# Patient Record
Sex: FEMALE | Race: WHITE | NOT HISPANIC OR LATINO | Employment: OTHER | URBAN - METROPOLITAN AREA
[De-identification: names, ages, dates, MRNs, and addresses within clinical notes are randomized per-mention and may not be internally consistent; named-entity substitution may affect disease eponyms.]

---

## 2017-01-09 RX ORDER — TIOTROPIUM BROMIDE 18 UG/1
CAPSULE ORAL; RESPIRATORY (INHALATION)
Qty: 90 CAPSULE | Refills: 3 | Status: SHIPPED | OUTPATIENT
Start: 2017-01-09 | End: 2017-05-08 | Stop reason: SDUPTHER

## 2017-01-11 RX ORDER — PROMETHAZINE HYDROCHLORIDE AND CODEINE PHOSPHATE 6.25; 1 MG/5ML; MG/5ML
SOLUTION ORAL
Qty: 120 ML | Refills: 0 | OUTPATIENT
Start: 2017-01-11

## 2017-01-12 ENCOUNTER — LAB VISIT (OUTPATIENT)
Dept: LAB | Facility: HOSPITAL | Age: 77
End: 2017-01-12
Attending: INTERNAL MEDICINE
Payer: MEDICARE

## 2017-01-12 ENCOUNTER — OFFICE VISIT (OUTPATIENT)
Dept: PULMONOLOGY | Facility: CLINIC | Age: 77
End: 2017-01-12
Payer: MEDICARE

## 2017-01-12 VITALS
DIASTOLIC BLOOD PRESSURE: 76 MMHG | HEIGHT: 62 IN | WEIGHT: 130.31 LBS | HEART RATE: 99 BPM | SYSTOLIC BLOOD PRESSURE: 128 MMHG | BODY MASS INDEX: 23.98 KG/M2 | OXYGEN SATURATION: 96 %

## 2017-01-12 DIAGNOSIS — R09.89 CHRONIC SINUS COMPLAINTS: ICD-10-CM

## 2017-01-12 DIAGNOSIS — D84.9 IMMUNE DEFICIENCY DISORDER: ICD-10-CM

## 2017-01-12 DIAGNOSIS — K21.9 GASTROESOPHAGEAL REFLUX DISEASE WITHOUT ESOPHAGITIS: ICD-10-CM

## 2017-01-12 DIAGNOSIS — J41.8 MIXED SIMPLE AND MUCOPURULENT CHRONIC BRONCHITIS: ICD-10-CM

## 2017-01-12 DIAGNOSIS — R05.3 CHRONIC COUGH: ICD-10-CM

## 2017-01-12 DIAGNOSIS — J41.8 MIXED SIMPLE AND MUCOPURULENT CHRONIC BRONCHITIS: Primary | ICD-10-CM

## 2017-01-12 LAB
IGA SERPL-MCNC: 134 MG/DL
IGG SERPL-MCNC: 999 MG/DL
IGM SERPL-MCNC: 78 MG/DL

## 2017-01-12 PROCEDURE — 82784 ASSAY IGA/IGD/IGG/IGM EACH: CPT

## 2017-01-12 PROCEDURE — 82784 ASSAY IGA/IGD/IGG/IGM EACH: CPT | Mod: 59

## 2017-01-12 PROCEDURE — 99205 OFFICE O/P NEW HI 60 MIN: CPT | Mod: S$PBB,,, | Performed by: INTERNAL MEDICINE

## 2017-01-12 PROCEDURE — 36415 COLL VENOUS BLD VENIPUNCTURE: CPT

## 2017-01-12 PROCEDURE — 86648 DIPHTHERIA ANTIBODY: CPT

## 2017-01-12 PROCEDURE — 99213 OFFICE O/P EST LOW 20 MIN: CPT | Mod: PBBFAC,PO | Performed by: INTERNAL MEDICINE

## 2017-01-12 PROCEDURE — 99999 PR PBB SHADOW E&M-EST. PATIENT-LVL III: CPT | Mod: PBBFAC,,, | Performed by: INTERNAL MEDICINE

## 2017-01-12 RX ORDER — METOPROLOL SUCCINATE 25 MG/1
25 TABLET, EXTENDED RELEASE ORAL DAILY
COMMUNITY
Start: 2017-01-09 | End: 2021-05-18 | Stop reason: SDUPTHER

## 2017-01-12 RX ORDER — ALBUTEROL SULFATE 90 UG/1
AEROSOL, METERED RESPIRATORY (INHALATION)
Qty: 1 INHALER | Refills: 11 | Status: SHIPPED | OUTPATIENT
Start: 2017-01-12 | End: 2017-05-08 | Stop reason: SDUPTHER

## 2017-01-12 RX ORDER — FLUTICASONE FUROATE AND VILANTEROL 200; 25 UG/1; UG/1
1 POWDER RESPIRATORY (INHALATION) DAILY
Qty: 1 EACH | Refills: 11 | Status: SHIPPED | OUTPATIENT
Start: 2017-01-12 | End: 2017-05-08 | Stop reason: SDUPTHER

## 2017-01-12 RX ORDER — AZITHROMYCIN 500 MG/1
TABLET, FILM COATED ORAL
Qty: 3 TABLET | Refills: 3 | Status: SHIPPED | OUTPATIENT
Start: 2017-01-12 | End: 2017-02-09 | Stop reason: SDUPTHER

## 2017-01-12 RX ORDER — PREDNISONE 20 MG/1
TABLET ORAL
Qty: 12 TABLET | Refills: 0 | Status: SHIPPED | OUTPATIENT
Start: 2017-01-12 | End: 2017-03-02 | Stop reason: SDUPTHER

## 2017-01-12 RX ORDER — HYDROCODONE BITARTRATE AND ACETAMINOPHEN 5; 325 MG/1; MG/1
1 TABLET ORAL EVERY 6 HOURS PRN
Qty: 90 TABLET | Refills: 0 | Status: ON HOLD | OUTPATIENT
Start: 2017-01-12 | End: 2017-03-29

## 2017-01-12 NOTE — MR AVS SNAPSHOT
[place] [facility]  [address] [facility] [address]  [place] [address]  Phone: [phone]                  Darlin Tipton   2017 9:40 AM   Office Visit    Description:  Female : 1940   Provider:  Luis Uriostegui MD   Department:  [place] [facility]           Reason for Visit     Cough     Bronchitis     Sputum Production           Diagnoses this Visit        Comments    Mixed simple and mucopurulent chronic bronchitis    -  Primary     Chronic sinus complaints         Chronic cough         Gastroesophageal reflux disease without esophagitis                To Do List           Future Appointments        Provider Department Dept Phone    2017 11:00 AM MD [place] [facility] [phone]    3/2/2017 10:30 AM MD [facility] - [facility] [phone]      Goals (5 Years of Data)     None      Follow-Up and Disposition     Return in about 4 weeks (around 2017).    Follow-up and Disposition History       These Medications        Disp Refills Start End    predniSONE (DELTASONE) 20 MG tablet 12 tablet 0 2017     One daily for 3 days and repeat for flare of lung symptoms as intructed    Pharmacy: [facility] - [address] Ph #: [phone]       albuterol 90 mcg/actuation inhaler 1 Inhaler 11 2017     2 puffs every 4 hours as needed for cough, wheeze, or shortness of breath    Pharmacy: [facility] - [address] Ph #: [phone]       fluticasone-vilanterol (BREO ELLIPTA) 200-25 mcg/dose DsDv diskus inhaler 1 each 11 2017     Inhale 1 puff into the lungs once daily. - Inhalation    Pharmacy: [facility] - [address] Ph #: [phone]       azithromycin (ZITHROMAX) 500 MG tablet 3 tablet 3 2017     One daily for yellow mucous, repeat if needed    Pharmacy: [facility] -  [address] Ph #: [phone]       hydrocodone-acetaminophen 5-325mg (NORCO) 5-325 mg per tablet 90 tablet 0 2017     Take 1 tablet by mouth every 6 (six) hours as needed for Pain (cough). - Oral    Pharmacy: [facility] - [address] Ph #: [phone]         [facility] On [facility]     [facility] On Call Nurse Care Line -  Assistance  Registered nurses in the [facility] On Call Center provide clinical advisement, health education, appointment booking, and other advisory services.  Call for this free service at [phone].             Medications           Message regarding Medications     Verify the changes and/or additions to your medication regime listed below are the same as discussed with your clinician today.  If any of these changes or additions are incorrect, please notify your healthcare provider.        START taking these NEW medications        Refills    predniSONE (DELTASONE) 20 MG tablet 0    Sig: One daily for 3 days and repeat for flare of lung symptoms as intructed    Class: Normal    albuterol 90 mcg/actuation inhaler 11    Si puffs every 4 hours as needed for cough, wheeze, or shortness of breath    Class: Normal    fluticasone-vilanterol (BREO ELLIPTA) 200-25 mcg/dose DsDv diskus inhaler 11    Sig: Inhale 1 puff into the lungs once daily.    Class: Normal    Route: Inhalation    azithromycin (ZITHROMAX) 500 MG tablet 3    Sig: One daily for yellow mucous, repeat if needed    Class: Normal    hydrocodone-acetaminophen 5-325mg (NORCO) 5-325 mg per tablet 0    Sig: Take 1 tablet by mouth every 6 (six) hours as needed for Pain (cough).    Class: Print    Route: Oral      STOP taking these medications     clopidogrel (PLAVIX) 75 mg tablet Take 75 mg by mouth once daily.      metoprolol tartrate (LOPRESSOR) 25 MG tablet Take 1 tablet (25 mg total) by mouth 2 (two) times daily.           Verify that the below list of medications  is an accurate representation of the medications you are currently taking.  If none reported, the list may be blank. If incorrect, please contact your healthcare provider. Carry this list with you in case of emergency.           Current Medications     acetaminophen (TYLENOL ARTHRITIS) 650 MG tablet 2 Tablet(s) Oral PRN Every day.      amitriptyline (ELAVIL) 50 MG tablet Take 50 mg by mouth every evening. Every day PRN    biotin 5 mg Tab Take by mouth.    blood sugar diagnostic (FREESTYLE LITE STRIPS) Strp 1 strip by Misc.(Non-Drug; Combo Route) route 2 (two) times daily.    blood-glucose meter (FREESTYLE LITE METER) kit Use as instructed    carboxymethylcellulose (REFRESH) 1 % ophthalmic solution as directed    cholecalciferol, vitamin D3, (VITAMIN D3) 5,000 unit Tab Take 5,000 Units by mouth once daily.    coenzyme Q10 100 mg capsule 1 Capsule(s) Oral PRN Every day.      cranberry extract (THERACRAN) 650 mg Cap Take 1 tablet by mouth 2 (two) times daily.      diclofenac sodium (VOLTAREN) 1 % Gel APPLY 2 G TOPICALLY 3 (THREE) TIMES DAILY.    digoxin (LANOXIN) 0.25 MG tablet Take 250 mcg by mouth once daily. 1/2 daily    fenofibric acid (TRILIPIX) 135 mg capsule Every day    fluticasone (FLONASE) 50 mcg/actuation nasal spray USE ONE SPRAY IN EACH NOSTRIL DAILY    hydrOXYzine HCl (ATARAX) 25 MG tablet TAKE 1 TABLET BY MOUTH EVERY EVENING AS NEEDED PRURITUS. DO NOT DRIVE WHILE TAKING    isosorbide mononitrate (IMDUR) 60 MG 24 hr tablet Take 60 mg by mouth once daily.    lancets Misc 1 Units by Misc.(Non-Drug; Combo Route) route 2 (two) times daily.    levothyroxine (LEVOTHROID) 25 MCG tablet Take 25 mcg by mouth before breakfast. Every day    metaxalone (SKELAXIN) 800 MG tablet TAKE 1 TABLET TWICE A DAY    metformin (GLUCOPHAGE) 500 MG tablet TAKE 1 TABLET TWICE A DAY WITH MEALS    MYRBETRIQ 50 mg Tb24     PREMARIN vaginal cream INSERT 1 GRAM VAGINALLY ONCE DAILY    rabeprazole (ACIPHEX) 20 mg tablet Take 20 mg by  "mouth once daily.      simvastatin (ZOCOR) 20 MG tablet Take 20 mg by mouth every evening. Every day    SPIRIVA WITH HANDIHALER 18 mcg inhalation capsule INHALE THE CONTENTS OF 1 CAPSULE DAILY    TOPROL XL 25 mg 24 hr tablet     triazolam (HALCION) 0.125 MG tablet Take 1 tablet (0.125 mg total) by mouth nightly as needed.    albuterol 90 mcg/actuation inhaler 2 puffs every 4 hours as needed for cough, wheeze, or shortness of breath    aspirin 325 MG tablet Take 1 tablet (325 mg total) by mouth once daily.    azithromycin (ZITHROMAX) 500 MG tablet One daily for yellow mucous, repeat if needed    ciclopirox (LOPROX) 0.77 % Crea Apply topically 2 (two) times daily.    fluticasone-vilanterol (BREO ELLIPTA) 200-25 mcg/dose DsDv diskus inhaler Inhale 1 puff into the lungs once daily.    hydrocodone-acetaminophen 5-325mg (NORCO) 5-325 mg per tablet Take 1 tablet by mouth every 6 (six) hours as needed for Pain (cough).    magnesium oxide (MAG-OX) 400 mg tablet Take 1 tablet (400 mg total) by mouth 2 (two) times daily.    nitroGLYCERIN (NITROSTAT) 0.4 MG SL tablet 0.4mg Sublingual PRN .      predniSONE (DELTASONE) 20 MG tablet Take one tablet daily x 7 days    predniSONE (DELTASONE) 20 MG tablet One daily for 3 days and repeat for flare of lung symptoms as intructed    ranitidine (ZANTAC) 150 MG tablet            Clinical Reference Information           Vital Signs - Last Recorded  Most recent update: 1/12/2017  9:51 AM by Lala De La Paz MA    BP Pulse Ht Wt SpO2 BMI    128/76 (BP Location: Right arm, Patient Position: Sitting, BP Method: Automatic) 99 5' 2" (1.575 m) 59.1 kg (130 lb 4.7 oz) 96% 23.83 kg/m2      Blood Pressure          Most Recent Value    BP  128/76      Allergies as of 1/12/2017     Cefaclor    Disalcid  [Salsalate]    Fenofibrate Micronized    Nitrofurantoin Macrocrystalline    Ofloxacin    Penicillins    Phenylfenesin [place]  [Phenylpropanolamine-gg]    Sulfa (Sulfonamide Antibiotics)    "   Immunizations Administered on Date of Encounter - 1/12/2017     None      Orders Placed During Today's Visit     Future Labs/Procedures Expected by Expires    AFB Culture & Smear  1/12/2017 3/13/2018    Humoral Immune Eval (Pneumo 14) with H. flu  1/12/2017 3/13/2018    IgA  1/12/2017 3/13/2018    IgG  1/12/2017 3/13/2018    IgM  1/12/2017 3/13/2018    AFB Culture & Smear  As directed 5/12/2017    AFB Culture & Smear  As directed 1/12/2018    Culture, Respiratory with Gram Stain  As directed 4/12/2017      Instructions    Chronic sinus   Try afrin 10 minutes before flonase if any stuffiness.   May help for ent check - passages stuffy, balloon sinuplasty??   Take z ayala if yg or pain?     Chronic bronchitis    Cause not clear?  Asthma?   Check breathing test and immune system and screen for chronic lung infection   Consider ct chest but ct abd was good?  May have airway disease?      Trial of asthma rx    breo one a day to prevent    Albuterol as needed 1-2 puffs for cough, may worsen then help     Prednisone one a day for 3 days and repeat if needed and helps.      Suppress cough or pain - hydrocodone - use minimally.

## 2017-01-12 NOTE — LETTER
January 12, 2017      Albert Watt MD  [address]  [place] [address]           [facility] [facility]  [address]  [place] [address]  Phone: [phone]          Patient: Darlin Tipton   MR Number: [id number]   YOB: 1940   Date of Visit: 1/12/2017       Dear Dr. Albert Watt:    Thank you for referring Darlin Tipton to me for evaluation. Attached you will find relevant portions of my assessment and plan of care.    If you have questions, please do not hesitate to call me. I look forward to following Darlin Tipton along with you.    Sincerely,    Luis Uriostegui MD    Enclosure  CC:  No Recipients    If you would like to receive this communication electronically, please contact [email or web address] or [phone] to request more information on [facility] Link access.    For providers and/or their staff who would like to refer a patient to [facility], please contact us through our one-stop-shop provider referral line, [facility], at [phone].    If you feel you have received this communication in error or would no longer like to receive these types of communications, please e-mail [email or web address]

## 2017-01-12 NOTE — PATIENT INSTRUCTIONS
Chronic sinus   Try afrin 10 minutes before flonase if any stuffiness.   May help for ent check - passages stuffy, balloon sinuplasty??   Take z ayala if yg or pain?     Chronic bronchitis    Cause not clear?  Asthma?   Check breathing test and immune system and screen for chronic lung infection   Consider ct chest but ct abd was good?  May have airway disease?      Trial of asthma rx    breo one a day to prevent    Albuterol as needed 1-2 puffs for cough, may worsen then help     Prednisone one a day for 3 days and repeat if needed and helps.      Suppress cough or pain - hydrocodone - use minimally.

## 2017-01-12 NOTE — PROGRESS NOTES
"1/12/2017    [place]  New Patient Consult    Chief Complaint   Patient presents with    Cough    Bronchitis    Sputum Production     greenish yellow       HPI: never smoker, no h/o lung problems, moved to [facility] in [facility] with developed allergies-- had cough and wheezes, supine worse, seasonal worse, not nocturnal, only used spiriva- helps a little, no recall albuterol.  Steroids helps some.  flonase helps , may have used singulair in past with  No help.      Smell ok, stuffy +, sinus headaches over eyes and left maxilary.      Had cabg in sept .     Cough violent with no problems.  Uses cough supressant with good results.  Tessalon no help.        The chief compliant  problem is new to me",   PFSH:  Past Medical History   Diagnosis Date    Allergy      Dust mites, Grasses, Trees    Arthritis     Asthma     Blood transfusion     CAD (coronary artery disease)     Cataract     CHRONIC BRONCHITIS     Diabetes mellitus     Diabetes mellitus type II     GERD (gastroesophageal reflux disease)     Hyperlipidemia     Hypertension     Irregular heart beat     Spinal stenosis     Thyroid disease      Hypothyroidism         Past Surgical History   Procedure Laterality Date    Coronary artery bypass graft  4/26/2004     x5    Spine surgery  3/2000     Tumor    Appendectomy  1968    Esophageal dilation      Cardiac surgery       CABG    Bladder suspension  1989    Wrist surgery  1993    Cataract extraction  9/2007 (L) and 10/2207 (R)     Social History   Substance Use Topics    Smoking status: Never Smoker    Smokeless tobacco: Never Used    Alcohol use Yes      Comment: Rare     Family History   Problem Relation Age of Onset    Allergic rhinitis Neg Hx     Allergies Neg Hx     Angioedema Neg Hx     Asthma Neg Hx     Eczema Neg Hx     Immunodeficiency Neg Hx     Urticaria Neg Hx     Rhinitis Neg Hx     Atopy Neg Hx      Review of patient's allergies indicates:   Allergen Reactions    " "Cefaclor      Other reaction(s): rash    Disalcid  [salsalate]      Other reaction(s): rash    Fenofibrate micronized      Other reaction(s): rash    Nitrofurantoin macrocrystalline      Other reaction(s): rash    Ofloxacin      Other reaction(s): rash    Penicillins      Other reaction(s): rash    Phenylfenesin la  [phenylpropanolamine-gg]      Other reaction(s): rash    Sulfa (sulfonamide antibiotics)      Other reaction(s): rash       Performance Status:The patient's activity level is no limits with regular activity.      Review of Systems:  a review of eleven systems covering constitutional, Eye, HEENT, Psych, Respiratory, Cardiac, GI, , Musculoskeletal, Endocrine, Dermatologic was negative except for pertinent findings as listed ABOVE and below:  Aspirates rare and sticking mid chest and severe gerd in past- had endo with dr quiñonez with stricture dilated lasting about a yr.      Exam:Comprehensive exam done.   Visit Vitals    /76 (BP Location: Right arm, Patient Position: Sitting, BP Method: Automatic)    Pulse 99    Ht 5' 2" (1.575 m)    Wt 59.1 kg (130 lb 4.7 oz)    SpO2 96%    BMI 23.83 kg/m2     Exam included Vitals as listed, and patient's appearance and affect and alertness and mood, oral exam for yeast and hygiene and pharynx lesions and Mallapatti (M) score, neck with inspection for jvd and masses and thyroid abnormalities and lymph nodes (supraclavicular and infraclavicular nodes and axillary also examined and noted if abn), chest exam included symmetry and effort and fremitus and percussion and auscultation, cardiac exam included rhythm and gallops and murmur and rubs and jvd and edema, abdominal exam for mass and hepatosplenomegaly and tenderness and hernias and bowel sounds, Musculoskeletal exam with muscle tone and posture and mobility/gait and  strength, and skin for rashes and cyanosis and pallor and turgor, extremity for clubbing.  Findings were normal except for " pertinent findings listed below:   M2 and clear chest, barking cough, trace edema post farrukh harvest r> l    Radiographs (ct chest and cxr) reviewed: view by direct vision  Post op cabg and ? copd    Labs reviewed         PFT will be done and results to be reviewed     Plan:  Clinical impression is resonably certain and repeated evaluation prn +/- follow up will be needed as below.    [place] was seen today for cough, bronchitis and sputum production.    Diagnoses and all orders for this visit:    Mixed simple and mucopurulent chronic bronchitis  -     predniSONE (DELTASONE) 20 MG tablet; One daily for 3 days and repeat for flare of lung symptoms as intructed  -     albuterol 90 mcg/actuation inhaler; 2 puffs every 4 hours as needed for cough, wheeze, or shortness of breath  -     fluticasone-vilanterol (BREO ELLIPTA) 200-25 mcg/dose DsDv diskus inhaler; Inhale 1 puff into the lungs once daily.  -     azithromycin (ZITHROMAX) 500 MG tablet; One daily for yellow mucous, repeat if needed  -     Culture, Respiratory with Gram Stain; Future  -     AFB Culture & Smear; Future  -     AFB Culture & Smear; Future  -     IgG; Future  -     Humoral Immune Eval (Pneumo 14) with H. flu; Future  -     IgM; Future  -     IgA; Future  -     AFB Culture & Smear; Future    Chronic sinus complaints  -     IgG; Future  -     Humoral Immune Eval (Pneumo 14) with H. flu; Future  -     IgM; Future  -     IgA; Future    Chronic cough  -     hydrocodone-acetaminophen 5-325mg (NORCO) 5-325 mg per tablet; Take 1 tablet by mouth every 6 (six) hours as needed for Pain (cough).    Gastroesophageal reflux disease without esophagitis        Return in about 4 weeks (around 2/9/2017).    Discussed with patient above for education the following:    Chronic sinus   Try afrin 10 minutes before flonase if any stuffiness.   May help for ent check - passages stuffy, balloon sinuplasty??   Take z ayala if yg or pain?     Chronic bronchitis    Cause not clear?   Asthma?   Check breathing test and immune system and screen for chronic lung infection   Consider ct chest but ct abd was good?  May have airway disease?      Trial of asthma rx    breo one a day to prevent    Albuterol as needed 1-2 puffs for cough, may worsen then help     Prednisone one a day for 3 days and repeat if needed and helps.      Suppress cough or pain - hydrocodone - use minimally.

## 2017-01-18 LAB
C DIPHTHERIAE AB SER IA-ACNC: 0.59 IU/ML
C TETANI AB SER-ACNC: 0.15 IU/ML
DEPRECATED S PNEUM 1 IGG SER-MCNC: 16.7 MCG/ML
DEPRECATED S PNEUM12 IGG SER-MCNC: 0.4 MCG/ML
DEPRECATED S PNEUM14 IGG SER-MCNC: 0.7 MCG/ML
DEPRECATED S PNEUM19 IGG SER-MCNC: 1.4 MCG/ML
DEPRECATED S PNEUM23 IGG SER-MCNC: <0.3 MCG/ML
DEPRECATED S PNEUM3 IGG SER-MCNC: 1.8 MCG/ML
DEPRECATED S PNEUM4 IGG SER-MCNC: 0.5 MCG/ML
DEPRECATED S PNEUM5 IGG SER-MCNC: 2.9 MCG/ML
DEPRECATED S PNEUM8 IGG SER-MCNC: 1.3 MCG/ML
DEPRECATED S PNEUM9 IGG SER-MCNC: 0.8 MCG/ML
HAEM INFLU B IGG SER-MCNC: 0.73 MG/L
S PNEUM DA 18C IGG SER-MCNC: 0.7 MCG/ML
S PNEUM DA 6B IGG SER-MCNC: 2.6 MCG/ML
S PNEUM DA 7F IGG SER-MCNC: 5.4 MCG/ML
S PNEUM DA 9V IGG SER-MCNC: 1.2 MCG/ML

## 2017-01-20 ENCOUNTER — TELEPHONE (OUTPATIENT)
Dept: PULMONOLOGY | Facility: CLINIC | Age: 77
End: 2017-01-20

## 2017-01-20 NOTE — TELEPHONE ENCOUNTER
Spoke to patient gave her information from Luis Shen : Pneumonia vaccine needed, Prevnar 13 preferred- but pneumovax would be fine.  Ordered prevnar 13   scheduled 01/24/2017 at 1015 am at [facility]-  Ordered labs Humeral(pneum 14)on 03/10/17 to repeat lab in 6-8 wks.  Patient stated she understands and agrees.

## 2017-01-20 NOTE — TELEPHONE ENCOUNTER
----- Message from Luis Uriostegui MD sent at 1/18/2017 12:38 PM CST -----  Pneumonia vaccine needed, Prevnar 13 preferred- but pneumovax would be fine.

## 2017-01-24 ENCOUNTER — CLINICAL SUPPORT (OUTPATIENT)
Dept: INTERNAL MEDICINE | Facility: CLINIC | Age: 77
End: 2017-01-24
Payer: MEDICARE

## 2017-01-24 PROCEDURE — 90670 PCV13 VACCINE IM: CPT | Mod: PBBFAC,PO

## 2017-01-24 NOTE — PROGRESS NOTES
Two person identification name, d.o.b with verbal feedback.  Aseptic technique used.  Administration Prevnar 13 vaccine to the  R Deltoid IM given.  Tolerated well.  waited 15 min.  VIS 11/5/2015 given./mp

## 2017-02-08 ENCOUNTER — TELEPHONE (OUTPATIENT)
Dept: FAMILY MEDICINE | Facility: CLINIC | Age: 77
End: 2017-02-08

## 2017-02-08 DIAGNOSIS — E11.9 TYPE 2 DIABETES MELLITUS WITHOUT COMPLICATION: Chronic | ICD-10-CM

## 2017-02-08 NOTE — TELEPHONE ENCOUNTER
----- Message from Renuka Bear sent at 2/8/2017  1:56 PM CST -----  Contact: pt [phone]  Patient called for a refill on her Test strips to be called into [facility] by [facility] patient states she is out of the test strips.

## 2017-02-09 ENCOUNTER — OFFICE VISIT (OUTPATIENT)
Dept: PULMONOLOGY | Facility: CLINIC | Age: 77
End: 2017-02-09
Payer: MEDICARE

## 2017-02-09 VITALS
HEIGHT: 62 IN | DIASTOLIC BLOOD PRESSURE: 82 MMHG | HEART RATE: 96 BPM | OXYGEN SATURATION: 94 % | BODY MASS INDEX: 24.22 KG/M2 | SYSTOLIC BLOOD PRESSURE: 137 MMHG | WEIGHT: 131.63 LBS

## 2017-02-09 DIAGNOSIS — D84.9 IMMUNE DEFICIENCY DISORDER: ICD-10-CM

## 2017-02-09 DIAGNOSIS — R09.89 CHRONIC SINUS COMPLAINTS: Primary | ICD-10-CM

## 2017-02-09 DIAGNOSIS — J41.8 MIXED SIMPLE AND MUCOPURULENT CHRONIC BRONCHITIS: ICD-10-CM

## 2017-02-09 DIAGNOSIS — R05.3 CHRONIC COUGH: ICD-10-CM

## 2017-02-09 PROCEDURE — 99215 OFFICE O/P EST HI 40 MIN: CPT | Mod: PBBFAC,PO | Performed by: INTERNAL MEDICINE

## 2017-02-09 PROCEDURE — 99999 PR PBB SHADOW E&M-EST. PATIENT-LVL V: CPT | Mod: PBBFAC,,, | Performed by: INTERNAL MEDICINE

## 2017-02-09 PROCEDURE — 99214 OFFICE O/P EST MOD 30 MIN: CPT | Mod: S$PBB,,, | Performed by: INTERNAL MEDICINE

## 2017-02-09 RX ORDER — AZELASTINE 1 MG/ML
1 SPRAY, METERED NASAL 2 TIMES DAILY
Qty: 90 ML | Refills: 3 | Status: SHIPPED | OUTPATIENT
Start: 2017-02-09 | End: 2017-05-08 | Stop reason: SDUPTHER

## 2017-02-09 RX ORDER — PREDNISONE 20 MG/1
TABLET ORAL
Qty: 12 TABLET | Refills: 0 | Status: SHIPPED | OUTPATIENT
Start: 2017-02-09 | End: 2017-03-02 | Stop reason: SDUPTHER

## 2017-02-09 RX ORDER — AZITHROMYCIN 500 MG/1
TABLET, FILM COATED ORAL
Qty: 3 TABLET | Refills: 3 | Status: SHIPPED | OUTPATIENT
Start: 2017-02-09 | End: 2017-03-06

## 2017-02-09 RX ORDER — BLOOD-GLUCOSE METER
KIT MISCELLANEOUS
Qty: 200 STRIP | Refills: 3 | Status: SHIPPED | OUTPATIENT
Start: 2017-02-09 | End: 2019-03-28 | Stop reason: SDUPTHER

## 2017-02-09 NOTE — PATIENT INSTRUCTIONS
Sinus    Try astelin along with flonase, may use as needed or regular.   Take z ayala if pain or yellow mucous     If remains unstable needing therapy, would do ct sinus, could do prior to next visit if explicitly ask for ct- call if needed    Asthma    Continue breo and use prednisone daily for 3 days if needed.     Check lung capacity prior next visit    Immune weakness   Will re check if not stable.

## 2017-02-09 NOTE — PROGRESS NOTES
"2/9/2017    Darlin Tipton  New Patient Consult    Chief Complaint   Patient presents with    Follow-up     4 wk    Bronchitis    Cough     Feb 9, cleared for a few days and relapsed ppt another course prednisone last 3 days,  Uses breo and flonase dailly and albuterol helped.  Sinuses better but still with left sinus pain. z ayala used once.      8/14 pneumo titers are weak, had repeat vaccine last month.      Jan 12, 2017HPI: never smoker, no h/o lung problems, moved to [facility] in [facility] with developed allergies-- had cough and wheezes, supine worse, seasonal worse, not nocturnal, only used spiriva- helps a little, no recall albuterol.  Steroids helps some.  flonase helps , may have used singulair in past with  No help.      Smell ok, stuffy +, sinus headaches over eyes and left maxilary.      Had cabg in sept .     Cough violent with no problems.  Uses cough supressant with good results.  Tessalon no help.        The chief compliant  problem is new to me",   PFSH:  Past Medical History   Diagnosis Date    Allergy      Dust mites, Grasses, Trees    Arthritis     Asthma     Blood transfusion     CAD (coronary artery disease)     Cataract     CHRONIC BRONCHITIS     Diabetes mellitus     Diabetes mellitus type II     GERD (gastroesophageal reflux disease)     Hyperlipidemia     Hypertension     Irregular heart beat     Spinal stenosis     Thyroid disease      Hypothyroidism         Past Surgical History   Procedure Laterality Date    Coronary artery bypass graft  4/26/2004     x5    Spine surgery  3/2000     Tumor    Appendectomy  1968    Esophageal dilation      Cardiac surgery       CABG    Bladder suspension  1989    Wrist surgery  1993    Cataract extraction  9/2007 (L) and 10/2207 (R)     Social History   Substance Use Topics    Smoking status: Never Smoker    Smokeless tobacco: Never Used    Alcohol use Yes      Comment: Rare     Family History   Problem Relation Age of Onset    " "Allergic rhinitis Neg Hx     Allergies Neg Hx     Angioedema Neg Hx     Asthma Neg Hx     Eczema Neg Hx     Immunodeficiency Neg Hx     Urticaria Neg Hx     Rhinitis Neg Hx     Atopy Neg Hx      Review of patient's allergies indicates:   Allergen Reactions    Cefaclor      Other reaction(s): rash    Disalcid  [salsalate]      Other reaction(s): rash    Fenofibrate micronized      Other reaction(s): rash    Nitrofurantoin macrocrystalline      Other reaction(s): rash    Ofloxacin      Other reaction(s): rash    Penicillins      Other reaction(s): rash    Phenylfenesin la  [phenylpropanolamine-gg]      Other reaction(s): rash    Sulfa (sulfonamide antibiotics)      Other reaction(s): rash       Performance Status:The patient's activity level is no limits with regular activity.      Review of Systems:  a review of eleven systems covering constitutional, Eye, HEENT, Psych, Respiratory, Cardiac, GI, , Musculoskeletal, Endocrine, Dermatologic was negative except for pertinent findings as listed ABOVE and below:  Aspirates rare and sticking mid chest and severe gerd in past- had endo with dr quiñonez with stricture dilated lasting about a yr.      Exam:Comprehensive exam done.   Visit Vitals    /82 (BP Location: Right arm, Patient Position: Sitting, BP Method: Automatic)    Pulse 96    Ht 5' 2" (1.575 m)    Wt 59.7 kg (131 lb 9.8 oz)    SpO2 (!) 94%    BMI 24.07 kg/m2     Exam included Vitals as listed, and patient's appearance and affect and alertness and mood, oral exam for yeast and hygiene and pharynx lesions and Mallapatti (M) score, neck with inspection for jvd and masses and thyroid abnormalities and lymph nodes (supraclavicular and infraclavicular nodes and axillary also examined and noted if abn), chest exam included symmetry and effort and fremitus and percussion and auscultation, cardiac exam included rhythm and gallops and murmur and rubs and jvd and edema, abdominal exam for mass " and hepatosplenomegaly and tenderness and hernias and bowel sounds, Musculoskeletal exam with muscle tone and posture and mobility/gait and  strength, and skin for rashes and cyanosis and pallor and turgor, extremity for clubbing.  Findings were normal except for pertinent findings listed below:   M2 and clear chest, no cough, trace edema post farrukh harvest r> l    Radiographs (ct chest and cxr) reviewed: view by direct vision  Post op cabg and ? copd    Labs reviewed         PFT will be done and results to be reviewed     Plan:  Clinical impression is resonably certain and repeated evaluation prn +/- follow up will be needed as below.    Darlin was seen today for follow-up, bronchitis and cough.    Diagnoses and all orders for this visit:    Chronic sinus complaints  -     azelastine (ASTELIN) 137 mcg (0.1 %) nasal spray; 1 spray (137 mcg total) by Nasal route 2 (two) times daily.  -     predniSONE (DELTASONE) 20 MG tablet; One daily for 3 days and repeat for flare of lung symptoms as intructed    Mixed simple and mucopurulent chronic bronchitis  -     azithromycin (ZITHROMAX) 500 MG tablet; One daily for yellow mucous, repeat if needed    Chronic cough  -     azelastine (ASTELIN) 137 mcg (0.1 %) nasal spray; 1 spray (137 mcg total) by Nasal route 2 (two) times daily.  -     predniSONE (DELTASONE) 20 MG tablet; One daily for 3 days and repeat for flare of lung symptoms as intructed  -     Complete PFT with bronchodilator; Future    Asthma, persistent  -     predniSONE (DELTASONE) 20 MG tablet; One daily for 3 days and repeat for flare of lung symptoms as intructed  -     Complete PFT with bronchodilator; Future    Immune deficiency disorder      Return in about 3 months (around 5/9/2017).    Discussed with patient above for education the following:           Sinus    Try astelin along with flonase, may use as needed or regular.   Take z ayala if pain or yellow mucous     If remains unstable needing therapy, would  do ct sinus, could do prior to next visit if explicitly ask for ct- call if needed    Asthma    Continue breo and use prednisone daily for 3 days if needed.     Check lung capacity prior next visit    Immune weakness   Will re check if not stable.

## 2017-02-15 RX ORDER — CONJUGATED ESTROGENS 0.62 MG/G
CREAM VAGINAL
Qty: 30 G | Refills: 0 | Status: SHIPPED | OUTPATIENT
Start: 2017-02-15 | End: 2017-03-06 | Stop reason: SDUPTHER

## 2017-02-15 RX ORDER — TRIAZOLAM 0.12 MG/1
0.12 TABLET ORAL NIGHTLY PRN
Qty: 90 TABLET | Refills: 1 | Status: SHIPPED | OUTPATIENT
Start: 2017-02-15 | End: 2017-07-25 | Stop reason: SDUPTHER

## 2017-02-20 ENCOUNTER — DOCUMENTATION ONLY (OUTPATIENT)
Dept: FAMILY MEDICINE | Facility: CLINIC | Age: 77
End: 2017-02-20

## 2017-02-20 NOTE — PROGRESS NOTES
Pre-Visit Chart Review  For Appointment Scheduled on (date) 3/2/17    Health Maintenance Due   Topic Date Due    TETANUS VACCINE  08/02/1958    Zoster Vaccine  08/02/2000    Urine Microalbumin  02/18/2017

## 2017-03-02 ENCOUNTER — LAB VISIT (OUTPATIENT)
Dept: LAB | Facility: HOSPITAL | Age: 77
End: 2017-03-02
Attending: FAMILY MEDICINE
Payer: MEDICARE

## 2017-03-02 ENCOUNTER — OFFICE VISIT (OUTPATIENT)
Dept: FAMILY MEDICINE | Facility: CLINIC | Age: 77
End: 2017-03-02
Payer: MEDICARE

## 2017-03-02 VITALS
DIASTOLIC BLOOD PRESSURE: 72 MMHG | HEIGHT: 62 IN | BODY MASS INDEX: 24.22 KG/M2 | WEIGHT: 131.63 LBS | SYSTOLIC BLOOD PRESSURE: 132 MMHG | TEMPERATURE: 98 F | HEART RATE: 93 BPM | RESPIRATION RATE: 18 BRPM

## 2017-03-02 DIAGNOSIS — K21.9 GASTROESOPHAGEAL REFLUX DISEASE WITHOUT ESOPHAGITIS: ICD-10-CM

## 2017-03-02 DIAGNOSIS — E11.49 CONTROLLED TYPE 2 DIABETES MELLITUS WITH OTHER NEUROLOGIC COMPLICATION, UNSPECIFIED LONG TERM INSULIN USE STATUS: ICD-10-CM

## 2017-03-02 DIAGNOSIS — M54.16 LUMBAR RADICULOPATHY, ACUTE: ICD-10-CM

## 2017-03-02 DIAGNOSIS — J41.8 MIXED SIMPLE AND MUCOPURULENT CHRONIC BRONCHITIS: ICD-10-CM

## 2017-03-02 DIAGNOSIS — E11.49 CONTROLLED TYPE 2 DIABETES MELLITUS WITH OTHER NEUROLOGIC COMPLICATION, UNSPECIFIED LONG TERM INSULIN USE STATUS: Primary | ICD-10-CM

## 2017-03-02 LAB
CREAT UR-MCNC: 71 MG/DL
MICROALBUMIN UR DL<=1MG/L-MCNC: 29 UG/ML
MICROALBUMIN/CREATININE RATIO: 40.8 UG/MG

## 2017-03-02 PROCEDURE — 82570 ASSAY OF URINE CREATININE: CPT

## 2017-03-02 PROCEDURE — 99999 PR PBB SHADOW E&M-EST. PATIENT-LVL III: CPT | Mod: PBBFAC,,, | Performed by: FAMILY MEDICINE

## 2017-03-02 PROCEDURE — 99213 OFFICE O/P EST LOW 20 MIN: CPT | Mod: S$PBB,,, | Performed by: FAMILY MEDICINE

## 2017-03-02 RX ORDER — PREDNISONE 20 MG/1
TABLET ORAL
Qty: 15 TABLET | Refills: 0 | Status: ON HOLD | OUTPATIENT
Start: 2017-03-02 | End: 2017-03-29 | Stop reason: ALTCHOICE

## 2017-03-02 RX ORDER — CETIRIZINE HYDROCHLORIDE 10 MG/1
10 TABLET ORAL DAILY
COMMUNITY
End: 2023-10-18 | Stop reason: ALTCHOICE

## 2017-03-02 RX ORDER — ASPIRIN 81 MG/1
81 TABLET ORAL DAILY
COMMUNITY
End: 2022-04-20

## 2017-03-02 NOTE — MR AVS SNAPSHOT
[facility]  [address]  [place] [address]  Phone: [phone]  Fax: [phone]                  Darlin Tipton   3/2/2017 10:30 AM   Office Visit    Description:  Female : 1940   Provider:  Oumar Almonte Jr., MD   Department:  [facility]           Reason for Visit     Follow-up           Diagnoses this Visit        Comments    Lumbar radiculopathy, acute    -  Primary     Mixed simple and mucopurulent chronic bronchitis         Controlled type 2 diabetes mellitus with other neurologic complication, unspecified long term insulin use status                To Do List           Future Appointments        Provider Department Dept Phone    3/6/2017 10:15 AM [place] [facility] [place] [phone]    3/10/2017 10:45 AM [facility] [phone]    2017 10:00 AM [facility] PULMONARY [facility] [phone]    2017 10:20 AM [place] [phone]    10/5/2017 10:30 AM Oumar Almonte Jr., MD [facility] [phone]      Goals (5 Years of Data)     None      Follow-Up and Disposition     Return in about 6 months (around 2017).       These Medications        Disp Refills Start End    predniSONE (DELTASONE) 20 MG tablet 15 tablet 0 3/2/2017     Take 2 tablets daily x 5 days then 1 tablet daily x 5 days    Pharmacy: [facility]/pharmacy #[id number] - [place]  [address]. Ph #: [phone]         [facility]     [facility] -  Assistance  Registered nurses in the [facility] provide clinical advisement, health education, appointment booking, and other advisory services.  Call for this free service at [phone].             Medications           Message regarding Medications     Verify the changes and/or additions to your medication regime listed below are the same as discussed with your clinician today.  If any  of these changes or additions are incorrect, please notify your healthcare provider.        CHANGE how you are taking these medications     Start Taking Instead of    predniSONE (DELTASONE) 20 MG tablet predniSONE (DELTASONE) 20 MG tablet    Dosage:  Take 2 tablets daily x 5 days then 1 tablet daily x 5 days Dosage:  One daily for 3 days and repeat for flare of lung symptoms as intructed    Reason for Change:  Reorder       STOP taking these medications     ciclopirox (LOPROX) 0.77 % Crea Apply topically 2 (two) times daily.    hydrOXYzine HCl (ATARAX) 25 MG tablet TAKE 1 TABLET BY MOUTH EVERY EVENING AS NEEDED PRURITUS. DO NOT DRIVE WHILE TAKING    ranitidine (ZANTAC) 150 MG tablet            Verify that the below list of medications is an accurate representation of the medications you are currently taking.  If none reported, the list may be blank. If incorrect, please contact your healthcare provider. Carry this list with you in case of emergency.           Current Medications     acetaminophen (TYLENOL ARTHRITIS) 650 MG tablet 2 Tablet(s) Oral PRN Every day.      albuterol 90 mcg/actuation inhaler 2 puffs every 4 hours as needed for cough, wheeze, or shortness of breath    amitriptyline (ELAVIL) 50 MG tablet Take 50 mg by mouth every evening. Every day PRN    aspirin (ECOTRIN) 81 MG EC tablet Take 81 mg by mouth once daily.    azelastine (ASTELIN) 137 mcg (0.1 %) nasal spray 1 spray (137 mcg total) by Nasal route 2 (two) times daily.    B INFANTIS/B ANI/B RADHA/B BIFID (PROBIOTIC 4X ORAL) Take by mouth.    biotin 5 mg Tab Take by mouth.    carboxymethylcellulose (REFRESH) 1 % ophthalmic solution as directed    cetirizine (ZYRTEC) 10 MG tablet Take 10 mg by mouth once daily.    cholecalciferol, vitamin D3, (VITAMIN D3) 5,000 unit Tab Take 2,000 Units by mouth once daily.     coenzyme Q10 100 mg capsule 1 Capsule(s) Oral PRN Every day.      cranberry extract (THERACRAN) 650 mg Cap Take 1 tablet by mouth 2 (two) times  daily.      diclofenac sodium (VOLTAREN) 1 % Gel APPLY 2 G TOPICALLY 3 (THREE) TIMES DAILY.    digoxin (LANOXIN) 0.25 MG tablet Take 250 mcg by mouth once daily. 1/2 daily    fenofibric acid (TRILIPIX) 135 mg capsule Every day    fluticasone (FLONASE) 50 mcg/actuation nasal spray USE ONE SPRAY IN EACH NOSTRIL DAILY    fluticasone-vilanterol (BREO ELLIPTA) 200-25 mcg/dose DsDv diskus inhaler Inhale 1 puff into the lungs once daily.    FREESTYLE LITE STRIPS Strp USE TO TEST BLOOD SUGAR TWICE A DAY    GUAIFENESIN/DEXTROMETHORPHAN (DIABETIC TUSSIN DM ORAL) Take by mouth.    isosorbide mononitrate (IMDUR) 60 MG 24 hr tablet Take 60 mg by mouth once daily.    lancets Misc 1 Units by Misc.(Non-Drug; Combo Route) route 2 (two) times daily.    levothyroxine (LEVOTHROID) 25 MCG tablet Take 25 mcg by mouth before breakfast. Every day    metaxalone (SKELAXIN) 800 MG tablet TAKE 1 TABLET TWICE A DAY    metformin (GLUCOPHAGE) 500 MG tablet TAKE 1 TABLET TWICE A DAY WITH MEALS    MYRBETRIQ 50 mg Tb24     nitroGLYCERIN (NITROSTAT) 0.4 MG SL tablet 0.4mg Sublingual PRN .      PREMARIN vaginal cream INSERT 1 GRAM VAGINALLY ONCE DAILY    rabeprazole (ACIPHEX) 20 mg tablet Take 20 mg by mouth once daily.      simvastatin (ZOCOR) 20 MG tablet Take 20 mg by mouth every evening. Every day    SPIRIVA WITH HANDIHALER 18 mcg inhalation capsule INHALE THE CONTENTS OF 1 CAPSULE DAILY    TOPROL XL 25 mg 24 hr tablet     triazolam (HALCION) 0.125 MG tablet Take 1 tablet (0.125 mg total) by mouth nightly as needed.    vitamins  A,C,E-zinc-copper (PRESERVISION AREDS) 14,320-226-200 unit-mg-unit Cap Take by mouth.    azithromycin (ZITHROMAX) 500 MG tablet One daily for yellow mucous, repeat if needed    hydrocodone-acetaminophen 5-325mg (NORCO) 5-325 mg per tablet Take 1 tablet by mouth every 6 (six) hours as needed for Pain (cough).    magnesium oxide (MAG-OX) 400 mg tablet Take 1 tablet (400 mg total) by mouth 2 (two) times daily.    predniSONE  (DELTASONE) 20 MG tablet Take 2 tablets daily x 5 days then 1 tablet daily x 5 days           Clinical Reference Information           Your Vitals Were     BP                   132/72 (BP Location: Right arm, Patient Position: Sitting, BP Method: Automatic)           Blood Pressure          Most Recent Value    BP  132/72      Allergies as of 3/2/2017     Cefaclor    Disalcid  [Salsalate]    Fenofibrate Micronized    Nitrofurantoin Macrocrystalline    Ofloxacin    Penicillins    Phenylfenesin La  [Phenylpropanolamine-gg]    Sulfa (Sulfonamide Antibiotics)      Immunizations Administered on Date of Encounter - 3/2/2017     None      Orders Placed During Today's Visit     Future Labs/Procedures Expected by Expires    Microalbumin/creatinine urine ratio  3/2/2017 5/1/2018      Language Assistance Services     ATTENTION: Language assistance services are available, free of charge. Please call [phone].      ATENCIÓN: Si guerrero fonseca, tiene a ruth disposición servicios gratuitos de asistencia lingüística. Llame al [phone].     CHÚ Ý: N?u b?n nói Ti?ng Vi?t, có các d?ch v? h? tr? ngôn ng? mi?n phí dành cho b?n. G?i s? [phone].         [facility] complies with applicable Federal civil rights laws and does not discriminate on the basis of race, color, national origin, age, disability, or sex.

## 2017-03-06 ENCOUNTER — OFFICE VISIT (OUTPATIENT)
Dept: UROLOGY | Facility: CLINIC | Age: 77
End: 2017-03-06
Payer: MEDICARE

## 2017-03-06 ENCOUNTER — APPOINTMENT (OUTPATIENT)
Dept: LAB | Facility: HOSPITAL | Age: 77
End: 2017-03-06
Attending: UROLOGY
Payer: MEDICARE

## 2017-03-06 VITALS
HEART RATE: 86 BPM | DIASTOLIC BLOOD PRESSURE: 82 MMHG | WEIGHT: 132 LBS | SYSTOLIC BLOOD PRESSURE: 149 MMHG | HEIGHT: 62 IN | TEMPERATURE: 98 F | BODY MASS INDEX: 24.29 KG/M2

## 2017-03-06 DIAGNOSIS — R31.9 HEMATURIA: Primary | ICD-10-CM

## 2017-03-06 LAB
BILIRUB SERPL-MCNC: ABNORMAL MG/DL
BLOOD URINE, POC: ABNORMAL
COLOR, POC UA: ABNORMAL
GLUCOSE UR QL STRIP: ABNORMAL
KETONES UR QL STRIP: ABNORMAL
LEUKOCYTE ESTERASE URINE, POC: ABNORMAL
NITRITE, POC UA: ABNORMAL
PH, POC UA: 5
PROTEIN, POC: ABNORMAL
SPECIFIC GRAVITY, POC UA: 1.01
UROBILINOGEN, POC UA: ABNORMAL

## 2017-03-06 PROCEDURE — 99999 PR PBB SHADOW E&M-EST. PATIENT-LVL V: CPT | Mod: PBBFAC,,, | Performed by: UROLOGY

## 2017-03-06 PROCEDURE — 99213 OFFICE O/P EST LOW 20 MIN: CPT | Mod: S$PBB,,, | Performed by: UROLOGY

## 2017-03-06 PROCEDURE — 81002 URINALYSIS NONAUTO W/O SCOPE: CPT | Mod: PBBFAC,PO | Performed by: UROLOGY

## 2017-03-06 PROCEDURE — 88112 CYTOPATH CELL ENHANCE TECH: CPT | Performed by: PATHOLOGY

## 2017-03-06 PROCEDURE — 99215 OFFICE O/P EST HI 40 MIN: CPT | Mod: PBBFAC,PO | Performed by: UROLOGY

## 2017-03-06 PROCEDURE — 88112 CYTOPATH CELL ENHANCE TECH: CPT | Mod: 26,,, | Performed by: PATHOLOGY

## 2017-03-06 RX ORDER — ACETAMINOPHEN 500 MG
1 TABLET ORAL DAILY
COMMUNITY
End: 2023-10-18 | Stop reason: ALTCHOICE

## 2017-03-06 RX ORDER — ESTRADIOL 0.1 MG/G
3 CREAM VAGINAL
Qty: 3 TUBE | Refills: 3 | Status: ON HOLD | OUTPATIENT
Start: 2017-03-06 | End: 2017-03-29

## 2017-03-06 NOTE — PROGRESS NOTES
Subjective:       Patient ID: Darlin Tipton is a 76 y.o. female.    Chief Complaint: Diabetes; CAD F/U; and Hyperlipidemia    HPI Comments: Patient presents here for six-month follow-up of diabetes, hyperlipidemia, and CAD.  Her history is pertinent for the fact that she underwent a four-vessel repeat CABG on 9/13/16.  She has recovered well from the surgery and has no complaints and no pains at this time.  Her hyperlipidemia is stable with her present dose of fenofibrate acid as well as her low-sodium low-fat low-cholesterol diet.  She does complain of some back pain with radiculopathy down her right leg for the last 3 weeks.  She states it feels like a previous episode of sciatica.  She has pain both at rest and at exercise.    Diabetes   She presents for her follow-up diabetic visit. She has type 2 diabetes mellitus. Her disease course has been stable. There are no hypoglycemic associated symptoms. Pertinent negatives for hypoglycemia include no dizziness, headaches or nervousness/anxiousness. There are no diabetic associated symptoms. Pertinent negatives for diabetes include no chest pain and no fatigue. Symptoms are stable. Diabetic complications include heart disease. Pertinent negatives for diabetic complications include no CVA, nephropathy, peripheral neuropathy or retinopathy. Risk factors for coronary artery disease include diabetes mellitus, dyslipidemia and post-menopausal. Current diabetic treatment includes oral agent (monotherapy). She is compliant with treatment all of the time. Her weight is stable. She is following a diabetic, low fat/cholesterol and low salt diet. Meal planning includes ADA exchanges and avoidance of concentrated sweets. She has had a previous visit with a dietitian. She participates in exercise intermittently. There is no change in her home blood glucose trend. An ACE inhibitor/angiotensin II receptor blocker is not being taken. She sees a podiatrist.Eye exam is current.    Hyperlipidemia   This is a chronic problem. The problem is controlled. Recent lipid tests were reviewed and are normal. Pertinent negatives include no chest pain or shortness of breath. Current antihyperlipidemic treatment includes statins. The current treatment provides moderate improvement of lipids. There are no compliance problems.  Risk factors for coronary artery disease include diabetes mellitus, dyslipidemia and post-menopausal.     Review of Systems   Constitutional: Positive for activity change. Negative for chills, fatigue, fever and unexpected weight change.   HENT: Negative for congestion, ear pain, postnasal drip and sore throat.    Eyes: Negative for pain and visual disturbance.        Up-to-date with eye exams   Respiratory: Negative for cough, shortness of breath and wheezing.    Cardiovascular: Negative for chest pain, palpitations and leg swelling.   Gastrointestinal: Negative for abdominal pain, constipation, diarrhea, nausea and vomiting.   Genitourinary: Negative for difficulty urinating, dysuria and flank pain.   Musculoskeletal: Positive for back pain. Negative for arthralgias.   Neurological: Negative for dizziness, light-headedness and headaches.   Psychiatric/Behavioral: Negative for sleep disturbance. The patient is not nervous/anxious.        Objective:      Physical Exam   Constitutional: She is oriented to person, place, and time. She appears well-developed and well-nourished.   HENT:   Head: Normocephalic and atraumatic.   Right Ear: External ear normal.   Left Ear: External ear normal.   Nose: Nose normal.   Mouth/Throat: Oropharynx is clear and moist.   Neck: Neck supple. No thyromegaly present.   Cardiovascular: Normal rate, regular rhythm, normal heart sounds and intact distal pulses.    No murmur heard.  Pulmonary/Chest: Effort normal and breath sounds normal. She has no wheezes. She has no rales.   Musculoskeletal: Normal range of motion. She exhibits no edema or tenderness.    Positive straight leg raise on the right at 60°; left straight leg raise is normal   Lymphadenopathy:     She has no cervical adenopathy.   Neurological: She is alert and oriented to person, place, and time. She has normal reflexes. No cranial nerve deficit.   Vitals reviewed.      Assessment:       1. Controlled type 2 diabetes mellitus with other neurologic complication, unspecified long term insulin use status    2. Lumbar radiculopathy, acute    3. Mixed simple and mucopurulent chronic bronchitis    4. Gastroesophageal reflux disease without esophagitis        Plan:       1.  Continue present medication as her CAD and hyperlipidemia are controlled  2.  Continue low-sodium, low-fat low-cholesterol diet and exercise  3.  Prednisone 40 mg daily ×5 days then 20 mg daily ×5 days  4.  Follow-up with me in 6 months or when necessary

## 2017-03-06 NOTE — MR AVS SNAPSHOT
[facility] [place] [facility]  [address]  [place] [address]  Phone: [phone]                  Darlin Tipton   3/6/2017 10:15 AM   Office Visit    Description:  Female : 1940   Provider:  Justin Trevino MD   Department:  [facility]           Reason for Visit     Annual Exam     Hematuria           Diagnoses this Visit        Comments    Hematuria    -  Primary            To Do List           Future Appointments        Provider Department Dept Phone    3/10/2017 8:45 AM [facility] US2 [facility] [phone]    3/10/2017 10:45 AM LAB, [facility] [place] [facility] - Lab [phone]    2017 10:00 AM [facility] PULMONARY [facility] [phone]    2017 10:20 [facility] [phone]    10/5/2017 10:30 AM Oumar Almonte Jr., MD [place] - Family Medicine [phone]      Goals (5 Years of Data)     None       These Medications        Disp Refills Start End    estradiol (ESTRACE) 0.01 % (0.1 mg/gram) vaginal cream 3 Tube 3 3/6/2017 3/6/2018    Place 3 g vaginally twice a week. - Vaginal    Pharmacy: [facility] [address] #: [phone]         [facility] On Call     [facility] -  Assistance  Registered nurses in the [facility] provide clinical advisement, health education, appointment booking, and other advisory services.  Call for this free service at [phone].             Medications           Message regarding Medications     Verify the changes and/or additions to your medication regime listed below are the same as discussed with your clinician today.  If any of these changes or additions are incorrect, please notify your healthcare provider.        START taking these NEW medications        Refills    estradiol (ESTRACE) 0.01 % (0.1 mg/gram) vaginal cream 3    Sig: Place 3 g vaginally twice a week.    Class:  Normal    Route: Vaginal      STOP taking these medications     cholecalciferol, vitamin D3, (VITAMIN D3) 5,000 unit Tab Take 2,000 Units by mouth once daily.     azithromycin (ZITHROMAX) 500 MG tablet One daily for yellow mucous, repeat if needed    rabeprazole (ACIPHEX) 20 mg tablet Take 20 mg by mouth once daily.      PREMARIN vaginal cream INSERT 1 GRAM VAGINALLY ONCE DAILY           Verify that the below list of medications is an accurate representation of the medications you are currently taking.  If none reported, the list may be blank. If incorrect, please contact your healthcare provider. Carry this list with you in case of emergency.           Current Medications     acetaminophen (TYLENOL ARTHRITIS) 650 MG tablet 2 Tablet(s) Oral  Every day.    albuterol 90 mcg/actuation inhaler 2 puffs every 4 hours as needed for cough, wheeze, or shortness of breath    amitriptyline (ELAVIL) 50 MG tablet Take 50 mg by mouth every evening.     aspirin (ECOTRIN) 81 MG EC tablet Take 81 mg by mouth once daily.    azelastine (ASTELIN) 137 mcg (0.1 %) nasal spray 1 spray (137 mcg total) by Nasal route 2 (two) times daily.    B INFANTIS/B ANI/B RADHA/B BIFID (PROBIOTIC 4X ORAL) Take 1 capsule by mouth once daily.     biotin 5 mg Tab Take 1 tablet by mouth once daily.     carboxymethylcellulose (REFRESH) 1 % ophthalmic solution as directed    cetirizine (ZYRTEC) 10 MG tablet Take 10 mg by mouth once daily.    cholecalciferol, vitamin D3, (VITAMIN D3) 2,000 unit Cap Take 1 capsule by mouth once daily.    coenzyme Q10 100 mg capsule Take 100 mg by mouth once daily.     cranberry extract (THERACRAN) 650 mg Cap Take 1 tablet by mouth 2 (two) times daily.      diclofenac sodium (VOLTAREN) 1 % Gel APPLY 2 G TOPICALLY 3 (THREE) TIMES DAILY.    digoxin (LANOXIN) 0.25 MG tablet Take 250 mcg by mouth once daily. 1/2 daily    estradiol (ESTRACE) 0.01 % (0.1 mg/gram) vaginal cream Place 3 g vaginally twice a week.    fenofibric acid  (TRILIPIX) 135 mg capsule 1 capsule once daily. Every day    fluticasone (FLONASE) 50 mcg/actuation nasal spray USE ONE SPRAY IN EACH NOSTRIL DAILY    fluticasone-vilanterol (BREO ELLIPTA) 200-25 mcg/dose DsDv diskus inhaler Inhale 1 puff into the lungs once daily.    FREESTYLE LITE STRIPS Strp USE TO TEST BLOOD SUGAR TWICE A DAY    GUAIFENESIN/DEXTROMETHORPHAN (DIABETIC TUSSIN DM ORAL) Take by mouth as needed.     isosorbide mononitrate (IMDUR) 60 MG 24 hr tablet Take 60 mg by mouth once daily.    lancets Misc 1 Units by Misc.(Non-Drug; Combo Route) route 2 (two) times daily.    levothyroxine (LEVOTHROID) 25 MCG tablet Take 25 mcg by mouth before breakfast. Every day    metaxalone (SKELAXIN) 800 MG tablet TAKE 1 TABLET TWICE A DAY    metformin (GLUCOPHAGE) 500 MG tablet TAKE 1 TABLET TWICE A DAY WITH MEALS    MYRBETRIQ 50 mg Tb24 Take 1 tablet by mouth once daily.     nitroGLYCERIN (NITROSTAT) 0.4 MG SL tablet 0.4mg Sublingual PRN .      predniSONE (DELTASONE) 20 MG tablet Take 2 tablets daily x 5 days then 1 tablet daily x 5 days    simvastatin (ZOCOR) 20 MG tablet Take 20 mg by mouth every evening. Every day    SPIRIVA WITH HANDIHALER 18 mcg inhalation capsule INHALE THE CONTENTS OF 1 CAPSULE DAILY    TOPROL XL 25 mg 24 hr tablet Take 25 mg by mouth once daily.     triazolam (HALCION) 0.125 MG tablet Take 1 tablet (0.125 mg total) by mouth nightly as needed.    vitamins  A,C,E-zinc-copper (PRESERVISION AREDS) [phone] unit-mg-unit Cap Take 1 capsule by mouth 2 (two) times daily.     hydrocodone-acetaminophen 5-325mg (NORCO) 5-325 mg per tablet Take 1 tablet by mouth every 6 (six) hours as needed for Pain (cough).    magnesium oxide (MAG-OX) 400 mg tablet Take 1 tablet (400 mg total) by mouth 2 (two) times daily.           Clinical Reference Information           Your Vitals Were     BP Pulse Temp Height Weight BMI    149/82 (BP Location: Right arm, Patient Position: Sitting, BP Method: Automatic) 86 98 °F  "(36.7 °C) (Oral) 5' 2" (1.575 m) 59.9 kg (132 lb) 24.14 kg/m2      Blood Pressure          Most Recent Value    BP  (!)  149/82      Allergies as of 3/6/2017     Cefaclor    Disalcid  [Salsalate]    Fenofibrate Micronized    Nitrofurantoin Macrocrystalline    Ofloxacin    Penicillins    Phenylfenesin [place]  [Phenylpropanolamine-gg]    Sulfa (Sulfonamide Antibiotics)      Immunizations Administered on Date of Encounter - 3/6/2017     None      Orders Placed During Today's Visit      Normal Orders This Visit    Cytology, urine     POCT URINE DIPSTICK WITHOUT MICROSCOPE     Future Labs/Procedures Expected by Expires    Cytology, urine  3/6/2017 3/7/2018    US Retroperitoneal Limited  3/6/2017 3/6/2018      Language Assistance Services     ATTENTION: Language assistance services are available, free of charge. Please call [phone].      ATENCIÓN: Si habla amparoañol, tiene a ruth disposición servicios gratuitos de asistencia lingüística. Llame al [phone].     ROSANGELA Ý: N?u b?n nói Ti?ng Vi?t, có các d?ch v? h? tr? ngôn ng? mi?n phí dành cho b?n. G?i s? [phone].         [facility] complies with applicable Federal civil rights laws and does not discriminate on the basis of race, color, national origin, age, disability, or sex.        "

## 2017-03-06 NOTE — PROGRESS NOTES
Subjective:       Patient ID: Darlin Tipton is a 76 y.o. female.    Chief Complaint:   OFFICE NOTE    CHIEF COMPLAINT:  History of renal cyst and hematuria.    Ms. Tipton is a 76-year-old female who in the past was evaluated and treated for   a renal cyst and microhematuria.  The patient is here for her followup visit.    She refers that for the last six months, 90% of the time she can see blood in   the urine with no clots, a pinkish decoloration of her urination.  Denies   dysuria.  Denies hematuria.  Has nocturia x1.  When I said denies hematuria it   means that she do not have any clots in the urine.  She refers that she has an   occasional right flank pain, but it is not colicky type, it is just a pressure   pain on the right side.  She feels that she can empty the bladder   satisfactorily, but only putting pressure in the lower part of the abdomen.    The remaining of the medical and surgical history had changed.  In September 2016, she underwent a repeat aortocoronary bypass grafting.  Since then, the   patient is taking aspirin 81 mg and is not taking any other anticoagulant   therapy.  Since the surgery, she feels that she is feeling much better than   before.    The urinalysis today shows positive blood, negative leukocytes, negative   nitrites.      EOR/PN  dd: 03/06/2017 13:21:39 (CST)  td: 03/06/2017 20:32:15 (CST)  Doc ID   #[id number]  Job ID #[id number]    CC:       HPI  Review of Systems   Constitutional: Negative.  Negative for activity change.   HENT: Negative.  Negative for facial swelling.    Eyes: Negative for discharge.   Respiratory: Negative for cough and shortness of breath.    Cardiovascular: Negative for chest pain and palpitations.   Gastrointestinal: Negative for abdominal distention, blood in stool and constipation.   Genitourinary: Positive for hematuria. Negative for dysuria, flank pain, frequency, urgency and vaginal pain.   Musculoskeletal: Negative.  Negative for arthralgias.   Skin:  Negative.    Neurological: Negative.  Negative for dizziness.   Hematological: Negative for adenopathy.   Psychiatric/Behavioral: The patient is not nervous/anxious.        Objective:      Physical Exam   Constitutional: She appears well-developed.   HENT:   Head: Normocephalic.   Eyes: Pupils are equal, round, and reactive to light.   Neck: Normal range of motion.   Cardiovascular: Normal rate.    Pulmonary/Chest: Effort normal.   Abdominal: Soft. She exhibits no distension and no mass. There is no tenderness. Hernia confirmed negative in the right inguinal area and confirmed negative in the left inguinal area.   Genitourinary: Vagina normal. No erythema, tenderness or bleeding in the vagina.   Musculoskeletal: Normal range of motion.   Neurological: She is alert.   Skin: Skin is warm.     Psychiatric: She has a normal mood and affect.       Assessment:       1. Hematuria        Plan:       Hematuria  -     POCT URINE DIPSTICK WITHOUT MICROSCOPE  -     Cytology, urine; Future; Expected date: 3/6/17  -     US Retroperitoneal Limited; Future; Expected date: 3/6/17  -     Case Request Operating Room: CYSTOSCOPY  -     Place in Outpatient; Standing    Other orders  -     estradiol (ESTRACE) 0.01 % (0.1 mg/gram) vaginal cream; Place 3 g vaginally twice a week.  Dispense: 3 Tube; Refill: 3  -     Low Risk of VTE; Standing

## 2017-03-10 ENCOUNTER — HOSPITAL ENCOUNTER (OUTPATIENT)
Dept: RADIOLOGY | Facility: HOSPITAL | Age: 77
Discharge: HOME | End: 2017-03-10
Attending: UROLOGY
Payer: MEDICARE

## 2017-03-10 DIAGNOSIS — R31.9 HEMATURIA: ICD-10-CM

## 2017-03-10 PROCEDURE — 76770 US EXAM ABDO BACK WALL COMP: CPT | Mod: TC

## 2017-03-10 PROCEDURE — 76770 US EXAM ABDO BACK WALL COMP: CPT | Mod: 26,,, | Performed by: RADIOLOGY

## 2017-03-16 ENCOUNTER — LAB VISIT (OUTPATIENT)
Dept: LAB | Facility: HOSPITAL | Age: 77
End: 2017-03-16
Attending: INTERNAL MEDICINE
Payer: MEDICARE

## 2017-03-16 ENCOUNTER — TELEPHONE (OUTPATIENT)
Dept: FAMILY MEDICINE | Facility: CLINIC | Age: 77
End: 2017-03-16

## 2017-03-16 DIAGNOSIS — J41.8 MIXED SIMPLE AND MUCOPURULENT CHRONIC BRONCHITIS: ICD-10-CM

## 2017-03-16 PROCEDURE — 87015 SPECIMEN INFECT AGNT CONCNTJ: CPT

## 2017-03-16 PROCEDURE — 87205 SMEAR GRAM STAIN: CPT

## 2017-03-16 PROCEDURE — 87116 MYCOBACTERIA CULTURE: CPT

## 2017-03-16 PROCEDURE — 87070 CULTURE OTHR SPECIMN AEROBIC: CPT

## 2017-03-16 NOTE — TELEPHONE ENCOUNTER
----- Message from Shonamo Rojas sent at 3/16/2017 11:25 AM CDT -----  Pain in hip and leg and need to speak to you before scheduling.  Call [phone].

## 2017-03-20 LAB
BACTERIA SPEC AEROBE CULT: NORMAL
GRAM STN SPEC: NORMAL

## 2017-03-24 DIAGNOSIS — M25.559 ARTHRALGIA OF HIP, UNSPECIFIED LATERALITY: Primary | ICD-10-CM

## 2017-03-27 ENCOUNTER — HOSPITAL ENCOUNTER (OUTPATIENT)
Dept: RADIOLOGY | Facility: HOSPITAL | Age: 77
Discharge: HOME | End: 2017-03-27
Attending: ORTHOPAEDIC SURGERY
Payer: MEDICARE

## 2017-03-27 ENCOUNTER — OFFICE VISIT (OUTPATIENT)
Dept: ORTHOPEDICS | Facility: CLINIC | Age: 77
End: 2017-03-27
Payer: MEDICARE

## 2017-03-27 VITALS
WEIGHT: 132 LBS | HEIGHT: 62 IN | HEART RATE: 98 BPM | SYSTOLIC BLOOD PRESSURE: 126 MMHG | DIASTOLIC BLOOD PRESSURE: 63 MMHG | BODY MASS INDEX: 24.29 KG/M2

## 2017-03-27 DIAGNOSIS — M54.50 LUMBAR SPINE PAIN: Primary | ICD-10-CM

## 2017-03-27 DIAGNOSIS — M25.559 ARTHRALGIA OF HIP, UNSPECIFIED LATERALITY: ICD-10-CM

## 2017-03-27 DIAGNOSIS — M54.41 CHRONIC RIGHT-SIDED LOW BACK PAIN WITH RIGHT-SIDED SCIATICA: Primary | ICD-10-CM

## 2017-03-27 DIAGNOSIS — G89.29 CHRONIC RIGHT-SIDED LOW BACK PAIN WITH RIGHT-SIDED SCIATICA: Primary | ICD-10-CM

## 2017-03-27 PROCEDURE — 73502 X-RAY EXAM HIP UNI 2-3 VIEWS: CPT | Mod: 26,RT,, | Performed by: RADIOLOGY

## 2017-03-27 PROCEDURE — 99999 PR PBB SHADOW E&M-EST. PATIENT-LVL III: CPT | Mod: PBBFAC,,, | Performed by: ORTHOPAEDIC SURGERY

## 2017-03-27 PROCEDURE — 99213 OFFICE O/P EST LOW 20 MIN: CPT | Mod: S$PBB,,, | Performed by: ORTHOPAEDIC SURGERY

## 2017-03-27 PROCEDURE — 73502 X-RAY EXAM HIP UNI 2-3 VIEWS: CPT | Mod: TC,PN,RT

## 2017-03-27 NOTE — PROGRESS NOTES
Past Medical History:   Diagnosis Date    Allergy     Dust mites, Grasses, Trees    Arthritis     Asthma     Blood transfusion     CAD (coronary artery disease)     Cataract     CHRONIC BRONCHITIS     Diabetes mellitus     Diabetes mellitus type II     GERD (gastroesophageal reflux disease)     Hyperlipidemia     Hypertension     Irregular heart beat     Spinal stenosis     Thyroid disease     Hypothyroidism       Past Surgical History:   Procedure Laterality Date    APPENDECTOMY  1968    BLADDER SUSPENSION  1989    CARDIAC SURGERY      CABG    CATARACT EXTRACTION  9/2007 (L) and 10/2207 (R)    CORONARY ARTERY BYPASS GRAFT  4/26/2004    x5    ESOPHAGEAL DILATION      SPINE SURGERY  3/2000    Tumor    WRIST SURGERY  1993       Current Outpatient Prescriptions   Medication Sig    acetaminophen (TYLENOL ARTHRITIS) 650 MG tablet 2 Tablet(s) Oral  Every day.    albuterol 90 mcg/actuation inhaler 2 puffs every 4 hours as needed for cough, wheeze, or shortness of breath    amitriptyline (ELAVIL) 50 MG tablet Take 50 mg by mouth every evening.     aspirin (ECOTRIN) 81 MG EC tablet Take 81 mg by mouth once daily.    azelastine (ASTELIN) 137 mcg (0.1 %) nasal spray 1 spray (137 mcg total) by Nasal route 2 (two) times daily.    B INFANTIS/B ANI/B RADHA/B BIFID (PROBIOTIC 4X ORAL) Take 1 capsule by mouth once daily.     biotin 5 mg Tab Take 1 tablet by mouth once daily.     carboxymethylcellulose (REFRESH) 1 % ophthalmic solution as directed    cetirizine (ZYRTEC) 10 MG tablet Take 10 mg by mouth once daily.    cholecalciferol, vitamin D3, (VITAMIN D3) 2,000 unit Cap Take 1 capsule by mouth once daily.    coenzyme Q10 100 mg capsule Take 100 mg by mouth once daily.     cranberry extract (THERACRAN) 650 mg Cap Take 1 tablet by mouth 2 (two) times daily.      diclofenac sodium (VOLTAREN) 1 % Gel APPLY 2 G TOPICALLY 3 (THREE) TIMES DAILY. (Patient taking differently: 3 (three) times daily as  needed. APPLY 2 G TOPICALLY 3 (THREE) TIMES DAILY.)    digoxin (LANOXIN) 0.25 MG tablet Take 250 mcg by mouth once daily. 1/2 daily    estradiol (ESTRACE) 0.01 % (0.1 mg/gram) vaginal cream Place 3 g vaginally twice a week.    fenofibric acid (TRILIPIX) 135 mg capsule 1 capsule once daily. Every day    fluticasone (FLONASE) 50 mcg/actuation nasal spray USE ONE SPRAY IN EACH NOSTRIL DAILY    fluticasone-vilanterol (BREO ELLIPTA) 200-25 mcg/dose DsDv diskus inhaler Inhale 1 puff into the lungs once daily.    FREESTYLE LITE STRIPS Strp USE TO TEST BLOOD SUGAR TWICE A DAY    GUAIFENESIN/DEXTROMETHORPHAN (DIABETIC TUSSIN DM ORAL) Take by mouth as needed.     hydrocodone-acetaminophen 5-325mg (NORCO) 5-325 mg per tablet Take 1 tablet by mouth every 6 (six) hours as needed for Pain (cough).    isosorbide mononitrate (IMDUR) 60 MG 24 hr tablet Take 60 mg by mouth once daily.    lancets Misc 1 Units by Misc.(Non-Drug; Combo Route) route 2 (two) times daily.    levothyroxine (LEVOTHROID) 25 MCG tablet Take 25 mcg by mouth before breakfast. Every day    magnesium oxide (MAG-OX) 400 mg tablet Take 1 tablet (400 mg total) by mouth 2 (two) times daily.    metaxalone (SKELAXIN) 800 MG tablet TAKE 1 TABLET TWICE A DAY    metformin (GLUCOPHAGE) 500 MG tablet TAKE 1 TABLET TWICE A DAY WITH MEALS    MYRBETRIQ 50 mg Tb24 Take 1 tablet by mouth once daily.     nitroGLYCERIN (NITROSTAT) 0.4 MG SL tablet 0.4mg Sublingual PRN .      predniSONE (DELTASONE) 20 MG tablet Take 2 tablets daily x 5 days then 1 tablet daily x 5 days    simvastatin (ZOCOR) 20 MG tablet Take 20 mg by mouth every evening. Every day    SPIRIVA WITH HANDIHALER 18 mcg inhalation capsule INHALE THE CONTENTS OF 1 CAPSULE DAILY    TOPROL XL 25 mg 24 hr tablet Take 25 mg by mouth once daily.     triazolam (HALCION) 0.125 MG tablet Take 1 tablet (0.125 mg total) by mouth nightly as needed. (Patient taking differently: Take 0.125 mg by mouth every  evening. )    vitamins  A,C,E-zinc-copper (PRESERVISION AREDS) 14,320-998-200 unit-mg-unit Cap Take 1 capsule by mouth 2 (two) times daily.      No current facility-administered medications for this visit.        Review of patient's allergies indicates:   Allergen Reactions    Cefaclor      Other reaction(s): rash    Disalcid  [salsalate]      Other reaction(s): rash    Fenofibrate micronized      Other reaction(s): rash    Nitrofurantoin macrocrystalline      Other reaction(s): rash    Ofloxacin      Other reaction(s): rash    Penicillins      Other reaction(s): rash    Phenylfenesin la  [phenylpropanolamine-gg]      Other reaction(s): rash    Sulfa (sulfonamide antibiotics)      Other reaction(s): rash       Family History   Problem Relation Age of Onset    Allergic rhinitis Neg Hx     Allergies Neg Hx     Angioedema Neg Hx     Asthma Neg Hx     Eczema Neg Hx     Immunodeficiency Neg Hx     Urticaria Neg Hx     Rhinitis Neg Hx     Atopy Neg Hx        Social History     Social History    Marital status:      Spouse name: N/A    Number of children: N/A    Years of education: N/A     Occupational History    Not on file.     Social History Main Topics    Smoking status: Never Smoker    Smokeless tobacco: Never Used    Alcohol use Yes      Comment: Rare    Drug use: No    Sexual activity: Not Currently     Other Topics Concern    Not on file     Social History Narrative       Chief Complaint:   Chief Complaint   Patient presents with    Hip Pain       History of present illness: This is a 76-year-old female seen for right buttock and leg pain.  This been ongoing for a couple months now.  I saw her a year ago for a similar problem.  Patient denies an injury or trauma.  Patient does have occasional radicular type pain.  Pain when standing or sitting.  Pain located over the buttocks as well as the medial proximal calf.  Rates pain as a 5 out of 10.  She does recall having some veins  stripped out of that leg previously.  She does have type 2 diabetes without known neuropathy.  She denies bowel or bladder symptoms.  Denies weakness or falling..  No significant numbness and tingling.  No bowel or bladder symptoms.      Review of Systems:    Constitution: Negative for chills, fever, and sweats.  Negative for unexplained weight loss.    HENT:  Negative for headaches and blurry vision.    Cardiovascular:Negative for chest pain or irregular heart beat. Negative for hypertension.    Respiratory:  Negative for cough and shortness of breath.    Gastrointestinal: Negative for abdominal pain, heartburn, melena, nausea, and vomitting.    Genitourinary:  Negative bladder incontinence and dysuria.    Musculoskeletal:  See HPI    Neurological: Negative for numbness.    Psychiatric/Behavioral: Negative for depression.  The patient is not nervous/anxious.      Endocrine: Negative for polyuria    Hematologic/Lymphatic: Negative for bleeding problem.  Does not bruise/bleed easily.    Skin: Negative for poor would healing and rash      Physical Examination:    Vital Signs:    Vitals:    03/27/17 1130   BP: 126/63   Pulse: 98       Body mass index is 24.14 kg/(m^2).    This a well-developed, well nourished patient in no acute distress.  They are alert and oriented and cooperative to examination.  Pt. walks without an antalgic gait.      Examination of the patient's right hip shows full range of motion with flexion to 160°, extension to 0, external rotation to 50°, internal rotation of 15°, abduction of 50°, adduction of 15°. Skin has no rashes or bruising. Patient has negative Stinchfield exam. Patient has negative straight leg raise.Negative internal impingement test. Negative DASIA test. Negative Sukhdeep's test. Patient has no pain with hip range of motion. Nontender to palpation over the greater trochanteric bursa. Patient is 5 out of 5 motor strength, palpable distal pulses, and intact light touch sensation.   Patient has some tenderness in the medial calf.  No masses or abnormalities detected.    Examination of the patient's left hip shows full range of motion with flexion to 160°, extension to 0, external rotation to 50°, internal rotation of 15°, abduction of 50°, adduction of 15°. Skin has no rashes or bruising. Patient has negative Stinchfield exam. Patient has negative straight leg raise.Negative internal impingement test. Negative DASIA test. Negative Sukhdeep's test. Patient has no pain with hip range of motion. Nontender to palpation over the greater trochanteric bursa. Patient is 5 out of 5 motor strength, palpable distal pulses, and intact light touch sensation.       X-rays: 4 views of the right knee are  reviewed which show no sign of abnormal fractures or arthritis  X-rays of the pelvis and right hip are ordered and reviewed shows some lumbar spine arthritis with well-maintained hip joints     Assessment:: Right-sided sciatica    Plan:  I recommended an MRI of her lumbar spine.  Patient saw her primary care doctor previously gave her some prednisone which helped temporarily.  Symptoms seem to be worsening.  Talked about referral to Dr. Jones for possible epidural injections depending on what MRI shows.

## 2017-03-29 ENCOUNTER — HOSPITAL ENCOUNTER (OUTPATIENT)
Facility: AMBULARY SURGERY CENTER | Age: 77
Discharge: HOME | End: 2017-03-29
Attending: UROLOGY | Admitting: UROLOGY
Payer: MEDICARE

## 2017-03-29 ENCOUNTER — SURGERY (OUTPATIENT)
Age: 77
End: 2017-03-29

## 2017-03-29 DIAGNOSIS — R31.9 HEMATURIA: ICD-10-CM

## 2017-03-29 LAB
BILIRUB SERPL-MCNC: NEGATIVE MG/DL
BLOOD URINE, POC: NEGATIVE
COLOR, POC UA: YELLOW
GLUCOSE UR QL STRIP: 50
KETONES UR QL STRIP: NEGATIVE
LEUKOCYTE ESTERASE URINE, POC: NEGATIVE
NITRITE, POC UA: NEGATIVE
PH, POC UA: 5
PROTEIN, POC: NORMAL
SPECIFIC GRAVITY, POC UA: 1.01
UROBILINOGEN, POC UA: NORMAL

## 2017-03-29 PROCEDURE — 52000 CYSTOURETHROSCOPY: CPT | Performed by: UROLOGY

## 2017-03-29 PROCEDURE — 52000 CYSTOURETHROSCOPY: CPT | Mod: ,,, | Performed by: UROLOGY

## 2017-03-29 RX ORDER — LIDOCAINE HYDROCHLORIDE 20 MG/ML
JELLY TOPICAL
Status: DISCONTINUED | OUTPATIENT
Start: 2017-03-29 | End: 2017-03-29 | Stop reason: HOSPADM

## 2017-03-29 RX ORDER — LIDOCAINE HYDROCHLORIDE 20 MG/ML
JELLY TOPICAL
Status: DISCONTINUED
Start: 2017-03-29 | End: 2017-03-29 | Stop reason: HOSPADM

## 2017-03-29 RX ADMIN — LIDOCAINE HYDROCHLORIDE 400 ML: 20 JELLY TOPICAL at 02:03

## 2017-03-29 NOTE — BRIEF OP NOTE
Brief Operative Note     Surgery Date: 3/29/2017    Surgeon:   Justin Trevino MD     Pre-op Diagnosis:  Hematuria [R31.9]  Hematuria [R31.9]    Post-op Diagnosis:  Same    Procedure:  Procedure(s) (LRB):  CYSTOSCOPY (N/A)    Anesthesia: Local MAC    Findings/Key Components:  See Op Report    Estimated Blood Loss: Minimal                                                                                                                           Discharge Summary    Admit Date: 3/29/2017     Attending Physician: Justin Trevino MD     Discharge Physician: Justin Trevino MD      Discharge Date: 3/29/2017    Treatments/Procedures: Procedure(s) (LRB):  CYSTOSCOPY (N/A)  Final Diagnosis:Hematuria of unknown origin  Hospital Course: cystoscopy done as planned  F. USohan Trevino in 1 yr for yearly cytology of urine    Disposition: Home or Self Care     Patient Instructions:     Current Discharge Medication List:         Darlin Tipton   Home Medication Instructions ROMERO:[id number]    Printed on:03/29/17 3423   Medication Information                      acetaminophen (TYLENOL ARTHRITIS) 650 MG tablet  2 Tablet(s) Oral  Every day.             albuterol 90 mcg/actuation inhaler  2 puffs every 4 hours as needed for cough, wheeze, or shortness of breath             amitriptyline (ELAVIL) 50 MG tablet  Take 50 mg by mouth every evening.              aspirin (ECOTRIN) 81 MG EC tablet  Take 81 mg by mouth once daily.             azelastine (ASTELIN) 137 mcg (0.1 %) nasal spray  1 spray (137 mcg total) by Nasal route 2 (two) times daily.             B INFANTIS/B ANI/B RADHA/B BIFID (PROBIOTIC 4X ORAL)  Take 1 capsule by mouth once daily.              biotin 5 mg Tab  Take 1 tablet by mouth once daily.              carboxymethylcellulose (REFRESH) 1 % ophthalmic solution  as directed             cetirizine (ZYRTEC) 10 MG tablet  Take 10 mg by mouth once daily.             cholecalciferol, vitamin D3, (VITAMIN D3) 2,000  unit Cap  Take 1 capsule by mouth once daily.             coenzyme Q10 100 mg capsule  Take 100 mg by mouth once daily.              cranberry extract (THERACRAN) 650 mg Cap  Take 1 tablet by mouth 2 (two) times daily.               diclofenac sodium (VOLTAREN) 1 % Gel  APPLY 2 G TOPICALLY 3 (THREE) TIMES DAILY.             digoxin (LANOXIN) 0.25 MG tablet  Take 250 mcg by mouth once daily. 1/2 daily             ESTROGENS, CONJUGATED (PREMARIN ORAL)  Take 1 tablet by mouth 3 (three) times a week.             fenofibric acid (TRILIPIX) 135 mg capsule  1 capsule once daily. Every day             fluticasone (FLONASE) 50 mcg/actuation nasal spray  USE ONE SPRAY IN EACH NOSTRIL DAILY             fluticasone-vilanterol (BREO ELLIPTA) 200-25 mcg/dose DsDv diskus inhaler  Inhale 1 puff into the lungs once daily.             FREESTYLE LITE STRIPS Strp  USE TO TEST BLOOD SUGAR TWICE A DAY             GUAIFENESIN/DEXTROMETHORPHAN (DIABETIC TUSSIN DM ORAL)  Take by mouth as needed.              isosorbide mononitrate (IMDUR) 60 MG 24 hr tablet  Take 60 mg by mouth once daily.             lancets Misc  1 Units by Misc.(Non-Drug; Combo Route) route 2 (two) times daily.             levothyroxine (LEVOTHROID) 25 MCG tablet  Take 25 mcg by mouth before breakfast. Every day             metaxalone (SKELAXIN) 800 MG tablet  TAKE 1 TABLET TWICE A DAY             metformin (GLUCOPHAGE) 500 MG tablet  TAKE 1 TABLET TWICE A DAY WITH MEALS             MYRBETRIQ 50 mg Tb24  Take 1 tablet by mouth once daily.              nitroGLYCERIN (NITROSTAT) 0.4 MG SL tablet  0.4mg Sublingual PRN .               RABEPRAZOLE SODIUM (ACIPHEX ORAL)  Take 1 tablet by mouth before dinner.             simvastatin (ZOCOR) 20 MG tablet  Take 20 mg by mouth every evening. Every day             SPIRIVA WITH HANDIHALER 18 mcg inhalation capsule  INHALE THE CONTENTS OF 1 CAPSULE DAILY             TOPROL XL 25 mg 24 hr tablet  Take 25 mg by mouth once daily.               triazolam (HALCION) 0.125 MG tablet  Take 1 tablet (0.125 mg total) by mouth nightly as needed.             vitamins  A,C,E-zinc-copper (PRESERVISION AREDS) [phone] unit-mg-unit Cap  Take 1 capsule by mouth 2 (two) times daily.                      Current Discharge Medication List      CONTINUE these medications which have NOT CHANGED    Details   acetaminophen (TYLENOL ARTHRITIS) 650 MG tablet 2 Tablet(s) Oral  Every day.      aspirin (ECOTRIN) 81 MG EC tablet Take 81 mg by mouth once daily.      B INFANTIS/B ANI/B RADHA/B BIFID (PROBIOTIC 4X ORAL) Take 1 capsule by mouth once daily.       biotin 5 mg Tab Take 1 tablet by mouth once daily.       carboxymethylcellulose (REFRESH) 1 % ophthalmic solution as directed      cetirizine (ZYRTEC) 10 MG tablet Take 10 mg by mouth once daily.      cholecalciferol, vitamin D3, (VITAMIN D3) 2,000 unit Cap Take 1 capsule by mouth once daily.      coenzyme Q10 100 mg capsule Take 100 mg by mouth once daily.       cranberry extract (THERACRAN) 650 mg Cap Take 1 tablet by mouth 2 (two) times daily.        digoxin (LANOXIN) 0.25 MG tablet Take 250 mcg by mouth once daily. 1/2 daily      ESTROGENS, CONJUGATED (PREMARIN ORAL) Take 1 tablet by mouth 3 (three) times a week.      fluticasone-vilanterol (BREO ELLIPTA) 200-25 mcg/dose DsDv diskus inhaler Inhale 1 puff into the lungs once daily.  Qty: 1 each, Refills: 11    Associated Diagnoses: Mixed simple and mucopurulent chronic bronchitis      metaxalone (SKELAXIN) 800 MG tablet TAKE 1 TABLET TWICE A DAY  Qty: 180 tablet, Refills: 3      metformin (GLUCOPHAGE) 500 MG tablet TAKE 1 TABLET TWICE A DAY WITH MEALS  Qty: 180 tablet, Refills: 3      RABEPRAZOLE SODIUM (ACIPHEX ORAL) Take 1 tablet by mouth before dinner.      TOPROL XL 25 mg 24 hr tablet Take 25 mg by mouth once daily.       vitamins  A,C,E-zinc-copper (PRESERVISION AREDS) 14,320-226-200 unit-mg-unit Cap Take 1 capsule by mouth 2 (two) times daily.       albuterol  90 mcg/actuation inhaler 2 puffs every 4 hours as needed for cough, wheeze, or shortness of breath  Qty: 1 Inhaler, Refills: 11    Associated Diagnoses: Mixed simple and mucopurulent chronic bronchitis      amitriptyline (ELAVIL) 50 MG tablet Take 50 mg by mouth every evening.       azelastine (ASTELIN) 137 mcg (0.1 %) nasal spray 1 spray (137 mcg total) by Nasal route 2 (two) times daily.  Qty: 90 mL, Refills: 3    Associated Diagnoses: Chronic sinus complaints; Chronic cough      diclofenac sodium (VOLTAREN) 1 % Gel APPLY 2 G TOPICALLY 3 (THREE) TIMES DAILY.  Qty: 100 g, Refills: 5      fenofibric acid (TRILIPIX) 135 mg capsule 1 capsule once daily. Every day      fluticasone (FLONASE) 50 mcg/actuation nasal spray USE ONE SPRAY IN EACH NOSTRIL DAILY  Qty: 48 g, Refills: 3      FREESTYLE LITE STRIPS Strp USE TO TEST BLOOD SUGAR TWICE A DAY  Qty: 200 strip, Refills: 3    Comments: DX Code Needed  PT REQUESTED REFILLS.  Associated Diagnoses: Type 2 diabetes mellitus without complication      GUAIFENESIN/DEXTROMETHORPHAN (DIABETIC TUSSIN DM ORAL) Take by mouth as needed.       isosorbide mononitrate (IMDUR) 60 MG 24 hr tablet Take 60 mg by mouth once daily.      lancets Misc 1 Units by Misc.(Non-Drug; Combo Route) route 2 (two) times daily.  Qty: 200 each, Refills: 3    Associated Diagnoses: Type II or unspecified type diabetes mellitus without mention of complication, not stated as uncontrolled      levothyroxine (LEVOTHROID) 25 MCG tablet Take 25 mcg by mouth before breakfast. Every day      MYRBETRIQ 50 mg Tb24 Take 1 tablet by mouth once daily.       nitroGLYCERIN (NITROSTAT) 0.4 MG SL tablet 0.4mg Sublingual PRN .        simvastatin (ZOCOR) 20 MG tablet Take 20 mg by mouth every evening. Every day      SPIRIVA WITH HANDIHALER 18 mcg inhalation capsule INHALE THE CONTENTS OF 1 CAPSULE DAILY  Qty: 90 capsule, Refills: 3      triazolam (HALCION) 0.125 MG tablet Take 1 tablet (0.125 mg total) by mouth nightly as  needed.  Qty: 90 tablet, Refills: 1             Discharge Procedure Orders:      Discharge Procedure Orders  Diet general     Activity as tolerated

## 2017-03-29 NOTE — H&P
CHIEF COMPLAINT: History of renal cyst and hematuria.     Ms. Tipton is a 76-year-old female who in the past was evaluated and treated for   a renal cyst and microhematuria. The patient is here for her followup visit.   She refers that for the last six months, 90% of the time she can see blood in   the urine with no clots, a pinkish decoloration of her urination. Denies   dysuria. Denies hematuria. Has nocturia x1. When I said denies hematuria it   means that she do not have any clots in the urine. She refers that she has an   occasional right flank pain, but it is not colicky type, it is just a pressure   pain on the right side. She feels that she can empty the bladder   satisfactorily, but only putting pressure in the lower part of the abdomen.     The remaining of the medical and surgical history had changed. In September 2016, she underwent a repeat aortocoronary bypass grafting. Since then, the   patient is taking aspirin 81 mg and is not taking any other anticoagulant   therapy. Since the surgery, she feels that she is feeling much better than   before.     The urinalysis today shows positive blood, negative leukocytes, negative   nitrites.          Review of Systems   Constitutional: Negative. Negative for activity change.   HENT: Negative. Negative for facial swelling.   Eyes: Negative for discharge.   Respiratory: Negative for cough and shortness of breath.   Cardiovascular: Negative for chest pain and palpitations.   Gastrointestinal: Negative for abdominal distention, blood in stool and constipation.   Genitourinary: Positive for hematuria. Negative for dysuria, flank pain, frequency, urgency and vaginal pain.   Musculoskeletal: Negative. Negative for arthralgias.   Skin: Negative.   Neurological: Negative. Negative for dizziness.   Hematological: Negative for adenopathy.   Psychiatric/Behavioral: The patient is not nervous/anxious.       Objective:      Physical Exam   Constitutional: She appears  well-developed.   HENT:   Head: Normocephalic.   Eyes: Pupils are equal, round, and reactive to light.   Neck: Normal range of motion.   Cardiovascular: Normal rate.   Pulmonary/Chest: Effort normal.   Abdominal: Soft. She exhibits no distension and no mass. There is no tenderness. Hernia confirmed negative in the right inguinal area and confirmed negative in the left inguinal area.   Genitourinary: Vagina normal. No erythema, tenderness or bleeding in the vagina.   Musculoskeletal: Normal range of motion.   Neurological: She is alert.   Skin: Skin is warm.     Psychiatric: She has a normal mood and affect.       Assessment:       1. Hematuria        Plan:

## 2017-03-29 NOTE — OP NOTE
CYSTOSCOPY REPORT    Surgery Date:  3/29/2017  Surgeon(s) and Role:     * Justin Trevino MD - Primary    Darlin Tipton  : 1940  Pre Procedure Diagnosis:Hematuria    OPERATION: CYSTOSCOPY    ANESTHESIA: 10 cc 2% lidocaine jelly applied per urethra.  14 FR Flexible Olympus cystoscope used.    PROCEDURE:  The patient was taken to the cystoscopy suite and placed in supine position with legs in frog legged position.  The genitalia was prepped and draped  in the usual sterile fashion.  Two percent lidocaine jelly was inserted in the urethra.  After sufficent time had passed to allow good local anesthesia, the cystoscope was inserted in the urethra. The dome, anterior, posterior and lateral walls were examined systematically.  The ureteral orifices were in their usual position and configuration.  The cystoscope was turned upon itself 180 degrees to visualize the bladder neck.  The cystoscope was then brought to the level of the bladder neck, the water was turned on and the urethra was visualized.  The cystoscope was removed and the patient was instructed to urinate proir to leaving the office.  Specimen:none     FINDINGS:  URETHRA: open with no diverticulum  BLADDER: no lesions or stones seen  TRABEC:grade 1  URETERAL ORIFICES:nssp clear efflux  OTHER FINDINGS:none    Procedure medications: Lidocaine gel in the urethra  Post Procedure Diagnosis: essential hematuria    ASSESSMENT/PLAN:    Status Post flexible cystoscopy.  1. Push fluids for 24 hours.  2. May see blood in the urine, this should gradually improve over the next 2-3 days.  3. The patient was instructed to return to the office or go to the emergency should fever, chills, cloudy urine, or inability to urinate develop.  4.  PLAN:Annual cytologies.  5. Follow up in 1 yr

## 2017-03-29 NOTE — IP AVS SNAPSHOT
[facility] [place] Location  [address] [address]           Patient Discharge Instructions   Our goal is to set you up for success. This packet includes information on your condition, medications, and your home care.  It will help you care for yourself to prevent having to return to the hospital.     Please ask your nurse if you have any questions.      There are many details to remember when preparing to leave the hospital. Here is what you will need to do:    1. Take your medicine. If you are prescribed medications, review your Medication List on the following pages. You may have new medications to  at the pharmacy and others that you'll need to stop taking. Review the instructions for how and when to take your medications. Talk with your doctor or nurses if you are unsure of what to do.     2. Go to your follow-up appointments. Specific follow-up information is listed in the following pages. Your may be contacted by a nurse or clinical provider about future appointments. Be sure we have all of the phone numbers to reach you. Please contact your provider's office if you are unable to make an appointment.     3. Watch for warning signs. Your doctor or nurse will give you detailed warning signs to watch for and when to call for assistance. These instructions may also include educational information about your condition. If you experience any of warning signs to your health, call your doctor.               [facility]  Unless otherwise directed by your provider, please contact [facility], our nurse care line that is available for 24/7 assistance.     [phone] (toll-free)    Registered nurses in the [facility] provide clinical advisement, health education, appointment booking, and other advisory services.                    ** Verify the list of medication(s) below is accurate and up to date. Carry this with you in case of  emergency. If your medications have changed, please notify your healthcare provider.             Medication List      CHANGE how you take these medications        Additional Info                      diclofenac sodium 1 % Gel   Commonly known as:  VOLTAREN   Quantity:  100 g   Refills:  5   What changed:    - when to take this  - reasons to take this  - additional instructions    Instructions:  APPLY 2 G TOPICALLY 3 (THREE) TIMES DAILY.     Begin Date    AM    Noon    PM    Bedtime       triazolam 0.125 MG tablet   Commonly known as:  HALCION   Quantity:  90 tablet   Refills:  1   Dose:  0.125 mg   What changed:  when to take this    Instructions:  Take 1 tablet (0.125 mg total) by mouth nightly as needed.     Begin Date    AM    Noon    PM    Bedtime         CONTINUE taking these medications        Additional Info                      ACIPHEX ORAL   Refills:  0   Dose:  1 tablet    Instructions:  Take 1 tablet by mouth before dinner.     Begin Date    AM    Noon    PM    Bedtime       albuterol 90 mcg/actuation inhaler   Quantity:  1 Inhaler   Refills:  11    Instructions:  2 puffs every 4 hours as needed for cough, wheeze, or shortness of breath     Begin Date    AM    Noon    PM    Bedtime       amitriptyline 50 MG tablet   Commonly known as:  ELAVIL   Refills:  0   Dose:  50 mg    Instructions:  Take 50 mg by mouth every evening.     Begin Date    AM    Noon    PM    Bedtime       aspirin 81 MG EC tablet   Commonly known as:  ECOTRIN   Refills:  0   Dose:  81 mg    Instructions:  Take 81 mg by mouth once daily.     Begin Date    AM    Noon    PM    Bedtime       azelastine 137 mcg (0.1 %) nasal spray   Commonly known as:  ASTELIN   Quantity:  90 mL   Refills:  3   Dose:  1 spray    Instructions:  1 spray (137 mcg total) by Nasal route 2 (two) times daily.     Begin Date    AM    Noon    PM    Bedtime       biotin 5 mg Tab   Refills:  0   Dose:  1 tablet    Instructions:  Take 1 tablet by mouth once daily.      Begin Date    AM    Noon    PM    Bedtime       cetirizine 10 MG tablet   Commonly known as:  ZYRTEC   Refills:  0   Dose:  10 mg    Instructions:  Take 10 mg by mouth once daily.     Begin Date    AM    Noon    PM    Bedtime       coenzyme Q10 100 mg capsule   Refills:  0   Dose:  100 mg    Instructions:  Take 100 mg by mouth once daily.     Begin Date    AM    Noon    PM    Bedtime       DIABETIC TUSSIN DM ORAL   Refills:  0    Instructions:  Take by mouth as needed.     Begin Date    AM    Noon    PM    Bedtime       digoxin 250 mcg tablet   Commonly known as:  LANOXIN   Refills:  0   Dose:  250 mcg    Instructions:  Take 250 mcg by mouth once daily. 1/2 daily     Begin Date    AM    Noon    PM    Bedtime       fluticasone 50 mcg/actuation nasal spray   Commonly known as:  FLONASE   Quantity:  48 g   Refills:  3    Instructions:  USE ONE SPRAY IN EACH NOSTRIL DAILY     Begin Date    AM    Noon    PM    Bedtime       fluticasone-vilanterol 200-25 mcg/dose Dsdv diskus inhaler   Commonly known as:  BREO ELLIPTA   Quantity:  1 each   Refills:  11   Dose:  1 puff    Instructions:  Inhale 1 puff into the lungs once daily.     Begin Date    AM    Noon    PM    Bedtime       FREESTYLE LITE STRIPS Strp   Quantity:  200 strip   Refills:  3   Comments:  DX Code Needed  PT REQUESTED REFILLS.   Generic drug:  blood sugar diagnostic    Instructions:  USE TO TEST BLOOD SUGAR TWICE A DAY     Begin Date    AM    Noon    PM    Bedtime       isosorbide mononitrate 60 MG 24 hr tablet   Commonly known as:  IMDUR   Refills:  0   Dose:  60 mg    Instructions:  Take 60 mg by mouth once daily.     Begin Date    AM    Noon    PM    Bedtime       lancets Misc   Quantity:  200 each   Refills:  3   Dose:  1 Units    Instructions:  1 Units by Misc.(Non-Drug; Combo Route) route 2 (two) times daily.     Begin Date    AM    Noon    PM    Bedtime       LEVOTHROID 25 MCG tablet   Refills:  0   Dose:  25 mcg   Generic drug:  levothyroxine     Instructions:  Take 25 mcg by mouth before breakfast. Every day     Begin Date    AM    Noon    PM    Bedtime       metaxalone 800 MG tablet   Commonly known as:  SKELAXIN   Quantity:  180 tablet   Refills:  3    Instructions:  TAKE 1 TABLET TWICE A DAY     Begin Date    AM    Noon    PM    Bedtime       metformin 500 MG tablet   Commonly known as:  GLUCOPHAGE   Quantity:  180 tablet   Refills:  3    Instructions:  TAKE 1 TABLET TWICE A DAY WITH MEALS     Begin Date    AM    Noon    PM    Bedtime       MYRBETRIQ 50 mg Tb24   Refills:  0   Dose:  1 tablet   Generic drug:  mirabegron    Instructions:  Take 1 tablet by mouth once daily.     Begin Date    AM    Noon    PM    Bedtime       NITROSTAT 0.4 MG SL tablet   Refills:  0   Generic drug:  nitroGLYCERIN    Instructions:  0.4mg Sublingual PRN .     Begin Date    AM    Noon    PM    Bedtime       PREMARIN ORAL   Refills:  0   Dose:  1 tablet    Instructions:  Take 1 tablet by mouth 3 (three) times a week.     Begin Date    AM    Noon    PM    Bedtime       PRESERVISION AREDS 14,320-226-200 unit-mg-unit Cap   Refills:  0   Dose:  1 capsule   Generic drug:  vitamins  A,C,E-zinc-copper    Instructions:  Take 1 capsule by mouth 2 (two) times daily.     Begin Date    AM    Noon    PM    Bedtime       PROBIOTIC 4X ORAL   Refills:  0   Dose:  1 capsule    Instructions:  Take 1 capsule by mouth once daily.     Begin Date    AM    Noon    PM    Bedtime       REFRESH 1 % ophthalmic solution   Refills:  0   Generic drug:  carboxymethylcellulose    Instructions:  as directed     Begin Date    AM    Noon    PM    Bedtime       SPIRIVA WITH HANDIHALER 18 mcg inhalation capsule   Quantity:  90 capsule   Refills:  3   Generic drug:  tiotropium    Instructions:  INHALE THE CONTENTS OF 1 CAPSULE DAILY     Begin Date    AM    Noon    PM    Bedtime       THERACRAN 650 mg Cap   Refills:  0   Dose:  1 tablet   Generic drug:  cranberry extract    Instructions:  Take 1 tablet by mouth 2  (two) times daily.     Begin Date    AM    Noon    PM    Bedtime       TOPROL XL 25 MG 24 hr tablet   Refills:  0   Dose:  25 mg   Generic drug:  metoprolol succinate    Instructions:  Take 25 mg by mouth once daily.     Begin Date    AM    Noon    PM    Bedtime       TRILIPIX 135 mg Cpdr   Refills:  0   Dose:  1 capsule   Generic drug:  fenofibric acid    Instructions:  1 capsule once daily. Every day     Begin Date    AM    Noon    PM    Bedtime       TYLENOL ARTHRITIS 650 MG Tbsr   Refills:  0   Generic drug:  acetaminophen    Instructions:  2 Tablet(s) Oral  Every day.     Begin Date    AM    Noon    PM    Bedtime       VITAMIN D3 2,000 unit Cap   Refills:  0   Dose:  1 capsule   Generic drug:  cholecalciferol (vitamin D3)    Instructions:  Take 1 capsule by mouth once daily.     Begin Date    AM    Noon    PM    Bedtime       ZOCOR 20 MG tablet   Refills:  0   Dose:  20 mg   Generic drug:  simvastatin    Instructions:  Take 20 mg by mouth every evening. Every day     Begin Date    AM    Noon    PM    Bedtime                  Please bring to all follow up appointments:    1. A copy of your discharge instructions.  2. All medicines you are currently taking in their original bottles.  3. Identification and insurance card.    Please arrive 15 minutes ahead of scheduled appointment time.    Please call 24 hours in advance if you must reschedule your appointment and/or time.        Your Scheduled Appointments     Apr 01, 2017 10:00 AM CDT   Mri L Spine Non Cont with [facility] MRI1 300 LB LIMIT   [facility] ([facility]) [address]  [facility] [address]   [phone]            Apr 10, 2017 11:15 AM CDT   Non-Fasting Lab with LAB, [facility]   [facility]    [address] [facility] [address]   [phone]            May 02, 2017 10:00 AM CDT   Complete PFT with [facility] PULMONARY   [facility] ([facility]) [address]  [place] [address]   [phone]            May 08, 2017 10:20 AM CDT   Established Patient Visit with MD [facility] - Pul[facility] ([facility] [facility])    [address]  [place] [address]   [phone]            Oct 05, 2017 10:30 AM CDT   Established Patient Visit with MD Chase [facility]ard Jr[facility] - Family Medicine ([place])    [address]  [place] [address] [phone]              Follow-up Information     Follow up with Justni Trevino MD In 12 months.    Specialty:  Urology    Contact information:    [address]  [address]  [facility] [address]  [phone]          Discharge Instructions     Future Orders    Activity as tolerated     Diet general     Questions:    Total calories:      Fat restriction, if any:      Protein restriction, if any:      Na restriction, if any:      Fluid restriction:      Additional restrictions:          Discharge Instructions                After the procedure    · Drink plenty of fluids.  · You may have burning or light bleeding when you urinate--this is normal.  · Medications may be prescribed to ease any discomfort or prevent infection. Take these as directed.  · Call your doctor if you have heavy bleeding or blood clots, burning that lasts more than a day, a fever over 100°F  (38° C), or trouble urinating.              Primary Diagnosis     Your primary diagnosis was:  Blood In Urine      Admission Information     Date & Time Provider Department CSN    3/29/2017  1:42 PM Justin Trevino MD [facility] [id number]      Care Providers     Provider Role Specialty Primary office phone    Justin Trevino MD Attending Provider Urology [phone]    Justin Trevino MD Surgeon  Urology [phone]      Your Vitals Were     BP Pulse Temp Resp Height Weight    153/77 86 98 °F (36.7 °C) (Oral) 18 5' 2" (1.575 m) 59.9 kg (132 lb)    SpO2 BMI             98% 24.14 kg/m2         Recent Lab Values        " 7/3/2012 3/19/2013 4/16/2014 10/23/2014 1/21/2015 7/27/2015 2/18/2016 9/6/2016      1:57 PM  9:25 AM  9:10 AM  4:19 PM 12:35 PM 11:36 AM 10:52 AM 12:05 AM    A1C 6.6 (H) 6.7 (H) 6.8 (H) 6.9 (H) 6.6 (H) 6.9 (H) 6.3 (H) 6.5 (H)    Comment for A1C at 12:05 AM on 9/6/2016:  According to ADA guidelines, hemoglobin A1C <7.0% represents  optimal control in non-pregnant diabetic patients.  Different  metrics may apply to specific populations.   Standards of Medical Care in Diabetes - 2016.  For the purpose of screening for the presence of diabetes:  <5.7%     Consistent with the absence of diabetes  5.7-6.4%  Consistent with increasing risk for diabetes   (prediabetes)  >or=6.5%  Consistent with diabetes  Currently no consensus exists for use of hemoglobin A1C  for diagnosis of diabetes for children.        Allergies as of 3/29/2017        Reactions    Sulfa (Sulfonamide Antibiotics) Rash    Cefaclor Rash    Disalcid [Salsalate] Rash    Fenofibrate Micronized Rash    Nitrofurantoin Macrocrystalline Rash    Ofloxacin Rash    Penicillins Rash    Phenylfenesin La [Phenylpropanolamine-gg] Rash      Language Assistance Services     ATTENTION: Language assistance services are available, free of charge. Please call [phone].      ATENCIÓN: Si habla español, tiene a ruth disposición servicios gratuitos de asistencia lingüística. Llame al [phone].     [facility] Ý: N?u b?n nói Ti?ng Vi?t, có các d?ch v? h? tr? ngôn ng? mi?n phí dành cho b?n. G?i s? [phone].         [facility] complies with applicable Federal civil rights laws and does not discriminate on the basis of race, color, national origin, age, disability, or sex.

## 2017-03-30 VITALS
WEIGHT: 132 LBS | DIASTOLIC BLOOD PRESSURE: 80 MMHG | BODY MASS INDEX: 24.29 KG/M2 | HEIGHT: 62 IN | RESPIRATION RATE: 20 BRPM | HEART RATE: 80 BPM | OXYGEN SATURATION: 97 % | TEMPERATURE: 98 F | SYSTOLIC BLOOD PRESSURE: 150 MMHG

## 2017-04-01 ENCOUNTER — HOSPITAL ENCOUNTER (OUTPATIENT)
Dept: RADIOLOGY | Facility: HOSPITAL | Age: 77
Discharge: HOME | End: 2017-04-01
Attending: ORTHOPAEDIC SURGERY
Payer: MEDICARE

## 2017-04-01 DIAGNOSIS — M54.50 LUMBAR SPINE PAIN: ICD-10-CM

## 2017-04-01 PROCEDURE — 72148 MRI LUMBAR SPINE W/O DYE: CPT | Mod: 26,,, | Performed by: RADIOLOGY

## 2017-04-01 PROCEDURE — 72148 MRI LUMBAR SPINE W/O DYE: CPT | Mod: TC

## 2017-04-06 ENCOUNTER — OFFICE VISIT (OUTPATIENT)
Dept: ORTHOPEDIC SURGERY | Facility: CLINIC | Age: 77
End: 2017-04-06
Payer: MEDICARE

## 2017-04-06 VITALS
BODY MASS INDEX: 24.65 KG/M2 | SYSTOLIC BLOOD PRESSURE: 137 MMHG | DIASTOLIC BLOOD PRESSURE: 79 MMHG | HEIGHT: 62 IN | WEIGHT: 133.94 LBS | HEART RATE: 93 BPM

## 2017-04-06 DIAGNOSIS — M47.26 OSTEOARTHRITIS OF SPINE WITH RADICULOPATHY, LUMBAR REGION: ICD-10-CM

## 2017-04-06 DIAGNOSIS — M41.9 SCOLIOSIS OF LUMBAR SPINE, UNSPECIFIED SCOLIOSIS TYPE: Primary | ICD-10-CM

## 2017-04-06 PROCEDURE — 99999 PR PBB SHADOW E&M-EST. PATIENT-LVL V: CPT | Mod: PBBFAC,,, | Performed by: PHYSICIAN ASSISTANT

## 2017-04-06 PROCEDURE — 99215 OFFICE O/P EST HI 40 MIN: CPT | Mod: PBBFAC,PN | Performed by: PHYSICIAN ASSISTANT

## 2017-04-06 PROCEDURE — 99214 OFFICE O/P EST MOD 30 MIN: CPT | Mod: S$PBB,,, | Performed by: PHYSICIAN ASSISTANT

## 2017-04-06 NOTE — PROGRESS NOTES
Neurosurgery History & Physical    Patient ID: Darlin Tipton is a 76 y.o. female.    Chief Complaint   Patient presents with    Hip Pain     right    Leg Pain       Review of Systems   Constitutional: Negative for activity change, appetite change, chills, fever and unexpected weight change.   HENT: Negative for tinnitus, trouble swallowing and voice change.    Respiratory: Negative for apnea, cough, chest tightness and shortness of breath.    Cardiovascular: Negative for chest pain and palpitations.   Gastrointestinal: Negative for constipation, diarrhea, nausea and vomiting.   Genitourinary: Negative for difficulty urinating, dysuria, frequency and urgency.   Musculoskeletal: Positive for back pain and myalgias. Negative for gait problem, neck pain and neck stiffness.   Skin: Negative for wound.   Neurological: Positive for numbness. Negative for dizziness, tremors, seizures, facial asymmetry, speech difficulty, weakness, light-headedness and headaches.   Psychiatric/Behavioral: Negative for confusion and decreased concentration.       Past Medical History:   Diagnosis Date    Allergy     Dust mites, Grasses, Trees    Arthritis     Asthma     Blood transfusion     CAD (coronary artery disease)     Cataract     CHRONIC BRONCHITIS     Diabetes mellitus     Diabetes mellitus type II     GERD (gastroesophageal reflux disease)     Hyperlipidemia     Hypertension     Irregular heart beat     Spinal stenosis     Thyroid disease     Hypothyroidism     Social History     Social History    Marital status:      Spouse name: N/A    Number of children: N/A    Years of education: N/A     Occupational History    Not on file.     Social History Main Topics    Smoking status: Never Smoker    Smokeless tobacco: Never Used    Alcohol use Yes      Comment: Rare    Drug use: No    Sexual activity: Not Currently     Other Topics Concern    Not on file     Social History Narrative     Family History    Problem Relation Age of Onset    Allergic rhinitis Neg Hx     Allergies Neg Hx     Angioedema Neg Hx     Asthma Neg Hx     Eczema Neg Hx     Immunodeficiency Neg Hx     Urticaria Neg Hx     Rhinitis Neg Hx     Atopy Neg Hx      Review of patient's allergies indicates:   Allergen Reactions    Sulfa (sulfonamide antibiotics) Rash    Cefaclor Rash    Disalcid [salsalate] Rash    Fenofibrate micronized Rash    Nitrofurantoin macrocrystalline Rash    Ofloxacin Rash    Penicillins Rash    Phenylfenesin la [phenylpropanolamine-gg] Rash       Current Outpatient Prescriptions:     acetaminophen (TYLENOL ARTHRITIS) 650 MG tablet, 2 Tablet(s) Oral  Every day., Disp: , Rfl:     albuterol 90 mcg/actuation inhaler, 2 puffs every 4 hours as needed for cough, wheeze, or shortness of breath, Disp: 1 Inhaler, Rfl: 11    amitriptyline (ELAVIL) 50 MG tablet, Take 50 mg by mouth every evening. , Disp: , Rfl:     aspirin (ECOTRIN) 81 MG EC tablet, Take 81 mg by mouth once daily., Disp: , Rfl:     azelastine (ASTELIN) 137 mcg (0.1 %) nasal spray, 1 spray (137 mcg total) by Nasal route 2 (two) times daily., Disp: 90 mL, Rfl: 3    B INFANTIS/B ANI/B RADHA/B BIFID (PROBIOTIC 4X ORAL), Take 1 capsule by mouth once daily. , Disp: , Rfl:     biotin 5 mg Tab, Take 1 tablet by mouth once daily. , Disp: , Rfl:     carboxymethylcellulose (REFRESH) 1 % ophthalmic solution, as directed, Disp: , Rfl:     cetirizine (ZYRTEC) 10 MG tablet, Take 10 mg by mouth once daily., Disp: , Rfl:     cholecalciferol, vitamin D3, (VITAMIN D3) 2,000 unit Cap, Take 1 capsule by mouth once daily., Disp: , Rfl:     coenzyme Q10 100 mg capsule, Take 100 mg by mouth once daily. , Disp: , Rfl:     cranberry extract (THERACRAN) 650 mg Cap, Take 1 tablet by mouth 2 (two) times daily.  , Disp: , Rfl:     diclofenac sodium (VOLTAREN) 1 % Gel, APPLY 2 G TOPICALLY 3 (THREE) TIMES DAILY. (Patient taking differently: 3 (three) times daily as needed.  APPLY 2 G TOPICALLY 3 (THREE) TIMES DAILY.), Disp: 100 g, Rfl: 5    digoxin (LANOXIN) 0.25 MG tablet, Take 250 mcg by mouth once daily. 1/2 daily, Disp: , Rfl:     ESTROGENS, CONJUGATED (PREMARIN ORAL), Take 1 tablet by mouth 3 (three) times a week., Disp: , Rfl:     fenofibric acid (TRILIPIX) 135 mg capsule, 1 capsule once daily. Every day, Disp: , Rfl:     fluticasone (FLONASE) 50 mcg/actuation nasal spray, USE ONE SPRAY IN EACH NOSTRIL DAILY, Disp: 48 g, Rfl: 3    fluticasone-vilanterol (BREO ELLIPTA) 200-25 mcg/dose DsDv diskus inhaler, Inhale 1 puff into the lungs once daily., Disp: 1 each, Rfl: 11    FREESTYLE LITE STRIPS Strp, USE TO TEST BLOOD SUGAR TWICE A DAY, Disp: 200 strip, Rfl: 3    GUAIFENESIN/DEXTROMETHORPHAN (DIABETIC TUSSIN DM ORAL), Take by mouth as needed. , Disp: , Rfl:     isosorbide mononitrate (IMDUR) 60 MG 24 hr tablet, Take 60 mg by mouth once daily., Disp: , Rfl:     lancets Misc, 1 Units by Misc.(Non-Drug; Combo Route) route 2 (two) times daily., Disp: 200 each, Rfl: 3    levothyroxine (LEVOTHROID) 25 MCG tablet, Take 25 mcg by mouth before breakfast. Every day, Disp: , Rfl:     metaxalone (SKELAXIN) 800 MG tablet, TAKE 1 TABLET TWICE A DAY, Disp: 180 tablet, Rfl: 3    metformin (GLUCOPHAGE) 500 MG tablet, TAKE 1 TABLET TWICE A DAY WITH MEALS, Disp: 180 tablet, Rfl: 3    MYRBETRIQ 50 mg Tb24, Take 1 tablet by mouth once daily. , Disp: , Rfl:     nitroGLYCERIN (NITROSTAT) 0.4 MG SL tablet, 0.4mg Sublingual PRN .  , Disp: , Rfl:     RABEPRAZOLE SODIUM (ACIPHEX ORAL), Take 1 tablet by mouth before dinner., Disp: , Rfl:     simvastatin (ZOCOR) 20 MG tablet, Take 20 mg by mouth every evening. Every day, Disp: , Rfl:     SPIRIVA WITH HANDIHALER 18 mcg inhalation capsule, INHALE THE CONTENTS OF 1 CAPSULE DAILY, Disp: 90 capsule, Rfl: 3    TOPROL XL 25 mg 24 hr tablet, Take 25 mg by mouth once daily. , Disp: , Rfl:     triazolam (HALCION) 0.125 MG tablet, Take 1 tablet (0.125  "mg total) by mouth nightly as needed. (Patient taking differently: Take 0.125 mg by mouth every evening. ), Disp: 90 tablet, Rfl: 1    vitamins  A,C,E-zinc-copper (PRESERVISION AREDS) 14,320226-200 unit-mg-unit Cap, Take 1 capsule by mouth 2 (two) times daily. , Disp: , Rfl:     Vitals:    04/06/17 1227   BP: 137/79   BP Location: Left arm   Patient Position: Sitting   BP Method: Automatic   Pulse: 93   Weight: 60.7 kg (133 lb 14.9 oz)   Height: 5' 2" (1.575 m)       Physical Exam   Constitutional: She is oriented to person, place, and time. She appears well-developed and well-nourished.   HENT:   Head: Normocephalic and atraumatic.   Eyes: Pupils are equal, round, and reactive to light.   Neck: Normal range of motion. Neck supple.   Cardiovascular: Normal rate.    Pulmonary/Chest: Effort normal.   Musculoskeletal: Normal range of motion. She exhibits no edema.   Neurological: She is alert and oriented to person, place, and time. She has a normal Finger-Nose-Finger Test, a normal Heel to Shin Test, a normal Romberg Test and a normal Tandem Gait Test. Gait normal.   Reflex Scores:       Tricep reflexes are 2+ on the right side and 2+ on the left side.       Bicep reflexes are 2+ on the right side and 2+ on the left side.       Brachioradialis reflexes are 2+ on the right side and 2+ on the left side.       Patellar reflexes are 2+ on the right side and 2+ on the left side.       Achilles reflexes are 2+ on the right side and 2+ on the left side.  Skin: Skin is warm, dry and intact.   Psychiatric: She has a normal mood and affect. Her speech is normal and behavior is normal. Judgment and thought content normal.   Nursing note and vitals reviewed.      Neurologic Exam     Mental Status   Oriented to person, place, and time.   Oriented to person.   Oriented to place.   Oriented to time.   Follows 3 step commands.   Attention: normal. Concentration: normal.   Speech: speech is normal   Level of consciousness: " alert  Knowledge: consistent with education.   Able to name object. Able to read. Able to repeat. Able to write. Normal comprehension.     Cranial Nerves     CN II   Visual acuity: normal  Right visual field deficit: none  Left visual field deficit: none     CN III, IV, VI   Pupils are equal, round, and reactive to light.  Right pupil: Size: 3 mm. Shape: regular. Reactivity: brisk. Consensual response: intact.   Left pupil: Size: 3 mm. Shape: regular. Reactivity: brisk. Consensual response: intact.   CN III: no CN III palsy  CN VI: no CN VI palsy  Nystagmus: none   Diplopia: none  Ophthalmoparesis: none  Conjugate gaze: present    CN V   Right facial sensation deficit: none  Left facial sensation deficit: none    CN VII   Right facial weakness: none  Left facial weakness: none    CN VIII   Hearing: intact    CN IX, X   CN IX normal.   CN X normal.     CN XI   Right sternocleidomastoid strength: normal  Left sternocleidomastoid strength: normal  Right trapezius strength: normal  Left trapezius strength: normal    CN XII   Fasciculations: absent  Tongue deviation: none    Motor Exam   Muscle bulk: normal  Overall muscle tone: normal  Right arm pronator drift: absent  Left arm pronator drift: absent    Strength   Right neck flexion: 5/5  Left neck flexion: 5/5  Right neck extension: 5/5  Left neck extension: 5/5  Right deltoid: 5/5  Left deltoid: 5/5  Right biceps: 5/5  Left biceps: 5/5  Right triceps: 5/5  Left triceps: 5/5  Right wrist flexion: 5/5  Left wrist flexion: 5/5  Right wrist extension: 5/5  Left wrist extension: 5/5  Right interossei: 5/5  Left interossei: 5/5  Right abdominals: 5/5  Left abdominals: 5/5  Right iliopsoas: 5/5  Left iliopsoas: 5/5  Right quadriceps: 5/5  Left quadriceps: 5/5  Right hamstrin/5  Left hamstrin/5  Right glutei: 5/5  Left glutei: 5/5  Right anterior tibial: 5/5  Left anterior tibial: 5/5  Right posterior tibial: 5/5  Left posterior tibial: 5/5  Right peroneal: 5/5  Left  peroneal: 5/5  Right gastroc: 5/5  Left gastroc: 5/5    Sensory Exam   Right arm light touch: normal  Left arm light touch: normal  Right leg light touch: decresed over the medial right ankle; secondary to veissel harvesting during cardiac surgery.  Left leg light touch: normal  Right arm vibration: normal  Left arm vibration: normal  Right arm pinprick: normal  Left arm pinprick: normal    Gait, Coordination, and Reflexes     Gait  Gait: normal    Coordination   Romberg: negative  Finger to nose coordination: normal  Heel to shin coordination: normal  Tandem walking coordination: normal    Tremor   Resting tremor: absent  Intention tremor: absent  Action tremor: absent    Reflexes   Right brachioradialis: 2+  Left brachioradialis: 2+  Right biceps: 2+  Left biceps: 2+  Right triceps: 2+  Left triceps: 2+  Right patellar: 2+  Left patellar: 2+  Right achilles: 2+  Left achilles: 2+  Right Callahan: absent  Left Callahan: absent  Right ankle clonus: absent  Left ankle clonus: absent      Provider dictation:  76 year old female with history of cervcial spondylosis is referred by Dr. Ackerman for evaluation of back and right hip/ leg pain.  Pain started in September/ October 2016 after a cardiac surgery.  Pain is felt in the right hip radiating to the right posterior leg to the ankle.  She feels numbness in the right great toe only.  She is taking voltaren gel, skelaxin and tylenol for pain.  She has not had PT and recalls [facility] greater than 10 years ago.  She did see Dr. Javier in 2015 for cervical spondylosis.    Oswestry score: 24%.    PHQ:  13.    On exam, she has 5/5 stregnth in the upper/ lower extremiteis with 1+ muscle stretch reflexes throughout.  She has decresed sensation in the right medial ankle that has been present since vessel harvesting in the area during her cardiac surgery in 2016.    I have reveiwed an MRI of the lumbar spine from [facility] revealing:  Scoliosis and multi level degenerative changes in the  form of facet and ligamentous hypertropohy and disk/ ostephyte complexes.  There is L1/2 moderate left foraminal narrowing.  L2/3 left foraminal and lateral recess stenosis.  L3/4 right foraminal and lateral recess stenosis.  L4/5  Moderate-severe right foraminal narrowing and bilateral lateral resess stenosis.  L5/S1 left facet cyst and possible right pars defect.  There is left foraminal and right lateral recess stenosis at L5/s1.    Assessment:  76 year old female with lower back and right L5, S1 radicular type paiin,.  She has multi level spondylosis with significnat stenosis and foraminal narrowing at multiple levels.  Right leg pain most likely from right L4/5 foraminal narrowing.  Options to help with pain are regular exercise, PT, injections and lastly surgery.  She is not interested in surgery.  Referred to PT and pain management for injections.  Follow up for injections.    Visit Diagnosis:  Scoliosis of lumbar spine, unspecified scoliosis type  -     Ambulatory Referral to Physical/Occupational Therapy  -     Ambulatory referral to Pain Clinic    Osteoarthritis of spine with radiculopathy, lumbar region  -     Ambulatory Referral to Physical/Occupational Therapy  -     Ambulatory referral to Pain Clinic        Total time spent counseling greater than fifty percent of total visit time.  Counseling included discussion regarding imaging findings, diagnosis possibilities, treatment options, risks and benefits.   The patient had many questions regarding the options and long-term effects.

## 2017-04-06 NOTE — LETTER
April 6, 2017      Sung Moreno MD  [address] [place] [address] [address] [place] [id number]           [facility]  [address] [place] [address] [place] [address]  Phone: [phone]  Fax: [phone]          Patient: Darlin Tipton   MR Number: [id number]   YOB: 1940   Date of Visit: 4/6/2017       Dear Dr. Sung Moreno:    Thank you for referring Darlin Tipton to me for evaluation. Attached you will find relevant portions of my assessment and plan of care.    If you have questions, please do not hesitate to call me. I look forward to following Darlin Tipton along with you.    Sincerely,    Yahaira Louis PA-C    Enclosure  CC:  No Recipients    If you would like to receive this communication electronically, please contact [email or web address] or [phone] to request more information on [employer] Link access.    For providers and/or their staff who would like to refer a patient to [facility], please contact us through our one-stop-shop provider referral line, [facility], at [phone].    If you feel you have received this communication in error or would no longer like to receive these types of communications, please e-mail [email or web address]

## 2017-04-06 NOTE — MR AVS SNAPSHOT
[facility]  [address] [place] [address] [place] [address]  Phone: [phone]  Fax: [phone]                  Darlin Tipton   2017 12:30 PM   Office Visit    Description:  Female : 1940   Provider:  Yahaira Louis PA-C   Department:  [facility]           Reason for Visit     Hip Pain     Leg Pain           Diagnoses this Visit        Comments    Scoliosis of lumbar spine, unspecified scoliosis type    -  Primary     Osteoarthritis of spine with radiculopathy, lumbar region                To Do List           Future Appointments        Provider Department Dept Phone    4/10/2017 11:15 AM LAB, [facility] - Lab [phone]    2017 10:00 AM [facility] PULMONARY [facility] [phone]    5/3/2017 9:20 AM Bj Jones MD [facility] [phone]    2017 10:20 AM Luis Uriostegui MD [facility] - Pulmonary [phone]    10/5/2017 10:30 AM Oumar Almonte Jr., MD [facility] [phone]      Goals (5 Years of Data)     None      [facility]     [facility] Nurse Care Line -  Assistance  Unless otherwise directed by your provider, please contact [facility], our nurse care line that is available for  assistance.     Registered nurses in the [facility] Center provide: appointment scheduling, clinical advisement, health education, and other advisory services.  Call: [phone] (toll free)               Medications           Message regarding Medications     Verify the changes and/or additions to your medication regime listed below are the same as discussed with your clinician today.  If any of these changes or additions are incorrect, please notify your healthcare provider.             Verify that the below list of medications is an accurate representation of the medications you are currently taking.  If none reported, the list may be blank. If incorrect, please contact  your healthcare provider. Carry this list with you in case of emergency.           Current Medications     acetaminophen (TYLENOL ARTHRITIS) 650 MG tablet 2 Tablet(s) Oral  Every day.    albuterol 90 mcg/actuation inhaler 2 puffs every 4 hours as needed for cough, wheeze, or shortness of breath    amitriptyline (ELAVIL) 50 MG tablet Take 50 mg by mouth every evening.     aspirin (ECOTRIN) 81 MG EC tablet Take 81 mg by mouth once daily.    azelastine (ASTELIN) 137 mcg (0.1 %) nasal spray 1 spray (137 mcg total) by Nasal route 2 (two) times daily.    B INFANTIS/B ANI/B RADHA/B BIFID (PROBIOTIC 4X ORAL) Take 1 capsule by mouth once daily.     biotin 5 mg Tab Take 1 tablet by mouth once daily.     carboxymethylcellulose (REFRESH) 1 % ophthalmic solution as directed    cetirizine (ZYRTEC) 10 MG tablet Take 10 mg by mouth once daily.    cholecalciferol, vitamin D3, (VITAMIN D3) 2,000 unit Cap Take 1 capsule by mouth once daily.    coenzyme Q10 100 mg capsule Take 100 mg by mouth once daily.     cranberry extract (THERACRAN) 650 mg Cap Take 1 tablet by mouth 2 (two) times daily.      diclofenac sodium (VOLTAREN) 1 % Gel APPLY 2 G TOPICALLY 3 (THREE) TIMES DAILY.    digoxin (LANOXIN) 0.25 MG tablet Take 250 mcg by mouth once daily. 1/2 daily    ESTROGENS, CONJUGATED (PREMARIN ORAL) Take 1 tablet by mouth 3 (three) times a week.    fenofibric acid (TRILIPIX) 135 mg capsule 1 capsule once daily. Every day    fluticasone (FLONASE) 50 mcg/actuation nasal spray USE ONE SPRAY IN EACH NOSTRIL DAILY    fluticasone-vilanterol (BREO ELLIPTA) 200-25 mcg/dose DsDv diskus inhaler Inhale 1 puff into the lungs once daily.    FREESTYLE LITE STRIPS Strp USE TO TEST BLOOD SUGAR TWICE A DAY    GUAIFENESIN/DEXTROMETHORPHAN (DIABETIC TUSSIN DM ORAL) Take by mouth as needed.     isosorbide mononitrate (IMDUR) 60 MG 24 hr tablet Take 60 mg by mouth once daily.    lancets Misc 1 Units by Misc.(Non-Drug; Combo Route) route 2 (two) times daily.     "levothyroxine (LEVOTHROID) 25 MCG tablet Take 25 mcg by mouth before breakfast. Every day    metaxalone (SKELAXIN) 800 MG tablet TAKE 1 TABLET TWICE A DAY    metformin (GLUCOPHAGE) 500 MG tablet TAKE 1 TABLET TWICE A DAY WITH MEALS    MYRBETRIQ 50 mg Tb24 Take 1 tablet by mouth once daily.     nitroGLYCERIN (NITROSTAT) 0.4 MG SL tablet 0.4mg Sublingual PRN .      RABEPRAZOLE SODIUM (ACIPHEX ORAL) Take 1 tablet by mouth before dinner.    simvastatin (ZOCOR) 20 MG tablet Take 20 mg by mouth every evening. Every day    SPIRIVA WITH HANDIHALER 18 mcg inhalation capsule INHALE THE CONTENTS OF 1 CAPSULE DAILY    TOPROL XL 25 mg 24 hr tablet Take 25 mg by mouth once daily.     triazolam (HALCION) 0.125 MG tablet Take 1 tablet (0.125 mg total) by mouth nightly as needed.    vitamins  A,C,E-zinc-copper (PRESERVISION AREDS) 14,320-226-200 unit-mg-unit Cap Take 1 capsule by mouth 2 (two) times daily.            Clinical Reference Information           Your Vitals Were     BP Pulse Height Weight BMI    137/79 (BP Location: Left arm, Patient Position: Sitting, BP Method: Automatic) 93 5' 2" (1.575 m) 60.7 kg (133 lb 14.9 oz) 24.5 kg/m2      Blood Pressure          Most Recent Value    BP  137/79      Allergies as of 4/6/2017     Sulfa (Sulfonamide Antibiotics)    Cefaclor    Disalcid [Salsalate]    Fenofibrate Micronized    Nitrofurantoin Macrocrystalline    Ofloxacin    Penicillins    Phenylfenesin La [Phenylpropanolamine-gg]      Immunizations Administered on Date of Encounter - 4/6/2017     None      Orders Placed During Today's Visit      Normal Orders This Visit    Ambulatory referral to Pain Clinic     Ambulatory Referral to Physical/Occupational Therapy       Language Assistance Services     ATTENTION: Language assistance services are available, free of charge. Please call [phone].      ATENCIÓN: Si habla raymundo, tiene a ruth disposición servicios gratuitos de asistencia lingüística. Llame al [phone].     CHÚ " Ý: N?u b?n nói Ti?ng Vi?t, có các d?ch v? h? tr? ngôn ng? mi?n phí dành cho b?n. G?i s? [phone].         [facility] complies with applicable Federal civil rights laws and does not discriminate on the basis of race, color, national origin, age, disability, or sex.

## 2017-04-10 ENCOUNTER — LAB VISIT (OUTPATIENT)
Dept: LAB | Facility: HOSPITAL | Age: 77
End: 2017-04-10
Attending: INTERNAL MEDICINE
Payer: MEDICARE

## 2017-04-10 DIAGNOSIS — D84.9 IMMUNE DEFICIENCY DISORDER: ICD-10-CM

## 2017-04-10 PROCEDURE — 36415 COLL VENOUS BLD VENIPUNCTURE: CPT | Mod: PO

## 2017-04-10 PROCEDURE — 86684 HEMOPHILUS INFLUENZA ANTIBDY: CPT

## 2017-04-13 ENCOUNTER — CLINICAL SUPPORT (OUTPATIENT)
Dept: UROLOGY | Facility: CLINIC | Age: 77
End: 2017-04-13
Payer: MEDICARE

## 2017-04-13 ENCOUNTER — TELEPHONE (OUTPATIENT)
Dept: UROLOGY | Facility: CLINIC | Age: 77
End: 2017-04-13

## 2017-04-13 DIAGNOSIS — E11.9 TYPE 2 DIABETES MELLITUS WITHOUT COMPLICATION: ICD-10-CM

## 2017-04-13 DIAGNOSIS — R30.0 DYSURIA: Primary | ICD-10-CM

## 2017-04-13 DIAGNOSIS — N39.0 URINARY TRACT INFECTION WITH HEMATURIA, SITE UNSPECIFIED: ICD-10-CM

## 2017-04-13 DIAGNOSIS — R31.9 URINARY TRACT INFECTION WITH HEMATURIA, SITE UNSPECIFIED: ICD-10-CM

## 2017-04-13 LAB
BILIRUB SERPL-MCNC: NEGATIVE MG/DL
BLOOD URINE, POC: 250
COLOR, POC UA: ABNORMAL
GLUCOSE UR QL STRIP: NEGATIVE
KETONES UR QL STRIP: NEGATIVE
LEUKOCYTE ESTERASE URINE, POC: ABNORMAL
NITRITE, POC UA: POSITIVE
PH, POC UA: 6
PROTEIN, POC: NEGATIVE
SPECIFIC GRAVITY, POC UA: 1.01
UROBILINOGEN, POC UA: NEGATIVE

## 2017-04-13 PROCEDURE — 87186 SC STD MICRODIL/AGAR DIL: CPT

## 2017-04-13 PROCEDURE — 87077 CULTURE AEROBIC IDENTIFY: CPT

## 2017-04-13 PROCEDURE — 87088 URINE BACTERIA CULTURE: CPT

## 2017-04-13 PROCEDURE — 87086 URINE CULTURE/COLONY COUNT: CPT

## 2017-04-13 PROCEDURE — 81002 URINALYSIS NONAUTO W/O SCOPE: CPT | Mod: PBBFAC,PO

## 2017-04-13 RX ORDER — CIPROFLOXACIN 500 MG/1
500 TABLET ORAL 2 TIMES DAILY
Qty: 14 TABLET | Refills: 0 | Status: SHIPPED | OUTPATIENT
Start: 2017-04-13 | End: 2017-08-30

## 2017-04-13 NOTE — TELEPHONE ENCOUNTER
----- Message from Radha Cruz sent at 4/13/2017 10:18 AM CDT -----  Patient is requesting a call back she states she is having burning with urination, contact patient at [phone] to advise.    Thank you

## 2017-04-13 NOTE — TELEPHONE ENCOUNTER
Patient c/o dysuria and frequency since her cystoscopy 3/29.    Patient came in to provide urine specimen.     POCT:     2+ leukocytes  + nitrites  250 blood    Will send urine for culture, per Dr. Murphy's verbal order.    Will call to pharmacy, Cipro 500mg BID for 7 days, per Dr. Murphy's verbal order.      Patient advised.

## 2017-04-13 NOTE — TELEPHONE ENCOUNTER
Patient states she started with dysuria a few days after having a cystoscopy.    Patient will come in for POCT u/a.

## 2017-04-13 NOTE — TELEPHONE ENCOUNTER
I accidentally collected the order DR. Almonte put in for this patient for HGA1C.  Please have him put in another order if it was needed.  Looks like the nurse placed the order in 2/16.

## 2017-04-16 LAB — BACTERIA UR CULT: NORMAL

## 2017-04-16 RX ORDER — METAXALONE 800 MG/1
TABLET ORAL
Qty: 180 TABLET | Refills: 3 | Status: SHIPPED | OUTPATIENT
Start: 2017-04-16 | End: 2018-04-29 | Stop reason: SDUPTHER

## 2017-04-17 ENCOUNTER — TELEPHONE (OUTPATIENT)
Dept: UROLOGY | Facility: CLINIC | Age: 77
End: 2017-04-17

## 2017-04-17 NOTE — TELEPHONE ENCOUNTER
----- Message from Freeman Murphy MD sent at 4/17/2017  3:46 PM CDT -----  Please let her know that her urine culture is sensitive to the cipro I prescribed.  She should complete the course and fu with Dr Trevino as scheduled

## 2017-04-17 NOTE — TELEPHONE ENCOUNTER
Called and spoke with the patient's , informed him that her urine culture did come back showing an infection and the Cipro that she is taking will clear the infection. Also to give us a call after she completes the course to schedule for her to see the MD and retest her urine, he verbally understood.

## 2017-04-19 ENCOUNTER — CLINICAL SUPPORT (OUTPATIENT)
Dept: REHABILITATION | Facility: HOSPITAL | Age: 77
End: 2017-04-19
Attending: NEUROLOGICAL SURGERY
Payer: MEDICARE

## 2017-04-19 DIAGNOSIS — M41.9 SCOLIOSIS OF LUMBAR SPINE, UNSPECIFIED SCOLIOSIS TYPE: Primary | ICD-10-CM

## 2017-04-19 DIAGNOSIS — M47.26 OSTEOARTHRITIS OF SPINE WITH RADICULOPATHY, LUMBAR REGION: ICD-10-CM

## 2017-04-19 LAB
C DIPHTHERIAE AB SER IA-ACNC: >3 IU/ML
C TETANI AB SER-ACNC: 0.08 IU/ML
DEPRECATED S PNEUM 1 IGG SER-MCNC: 36.6 MCG/ML
DEPRECATED S PNEUM12 IGG SER-MCNC: 0.5 MCG/ML
DEPRECATED S PNEUM14 IGG SER-MCNC: 3.3 MCG/ML
DEPRECATED S PNEUM19 IGG SER-MCNC: 10 MCG/ML
DEPRECATED S PNEUM23 IGG SER-MCNC: 2.2 MCG/ML
DEPRECATED S PNEUM3 IGG SER-MCNC: 14.2 MCG/ML
DEPRECATED S PNEUM4 IGG SER-MCNC: 14.9 MCG/ML
DEPRECATED S PNEUM5 IGG SER-MCNC: 25.9 MCG/ML
DEPRECATED S PNEUM8 IGG SER-MCNC: 1.4 MCG/ML
DEPRECATED S PNEUM9 IGG SER-MCNC: 1.1 MCG/ML
HAEM INFLU B IGG SER-MCNC: 0.84 MG/L
S PNEUM DA 18C IGG SER-MCNC: 20.7 MCG/ML
S PNEUM DA 6B IGG SER-MCNC: 95.6 MCG/ML
S PNEUM DA 7F IGG SER-MCNC: 10 MCG/ML
S PNEUM DA 9V IGG SER-MCNC: >52 MCG/ML

## 2017-04-19 PROCEDURE — 97161 PT EVAL LOW COMPLEX 20 MIN: CPT | Mod: PN

## 2017-04-19 PROCEDURE — G8979 MOBILITY GOAL STATUS: HCPCS | Mod: CK,PN

## 2017-04-19 PROCEDURE — G8978 MOBILITY CURRENT STATUS: HCPCS | Mod: CL,PN

## 2017-04-19 NOTE — PLAN OF CARE
TIME RECORD    Date: 04/19/2017    Start Time:  1005  Stop Time:  1050    PROCEDURES:    TIMED  Procedure Time Min.    Start:  Stop:     Start:  Stop:     Start:  Stop:     Start:  Stop:          UNTIMED  Procedure Time Min.   EVAL Start:  Stop:     Start:  Stop:      Total Timed Minutes:    Total Timed Units:    Total Untimed Units:  1  Charges Billed/# of units:  1    OUTPATIENT PHYSICAL THERAPY   PATIENT EVALUATION  Onset Date: Insidious.  Primary Diagnosis:   1. Scoliosis of lumbar spine, unspecified scoliosis type     2. Osteoarthritis of spine with radiculopathy, lumbar region       Treatment Diagnosis: LBP.  Past Medical History:   Diagnosis Date    Allergy     Dust mites, Grasses, Trees    Arthritis     Asthma     Blood transfusion     CAD (coronary artery disease)     Cataract     CHRONIC BRONCHITIS     Diabetes mellitus     Diabetes mellitus type II     GERD (gastroesophageal reflux disease)     Hyperlipidemia     Hypertension     Irregular heart beat     Spinal stenosis     Thyroid disease     Hypothyroidism     Precautions: STD see chart,PMH.  Prior Therapy: 215/216 PT for neck pain.  Medications: Darlin Tipton has a current medication list which includes the following prescription(s): acetaminophen, albuterol, amitriptyline, aspirin, azelastine, b infantis/b ani/b christian/b bifid, biotin, carboxymethylcellulose, cetirizine, cholecalciferol (vitamin d3), ciprofloxacin hcl, coenzyme q10, cranberry extract, diclofenac sodium, digoxin, estrogens, conjugated, fenofibric acid, fluticasone, fluticasone-vilanterol, freestyle lite strips, guaifenesin/dextromethorphan, isosorbide mononitrate, lancets, levothyroxine, metaxalone, metformin, myrbetriq, nitroglycerin, rabeprazole sodium, simvastatin, spiriva with handihaler, toprol xl, triazolam, and vitamins  a,c,e-zinc-copper.  Nutrition:  Normal  History of Present Illness: LBP started ~ Feb 2017 without trauma.  Prior Level of Function:  Independent  Social History: Not working.  Place of Residence (Steps/Adaptations): With  [place] art house.  Functional Deficits Leading to Referral/Nature of Injury: Difficulties with ADL,functional activities,homemaking, self care.  Patient Therapy Goals: Decrease pain.    Subjective     Darlin Tipton states Right side hurts more..    Pain:  Location: back   Description: Aching, Throbbing, Tight, Sharp and Variable  Activities Which Increase Pain: Sitting, Standing, Walking, Lifting and Getting out of bed/chair  Activities Which Decrease Pain: relaxation, pain medication, lying down and rest  Pain Scale: 4/10 at best 6/10 now  10/10 at worst    Objective     Posture: Head forward, round shoulders,scoliosis.  Palpation: L3-S1 tender on right.  Sensation: Intact.  DTRs:  Range of Motion/Strength:   Cervical/Thoracic/Lumbar AROM: Pain/Dysfunction with Movement:   Flexion    40    Extension     10    Right side bending    20    Left side bending    25    Right rotation   10    Left rotation      10       Flexibility: Both  HS tight.  Gait: Without AD  Analysis: SBA - guarded, flexed hips.  Bed Mobility:Independent  Transfers: Independent  Special Tests: Slump test neg (-), SLR LT;90,  RT;90 neg (-) bilaterally.  Other: MOD OSWESTRY 38/50 60-80% IMPAIRED, GOAL 25/50 40-60% IMP  Treatment: EVAL.    Assessment       Initial Assessment (Pertinent finding, problem list and factors affecting outcome): Pt presents ROM and strength deficit of LS, poor posture, decreased functional level due to LBP and above listed deficits.Will benefit from skilled PT to decrease pain and improve function.  Rehab Potiential: good/fair.    Short Term Goals (2 Weeks): 1.Decrease pain x 1 grade. 2.Start HEP.  Long Term Goals (4 Weeks): 1.Pain 0-4/10.2.Independent HEP. 3.ROM WFL/WNL. 4.Strength >4/5. 5.Normalize posture. 6.MOD OSWESTRY 25/50 40-60% IMPAIRED. 7.Restore previous JANI.     Plan     Certification Period: 4/19/17 to  5/19/17  Recommended Treatment Plan: 2 times per week for 4 weeks: Electrical Stimulation PRN, Gait Training, Group Therapy, Manual Therapy, Moist Heat/ Ice, Patient Education, Therapeutic Activites and Therapeutic Exercise  Other Recommendations: Dry needling PRN.      Therapist: Wei Patel, PT    I CERTIFY THE NEED FOR THESE SERVICES FURNISHED UNDER THIS PLAN OF TREATMENT AND WHILE UNDER MY CARE    Physician's comments: ________________________________________________________________________________________________________________________________________________      Physician's Name: ___________________________________

## 2017-04-24 ENCOUNTER — TELEPHONE (OUTPATIENT)
Dept: UROLOGY | Facility: CLINIC | Age: 77
End: 2017-04-24

## 2017-04-24 ENCOUNTER — CLINICAL SUPPORT (OUTPATIENT)
Dept: REHABILITATION | Facility: HOSPITAL | Age: 77
End: 2017-04-24
Attending: NEUROLOGICAL SURGERY
Payer: MEDICARE

## 2017-04-24 DIAGNOSIS — M47.26 OSTEOARTHRITIS OF SPINE WITH RADICULOPATHY, LUMBAR REGION: Primary | ICD-10-CM

## 2017-04-24 PROCEDURE — 97110 THERAPEUTIC EXERCISES: CPT | Mod: PN

## 2017-04-24 PROCEDURE — 97140 MANUAL THERAPY 1/> REGIONS: CPT | Mod: PN

## 2017-04-24 NOTE — TELEPHONE ENCOUNTER
----- Message from Radha Gross sent at 4/24/2017  3:29 PM CDT -----  Patient calling back concerning referral to another doctor/stated number given is not a working number/please call back at [phone] or [phone] (cellphone)

## 2017-04-24 NOTE — PROGRESS NOTES
Name: Darlin RAYMUNDO Saeed  [facility] Number: [id number]  Date of Treatment: 04/24/2017   Diagnosis:   Encounter Diagnosis   Name Primary?    Osteoarthritis of spine with radiculopathy, lumbar region Yes       Time in: 1100  Time Out: 1145  Total Treatment Time: 45      Subjective:    Darlin reports pain remains.  Patient reports their pain to be 5/10 on a 0-10 scale with 0 being no pain and 10 being the worst pain imaginable.    Objective    Patient received individual therapy to increase strength, flexibility, posture and core stabilization with activities as follows:     Darlin received therapeutic exercises to develop strength, flexibility, posture and core stabilization for 35 minutes including:     Bike x 10 minutes  Standing gastroc stretch 3 x 30 sec B  Hamstring stretch 3 x 30 sec B  Piriformis stretch seated 3 x 30 sec L, R: supine 2 x 30 sec, seated x 30 sec, pt reported pain with seated stretch  Seated TrA x 30  LTR x 30 B  Bridges x 30  PPT x 30  Balls squeeze x 30  Clamshell RTB x 30    Darlin received the following manual therapy techniques: Myofacial release were applied to the: R ITB, hip, buttock regions for 10 minutes.     Written Home Exercises Provided: cont HEP  Pt demo good understanding of the education provided. Darlin demonstrated good return demonstration of activities.     Assessment:     Painful regions near iliac crest region, upper/mid ITB region.  Pain and discomfort decreased after MT, but did not resolve completely.  Pt will continue to benefit from skilled PT intervention. Medical Necessity is demonstrated by:  Pain limits function of effected part for some activities, Unable to participate fully in daily activities, Requires skilled supervision to complete and progress HEP and Weakness.    Patient is making good progress towards established goals.      Plan:  Continue with established Plan of Care towards PT goals.

## 2017-04-25 ENCOUNTER — TELEPHONE (OUTPATIENT)
Dept: UROLOGY | Facility: CLINIC | Age: 77
End: 2017-04-25

## 2017-04-25 NOTE — TELEPHONE ENCOUNTER
----- Message from Germain Mcfarland sent at 4/24/2017  4:11 PM CDT -----  Contact: Patient   Please call the office of Dr Chuyita Acevedo to provide medical information they have requested. The patient advised the nurses in [facility]'s office already have Dr Regalado contact info - Please call the patient at [phone] if you have any questions

## 2017-04-26 ENCOUNTER — TELEPHONE (OUTPATIENT)
Dept: UROLOGY | Facility: CLINIC | Age: 77
End: 2017-04-26

## 2017-04-26 ENCOUNTER — CLINICAL SUPPORT (OUTPATIENT)
Dept: REHABILITATION | Facility: HOSPITAL | Age: 77
End: 2017-04-26
Attending: NEUROLOGICAL SURGERY
Payer: MEDICARE

## 2017-04-26 DIAGNOSIS — M47.26 OSTEOARTHRITIS OF SPINE WITH RADICULOPATHY, LUMBAR REGION: ICD-10-CM

## 2017-04-26 PROCEDURE — 97110 THERAPEUTIC EXERCISES: CPT | Mod: PN

## 2017-04-26 PROCEDURE — 97140 MANUAL THERAPY 1/> REGIONS: CPT | Mod: PN

## 2017-04-26 NOTE — PROGRESS NOTES
"Name: Darlin RAYMUNDO [facility] Number: [id number]  Date of Treatment: 04/26/2017   Diagnosis:   Encounter Diagnosis   Name Primary?    Osteoarthritis of spine with radiculopathy, lumbar region        Time in: 1002  Time Out: 1100  Total Treatment Time: 58  Group Time: 0      Subjective:    Darlin reports R LE soreness from increased sitting yesterday.  Patient reports their pain to be 5/10 on a 0-10 scale with 0 being no pain and 10 being the worst pain imaginable.    Objective    Darlin received therapeutic exercises to develop strength, endurance, flexibility and core stabilization for 48 minutes including:     Bike Lv3 10'  Hamstring stretch 3x30" B in sit  Piriformis stretch 3x30" B in sit  IT band stretch 3x30" B supine with strap with assist  Ball squeeze x30  Clamshells x30 Green Tband  PPT x30  Bridges 3x10  LTR 3x10 with Tball  SLR B 3x10    Darlin received the following manual therapy techniques: Myofacial release with Thera roller were applied to the: R IT band while on stretch for 10 minutes.     Written Home Exercises Provided: yes    Pt demo good understanding of the education provided. Darlin demonstrated good return demonstration of activities with cues for proper form.     Assessment:     Pt will continue to benefit from skilled PT intervention. Medical Necessity is demonstrated by:  Pain limits function of effected part for some activities, Unable to participate fully in daily activities, Requires skilled supervision to complete and progress HEP and Weakness.    Plan:    Continue with established Plan of Care towards PT goals.   "

## 2017-05-01 ENCOUNTER — CLINICAL SUPPORT (OUTPATIENT)
Dept: REHABILITATION | Facility: HOSPITAL | Age: 77
End: 2017-05-01
Attending: NEUROLOGICAL SURGERY
Payer: MEDICARE

## 2017-05-01 DIAGNOSIS — M47.26 OSTEOARTHRITIS OF SPINE WITH RADICULOPATHY, LUMBAR REGION: ICD-10-CM

## 2017-05-01 PROCEDURE — 97110 THERAPEUTIC EXERCISES: CPT | Mod: PN

## 2017-05-01 NOTE — PROGRESS NOTES
"Name: Darlin RAYMUNDO [facility] Number: [id number]  Date of Treatment: 05/01/2017   Diagnosis:   Encounter Diagnosis   Name Primary?    Osteoarthritis of spine with radiculopathy, lumbar region        Time in: 1100  Time Out: 1150  Total Treatment Time: 50  Group Time: 0      Subjective:    Darlin reports worsening of symptoms. Patient reports continued R hip post/lat soreness, reports was unable to do HEP 2nd to increased soreness over weekend.  Patient reports their pain to be 6/10 on a 0-10 scale with 0 being no pain and 10 being the worst pain imaginable.    Objective    Darlin received therapeutic exercises to develop strength, endurance, flexibility and core stabilization for 50 minutes including:     Bike Lv3 10'  Hamstring stretch 3x30" B in sit  Piriformis stretch 3x30" B in sit  IT band stretch 3x30" B supine with strap with assist  Ball squeeze 3x10 5" hold  Clamshells 3x10 Green Tband  PPT 3x10  Bridges 2x10  LTR 3x10 with Tball, 1x10 without Tball  SLR B 2x10    Written Home Exercises Provided: yes    Pt demo fair understanding of the education provided. Darlin demonstrated fair return demonstration of activities with cues to break down activities into 3 sets with c/o pain.     Assessment:     Pt will continue to benefit from skilled PT intervention. Medical Necessity is demonstrated by:  Pain limits function of effected part for some activities, Unable to participate fully in daily activities, Requires skilled supervision to complete and progress HEP and Weakness.    Patient is making fair progress towards established goals.    Plan:    Continue with established Plan of Care towards PT goals.   "

## 2017-05-03 ENCOUNTER — CLINICAL SUPPORT (OUTPATIENT)
Dept: REHABILITATION | Facility: HOSPITAL | Age: 77
End: 2017-05-03
Attending: NEUROLOGICAL SURGERY
Payer: MEDICARE

## 2017-05-03 ENCOUNTER — OFFICE VISIT (OUTPATIENT)
Dept: PAIN MEDICINE | Facility: CLINIC | Age: 77
End: 2017-05-03
Payer: MEDICARE

## 2017-05-03 VITALS
WEIGHT: 133.81 LBS | DIASTOLIC BLOOD PRESSURE: 72 MMHG | BODY MASS INDEX: 24.63 KG/M2 | HEIGHT: 62 IN | HEART RATE: 88 BPM | SYSTOLIC BLOOD PRESSURE: 128 MMHG

## 2017-05-03 DIAGNOSIS — M41.9 SCOLIOSIS OF LUMBAR SPINE, UNSPECIFIED SCOLIOSIS TYPE: Primary | ICD-10-CM

## 2017-05-03 DIAGNOSIS — M50.30 DDD (DEGENERATIVE DISC DISEASE), CERVICAL: Primary | ICD-10-CM

## 2017-05-03 DIAGNOSIS — M47.819 FACET ARTHROPATHY: ICD-10-CM

## 2017-05-03 DIAGNOSIS — M54.16 LUMBAR RADICULITIS: ICD-10-CM

## 2017-05-03 PROCEDURE — 99213 OFFICE O/P EST LOW 20 MIN: CPT | Mod: PBBFAC,PO | Performed by: ANESTHESIOLOGY

## 2017-05-03 PROCEDURE — 99999 PR PBB SHADOW E&M-EST. PATIENT-LVL III: CPT | Mod: PBBFAC,,, | Performed by: ANESTHESIOLOGY

## 2017-05-03 PROCEDURE — 99204 OFFICE O/P NEW MOD 45 MIN: CPT | Mod: S$PBB,,, | Performed by: ANESTHESIOLOGY

## 2017-05-03 PROCEDURE — 97110 THERAPEUTIC EXERCISES: CPT | Mod: PN

## 2017-05-03 RX ORDER — AZITHROMYCIN 500 MG/1
TABLET, FILM COATED ORAL
Refills: 3 | COMMUNITY
Start: 2017-04-08 | End: 2017-05-08 | Stop reason: SDUPTHER

## 2017-05-03 RX ORDER — HYDROCODONE BITARTRATE AND ACETAMINOPHEN 5; 325 MG/1; MG/1
1 TABLET ORAL EVERY 12 HOURS PRN
Qty: 60 TABLET | Refills: 0 | Status: SHIPPED | OUTPATIENT
Start: 2017-05-03 | End: 2017-06-01 | Stop reason: SDUPTHER

## 2017-05-03 NOTE — PROGRESS NOTES
This note was completed with dictation software and grammatical errors may exist.    Referring Physician: Choco Hill MD    PCP: Oumar Almonte Jr, MD      CC: Right leg pain    HPI:   Darlin Tipton is a 76 y.o. female referred to us for right leg pain.  She has had lower back pain for over 20 years but states right leg pain has gradually worsened over past 2 months.  No traumatic incident.  She has intermittent burning, grabbing, deep, sharp, shooting pain traveling down her right leg into her right ankle.  Pain worsens with prolonged sitting, standing, walking or flexing.  Pain improves with heat.  MRI lumbar spine was recently ordered.  She denies any worsening weakness.  No bowel bladder changes.  She received start physical therapy with minimal benefit.  She takes Norco very sparingly with mild to moderate benefit.  She rates her pain 6/10 today, 8/10 at worse.    ROS:  CONSTITUTIONAL: No fevers, chills, night sweats, wt. loss, appetite changes  SKIN: no rashes or itching  ENT: No headaches, head trauma, vision changes, or eye pain  LYMPH NODES: None noticed   CV: No chest pain, palpitations.   RESP: No shortness of breath, dyspnea on exertion, cough, wheezing, or hemoptysis  GI: No nausea, emesis, diarrhea, constipation, melena, hematochezia, pain.    : No dysuria, hematuria, urgency, or frequency   HEME: No easy bruising, bleeding problems  PSYCHIATRIC: No depression, anxiety, psychosis, hallucinations.  NEURO: No seizures, memory loss, dizziness or difficulty sleeping  MSK: + History of present illness      Past Medical History:   Diagnosis Date    Allergy     Dust mites, Grasses, Trees    Arthritis     Asthma     Blood transfusion     CAD (coronary artery disease)     Cataract     CHRONIC BRONCHITIS     Diabetes mellitus     Diabetes mellitus type II     GERD (gastroesophageal reflux disease)     Hyperlipidemia     Hypertension     Irregular heart beat     Spinal stenosis      "Thyroid disease     Hypothyroidism     Past Surgical History:   Procedure Laterality Date    APPENDECTOMY  1968    BLADDER SUSPENSION  1989    CARDIAC SURGERY      CABG    CATARACT EXTRACTION  9/2007 (L) and 10/2207 (R)    CORONARY ARTERY BYPASS GRAFT  4/26/2004    x5    ESOPHAGEAL DILATION      SPINE SURGERY  3/2000    Tumor    WRIST SURGERY  1993     Family History   Problem Relation Age of Onset    Allergic rhinitis Neg Hx     Allergies Neg Hx     Angioedema Neg Hx     Asthma Neg Hx     Eczema Neg Hx     Immunodeficiency Neg Hx     Urticaria Neg Hx     Rhinitis Neg Hx     Atopy Neg Hx      Social History     Social History    Marital status:      Spouse name: N/A    Number of children: N/A    Years of education: N/A     Social History Main Topics    Smoking status: Never Smoker    Smokeless tobacco: Never Used    Alcohol use Yes      Comment: Rare    Drug use: No    Sexual activity: Not Currently     Other Topics Concern    None     Social History Narrative         Medications/Allergies: See med card    Vitals:    05/03/17 0923   BP: 128/72   Pulse: 88   Weight: 60.7 kg (133 lb 13.1 oz)   Height: 5' 2" (1.575 m)   PainSc:   6         Physical exam:    GENERAL: A and O x3, the patient appears well groomed and is in no acute distress.  Skin: No rashes or obvious lesions  HEENT: normocephalic, atraumatic  CARDIOVASCULAR:  Palpable peripheral pulses  LUNGS: easy work of breathing  ABDOMEN: soft, nontender   UPPER EXTREMITIES: Normal alignment, normal range of motion, no atrophy, no skin changes,  hair growth and nail growth normal and equal bilaterally. No swelling, no tenderness.    LOWER EXTREMITIES:  Normal alignment, normal range of motion, no atrophy, no skin changes,  hair growth and nail growth normal and equal bilaterally. No swelling, no tenderness.    LUMBAR SPINE  Lumbar spine: ROM is limited with flexion extension and oblique extension with mild increased pain.  "   Boyd's test causes no increased pain on either side.    Supine straight leg raise is positive on the right at 45°    Internal and external rotation of the hip causes no increased pain on either side.  Myofascial exam: No tenderness to palpation across lumbar paraspinous muscles.      MENTAL STATUS: normal orientation, speech, language, and fund of knowledge for social situation.  Emotional state appropriate.    CRANIAL NERVES:  II:  PERRL bilaterally,   III,IV,VI: EOMI.    V:  Facial sensation equal bilaterally  VII:  Facial motor function normal.  VIII:  Hearing equal to finger rub bilaterally  IX/X: Gag normal, palate symmetric  XI:  Shoulder shrug equal, head turn equal  XII:  Tongue midline without fasciculations      MOTOR: Tone and bulk: normal bilateral upper and lower Strength: normal   Delt Bi Tri WE WF     R 5 5 5 5 5 5   L 5 5 5 5 5 5     IP ADD ABD Quad TA Gas HAM  R 5 5 5 5 5 5 5  L 5 5 5 5 5 5 5    SENSATION: Light touch and pinprick intact bilaterally  REFLEXES: normal, symmetric, nonbrisk.  Toes down, no clonus. No hoffmans.  GAIT: normal rise, base, steps, and arm swing.        Imaging:  MRI L-spine 4/1/17  L1-L2: There is a broad disc bulge, asymmetric to the left, which extends into both neural foramina, left greater than right.  There is left ligamentum flavum thickening and left facet arthropathy.  There is left lateral recess stenosis.  No central canal stenosis.  There is moderate left neuroforaminal stenosis.  No right neuroforaminal stenosis.      L2-L3: There is a broad disc bulge, asymmetric to the left, which extends into the left neural foramen and left extraforaminal soft tissues.  There is prominent left ligamentum flavum thickening.  There is bilateral hypertrophic facet arthropathy.  There is a superimposed descending left paracentral disc extrusion which extends approximately 6 mm below the disc plane and which measures approximately 4 x 7 mm in size.  There is left lateral  recess stenosis.  Mass effect upon the descending left L3 nerve is possible.  Please correlate clinically for symptoms referable to the left L3 nerve.  There is mild-moderate central canal stenosis present.  There is moderate left neuroforaminal stenosis.  No right neuroforaminal stenosis.    L3-L4:  There is a broad disc bulge with a superimposed descending central disc extrusion which extends approximately 7 mm below the disc plane and measures approximately 13 x 4 mm.  There is right lateral recess stenosis.  There is ligamentum flavum thickening and facet arthropathy.  The patient's disc extrusion appears to encroach upon the descending right L4 nerve and the right lateral recess.  There is mild-moderate central canal stenosis.  There is, at most, mild left and mild-moderate right neuroforaminal stenosis.    L4-L5:  There is a bulging posterior disc osteophyte complex which extends into the neural foramina right greater than left.  There is bilateral lateral recess stenosis.  There is ligamentum flavum thickening and hypertrophic facet arthropathy, right greater than left.  There is abutment of the exited right L4 nerve in the right extraforaminal soft tissues.  Please correlate clinically for symptoms referable to the right L4 nerve.  There is also abutment of the exited left L4 nerve in the left extraforaminal soft tissues.There is moderate-severe right neuroforaminal stenosis.  No left neuroforaminal stenosis.  There is mild central canal stenosis.    L5-S1:  There is a grade I anterolisthesis of L5 on S1 with uncovering of the intervertebral disc.  There is a possible unilateral right-sided pars defect.  There is ligamentum flavum thickening and severe facet arthropathy.  There is a facet joint effusion present.  There is right lateral recess stenosis, and there is abutment of the descending right S1 nerve in the right lateral recess.There is mild overall central canal stenosis.  There is moderate-severe  left neuroforaminal stenosis.  There is an extraspinal synovial cyst measuring 10 mm which arises from the anterior margin of the left facet joint and exerts severe mass effect upon the exiting left L5 nerve (series 4 image 13).  Please correlate clinically for symptoms referable to the left L5 nerve.    Assessment:  Patient referred for right leg pain  1. DDD (degenerative disc disease), cervical    2. Lumbar radiculitis    3. Facet arthropathy          Plan:  - I have stressed the importance of physical activity and exercise to improve overall health  - I think that the patient's back pain and radicular leg symptoms are due to degenerative disc disease and have recommended a lumbar epidural steroid injection to the Right L4-5 and L5-S1 levels.  If the patient gets relief but not 100%, we can repeat this as necessary up to a total of 3 injections.  - She did request medication.  She can take Norco 5 mg every 12 hours as needed for pain.  - May consider adding gabapentin in the future if needed  - Follow-up after procedure        Thank you for referring this interesting patient, and I look forward to continuing to collaborate in her care.

## 2017-05-03 NOTE — PROGRESS NOTES
TIME RECORD    Date:  05/03/2017    Start Time:  1302  Stop Time:  1349    PROCEDURES:    TIMED  Procedure Time Min.   TE Start:1302  Stop:1349 47    Start:  Stop:     Start:  Stop:     Start:  Stop:          UNTIMED  Procedure Time Min.    Start:  Stop:     Start:  Stop:      Total Timed Minutes:  47  Total Timed Units:  3  Total Untimed Units:  0  Charges Billed/# of units:  3      Progress/Current Status    Subjective:     Patient ID: Darlin Tipton is a 76 y.o. female.  Diagnosis:   1. Scoliosis of lumbar spine, unspecified scoliosis type       Pain: 6 /10  Increased pain today.    Objective:     TE for ROM,strength,stabilization,posture.    Assessment:     Performed ex well.    Patient Education/Response:     Posture ed.    Plans and Goals:     Cont per POC.

## 2017-05-03 NOTE — PROGRESS NOTES
05/03/17 1300   Ankle Exercises   Standing Dorsiflexion Stretch(Gastroc) Reps/Sets/Hold Time 3X30   Hip Exercises   Clamshells Reps/Sets/Resistance 30 GTB, BALL SQUEEZE 30   Lumbar Exercises   Posterior Pelvic Tilts  Reps/Sets/Hold Time 30   Double Knee To Chest Stretch Reps/Sets/Hold Time 30 W/BALL   Hamsting Stretch Reps/Sets/Hold Time 3X30   Piriformis Stretch Reps/Sets/Hold Time 3X30   Bridging Reps/Sets/Hold Time 3X10   Crunches  Reps/Sets 3X10   Lower Trunk Rotation Stretch Reps/Sets/Hold Time 30/30   Additional Exercises   Additional Exercise BIKE 10'

## 2017-05-03 NOTE — MR AVS SNAPSHOT
[facility]  [address] [place] [address] Dr [address]  [facility] [place] [address]  Phone: [phone]                  Darlin Tipton   5/3/2017 9:30 AM   Office Visit    Description:  Female : 1940   Provider:  Bj Jones MD   Department:  [facility]           Reason for Visit     Hip Pain     Leg Pain           Diagnoses this Visit        Comments    DDD (degenerative disc disease), cervical    -  Primary     Lumbar radiculitis         Facet arthropathy                To Do List           Future Appointments        Provider Department Dept Phone    5/3/2017 1:00 PM Wei Patel, PT [facility] [phone]    2017 3:00 PM [facility]UL[facility] [phone]    2017 10:20 AM [facility] [phone]    2017 12:00 PM Aura Adkins, PTA [facility] [phone]    2017 10:00 AM Wei Patel, PT [facility] [phone]      Goals (5 Years of Data)     None       These Medications        Disp Refills Start End    hydrocodone-acetaminophen 5-325mg (NORCO) 5-325 mg per tablet 60 tablet 0 5/3/2017 2017    Take 1 tablet by mouth every 12 (twelve) hours as needed for Pain. - Oral    Pharmacy: [facility] - [address] Ph #: [phone]         [facility]     [facility] -  Assistance  Unless otherwise directed by your provider, please contact [facility], our nurse care line that is available for  assistance.     Registered nurses in the [facility] provide: appointment scheduling, clinical advisement, health education, and other advisory services.  Call: [phone] (toll free)               Medications           Message regarding Medications     Verify the changes and/or additions to your medication regime listed below are the same as discussed with  your clinician today.  If any of these changes or additions are incorrect, please notify your healthcare provider.        START taking these NEW medications        Refills    hydrocodone-acetaminophen 5-325mg (NORCO) 5-325 mg per tablet 0    Sig: Take 1 tablet by mouth every 12 (twelve) hours as needed for Pain.    Class: Print    Route: Oral           Verify that the below list of medications is an accurate representation of the medications you are currently taking.  If none reported, the list may be blank. If incorrect, please contact your healthcare provider. Carry this list with you in case of emergency.           Current Medications     acetaminophen (TYLENOL ARTHRITIS) 650 MG tablet 2 Tablet(s) Oral  Every day.    albuterol 90 mcg/actuation inhaler 2 puffs every 4 hours as needed for cough, wheeze, or shortness of breath    amitriptyline (ELAVIL) 50 MG tablet Take 50 mg by mouth every evening.     aspirin (ECOTRIN) 81 MG EC tablet Take 81 mg by mouth once daily.    azelastine (ASTELIN) 137 mcg (0.1 %) nasal spray 1 spray (137 mcg total) by Nasal route 2 (two) times daily.    azithromycin (ZITHROMAX) 500 MG tablet TAKE 1 TABLET BY MOUTH DAILY FOR YELLOW MUCUS. REPEAT IF NEEDED    B INFANTIS/B ANI/B RADHA/B BIFID (PROBIOTIC 4X ORAL) Take 1 capsule by mouth once daily.     biotin 5 mg Tab Take 1 tablet by mouth once daily.     cetirizine (ZYRTEC) 10 MG tablet Take 10 mg by mouth once daily.    cholecalciferol, vitamin D3, (VITAMIN D3) 2,000 unit Cap Take 1 capsule by mouth once daily.    coenzyme Q10 100 mg capsule Take 100 mg by mouth once daily.     cranberry extract (THERACRAN) 650 mg Cap Take 1 tablet by mouth 2 (two) times daily.      diclofenac sodium (VOLTAREN) 1 % Gel APPLY 2 G TOPICALLY 3 (THREE) TIMES DAILY.    digoxin (LANOXIN) 0.25 MG tablet Take 250 mcg by mouth once daily. 1/2 daily    ESTROGENS, CONJUGATED (PREMARIN ORAL) Take 1 tablet by mouth 3 (three) times a week.    fenofibric acid (TRILIPIX)  "135 mg capsule 1 capsule once daily. Every day    fluticasone (FLONASE) 50 mcg/actuation nasal spray USE ONE SPRAY IN EACH NOSTRIL DAILY    fluticasone-vilanterol (BREO ELLIPTA) 200-25 mcg/dose DsDv diskus inhaler Inhale 1 puff into the lungs once daily.    FREESTYLE LITE STRIPS Strp USE TO TEST BLOOD SUGAR TWICE A DAY    isosorbide mononitrate (IMDUR) 60 MG 24 hr tablet Take 60 mg by mouth once daily.    levothyroxine (LEVOTHROID) 25 MCG tablet Take 25 mcg by mouth before breakfast. Every day    metaxalone (SKELAXIN) 800 MG tablet TAKE 1 TABLET TWICE A DAY    MYRBETRIQ 50 mg Tb24 Take 1 tablet by mouth once daily.     nitroGLYCERIN (NITROSTAT) 0.4 MG SL tablet 0.4mg Sublingual PRN .      RABEPRAZOLE SODIUM (ACIPHEX ORAL) Take 1 tablet by mouth before dinner.    simvastatin (ZOCOR) 20 MG tablet Take 20 mg by mouth every evening. Every day    SPIRIVA WITH HANDIHALER 18 mcg inhalation capsule INHALE THE CONTENTS OF 1 CAPSULE DAILY    TOPROL XL 25 mg 24 hr tablet Take 25 mg by mouth once daily.     triazolam (HALCION) 0.125 MG tablet Take 1 tablet (0.125 mg total) by mouth nightly as needed.    vitamins  A,C,E-zinc-copper (PRESERVISION AREDS) 14,320-226-200 unit-mg-unit Cap Take 1 capsule by mouth 2 (two) times daily.     carboxymethylcellulose (REFRESH) 1 % ophthalmic solution as directed    ciprofloxacin HCl (CIPRO) 500 MG tablet Take 1 tablet (500 mg total) by mouth 2 (two) times daily.    GUAIFENESIN/DEXTROMETHORPHAN (DIABETIC TUSSIN DM ORAL) Take by mouth as needed.     hydrocodone-acetaminophen 5-325mg (NORCO) 5-325 mg per tablet Take 1 tablet by mouth every 12 (twelve) hours as needed for Pain.    lancets Misc 1 Units by Misc.(Non-Drug; Combo Route) route 2 (two) times daily.    metformin (GLUCOPHAGE) 500 MG tablet TAKE 1 TABLET TWICE A DAY WITH MEALS           Clinical Reference Information           Your Vitals Were     BP Pulse Height Weight BMI    128/72 88 5' 2" (1.575 m) 60.7 kg (133 lb 13.1 oz) 24.48 " kg/m2      Blood Pressure          Most Recent Value    BP  128/72      Allergies as of 5/3/2017     Sulfa (Sulfonamide Antibiotics)    Cefaclor    Disalcid [Salsalate]    Fenofibrate Micronized    Nitrofurantoin Macrocrystalline    Penicillins    Phenylfenesin La [Phenylpropanolamine-gg]      Immunizations Administered on Date of Encounter - 5/3/2017     None      Language Assistance Services     ATTENTION: Language assistance services are available, free of charge. Please call [phone].      ATENCIÓN: Si guerrero raymundo, tiene a ruth disposición servicios gratuitos de asistencia lingüística. Llame al [phone].     CHÚ Ý: N?u b?n nói Ti?ng Vi?t, có các d?ch v? h? tr? ngôn ng? mi?n phí dành cho b?n. G?i s? [phone].         [facility] complies with applicable Federal civil rights laws and does not discriminate on the basis of race, color, national origin, age, disability, or sex.

## 2017-05-04 ENCOUNTER — HOSPITAL ENCOUNTER (OUTPATIENT)
Dept: RESPIRATORY THERAPY | Facility: HOSPITAL | Age: 77
Discharge: HOME | End: 2017-05-04
Attending: INTERNAL MEDICINE
Payer: MEDICARE

## 2017-05-04 DIAGNOSIS — R05.3 CHRONIC COUGH: ICD-10-CM

## 2017-05-04 DIAGNOSIS — M54.16 LUMBAR RADICULOPATHY: Primary | ICD-10-CM

## 2017-05-04 PROCEDURE — 94060 EVALUATION OF WHEEZING: CPT | Mod: 26,,, | Performed by: INTERNAL MEDICINE

## 2017-05-04 PROCEDURE — 94727 GAS DIL/WSHOT DETER LNG VOL: CPT | Mod: 26,,, | Performed by: INTERNAL MEDICINE

## 2017-05-04 PROCEDURE — 94729 DIFFUSING CAPACITY: CPT | Mod: 26,,, | Performed by: INTERNAL MEDICINE

## 2017-05-04 PROCEDURE — 94060 EVALUATION OF WHEEZING: CPT

## 2017-05-04 PROCEDURE — 94729 DIFFUSING CAPACITY: CPT

## 2017-05-04 PROCEDURE — 94727 GAS DIL/WSHOT DETER LNG VOL: CPT

## 2017-05-08 ENCOUNTER — OFFICE VISIT (OUTPATIENT)
Dept: PULMONOLOGY | Facility: CLINIC | Age: 77
End: 2017-05-08
Payer: MEDICARE

## 2017-05-08 VITALS
HEIGHT: 62 IN | DIASTOLIC BLOOD PRESSURE: 75 MMHG | OXYGEN SATURATION: 94 % | BODY MASS INDEX: 24.78 KG/M2 | WEIGHT: 134.69 LBS | SYSTOLIC BLOOD PRESSURE: 142 MMHG | HEART RATE: 85 BPM

## 2017-05-08 DIAGNOSIS — R09.89 CHRONIC SINUS COMPLAINTS: Primary | ICD-10-CM

## 2017-05-08 DIAGNOSIS — R05.3 CHRONIC COUGH: ICD-10-CM

## 2017-05-08 DIAGNOSIS — J41.8 MIXED SIMPLE AND MUCOPURULENT CHRONIC BRONCHITIS: ICD-10-CM

## 2017-05-08 PROCEDURE — 99215 OFFICE O/P EST HI 40 MIN: CPT | Mod: PBBFAC,PO | Performed by: INTERNAL MEDICINE

## 2017-05-08 PROCEDURE — 99999 PR PBB SHADOW E&M-EST. PATIENT-LVL V: CPT | Mod: PBBFAC,,, | Performed by: INTERNAL MEDICINE

## 2017-05-08 PROCEDURE — 99213 OFFICE O/P EST LOW 20 MIN: CPT | Mod: S$PBB,,, | Performed by: INTERNAL MEDICINE

## 2017-05-08 RX ORDER — PREDNISONE 20 MG/1
TABLET ORAL
Qty: 21 TABLET | Refills: 0 | Status: SHIPPED | OUTPATIENT
Start: 2017-05-08 | End: 2017-12-23 | Stop reason: SDUPTHER

## 2017-05-08 RX ORDER — TIOTROPIUM BROMIDE 18 UG/1
CAPSULE ORAL; RESPIRATORY (INHALATION)
Qty: 90 CAPSULE | Refills: 3 | Status: SHIPPED | OUTPATIENT
Start: 2017-05-08 | End: 2018-01-30

## 2017-05-08 RX ORDER — ALBUTEROL SULFATE 90 UG/1
AEROSOL, METERED RESPIRATORY (INHALATION)
Qty: 3 INHALER | Refills: 3 | Status: SHIPPED | OUTPATIENT
Start: 2017-05-08 | End: 2018-01-30 | Stop reason: SDUPTHER

## 2017-05-08 RX ORDER — FLUTICASONE FUROATE AND VILANTEROL 200; 25 UG/1; UG/1
1 POWDER RESPIRATORY (INHALATION) DAILY
Qty: 3 EACH | Refills: 3 | Status: SHIPPED | OUTPATIENT
Start: 2017-05-08 | End: 2018-01-30

## 2017-05-08 RX ORDER — AZELASTINE 1 MG/ML
1 SPRAY, METERED NASAL 2 TIMES DAILY
Qty: 90 ML | Refills: 3 | Status: SHIPPED | OUTPATIENT
Start: 2017-05-08 | End: 2018-12-19 | Stop reason: SDUPTHER

## 2017-05-08 RX ORDER — AZITHROMYCIN 500 MG/1
TABLET, FILM COATED ORAL
Qty: 3 TABLET | Refills: 3 | Status: SHIPPED | OUTPATIENT
Start: 2017-05-08 | End: 2017-08-30

## 2017-05-08 NOTE — MR AVS SNAPSHOT
[facility]  [address] [facility] [address]  [facility] [address]  Phone: [phone]                  Darlin Tipton   2017 10:20 AM   Office Visit    Description:  Female : 1940   Provider:  Luis Uriostegui MD   Department:  [facility]           Reason for Visit     Follow-up     Asthma     Cough     Shortness of Breath           Diagnoses this Visit        Comments    Chronic sinus complaints    -  Primary     Chronic cough         Asthma, persistent         Mixed simple and mucopurulent chronic bronchitis                To Do List           Future Appointments        Provider Department Dept Phone    2017 12:00 PM Aura Adkins, PTA [facility] [phone]    2017 10:00 AM Wei Patel, PT [facility] [phone]    5/15/2017 11:00 AM Aura Adkins, PTA [facility] [phone]    2017 10:00 AM Wei Patel, PT [facility] [phone]    10/5/2017 10:30 AM Oumar Almonte Jr., MD [facility] [phone]      Your Future Surgeries/Procedures     May 09, 2017   Surgery with Bj Jones MD   [facility] ([facility])    [address]  [facility] [address] [phone]              Goals (5 Years of Data)     None      Follow-Up and Disposition     Return in about 1 year (around 2018).    Follow-up and Disposition History       These Medications        Disp Refills Start End    fluticasone-vilanterol (BREO ELLIPTA) 200-25 mcg/dose DsDv diskus inhaler 3 each 3 2017     Inhale 1 puff into the lungs once daily. - Inhalation    Pharmacy: [facility] - [facility], [place] [address] Ph #: [phone]       SPIRIVA WITH HANDIHALER 18 mcg inhalation capsule 90 capsule 3 2017     INHALE THE CONTENTS OF 1 CAPSULE DAILY    Pharmacy: [facility] -  [address] Ph #: [phone]       albuterol 90 mcg/actuation inhaler 3 Inhaler 3 5/8/2017     2 puffs every 4 hours as needed for cough, wheeze, or shortness of breath    Pharmacy: [facility] - [address] Ph #: [phone]       azelastine (ASTELIN) 137 mcg (0.1 %) nasal spray 90 mL 3 5/8/2017 5/8/2018    1 spray (137 mcg total) by Nasal route 2 (two) times daily. - Nasal    Pharmacy: [facility] - [address] Ph #: [phone]       azithromycin (ZITHROMAX) 500 MG tablet 3 tablet 3 5/8/2017     TAKE 1 TABLET BY MOUTH DAILY FOR YELLOW MUCUS. REPEAT IF NEEDED    Pharmacy: [facility] - [address] Ph #: [phone]       predniSONE (DELTASONE) 20 MG tablet 21 tablet 0 5/8/2017     One daily for 3 days and repeat for flare of lung symptoms as intructed    Pharmacy: [facility] - [address] Ph #: [phone]         [facility]     [facility] On Call Nurse Care Line - 24/7 Assistance  Unless otherwise directed by your provider, please contact [facility], our nurse care line that is available for 24/7 assistance.     Registered nurses in the [facility] On Call Center provide: appointment scheduling, clinical advisement, health education, and other advisory services.  Call: [phone] (toll free)               Medications           Message regarding Medications     Verify the changes and/or additions to your medication regime listed below are the same as discussed with your clinician today.  If any of these changes or additions are incorrect, please notify your healthcare provider.        START taking these NEW medications        Refills    predniSONE (DELTASONE) 20 MG tablet 0    Sig: One daily for 3 days and repeat for flare of lung symptoms as intructed    Class: Print           Verify that the below  list of medications is an accurate representation of the medications you are currently taking.  If none reported, the list may be blank. If incorrect, please contact your healthcare provider. Carry this list with you in case of emergency.           Current Medications     acetaminophen (TYLENOL ARTHRITIS) 650 MG tablet 2 Tablet(s) Oral  Every day.    albuterol 90 mcg/actuation inhaler 2 puffs every 4 hours as needed for cough, wheeze, or shortness of breath    amitriptyline (ELAVIL) 50 MG tablet Take 50 mg by mouth every evening.     azelastine (ASTELIN) 137 mcg (0.1 %) nasal spray 1 spray (137 mcg total) by Nasal route 2 (two) times daily.    B INFANTIS/B ANI/B RADHA/B BIFID (PROBIOTIC 4X ORAL) Take 1 capsule by mouth once daily.     biotin 5 mg Tab Take 1 tablet by mouth once daily.     carboxymethylcellulose (REFRESH) 1 % ophthalmic solution as directed    cetirizine (ZYRTEC) 10 MG tablet Take 10 mg by mouth once daily.    coenzyme Q10 100 mg capsule Take 100 mg by mouth once daily.     cranberry extract (THERACRAN) 650 mg Cap Take 1 tablet by mouth 2 (two) times daily.      diclofenac sodium (VOLTAREN) 1 % Gel APPLY 2 G TOPICALLY 3 (THREE) TIMES DAILY.    digoxin (LANOXIN) 0.25 MG tablet Take 250 mcg by mouth once daily. 1/2 daily    ESTROGENS, CONJUGATED (PREMARIN ORAL) Take 1 tablet by mouth 3 (three) times a week.    fenofibric acid (TRILIPIX) 135 mg capsule 1 capsule once daily. Every day    fluticasone (FLONASE) 50 mcg/actuation nasal spray USE ONE SPRAY IN EACH NOSTRIL DAILY    fluticasone-vilanterol (BREO ELLIPTA) 200-25 mcg/dose DsDv diskus inhaler Inhale 1 puff into the lungs once daily.    FREESTYLE LITE STRIPS Strp USE TO TEST BLOOD SUGAR TWICE A DAY    GUAIFENESIN/DEXTROMETHORPHAN (DIABETIC TUSSIN DM ORAL) Take by mouth as needed.     hydrocodone-acetaminophen 5-325mg (NORCO) 5-325 mg per tablet Take 1 tablet by mouth every 12 (twelve) hours as needed for Pain.    isosorbide mononitrate (IMDUR) 60 MG  "24 hr tablet Take 60 mg by mouth once daily.    lancets Misc 1 Units by Misc.(Non-Drug; Combo Route) route 2 (two) times daily.    levothyroxine (LEVOTHROID) 25 MCG tablet Take 25 mcg by mouth before breakfast. Every day    metaxalone (SKELAXIN) 800 MG tablet TAKE 1 TABLET TWICE A DAY    metformin (GLUCOPHAGE) 500 MG tablet TAKE 1 TABLET TWICE A DAY WITH MEALS    MYRBETRIQ 50 mg Tb24 Take 1 tablet by mouth once daily.     simvastatin (ZOCOR) 20 MG tablet Take 20 mg by mouth every evening. Every day    SPIRIVA WITH HANDIHALER 18 mcg inhalation capsule INHALE THE CONTENTS OF 1 CAPSULE DAILY    TOPROL XL 25 mg 24 hr tablet Take 25 mg by mouth once daily.     triazolam (HALCION) 0.125 MG tablet Take 1 tablet (0.125 mg total) by mouth nightly as needed.    vitamins  A,C,E-zinc-copper (PRESERVISION AREDS) 14,320-226-200 unit-mg-unit Cap Take 1 capsule by mouth 2 (two) times daily.     aspirin (ECOTRIN) 81 MG EC tablet Take 81 mg by mouth once daily.    azithromycin (ZITHROMAX) 500 MG tablet TAKE 1 TABLET BY MOUTH DAILY FOR YELLOW MUCUS. REPEAT IF NEEDED    cholecalciferol, vitamin D3, (VITAMIN D3) 2,000 unit Cap Take 1 capsule by mouth once daily.    ciprofloxacin HCl (CIPRO) 500 MG tablet Take 1 tablet (500 mg total) by mouth 2 (two) times daily.    nitroGLYCERIN (NITROSTAT) 0.4 MG SL tablet 0.4mg Sublingual PRN .      predniSONE (DELTASONE) 20 MG tablet One daily for 3 days and repeat for flare of lung symptoms as intructed    RABEPRAZOLE SODIUM (ACIPHEX ORAL) Take 1 tablet by mouth before dinner.           Clinical Reference Information           Your Vitals Were     BP Pulse Height Weight SpO2 BMI    142/75 (BP Location: Left arm, Patient Position: Sitting, BP Method: Automatic) 85 5' 2" (1.575 m) 61.1 kg (134 lb 11.2 oz) 94% 24.64 kg/m2      Blood Pressure          Most Recent Value    BP  (!)  142/75      Allergies as of 5/8/2017     Sulfa (Sulfonamide Antibiotics)    Cefaclor    Disalcid [Salsalate]    Fenofibrate " Micronized    Nitrofurantoin Macrocrystalline    Penicillins    Phenylfenesin La [Phenylpropanolamine-gg]      Immunizations Administered on Date of Encounter - 5/8/2017     None      Instructions    Tetanus immunity low, booster.  Pneumonia response was good, immune system looks ok.    Breathing test was  Good and normal.    Chronic cough from sinus and asthma.        Repeat prednisone as needed, avoid as much as able.     Try skipping spiriva if well.         Language Assistance Services     ATTENTION: Language assistance services are available, free of charge. Please call [phone].      ATENCIÓN: Si habla raymundo, tiene a ruth disposición servicios gratuitos de asistencia lingüística. Llame al [phone].     ROSANGELA Ý: N?u b?n nói Ti?ng Vi?t, có các d?ch v? h? tr? ngôn ng? mi?n phí dành cho b?n. G?i s? [phone].         [facility] complies with applicable Federal civil rights laws and does not discriminate on the basis of race, color, national origin, age, disability, or sex.

## 2017-05-08 NOTE — PATIENT INSTRUCTIONS
Tetanus immunity low, booster.  Pneumonia response was good, immune system looks ok.    Breathing test was  Good and normal.    Chronic cough from sinus and asthma.        Repeat prednisone as needed, avoid as much as able.     Try skipping spiriva if well.

## 2017-05-08 NOTE — PROGRESS NOTES
"5/8/2017    Darlin Tipton  New Patient Consult    Chief Complaint   Patient presents with    Follow-up     3 month    Asthma    Cough    Shortness of Breath     May 7, 2017- no prednisone since last visit, sinuses better, leg problems,  pft done and nl.      Feb 9, cleared for a few days and relapsed ppt another course prednisone last 3 days,  Uses breo and flonase dailly and albuterol helped.  Sinuses better but still with left sinus pain. z ayala used once.      8/14 pneumo titers are weak, had repeat vaccine last month.      Jan 12, 2017HPI: never smoker, no h/o lung problems, moved to [place] in 1965 with developed allergies-- had cough and wheezes, supine worse, seasonal worse, not nocturnal, only used spiriva- helps a little, no recall albuterol.  Steroids helps some.  flonase helps , may have used singulair in past with  No help.      Smell ok, stuffy +, sinus headaches over eyes and left maxilary.      Had cabg in sept .     Cough violent with no problems.  Uses cough supressant with good results.  Tessalon no help.        The chief compliant  problem is new to me",   PFSH:  Past Medical History:   Diagnosis Date    Allergy     Dust mites, Grasses, Trees    Arthritis     Asthma     Blood transfusion     CAD (coronary artery disease)     Cataract     CHRONIC BRONCHITIS     Diabetes mellitus     Diabetes mellitus type II     GERD (gastroesophageal reflux disease)     Hyperlipidemia     Hypertension     Irregular heart beat     Spinal stenosis     Thyroid disease     Hypothyroidism         Past Surgical History:   Procedure Laterality Date    APPENDECTOMY  1968    BLADDER SUSPENSION  1989    CARDIAC SURGERY      CABG    CATARACT EXTRACTION  9/2007 (L) and 10/2207 (R)    CORONARY ARTERY BYPASS GRAFT  4/26/2004    x5    ESOPHAGEAL DILATION      SPINE SURGERY  3/2000    Tumor    WRIST SURGERY  1993     Social History   Substance Use Topics    Smoking status: Never Smoker    Smokeless " "tobacco: Never Used    Alcohol use Yes      Comment: Rare     Family History   Problem Relation Age of Onset    Allergic rhinitis Neg Hx     Allergies Neg Hx     Angioedema Neg Hx     Asthma Neg Hx     Eczema Neg Hx     Immunodeficiency Neg Hx     Urticaria Neg Hx     Rhinitis Neg Hx     Atopy Neg Hx      Review of patient's allergies indicates:   Allergen Reactions    Cefaclor      Other reaction(s): rash    Disalcid  [salsalate]      Other reaction(s): rash    Fenofibrate micronized      Other reaction(s): rash    Nitrofurantoin macrocrystalline      Other reaction(s): rash    Ofloxacin      Other reaction(s): rash    Penicillins      Other reaction(s): rash    Phenylfenesin la  [phenylpropanolamine-gg]      Other reaction(s): rash    Sulfa (sulfonamide antibiotics)      Other reaction(s): rash       Performance Status:The patient's activity level is no limits with regular activity.      Review of Systems:  a review of eleven systems covering constitutional, Eye, HEENT, Psych, Respiratory, Cardiac, GI, , Musculoskeletal, Endocrine, Dermatologic was negative except for pertinent findings as listed ABOVE and below:  Aspirates rare and sticking mid chest and severe gerd in past- had endo with dr quiñonez with stricture dilated lasting about a yr.      Exam:Comprehensive exam done.   BP (!) 142/75 (BP Location: Left arm, Patient Position: Sitting, BP Method: Automatic)  Pulse 85  Ht 5' 2" (1.575 m)  Wt 61.1 kg (134 lb 11.2 oz)  SpO2 (!) 94%  BMI 24.64 kg/m2  Exam included Vitals as listed, and patient's appearance and affect and alertness and mood, oral exam for yeast and hygiene and pharynx lesions and Mallapatti (M) score, neck with inspection for jvd and masses and thyroid abnormalities and lymph nodes (supraclavicular and infraclavicular nodes and axillary also examined and noted if abn), chest exam included symmetry and effort and fremitus and percussion and auscultation, cardiac exam " included rhythm and gallops and murmur and rubs and jvd and edema, abdominal exam for mass and hepatosplenomegaly and tenderness and hernias and bowel sounds, Musculoskeletal exam with muscle tone and posture and mobility/gait and  strength, and skin for rashes and cyanosis and pallor and turgor, extremity for clubbing.  Findings were normal except for pertinent findings listed below:   M2 and clear chest, no cough, trace edema post farrukh harvest r> l    Radiographs (ct chest and cxr) reviewed: view by direct vision  Post op cabg and ? copd    Labs reviewed         PFT wnl 5/5/2017    Plan:  Clinical impression is resonably certain and repeated evaluation prn +/- follow up will be needed as below.    Darlin was seen today for follow-up, asthma, cough and shortness of breath.    Diagnoses and all orders for this visit:    Chronic sinus complaints  -     azelastine (ASTELIN) 137 mcg (0.1 %) nasal spray; 1 spray (137 mcg total) by Nasal route 2 (two) times daily.  -     azithromycin (ZITHROMAX) 500 MG tablet; TAKE 1 TABLET BY MOUTH DAILY FOR YELLOW MUCUS. REPEAT IF NEEDED    Chronic cough  -     azelastine (ASTELIN) 137 mcg (0.1 %) nasal spray; 1 spray (137 mcg total) by Nasal route 2 (two) times daily.  -     azithromycin (ZITHROMAX) 500 MG tablet; TAKE 1 TABLET BY MOUTH DAILY FOR YELLOW MUCUS. REPEAT IF NEEDED    Asthma, persistent  -     fluticasone-vilanterol (BREO ELLIPTA) 200-25 mcg/dose DsDv diskus inhaler; Inhale 1 puff into the lungs once daily.  -     albuterol 90 mcg/actuation inhaler; 2 puffs every 4 hours as needed for cough, wheeze, or shortness of breath  -     predniSONE (DELTASONE) 20 MG tablet; One daily for 3 days and repeat for flare of lung symptoms as intructed    Mixed simple and mucopurulent chronic bronchitis  -     fluticasone-vilanterol (BREO ELLIPTA) 200-25 mcg/dose DsDv diskus inhaler; Inhale 1 puff into the lungs once daily.  -     SPIRIVA WITH HANDIHALER 18 mcg inhalation capsule;  INHALE THE CONTENTS OF 1 CAPSULE DAILY  -     albuterol 90 mcg/actuation inhaler; 2 puffs every 4 hours as needed for cough, wheeze, or shortness of breath  -     predniSONE (DELTASONE) 20 MG tablet; One daily for 3 days and repeat for flare of lung symptoms as intructed      Return in about 1 year (around 5/8/2018).    Discussed with patient above for education the following:          Tetanus immunity low, booster.  Pneumonia response was good, immune system looks ok.    Breathing test was  Good and normal.    Chronic cough from sinus and asthma.        Repeat prednisone as needed, avoid as much as able.     Try skipping spiriva if well.

## 2017-05-09 ENCOUNTER — HOSPITAL ENCOUNTER (OUTPATIENT)
Facility: AMBULARY SURGERY CENTER | Age: 77
Discharge: HOME | End: 2017-05-09
Attending: ANESTHESIOLOGY | Admitting: ANESTHESIOLOGY
Payer: MEDICARE

## 2017-05-09 ENCOUNTER — SURGERY (OUTPATIENT)
Age: 77
End: 2017-05-09

## 2017-05-09 DIAGNOSIS — M51.36 DDD (DEGENERATIVE DISC DISEASE), LUMBAR: Primary | ICD-10-CM

## 2017-05-09 LAB — POCT GLUCOSE: 92 MG/DL (ref 70–110)

## 2017-05-09 PROCEDURE — 64483 NJX AA&/STRD TFRM EPI L/S 1: CPT | Mod: RT,,, | Performed by: ANESTHESIOLOGY

## 2017-05-09 PROCEDURE — 64484 NJX AA&/STRD TFRM EPI L/S EA: CPT | Mod: RT,,, | Performed by: ANESTHESIOLOGY

## 2017-05-09 PROCEDURE — G8907 PT DOC NO EVENTS ON DISCHARG: HCPCS | Performed by: ANESTHESIOLOGY

## 2017-05-09 PROCEDURE — 64483 NJX AA&/STRD TFRM EPI L/S 1: CPT | Performed by: ANESTHESIOLOGY

## 2017-05-09 PROCEDURE — G8918 PT W/O PREOP ORDER IV AB PRO: HCPCS | Performed by: ANESTHESIOLOGY

## 2017-05-09 PROCEDURE — 99152 MOD SED SAME PHYS/QHP 5/>YRS: CPT | Mod: ,,, | Performed by: ANESTHESIOLOGY

## 2017-05-09 PROCEDURE — 64484 NJX AA&/STRD TFRM EPI L/S EA: CPT | Performed by: ANESTHESIOLOGY

## 2017-05-09 RX ORDER — LIDOCAINE HYDROCHLORIDE 10 MG/ML
INJECTION, SOLUTION EPIDURAL; INFILTRATION; INTRACAUDAL; PERINEURAL
Status: DISCONTINUED | OUTPATIENT
Start: 2017-05-09 | End: 2017-05-09 | Stop reason: HOSPADM

## 2017-05-09 RX ORDER — LIDOCAINE HYDROCHLORIDE 10 MG/ML
0.2 INJECTION, SOLUTION EPIDURAL; INFILTRATION; INTRACAUDAL; PERINEURAL ONCE AS NEEDED
Status: COMPLETED | OUTPATIENT
Start: 2017-05-09 | End: 2017-05-09

## 2017-05-09 RX ORDER — DEXAMETHASONE SODIUM PHOSPHATE 10 MG/ML
INJECTION INTRAMUSCULAR; INTRAVENOUS
Status: DISCONTINUED
Start: 2017-05-09 | End: 2017-05-09 | Stop reason: HOSPADM

## 2017-05-09 RX ORDER — ALPRAZOLAM 1 MG/1
1 TABLET, ORALLY DISINTEGRATING ORAL
Status: DISCONTINUED | OUTPATIENT
Start: 2017-05-09 | End: 2017-05-09 | Stop reason: HOSPADM

## 2017-05-09 RX ORDER — FENTANYL CITRATE 50 UG/ML
INJECTION, SOLUTION INTRAMUSCULAR; INTRAVENOUS
Status: DISCONTINUED
Start: 2017-05-09 | End: 2017-05-09 | Stop reason: HOSPADM

## 2017-05-09 RX ORDER — BUPIVACAINE HYDROCHLORIDE 2.5 MG/ML
INJECTION, SOLUTION EPIDURAL; INFILTRATION; INTRACAUDAL
Status: DISCONTINUED
Start: 2017-05-09 | End: 2017-05-09 | Stop reason: HOSPADM

## 2017-05-09 RX ORDER — BUPIVACAINE HYDROCHLORIDE 2.5 MG/ML
INJECTION, SOLUTION EPIDURAL; INFILTRATION; INTRACAUDAL
Status: DISCONTINUED | OUTPATIENT
Start: 2017-05-09 | End: 2017-05-09 | Stop reason: HOSPADM

## 2017-05-09 RX ORDER — DEXAMETHASONE SODIUM PHOSPHATE 10 MG/ML
INJECTION INTRAMUSCULAR; INTRAVENOUS
Status: DISCONTINUED | OUTPATIENT
Start: 2017-05-09 | End: 2017-05-09 | Stop reason: HOSPADM

## 2017-05-09 RX ORDER — SODIUM CHLORIDE, SODIUM LACTATE, POTASSIUM CHLORIDE, CALCIUM CHLORIDE 600; 310; 30; 20 MG/100ML; MG/100ML; MG/100ML; MG/100ML
INJECTION, SOLUTION INTRAVENOUS ONCE AS NEEDED
Status: COMPLETED | OUTPATIENT
Start: 2017-05-09 | End: 2017-05-09

## 2017-05-09 RX ORDER — FENTANYL CITRATE 50 UG/ML
INJECTION, SOLUTION INTRAMUSCULAR; INTRAVENOUS
Status: DISCONTINUED | OUTPATIENT
Start: 2017-05-09 | End: 2017-05-09 | Stop reason: HOSPADM

## 2017-05-09 RX ORDER — LIDOCAINE HYDROCHLORIDE 10 MG/ML
INJECTION, SOLUTION EPIDURAL; INFILTRATION; INTRACAUDAL; PERINEURAL
Status: DISCONTINUED
Start: 2017-05-09 | End: 2017-05-09 | Stop reason: HOSPADM

## 2017-05-09 RX ORDER — MIDAZOLAM HYDROCHLORIDE 1 MG/ML
INJECTION INTRAMUSCULAR; INTRAVENOUS
Status: DISCONTINUED
Start: 2017-05-09 | End: 2017-05-09 | Stop reason: HOSPADM

## 2017-05-09 RX ORDER — MIDAZOLAM HYDROCHLORIDE 1 MG/ML
INJECTION INTRAMUSCULAR; INTRAVENOUS
Status: DISCONTINUED | OUTPATIENT
Start: 2017-05-09 | End: 2017-05-09 | Stop reason: HOSPADM

## 2017-05-09 RX ADMIN — FENTANYL CITRATE 25 MCG: 50 INJECTION, SOLUTION INTRAMUSCULAR; INTRAVENOUS at 12:05

## 2017-05-09 RX ADMIN — SODIUM CHLORIDE, SODIUM LACTATE, POTASSIUM CHLORIDE, CALCIUM CHLORIDE: 600; 310; 30; 20 INJECTION, SOLUTION INTRAVENOUS at 11:05

## 2017-05-09 RX ADMIN — MIDAZOLAM HYDROCHLORIDE 1 MG: 1 INJECTION INTRAMUSCULAR; INTRAVENOUS at 12:05

## 2017-05-09 RX ADMIN — LIDOCAINE HYDROCHLORIDE 10 ML: 10 INJECTION, SOLUTION EPIDURAL; INFILTRATION; INTRACAUDAL; PERINEURAL at 12:05

## 2017-05-09 RX ADMIN — DEXAMETHASONE SODIUM PHOSPHATE 10 MG: 10 INJECTION INTRAMUSCULAR; INTRAVENOUS at 12:05

## 2017-05-09 RX ADMIN — BUPIVACAINE HYDROCHLORIDE 3 ML: 2.5 INJECTION, SOLUTION EPIDURAL; INFILTRATION; INTRACAUDAL at 12:05

## 2017-05-09 RX ADMIN — LIDOCAINE HYDROCHLORIDE 0.2 MG: 10 INJECTION, SOLUTION EPIDURAL; INFILTRATION; INTRACAUDAL; PERINEURAL at 11:05

## 2017-05-09 NOTE — OP NOTE
PROCEDURE DATE: 5/9/2017    PROCEDURE: Right L4-5 and L5-S1 transforaminal epidural steroid injection under fluoroscopy    DIAGNOSIS: Lumbar disc displacement without myelopathy  Post op diagnosis: Same    PHYSICIAN: Bj Jones MD    MEDICATIONS INJECTED:  Dexamethasone 5mg (0.5ml) and 1.5ml 0.25% bupivicaine at each nerve root.     LOCAL ANESTHETIC INJECTED:  Lidocaine 1%. 2 ml per site.    SEDATION MEDICATIONS: RN IV sedation    ESTIMATED BLOOD LOSS:  None    COMPLICATIONS:  None    TECHNIQUE:   A time-out was taken to identify patient and procedure side prior to starting the procedure. The patient was placed in a prone position, prepped and draped in the usual sterile fashion using ChloraPrep and sterile towels.  The area to be injected was determined under fluoroscopic guidance in AP and oblique view.  Local anesthetic was given by raising a wheal and going down to the hub of a 25-gauge 1.5 inch needle.  In oblique view, a 3.5 inch 22-gauge bent-tip spinal needle was introduced towards 6 oclock position of the pedicle of each above named nerve root level.  The needle was walked medially then hinged into the neural foramen and position was confirmed in AP and lateral views.  1ml contrast dye was injected to confirm appropriate placement and that there was no vascular uptake.  After negative aspiration for blood or CSF, the medication was then injected. This was performed at the right L4-5 and L5-S1 level(s). The patient tolerated the procedure well.    The patient was monitored after the procedure.  Patient was given post procedure and discharge instructions to follow at home. The patient was discharged in a stable condition.

## 2017-05-09 NOTE — IP AVS SNAPSHOT
[facility] [place] Location  [address] [address]           Patient Discharge Instructions   Our goal is to set you up for success. This packet includes information on your condition, medications, and your home care.  It will help you care for yourself to prevent having to return to the hospital.     Please ask your nurse if you have any questions.      There are many details to remember when preparing to leave the hospital. Here is what you will need to do:    1. Take your medicine. If you are prescribed medications, review your Medication List on the following pages. You may have new medications to  at the pharmacy and others that you'll need to stop taking. Review the instructions for how and when to take your medications. Talk with your doctor or nurses if you are unsure of what to do.     2. Go to your follow-up appointments. Specific follow-up information is listed in the following pages. Your may be contacted by a nurse or clinical provider about future appointments. Be sure we have all of the phone numbers to reach you. Please contact your provider's office if you are unable to make an appointment.     3. Watch for warning signs. Your doctor or nurse will give you detailed warning signs to watch for and when to call for assistance. These instructions may also include educational information about your condition. If you experience any of warning signs to your health, call your doctor.           [facility]  Unless otherwise directed by your provider, please   contact [facility], our nurse care line   that is available for 24/7 assistance.     [phone] (toll-free)     Registered nurses in the [facility]   provide: appointment scheduling, clinical advisement, health education, and other advisory services.                  ** Verify the list of medication(s) below is accurate and up to date. Carry this with you in case of  emergency. If your medications have changed, please notify your healthcare provider.             Medication List      CHANGE how you take these medications        Additional Info                      diclofenac sodium 1 % Gel   Commonly known as:  VOLTAREN   Quantity:  100 g   Refills:  5   What changed:    - when to take this  - reasons to take this  - additional instructions    Instructions:  APPLY 2 G TOPICALLY 3 (THREE) TIMES DAILY.     Begin Date    AM    Noon    PM    Bedtime       triazolam 0.125 MG tablet   Commonly known as:  HALCION   Quantity:  90 tablet   Refills:  1   Dose:  0.125 mg   What changed:  when to take this    Instructions:  Take 1 tablet (0.125 mg total) by mouth nightly as needed.     Begin Date    AM    Noon    PM    Bedtime         CONTINUE taking these medications        Additional Info                      ACIPHEX ORAL   Refills:  0   Dose:  1 tablet    Instructions:  Take 1 tablet by mouth before dinner.     Begin Date    AM    Noon    PM    Bedtime       albuterol 90 mcg/actuation inhaler   Quantity:  3 Inhaler   Refills:  3    Instructions:  2 puffs every 4 hours as needed for cough, wheeze, or shortness of breath     Begin Date    AM    Noon    PM    Bedtime       amitriptyline 50 MG tablet   Commonly known as:  ELAVIL   Refills:  0   Dose:  50 mg    Instructions:  Take 50 mg by mouth every evening.     Begin Date    AM    Noon    PM    Bedtime       aspirin 81 MG EC tablet   Commonly known as:  ECOTRIN   Refills:  0   Dose:  81 mg    Instructions:  Take 81 mg by mouth once daily.     Begin Date    AM    Noon    PM    Bedtime       azelastine 137 mcg (0.1 %) nasal spray   Commonly known as:  ASTELIN   Quantity:  90 mL   Refills:  3   Dose:  1 spray    Instructions:  1 spray (137 mcg total) by Nasal route 2 (two) times daily.     Begin Date    AM    Noon    PM    Bedtime       azithromycin 500 MG tablet   Commonly known as:  ZITHROMAX   Quantity:  3 tablet   Refills:  3     Instructions:  TAKE 1 TABLET BY MOUTH DAILY FOR YELLOW MUCUS. REPEAT IF NEEDED     Begin Date    AM    Noon    PM    Bedtime       biotin 5 mg Tab   Refills:  0   Dose:  1 tablet    Instructions:  Take 1 tablet by mouth once daily.     Begin Date    AM    Noon    PM    Bedtime       cetirizine 10 MG tablet   Commonly known as:  ZYRTEC   Refills:  0   Dose:  10 mg    Instructions:  Take 10 mg by mouth once daily.     Begin Date    AM    Noon    PM    Bedtime       ciprofloxacin HCl 500 MG tablet   Commonly known as:  CIPRO   Quantity:  14 tablet   Refills:  0   Dose:  500 mg    Instructions:  Take 1 tablet (500 mg total) by mouth 2 (two) times daily.     Begin Date    AM    Noon    PM    Bedtime       coenzyme Q10 100 mg capsule   Refills:  0   Dose:  100 mg    Instructions:  Take 100 mg by mouth once daily.     Begin Date    AM    Noon    PM    Bedtime       DIABETIC TUSSIN DM ORAL   Refills:  0    Instructions:  Take by mouth as needed.     Begin Date    AM    Noon    PM    Bedtime       digoxin 250 mcg tablet   Commonly known as:  LANOXIN   Refills:  0   Dose:  250 mcg    Instructions:  Take 250 mcg by mouth once daily. 1/2 daily     Begin Date    AM    Noon    PM    Bedtime       fluticasone 50 mcg/actuation nasal spray   Commonly known as:  FLONASE   Quantity:  48 g   Refills:  3    Instructions:  USE ONE SPRAY IN EACH NOSTRIL DAILY     Begin Date    AM    Noon    PM    Bedtime       fluticasone-vilanterol 200-25 mcg/dose Dsdv diskus inhaler   Commonly known as:  BREO ELLIPTA   Quantity:  3 each   Refills:  3   Dose:  1 puff    Instructions:  Inhale 1 puff into the lungs once daily.     Begin Date    AM    Noon    PM    Bedtime       FREESTYLE LITE STRIPS Strp   Quantity:  200 strip   Refills:  3   Comments:  DX Code Needed  PT REQUESTED REFILLS.   Generic drug:  blood sugar diagnostic    Instructions:  USE TO TEST BLOOD SUGAR TWICE A DAY     Begin Date    AM    Noon    PM    Bedtime        hydrocodone-acetaminophen 5-325mg 5-325 mg per tablet   Commonly known as:  NORCO   Quantity:  60 tablet   Refills:  0   Dose:  1 tablet    Instructions:  Take 1 tablet by mouth every 12 (twelve) hours as needed for Pain.     Begin Date    AM    Noon    PM    Bedtime       isosorbide mononitrate 60 MG 24 hr tablet   Commonly known as:  IMDUR   Refills:  0   Dose:  60 mg    Instructions:  Take 60 mg by mouth once daily.     Begin Date    AM    Noon    PM    Bedtime       lancets Misc   Quantity:  200 each   Refills:  3   Dose:  1 Units    Instructions:  1 Units by Misc.(Non-Drug; Combo Route) route 2 (two) times daily.     Begin Date    AM    Noon    PM    Bedtime       LEVOTHROID 25 MCG tablet   Refills:  0   Dose:  25 mcg   Generic drug:  levothyroxine    Instructions:  Take 25 mcg by mouth before breakfast. Every day     Begin Date    AM    Noon    PM    Bedtime       metaxalone 800 MG tablet   Commonly known as:  SKELAXIN   Quantity:  180 tablet   Refills:  3    Instructions:  TAKE 1 TABLET TWICE A DAY     Begin Date    AM    Noon    PM    Bedtime       metformin 500 MG tablet   Commonly known as:  GLUCOPHAGE   Quantity:  180 tablet   Refills:  3    Instructions:  TAKE 1 TABLET TWICE A DAY WITH MEALS     Begin Date    AM    Noon    PM    Bedtime       MYRBETRIQ 50 mg Tb24   Refills:  0   Dose:  1 tablet   Generic drug:  mirabegron    Instructions:  Take 1 tablet by mouth once daily.     Begin Date    AM    Noon    PM    Bedtime       NITROSTAT 0.4 MG SL tablet   Refills:  0   Generic drug:  nitroGLYCERIN    Instructions:  0.4mg Sublingual PRN .     Begin Date    AM    Noon    PM    Bedtime       predniSONE 20 MG tablet   Commonly known as:  DELTASONE   Quantity:  21 tablet   Refills:  0    Instructions:  One daily for 3 days and repeat for flare of lung symptoms as intructed     Begin Date    AM    Noon    PM    Bedtime       PREMARIN ORAL   Refills:  0   Dose:  1 tablet    Instructions:  Take 1 tablet by mouth 3  (three) times a week.     Begin Date    AM    Noon    PM    Bedtime       PRESERVISION AREDS 14,320-226-200 unit-mg-unit Cap   Refills:  0   Dose:  1 capsule   Generic drug:  vitamins  A,C,E-zinc-copper    Instructions:  Take 1 capsule by mouth 2 (two) times daily.     Begin Date    AM    Noon    PM    Bedtime       PROBIOTIC 4X ORAL   Refills:  0   Dose:  1 capsule    Instructions:  Take 1 capsule by mouth once daily.     Begin Date    AM    Noon    PM    Bedtime       REFRESH 1 % ophthalmic solution   Refills:  0   Generic drug:  carboxymethylcellulose    Instructions:  as directed     Begin Date    AM    Noon    PM    Bedtime       SPIRIVA WITH HANDIHALER 18 mcg inhalation capsule   Quantity:  90 capsule   Refills:  3   Generic drug:  tiotropium    Instructions:  INHALE THE CONTENTS OF 1 CAPSULE DAILY     Begin Date    AM    Noon    PM    Bedtime       THERACRAN 650 mg Cap   Refills:  0   Dose:  1 tablet   Generic drug:  cranberry extract    Instructions:  Take 1 tablet by mouth 2 (two) times daily.     Begin Date    AM    Noon    PM    Bedtime       TOPROL XL 25 MG 24 hr tablet   Refills:  0   Dose:  25 mg   Generic drug:  metoprolol succinate    Instructions:  Take 25 mg by mouth once daily.     Begin Date    AM    Noon    PM    Bedtime       TRILIPIX 135 mg Cpdr   Refills:  0   Dose:  1 capsule   Generic drug:  fenofibric acid    Instructions:  1 capsule once daily. Every day     Begin Date    AM    Noon    PM    Bedtime       TYLENOL ARTHRITIS 650 MG Tbsr   Refills:  0   Generic drug:  acetaminophen    Instructions:  2 Tablet(s) Oral  Every day.     Begin Date    AM    Noon    PM    Bedtime       VITAMIN D3 2,000 unit Cap   Refills:  0   Dose:  1 capsule   Generic drug:  cholecalciferol (vitamin D3)    Instructions:  Take 1 capsule by mouth once daily.     Begin Date    AM    Noon    PM    Bedtime       ZOCOR 20 MG tablet   Refills:  0   Dose:  20 mg   Generic drug:  simvastatin    Instructions:  Take 20 mg  by mouth every evening. Every day     Begin Date    AM    Noon    PM    Bedtime                  Please bring to all follow up appointments:    1. A copy of your discharge instructions.  2. All medicines you are currently taking in their original bottles.  3. Identification and insurance card.    Please arrive 15 minutes ahead of scheduled appointment time.    Please call 24 hours in advance if you must reschedule your appointment and/or time.        Your Scheduled Appointments     May 15, 2017 11:00 AM CDT   Established Physical Therapy with Aura Adkins PTA   [facility] ([facility])    [address] [place] [id number]   [phone]            May 17, 2017 10:00 AM CDT   Established Physical Therapy with Wei Patel PT   [facility] ([facility])    [address] [place] [address]   [phone]            Jun 07, 2017  8:30 AM CDT   Established Patient Visit with INGE Smith - Pain Management ([facility])    [address] [place] [address] [address]  [facility] [address]   [phone]            Oct 05, 2017 10:30 AM CDT   Established Patient Visit with MD Dameon Heard Jr. - Family Medicine ([facility])    [address] [address] [facility] [address]   [phone]                Discharge Instructions     Future Orders    Activity as tolerated     Call MD for:  difficulty breathing or increased cough     Call MD for:  persistent nausea and vomiting or diarrhea     Call MD for:  redness, tenderness, or signs of infection (pain, swelling, redness, odor or green/yellow discharge around incision site)     Call MD for:  severe persistent headache     Call MD for:  severe uncontrolled pain     Call MD for:  temperature >100.4     Diet general     Questions:    Total calories:      Fat restriction, if any:      Protein restriction, if any:      Na  restriction, if any:      Fluid restriction:      Additional restrictions:          Discharge Instructions           Lumbar Epidural Injection: Recovery at Home    After a lumbar epidural injection, you dont need to stay in bed when you get home. In fact, its best to walk around if you feel up to it. Just be careful about being too active. Even if you feel better right away, avoid activities that may strain your back. And follow up on all treatment with your doctor.  What to know about pain relief  Keep in mind that some people feel more pain at first. It usually goes away within a few days. You may also have headaches or trouble sleeping, but if these symptoms are severe, you should call your doctor right away. These should also go away within a few days. In general:  · An injection to reduce inflammation takes a few days to work, sometimes even up to a week. There may even be more pain at first.  · An injection to help locate the source of pain may give only brief pain relief. Later, youll feel the same as you did before the injection.  Tips for recovery  Whether you were injected for pain relief or diagnosis, these tips will help you recover:  · Take walks when you feel up to it.  · Rest if needed, but get up and move around after sitting for half an hour.  · Dont exercise vigorously.  · Dont drive the day of the procedure or until your doctor says its OK.  · Return to work or other activities when your doctor says youre ready.  When to call your doctor  Call right away if you notice any of the following symptoms:  · Severe pain or headache  · Loss of bladder or bowel control  · Fever or chills  · Redness or swelling around the injection site       Date Last Reviewed: 11/1/2015 © 2000-2016 [employer]. [address] [place] [address], [place] [address]. All rights reserved. This information is not intended as a substitute for professional medical care. Always follow your healthcare professional's  instructions.        Procedural Sedation (Adult)  You have been given medicine by vein to make you sleep during your surgery. This may have included both a pain medicine and sleeping medicine. Most of the effects have worn off. But you may still have some drowsiness for the next 6 to 8 hours.  Home care  Follow these guidelines when you get home:  · For the next 8 hours, you should be watched by a responsible adult. This person should make sure your condition is not getting worse.  · Don't take any medicine by mouth for pain or for sleep during the next 4 hours. These might react with the medicines you were given in the hospital. This could cause a much stronger response than usual.  · Don't drink any alcohol for the next 24 hours.  · Don't drive, operate dangerous machinery, or make important business or personal decisions during the next 24 hours.  Follow-up care  Follow up with your healthcare provider if you are not alert and back to your usual level of activity within 12 hours.  Home care instructions  You may apply ice pack to the injection site for 20 minutes periods for the first 24hrs, NO HEAT for 2 days   You may shower today but dont soak in a tub above the injection site for 2 days  Resume Aspirin, Plavix, or Coumadin the day after the procedure unless otherwise instructed.  Gradually increase activity  Do not drive for 24 hr  If diabetic monitor your glucose carefully. Follow up with your primary MD if this is a problem    SEEK  IMMEDIATE MEDICAL HELP FOR:  Severe increase in your usual pain or appearance of new pain  Prolonged or increasing weakness or numbness in the legs or arms  Drainage, redness, active bleeding, or increased swelling at the injection site  Temperature over 100.0 degrees F.  Headache that increases when your head is upright and decreases when you lie flat  Shortness of breath, chest pain, or problems breathing                  Primary Diagnosis     Your primary diagnosis was:   "Degeneration Of Lumbar Or Lumbosacral Intervertebral Disc      Admission Information     Date & Time Provider Department CSN    5/9/2017 11:28 AM Bj Jones MD [facility] [id number]      Care Providers     Provider Role Specialty Primary office phone    Bj Jones MD Attending Provider Pain Medicine [phone]    Bj Jones MD Surgeon  Pain Medicine [phone]      Your Vitals Were     BP Pulse Temp Resp Height Weight    158/70 84 97.7 °F (36.5 °C) (Oral) 18 5' 2" (1.575 m) 60.3 kg (133 lb)    SpO2 BMI             96% 24.33 kg/m2         Recent Lab Values        7/3/2012 3/19/2013 4/16/2014 10/23/2014 1/21/2015 7/27/2015 2/18/2016 9/6/2016      1:57 PM  9:25 AM  9:10 AM  4:19 PM 12:35 PM 11:36 AM 10:52 AM 12:05 AM    A1C 6.6 (H) 6.7 (H) 6.8 (H) 6.9 (H) 6.6 (H) 6.9 (H) 6.3 (H) 6.5 (H)    Comment for A1C at 12:05 AM on 9/6/2016:  According to ADA guidelines, hemoglobin A1C <7.0% represents  optimal control in non-pregnant diabetic patients.  Different  metrics may apply to specific populations.   Standards of Medical Care in Diabetes - 2016.  For the purpose of screening for the presence of diabetes:  <5.7%     Consistent with the absence of diabetes  5.7-6.4%  Consistent with increasing risk for diabetes   (prediabetes)  >or=6.5%  Consistent with diabetes  Currently no consensus exists for use of hemoglobin A1C  for diagnosis of diabetes for children.        Allergies as of 5/9/2017        Reactions    Sulfa (Sulfonamide Antibiotics) Rash    Cefaclor Itching    Disalcid [Salsalate] Rash    Fenofibrate Micronized Rash    Nitrofurantoin Macrocrystalline Other (See Comments)    unknown    Penicillins Itching    itch    Phenylfenesin La [Phenylpropanolamine-gg] Rash      Language Assistance Services     ATTENTION: Language assistance services are available, free of charge. Please call [phone].      ATENCIÓN: Si guerrero fonseca, tiene a ruth disposición servicios gratuitos de asistencia " lingüística. Itzel al [phone].     ROSANGELA Ý: N?u b?n nói Ti?ng Vi?t, có các d?ch v? h? tr? ngôn ng? mi?n phí dành cho b?n. G?i s? [phone].         [facility] complies with applicable Federal civil rights laws and does not discriminate on the basis of race, color, national origin, age, disability, or sex.

## 2017-05-09 NOTE — DISCHARGE SUMMARY
[facility]  Discharge Note  Short Stay    Admit Date: 5/9/2017    Discharge Date and Time: 5/9/2017    Attending Physician: Bj Jones MD     Discharge Provider: Bj Jones    Diagnoses:  Active Hospital Problems    Diagnosis  POA    *DDD (degenerative disc disease), lumbar [M51.36]  Yes      Resolved Hospital Problems    Diagnosis Date Resolved POA   No resolved problems to display.       Hospital Course: Lumbar EDENILSON  Discharged Condition: Good    Final Diagnoses:   Active Hospital Problems    Diagnosis  POA    *DDD (degenerative disc disease), lumbar [M51.36]  Yes      Resolved Hospital Problems    Diagnosis Date Resolved POA   No resolved problems to display.       Disposition: Home or Self Care    Follow up/Patient Instructions:    Medications:  Reconciled Home Medications:   Current Discharge Medication List      CONTINUE these medications which have NOT CHANGED    Details   levothyroxine (LEVOTHROID) 25 MCG tablet Take 25 mcg by mouth before breakfast. Every day      TOPROL XL 25 mg 24 hr tablet Take 25 mg by mouth once daily.       acetaminophen (TYLENOL ARTHRITIS) 650 MG tablet 2 Tablet(s) Oral  Every day.      albuterol 90 mcg/actuation inhaler 2 puffs every 4 hours as needed for cough, wheeze, or shortness of breath  Qty: 3 Inhaler, Refills: 3    Associated Diagnoses: Mixed simple and mucopurulent chronic bronchitis; Asthma, persistent      amitriptyline (ELAVIL) 50 MG tablet Take 50 mg by mouth every evening.       aspirin (ECOTRIN) 81 MG EC tablet Take 81 mg by mouth once daily.      azelastine (ASTELIN) 137 mcg (0.1 %) nasal spray 1 spray (137 mcg total) by Nasal route 2 (two) times daily.  Qty: 90 mL, Refills: 3    Associated Diagnoses: Chronic sinus complaints; Chronic cough      azithromycin (ZITHROMAX) 500 MG tablet TAKE 1 TABLET BY MOUTH DAILY FOR YELLOW MUCUS. REPEAT IF NEEDED  Qty: 3 tablet, Refills: 3    Associated Diagnoses: Chronic sinus complaints; Chronic cough      B INFANTIS/B  ANI/B RADHA/B BIFID (PROBIOTIC 4X ORAL) Take 1 capsule by mouth once daily.       biotin 5 mg Tab Take 1 tablet by mouth once daily.       carboxymethylcellulose (REFRESH) 1 % ophthalmic solution as directed      cetirizine (ZYRTEC) 10 MG tablet Take 10 mg by mouth once daily.      cholecalciferol, vitamin D3, (VITAMIN D3) 2,000 unit Cap Take 1 capsule by mouth once daily.      ciprofloxacin HCl (CIPRO) 500 MG tablet Take 1 tablet (500 mg total) by mouth 2 (two) times daily.  Qty: 14 tablet, Refills: 0    Associated Diagnoses: Urinary tract infection with hematuria, site unspecified      coenzyme Q10 100 mg capsule Take 100 mg by mouth once daily.       cranberry extract (THERACRAN) 650 mg Cap Take 1 tablet by mouth 2 (two) times daily.        diclofenac sodium (VOLTAREN) 1 % Gel APPLY 2 G TOPICALLY 3 (THREE) TIMES DAILY.  Qty: 100 g, Refills: 5      digoxin (LANOXIN) 0.25 MG tablet Take 250 mcg by mouth once daily. 1/2 daily      ESTROGENS, CONJUGATED (PREMARIN ORAL) Take 1 tablet by mouth 3 (three) times a week.      fenofibric acid (TRILIPIX) 135 mg capsule 1 capsule once daily. Every day      fluticasone (FLONASE) 50 mcg/actuation nasal spray USE ONE SPRAY IN EACH NOSTRIL DAILY  Qty: 48 g, Refills: 3      fluticasone-vilanterol (BREO ELLIPTA) 200-25 mcg/dose DsDv diskus inhaler Inhale 1 puff into the lungs once daily.  Qty: 3 each, Refills: 3    Associated Diagnoses: Mixed simple and mucopurulent chronic bronchitis; Asthma, persistent      FREESTYLE LITE STRIPS Strp USE TO TEST BLOOD SUGAR TWICE A DAY  Qty: 200 strip, Refills: 3    Comments: DX Code Needed  PT REQUESTED REFILLS.  Associated Diagnoses: Type 2 diabetes mellitus without complication      GUAIFENESIN/DEXTROMETHORPHAN (DIABETIC TUSSIN DM ORAL) Take by mouth as needed.       hydrocodone-acetaminophen 5-325mg (NORCO) 5-325 mg per tablet Take 1 tablet by mouth every 12 (twelve) hours as needed for Pain.  Qty: 60 tablet, Refills: 0    Associated Diagnoses:  DDD (degenerative disc disease), cervical; Lumbar radiculitis; Facet arthropathy      isosorbide mononitrate (IMDUR) 60 MG 24 hr tablet Take 60 mg by mouth once daily.      lancets Misc 1 Units by Misc.(Non-Drug; Combo Route) route 2 (two) times daily.  Qty: 200 each, Refills: 3    Associated Diagnoses: Type II or unspecified type diabetes mellitus without mention of complication, not stated as uncontrolled      metaxalone (SKELAXIN) 800 MG tablet TAKE 1 TABLET TWICE A DAY  Qty: 180 tablet, Refills: 3      metformin (GLUCOPHAGE) 500 MG tablet TAKE 1 TABLET TWICE A DAY WITH MEALS  Qty: 180 tablet, Refills: 3      MYRBETRIQ 50 mg Tb24 Take 1 tablet by mouth once daily.       nitroGLYCERIN (NITROSTAT) 0.4 MG SL tablet 0.4mg Sublingual PRN .        predniSONE (DELTASONE) 20 MG tablet One daily for 3 days and repeat for flare of lung symptoms as intructed  Qty: 21 tablet, Refills: 0    Associated Diagnoses: Asthma, persistent; Mixed simple and mucopurulent chronic bronchitis      RABEPRAZOLE SODIUM (ACIPHEX ORAL) Take 1 tablet by mouth before dinner.      simvastatin (ZOCOR) 20 MG tablet Take 20 mg by mouth every evening. Every day      SPIRIVA WITH HANDIHALER 18 mcg inhalation capsule INHALE THE CONTENTS OF 1 CAPSULE DAILY  Qty: 90 capsule, Refills: 3    Associated Diagnoses: Mixed simple and mucopurulent chronic bronchitis      triazolam (HALCION) 0.125 MG tablet Take 1 tablet (0.125 mg total) by mouth nightly as needed.  Qty: 90 tablet, Refills: 1      vitamins  A,C,E-zinc-copper (PRESERVISION AREDS) [id number] unit-mg-unit Cap Take 1 capsule by mouth 2 (two) times daily.              Discharge Procedure Orders  Diet general     Activity as tolerated     Call MD for:  temperature >100.4     Call MD for:  persistent nausea and vomiting or diarrhea     Call MD for:  severe uncontrolled pain     Call MD for:  redness, tenderness, or signs of infection (pain, swelling, redness, odor or green/yellow discharge around  incision site)     Call MD for:  difficulty breathing or increased cough     Call MD for:  severe persistent headache          Follow up with MD in 2-3 weeks    Discharge Procedure Orders (must include Diet, Follow-up, Activity):    Discharge Procedure Orders (must include Diet, Follow-up, Activity)  Diet general     Activity as tolerated     Call MD for:  temperature >100.4     Call MD for:  persistent nausea and vomiting or diarrhea     Call MD for:  severe uncontrolled pain     Call MD for:  redness, tenderness, or signs of infection (pain, swelling, redness, odor or green/yellow discharge around incision site)     Call MD for:  difficulty breathing or increased cough     Call MD for:  severe persistent headache

## 2017-05-09 NOTE — DISCHARGE INSTRUCTIONS
Lumbar Epidural Injection: Recovery at Home    After a lumbar epidural injection, you dont need to stay in bed when you get home. In fact, its best to walk around if you feel up to it. Just be careful about being too active. Even if you feel better right away, avoid activities that may strain your back. And follow up on all treatment with your doctor.  What to know about pain relief  Keep in mind that some people feel more pain at first. It usually goes away within a few days. You may also have headaches or trouble sleeping, but if these symptoms are severe, you should call your doctor right away. These should also go away within a few days. In general:  · An injection to reduce inflammation takes a few days to work, sometimes even up to a week. There may even be more pain at first.  · An injection to help locate the source of pain may give only brief pain relief. Later, youll feel the same as you did before the injection.  Tips for recovery  Whether you were injected for pain relief or diagnosis, these tips will help you recover:  · Take walks when you feel up to it.  · Rest if needed, but get up and move around after sitting for half an hour.  · Dont exercise vigorously.  · Dont drive the day of the procedure or until your doctor says its OK.  · Return to work or other activities when your doctor says youre ready.  When to call your doctor  Call right away if you notice any of the following symptoms:  · Severe pain or headache  · Loss of bladder or bowel control  · Fever or chills  · Redness or swelling around the injection site       Date Last Reviewed: 11/1/2015 © 2000-2016 [employer]. [address] [place] [address], [place] [address]. All rights reserved. This information is not intended as a substitute for professional medical care. Always follow your healthcare professional's instructions.        Procedural Sedation (Adult)  You have been given medicine by vein to make you sleep during your  surgery. This may have included both a pain medicine and sleeping medicine. Most of the effects have worn off. But you may still have some drowsiness for the next 6 to 8 hours.  Home care  Follow these guidelines when you get home:  · For the next 8 hours, you should be watched by a responsible adult. This person should make sure your condition is not getting worse.  · Don't take any medicine by mouth for pain or for sleep during the next 4 hours. These might react with the medicines you were given in the hospital. This could cause a much stronger response than usual.  · Don't drink any alcohol for the next 24 hours.  · Don't drive, operate dangerous machinery, or make important business or personal decisions during the next 24 hours.  Follow-up care  Follow up with your healthcare provider if you are not alert and back to your usual level of activity within 12 hours.  Home care instructions  You may apply ice pack to the injection site for 20 minutes periods for the first 24hrs, NO HEAT for 2 days   You may shower today but dont soak in a tub above the injection site for 2 days  Resume Aspirin, Plavix, or Coumadin the day after the procedure unless otherwise instructed.  Gradually increase activity  Do not drive for 24 hr  If diabetic monitor your glucose carefully. Follow up with your primary MD if this is a problem    SEEK  IMMEDIATE MEDICAL HELP FOR:  Severe increase in your usual pain or appearance of new pain  Prolonged or increasing weakness or numbness in the legs or arms  Drainage, redness, active bleeding, or increased swelling at the injection site  Temperature over 100.0 degrees F.  Headache that increases when your head is upright and decreases when you lie flat  Shortness of breath, chest pain, or problems breathing

## 2017-05-09 NOTE — PLAN OF CARE
Discharged to car per wheelchair in stable condition accompanied by   Discussed walking with support of  or walker on arrival home to prevent fall

## 2017-05-09 NOTE — OR NURSING
Encouraged pt to sit in stepdown until numbness wears off in right leg but states she wants to go home. Walked from wheelchair to car with assistance.

## 2017-05-10 VITALS
DIASTOLIC BLOOD PRESSURE: 70 MMHG | BODY MASS INDEX: 24.48 KG/M2 | HEIGHT: 62 IN | TEMPERATURE: 98 F | SYSTOLIC BLOOD PRESSURE: 158 MMHG | OXYGEN SATURATION: 96 % | RESPIRATION RATE: 18 BRPM | HEART RATE: 84 BPM | WEIGHT: 133 LBS

## 2017-05-10 NOTE — PROCEDURES
Pulmonary Functions, including spirometry and bronchodilator response and lung volumes and diffusion, study was done May 4,2017.    Spirometry shows  Normal, FEV1 is 1.90 liters.  Lung volumes show  normal TLC.  Diffusion shows reduced but falls within normal range when corrected for lung volumes.    Pulmonary functions show normal. Clinical correlation recommended.     Luis Uriostegui M.D.

## 2017-05-11 ENCOUNTER — TELEPHONE (OUTPATIENT)
Dept: PULMONOLOGY | Facility: CLINIC | Age: 77
End: 2017-05-11

## 2017-05-11 NOTE — TELEPHONE ENCOUNTER
----- Message from Luis Uriostegui MD sent at 5/10/2017 10:28 AM CDT -----  Notify normal breathing test

## 2017-05-15 ENCOUNTER — TELEPHONE (OUTPATIENT)
Dept: PAIN MEDICINE | Facility: CLINIC | Age: 77
End: 2017-05-15

## 2017-05-15 NOTE — TELEPHONE ENCOUNTER
----- Message from Valentina Mcmahon sent at 5/15/2017 10:21 AM CDT -----  Contact: Patient  Patient states that she would like to know if she should continue rehab for physical therapy.  She has an appointment for today. States that she is still in a lot of pain.  Can you please call [phone].  Thank you

## 2017-05-19 LAB
ACID FAST MOD KINY STN SPEC: NORMAL
MYCOBACTERIUM SPEC QL CULT: NORMAL

## 2017-05-23 ENCOUNTER — TELEPHONE (OUTPATIENT)
Dept: PODIATRY | Facility: CLINIC | Age: 77
End: 2017-05-23

## 2017-05-23 NOTE — TELEPHONE ENCOUNTER
Faxed Custom insert prescription to [facility] via [facility] per patient's request.

## 2017-05-23 NOTE — TELEPHONE ENCOUNTER
----- Message from Jade Allan sent at 5/23/2017  2:11 PM CDT -----  Contact: self  Patient wants to speak with a nurse regarding getting another copy of orders for diabetic shoes. Please call back at [phone] (home)

## 2017-05-29 ENCOUNTER — TELEPHONE (OUTPATIENT)
Dept: PODIATRY | Facility: CLINIC | Age: 77
End: 2017-05-29

## 2017-05-29 NOTE — TELEPHONE ENCOUNTER
----- Message from Aura Garrido sent at 5/26/2017 11:38 AM CDT -----  Patient is calling in reference to orthotics for her shoes. Provider called patient and they haven't received the orders yet. Please call patient back at [phone].     Thank you

## 2017-05-29 NOTE — TELEPHONE ENCOUNTER
Notified Patient that Orthotic prescription and clinical note was faxed to [facility] via [facility].

## 2017-06-01 DIAGNOSIS — M54.16 LUMBAR RADICULITIS: ICD-10-CM

## 2017-06-01 DIAGNOSIS — M47.819 FACET ARTHROPATHY: ICD-10-CM

## 2017-06-01 DIAGNOSIS — M50.30 DDD (DEGENERATIVE DISC DISEASE), CERVICAL: ICD-10-CM

## 2017-06-01 RX ORDER — HYDROCODONE BITARTRATE AND ACETAMINOPHEN 5; 325 MG/1; MG/1
1 TABLET ORAL EVERY 12 HOURS PRN
Qty: 60 TABLET | Refills: 0 | Status: SHIPPED | OUTPATIENT
Start: 2017-06-07 | End: 2017-07-07

## 2017-06-05 NOTE — TELEPHONE ENCOUNTER
----- Message from Babs Rollins sent at 6/5/2017 10:10 AM CDT -----  Contact: self  Tried to refill a cream and states you had taken it off.  Needs to know what to do.  Please call back at [phone] (home)       [facility] - [address] [address]  Phone: [phone] Fax: [phone]

## 2017-06-06 ENCOUNTER — TELEPHONE (OUTPATIENT)
Dept: UROLOGY | Facility: CLINIC | Age: 77
End: 2017-06-06

## 2017-06-06 RX ORDER — ESTRADIOL 0.1 MG/G
3 CREAM VAGINAL
Qty: 3 TUBE | Refills: 3 | Status: SHIPPED | OUTPATIENT
Start: 2017-06-08 | End: 2017-09-07 | Stop reason: SDUPTHER

## 2017-06-06 RX ORDER — MIRABEGRON 50 MG/1
TABLET, FILM COATED, EXTENDED RELEASE ORAL
Qty: 90 TABLET | Refills: 2 | Status: SHIPPED | OUTPATIENT
Start: 2017-06-06 | End: 2018-03-14

## 2017-06-06 NOTE — TELEPHONE ENCOUNTER
----- Message from Negra Hatfield sent at 6/6/2017 10:42 AM CDT -----  Contact: Patient  Patient called regarding medication was stop per pharmacy. Please call back at [phone]. Thanks,

## 2017-06-07 ENCOUNTER — OFFICE VISIT (OUTPATIENT)
Dept: PAIN MEDICINE | Facility: CLINIC | Age: 77
End: 2017-06-07
Payer: MEDICARE

## 2017-06-07 VITALS
SYSTOLIC BLOOD PRESSURE: 117 MMHG | DIASTOLIC BLOOD PRESSURE: 61 MMHG | WEIGHT: 134 LBS | BODY MASS INDEX: 24.66 KG/M2 | HEART RATE: 92 BPM | HEIGHT: 62 IN

## 2017-06-07 DIAGNOSIS — M47.819 FACET ARTHROPATHY: ICD-10-CM

## 2017-06-07 DIAGNOSIS — M50.30 DDD (DEGENERATIVE DISC DISEASE), CERVICAL: ICD-10-CM

## 2017-06-07 DIAGNOSIS — M54.16 LUMBAR RADICULOPATHY: Primary | ICD-10-CM

## 2017-06-07 PROCEDURE — 1125F AMNT PAIN NOTED PAIN PRSNT: CPT | Mod: ,,, | Performed by: PHYSICIAN ASSISTANT

## 2017-06-07 PROCEDURE — 99999 PR PBB SHADOW E&M-EST. PATIENT-LVL V: CPT | Mod: PBBFAC,,, | Performed by: PHYSICIAN ASSISTANT

## 2017-06-07 PROCEDURE — 1159F MED LIST DOCD IN RCRD: CPT | Mod: ,,, | Performed by: PHYSICIAN ASSISTANT

## 2017-06-07 PROCEDURE — 99215 OFFICE O/P EST HI 40 MIN: CPT | Mod: PBBFAC,PO | Performed by: PHYSICIAN ASSISTANT

## 2017-06-07 PROCEDURE — 99214 OFFICE O/P EST MOD 30 MIN: CPT | Mod: S$PBB,,, | Performed by: PHYSICIAN ASSISTANT

## 2017-06-07 NOTE — PROGRESS NOTES
Referring Physician: No ref. provider found    PCP: Oumar Almonte Jr, MD      CC: Right leg pain  Interval History:  Mrs. Tipton is a 76 y.o. female with low back and right leg pain who presents today for f/u s/p right TFESI at L4-5 and L5-S1. Reports very good relief for 2 days and then pain gradually returned to 70% of baseline. Pain does not occur as often but she cannot sit, stand, or walk for prolonged periods. She is currently undergoing PT. She takes Norco very sparingly with mild to moderate benefits. Denies b/b changes or LE weakness. Pain today is rated 5/10.  Pt has been seen in the clinic before, however pt is new to me.     History below per Dr. Jones    HPI:   Darlin Tipton is a 76 y.o. female referred to us for right leg pain.  She has had lower back pain for over 20 years but states right leg pain has gradually worsened over past 2 months.  No traumatic incident.  She has intermittent burning, grabbing, deep, sharp, shooting pain traveling down her right leg into her right ankle.  Pain worsens with prolonged sitting, standing, walking or flexing.  Pain improves with heat.  MRI lumbar spine was recently ordered.  She denies any worsening weakness.  No bowel bladder changes.  She received start physical therapy with minimal benefit.  She takes Norco very sparingly with mild to moderate benefit.  She rates her pain 6/10 today, 8/10 at worse.    ROS:  CONSTITUTIONAL: No fevers, chills, night sweats, wt. loss, appetite changes  SKIN: no rashes or itching  ENT: No headaches, head trauma, vision changes, or eye pain  LYMPH NODES: None noticed   CV: No chest pain, palpitations.   RESP: No shortness of breath, dyspnea on exertion, cough, wheezing, or hemoptysis  GI: No nausea, emesis, diarrhea, constipation, melena, hematochezia, pain.    : No dysuria, hematuria, urgency, or frequency   HEME: No easy bruising, bleeding problems  PSYCHIATRIC: No depression, anxiety, psychosis, hallucinations.  NEURO: No  "seizures, memory loss, dizziness or difficulty sleeping  MSK: + History of present illness      Past Medical History:   Diagnosis Date    Allergy     Dust mites, Grasses, Trees    Arthritis     Asthma     Blood transfusion     CAD (coronary artery disease)     Cataract     CHRONIC BRONCHITIS     Diabetes mellitus     Diabetes mellitus type II     GERD (gastroesophageal reflux disease)     Hyperlipidemia     Hypertension     Irregular heart beat     Spinal stenosis     Thyroid disease     Hypothyroidism     Past Surgical History:   Procedure Laterality Date    APPENDECTOMY  1968    BLADDER SUSPENSION  1989    CARDIAC SURGERY      CABG    CATARACT EXTRACTION  9/2007 (L) and 10/2207 (R)    CORONARY ARTERY BYPASS GRAFT  4/26/2004    x5    ESOPHAGEAL DILATION      SPINE SURGERY  3/2000    Tumor    WRIST SURGERY  1993     Family History   Problem Relation Age of Onset    Allergic rhinitis Neg Hx     Allergies Neg Hx     Angioedema Neg Hx     Asthma Neg Hx     Eczema Neg Hx     Immunodeficiency Neg Hx     Urticaria Neg Hx     Rhinitis Neg Hx     Atopy Neg Hx      Social History     Social History    Marital status:      Spouse name: N/A    Number of children: N/A    Years of education: N/A     Social History Main Topics    Smoking status: Never Smoker    Smokeless tobacco: Never Used    Alcohol use Yes      Comment: Rare    Drug use: No    Sexual activity: Not Currently     Other Topics Concern    None     Social History Narrative    None         Medications/Allergies: See med card    Vitals:    06/07/17 0834   BP: 117/61   Pulse: 92   Weight: 60.8 kg (134 lb)   Height: 5' 2" (1.575 m)   PainSc:   5   PainLoc: Back         Physical exam:    GENERAL: A and O x3, the patient appears well groomed and is in no acute distress.  Skin: No rashes or obvious lesions  HEENT: normocephalic, atraumatic  CARDIOVASCULAR:  RRR  LUNGS: non labored breathing  ABDOMEN: soft, nontender   UPPER " EXTREMITIES: Normal alignment, normal range of motion, no atrophy, no skin changes,  hair growth and nail growth normal and equal bilaterally. No swelling, no tenderness.    LOWER EXTREMITIES:  Normal alignment, normal range of motion, no atrophy, no skin changes,  hair growth and nail growth normal and equal bilaterally. No swelling, no tenderness.    LUMBAR SPINE  Lumbar spine: ROM is limited with flexion extension and oblique extension with mild increased pain.    Boyd's test causes no increased pain on either side.    Supine straight leg raise is positive on the right at 45°    Internal and external rotation of the hip causes no increased pain on either side.  Myofascial exam: No tenderness to palpation across lumbar paraspinous muscles.      MENTAL STATUS: normal orientation, speech, language, and fund of knowledge for social situation.  Emotional state appropriate.    CRANIAL NERVES:  II:  PERRL bilaterally,   III,IV,VI: EOMI.    V:  Facial sensation equal bilaterally  VII:  Facial motor function normal.  VIII:  Hearing equal to finger rub bilaterally  IX/X: Gag normal, palate symmetric  XI:  Shoulder shrug equal, head turn equal  XII:  Tongue midline without fasciculations      MOTOR: Tone and bulk: normal bilateral upper and lower Strength: normal   Delt Bi Tri WE WF     R 5 5 5 5 5 5   L 5 5 5 5 5 5     IP ADD ABD Quad TA Gas HAM  R 5 5 5 5 5 5 5  L 5 5 5 5 5 5 5    SENSATION: Light touch and pinprick intact bilaterally  REFLEXES: normal, symmetric, nonbrisk.  Toes down, no clonus. No hoffmans.  GAIT: normal rise, base, steps, and arm swing.        Imaging:  MRI L-spine 4/1/17  L1-L2: There is a broad disc bulge, asymmetric to the left, which extends into both neural foramina, left greater than right.  There is left ligamentum flavum thickening and left facet arthropathy.  There is left lateral recess stenosis.  No central canal stenosis.  There is moderate left neuroforaminal stenosis.  No right  neuroforaminal stenosis.      L2-L3: There is a broad disc bulge, asymmetric to the left, which extends into the left neural foramen and left extraforaminal soft tissues.  There is prominent left ligamentum flavum thickening.  There is bilateral hypertrophic facet arthropathy.  There is a superimposed descending left paracentral disc extrusion which extends approximately 6 mm below the disc plane and which measures approximately 4 x 7 mm in size.  There is left lateral recess stenosis.  Mass effect upon the descending left L3 nerve is possible.  Please correlate clinically for symptoms referable to the left L3 nerve.  There is mild-moderate central canal stenosis present.  There is moderate left neuroforaminal stenosis.  No right neuroforaminal stenosis.    L3-L4:  There is a broad disc bulge with a superimposed descending central disc extrusion which extends approximately 7 mm below the disc plane and measures approximately 13 x 4 mm.  There is right lateral recess stenosis.  There is ligamentum flavum thickening and facet arthropathy.  The patient's disc extrusion appears to encroach upon the descending right L4 nerve and the right lateral recess.  There is mild-moderate central canal stenosis.  There is, at most, mild left and mild-moderate right neuroforaminal stenosis.    L4-L5:  There is a bulging posterior disc osteophyte complex which extends into the neural foramina right greater than left.  There is bilateral lateral recess stenosis.  There is ligamentum flavum thickening and hypertrophic facet arthropathy, right greater than left.  There is abutment of the exited right L4 nerve in the right extraforaminal soft tissues.  Please correlate clinically for symptoms referable to the right L4 nerve.  There is also abutment of the exited left L4 nerve in the left extraforaminal soft tissues.There is moderate-severe right neuroforaminal stenosis.  No left neuroforaminal stenosis.  There is mild central canal  stenosis.    L5-S1:  There is a grade I anterolisthesis of L5 on S1 with uncovering of the intervertebral disc.  There is a possible unilateral right-sided pars defect.  There is ligamentum flavum thickening and severe facet arthropathy.  There is a facet joint effusion present.  There is right lateral recess stenosis, and there is abutment of the descending right S1 nerve in the right lateral recess.There is mild overall central canal stenosis.  There is moderate-severe left neuroforaminal stenosis.  There is an extraspinal synovial cyst measuring 10 mm which arises from the anterior margin of the left facet joint and exerts severe mass effect upon the exiting left L5 nerve (series 4 image 13).  Please correlate clinically for symptoms referable to the left L5 nerve.    Assessment:  Mrs Tipton is a 76 y.o. female with right leg pain  1. Lumbar radiculopathy    2. DDD (degenerative disc disease), cervical    3. Facet arthropathy        Plan:  1. I have stressed the importance of physical activity and exercise to improve overall health  2. Schedule repeat Right L4-5 and L5-S1 levels.  I have explained the risks, benefits, and alternatives of the procedure in detail. The patient voices understanding and all questions have been answered. The patient agrees to proceed as planned. Written Consent obtained.   3. Continue PT  4. Prescription given for Norco 5 mg every 12 hours as needed for pain. She will fill this when needed.  5. May consider adding gabapentin in the future if needed  6. F/u 3 weeks s/p EDENILSON  All medication management was performed by Dr. Bj Jones

## 2017-06-15 ENCOUNTER — TELEPHONE (OUTPATIENT)
Dept: PODIATRY | Facility: CLINIC | Age: 77
End: 2017-06-15

## 2017-06-15 NOTE — TELEPHONE ENCOUNTER
----- Message from Ivy Alejandra sent at 6/15/2017 10:40 AM CDT -----  Contact: Son,Dwayne Rice wants to speak with a nurse regarding address to [employer] store please call back at [phone]

## 2017-06-19 DIAGNOSIS — M54.16 LUMBAR RADICULOPATHY: Primary | ICD-10-CM

## 2017-06-26 ENCOUNTER — SURGERY (OUTPATIENT)
Age: 77
End: 2017-06-26

## 2017-06-26 ENCOUNTER — HOSPITAL ENCOUNTER (OUTPATIENT)
Facility: AMBULARY SURGERY CENTER | Age: 77
Discharge: HOME | End: 2017-06-26
Attending: ANESTHESIOLOGY | Admitting: ANESTHESIOLOGY
Payer: MEDICARE

## 2017-06-26 DIAGNOSIS — M51.36 DDD (DEGENERATIVE DISC DISEASE), LUMBAR: Primary | ICD-10-CM

## 2017-06-26 LAB — POCT GLUCOSE: 91 MG/DL (ref 70–110)

## 2017-06-26 PROCEDURE — 64484 NJX AA&/STRD TFRM EPI L/S EA: CPT | Performed by: ANESTHESIOLOGY

## 2017-06-26 PROCEDURE — G8907 PT DOC NO EVENTS ON DISCHARG: HCPCS | Performed by: ANESTHESIOLOGY

## 2017-06-26 PROCEDURE — G8918 PT W/O PREOP ORDER IV AB PRO: HCPCS | Performed by: ANESTHESIOLOGY

## 2017-06-26 PROCEDURE — 64484 NJX AA&/STRD TFRM EPI L/S EA: CPT | Mod: RT,,, | Performed by: ANESTHESIOLOGY

## 2017-06-26 PROCEDURE — 64483 NJX AA&/STRD TFRM EPI L/S 1: CPT | Mod: RT,,, | Performed by: ANESTHESIOLOGY

## 2017-06-26 PROCEDURE — 64483 NJX AA&/STRD TFRM EPI L/S 1: CPT | Performed by: ANESTHESIOLOGY

## 2017-06-26 PROCEDURE — 99152 MOD SED SAME PHYS/QHP 5/>YRS: CPT | Mod: ,,, | Performed by: ANESTHESIOLOGY

## 2017-06-26 RX ORDER — MIDAZOLAM HYDROCHLORIDE 1 MG/ML
INJECTION INTRAMUSCULAR; INTRAVENOUS
Status: DISCONTINUED | OUTPATIENT
Start: 2017-06-26 | End: 2017-06-26 | Stop reason: HOSPADM

## 2017-06-26 RX ORDER — ALPRAZOLAM 1 MG/1
1 TABLET, ORALLY DISINTEGRATING ORAL
Status: DISCONTINUED | OUTPATIENT
Start: 2017-06-26 | End: 2017-06-26 | Stop reason: HOSPADM

## 2017-06-26 RX ORDER — LIDOCAINE HYDROCHLORIDE 10 MG/ML
INJECTION, SOLUTION EPIDURAL; INFILTRATION; INTRACAUDAL; PERINEURAL
Status: DISPENSED
Start: 2017-06-26 | End: 2017-06-27

## 2017-06-26 RX ORDER — SODIUM CHLORIDE, SODIUM LACTATE, POTASSIUM CHLORIDE, CALCIUM CHLORIDE 600; 310; 30; 20 MG/100ML; MG/100ML; MG/100ML; MG/100ML
INJECTION, SOLUTION INTRAVENOUS ONCE AS NEEDED
Status: COMPLETED | OUTPATIENT
Start: 2017-06-26 | End: 2017-06-26

## 2017-06-26 RX ORDER — BUPIVACAINE HYDROCHLORIDE 2.5 MG/ML
INJECTION, SOLUTION EPIDURAL; INFILTRATION; INTRACAUDAL
Status: DISCONTINUED | OUTPATIENT
Start: 2017-06-26 | End: 2017-06-26 | Stop reason: HOSPADM

## 2017-06-26 RX ORDER — FENTANYL CITRATE 50 UG/ML
INJECTION, SOLUTION INTRAMUSCULAR; INTRAVENOUS
Status: DISCONTINUED
Start: 2017-06-26 | End: 2017-06-26 | Stop reason: HOSPADM

## 2017-06-26 RX ORDER — BUPIVACAINE HYDROCHLORIDE 2.5 MG/ML
INJECTION, SOLUTION EPIDURAL; INFILTRATION; INTRACAUDAL
Status: DISPENSED
Start: 2017-06-26 | End: 2017-06-27

## 2017-06-26 RX ORDER — MIDAZOLAM HYDROCHLORIDE 1 MG/ML
INJECTION INTRAMUSCULAR; INTRAVENOUS
Status: DISCONTINUED
Start: 2017-06-26 | End: 2017-06-26 | Stop reason: HOSPADM

## 2017-06-26 RX ORDER — LIDOCAINE HYDROCHLORIDE 10 MG/ML
INJECTION, SOLUTION EPIDURAL; INFILTRATION; INTRACAUDAL; PERINEURAL
Status: DISCONTINUED | OUTPATIENT
Start: 2017-06-26 | End: 2017-06-26 | Stop reason: HOSPADM

## 2017-06-26 RX ORDER — FENTANYL CITRATE 50 UG/ML
INJECTION, SOLUTION INTRAMUSCULAR; INTRAVENOUS
Status: DISCONTINUED | OUTPATIENT
Start: 2017-06-26 | End: 2017-06-26 | Stop reason: HOSPADM

## 2017-06-26 RX ORDER — DEXAMETHASONE SODIUM PHOSPHATE 10 MG/ML
INJECTION INTRAMUSCULAR; INTRAVENOUS
Status: DISCONTINUED | OUTPATIENT
Start: 2017-06-26 | End: 2017-06-26 | Stop reason: HOSPADM

## 2017-06-26 RX ORDER — DEXAMETHASONE SODIUM PHOSPHATE 10 MG/ML
INJECTION INTRAMUSCULAR; INTRAVENOUS
Status: DISPENSED
Start: 2017-06-26 | End: 2017-06-27

## 2017-06-26 RX ADMIN — LIDOCAINE HYDROCHLORIDE 10 ML: 10 INJECTION, SOLUTION EPIDURAL; INFILTRATION; INTRACAUDAL; PERINEURAL at 12:06

## 2017-06-26 RX ADMIN — DEXAMETHASONE SODIUM PHOSPHATE 10 MG: 10 INJECTION INTRAMUSCULAR; INTRAVENOUS at 12:06

## 2017-06-26 RX ADMIN — MIDAZOLAM HYDROCHLORIDE 2 MG: 1 INJECTION INTRAMUSCULAR; INTRAVENOUS at 12:06

## 2017-06-26 RX ADMIN — BUPIVACAINE HYDROCHLORIDE 3 ML: 2.5 INJECTION, SOLUTION EPIDURAL; INFILTRATION; INTRACAUDAL at 12:06

## 2017-06-26 RX ADMIN — SODIUM CHLORIDE, SODIUM LACTATE, POTASSIUM CHLORIDE, CALCIUM CHLORIDE: 600; 310; 30; 20 INJECTION, SOLUTION INTRAVENOUS at 11:06

## 2017-06-26 RX ADMIN — FENTANYL CITRATE 50 MCG: 50 INJECTION, SOLUTION INTRAMUSCULAR; INTRAVENOUS at 12:06

## 2017-06-26 NOTE — OP NOTE
PROCEDURE DATE: 6/26/2017    PROCEDURE: Right L4-5 and L5-S1 transforaminal epidural steroid injection under fluoroscopy    DIAGNOSIS: Lumbar disc displacement without myelopathy  Post op diagnosis: Same    PHYSICIAN: Bj Jones MD    MEDICATIONS INJECTED:  Dexamethasone 5mg (0.5ml) and 1.5ml 0.25% bupivicaine at each nerve root.     LOCAL ANESTHETIC INJECTED:  Lidocaine 1%. 2 ml per site.    SEDATION MEDICATIONS: RN IV sedation    ESTIMATED BLOOD LOSS:  None    COMPLICATIONS:  None    TECHNIQUE:   A time-out was taken to identify patient and procedure side prior to starting the procedure. The patient was placed in a prone position, prepped and draped in the usual sterile fashion using ChloraPrep and sterile towels.  The area to be injected was determined under fluoroscopic guidance in AP and oblique view.  Local anesthetic was given by raising a wheal and going down to the hub of a 25-gauge 1.5 inch needle.  In oblique view, a 3.5 inch 22-gauge bent-tip spinal needle was introduced towards 6 oclock position of the pedicle of each above named nerve root level.  The needle was walked medially then hinged into the neural foramen and position was confirmed in AP and lateral views.  1ml contrast dye was injected to confirm appropriate placement and that there was no vascular uptake.  After negative aspiration for blood or CSF, the medication was then injected. This was performed at the right L4-5 and L5-S1 level(s). The patient tolerated the procedure well.    The patient was monitored after the procedure.  Patient was given post procedure and discharge instructions to follow at home. The patient was discharged in a stable condition.

## 2017-06-26 NOTE — DISCHARGE SUMMARY
[facility]  Discharge Note  Short Stay    Admit Date: 6/26/2017    Discharge Date and Time: 6/26/2017    Attending Physician: Bj Jones MD     Discharge Provider: Bj Jones    Diagnoses:  Active Hospital Problems    Diagnosis  POA    *DDD (degenerative disc disease), lumbar [M51.36]  Yes      Resolved Hospital Problems    Diagnosis Date Resolved POA   No resolved problems to display.       Hospital Course: Lumbar [place]  Discharged Condition: Good    Final Diagnoses:   Active Hospital Problems    Diagnosis  POA    *DDD (degenerative disc disease), lumbar [M51.36]  Yes      Resolved Hospital Problems    Diagnosis Date Resolved POA   No resolved problems to display.       Disposition: Home or Self Care    Follow up/Patient Instructions:    Medications:  Reconciled Home Medications:   Current Discharge Medication List      CONTINUE these medications which have NOT CHANGED    Details   fluticasone-vilanterol (BREO ELLIPTA) 200-25 mcg/dose DsDv diskus inhaler Inhale 1 puff into the lungs once daily.  Qty: 3 each, Refills: 3    Associated Diagnoses: Mixed simple and mucopurulent chronic bronchitis; Asthma, persistent      nitroGLYCERIN (NITROSTAT) 0.4 MG SL tablet 0.4mg Sublingual PRN .        TOPROL XL 25 mg 24 hr tablet Take 25 mg by mouth once daily.       acetaminophen (TYLENOL ARTHRITIS) 650 MG tablet 2 Tablet(s) Oral  Every day.      albuterol 90 mcg/actuation inhaler 2 puffs every 4 hours as needed for cough, wheeze, or shortness of breath  Qty: 3 Inhaler, Refills: 3    Associated Diagnoses: Mixed simple and mucopurulent chronic bronchitis; Asthma, persistent      amitriptyline (ELAVIL) 50 MG tablet Take 50 mg by mouth every evening.       aspirin (ECOTRIN) 81 MG EC tablet Take 81 mg by mouth once daily.      azelastine (ASTELIN) 137 mcg (0.1 %) nasal spray 1 spray (137 mcg total) by Nasal route 2 (two) times daily.  Qty: 90 mL, Refills: 3    Associated Diagnoses: Chronic sinus complaints; Chronic cough       azithromycin (ZITHROMAX) 500 MG tablet TAKE 1 TABLET BY MOUTH DAILY FOR YELLOW MUCUS. REPEAT IF NEEDED  Qty: 3 tablet, Refills: 3    Associated Diagnoses: Chronic sinus complaints; Chronic cough      B INFANTIS/B ANI/B RADHA/B BIFID (PROBIOTIC 4X ORAL) Take 1 capsule by mouth once daily.       biotin 5 mg Tab Take 1 tablet by mouth once daily.       carboxymethylcellulose (REFRESH) 1 % ophthalmic solution as directed      cetirizine (ZYRTEC) 10 MG tablet Take 10 mg by mouth once daily.      cholecalciferol, vitamin D3, (VITAMIN D3) 2,000 unit Cap Take 1 capsule by mouth once daily.      ciprofloxacin HCl (CIPRO) 500 MG tablet Take 1 tablet (500 mg total) by mouth 2 (two) times daily.  Qty: 14 tablet, Refills: 0    Associated Diagnoses: Urinary tract infection with hematuria, site unspecified      coenzyme Q10 100 mg capsule Take 100 mg by mouth once daily.       cranberry extract (THERACRAN) 650 mg Cap Take 1 tablet by mouth 2 (two) times daily.        diclofenac sodium (VOLTAREN) 1 % Gel APPLY 2 G TOPICALLY 3 (THREE) TIMES DAILY.  Qty: 100 g, Refills: 5      digoxin (LANOXIN) 0.25 MG tablet Take 250 mcg by mouth once daily. 1/2 daily      estradiol (ESTRACE) 0.01 % (0.1 mg/gram) vaginal cream Place 3 g vaginally twice a week.  Qty: 3 Tube, Refills: 3      ESTROGENS, CONJUGATED (PREMARIN ORAL) Take 1 tablet by mouth 3 (three) times a week.      fenofibric acid (TRILIPIX) 135 mg capsule 1 capsule once daily. Every day      fluticasone (FLONASE) 50 mcg/actuation nasal spray USE ONE SPRAY IN EACH NOSTRIL DAILY  Qty: 48 g, Refills: 3      FREESTYLE LITE STRIPS Strp USE TO TEST BLOOD SUGAR TWICE A DAY  Qty: 200 strip, Refills: 3    Comments: DX Code Needed  PT REQUESTED REFILLS.  Associated Diagnoses: Type 2 diabetes mellitus without complication      GUAIFENESIN/DEXTROMETHORPHAN (DIABETIC TUSSIN DM ORAL) Take by mouth as needed.       hydrocodone-acetaminophen 5-325mg (NORCO) 5-325 mg per tablet Take 1 tablet by  mouth every 12 (twelve) hours as needed for Pain.  Qty: 60 tablet, Refills: 0    Associated Diagnoses: DDD (degenerative disc disease), cervical; Lumbar radiculitis; Facet arthropathy      isosorbide mononitrate (IMDUR) 60 MG 24 hr tablet Take 60 mg by mouth once daily.      lancets Misc 1 Units by Misc.(Non-Drug; Combo Route) route 2 (two) times daily.  Qty: 200 each, Refills: 3    Associated Diagnoses: Type II or unspecified type diabetes mellitus without mention of complication, not stated as uncontrolled      levothyroxine (LEVOTHROID) 25 MCG tablet Take 25 mcg by mouth before breakfast. Every day      metaxalone (SKELAXIN) 800 MG tablet TAKE 1 TABLET TWICE A DAY  Qty: 180 tablet, Refills: 3      metformin (GLUCOPHAGE) 500 MG tablet TAKE 1 TABLET TWICE A DAY WITH MEALS  Qty: 180 tablet, Refills: 3      MYRBETRIQ 50 mg Tb24 TAKE 1 TABLET DAILY  Qty: 90 tablet, Refills: 2      predniSONE (DELTASONE) 20 MG tablet One daily for 3 days and repeat for flare of lung symptoms as intructed  Qty: 21 tablet, Refills: 0    Associated Diagnoses: Asthma, persistent; Mixed simple and mucopurulent chronic bronchitis      RABEPRAZOLE SODIUM (ACIPHEX ORAL) Take 1 tablet by mouth before dinner.      simvastatin (ZOCOR) 20 MG tablet Take 20 mg by mouth every evening. Every day      SPIRIVA WITH HANDIHALER 18 mcg inhalation capsule INHALE THE CONTENTS OF 1 CAPSULE DAILY  Qty: 90 capsule, Refills: 3    Associated Diagnoses: Mixed simple and mucopurulent chronic bronchitis      triazolam (HALCION) 0.125 MG tablet Take 1 tablet (0.125 mg total) by mouth nightly as needed.  Qty: 90 tablet, Refills: 1      vitamins  A,C,E-zinc-copper (PRESERVISION AREDS) 14,320-226-200 unit-mg-unit Cap Take 1 capsule by mouth 2 (two) times daily.              Discharge Procedure Orders  Diet general     Activity as tolerated     Call MD for:  temperature >100.4     Call MD for:  persistent nausea and vomiting or diarrhea     Call MD for:  severe  uncontrolled pain     Call MD for:  redness, tenderness, or signs of infection (pain, swelling, redness, odor or green/yellow discharge around incision site)     Call MD for:  difficulty breathing or increased cough     Call MD for:  severe persistent headache          Follow up with MD in 2-3 weeks    Discharge Procedure Orders (must include Diet, Follow-up, Activity):    Discharge Procedure Orders (must include Diet, Follow-up, Activity)  Diet general     Activity as tolerated     Call MD for:  temperature >100.4     Call MD for:  persistent nausea and vomiting or diarrhea     Call MD for:  severe uncontrolled pain     Call MD for:  redness, tenderness, or signs of infection (pain, swelling, redness, odor or green/yellow discharge around incision site)     Call MD for:  difficulty breathing or increased cough     Call MD for:  severe persistent headache

## 2017-06-26 NOTE — INTERVAL H&P NOTE
The patient has been examined and the H&P has been reviewed:    I concur with the findings and no changes have occurred since H&P was written.   ASA 3,  MP3  No history of anesthetic complications  Plan for RN IV sedation    This patient has been cleared for surgery in an ambulatory surgical facility      Anesthesia/Surgery risks, benefits and alternative options discussed and understood by patient/family.          Active Hospital Problems    Diagnosis  POA    DDD (degenerative disc disease), lumbar [M51.36]  Yes      Resolved Hospital Problems    Diagnosis Date Resolved POA   No resolved problems to display.

## 2017-06-26 NOTE — PLAN OF CARE
Stable, States ready to go home, kalyani po fluids, denies pain, to car per wc accompainied by RN and

## 2017-06-27 VITALS
SYSTOLIC BLOOD PRESSURE: 171 MMHG | BODY MASS INDEX: 24.66 KG/M2 | OXYGEN SATURATION: 93 % | DIASTOLIC BLOOD PRESSURE: 80 MMHG | TEMPERATURE: 98 F | HEART RATE: 84 BPM | RESPIRATION RATE: 20 BRPM | WEIGHT: 134 LBS | HEIGHT: 62 IN

## 2017-07-09 DIAGNOSIS — E11.49 CONTROLLED TYPE 2 DIABETES MELLITUS WITH OTHER NEUROLOGIC COMPLICATION, WITHOUT LONG-TERM CURRENT USE OF INSULIN: Primary | ICD-10-CM

## 2017-07-09 RX ORDER — METFORMIN HYDROCHLORIDE 500 MG/1
TABLET ORAL
Qty: 180 TABLET | Refills: 3 | Status: SHIPPED | OUTPATIENT
Start: 2017-07-09 | End: 2018-08-06 | Stop reason: SDUPTHER

## 2017-07-24 ENCOUNTER — OFFICE VISIT (OUTPATIENT)
Dept: PAIN MEDICINE | Facility: CLINIC | Age: 77
End: 2017-07-24
Payer: MEDICARE

## 2017-07-24 VITALS
DIASTOLIC BLOOD PRESSURE: 80 MMHG | SYSTOLIC BLOOD PRESSURE: 132 MMHG | HEIGHT: 62 IN | BODY MASS INDEX: 24.66 KG/M2 | HEART RATE: 97 BPM | WEIGHT: 134 LBS

## 2017-07-24 DIAGNOSIS — M50.30 DDD (DEGENERATIVE DISC DISEASE), CERVICAL: ICD-10-CM

## 2017-07-24 DIAGNOSIS — M47.819 FACET ARTHROPATHY: ICD-10-CM

## 2017-07-24 DIAGNOSIS — M54.16 LUMBAR RADICULOPATHY: Primary | ICD-10-CM

## 2017-07-24 PROCEDURE — 1159F MED LIST DOCD IN RCRD: CPT | Mod: ,,, | Performed by: PHYSICIAN ASSISTANT

## 2017-07-24 PROCEDURE — 1125F AMNT PAIN NOTED PAIN PRSNT: CPT | Mod: ,,, | Performed by: PHYSICIAN ASSISTANT

## 2017-07-24 PROCEDURE — 99214 OFFICE O/P EST MOD 30 MIN: CPT | Mod: PBBFAC,PO | Performed by: PHYSICIAN ASSISTANT

## 2017-07-24 PROCEDURE — 99999 PR PBB SHADOW E&M-EST. PATIENT-LVL IV: CPT | Mod: PBBFAC,,, | Performed by: PHYSICIAN ASSISTANT

## 2017-07-24 PROCEDURE — 99213 OFFICE O/P EST LOW 20 MIN: CPT | Mod: S$PBB,,, | Performed by: PHYSICIAN ASSISTANT

## 2017-07-24 RX ORDER — ONDANSETRON 8 MG/1
TABLET, ORALLY DISINTEGRATING ORAL
Status: ON HOLD | COMMUNITY
Start: 2017-07-19 | End: 2019-12-31

## 2017-07-24 NOTE — PROGRESS NOTES
Referring Physician: No ref. provider found    PCP: Oumar Almonte Jr, MD      CC: Right leg pain  Interval History:  Mrs. Tipton is a 76 y.o. female with low back and right leg pain who presents today for f/u s/p right TFESI at L4-5 and L5-S1. Reports very good relief for 2 days and then pain gradually returned to 70% of baseline. Pain does not occur as often but she cannot sit, stand, or walk for prolonged periods. She is currently undergoing PT. She takes Norco very sparingly with mild to moderate benefits. Denies b/b changes or LE weakness. Pain today is rated 5/10.  Pt has been seen in the clinic before, however pt is new to me.     History below per Dr. Jones    HPI:   Darlin Tipton is a 76 y.o. female referred to us for right leg pain.  She has had lower back pain for over 20 years but states right leg pain has gradually worsened over past 2 months.  No traumatic incident.  She has intermittent burning, grabbing, deep, sharp, shooting pain traveling down her right leg into her right ankle.  Pain worsens with prolonged sitting, standing, walking or flexing.  Pain improves with heat.  MRI lumbar spine was recently ordered.  She denies any worsening weakness.  No bowel bladder changes.  She received start physical therapy with minimal benefit.  She takes Norco very sparingly with mild to moderate benefit.  She rates her pain 6/10 today, 8/10 at worse.    ROS:  CONSTITUTIONAL: No fevers, chills, night sweats, wt. loss, appetite changes  SKIN: no rashes or itching  ENT: No headaches, head trauma, vision changes, or eye pain  LYMPH NODES: None noticed   CV: No chest pain, palpitations.   RESP: No shortness of breath, dyspnea on exertion, cough, wheezing, or hemoptysis  GI: No nausea, emesis, diarrhea, constipation, melena, hematochezia, pain.    : No dysuria, hematuria, urgency, or frequency   HEME: No easy bruising, bleeding problems  PSYCHIATRIC: No depression, anxiety, psychosis, hallucinations.  NEURO: No  "seizures, memory loss, dizziness or difficulty sleeping  MSK: + History of present illness      Past Medical History:   Diagnosis Date    Allergy     Dust mites, Grasses, Trees    Arthritis     Asthma     Blood transfusion     CAD (coronary artery disease)     Cataract     CHRONIC BRONCHITIS     Diabetes mellitus     Diabetes mellitus type II     GERD (gastroesophageal reflux disease)     Hyperlipidemia     Hypertension     Irregular heart beat     Spinal stenosis     Thyroid disease     Hypothyroidism     Past Surgical History:   Procedure Laterality Date    APPENDECTOMY  1968    BLADDER SUSPENSION  1989    CARDIAC SURGERY      CABG    CATARACT EXTRACTION  9/2007 (L) and 10/2207 (R)    CORONARY ARTERY BYPASS GRAFT  4/26/2004    x5    ESOPHAGEAL DILATION      SPINE SURGERY  3/2000    Tumor    WRIST SURGERY  1993     Family History   Problem Relation Age of Onset    Allergic rhinitis Neg Hx     Allergies Neg Hx     Angioedema Neg Hx     Asthma Neg Hx     Eczema Neg Hx     Immunodeficiency Neg Hx     Urticaria Neg Hx     Rhinitis Neg Hx     Atopy Neg Hx      Social History     Social History    Marital status:      Spouse name: N/A    Number of children: N/A    Years of education: N/A     Social History Main Topics    Smoking status: Never Smoker    Smokeless tobacco: Never Used    Alcohol use Yes      Comment: Rare    Drug use: No    Sexual activity: Not Currently     Other Topics Concern    None     Social History Narrative    None         Medications/Allergies: See med card    Vitals:    07/24/17 0826   BP: 132/80   Pulse: 97   Weight: 60.8 kg (134 lb)   Height: 5' 2" (1.575 m)   PainSc:   6   PainLoc: Back         Physical exam:    GENERAL: A and O x3, the patient appears well groomed and is in no acute distress.  Skin: No rashes or obvious lesions  HEENT: normocephalic, atraumatic  CARDIOVASCULAR:  RRR  LUNGS: non labored breathing  ABDOMEN: soft, nontender   UPPER " EXTREMITIES: Normal alignment, normal range of motion, no atrophy, no skin changes,  hair growth and nail growth normal and equal bilaterally. No swelling, no tenderness.    LOWER EXTREMITIES:  Normal alignment, normal range of motion, no atrophy, no skin changes,  hair growth and nail growth normal and equal bilaterally. No swelling, no tenderness.    LUMBAR SPINE  Lumbar spine: ROM is limited with flexion extension and oblique extension with mild increased pain.    Boyd's test causes no increased pain on either side.    Supine straight leg raise is positive on the right at 45°    Internal and external rotation of the hip causes no increased pain on either side.  Myofascial exam: No tenderness to palpation across lumbar paraspinous muscles.      MENTAL STATUS: normal orientation, speech, language, and fund of knowledge for social situation.  Emotional state appropriate.    CRANIAL NERVES:  II:  PERRL bilaterally,   III,IV,VI: EOMI.    V:  Facial sensation equal bilaterally  VII:  Facial motor function normal.  VIII:  Hearing equal to finger rub bilaterally  IX/X: Gag normal, palate symmetric  XI:  Shoulder shrug equal, head turn equal  XII:  Tongue midline without fasciculations      MOTOR: Tone and bulk: normal bilateral upper and lower Strength: normal   Delt Bi Tri WE WF     R 5 5 5 5 5 5   L 5 5 5 5 5 5     IP ADD ABD Quad TA Gas HAM  R 5 5 5 5 5 5 5  L 5 5 5 5 5 5 5    SENSATION: Light touch and pinprick intact bilaterally  REFLEXES: normal, symmetric, nonbrisk.  Toes down, no clonus. No hoffmans.  GAIT: normal rise, base, steps, and arm swing.        Imaging:  MRI L-spine 4/1/17  L1-L2: There is a broad disc bulge, asymmetric to the left, which extends into both neural foramina, left greater than right.  There is left ligamentum flavum thickening and left facet arthropathy.  There is left lateral recess stenosis.  No central canal stenosis.  There is moderate left neuroforaminal stenosis.  No right  neuroforaminal stenosis.      L2-L3: There is a broad disc bulge, asymmetric to the left, which extends into the left neural foramen and left extraforaminal soft tissues.  There is prominent left ligamentum flavum thickening.  There is bilateral hypertrophic facet arthropathy.  There is a superimposed descending left paracentral disc extrusion which extends approximately 6 mm below the disc plane and which measures approximately 4 x 7 mm in size.  There is left lateral recess stenosis.  Mass effect upon the descending left L3 nerve is possible.  Please correlate clinically for symptoms referable to the left L3 nerve.  There is mild-moderate central canal stenosis present.  There is moderate left neuroforaminal stenosis.  No right neuroforaminal stenosis.    L3-L4:  There is a broad disc bulge with a superimposed descending central disc extrusion which extends approximately 7 mm below the disc plane and measures approximately 13 x 4 mm.  There is right lateral recess stenosis.  There is ligamentum flavum thickening and facet arthropathy.  The patient's disc extrusion appears to encroach upon the descending right L4 nerve and the right lateral recess.  There is mild-moderate central canal stenosis.  There is, at most, mild left and mild-moderate right neuroforaminal stenosis.    L4-L5:  There is a bulging posterior disc osteophyte complex which extends into the neural foramina right greater than left.  There is bilateral lateral recess stenosis.  There is ligamentum flavum thickening and hypertrophic facet arthropathy, right greater than left.  There is abutment of the exited right L4 nerve in the right extraforaminal soft tissues.  Please correlate clinically for symptoms referable to the right L4 nerve.  There is also abutment of the exited left L4 nerve in the left extraforaminal soft tissues.There is moderate-severe right neuroforaminal stenosis.  No left neuroforaminal stenosis.  There is mild central canal  stenosis.    L5-S1:  There is a grade I anterolisthesis of L5 on S1 with uncovering of the intervertebral disc.  There is a possible unilateral right-sided pars defect.  There is ligamentum flavum thickening and severe facet arthropathy.  There is a facet joint effusion present.  There is right lateral recess stenosis, and there is abutment of the descending right S1 nerve in the right lateral recess.There is mild overall central canal stenosis.  There is moderate-severe left neuroforaminal stenosis.  There is an extraspinal synovial cyst measuring 10 mm which arises from the anterior margin of the left facet joint and exerts severe mass effect upon the exiting left L5 nerve (series 4 image 13).  Please correlate clinically for symptoms referable to the left L5 nerve.    Assessment:  Mrs Tipton is a 76 y.o. female with right leg pain  No diagnosis found.    Plan:  1. I have stressed the importance of physical activity and exercise to improve overall health  2. Schedule repeat Right L4-5 and L5-S1 levels.  I have explained the risks, benefits, and alternatives of the procedure in detail. The patient voices understanding and all questions have been answered. The patient agrees to proceed as planned. Written Consent obtained.   3. Continue PT  4. Prescription given for Norco 5 mg every 12 hours as needed for pain. She will fill this when needed.  5. May consider adding gabapentin in the future if needed  6. F/u 3 weeks s/p EDENILSON  All medication management was performed by Dr. Bj Jones

## 2017-07-24 NOTE — PROGRESS NOTES
Referring Physician: No ref. provider found    PCP: Oumar Almonte Jr, MD      CC: Right leg pain  Interval History:  Mrs. Tipton is a 76 y.o. female with low back and right leg pain who presents today for f/u s/p repeat right TFESI at L4-5 and L5-S1. Reports 60% improvment. She complains that she still cannot walk fast. She is currently undergoing PT. She takes Norco very sparingly with mild to moderate benefits. Causes constipation but she cannot take stool softeners often. Denies b/b changes or LE weakness. Pain today is rated 6/10.    HPI:   Darlin Tipton is a 76 y.o. female referred to us for right leg pain.  She has had lower back pain for over 20 years but states right leg pain has gradually worsened over past 2 months.  No traumatic incident.  She has intermittent burning, grabbing, deep, sharp, shooting pain traveling down her right leg into her right ankle.  Pain worsens with prolonged sitting, standing, walking or flexing.  Pain improves with heat.  MRI lumbar spine was recently ordered.  She denies any worsening weakness.  No bowel bladder changes.  She received start physical therapy with minimal benefit.  She takes Norco very sparingly with mild to moderate benefit.  She rates her pain 6/10 today, 8/10 at worse.    ROS:  CONSTITUTIONAL: No fevers, chills, night sweats, wt. loss, appetite changes  SKIN: no rashes or itching  ENT: No headaches, head trauma, vision changes, or eye pain  LYMPH NODES: None noticed   CV: No chest pain, palpitations.   RESP: No shortness of breath, dyspnea on exertion, cough, wheezing, or hemoptysis  GI: No nausea, emesis, diarrhea, constipation, melena, hematochezia, pain.    : No dysuria, hematuria, urgency, or frequency   HEME: No easy bruising, bleeding problems  PSYCHIATRIC: No depression, anxiety, psychosis, hallucinations.  NEURO: No seizures, memory loss, dizziness or difficulty sleeping  MSK: + History of present illness      Past Medical History:   Diagnosis Date  "   Allergy     Dust mites, Grasses, Trees    Arthritis     Asthma     Blood transfusion     CAD (coronary artery disease)     Cataract     CHRONIC BRONCHITIS     Diabetes mellitus     Diabetes mellitus type II     GERD (gastroesophageal reflux disease)     Hyperlipidemia     Hypertension     Irregular heart beat     Spinal stenosis     Thyroid disease     Hypothyroidism     Past Surgical History:   Procedure Laterality Date    APPENDECTOMY  1968    BLADDER SUSPENSION  1989    CARDIAC SURGERY      CABG    CATARACT EXTRACTION  9/2007 (L) and 10/2207 (R)    CORONARY ARTERY BYPASS GRAFT  4/26/2004    x5    ESOPHAGEAL DILATION      SPINE SURGERY  3/2000    Tumor    WRIST SURGERY  1993     Family History   Problem Relation Age of Onset    Allergic rhinitis Neg Hx     Allergies Neg Hx     Angioedema Neg Hx     Asthma Neg Hx     Eczema Neg Hx     Immunodeficiency Neg Hx     Urticaria Neg Hx     Rhinitis Neg Hx     Atopy Neg Hx      Social History     Social History    Marital status:      Spouse name: N/A    Number of children: N/A    Years of education: N/A     Social History Main Topics    Smoking status: Never Smoker    Smokeless tobacco: Never Used    Alcohol use Yes      Comment: Rare    Drug use: No    Sexual activity: Not Currently     Other Topics Concern    None     Social History Narrative    None         Medications/Allergies: See med card    Vitals:    07/24/17 0826   BP: 132/80   Pulse: 97   Weight: 60.8 kg (134 lb)   Height: 5' 2" (1.575 m)   PainSc:   6   PainLoc: Back         Physical exam:    GENERAL: A and O x3, the patient appears well groomed and is in no acute distress.  Skin: No rashes or obvious lesions  HEENT: normocephalic, atraumatic  CARDIOVASCULAR:  RRR  LUNGS: non labored breathing  ABDOMEN: soft, nontender   UPPER EXTREMITIES: Normal alignment, normal range of motion, no atrophy, no skin changes,  hair growth and nail growth normal and equal " bilaterally. No swelling, no tenderness.    LOWER EXTREMITIES:  Normal alignment, normal range of motion, no atrophy, no skin changes,  hair growth and nail growth normal and equal bilaterally. No swelling, no tenderness.    LUMBAR SPINE  Lumbar spine: ROM is limited with flexion extension and oblique extension with mild increased pain.    Boyd's test causes no increased pain on either side.    Supine straight leg raise is negative   Internal and external rotation of the hip causes no increased pain on either side.  Myofascial exam: No tenderness to palpation across lumbar paraspinous muscles.      MENTAL STATUS: normal orientation, speech, language, and fund of knowledge for social situation.  Emotional state appropriate.    CRANIAL NERVES:  II:  PERRL bilaterally,   III,IV,VI: EOMI.    V:  Facial sensation equal bilaterally  VII:  Facial motor function normal.  VIII:  Hearing equal to finger rub bilaterally  IX/X: Gag normal, palate symmetric  XI:  Shoulder shrug equal, head turn equal  XII:  Tongue midline without fasciculations      MOTOR: Tone and bulk: normal bilateral upper and lower Strength: normal   Delt Bi Tri WE WF     R 5 5 5 5 5 5   L 5 5 5 5 5 5     IP ADD ABD Quad TA Gas HAM  R 5 5 5 5 5 5 5  L 5 5 5 5 5 5 5    SENSATION: Light touch and pinprick intact bilaterally  REFLEXES: normal, symmetric, nonbrisk.  Toes down, no clonus. No hoffmans.  GAIT: normal rise, base, steps, and arm swing.        Imaging:  MRI L-spine 4/1/17  L1-L2: There is a broad disc bulge, asymmetric to the left, which extends into both neural foramina, left greater than right.  There is left ligamentum flavum thickening and left facet arthropathy.  There is left lateral recess stenosis.  No central canal stenosis.  There is moderate left neuroforaminal stenosis.  No right neuroforaminal stenosis.      L2-L3: There is a broad disc bulge, asymmetric to the left, which extends into the left neural foramen and left  extraforaminal soft tissues.  There is prominent left ligamentum flavum thickening.  There is bilateral hypertrophic facet arthropathy.  There is a superimposed descending left paracentral disc extrusion which extends approximately 6 mm below the disc plane and which measures approximately 4 x 7 mm in size.  There is left lateral recess stenosis.  Mass effect upon the descending left L3 nerve is possible.  Please correlate clinically for symptoms referable to the left L3 nerve.  There is mild-moderate central canal stenosis present.  There is moderate left neuroforaminal stenosis.  No right neuroforaminal stenosis.    L3-L4:  There is a broad disc bulge with a superimposed descending central disc extrusion which extends approximately 7 mm below the disc plane and measures approximately 13 x 4 mm.  There is right lateral recess stenosis.  There is ligamentum flavum thickening and facet arthropathy.  The patient's disc extrusion appears to encroach upon the descending right L4 nerve and the right lateral recess.  There is mild-moderate central canal stenosis.  There is, at most, mild left and mild-moderate right neuroforaminal stenosis.    L4-L5:  There is a bulging posterior disc osteophyte complex which extends into the neural foramina right greater than left.  There is bilateral lateral recess stenosis.  There is ligamentum flavum thickening and hypertrophic facet arthropathy, right greater than left.  There is abutment of the exited right L4 nerve in the right extraforaminal soft tissues.  Please correlate clinically for symptoms referable to the right L4 nerve.  There is also abutment of the exited left L4 nerve in the left extraforaminal soft tissues.There is moderate-severe right neuroforaminal stenosis.  No left neuroforaminal stenosis.  There is mild central canal stenosis.    L5-S1:  There is a grade I anterolisthesis of L5 on S1 with uncovering of the intervertebral disc.  There is a possible unilateral  right-sided pars defect.  There is ligamentum flavum thickening and severe facet arthropathy.  There is a facet joint effusion present.  There is right lateral recess stenosis, and there is abutment of the descending right S1 nerve in the right lateral recess.There is mild overall central canal stenosis.  There is moderate-severe left neuroforaminal stenosis.  There is an extraspinal synovial cyst measuring 10 mm which arises from the anterior margin of the left facet joint and exerts severe mass effect upon the exiting left L5 nerve (series 4 image 13).  Please correlate clinically for symptoms referable to the left L5 nerve.    Assessment:  Mrs Tipton is a 76 y.o. female with right leg pain  1. Lumbar radiculopathy    2. DDD (degenerative disc disease), cervical    3. Facet arthropathy        Plan:  1. I have stressed the importance of physical activity and exercise to improve overall health  2. Will monitor progress and consider repeat Right L4-5 and L5-S1 levels if indicated in the future.  3. Continue PT  4. Continue Norco 5 mg every 12 hours as needed for pain. She will call for refill if needed  5. May consider adding gabapentin in the future if needed  6. F/u prn   All medication management was performed by Dr. Bj Jones

## 2017-07-25 RX ORDER — TRIAZOLAM 0.12 MG/1
0.12 TABLET ORAL NIGHTLY PRN
Qty: 90 TABLET | Refills: 1 | Status: SHIPPED | OUTPATIENT
Start: 2017-07-25 | End: 2018-01-22 | Stop reason: SDUPTHER

## 2017-08-02 ENCOUNTER — OFFICE VISIT (OUTPATIENT)
Dept: FAMILY MEDICINE | Facility: CLINIC | Age: 77
End: 2017-08-02
Payer: MEDICARE

## 2017-08-02 VITALS
HEIGHT: 62 IN | WEIGHT: 139.56 LBS | DIASTOLIC BLOOD PRESSURE: 76 MMHG | SYSTOLIC BLOOD PRESSURE: 134 MMHG | BODY MASS INDEX: 25.68 KG/M2 | HEART RATE: 94 BPM | TEMPERATURE: 98 F

## 2017-08-02 DIAGNOSIS — H65.93 SEROMUCINOUS OTITIS MEDIA, BILATERAL: ICD-10-CM

## 2017-08-02 DIAGNOSIS — J32.8 OTHER CHRONIC SINUSITIS: Primary | ICD-10-CM

## 2017-08-02 DIAGNOSIS — J30.1 SEASONAL ALLERGIC RHINITIS DUE TO POLLEN, UNSPECIFIED CHRONICITY: ICD-10-CM

## 2017-08-02 PROCEDURE — 1125F AMNT PAIN NOTED PAIN PRSNT: CPT | Mod: ,,, | Performed by: FAMILY MEDICINE

## 2017-08-02 PROCEDURE — 99214 OFFICE O/P EST MOD 30 MIN: CPT | Mod: S$PBB,,, | Performed by: FAMILY MEDICINE

## 2017-08-02 PROCEDURE — 1159F MED LIST DOCD IN RCRD: CPT | Mod: ,,, | Performed by: FAMILY MEDICINE

## 2017-08-02 PROCEDURE — 99999 PR PBB SHADOW E&M-EST. PATIENT-LVL III: CPT | Mod: PBBFAC,,, | Performed by: FAMILY MEDICINE

## 2017-08-02 PROCEDURE — 99213 OFFICE O/P EST LOW 20 MIN: CPT | Mod: PBBFAC,PO | Performed by: FAMILY MEDICINE

## 2017-08-02 PROCEDURE — 96372 THER/PROPH/DIAG INJ SC/IM: CPT | Mod: PBBFAC,PO

## 2017-08-02 RX ORDER — BETAMETHASONE SODIUM PHOSPHATE AND BETAMETHASONE ACETATE 3; 3 MG/ML; MG/ML
9 INJECTION, SUSPENSION INTRA-ARTICULAR; INTRALESIONAL; INTRAMUSCULAR; SOFT TISSUE
Status: COMPLETED | OUTPATIENT
Start: 2017-08-02 | End: 2017-08-02

## 2017-08-02 RX ADMIN — BETAMETHASONE SODIUM PHOSPHATE AND BETAMETHASONE ACETATE 9 MG: 3; 3 INJECTION, SUSPENSION INTRA-ARTICULAR; INTRALESIONAL; INTRAMUSCULAR at 10:08

## 2017-08-02 NOTE — PROGRESS NOTES
Subjective:       Patient ID: Darlin Tipton is a 77 y.o. female.    Chief Complaint: Sore Throat    Sore Throat    This is a new problem. The current episode started in the past 7 days. The problem has been waxing and waning. The pain is worse on the left side. There has been no fever. The pain is mild. Associated symptoms include congestion, coughing, a plugged ear sensation and shortness of breath (mild, occasional, stable). Pertinent negatives include no abdominal pain. Associated symptoms comments: Flare of chronic allergy symptoms.. She has tried nothing for the symptoms.     Review of Systems   Constitutional: Negative for fever.   HENT: Positive for congestion and sore throat.    Respiratory: Positive for cough and shortness of breath (mild, occasional, stable).    Cardiovascular: Negative for chest pain.   Gastrointestinal: Negative for abdominal pain and nausea.   Skin: Negative for rash.   All other systems reviewed and are negative.      Objective:      Physical Exam   Constitutional: She appears well-developed. No distress.   HENT:   Right Ear: Tympanic membrane is not injected and not erythematous. A middle ear effusion is present.   Left Ear: Tympanic membrane is not injected and not erythematous. A middle ear effusion is present.   Nose: Mucosal edema present. Right sinus exhibits no maxillary sinus tenderness. Left sinus exhibits no maxillary sinus tenderness.   Mouth/Throat: Posterior oropharyngeal erythema present.   Neck: Neck supple.   Cardiovascular: Normal rate and regular rhythm.    No murmur heard.  Pulmonary/Chest: Effort normal and breath sounds normal.   Lymphadenopathy:     She has no cervical adenopathy.       Assessment:       1. Other chronic sinusitis    2. Seasonal allergic rhinitis due to pollen, unspecified chronicity    3. Seromucinous otitis media, bilateral        Plan:         Darlin was seen today for sore throat.    Diagnoses and all orders for this visit:    Other chronic  sinusitis  -     betamethasone acetate-betamethasone sodium phosphate injection 9 mg; Inject 1.5 mLs (9 mg total) into the muscle one time.    Seasonal allergic rhinitis due to pollen, unspecified chronicity  -     betamethasone acetate-betamethasone sodium phosphate injection 9 mg; Inject 1.5 mLs (9 mg total) into the muscle one time.    Seromucinous otitis media, bilateral  -     betamethasone acetate-betamethasone sodium phosphate injection 9 mg; Inject 1.5 mLs (9 mg total) into the muscle one time.

## 2017-08-30 ENCOUNTER — OFFICE VISIT (OUTPATIENT)
Dept: FAMILY MEDICINE | Facility: CLINIC | Age: 77
End: 2017-08-30
Payer: MEDICARE

## 2017-08-30 ENCOUNTER — DOCUMENTATION ONLY (OUTPATIENT)
Dept: FAMILY MEDICINE | Facility: CLINIC | Age: 77
End: 2017-08-30

## 2017-08-30 VITALS — TEMPERATURE: 98 F | HEIGHT: 62 IN | BODY MASS INDEX: 25.55 KG/M2 | WEIGHT: 138.88 LBS

## 2017-08-30 DIAGNOSIS — R49.0 CHRONIC HOARSENESS: Primary | ICD-10-CM

## 2017-08-30 DIAGNOSIS — J04.0 LARYNGITIS: ICD-10-CM

## 2017-08-30 PROCEDURE — 1159F MED LIST DOCD IN RCRD: CPT | Mod: ,,, | Performed by: PHYSICIAN ASSISTANT

## 2017-08-30 PROCEDURE — 99999 PR PBB SHADOW E&M-EST. PATIENT-LVL V: CPT | Mod: PBBFAC,,, | Performed by: PHYSICIAN ASSISTANT

## 2017-08-30 PROCEDURE — 99215 OFFICE O/P EST HI 40 MIN: CPT | Mod: PBBFAC,PO | Performed by: PHYSICIAN ASSISTANT

## 2017-08-30 PROCEDURE — 99213 OFFICE O/P EST LOW 20 MIN: CPT | Mod: S$PBB,,, | Performed by: PHYSICIAN ASSISTANT

## 2017-08-30 RX ORDER — PROMETHAZINE HYDROCHLORIDE AND CODEINE PHOSPHATE 6.25; 1 MG/5ML; MG/5ML
5 SOLUTION ORAL EVERY 6 HOURS PRN
Qty: 120 ML | Refills: 0 | Status: SHIPPED | OUTPATIENT
Start: 2017-08-30 | End: 2017-09-09

## 2017-08-30 NOTE — PROGRESS NOTES
Pre-Visit Chart Review  For Appointment Scheduled on 8-    Health Maintenance Due   Topic Date Due    TETANUS VACCINE  08/02/1958    Zoster Vaccine  08/02/2000    Hemoglobin A1c  03/06/2017    Influenza Vaccine  08/01/2017

## 2017-08-30 NOTE — PROGRESS NOTES
Subjective:       Patient ID: Darlin Tipton is a 77 y.o. female.    Chief Complaint: Cough    Patient with chronic cough, asthma, bronchitis, and frequent sinusitis presents for evaluation of hoarseness X 1 month.  She continues to have chronic non productive cough.  She denies weight loss, night sweats, hemoptysis.  She denies sneezing, itchy watery eyes.  She has dysphagia and sensation of something in throat when swallowing but she states that this is unrelated to her acute problems.  She has not had ENT evaluation and would like to pursue this.  Last week She took 3 days of prednisone and azithromycin as prescribed per pulmonary.  Her symptoms are unchanged.       Review of Systems   Constitutional: Positive for fatigue. Negative for activity change, appetite change, chills, diaphoresis, fever and unexpected weight change.   HENT: Positive for congestion, postnasal drip, rhinorrhea, trouble swallowing and voice change. Negative for drooling, ear pain, facial swelling, sinus pressure, sneezing and sore throat.    Respiratory: Positive for shortness of breath. Negative for apnea, cough, choking, chest tightness, wheezing and stridor.    Cardiovascular: Negative for chest pain, palpitations and leg swelling.   Gastrointestinal: Negative for abdominal pain.       Objective:      Physical Exam   Constitutional: She appears well-developed and well-nourished. She is cooperative. No distress.   HENT:   Head: Normocephalic and atraumatic.   Mouth/Throat: Posterior oropharyngeal erythema present.   Eyes: Conjunctivae and EOM are normal. Pupils are equal, round, and reactive to light. No scleral icterus.   Neck: Trachea normal. No tracheal tenderness present. No thyromegaly present.   Cardiovascular: Normal rate, regular rhythm and normal heart sounds.    Pulmonary/Chest: Effort normal and breath sounds normal. No stridor.   Abdominal: Soft. Bowel sounds are normal.   Musculoskeletal:        Right lower leg: She exhibits  no edema.        Left lower leg: She exhibits no edema.   Neurological: She is alert.   Skin: Skin is warm and dry.   Psychiatric: She has a normal mood and affect. Her speech is normal. Judgment normal. Cognition and memory are normal.   Vitals reviewed.      Assessment:       1. Chronic hoarseness    2. Laryngitis    3. Asthma, persistent        Plan:       Darlin was seen today for cough.    Diagnoses and all orders for this visit:    Chronic hoarseness  -     Ambulatory referral to ENT    Laryngitis  -     Ambulatory referral to ENT    Asthma, persistent  Continue breo, Spiriva , albuterol   Has prednisone to use per pulmonology   Other orders  -     promethazine-codeine 6.25-10 mg/5 ml (PHENERGAN WITH CODEINE) 6.25-10 mg/5 mL syrup; Take 5 mLs by mouth every 6 (six) hours as needed for Cough.

## 2017-09-07 NOTE — TELEPHONE ENCOUNTER
----- Message from Capri Qureshi sent at 9/7/2017  4:04 PM CDT -----  Contact: Patient  Darlin, patient [phone], calling about Rx estradiol (ESTRACE) 0.01 % (0.1 mg/gram) vaginal cream, Pharmacy has not filled Rx.  Please advise. Thanks.    [facility] - [address] [address]  Phone: [phone] Fax: [phone]

## 2017-09-08 RX ORDER — ESTRADIOL 0.1 MG/G
3 CREAM VAGINAL
Qty: 3 TUBE | Refills: 3 | Status: SHIPPED | OUTPATIENT
Start: 2017-09-11 | End: 2018-03-14 | Stop reason: SDUPTHER

## 2017-09-13 RX ORDER — FLUTICASONE PROPIONATE 50 MCG
SPRAY, SUSPENSION (ML) NASAL
Qty: 48 G | Refills: 3 | Status: SHIPPED | OUTPATIENT
Start: 2017-09-13 | End: 2018-10-05 | Stop reason: SDUPTHER

## 2017-09-26 ENCOUNTER — DOCUMENTATION ONLY (OUTPATIENT)
Dept: FAMILY MEDICINE | Facility: CLINIC | Age: 77
End: 2017-09-26

## 2017-09-26 NOTE — PROGRESS NOTES
Pre-Visit Chart Review  For Appointment Scheduled on (date) 10/5/17    Health Maintenance Due   Topic Date Due    TETANUS VACCINE  08/02/1958    Zoster Vaccine  08/02/2000    Hemoglobin A1c  03/06/2017    Influenza Vaccine  08/01/2017    Lipid Panel  09/06/2017    Foot Exam  11/21/2017

## 2017-09-27 ENCOUNTER — HOSPITAL ENCOUNTER (OUTPATIENT)
Facility: HOSPITAL | Age: 77
Discharge: HOME | End: 2017-09-27
Attending: INTERNAL MEDICINE | Admitting: INTERNAL MEDICINE
Payer: MEDICARE

## 2017-09-27 ENCOUNTER — ANESTHESIA (OUTPATIENT)
Dept: ENDOSCOPY | Facility: HOSPITAL | Age: 77
End: 2017-09-27
Payer: MEDICARE

## 2017-09-27 ENCOUNTER — ANESTHESIA EVENT (OUTPATIENT)
Dept: ENDOSCOPY | Facility: HOSPITAL | Age: 77
End: 2017-09-27
Payer: MEDICARE

## 2017-09-27 VITALS
SYSTOLIC BLOOD PRESSURE: 156 MMHG | DIASTOLIC BLOOD PRESSURE: 75 MMHG | RESPIRATION RATE: 20 BRPM | RESPIRATION RATE: 32 BRPM | HEART RATE: 86 BPM | OXYGEN SATURATION: 96 %

## 2017-09-27 DIAGNOSIS — R13.10 DIFFICULTY SWALLOWING: ICD-10-CM

## 2017-09-27 LAB — POCT GLUCOSE: 106 MG/DL (ref 70–110)

## 2017-09-27 PROCEDURE — 43450 DILATE ESOPHAGUS 1/MULT PASS: CPT | Performed by: INTERNAL MEDICINE

## 2017-09-27 PROCEDURE — 27201012 HC FORCEPS, HOT/COLD, DISP: Performed by: INTERNAL MEDICINE

## 2017-09-27 PROCEDURE — 25000003 PHARM REV CODE 250: Performed by: INTERNAL MEDICINE

## 2017-09-27 PROCEDURE — 88305 TISSUE EXAM BY PATHOLOGIST: CPT | Mod: 26,,,

## 2017-09-27 PROCEDURE — 43239 EGD BIOPSY SINGLE/MULTIPLE: CPT | Performed by: INTERNAL MEDICINE

## 2017-09-27 PROCEDURE — 27000221 HC OXYGEN, UP TO 24 HOURS

## 2017-09-27 PROCEDURE — 94640 AIRWAY INHALATION TREATMENT: CPT

## 2017-09-27 PROCEDURE — D9220A PRA ANESTHESIA: Mod: ANES,,, | Performed by: ANESTHESIOLOGY

## 2017-09-27 PROCEDURE — 87081 CULTURE SCREEN ONLY: CPT

## 2017-09-27 PROCEDURE — 37000009 HC ANESTHESIA EA ADD 15 MINS: Performed by: INTERNAL MEDICINE

## 2017-09-27 PROCEDURE — D9220A PRA ANESTHESIA: Mod: CRNA,,, | Performed by: NURSE ANESTHETIST, CERTIFIED REGISTERED

## 2017-09-27 PROCEDURE — 37000008 HC ANESTHESIA 1ST 15 MINUTES: Performed by: INTERNAL MEDICINE

## 2017-09-27 PROCEDURE — 25000242 PHARM REV CODE 250 ALT 637 W/ HCPCS: Performed by: INTERNAL MEDICINE

## 2017-09-27 PROCEDURE — 63600175 PHARM REV CODE 636 W HCPCS: Performed by: NURSE ANESTHETIST, CERTIFIED REGISTERED

## 2017-09-27 PROCEDURE — 88305 TISSUE EXAM BY PATHOLOGIST: CPT

## 2017-09-27 RX ORDER — LIDOCAINE HCL/PF 100 MG/5ML
SYRINGE (ML) INTRAVENOUS
Status: DISCONTINUED | OUTPATIENT
Start: 2017-09-27 | End: 2017-09-27

## 2017-09-27 RX ORDER — NITROGLYCERIN 0.4 MG/1
TABLET SUBLINGUAL
Status: DISCONTINUED
Start: 2017-09-27 | End: 2017-09-27 | Stop reason: WASHOUT

## 2017-09-27 RX ORDER — LEVALBUTEROL 1.25 MG/.5ML
1.25 SOLUTION, CONCENTRATE RESPIRATORY (INHALATION) ONCE
Status: COMPLETED | OUTPATIENT
Start: 2017-09-27 | End: 2017-09-27

## 2017-09-27 RX ORDER — SODIUM CHLORIDE 9 MG/ML
INJECTION, SOLUTION INTRAVENOUS CONTINUOUS
Status: DISCONTINUED | OUTPATIENT
Start: 2017-09-27 | End: 2017-09-27 | Stop reason: HOSPADM

## 2017-09-27 RX ORDER — PROPOFOL 10 MG/ML
VIAL (ML) INTRAVENOUS
Status: DISCONTINUED | OUTPATIENT
Start: 2017-09-27 | End: 2017-09-27

## 2017-09-27 RX ORDER — NITROGLYCERIN 0.4 MG/1
0.4 TABLET SUBLINGUAL ONCE
Status: COMPLETED | OUTPATIENT
Start: 2017-09-27 | End: 2017-09-27

## 2017-09-27 RX ADMIN — PROPOFOL 50 MG: 10 INJECTION, EMULSION INTRAVENOUS at 11:09

## 2017-09-27 RX ADMIN — SODIUM CHLORIDE: 0.9 INJECTION, SOLUTION INTRAVENOUS at 10:09

## 2017-09-27 RX ADMIN — NITROGLYCERIN 0.4 MG: 0.4 TABLET SUBLINGUAL at 12:09

## 2017-09-27 RX ADMIN — PROPOFOL 80 MG: 10 INJECTION, EMULSION INTRAVENOUS at 11:09

## 2017-09-27 RX ADMIN — LEVALBUTEROL 1.25 MG: 1.25 SOLUTION, CONCENTRATE RESPIRATORY (INHALATION) at 12:09

## 2017-09-27 RX ADMIN — LIDOCAINE HYDROCHLORIDE 100 MG: 20 INJECTION, SOLUTION INTRAVENOUS at 11:09

## 2017-09-27 NOTE — PLAN OF CARE
Vss, kalyani po fluids, denies pain, ambulates easily. IV removed, catheter intact. Discharge instructions provided and states understanding. States ready to go home.  Discharged from facility with family.

## 2017-09-27 NOTE — TRANSFER OF CARE
Anesthesia Transfer of Care Note    Patient: Darlin Tipton    Procedure(s) Performed: Procedure(s) (LRB):  ESOPHAGOGASTRODUODENOSCOPY (EGD) (N/A)    Patient location: GI    Anesthesia Type: general    Transport from OR: Transported from OR on 2-3 L/min O2 by NC with adequate spontaneous ventilation    Post pain: adequate analgesia    Post assessment: no apparent anesthetic complications    Post vital signs: stable    Level of consciousness: awake, alert and oriented    Nausea/Vomiting: no nausea/vomiting    Complications: none    Transfer of care protocol was followed      Last vitals:   Visit Vitals  BP (!) 146/69   Pulse 84   Resp 18   SpO2 95%   Breastfeeding? No

## 2017-09-27 NOTE — ANESTHESIA PREPROCEDURE EVALUATION
09/27/2017  Darlin Tipton is a 77 y.o., female.    Anesthesia Evaluation    I have reviewed the Patient Summary Reports.    I have reviewed the Nursing Notes.   I have reviewed the Medications.     Review of Systems  Anesthesia Hx:  Denies Family Hx of Anesthesia complications.   Denies Personal Hx of Anesthesia complications.   Cardiovascular:   Hypertension CAD  Dysrhythmias  Angina ECG has been reviewed.    Pulmonary:   Asthma    Hepatic/GI:   GERD    Musculoskeletal:   Arthritis     Neurological:   Denies Neuromuscular Disease.   Chronic Pain Syndrome   Endocrine:   Diabetes        Physical Exam  General:  Well nourished    Airway/Jaw/Neck:  Airway Findings: Mouth Opening: Normal Tongue: Normal  General Airway Assessment: Adult  Mallampati: III  Improves to II with phonation.  TM Distance: Normal, at least 6 cm  Jaw/Neck Findings:  Neck ROM: Normal ROM      Dental:  Dental Findings: In tact   Chest/Lungs:  Chest/Lungs Clear    Heart/Vascular:  Heart Findings: Normal            Anesthesia Plan  Type of Anesthesia, risks & benefits discussed:  Anesthesia Type:  general  Patient's Preference:   Intra-op Monitoring Plan: standard ASA monitors  Intra-op Monitoring Plan Comments:   Post Op Pain Control Plan:   Post Op Pain Control Plan Comments:   Induction:   IV  Beta Blocker:  Patient is not currently on a Beta-Blocker (No further documentation required).       Informed Consent: Patient understands risks and agrees with Anesthesia plan.  Questions answered. Anesthesia consent signed with patient.  ASA Score: 3     Day of Surgery Review of History & Physical:    H&P update referred to the provider.         Ready For Surgery From Anesthesia Perspective.

## 2017-09-27 NOTE — ANESTHESIA POSTPROCEDURE EVALUATION
Anesthesia Post Evaluation    Patient: Darlin Tipton    Procedure(s) Performed: Procedure(s) (LRB):  ESOPHAGOGASTRODUODENOSCOPY (EGD) (N/A)    Final Anesthesia Type: general  Patient location during evaluation: PACU  Patient participation: Yes- Able to Participate  Level of consciousness: awake and alert  Post-procedure vital signs: reviewed and stable  Pain management: adequate  Airway patency: patent  PONV status at discharge: No PONV  Anesthetic complications: no      Cardiovascular status: blood pressure returned to baseline  Respiratory status: unassisted  Hydration status: euvolemic  Follow-up not needed.        Visit Vitals  BP (!) 140/69   Pulse 73   Resp (!) 22   SpO2 98%   Breastfeeding? No       Pain/Reagan Score: Pain Assessment Performed: Yes (9/27/2017 12:10 PM)  Presence of Pain: denies (9/27/2017 12:15 PM)  Reagan Score: 10 (9/27/2017 12:15 PM)

## 2017-09-27 NOTE — DISCHARGE INSTRUCTIONS
"Discharge Instructions: After Your Surgery/Procedure  Youve just had surgery. During surgery you were given medicine called anesthesia to keep you relaxed and free of pain. After surgery you may have some pain or nausea. This is common. Here are some tips for feeling better and getting well after surgery.     Stay on schedule with your medication.   Going home  Your doctor or nurse will show you how to take care of yourself when you go home. He or she will also answer your questions. Have an adult family member or friend drive you home.      For your safety we recommend these precaution for the first 24 hours after your procedure:  · Do not drive or use heavy equipment.  · Do not make important decisions or sign legal papers.  · Do not drink alcohol.  · Have someone stay with you, if needed. He or she can watch for problems and help keep you safe.  · Your concentration, balance, coordination, and judgement may be impaired for many hours after anesthesia.  Use caution when ambulating or standing up.     · You may feel weak and "washed out" after anesthesia and surgery.      Subtle residual effects of general anesthesia or sedation with regional / local anesthesia can last more than 24 hours.  Rest for the remainder of the day or longer if your Doctor/Surgeon has advised you to do so.  Although you may feel normal within the first 24 hours, your reflexes and mental ability may be impaired without you realizing it.  You may feel dizzy, lightheaded or sleepy for 24 hours or longer.      Be sure to go to all follow-up visits with your doctor. And rest after your surgery for as long as your doctor tells you to.  Coping with pain  If you have pain after surgery, pain medicine will help you feel better. Take it as told, before pain becomes severe. Also, ask your doctor or pharmacist about other ways to control pain. This might be with heat, ice, or relaxation. And follow any other instructions your surgeon or nurse gives " you.  Tips for taking pain medicine  To get the best relief possible, remember these points:  · Pain medicines can upset your stomach. Taking them with a little food may help.  · Most pain relievers taken by mouth need at least 20 to 30 minutes to start to work.  · Taking medicine on a schedule can help you remember to take it. Try to time your medicine so that you can take it before starting an activity. This might be before you get dressed, go for a walk, or sit down for dinner.  · Constipation is a common side effect of pain medicines. Call your doctor before taking any medicines such as laxatives or stool softeners to help ease constipation. Also ask if you should skip any foods. Drinking lots of fluids and eating foods such as fruits and vegetables that are high in fiber can also help. Remember, do not take laxatives unless your surgeon has prescribed them.  · Drinking alcohol and taking pain medicine can cause dizziness and slow your breathing. It can even be deadly. Do not drink alcohol while taking pain medicine.  · Pain medicine can make you react more slowly to things. Do not drive or run machinery while taking pain medicine.  Your health care provider may tell you to take acetaminophen to help ease your pain. Ask him or her how much you are supposed to take each day. Acetaminophen or other pain relievers may interact with your prescription medicines or other over-the-counter (OTC) drugs. Some prescription medicines have acetaminophen and other ingredients. Using both prescription and OTC acetaminophen for pain can cause you to overdose. Read the labels on your OTC medicines with care. This will help you to clearly know the list of ingredients, how much to take, and any warnings. It may also help you not take too much acetaminophen. If you have questions or do not understand the information, ask your pharmacist or health care provider to explain it to you before you take the OTC medicine.  Managing  nausea  Some people have an upset stomach after surgery. This is often because of anesthesia, pain, or pain medicine, or the stress of surgery. These tips will help you handle nausea and eat healthy foods as you get better. If you were on a special food plan before surgery, ask your doctor if you should follow it while you get better. These tips may help:  · Do not push yourself to eat. Your body will tell you when to eat and how much.  · Start off with clear liquids and soup. They are easier to digest.  · Next try semi-solid foods, such as mashed potatoes, applesauce, and gelatin, as you feel ready.  · Slowly move to solid foods. Dont eat fatty, rich, or spicy foods at first.  · Do not force yourself to have 3 large meals a day. Instead eat smaller amounts more often.  · Take pain medicines with a small amount of solid food, such as crackers or toast, to avoid nausea.     Call your surgeon if  · You still have pain an hour after taking medicine. The medicine may not be strong enough.  · You feel too sleepy, dizzy, or groggy. The medicine may be too strong.  · You have side effects like nausea, vomiting, or skin changes, such as rash, itching, or hives.       If you have obstructive sleep apnea  You were given anesthesia medicine during surgery to keep you comfortable and free of pain. After surgery, you may have more apnea spells because of this medicine and other medicines you were given. The spells may last longer than usual.   At home:  · Keep using the continuous positive airway pressure (CPAP) device when you sleep. Unless your health care provider tells you not to, use it when you sleep, day or night. CPAP is a common device used to treat obstructive sleep apnea.  · Talk with your provider before taking any pain medicine, muscle relaxants, or sedatives. Your provider will tell you about the possible dangers of taking these medicines.  © 3057-7167 [employer]. [address] [place] [address]  [place] [address]. All rights reserved. This information is not intended as a substitute for professional medical care. Always follow your healthcare professional's instructions.      Gastritis (Adult)    Gastritis is inflammation and irritation of the stomach lining. It can be present for a short time (acute) or be long lasting (chronic). Gastritis is often caused by infection with bacteria called H pylori. More than a third of people in the [place] have this bacteria in their bodies. In many cases, H pylori causes no problems or symptoms. In some people, though, the infection irritates the stomach lining and causes gastritis. Other causes of stomach irritation include drinking alcohol or taking pain-relieving medicines called NSAIDs (such as aspirin or ibuprofen).   Symptoms of gastritis can include:  · Abdominal pain or bloating  · Loss of appetite  · Nausea or vomiting  · Vomiting blood or having black stools  · Feeling more tired than usual  An inflamed and irritated stomach lining is more likely to develop a sore called an ulcer. To help prevent this, gastritis should be treated.  Home care  If needed, medicines may be prescribed. If you have H pylori infection, treating it will likely relieve your symptoms. Other changes can help reduce stomach irritation and help it heal.  · If you have been prescribed medicines for H pylori infection, take them as directed. Take all of the medicine until it is finished or your healthcare provider tells you to stop, even if you feel better.  · Your healthcare provider may recommend avoiding NSAIDs. If you take daily aspirin for your heart or other medical reasons, do not stop without talking to your healthcare provider first.  · Avoid drinking alcohol.  · Stop smoking. Smoking can irritate the stomach and delay healing. As much as possible, stay away from second hand smoke.  Follow-up care  Follow up with your healthcare provider, or as advised by our staff. Testing may be needed to check  for inflammation or an ulcer.  When to seek medical advice  Call your healthcare provider for any of the following:  · Stomach pain that gets worse or moves to the lower right abdomen (appendix area)  · Chest pain that appears or gets worse, or spreads to the back, neck, shoulder, or arm  · Frequent vomiting (cant keep down liquids)  · Blood in the stool or vomit (red or black in color)  · Feeling weak or dizzy  · Fever of 100.4ºF (38ºC) or higher, or as directed by your healthcare provider  Date Last Reviewed: 6/22/2015  © 6280-3944 [employer]. [address] [place] [address], [place] [address]. All rights reserved. This information is not intended as a substitute for professional medical care. Always follow your healthcare professional's instructions.

## 2017-09-28 DIAGNOSIS — E11.8 TYPE 2 DIABETES MELLITUS WITH COMPLICATION, WITHOUT LONG-TERM CURRENT USE OF INSULIN: Primary | ICD-10-CM

## 2017-09-28 LAB — UREASE 1D P INC TISS QL: NEGATIVE

## 2017-10-05 ENCOUNTER — OFFICE VISIT (OUTPATIENT)
Dept: FAMILY MEDICINE | Facility: CLINIC | Age: 77
End: 2017-10-05
Payer: MEDICARE

## 2017-10-05 VITALS
DIASTOLIC BLOOD PRESSURE: 61 MMHG | BODY MASS INDEX: 25.76 KG/M2 | SYSTOLIC BLOOD PRESSURE: 124 MMHG | RESPIRATION RATE: 18 BRPM | WEIGHT: 140 LBS | HEIGHT: 62 IN | TEMPERATURE: 98 F | HEART RATE: 81 BPM

## 2017-10-05 DIAGNOSIS — I25.119 ATHEROSCLEROSIS OF NATIVE CORONARY ARTERY OF NATIVE HEART WITH ANGINA PECTORIS: ICD-10-CM

## 2017-10-05 DIAGNOSIS — E78.1 HYPERTRIGLYCERIDEMIA: ICD-10-CM

## 2017-10-05 DIAGNOSIS — J45.30 MILD PERSISTENT ASTHMA WITHOUT COMPLICATION: ICD-10-CM

## 2017-10-05 DIAGNOSIS — K21.00 GASTROESOPHAGEAL REFLUX DISEASE WITH ESOPHAGITIS: ICD-10-CM

## 2017-10-05 DIAGNOSIS — E11.49 CONTROLLED TYPE 2 DIABETES MELLITUS WITH OTHER NEUROLOGIC COMPLICATION, WITHOUT LONG-TERM CURRENT USE OF INSULIN: Primary | ICD-10-CM

## 2017-10-05 DIAGNOSIS — J41.8 MIXED SIMPLE AND MUCOPURULENT CHRONIC BRONCHITIS: ICD-10-CM

## 2017-10-05 PROCEDURE — G0008 ADMIN INFLUENZA VIRUS VAC: HCPCS | Mod: PBBFAC,PO

## 2017-10-05 PROCEDURE — 99213 OFFICE O/P EST LOW 20 MIN: CPT | Mod: PBBFAC,PO | Performed by: FAMILY MEDICINE

## 2017-10-05 PROCEDURE — 99214 OFFICE O/P EST MOD 30 MIN: CPT | Mod: S$PBB,,, | Performed by: FAMILY MEDICINE

## 2017-10-05 PROCEDURE — 99999 PR PBB SHADOW E&M-EST. PATIENT-LVL III: CPT | Mod: PBBFAC,,, | Performed by: FAMILY MEDICINE

## 2017-10-05 RX ORDER — SUCRALFATE 1 G/10ML
SUSPENSION ORAL
COMMUNITY
Start: 2017-09-27 | End: 2019-07-08

## 2017-10-05 NOTE — PROGRESS NOTES
2 patient identifiers used ( name &  ).  Administered Flu vaccine left deltoid IM.  Patient tolerated well, no bleeding at insertion site noted.  Pain scale 0/10.  Aseptic technique maintained.  Immunization information given to patient. Advised patient to remain in clinic for 15 minutes to monitor for reaction.  No AR noted.

## 2017-10-09 NOTE — PROGRESS NOTES
Subjective:       Patient ID: Darlin Tipton is a 77 y.o. female.    Chief Complaint: Diabetes; Hyperlipidemia; and Coronary Artery Disease    Patient presents here for six-month follow-up of diabetes, hyperlipidemia, CAD, asthma, and GERD.  Her weight has increased 8 pounds over the last 6 months.  She has been limited in her physical activity more so lately but she states she has been following her diet well.  Her diabetes has been well controlled at home and she is expressing no hypoglycemia.  She has no diabetic nephropathy or neuropathy but she does have mild nonproliferative retinopathy bilaterally.  She has had a hemoglobin A1c done by her cardiologist and I will attempt to get this result.  She states that it was good.  Her hyperlipidemia is also well controlled and this was also done by her cardiologist.  She states she is following her low fat low-cholesterol diet and is compliant with her present dose of simvastatin and fenofibric acid.  Her hypothyroidism is well controlled with her present dose of levothyroxine 25 mg daily.  She has not had any significant problems with her asthma over the last 6 months.  She would like to get her flu vaccine today.  She defers shingles vaccine at this time.      Diabetes   She presents for her follow-up diabetic visit. She has type 2 diabetes mellitus. Her disease course has been stable. There are no hypoglycemic associated symptoms. Pertinent negatives for hypoglycemia include no dizziness, headaches or nervousness/anxiousness. Pertinent negatives for diabetes include no chest pain, no fatigue, no foot paresthesias, no polydipsia, no polyuria and no visual change. There are no hypoglycemic complications. Symptoms are stable. Diabetic complications include heart disease and retinopathy. Pertinent negatives for diabetic complications include no CVA, nephropathy or peripheral neuropathy. Risk factors for coronary artery disease include diabetes mellitus, dyslipidemia and  post-menopausal. Current diabetic treatment includes oral agent (monotherapy). She is compliant with treatment all of the time. Her weight is stable. She is following a diabetic, low fat/cholesterol and low salt diet. Meal planning includes ADA exchanges and avoidance of concentrated sweets. She has had a previous visit with a dietitian. She participates in exercise intermittently. There is no change in her home blood glucose trend. An ACE inhibitor/angiotensin II receptor blocker is not being taken. She does not see a podiatrist.Eye exam is current.   Hyperlipidemia   This is a chronic problem. The problem is controlled. Recent lipid tests were reviewed and are normal. Pertinent negatives include no chest pain, myalgias or shortness of breath. Current antihyperlipidemic treatment includes statins and fibric acid derivatives. The current treatment provides moderate improvement of lipids. There are no compliance problems.  Risk factors for coronary artery disease include diabetes mellitus, dyslipidemia, hypertension and post-menopausal.   Coronary Artery Disease   Presents for follow-up visit. Pertinent negatives include no chest pain, chest tightness, dizziness, palpitations or shortness of breath. Risk factors include hyperlipidemia. The symptoms have been stable. Compliance with diet is good. Compliance with exercise is variable. Compliance with medications is good.     Review of Systems   Constitutional: Negative for chills, fatigue, fever and unexpected weight change.   HENT: Negative for congestion, ear pain, postnasal drip and sore throat.    Eyes: Negative for pain and visual disturbance.        Up to date with eye exams; positive for mild retinopathy   Respiratory: Negative for cough, chest tightness, shortness of breath and wheezing.    Cardiovascular: Negative for chest pain and palpitations.   Gastrointestinal: Negative for abdominal pain, diarrhea and nausea.   Endocrine: Negative for polydipsia and  polyuria.   Genitourinary: Negative for difficulty urinating, dysuria, flank pain and pelvic pain.   Musculoskeletal: Positive for arthralgias. Negative for back pain and myalgias.   Neurological: Negative for dizziness, light-headedness and headaches.   Psychiatric/Behavioral: Positive for sleep disturbance. The patient is not nervous/anxious.        Objective:      Physical Exam   Constitutional: She is oriented to person, place, and time. She appears well-developed and well-nourished.   HENT:   Head: Normocephalic and atraumatic.   Right Ear: External ear normal.   Left Ear: External ear normal.   Nose: Nose normal.   Mouth/Throat: Oropharynx is clear and moist.   Neck: Normal range of motion. Neck supple. No JVD present. No thyromegaly present.   Cardiovascular: Normal rate, regular rhythm, normal heart sounds and intact distal pulses.    No murmur heard.  Pulmonary/Chest: Effort normal and breath sounds normal. She has no wheezes. She has no rales.   Musculoskeletal: Normal range of motion. She exhibits no edema or tenderness.   Lymphadenopathy:     She has no cervical adenopathy.   Neurological: She is alert and oriented to person, place, and time. She has normal reflexes. No cranial nerve deficit.   Psychiatric: She has a normal mood and affect. Her behavior is normal.   Vitals reviewed.      Assessment:       1. Controlled type 2 diabetes mellitus with other neurologic complication, without long-term current use of insulin    2. Hypertriglyceridemia    3. Atherosclerosis of native coronary artery of native heart with angina pectoris    4. Gastroesophageal reflux disease with esophagitis    5. Mild persistent asthma without complication    6. Mixed simple and mucopurulent chronic bronchitis        Plan:       1.  High-dose flu vaccine today  2.  Continue present medication as all of her chronic medical problems noted above are stable and controlled  3.  Continue diabetic, low-sodium, low-fat low-cholesterol  diet and try to increase exercise for weight loss  4.  Continue follow-up with cardiology  5.  I will attempt to get her recent blood work done by cardiology which she states a lipid profile and hemoglobin A1c was done  6.  Follow-up with me in 6 months or when necessary        Patient readiness: acceptance and barriers:none    During the course of the visit the patient was educated and counseled about the following:     Diabetes:  Addressed ADA diet.  Suggested low cholesterol diet.  Encouraged aerobic exercise.  Discussed foot care.  Reminded to get yearly retinal exam.  Discussed ways to avoid symptomatic hypoglycemia.  Discussed sick day management.  Reminded to bring in blood sugar diary at next visit.  Follow up in 6 months or as needed.    Goals: Diabetes: Maintain Hemoglobin A1C below 7    Did patient meet goals/outcomes: Yes    The following self management tools provided: blood glucose log    Patient Instructions (the written plan) was given to the patient/family.     Time spent with patient: 30 minutes

## 2017-10-18 ENCOUNTER — ANESTHESIA EVENT (OUTPATIENT)
Dept: ENDOSCOPY | Facility: HOSPITAL | Age: 77
End: 2017-10-18
Payer: MEDICARE

## 2017-10-18 ENCOUNTER — HOSPITAL ENCOUNTER (OUTPATIENT)
Facility: HOSPITAL | Age: 77
Discharge: HOME | End: 2017-10-18
Attending: INTERNAL MEDICINE | Admitting: INTERNAL MEDICINE
Payer: MEDICARE

## 2017-10-18 ENCOUNTER — ANESTHESIA (OUTPATIENT)
Dept: ENDOSCOPY | Facility: HOSPITAL | Age: 77
End: 2017-10-18
Payer: MEDICARE

## 2017-10-18 VITALS — RESPIRATION RATE: 44 BRPM

## 2017-10-18 DIAGNOSIS — Z86.010 HX OF COLONIC POLYPS: ICD-10-CM

## 2017-10-18 LAB — POCT GLUCOSE: 101 MG/DL (ref 70–110)

## 2017-10-18 PROCEDURE — 25000242 PHARM REV CODE 250 ALT 637 W/ HCPCS: Performed by: INTERNAL MEDICINE

## 2017-10-18 PROCEDURE — 27201012 HC FORCEPS, HOT/COLD, DISP: Performed by: INTERNAL MEDICINE

## 2017-10-18 PROCEDURE — 25000003 PHARM REV CODE 250: Performed by: INTERNAL MEDICINE

## 2017-10-18 PROCEDURE — D9220A PRA ANESTHESIA: Mod: PT,ANES,, | Performed by: ANESTHESIOLOGY

## 2017-10-18 PROCEDURE — 88305 TISSUE EXAM BY PATHOLOGIST: CPT | Performed by: PATHOLOGY

## 2017-10-18 PROCEDURE — 45380 COLONOSCOPY AND BIOPSY: CPT | Performed by: INTERNAL MEDICINE

## 2017-10-18 PROCEDURE — D9220A PRA ANESTHESIA: Mod: PT,CRNA,, | Performed by: NURSE ANESTHETIST, CERTIFIED REGISTERED

## 2017-10-18 PROCEDURE — 37000009 HC ANESTHESIA EA ADD 15 MINS: Performed by: INTERNAL MEDICINE

## 2017-10-18 PROCEDURE — 94640 AIRWAY INHALATION TREATMENT: CPT

## 2017-10-18 PROCEDURE — 37000008 HC ANESTHESIA 1ST 15 MINUTES: Performed by: INTERNAL MEDICINE

## 2017-10-18 PROCEDURE — 63600175 PHARM REV CODE 636 W HCPCS: Performed by: NURSE ANESTHETIST, CERTIFIED REGISTERED

## 2017-10-18 PROCEDURE — 82962 GLUCOSE BLOOD TEST: CPT | Performed by: INTERNAL MEDICINE

## 2017-10-18 RX ORDER — LIDOCAINE HYDROCHLORIDE 20 MG/ML
INJECTION, SOLUTION EPIDURAL; INFILTRATION; INTRACAUDAL; PERINEURAL
Status: DISCONTINUED
Start: 2017-10-18 | End: 2017-10-18 | Stop reason: HOSPADM

## 2017-10-18 RX ORDER — PROPOFOL 10 MG/ML
INJECTION, EMULSION INTRAVENOUS
Status: DISCONTINUED
Start: 2017-10-18 | End: 2017-10-18 | Stop reason: HOSPADM

## 2017-10-18 RX ORDER — LIDOCAINE HCL/PF 100 MG/5ML
SYRINGE (ML) INTRAVENOUS
Status: DISCONTINUED | OUTPATIENT
Start: 2017-10-18 | End: 2017-10-18

## 2017-10-18 RX ORDER — SODIUM CHLORIDE 9 MG/ML
INJECTION, SOLUTION INTRAVENOUS CONTINUOUS
Status: DISCONTINUED | OUTPATIENT
Start: 2017-10-18 | End: 2017-10-18 | Stop reason: HOSPADM

## 2017-10-18 RX ORDER — LEVALBUTEROL 1.25 MG/.5ML
1.25 SOLUTION, CONCENTRATE RESPIRATORY (INHALATION) ONCE
Status: COMPLETED | OUTPATIENT
Start: 2017-10-18 | End: 2017-10-18

## 2017-10-18 RX ORDER — PROPOFOL 10 MG/ML
VIAL (ML) INTRAVENOUS
Status: DISCONTINUED | OUTPATIENT
Start: 2017-10-18 | End: 2017-10-18

## 2017-10-18 RX ADMIN — PROPOFOL 50 MG: 10 INJECTION, EMULSION INTRAVENOUS at 09:10

## 2017-10-18 RX ADMIN — PROPOFOL 50 MG: 10 INJECTION, EMULSION INTRAVENOUS at 10:10

## 2017-10-18 RX ADMIN — SODIUM CHLORIDE: 0.9 INJECTION, SOLUTION INTRAVENOUS at 08:10

## 2017-10-18 RX ADMIN — PROPOFOL 40 MG: 10 INJECTION, EMULSION INTRAVENOUS at 09:10

## 2017-10-18 RX ADMIN — PROPOFOL 60 MG: 10 INJECTION, EMULSION INTRAVENOUS at 09:10

## 2017-10-18 RX ADMIN — LIDOCAINE HYDROCHLORIDE 50 MG: 20 INJECTION, SOLUTION INTRAVENOUS at 09:10

## 2017-10-18 RX ADMIN — LEVALBUTEROL 1.25 MG: 1.25 SOLUTION, CONCENTRATE RESPIRATORY (INHALATION) at 11:10

## 2017-10-18 NOTE — TRANSFER OF CARE
"Anesthesia Transfer of Care Note    Patient: Darlin Tipton    Procedure(s) Performed: Procedure(s) (LRB):  COLONOSCOPY (N/A)    Patient location: PACU    Anesthesia Type: general    Transport from OR: Transported from OR on room air with adequate spontaneous ventilation    Post pain: adequate analgesia    Post assessment: no apparent anesthetic complications and tolerated procedure well    Post vital signs: stable    Level of consciousness: awake    Nausea/Vomiting: no nausea/vomiting    Complications: none    Transfer of care protocol was followed      Last vitals:   Visit Vitals  /72 (BP Location: Left arm)   Pulse 104   Temp 36.4 °C (97.5 °F) (Temporal)   Resp (!) 22   Ht 5' 2" (1.575 m)   Wt 61.2 kg (135 lb)   SpO2 (!) 94%   Breastfeeding? No   BMI 24.69 kg/m²     "

## 2017-10-18 NOTE — PLAN OF CARE
Discharge instructions given to pt and family who voice understanding.  Taking po fluids without difficulty.  Denies pain  Passing flatus without difficulty.

## 2017-10-18 NOTE — OR NURSING
Have you had a colonoscopy LESS THAN 3 years ago?   * If YES, answer these questions*: no, 12/29/2010    1. Did patient have a prior colonic polyp in a previous surveillance/diagnostic colonoscopy and is 18 years or older on date of encounter?  2. Documentation of < 3 year interval since the patients last colonoscopy due to medical reasons (eg., last colonoscopy incomplete, last colonoscopy had inadequate prep, piecemeal removal of adenomas, or last colonoscopy found > 10 adenomas) ?

## 2017-10-18 NOTE — ANESTHESIA PREPROCEDURE EVALUATION
10/18/2017  Darlin Tipton is a 77 y.o., female.    Anesthesia Evaluation    I have reviewed the Patient Summary Reports.    I have reviewed the Nursing Notes.   I have reviewed the Medications.     Review of Systems  Anesthesia Hx:  No problems with previous Anesthesia    Social:  Non-Smoker    Cardiovascular:   Hypertension CAD  Dysrhythmias  Angina    Pulmonary:   Asthma mild    Renal/:  Renal/ Normal     Hepatic/GI:   GERD, well controlled    Musculoskeletal:   Arthritis     Neurological:   Neuromuscular Disease,    Endocrine:   Diabetes, well controlled        Physical Exam  General:  Well nourished    Airway/Jaw/Neck:  Airway Findings: Mouth Opening: Normal Tongue: Normal  General Airway Assessment: Adult  Oropharynx Findings:  Mallampati: II  Jaw/Neck Findings:  Neck ROM: Normal ROM     Eyes/Ears/Nose:  Eyes/Ears/Nose Findings:    Dental:  Dental Findings:   Chest/Lungs:  Chest/Lungs Findings: Clear to auscultation, Normal Respiratory Rate     Heart/Vascular:  Heart Findings: Rate: Normal  Rhythm: Regular Rhythm        Mental Status:  Mental Status Findings:  Cooperative, Alert and Oriented         Anesthesia Plan  Type of Anesthesia, risks & benefits discussed:  Anesthesia Type:  general  Patient's Preference:   Intra-op Monitoring Plan:   Intra-op Monitoring Plan Comments:   Post Op Pain Control Plan: multimodal analgesia  Post Op Pain Control Plan Comments:   Induction:   IV  Beta Blocker:  Patient is on a Beta-Blocker and has received one dose within the past 24 hours (No further documentation required).       Informed Consent: Patient understands risks and agrees with Anesthesia plan.  Questions answered. Anesthesia consent signed with patient.  ASA Score: 3     Day of Surgery Review of History & Physical:  There are no significant changes.   H&P completed by Anesthesiologist.       Ready For  Surgery From Anesthesia Perspective.

## 2017-10-18 NOTE — DISCHARGE INSTRUCTIONS
"Discharge Instructions: After Your Surgery/Procedure  Youve just had surgery. During surgery you were given medicine called anesthesia to keep you relaxed and free of pain. After surgery you may have some pain or nausea. This is common. Here are some tips for feeling better and getting well after surgery.     Stay on schedule with your medication.   Going home  Your doctor or nurse will show you how to take care of yourself when you go home. He or she will also answer your questions. Have an adult family member or friend drive you home.      For your safety we recommend these precaution for the first 24 hours after your procedure:  · Do not drive or use heavy equipment.  · Do not make important decisions or sign legal papers.  · Do not drink alcohol.  · Have someone stay with you, if needed. He or she can watch for problems and help keep you safe.  · Your concentration, balance, coordination, and judgement may be impaired for many hours after anesthesia.  Use caution when ambulating or standing up.     · You may feel weak and "washed out" after anesthesia and surgery.      Subtle residual effects of general anesthesia or sedation with regional / local anesthesia can last more than 24 hours.  Rest for the remainder of the day or longer if your Doctor/Surgeon has advised you to do so.  Although you may feel normal within the first 24 hours, your reflexes and mental ability may be impaired without you realizing it.  You may feel dizzy, lightheaded or sleepy for 24 hours or longer.      Be sure to go to all follow-up visits with your doctor. And rest after your surgery for as long as your doctor tells you to.  Coping with pain  If you have pain after surgery, pain medicine will help you feel better. Take it as told, before pain becomes severe. Also, ask your doctor or pharmacist about other ways to control pain. This might be with heat, ice, or relaxation. And follow any other instructions your surgeon or nurse gives " you.  Tips for taking pain medicine  To get the best relief possible, remember these points:  · Pain medicines can upset your stomach. Taking them with a little food may help.  · Most pain relievers taken by mouth need at least 20 to 30 minutes to start to work.  · Taking medicine on a schedule can help you remember to take it. Try to time your medicine so that you can take it before starting an activity. This might be before you get dressed, go for a walk, or sit down for dinner.  · Constipation is a common side effect of pain medicines. Call your doctor before taking any medicines such as laxatives or stool softeners to help ease constipation. Also ask if you should skip any foods. Drinking lots of fluids and eating foods such as fruits and vegetables that are high in fiber can also help. Remember, do not take laxatives unless your surgeon has prescribed them.  · Drinking alcohol and taking pain medicine can cause dizziness and slow your breathing. It can even be deadly. Do not drink alcohol while taking pain medicine.  · Pain medicine can make you react more slowly to things. Do not drive or run machinery while taking pain medicine.  Your health care provider may tell you to take acetaminophen to help ease your pain. Ask him or her how much you are supposed to take each day. Acetaminophen or other pain relievers may interact with your prescription medicines or other over-the-counter (OTC) drugs. Some prescription medicines have acetaminophen and other ingredients. Using both prescription and OTC acetaminophen for pain can cause you to overdose. Read the labels on your OTC medicines with care. This will help you to clearly know the list of ingredients, how much to take, and any warnings. It may also help you not take too much acetaminophen. If you have questions or do not understand the information, ask your pharmacist or health care provider to explain it to you before you take the OTC medicine.  Managing  nausea  Some people have an upset stomach after surgery. This is often because of anesthesia, pain, or pain medicine, or the stress of surgery. These tips will help you handle nausea and eat healthy foods as you get better. If you were on a special food plan before surgery, ask your doctor if you should follow it while you get better. These tips may help:  · Do not push yourself to eat. Your body will tell you when to eat and how much.  · Start off with clear liquids and soup. They are easier to digest.  · Next try semi-solid foods, such as mashed potatoes, applesauce, and gelatin, as you feel ready.  · Slowly move to solid foods. Dont eat fatty, rich, or spicy foods at first.  · Do not force yourself to have 3 large meals a day. Instead eat smaller amounts more often.  · Take pain medicines with a small amount of solid food, such as crackers or toast, to avoid nausea.     Call your surgeon if  · You still have pain an hour after taking medicine. The medicine may not be strong enough.  · You feel too sleepy, dizzy, or groggy. The medicine may be too strong.  · You have side effects like nausea, vomiting, or skin changes, such as rash, itching, or hives.       If you have obstructive sleep apnea  You were given anesthesia medicine during surgery to keep you comfortable and free of pain. After surgery, you may have more apnea spells because of this medicine and other medicines you were given. The spells may last longer than usual.   At home:  · Keep using the continuous positive airway pressure (CPAP) device when you sleep. Unless your health care provider tells you not to, use it when you sleep, day or night. CPAP is a common device used to treat obstructive sleep apnea.  · Talk with your provider before taking any pain medicine, muscle relaxants, or sedatives. Your provider will tell you about the possible dangers of taking these medicines.  © 8364-0683 [employer]. [address] [place] [address]  [place] [address]. All rights reserved. This information is not intended as a substitute for professional medical care. Always follow your healthcare professional's instructions.          Diverticulosis    Diverticulosis means that small pouches have formed in the wall of your large intestine (colon). Most often, this problem causes no symptoms and is common as people age. But the pouches in the colon are at risk of becoming infected. When this happens, the condition is called diverticulitis. Although most people with diverticulosis never develop diverticulitis, it is still not uncommon. Rectal bleeding can also occur and in less common situations, a type of colon inflammation called colitis.  While most people do not have symptoms, some people with diverticulosis may have:  · Abdominal cramps and pain  · Bloating  · Constipation  · Change in bowel habits  Causes  The exact cause of diverticulosis (and diverticulitis) has not been proved, but a few things are associated with the condition:  · Low-fiber diet  · Constipation  · Lack of exercise  Your healthcare provider will talk with you about how to manage your condition. Diet changes may be all that are needed to help control diverticulosis and prevent progression to diverticulitis. If you develop diverticulitis, you will likely need other treatments.  Home care  You may be told to take fiber supplements daily. Fiber adds bulk to the stool so that it passes through the colon more easily. Stool softeners may be recommended. You may also be given medications for pain relief. Be sure to take all medications as directed.  In the past, people were told to avoid corn, nuts, and seeds. This is no longer necessary.  Follow these guidelines when caring for yourself at home:  · Eat unprocessed foods that are high in fiber. Whole grains, fruits, and vegetables are good choices.  · Drink 6 to 8 glasses of water every day unless your healthcare provider has you limit how much fluid you  should have.  · Watch for changes in your bowel movements. Tell your provider if you notice any changes.  · Begin an exercise program. Ask your provider how to get started. Generally, walking is the best.  · Get plenty of rest and sleep.  Follow-up care  Follow up with your healthcare provider, or as advised. Regular visits may be needed to check on your health. Sometimes special procedures such as colonoscopy, are needed after an episode of diverticulitis or blooding. Be sure to keep all your appointments.  If a stool sample was taken, or cultures were done, you should be told if they are positive, or if your treatment needs to be changed. You can call as directed for the results.  If X-rays were done, a radiologist will look at them. You will be told if there is a change in your treatment.  If antibiotics were prescribed, be sure to finish them all.  When to seek medical advice  Call your healthcare provider right away if any of these occur:  · Fever of 100.4°F (38°C) or higher, or as directed by your healthcare provider  · Severe cramps in the lower left side of the abdomen or pain that is getting worse  · Tenderness in the lower left side of the abdomen or worsening pain throughout the abdomen  · Diarrhea or constipation that doesn't get better within 24 hours  · Nausea and vomiting  · Bleeding from the rectum  Call 911  Call emergency services if any of the following occur:  · Trouble breathing  · Confusion  · Very drowsy or trouble awakening  · Fainting or loss of consciousness  · Rapid heart rate  · Chest pain  Date Last Reviewed: 12/30/2015  © 3676-7044 [employer]. [address] [place] [address], [place] [address]. All rights reserved. This information is not intended as a substitute for professional medical care. Always follow your healthcare professional's instructions.        Understanding Colon and Rectal Polyps    The colon (also called the large intestine) is a muscular tube that forms the last  part of the digestive tract. It absorbs water and stores food waste. The colon is about 4 to 6 feet long. The rectum is the last 6 inches of the colon. The colon and rectum have a smooth lining composed of millions of cells. Changes in these cells can lead to growths in the colon that can become cancerous and should be removed. Multiple tests are available to screen for colon cancer, but the colonoscopy is the most recommended test. During colonoscopy, these polyps can be removed. How often you need this test depends on many things including your condition, your family history, symptoms, and what the findings were at the previous colonoscopy.   When the colon lining changes  Changes that happen in the cells that line the colon or rectum can lead to growths called polyps. Over a period of years, polyps can turn cancerous. Removing polyps early may prevent cancer from ever forming.  Polyps  Polyps are fleshy clumps of tissue that form on the lining of the colon or rectum. Small polyps are usually benign (not cancerous). However, over time, cells in a polyp can change and become cancerous. Certain types of polyps known as adenomatous polyps are premalignant. The risk for invasive cancer increases with the size of the polyp and certain cell and gene features. This means that they can become cancerous if they're not removed. Hyperplastic polyps are benign. They can grow quite large and not turn cancerous.   Cancer  Almost all colorectal cancers start when polyp cells begin growing abnormally. As a cancerous tumor grows, it may involve more and more of the colon or rectum. In time, cancer can also grow beyond the colon or rectum and spread to nearby organs or to glands called lymph nodes. The cells can also travel to other parts of the body. This is known as metastasis. The earlier a cancerous tumor is removed, the better the chance of preventing its spread.    Date Last Reviewed: 8/1/2016  © 5768-3816 [employer]. [address] [place] [address], [place] [address]. All rights reserved. This information is not intended as a substitute for professional medical care. Always follow your healthcare professional's instructions.

## 2017-10-18 NOTE — ANESTHESIA POSTPROCEDURE EVALUATION
"Anesthesia Post Evaluation    Patient: Darlin Tipton    Procedure(s) Performed: Procedure(s) (LRB):  COLONOSCOPY (N/A)    Final Anesthesia Type: general  Patient location during evaluation: PACU  Patient participation: Yes- Able to Participate  Level of consciousness: awake and alert and oriented  Post-procedure vital signs: reviewed and stable  Pain management: adequate  Airway patency: patent  PONV status at discharge: No PONV  Anesthetic complications: no      Cardiovascular status: blood pressure returned to baseline and stable  Respiratory status: unassisted and spontaneous ventilation  Hydration status: euvolemic  Follow-up not needed.        Visit Vitals  /64   Pulse 81   Temp 36.4 °C (97.5 °F) (Temporal)   Resp 16   Ht 5' 2" (1.575 m)   Wt 61.2 kg (135 lb)   SpO2 96%   Breastfeeding? No   BMI 24.69 kg/m²       Pain/Reagan Score: Pain Assessment Performed: Yes (10/18/2017 11:26 AM)  Presence of Pain: denies (10/18/2017 11:26 AM)  Reagan Score: 10 (10/18/2017 11:26 AM)      "

## 2017-10-19 VITALS
BODY MASS INDEX: 24.84 KG/M2 | SYSTOLIC BLOOD PRESSURE: 138 MMHG | WEIGHT: 135 LBS | HEART RATE: 81 BPM | OXYGEN SATURATION: 96 % | TEMPERATURE: 98 F | RESPIRATION RATE: 16 BRPM | DIASTOLIC BLOOD PRESSURE: 64 MMHG | HEIGHT: 62 IN

## 2017-12-18 ENCOUNTER — TELEPHONE (OUTPATIENT)
Dept: FAMILY MEDICINE | Facility: CLINIC | Age: 77
End: 2017-12-18

## 2017-12-18 NOTE — TELEPHONE ENCOUNTER
----- Message from Julienne Grarido sent at 12/18/2017  1:09 PM CST -----  Contact: Darlin  Patient is asking to be seen today for productive cough and wheezing. Please call [phone]. Thanks!

## 2017-12-18 NOTE — TELEPHONE ENCOUNTER
C/o cough with clear/beige sputum, wheezing.  Appt made with patient to see Mariella Sy PA-C tomorrow, 12/19/17 at 10:40 am.

## 2017-12-19 ENCOUNTER — DOCUMENTATION ONLY (OUTPATIENT)
Dept: FAMILY MEDICINE | Facility: CLINIC | Age: 77
End: 2017-12-19

## 2017-12-19 ENCOUNTER — OFFICE VISIT (OUTPATIENT)
Dept: FAMILY MEDICINE | Facility: CLINIC | Age: 77
End: 2017-12-19
Payer: MEDICARE

## 2017-12-19 ENCOUNTER — TELEPHONE (OUTPATIENT)
Dept: INTERNAL MEDICINE | Facility: CLINIC | Age: 77
End: 2017-12-19

## 2017-12-19 VITALS
HEART RATE: 89 BPM | HEIGHT: 62 IN | SYSTOLIC BLOOD PRESSURE: 134 MMHG | BODY MASS INDEX: 24.91 KG/M2 | WEIGHT: 135.38 LBS | DIASTOLIC BLOOD PRESSURE: 76 MMHG | TEMPERATURE: 98 F

## 2017-12-19 DIAGNOSIS — R05.8 COUGH PRODUCTIVE OF PURULENT SPUTUM: ICD-10-CM

## 2017-12-19 DIAGNOSIS — J45.901 EXACERBATION OF ASTHMA, UNSPECIFIED ASTHMA SEVERITY, UNSPECIFIED WHETHER PERSISTENT: Primary | ICD-10-CM

## 2017-12-19 DIAGNOSIS — E11.49 CONTROLLED TYPE 2 DIABETES MELLITUS WITH OTHER NEUROLOGIC COMPLICATION, WITHOUT LONG-TERM CURRENT USE OF INSULIN: ICD-10-CM

## 2017-12-19 PROCEDURE — 94640 AIRWAY INHALATION TREATMENT: CPT | Mod: PBBFAC,PO

## 2017-12-19 PROCEDURE — 99213 OFFICE O/P EST LOW 20 MIN: CPT | Mod: S$PBB,,, | Performed by: PHYSICIAN ASSISTANT

## 2017-12-19 PROCEDURE — 99213 OFFICE O/P EST LOW 20 MIN: CPT | Mod: PBBFAC,PO | Performed by: PHYSICIAN ASSISTANT

## 2017-12-19 PROCEDURE — 99999 PR PBB SHADOW E&M-EST. PATIENT-LVL III: CPT | Mod: PBBFAC,,, | Performed by: PHYSICIAN ASSISTANT

## 2017-12-19 RX ORDER — MOXIFLOXACIN HYDROCHLORIDE 400 MG/1
400 TABLET ORAL DAILY
Qty: 7 TABLET | Refills: 0 | Status: SHIPPED | OUTPATIENT
Start: 2017-12-19 | End: 2018-05-03

## 2017-12-19 RX ORDER — ALBUTEROL SULFATE 1.25 MG/3ML
1.25 SOLUTION RESPIRATORY (INHALATION)
Status: COMPLETED | OUTPATIENT
Start: 2017-12-19 | End: 2017-12-19

## 2017-12-19 RX ORDER — ALBUTEROL SULFATE 1.25 MG/3ML
1.25 SOLUTION RESPIRATORY (INHALATION) EVERY 6 HOURS PRN
Qty: 180 ML | Refills: 0 | Status: SHIPPED | OUTPATIENT
Start: 2017-12-19 | End: 2018-01-30 | Stop reason: SDUPTHER

## 2017-12-19 RX ADMIN — ALBUTEROL SULFATE 1.25 MG: 1.5 SOLUTION RESPIRATORY (INHALATION) at 11:12

## 2017-12-19 NOTE — PROGRESS NOTES
Pre-Visit Chart Review  For Appointment Scheduled on 12/19/17    Health Maintenance Due   Topic Date Due    TETANUS VACCINE  08[birth year]02/1958    Zoster Vaccine  08/02/2000    Hemoglobin A1c  03/06/2017    Foot Exam  11/21/2017

## 2017-12-19 NOTE — PROGRESS NOTES
2 patient identifiers used ( name &  ).  Administered Albuterol Nebulizer .  Patient tolerated well.

## 2017-12-19 NOTE — TELEPHONE ENCOUNTER
----- Message from Radha Ruiz sent at 12/14/2017 12:36 PM CST -----  Contact: SELF  WOULD LIKE TEST RESULTS FROM LABS, EGD, AND COLONOSCOPY.

## 2017-12-19 NOTE — PROGRESS NOTES
Subjective:       Patient ID: Darlin Tipton is a 77 y.o. female.    Chief Complaint: Cough and Chest Congestion    Cough   This is a new problem. The current episode started 1 to 4 weeks ago. The problem has been unchanged. The cough is productive of purulent sputum. Associated symptoms include a sore throat (scratchy) and wheezing. Pertinent negatives include no ear congestion, ear pain, fever, heartburn, hemoptysis, myalgias, postnasal drip, rhinorrhea, shortness of breath, sweats or weight loss. Treatments tried: mucinex. The treatment provided mild relief. Her past medical history is significant for asthma.     Review of Systems   Constitutional: Negative for fever and weight loss.   HENT: Positive for sore throat (scratchy). Negative for ear pain, postnasal drip and rhinorrhea.    Respiratory: Positive for cough and wheezing. Negative for hemoptysis and shortness of breath.    Gastrointestinal: Negative for heartburn.   Musculoskeletal: Negative for myalgias.       Objective:      Physical Exam   Constitutional: Vital signs are normal. She appears well-developed and well-nourished. No distress.   Cardiovascular: Normal rate, regular rhythm, S1 normal and S2 normal.  Exam reveals no gallop.    No murmur heard.   No diastolic murmur is present   Pulses:       Radial pulses are 2+ on the right side, and 2+ on the left side.   Pulmonary/Chest: Effort normal. No respiratory distress. She has decreased breath sounds. She has no wheezes. She has no rhonchi.   Decreased inspiratory and expiratory lung capacity; mild wheezing upper airways   Skin: Skin is warm and dry. She is not diaphoretic.   Psychiatric: She has a normal mood and affect. Her speech is normal and behavior is normal. Judgment and thought content normal. Cognition and memory are normal.       Assessment:       1. Exacerbation of asthma, unspecified asthma severity, unspecified whether persistent    2. Cough productive of purulent sputum    3.  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    Diagnoses and all orders for this visit:    Exacerbation of asthma, unspecified asthma severity, unspecified whether persistent  Patient given albuterol treatment in clinic today with good improvement & good mucous production; we will try and get her set up with a nebulizer machine  -     albuterol nebulizer solution 1.25 mg; Take 3 mLs (1.25 mg total) by nebulization one time.  -     Cancel: NEBULIZER FOR HOME USE  -     albuterol (ACCUNEB) 1.25 mg/3 mL Nebu; Take 3 mLs (1.25 mg total) by nebulization every 6 (six) hours as needed. Rescue  -     moxifloxacin (AVELOX) 400 mg tablet; Take 1 tablet (400 mg total) by mouth once daily.  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    Goals: Diabetes: Maintain Hemoglobin A1C below 7    Did patient meet goals/outcomes: Yes    The following self management tools provided: declined    Patient Instructions (the written plan) was given to the patient/family.     Time spent with patient: 20 minutes    Barriers to medications present (no )    Adverse reactions to current medications (no)    Over the counter medications reviewed (Yes)

## 2017-12-22 ENCOUNTER — OFFICE VISIT (OUTPATIENT)
Dept: FAMILY MEDICINE | Facility: CLINIC | Age: 77
End: 2017-12-22
Payer: MEDICARE

## 2017-12-22 ENCOUNTER — DOCUMENTATION ONLY (OUTPATIENT)
Dept: FAMILY MEDICINE | Facility: CLINIC | Age: 77
End: 2017-12-22

## 2017-12-22 ENCOUNTER — HOSPITAL ENCOUNTER (OUTPATIENT)
Dept: RADIOLOGY | Facility: CLINIC | Age: 77
Discharge: HOME | End: 2017-12-22
Attending: PHYSICIAN ASSISTANT
Payer: MEDICARE

## 2017-12-22 ENCOUNTER — TELEPHONE (OUTPATIENT)
Dept: FAMILY MEDICINE | Facility: CLINIC | Age: 77
End: 2017-12-22

## 2017-12-22 VITALS
DIASTOLIC BLOOD PRESSURE: 74 MMHG | HEART RATE: 92 BPM | SYSTOLIC BLOOD PRESSURE: 132 MMHG | HEIGHT: 62 IN | BODY MASS INDEX: 25.23 KG/M2 | TEMPERATURE: 98 F | WEIGHT: 137.13 LBS | OXYGEN SATURATION: 95 %

## 2017-12-22 DIAGNOSIS — J45.909 ASTHMA, UNSPECIFIED ASTHMA SEVERITY, UNSPECIFIED WHETHER COMPLICATED, UNSPECIFIED WHETHER PERSISTENT: ICD-10-CM

## 2017-12-22 DIAGNOSIS — R06.02 SHORTNESS OF BREATH: Primary | ICD-10-CM

## 2017-12-22 DIAGNOSIS — J45.909 ASTHMA, UNSPECIFIED ASTHMA SEVERITY, UNSPECIFIED WHETHER COMPLICATED, UNSPECIFIED WHETHER PERSISTENT: Primary | ICD-10-CM

## 2017-12-22 DIAGNOSIS — E11.8 CONTROLLED TYPE 2 DIABETES MELLITUS WITH COMPLICATION, WITHOUT LONG-TERM CURRENT USE OF INSULIN: ICD-10-CM

## 2017-12-22 DIAGNOSIS — J41.8 MIXED SIMPLE AND MUCOPURULENT CHRONIC BRONCHITIS: ICD-10-CM

## 2017-12-22 DIAGNOSIS — J45.909 PERSISTENT ASTHMA WITHOUT COMPLICATION, UNSPECIFIED ASTHMA SEVERITY: ICD-10-CM

## 2017-12-22 PROCEDURE — 71020 XR CHEST PA AND LATERAL: CPT | Mod: TC,PO

## 2017-12-22 PROCEDURE — 71020 XR CHEST PA AND LATERAL: CPT | Mod: 26,,, | Performed by: RADIOLOGY

## 2017-12-22 PROCEDURE — 99999 PR PBB SHADOW E&M-EST. PATIENT-LVL V: CPT | Mod: PBBFAC,,, | Performed by: PHYSICIAN ASSISTANT

## 2017-12-22 PROCEDURE — 99214 OFFICE O/P EST MOD 30 MIN: CPT | Mod: S$PBB,,, | Performed by: PHYSICIAN ASSISTANT

## 2017-12-22 PROCEDURE — 99215 OFFICE O/P EST HI 40 MIN: CPT | Mod: PBBFAC,PO | Performed by: PHYSICIAN ASSISTANT

## 2017-12-22 NOTE — TELEPHONE ENCOUNTER
CXR showed increased pulmonary markings suggesting a congestive heart failure exacerbation. I do not have her labs back yet, but I will look at these tomorrow.     Who is her cardiologist? Does she take diuretics?    I would have her go ahead and start her prednisone and we will give her a call back tomorrow when I get the labs back. If worsening over night, needs to go to the ER.

## 2017-12-22 NOTE — TELEPHONE ENCOUNTER
Patient has been notified of results.  Patient's cardiologist is Dr Coleman and no she is not on any diuretics

## 2017-12-22 NOTE — PROGRESS NOTES
Subjective:       Patient ID: Darlin Tipton is a 77 y.o. female.    Chief Complaint: Follow-up    Asthma   She complains of cough, difficulty breathing, hoarse voice, shortness of breath, sputum production and wheezing. There is no chest tightness, frequent throat clearing or hemoptysis. This is a new problem. The current episode started 1 to 4 weeks ago. The problem has been gradually worsening. The cough is croupy, barking and productive of purulent sputum. Associated symptoms include dyspnea on exertion, headaches, postnasal drip and a sore throat. Pertinent negatives include no appetite change, chest pain, ear congestion, fever, heartburn, malaise/fatigue, myalgias, orthopnea, rhinorrhea, trouble swallowing or weight loss. Her symptoms are aggravated by lying down. Her symptoms are alleviated by nothing. She reports no improvement on treatment. Ineffective treatments: on avelox, albuterol neb tx. Her past medical history is significant for asthma.     Review of Systems   Constitutional: Negative for activity change, appetite change, chills, diaphoresis, fatigue, fever, malaise/fatigue and weight loss.   HENT: Positive for congestion, hoarse voice, postnasal drip, sinus pain, sinus pressure and sore throat. Negative for rhinorrhea and trouble swallowing.    Respiratory: Positive for cough, sputum production, shortness of breath and wheezing. Negative for hemoptysis and stridor.    Cardiovascular: Positive for dyspnea on exertion. Negative for chest pain, palpitations and leg swelling.   Gastrointestinal: Negative for diarrhea, heartburn, nausea and vomiting.   Musculoskeletal: Negative for myalgias.   Neurological: Positive for headaches.       Objective:      Physical Exam   Constitutional: Vital signs are normal. She appears well-developed and well-nourished. No distress.   Cardiovascular: Normal rate, regular rhythm, S1 normal, S2 normal and normal heart sounds.  Exam reveals no gallop.    No murmur  heard.  Pulses:       Radial pulses are 2+ on the right side, and 2+ on the left side.   Pulmonary/Chest: Effort normal and breath sounds normal. No respiratory distress. She has no wheezes. She has no rhonchi.   Skin: Skin is warm and dry. She is not diaphoretic.   Psychiatric: She has a normal mood and affect. Her speech is normal and behavior is normal. Judgment and thought content normal. Cognition and memory are normal.       Assessment:       1. Asthma, unspecified asthma severity, unspecified whether complicated, unspecified whether persistent    2. Controlled type 2 diabetes mellitus with complication, without long-term current use of insulin        Plan:   Darlin was seen today for follow-up.    Diagnoses and all orders for this visit:    Asthma, unspecified asthma severity, unspecified whether complicated, unspecified whether persistent  On avelox  Continue albuterol neb treatments every 4-6 hours prn  -     CBC auto differential; Future  -     Basic metabolic panel; Future  -     X-Ray Chest PA And Lateral; Future  Will f/u with xray and discuss further recommendations    Controlled type 2 diabetes mellitus with complication, without long-term current use of insulin  -     Hemoglobin A1c; Future      Patient readiness: acceptance and barriers:none    During the course of the visit the patient was educated and counseled about the following:     Diabetes:  Discussed general issues about diabetes pathophysiology and management.    Goals: Diabetes: Maintain Hemoglobin A1C below 7    Did patient meet goals/outcomes: No    The following self management tools provided: declined    Patient Instructions (the written plan) was given to the patient/family.     Time spent with patient: 15 minutes    Barriers to medications present (no )    Adverse reactions to current medications (no)    Over the counter medications reviewed (Yes)

## 2017-12-22 NOTE — PROGRESS NOTES
Pre-Visit Chart Review  For Appointment Scheduled on (date)    Health Maintenance Due   Topic Date Due    TETANUS VACCINE  08[birth year]02/1958    Zoster Vaccine  08/02/2000    Hemoglobin A1c  03/06/2017    Foot Exam  11/21/2017

## 2017-12-23 RX ORDER — FUROSEMIDE 20 MG/1
20 TABLET ORAL 2 TIMES DAILY
Qty: 30 TABLET | Refills: 0 | Status: SHIPPED | OUTPATIENT
Start: 2017-12-23 | End: 2018-01-30

## 2017-12-23 RX ORDER — PREDNISONE 20 MG/1
TABLET ORAL
Qty: 5 TABLET | Refills: 0 | Status: SHIPPED | OUTPATIENT
Start: 2017-12-23 | End: 2018-05-03 | Stop reason: SDUPTHER

## 2018-01-03 ENCOUNTER — TELEPHONE (OUTPATIENT)
Dept: FAMILY MEDICINE | Facility: CLINIC | Age: 78
End: 2018-01-03

## 2018-01-03 NOTE — TELEPHONE ENCOUNTER
Patient states when she started taking Furosamide she has develop a rash with bumps and itching.  started 12/23/17.  Please advise

## 2018-01-03 NOTE — TELEPHONE ENCOUNTER
When did this rash start? We do not have appointments today, but she needs to stop the medication. She can start OTC benadry. She needs to be scheduled soon, but if symptoms worsen or difficulty breathing she needs to go to the ER.    How is her breathing doing?

## 2018-01-03 NOTE — TELEPHONE ENCOUNTER
----- Message from Julienne Garrido sent at 1/3/2018  9:25 AM CST -----  Contact: Darlin  Patient is asking to be seen today for allergic reaction to Rx Furosemide (allergic to sulphur) ; itchy with small bumps. Please call [phone]. Thanks!

## 2018-01-03 NOTE — TELEPHONE ENCOUNTER
Patient was advised to stop the lasix and take Benadryl.  She states she went to urgent care.  appt scheduled for 1/8/18.  Verbalized understanding to go to the ED is symptoms worsens

## 2018-01-22 RX ORDER — TRIAZOLAM 0.12 MG/1
0.12 TABLET ORAL NIGHTLY PRN
Qty: 90 TABLET | Refills: 1 | Status: SHIPPED | OUTPATIENT
Start: 2018-01-22 | End: 2018-04-02 | Stop reason: SDUPTHER

## 2018-01-26 ENCOUNTER — TELEPHONE (OUTPATIENT)
Dept: PULMONOLOGY | Facility: CLINIC | Age: 78
End: 2018-01-26

## 2018-01-29 ENCOUNTER — TELEPHONE (OUTPATIENT)
Dept: PULMONOLOGY | Facility: CLINIC | Age: 78
End: 2018-01-29

## 2018-01-29 NOTE — TELEPHONE ENCOUNTER
Advised pt she needs to discuss her billing issues with the billing department. Gave her number to dept    ----- Message from Capri Qureshi sent at 1/29/2018 11:03 AM CST -----  Darlin, patient [phone], Calling about tomorrows appointment and her insurance, [employer]. Please advise. Thanks.

## 2018-01-30 ENCOUNTER — OFFICE VISIT (OUTPATIENT)
Dept: PULMONOLOGY | Facility: CLINIC | Age: 78
End: 2018-01-30
Payer: MEDICARE

## 2018-01-30 VITALS
HEIGHT: 62 IN | SYSTOLIC BLOOD PRESSURE: 137 MMHG | BODY MASS INDEX: 24.06 KG/M2 | OXYGEN SATURATION: 96 % | DIASTOLIC BLOOD PRESSURE: 75 MMHG | WEIGHT: 130.75 LBS | HEART RATE: 90 BPM

## 2018-01-30 DIAGNOSIS — J45.30 MILD PERSISTENT ASTHMA WITHOUT COMPLICATION: ICD-10-CM

## 2018-01-30 DIAGNOSIS — J41.8 MIXED SIMPLE AND MUCOPURULENT CHRONIC BRONCHITIS: ICD-10-CM

## 2018-01-30 DIAGNOSIS — R60.9 EDEMA, UNSPECIFIED TYPE: Primary | ICD-10-CM

## 2018-01-30 DIAGNOSIS — J45.901 EXACERBATION OF ASTHMA, UNSPECIFIED ASTHMA SEVERITY, UNSPECIFIED WHETHER PERSISTENT: ICD-10-CM

## 2018-01-30 PROCEDURE — 1126F AMNT PAIN NOTED NONE PRSNT: CPT | Mod: S$GLB,,, | Performed by: INTERNAL MEDICINE

## 2018-01-30 PROCEDURE — 99999 PR PBB SHADOW E&M-EST. PATIENT-LVL V: CPT | Mod: PBBFAC,,, | Performed by: INTERNAL MEDICINE

## 2018-01-30 PROCEDURE — 99214 OFFICE O/P EST MOD 30 MIN: CPT | Mod: S$GLB,,, | Performed by: INTERNAL MEDICINE

## 2018-01-30 PROCEDURE — 3008F BODY MASS INDEX DOCD: CPT | Mod: S$GLB,,, | Performed by: INTERNAL MEDICINE

## 2018-01-30 PROCEDURE — 1159F MED LIST DOCD IN RCRD: CPT | Mod: S$GLB,,, | Performed by: INTERNAL MEDICINE

## 2018-01-30 RX ORDER — TORSEMIDE 5 MG/1
10 TABLET ORAL DAILY
Qty: 30 TABLET | Refills: 1 | Status: SHIPPED | OUTPATIENT
Start: 2018-01-30 | End: 2018-05-03

## 2018-01-30 RX ORDER — SPIRONOLACTONE 25 MG/1
TABLET ORAL
Status: ON HOLD | COMMUNITY
Start: 2018-01-04 | End: 2020-01-27

## 2018-01-30 RX ORDER — PREDNISONE 20 MG/1
TABLET ORAL
Qty: 12 TABLET | Refills: 0 | Status: SHIPPED | OUTPATIENT
Start: 2018-01-30 | End: 2018-05-14 | Stop reason: SDUPTHER

## 2018-01-30 RX ORDER — ALBUTEROL SULFATE 90 UG/1
AEROSOL, METERED RESPIRATORY (INHALATION)
Qty: 3 INHALER | Refills: 3 | Status: SHIPPED | OUTPATIENT
Start: 2018-01-30 | End: 2018-07-26 | Stop reason: SDUPTHER

## 2018-01-30 RX ORDER — FUROSEMIDE 20 MG/1
20 TABLET ORAL DAILY PRN
Qty: 30 TABLET | Refills: 1 | Status: SHIPPED | OUTPATIENT
Start: 2018-01-30 | End: 2018-01-30

## 2018-01-30 RX ORDER — ALBUTEROL SULFATE 1.25 MG/3ML
1.25 SOLUTION RESPIRATORY (INHALATION) 4 TIMES DAILY PRN
Qty: 360 VIAL | Refills: 3 | Status: SHIPPED | OUTPATIENT
Start: 2018-01-30 | End: 2018-07-26 | Stop reason: SDUPTHER

## 2018-01-30 RX ORDER — OSELTAMIVIR PHOSPHATE 75 MG/1
75 CAPSULE ORAL 2 TIMES DAILY
Qty: 10 CAPSULE | Refills: 0 | Status: SHIPPED | OUTPATIENT
Start: 2018-01-30 | End: 2018-02-04

## 2018-01-30 RX ORDER — AMOXICILLIN 875 MG/1
875 TABLET, FILM COATED ORAL 2 TIMES DAILY
Qty: 20 TABLET | Refills: 3 | Status: SHIPPED | OUTPATIENT
Start: 2018-01-30 | End: 2018-02-21 | Stop reason: ALTCHOICE

## 2018-01-30 NOTE — PROGRESS NOTES
"1/30/2018    Darlin Tipton  New Patient Consult    Chief Complaint   Patient presents with    Cough    Shortness of Breath    Asthma     Jan30, 2018 - had increase cough with occ sob, better with nebulizer rx. Had cxr dec NSR with early chf changes.  Had another cxr 1/11/18 [facility] with lg cor, but no chf- films viewed.  Echo 1/17 tihe ef 30-35%, pt had lhc and vessel blocked but said to be small.. Sinuses ok,  Sl leg edema.   No prednisone lately.  Took prednisone/abx past - poor response and ppt insomnia..        May 7, 2017- no prednisone since last visit, sinuses better, leg problems,  pft done and nl.      Feb 9, cleared for a few days and relapsed ppt another course prednisone last 3 days,  Uses breo and flonase dailly and albuterol helped.  Sinuses better but still with left sinus pain. z ayala used once.      8/14 pneumo titers are weak, had repeat vaccine last month.      Jan 12, 2017HPI: never smoker, no h/o lung problems, moved to [facility] in [facility] with developed allergies-- had cough and wheezes, supine worse, seasonal worse, not nocturnal, only used spiriva- helps a little, no recall albuterol.  Steroids helps some.  flonase helps , may have used singulair in past with  No help.      Smell ok, stuffy +, sinus headaches over eyes and left maxilary.      Had cabg in sept .     Cough violent with no problems.  Uses cough supressant with good results.  Tessalon no help.        The chief compliant  problem is new to me",   PFSH:  Past Medical History:   Diagnosis Date    Allergy     Dust mites, Grasses, Trees    Arthritis     Asthma     Blood transfusion     CAD (coronary artery disease)     Cataract     CHRONIC BRONCHITIS     Diabetes mellitus     Diabetes mellitus type II     GERD (gastroesophageal reflux disease)     Hyperlipidemia     Hypertension     Irregular heart beat     Spinal stenosis     Thyroid disease     Hypothyroidism         Past Surgical History:   Procedure Laterality Date    " "APPENDECTOMY  1968    BLADDER SUSPENSION  1989    CARDIAC SURGERY      CABG    CATARACT EXTRACTION  9/2007 (L) and 10/2207 (R)    COLONOSCOPY N/A 10/18/2017    Procedure: COLONOSCOPY;  Surgeon: Esme Quiñonez MD;  Location: [facility] ENDO;  Service: Endoscopy;  Laterality: N/A;    CORONARY ARTERY BYPASS GRAFT  4/26/2004    x5    ESOPHAGEAL DILATION      SPINE SURGERY  3/2000    Tumor    WRIST SURGERY  1993     Social History   Substance Use Topics    Smoking status: Never Smoker    Smokeless tobacco: Never Used    Alcohol use Yes      Comment: Rare     Family History   Problem Relation Age of Onset    Allergic rhinitis Neg Hx     Allergies Neg Hx     Angioedema Neg Hx     Asthma Neg Hx     Eczema Neg Hx     Immunodeficiency Neg Hx     Urticaria Neg Hx     Rhinitis Neg Hx     Atopy Neg Hx      Review of patient's allergies indicates:   Allergen Reactions    Cefaclor      Other reaction(s): rash    Disalcid  [salsalate]      Other reaction(s): rash    Fenofibrate micronized      Other reaction(s): rash    Nitrofurantoin macrocrystalline      Other reaction(s): rash    Ofloxacin      Other reaction(s): rash    Penicillins      Other reaction(s): rash    Phenylfenesin la  [phenylpropanolamine-gg]      Other reaction(s): rash    Sulfa (sulfonamide antibiotics)      Other reaction(s): rash       Performance Status:The patient's activity level is no limits with regular activity.      Review of Systems:  a review of eleven systems covering constitutional, Eye, HEENT, Psych, Respiratory, Cardiac, GI, , Musculoskeletal, Endocrine, Dermatologic was negative except for pertinent findings as listed ABOVE and below:  Aspirates rare and sticking mid chest and severe gerd in past- had endo with dr quiñonez with stricture dilated lasting about a yr.      Exam:Comprehensive exam done.   /75 (BP Location: Right arm, Patient Position: Sitting)   Pulse 90   Ht 5' 2" (1.575 m)   Wt 59.3 kg (130 lb " 11.7 oz)   SpO2 96%   BMI 23.91 kg/m²   Exam included Vitals as listed, and patient's appearance and affect and alertness and mood, oral exam for yeast and hygiene and pharynx lesions and Mallapatti (M) score, neck with inspection for jvd and masses and thyroid abnormalities and lymph nodes (supraclavicular and infraclavicular nodes and axillary also examined and noted if abn), chest exam included symmetry and effort and fremitus and percussion and auscultation, cardiac exam included rhythm and gallops and murmur and rubs and jvd and edema, abdominal exam for mass and hepatosplenomegaly and tenderness and hernias and bowel sounds, Musculoskeletal exam with muscle tone and posture and mobility/gait and  strength, and skin for rashes and cyanosis and pallor and turgor, extremity for clubbing.  Findings were normal except for pertinent findings listed below:   M2 and clear chest, no cough, trace edema post farrukh harvest r> l    Radiographs (ct chest and cxr) reviewed: view by direct vision  Post op cabg and ? Copd,  Had cxr dec NSR with early chf changes.  Had another cxr 1/11/18 [facility] with lg cor, but no chf- films viewed.    Labs reviewed         PFT wnl 5/5/2017    Plan:  Clinical impression is resonably certain and repeated evaluation prn +/- follow up will be needed as below.    Darlin was seen today for cough, shortness of breath and asthma.    Diagnoses and all orders for this visit:    Edema, unspecified type  -     Discontinue: furosemide (LASIX) 20 MG tablet; Take 1 tablet (20 mg total) by mouth daily as needed. For edema,  -     torsemide (DEMADEX) 5 MG Tab; Take 2 tablets (10 mg total) by mouth once daily.    Exacerbation of asthma, unspecified asthma severity, unspecified whether persistent    Mixed simple and mucopurulent chronic bronchitis  -     albuterol (ACCUNEB) 1.25 mg/3 mL Nebu; Take 3 mLs (1.25 mg total) by nebulization 4 (four) times daily as needed. Rescue  -     albuterol 90 mcg/actuation  inhaler; 2 puffs every 4 hours as needed for cough, wheeze, or shortness of breath  -     fluticasone-umeclidin-vilanter (TRELEGY ELLIPTA) 100-62.5-25 mcg DsDv; Inhale 1 puff into the lungs once daily.  -     amoxicillin (AMOXIL) 875 MG tablet; Take 1 tablet (875 mg total) by mouth 2 (two) times daily.  -     predniSONE (DELTASONE) 20 MG tablet; One daily for 3 days and repeat for flare of lung symptoms as intructed    Mild persistent asthma without complication  -     oseltamivir (TAMIFLU) 75 MG capsule; Take 1 capsule (75 mg total) by mouth 2 (two) times daily.  -     albuterol (ACCUNEB) 1.25 mg/3 mL Nebu; Take 3 mLs (1.25 mg total) by nebulization 4 (four) times daily as needed. Rescue  -     albuterol 90 mcg/actuation inhaler; 2 puffs every 4 hours as needed for cough, wheeze, or shortness of breath  -     fluticasone-umeclidin-vilanter (TRELEGY ELLIPTA) 100-62.5-25 mcg DsDv; Inhale 1 puff into the lungs once daily.  -     amoxicillin (AMOXIL) 875 MG tablet; Take 1 tablet (875 mg total) by mouth 2 (two) times daily.  -     predniSONE (DELTASONE) 20 MG tablet; One daily for 3 days and repeat for flare of lung symptoms as intructed        Follow-up in about 6 months (around 7/30/2018), or if symptoms worsen or fail to improve.    Discussed with patient above for education the following:    Chest xray in December suggest role of heart failure with lung problems and heart test a bit weak.  Heart could be contributing to lung symptoms.      Use amoxil for cough/yellow mucous.    May use nebulizer as needed for cough or wheezes or short breath.    Use prednisone daily x 3 for bad cough/wheezes    Use trelegy in place of breo and spiriva    If having more fluid in legs- may take torsemide 5-10 mg a day - 5 mg pill - please no more than 10 mg every 3 days or 20mg weekly.  Try one, and go to 2 if not passing lots of urine.      Use tamiflu if flu - fever, headache,cough, muscle ache, sore throat- call

## 2018-01-30 NOTE — PATIENT INSTRUCTIONS
Chest xray in December suggest role of heart failure with lung problems and heart test a bit weak.  Heart could be contributing to lung symptoms.      Use amoxil for cough/yellow mucous.    May use nebulizer as needed for cough or wheezes or short breath.    Use prednisone daily x 3 for bad cough/wheezes    Use trelegy in place of breo and spiriva    If having more fluid in legs- may take torsemide 5-10 mg a day - 5 mg pill - please no more than 10 mg every 3 days or 20mg weekly.  Try one, and go to 2 if not passing lots of urine.      Use tamiflu if flu - fever, headache,cough, muscle ache, sore throat- call

## 2018-02-07 ENCOUNTER — TELEPHONE (OUTPATIENT)
Dept: PULMONOLOGY | Facility: CLINIC | Age: 78
End: 2018-02-07

## 2018-02-07 NOTE — TELEPHONE ENCOUNTER
Advised that the information that they wanted was faxed to them on 2/6/2018.    ----- Message from Gayle Davey sent at 2/7/2018 11:08 AM CST -----  Contact: Lu diez/ Shannon [place]   Lu diez/ Shannon [facility] in regards to a request being sent  for medical records. No response. Please advise. Call to pod. No answer.   Call back    Fax   Thanks!

## 2018-02-21 ENCOUNTER — OFFICE VISIT (OUTPATIENT)
Dept: PODIATRY | Facility: CLINIC | Age: 78
End: 2018-02-21
Payer: MEDICARE

## 2018-02-21 VITALS — BODY MASS INDEX: 23.91 KG/M2 | WEIGHT: 130.75 LBS

## 2018-02-21 DIAGNOSIS — M79.674 TOE PAIN, RIGHT: Primary | ICD-10-CM

## 2018-02-21 DIAGNOSIS — L60.0 INGROWN NAIL: ICD-10-CM

## 2018-02-21 DIAGNOSIS — L03.031 PARONYCHIA, TOE, RIGHT: ICD-10-CM

## 2018-02-21 PROCEDURE — 1126F AMNT PAIN NOTED NONE PRSNT: CPT | Mod: S$GLB,,, | Performed by: PODIATRIST

## 2018-02-21 PROCEDURE — 99214 OFFICE O/P EST MOD 30 MIN: CPT | Mod: S$GLB,,, | Performed by: PODIATRIST

## 2018-02-21 PROCEDURE — 1159F MED LIST DOCD IN RCRD: CPT | Mod: S$GLB,,, | Performed by: PODIATRIST

## 2018-02-21 PROCEDURE — 3008F BODY MASS INDEX DOCD: CPT | Mod: S$GLB,,, | Performed by: PODIATRIST

## 2018-02-21 PROCEDURE — 99999 PR PBB SHADOW E&M-EST. PATIENT-LVL III: CPT | Mod: PBBFAC,,, | Performed by: PODIATRIST

## 2018-02-21 RX ORDER — TOBRAMYCIN 3 MG/ML
SOLUTION/ DROPS OPHTHALMIC
Qty: 5 ML | Refills: 3 | Status: SHIPPED | OUTPATIENT
Start: 2018-02-21 | End: 2018-05-03

## 2018-02-21 NOTE — PROGRESS NOTES
Subjective:      Patient ID: Darlin Tipton is a 77 y.o. female.    Chief Complaint: Ingrown Toenail (left great toe )    Sharp deep pain from ingrown nail right big toe.  Gradual onset, worsening over past several weeks, aggravated by increased weight bearing, shoe gear, pressure.  No previous medical treatment.  OTC pain med not helping.   denies trauma, surgery right big toe.    Diabetes, increased risk amputation needing evaluation/management/optomization of foot care.     Review of Systems   Constitution: Negative for chills, diaphoresis, fever, malaise/fatigue and night sweats.   Cardiovascular: Negative for claudication, cyanosis, leg swelling and syncope.   Skin: Positive for nail changes. Negative for color change, dry skin, rash, suspicious lesions and unusual hair distribution.   Musculoskeletal: Negative for falls, joint pain, joint swelling, muscle cramps, muscle weakness and stiffness.   Gastrointestinal: Negative for constipation, diarrhea, nausea and vomiting.   Neurological: Positive for sensory change. Negative for brief paralysis, disturbances in coordination, focal weakness, numbness, paresthesias and tremors.           Objective:      Physical Exam   Constitutional: She is oriented to person, place, and time. She appears well-developed and well-nourished. She is cooperative.   Oriented to time, place, and person.   Cardiovascular:   Pulses:       Dorsalis pedis pulses are 1+ on the right side, and 1+ on the left side.        Posterior tibial pulses are 1+ on the right side, and 1+ on the left side.   Capillary fill time 3-5 seconds.  All toes warm to touch.      Negative lower extremity edema bilateral.    Negative elevational pallor and dependent rubor bilateral.     Musculoskeletal:   Normal angle, base, station of gait. Decreased stride length, early heel off, moderately propulsive toe off bilateral.    All ten toes without clubbing, cyanosis, or signs of ischemia.      No pain to palpation  bilateral lower extremities.      Range of motion, stability, muscle strength, and muscle tone are age and health appropriate normal bilateral feet and legs.       Lymphadenopathy:   Negative lymphadenopathy bilateral popliteal fossa and tarsal tunnel.  Negative lymphangitic streaking bilateral foot/ankle bilateral.     Neurological: She is alert and oriented to person, place, and time. She has normal strength. She is not disoriented. She displays no atrophy and no tremor. A sensory deficit is present. She exhibits normal muscle tone.   Reflex Scores:       Patellar reflexes are 2+ on the right side and 2+ on the left side.       Achilles reflexes are 2+ on the right side and 2+ on the left side.  Decreased/absent vibratory sensation bilateral feet to 128Hz tuning fork.     Skin: Skin is warm, dry and intact. No abrasion, no bruising, no burn, no ecchymosis, no laceration, no lesion, no petechiae and no rash noted. She is not diaphoretic. No cyanosis or erythema. No pallor. Nails show no clubbing.   Skin thin, atrophic, with decreased density and distribution of pedal hair bilateral, but without hyperpigmentation, nomi discoloration,  ulcers, masses, nodules or cords palpated bilateral feet and legs.      Visible and palpable ingrowth of toenail lateral border right hallux with pain to palpation, and focal localized erythema and edema,  without ulceration, drainage, pus, tracking, fluctuance, malodor, or cardinal signs infection.               Assessment:       Encounter Diagnoses   Name Primary?    Toe pain, right Yes    Ingrown nail     Paronychia, toe, right          Plan:       Darlin was seen today for ingrown toenail.    Diagnoses and all orders for this visit:    Toe pain, right    Ingrown nail    Paronychia, toe, right    Other orders  -     tobramycin sulfate 0.3% (TOBREX) 0.3 % ophthalmic solution; 1-2 drops topically twice daily to affected toe(s).      I counseled the patient on her conditions, their  implications and medical management.    The patient has received literature on basic diabetic foot care.  Patient will inspect feet daily, wear protective shoe gear when ambulatory, and apply moisturizer to skin as needed to maintain elasticity and help prevent ulceration.    Rx tobramycin drops bid.  Cover right hallux all times with band aid dressings changing daily.    Non covered foot care:  Utilizing sterile toenail clippers I aggressively debrided the offending nail border approximately 3 mm from its edge and carried the nail plate incision down at an angle in order to wedge out the offending cryptotic portion of the nail plate. The offending border was then removed in toto. The area was cleansed with alcohol. Patient tolerated the procedure well and related significant relief.    Discussed conservative treatment with shoes of adequate dimensions, material, and style to alleviate symptoms and delay or prevent surgical intervention.         Follow-up in about 1 week (around 2/28/2018).

## 2018-03-14 ENCOUNTER — OFFICE VISIT (OUTPATIENT)
Dept: UROLOGY | Facility: CLINIC | Age: 78
End: 2018-03-14
Payer: MEDICARE

## 2018-03-14 VITALS
BODY MASS INDEX: 24.06 KG/M2 | WEIGHT: 130.75 LBS | HEIGHT: 62 IN | RESPIRATION RATE: 18 BRPM | DIASTOLIC BLOOD PRESSURE: 58 MMHG | SYSTOLIC BLOOD PRESSURE: 110 MMHG | HEART RATE: 87 BPM

## 2018-03-14 DIAGNOSIS — N32.81 OAB (OVERACTIVE BLADDER): Primary | ICD-10-CM

## 2018-03-14 LAB
BILIRUB SERPL-MCNC: NORMAL MG/DL
BLOOD URINE, POC: NORMAL
COLOR, POC UA: CLEAR
GLUCOSE UR QL STRIP: NORMAL
KETONES UR QL STRIP: NORMAL
LEUKOCYTE ESTERASE URINE, POC: NORMAL
NITRITE, POC UA: NORMAL
PH, POC UA: 5
PROTEIN, POC: NORMAL
SPECIFIC GRAVITY, POC UA: 1.01
UROBILINOGEN, POC UA: NORMAL

## 2018-03-14 PROCEDURE — 99999 PR PBB SHADOW E&M-EST. PATIENT-LVL III: CPT | Mod: PBBFAC,,, | Performed by: UROLOGY

## 2018-03-14 PROCEDURE — 81002 URINALYSIS NONAUTO W/O SCOPE: CPT | Mod: S$GLB,,, | Performed by: UROLOGY

## 2018-03-14 PROCEDURE — 99213 OFFICE O/P EST LOW 20 MIN: CPT | Mod: 25,S$GLB,, | Performed by: UROLOGY

## 2018-03-14 RX ORDER — ESTRADIOL 0.1 MG/G
3 CREAM VAGINAL
Qty: 3 TUBE | Refills: 3 | Status: SHIPPED | OUTPATIENT
Start: 2018-03-15 | End: 2019-06-17 | Stop reason: SDUPTHER

## 2018-03-14 NOTE — PROGRESS NOTES
OFFICE NOTE    CHIEF COMPLAINT:  Overactive bladder.    HISTORY OF PRESENT ILLNESS:  Ms. Tipton has a long history of overactive bladder,   lately was well controlled using Myrbetriq 50 mg p.o. daily.  Recently, the   patient had discontinued the Myrbetriq because she underwent the placement of   heart pacemaker and the defibrillator.  At the same time, the patient is taking   Lasix, and she felt that taking Lasix was not good to combine it with Myrbetriq.    I explained to the patient that probably was okay, but she referred that   despite that she is not taking any medication for her overactive bladder, she is   urinating with a fairly good normal function, nocturia x1, no dysuria, no   hematuria, the flow is adequate and she feels that she empties the bladder   satisfactorily.  In view of normal urination with no medication for her   overactive bladder, I suggested to the patient that we should not continue with   that medication with any other medication for overactive bladder since she takes   no medicines and she can do very well without this type of medicine.  She   agreed for it    The past medical and the past surgical history are well documented in the   medical record, and all this was reviewed by me during this visit, in the review   also the current medications and allergies.  It is to be noted that the only   change is the placement of defibrillator pacemaker recently, and she feels   somewhat better from her heart condition.  The urinalysis today is completely   clear.  I felt no reason to do a physical exam on Ms. Tipton today, and she left   the office in satisfactory condition.  She is going to return to the clinic in   one year unless is a problem with her urination.      EOR/HN  dd: 03/14/2018 13:06:03 (CDT)  td: 03/15/2018 07:05:18 (CDT)  Doc ID   #[id number]  Job ID #[id number]    CC:

## 2018-04-02 RX ORDER — TRIAZOLAM 0.12 MG/1
0.12 TABLET ORAL NIGHTLY PRN
Qty: 90 TABLET | Refills: 1 | Status: SHIPPED | OUTPATIENT
Start: 2018-04-02 | End: 2018-09-26 | Stop reason: SDUPTHER

## 2018-04-23 ENCOUNTER — DOCUMENTATION ONLY (OUTPATIENT)
Dept: FAMILY MEDICINE | Facility: CLINIC | Age: 78
End: 2018-04-23

## 2018-04-23 NOTE — PROGRESS NOTES
Pre-Visit Chart Review  For Appointment Scheduled on (date) 5/3/18      Health Maintenance Due   Topic Date Due    TETANUS VACCINE  [birth year]02/1958    Zoster Vaccine  08/02/2000    Urine Microalbumin  04/27/2018

## 2018-04-30 DIAGNOSIS — E11.8 TYPE 2 DIABETES MELLITUS WITH COMPLICATION, WITHOUT LONG-TERM CURRENT USE OF INSULIN: Primary | ICD-10-CM

## 2018-04-30 RX ORDER — METAXALONE 800 MG/1
TABLET ORAL
Qty: 180 TABLET | Refills: 3 | Status: SHIPPED | OUTPATIENT
Start: 2018-04-30 | End: 2019-05-13 | Stop reason: SDUPTHER

## 2018-05-03 ENCOUNTER — OFFICE VISIT (OUTPATIENT)
Dept: FAMILY MEDICINE | Facility: CLINIC | Age: 78
End: 2018-05-03
Payer: MEDICARE

## 2018-05-03 VITALS
RESPIRATION RATE: 16 BRPM | SYSTOLIC BLOOD PRESSURE: 119 MMHG | WEIGHT: 133.38 LBS | HEART RATE: 84 BPM | HEIGHT: 62 IN | TEMPERATURE: 98 F | BODY MASS INDEX: 24.54 KG/M2 | DIASTOLIC BLOOD PRESSURE: 58 MMHG

## 2018-05-03 DIAGNOSIS — I25.119 ATHEROSCLEROSIS OF NATIVE CORONARY ARTERY OF NATIVE HEART WITH ANGINA PECTORIS: ICD-10-CM

## 2018-05-03 DIAGNOSIS — E11.49 CONTROLLED TYPE 2 DIABETES MELLITUS WITH OTHER NEUROLOGIC COMPLICATION, WITHOUT LONG-TERM CURRENT USE OF INSULIN: Primary | ICD-10-CM

## 2018-05-03 DIAGNOSIS — J45.30 MILD PERSISTENT ASTHMA WITHOUT COMPLICATION: ICD-10-CM

## 2018-05-03 DIAGNOSIS — K21.00 GASTROESOPHAGEAL REFLUX DISEASE WITH ESOPHAGITIS: ICD-10-CM

## 2018-05-03 DIAGNOSIS — E11.21 TYPE 2 DIABETES MELLITUS WITH DIABETIC NEPHROPATHY, WITHOUT LONG-TERM CURRENT USE OF INSULIN: ICD-10-CM

## 2018-05-03 DIAGNOSIS — E78.1 HYPERTRIGLYCERIDEMIA: ICD-10-CM

## 2018-05-03 DIAGNOSIS — I25.5 ISCHEMIC CARDIOMYOPATHY: ICD-10-CM

## 2018-05-03 DIAGNOSIS — D84.9 IMMUNE DEFICIENCY DISORDER: ICD-10-CM

## 2018-05-03 PROCEDURE — 99214 OFFICE O/P EST MOD 30 MIN: CPT | Mod: S$GLB,,, | Performed by: FAMILY MEDICINE

## 2018-05-03 PROCEDURE — 99999 PR PBB SHADOW E&M-EST. PATIENT-LVL III: CPT | Mod: PBBFAC,,, | Performed by: FAMILY MEDICINE

## 2018-05-05 PROBLEM — E11.21 TYPE 2 DIABETES MELLITUS WITH DIABETIC NEPHROPATHY, WITHOUT LONG-TERM CURRENT USE OF INSULIN: Status: ACTIVE | Noted: 2018-05-05

## 2018-05-05 PROBLEM — R13.10 DIFFICULTY SWALLOWING: Status: RESOLVED | Noted: 2017-09-27 | Resolved: 2018-05-05

## 2018-05-05 NOTE — PROGRESS NOTES
Subjective:       Patient ID: Darlin Tipton is a 77 y.o. female.    Chief Complaint: Diabetes; Coronary Artery Disease; Hyperlipidemia; Asthma; and Gastroesophageal Reflux    Patient presents here for six-month follow-up of diabetes, CAD, hypertriglyceridemia, asthma, and GERD.  Her weight has decreased 6 pounds in the last 6 months.  The most significant change in her medical history is the fact that she had a decrease in her ejection fraction to 35% when seeing her cardiologist and he placed her on a LifeVest for 3 months.  She is due to see him in 2 weeks.  She recently had another echocardiogram and this which shows an ejection fraction of 45-47%.  She has not had the LifeVest cough and no shocks.  According to the patient's history, the precipitating factor was when she was exercising in cardiac rehabilitation, she had a significant episode of what sounds like asystole but she was completely asymptomatic.  She was sent to the emergency room at that time and evaluated and admitted.  She did have a coronary angiogram at that time but I am not sure what the results showed.  Her chart was reviewed since my last visit and she has had multiple visits with other specialist including pulmonary and urology.  As far as her diabetes, this is well controlled on her present dose of metformin.  She denies any hypoglycemia although she does have rare glucose down into the 70s.  She is completely asymptomatic with this.  I have informed her that if she has any significant hypoglycemia she needs to let me know so that we can back off on her medications.  She has no diabetic neuropathy or retinopathy but she does have mild diabetic nephropathy.  She is up-to-date with her eye exams.  Her coronary artery disease is stable at this time except for the episodes mentioned above.  As far as her hypertriglyceridemia, her blood work has been done by her cardiologist and I do not have a copy of this at this time, but I will get one.   She is compliant with her medications and her diet.  As far as her asthma and chronic bronchitis, she had seen Dr. Uriostegui in pulmonology and this has stabilized.  Her GERD is stable at this point on her present medications.  As far as screening, she is up-to-date with all the recommended screening except she does need a tetanus vaccine and shingles vaccine.  She will get these done at her local pharmacy and let me know when this is done.  Most recent hemoglobin A1c in December 2017 was 5.6%.      Diabetes   She presents for her follow-up diabetic visit. She has type 2 diabetes mellitus. Her disease course has been stable. There are no hypoglycemic associated symptoms. Pertinent negatives for hypoglycemia include no confusion or nervousness/anxiousness. Associated symptoms include weakness. Pertinent negatives for diabetes include no chest pain, no polydipsia and no polyuria. There are no hypoglycemic complications. Symptoms are stable. Diabetic complications include heart disease, nephropathy and peripheral neuropathy. Pertinent negatives for diabetic complications include no CVA or retinopathy. Risk factors for coronary artery disease include diabetes mellitus, dyslipidemia and post-menopausal. Current diabetic treatment includes oral agent (monotherapy). She is compliant with treatment all of the time. Her weight is stable. She is following a diabetic, low salt and low fat/cholesterol diet. Meal planning includes ADA exchanges and avoidance of concentrated sweets. She has had a previous visit with a dietitian. She participates in exercise intermittently. There is no change in her home blood glucose trend. An ACE inhibitor/angiotensin II receptor blocker is not being taken. She does not see a podiatrist.Eye exam is current.   Coronary Artery Disease   Presents for follow-up visit. Pertinent negatives include no chest pain, chest tightness, palpitations or shortness of breath. Risk factors include hyperlipidemia. The  symptoms have been stable. Compliance with diet is good. Compliance with exercise is good. Compliance with medications is good.   Hyperlipidemia   This is a chronic problem. The problem is controlled. Pertinent negatives include no chest pain, myalgias or shortness of breath. Current antihyperlipidemic treatment includes statins and fibric acid derivatives. The current treatment provides moderate improvement of lipids. There are no compliance problems.  Risk factors for coronary artery disease include diabetes mellitus, dyslipidemia and post-menopausal.     Review of Systems   Constitutional: Negative for activity change and unexpected weight change.   HENT: Negative for hearing loss.    Eyes: Negative for discharge and visual disturbance.   Respiratory: Negative for chest tightness and shortness of breath.    Cardiovascular: Negative for chest pain and palpitations.   Gastrointestinal: Negative for blood in stool, constipation, diarrhea and vomiting.   Endocrine: Negative for polydipsia and polyuria.   Genitourinary: Negative for difficulty urinating, dysuria, hematuria and menstrual problem.   Musculoskeletal: Negative for arthralgias, joint swelling, myalgias and neck pain.   Neurological: Positive for weakness.   Psychiatric/Behavioral: Negative for confusion and dysphoric mood. The patient is not nervous/anxious.        Objective:      Physical Exam   Constitutional: She is oriented to person, place, and time. She appears well-developed and well-nourished.   HENT:   Head: Normocephalic and atraumatic.   Right Ear: External ear normal.   Left Ear: External ear normal.   Nose: Nose normal.   Mouth/Throat: Oropharynx is clear and moist.   Neck: Normal range of motion. Neck supple. No thyromegaly present.   Cardiovascular: Normal rate, regular rhythm, normal heart sounds and intact distal pulses.    No murmur heard.  Pulmonary/Chest: Effort normal and breath sounds normal. She has no wheezes. She has no rales.    Musculoskeletal: Normal range of motion. She exhibits no edema or tenderness.   Lymphadenopathy:     She has no cervical adenopathy.   Neurological: She is alert and oriented to person, place, and time. She has normal reflexes. No cranial nerve deficit.   Psychiatric: She has a normal mood and affect. Her behavior is normal.   Vitals reviewed.      Assessment:       1. Controlled type 2 diabetes mellitus with other neurologic complication, without long-term current use of insulin    2. Type 2 diabetes mellitus with diabetic nephropathy, without long-term current use of insulin    3. Ischemic cardiomyopathy    4. Atherosclerosis of native coronary artery of native heart with angina pectoris    5. Hypertriglyceridemia    6. Gastroesophageal reflux disease with esophagitis    7. Mild persistent asthma without complication    8. Immune deficiency disorder        Plan:       1.  Continue present medication as her diabetes, CAD, GERD, asthma, and hypertriglyceridemia are all stable  2.  Obtain results of blood work from her cardiologist  3.  Continue using LifeVest until seen by cardiology in 2 weeks  4.  Continue diabetic, low-sodium, low-fat low-cholesterol diet  5.  Patient instructed to get her tetanus vaccine and shingles vaccine done prior to her next visit  6.  Follow-up with me in 6 months or when necessary        Patient readiness: acceptance and barriers:none    During the course of the visit the patient was educated and counseled about the following:     Diabetes:  Addressed ADA diet.  Suggested low cholesterol diet.  Encouraged aerobic exercise.  Discussed foot care.  Reminded to get yearly retinal exam.  Discussed ways to avoid symptomatic hypoglycemia.  Discussed sick day management.  Reminded to bring in blood sugar diary at next visit.  Follow up in 6 months or as needed.    Goals: Diabetes: Maintain Hemoglobin A1C below 7    Did patient meet goals/outcomes: Yes    The following self management tools  provided: blood glucose log    Patient Instructions (the written plan) was given to the patient/family.     Time spent with patient: 45 minutes    Barriers to medications present (no )    Adverse reactions to current medications (no)    Over the counter medications reviewed (Yes)

## 2018-05-14 DIAGNOSIS — J41.8 MIXED SIMPLE AND MUCOPURULENT CHRONIC BRONCHITIS: ICD-10-CM

## 2018-05-14 DIAGNOSIS — J45.30 MILD PERSISTENT ASTHMA WITHOUT COMPLICATION: ICD-10-CM

## 2018-05-14 RX ORDER — PREDNISONE 20 MG/1
TABLET ORAL
Qty: 12 TABLET | Refills: 0 | Status: SHIPPED | OUTPATIENT
Start: 2018-05-14 | End: 2018-07-26 | Stop reason: SDUPTHER

## 2018-05-14 NOTE — TELEPHONE ENCOUNTER
----- Message from Thu Llanes sent at 5/14/2018  2:53 PM CDT -----  Type:  RX Refill Request    Who Called: Patient   Refill or New Rx:  refill  RX Name and Strength:  predniSONE (DELTASONE) 20 MG tablet   How is the patient currently taking it? (ex. 1XDay):  As needed  Is this a 30 day or 90 day RX:  12  Preferred Pharmacy with phone number:   [facility]/pharmacy #[id number] - [place] - [address]  [address] [address]  Phone: [phone] Fax: [phone]  Local or Mail Order:  Local  Ordering Provider:  Same  Best Call Back Number:  [phone]  Additional Information:  Please call when completed.

## 2018-07-26 ENCOUNTER — OFFICE VISIT (OUTPATIENT)
Dept: PULMONOLOGY | Facility: CLINIC | Age: 78
End: 2018-07-26
Payer: MEDICARE

## 2018-07-26 VITALS
WEIGHT: 137.13 LBS | OXYGEN SATURATION: 96 % | HEART RATE: 80 BPM | SYSTOLIC BLOOD PRESSURE: 131 MMHG | HEIGHT: 62 IN | BODY MASS INDEX: 25.23 KG/M2 | DIASTOLIC BLOOD PRESSURE: 59 MMHG

## 2018-07-26 DIAGNOSIS — D84.9 IMMUNE DEFICIENCY DISORDER: Primary | ICD-10-CM

## 2018-07-26 DIAGNOSIS — J41.8 MIXED SIMPLE AND MUCOPURULENT CHRONIC BRONCHITIS: ICD-10-CM

## 2018-07-26 DIAGNOSIS — J45.30 MILD PERSISTENT ASTHMA WITHOUT COMPLICATION: ICD-10-CM

## 2018-07-26 PROCEDURE — 99214 OFFICE O/P EST MOD 30 MIN: CPT | Mod: S$GLB,,, | Performed by: INTERNAL MEDICINE

## 2018-07-26 PROCEDURE — 99999 PR PBB SHADOW E&M-EST. PATIENT-LVL V: CPT | Mod: PBBFAC,,, | Performed by: INTERNAL MEDICINE

## 2018-07-26 RX ORDER — AMOXICILLIN 875 MG/1
875 TABLET, FILM COATED ORAL EVERY 12 HOURS
Qty: 20 TABLET | Refills: 3 | Status: SHIPPED | OUTPATIENT
Start: 2018-07-26 | End: 2019-01-29 | Stop reason: SDUPTHER

## 2018-07-26 RX ORDER — ALBUTEROL SULFATE 1.25 MG/3ML
1.25 SOLUTION RESPIRATORY (INHALATION) 4 TIMES DAILY PRN
Qty: 360 VIAL | Refills: 3 | Status: SHIPPED | OUTPATIENT
Start: 2018-07-26 | End: 2019-07-08 | Stop reason: SDUPTHER

## 2018-07-26 RX ORDER — ALBUTEROL SULFATE 90 UG/1
AEROSOL, METERED RESPIRATORY (INHALATION)
Qty: 3 INHALER | Refills: 3 | Status: SHIPPED | OUTPATIENT
Start: 2018-07-26 | End: 2020-01-24 | Stop reason: SDUPTHER

## 2018-07-26 RX ORDER — AMOXICILLIN 875 MG/1
TABLET, FILM COATED ORAL
COMMUNITY
Start: 2018-06-26 | End: 2018-07-26 | Stop reason: SDUPTHER

## 2018-07-26 RX ORDER — FLUTICASONE FUROATE AND VILANTEROL 200; 25 UG/1; UG/1
1 POWDER RESPIRATORY (INHALATION) DAILY
Qty: 3 EACH | Refills: 3 | Status: SHIPPED | OUTPATIENT
Start: 2018-07-26 | End: 2019-01-29

## 2018-07-26 RX ORDER — MONTELUKAST SODIUM 10 MG/1
10 TABLET ORAL NIGHTLY
Qty: 90 TABLET | Refills: 3 | Status: SHIPPED | OUTPATIENT
Start: 2018-07-26 | End: 2019-07-05 | Stop reason: SDUPTHER

## 2018-07-26 RX ORDER — PREDNISONE 20 MG/1
TABLET ORAL
Qty: 21 TABLET | Refills: 1 | Status: SHIPPED | OUTPATIENT
Start: 2018-07-26 | End: 2019-01-29 | Stop reason: SDUPTHER

## 2018-07-26 NOTE — PROGRESS NOTES
"7/26/2018    Darlin Tipton  New Patient Consult    Chief Complaint   Patient presents with    Follow-up    Sputum Production     clear   July 26, 2018- took prednisone and abx 2 x or so.  Uses trelegy.  Hear t improved by 4/2018 - 45%+.  Uses neb 3-day.  Having sinus- pnd , never tried singulair?        Jan30, 2018 - had increase cough with occ sob, better with nebulizer rx. Had cxr dec NSR with early chf changes.  Had another cxr 1/11/18 [facility] with lg cor, but no chf- films viewed.  Echo 1/17 tihe ef 30-35%, pt had lhc and vessel blocked but said to be small.. Sinuses ok,  Sl leg edema.   No prednisone lately.  Took prednisone/abx past - poor response and ppt insomnia..    May 7, 2017- no prednisone since last visit, sinuses better, leg problems,  pft done and nl.  Feb 9, cleared for a few days and relapsed ppt another course prednisone last 3 days,  Uses breo and flonase dailly and albuterol helped.  Sinuses better but still with left sinus pain. z ayala used once.  8/14 pneumo titers are weak, had repeat vaccine last month.  Jan 12, 2017HPI: never smoker, no h/o lung problems, moved to [facility] in [place] with developed allergies-- had cough and wheezes, supine worse, seasonal worse, not nocturnal, only used spiriva- helps a little, no recall albuterol.  Steroids helps some.  flonase helps , may have used singulair in past with  No help.    Smell ok, stuffy +, sinus headaches over eyes and left maxilary.    Had cabg in sept .   Cough violent with no problems.  Uses cough supressant with good results.  Tessalon no help.        The chief compliant  problem is new to me",   PFSH:  Past Medical History:   Diagnosis Date    Allergy     Dust mites, Grasses, Trees    Arthritis     Asthma     Blood transfusion     CAD (coronary artery disease)     Cataract     CHRONIC BRONCHITIS     Diabetes mellitus     Diabetes mellitus type II     GERD (gastroesophageal reflux disease)     Hyperlipidemia     Hypertension     " Irregular heart beat     Spinal stenosis     Thyroid disease     Hypothyroidism         Past Surgical History:   Procedure Laterality Date    APPENDECTOMY  1968    BLADDER SUSPENSION  1989    CARDIAC SURGERY      CABG    CATARACT EXTRACTION  9/2007 (L) and 10/2207 (R)    COLONOSCOPY N/A 10/18/2017    Procedure: COLONOSCOPY;  Surgeon: Esme Quiñonez MD;  Location: [facility];  Service: Endoscopy;  Laterality: N/A;    CORONARY ARTERY BYPASS GRAFT  4/26/2004    x5    ESOPHAGEAL DILATION      SPINE SURGERY  3/2000    Tumor    WRIST SURGERY  1993     Social History   Substance Use Topics    Smoking status: Never Smoker    Smokeless tobacco: Never Used    Alcohol use Yes      Comment: Rare     Family History   Problem Relation Age of Onset    Allergic rhinitis Neg Hx     Allergies Neg Hx     Angioedema Neg Hx     Asthma Neg Hx     Eczema Neg Hx     Immunodeficiency Neg Hx     Urticaria Neg Hx     Rhinitis Neg Hx     Atopy Neg Hx      Review of patient's allergies indicates:   Allergen Reactions    Cefaclor      Other reaction(s): rash    Disalcid  [salsalate]      Other reaction(s): rash    Fenofibrate micronized      Other reaction(s): rash    Nitrofurantoin macrocrystalline      Other reaction(s): rash    Ofloxacin      Other reaction(s): rash    Penicillins      Other reaction(s): rash    Phenylfenesin la  [phenylpropanolamine-gg]      Other reaction(s): rash    Sulfa (sulfonamide antibiotics)      Other reaction(s): rash       Performance Status:The patient's activity level is no limits with regular activity.      Review of Systems:  a review of eleven systems covering constitutional, Eye, HEENT, Psych, Respiratory, Cardiac, GI, , Musculoskeletal, Endocrine, Dermatologic was negative except for pertinent findings as listed ABOVE and below:  Aspirates rare and sticking mid chest and severe gerd in past- had endo with dr quiñonez with stricture dilated lasting about a yr.   "    Exam:Comprehensive exam done.   BP (!) 131/59 (BP Location: Left arm, Patient Position: Sitting)   Pulse 80   Ht 5' 2" (1.575 m)   Wt 62.2 kg (137 lb 2 oz)   SpO2 96%   BMI 25.08 kg/m²   Exam included Vitals as listed, and patient's appearance and affect and alertness and mood, oral exam for yeast and hygiene and pharynx lesions and Mallapatti (M) score, neck with inspection for jvd and masses and thyroid abnormalities and lymph nodes (supraclavicular and infraclavicular nodes and axillary also examined and noted if abn), chest exam included symmetry and effort and fremitus and percussion and auscultation, cardiac exam included rhythm and gallops and murmur and rubs and jvd and edema, abdominal exam for mass and hepatosplenomegaly and tenderness and hernias and bowel sounds, Musculoskeletal exam with muscle tone and posture and mobility/gait and  strength, and skin for rashes and cyanosis and pallor and turgor, extremity for clubbing.  Findings were normal except for pertinent findings listed below:   M2 and clear chest, no cough, trace edema post farrukh harvest r> l    Radiographs (ct chest and cxr) reviewed: view by direct vision  Post op cabg and ? Copd,  Had cxr dec NSR with early chf changes.  Had another cxr 1/11/18 [facility] with lg cor, but no chf- films viewed.    Labs reviewed         PFT wnl 5/5/2017    Plan:  Clinical impression is resonably certain and repeated evaluation prn +/- follow up will be needed as below.    Darlin was seen today for follow-up and sputum production.    Diagnoses and all orders for this visit:    Immune deficiency disorder  -     amoxicillin (AMOXIL) 875 MG tablet; Take 1 tablet (875 mg total) by mouth every 12 (twelve) hours.    Mixed simple and mucopurulent chronic bronchitis  -     predniSONE (DELTASONE) 20 MG tablet; One daily for 3 days and repeat for flare of lung symptoms as intructed  -     amoxicillin (AMOXIL) 875 MG tablet; Take 1 tablet (875 mg total) by mouth " every 12 (twelve) hours.  -     fluticasone-vilanterol (BREO ELLIPTA) 200-25 mcg/dose DsDv diskus inhaler; Inhale 1 puff into the lungs once daily.  -     montelukast (SINGULAIR) 10 mg tablet; Take 1 tablet (10 mg total) by mouth every evening.  -     albuterol (ACCUNEB) 1.25 mg/3 mL Nebu; Take 3 mLs (1.25 mg total) by nebulization 4 (four) times daily as needed. Rescue  -     tiotropium bromide (SPIRIVA RESPIMAT) 2.5 mcg/actuation Mist; Inhale 2 puffs into the lungs once daily. Controller  -     albuterol 90 mcg/actuation inhaler; 2 puffs every 4 hours as needed for cough, wheeze, or shortness of breath    Mild persistent asthma without complication  -     predniSONE (DELTASONE) 20 MG tablet; One daily for 3 days and repeat for flare of lung symptoms as intructed  -     amoxicillin (AMOXIL) 875 MG tablet; Take 1 tablet (875 mg total) by mouth every 12 (twelve) hours.  -     fluticasone-vilanterol (BREO ELLIPTA) 200-25 mcg/dose DsDv diskus inhaler; Inhale 1 puff into the lungs once daily.  -     montelukast (SINGULAIR) 10 mg tablet; Take 1 tablet (10 mg total) by mouth every evening.  -     albuterol (ACCUNEB) 1.25 mg/3 mL Nebu; Take 3 mLs (1.25 mg total) by nebulization 4 (four) times daily as needed. Rescue  -     tiotropium bromide (SPIRIVA RESPIMAT) 2.5 mcg/actuation Mist; Inhale 2 puffs into the lungs once daily. Controller  -     albuterol 90 mcg/actuation inhaler; 2 puffs every 4 hours as needed for cough, wheeze, or shortness of breath        Follow-up in about 6 months (around 1/26/2019), or if symptoms worsen or fail to improve.    Discussed with patient above for education the following:      breo 200 has more steroid than trelegy-(only 100)-- use up trelegy going to breo 200 later.    spriva may be resumed if needed- 1-2 daily ok    Try singulair daily - should help sinus and asthma    Prednisone if cough/wheeze/sob/respiratory treatments not working or lasting?    Antibiotic if yellow.

## 2018-07-26 NOTE — PATIENT INSTRUCTIONS
breo 200 has more steroid than trelegy-(only 100)-- use up trelegy going to breo 200 later.    spriva may be resumed if needed- 1-2 daily ok    Try singulair daily - should help sinus and asthma    Prednisone if cough/wheeze/sob/respiratory treatments not working or lasting?    Antibiotic if yellow.

## 2018-08-06 DIAGNOSIS — E11.49 CONTROLLED TYPE 2 DIABETES MELLITUS WITH OTHER NEUROLOGIC COMPLICATION, WITHOUT LONG-TERM CURRENT USE OF INSULIN: ICD-10-CM

## 2018-08-06 RX ORDER — METFORMIN HYDROCHLORIDE 500 MG/1
TABLET ORAL
Qty: 180 TABLET | Refills: 3 | Status: SHIPPED | OUTPATIENT
Start: 2018-08-06 | End: 2019-07-28 | Stop reason: SDUPTHER

## 2018-09-26 RX ORDER — TRIAZOLAM 0.12 MG/1
0.12 TABLET ORAL NIGHTLY PRN
Qty: 90 TABLET | Refills: 1 | Status: SHIPPED | OUTPATIENT
Start: 2018-09-26 | End: 2019-04-02 | Stop reason: SDUPTHER

## 2018-10-08 RX ORDER — FLUTICASONE PROPIONATE 50 MCG
SPRAY, SUSPENSION (ML) NASAL
Qty: 48 G | Refills: 3 | Status: ON HOLD | OUTPATIENT
Start: 2018-10-08 | End: 2020-02-07 | Stop reason: SDUPTHER

## 2018-11-05 ENCOUNTER — TELEPHONE (OUTPATIENT)
Dept: ADMINISTRATIVE | Facility: HOSPITAL | Age: 78
End: 2018-11-05

## 2018-11-05 ENCOUNTER — PATIENT OUTREACH (OUTPATIENT)
Dept: ADMINISTRATIVE | Facility: HOSPITAL | Age: 78
End: 2018-11-05

## 2018-11-05 DIAGNOSIS — E11.8 TYPE 2 DIABETES MELLITUS WITH COMPLICATION, WITHOUT LONG-TERM CURRENT USE OF INSULIN: Primary | ICD-10-CM

## 2018-11-05 DIAGNOSIS — Z78.0 ASYMPTOMATIC MENOPAUSAL STATE: ICD-10-CM

## 2018-11-09 ENCOUNTER — HOSPITAL ENCOUNTER (OUTPATIENT)
Dept: RADIOLOGY | Facility: CLINIC | Age: 78
Discharge: HOME | End: 2018-11-09
Attending: FAMILY MEDICINE
Payer: MEDICARE

## 2018-11-09 DIAGNOSIS — Z78.0 ASYMPTOMATIC MENOPAUSAL STATE: ICD-10-CM

## 2018-11-09 PROCEDURE — 77080 DXA BONE DENSITY AXIAL: CPT | Mod: 26,,, | Performed by: RADIOLOGY

## 2018-11-09 PROCEDURE — 77080 DXA BONE DENSITY AXIAL: CPT | Mod: TC,PO

## 2018-11-19 ENCOUNTER — OFFICE VISIT (OUTPATIENT)
Dept: FAMILY MEDICINE | Facility: CLINIC | Age: 78
End: 2018-11-19
Payer: MEDICARE

## 2018-11-19 VITALS
BODY MASS INDEX: 25.11 KG/M2 | HEART RATE: 87 BPM | HEIGHT: 62 IN | TEMPERATURE: 98 F | WEIGHT: 136.44 LBS | SYSTOLIC BLOOD PRESSURE: 118 MMHG | DIASTOLIC BLOOD PRESSURE: 66 MMHG

## 2018-11-19 DIAGNOSIS — I25.119 ATHEROSCLEROSIS OF NATIVE CORONARY ARTERY OF NATIVE HEART WITH ANGINA PECTORIS: ICD-10-CM

## 2018-11-19 DIAGNOSIS — I25.5 ISCHEMIC CARDIOMYOPATHY: ICD-10-CM

## 2018-11-19 DIAGNOSIS — M54.31 SCIATICA OF RIGHT SIDE: ICD-10-CM

## 2018-11-19 DIAGNOSIS — E11.49 CONTROLLED TYPE 2 DIABETES MELLITUS WITH OTHER NEUROLOGIC COMPLICATION, WITHOUT LONG-TERM CURRENT USE OF INSULIN: ICD-10-CM

## 2018-11-19 DIAGNOSIS — D84.9 IMMUNE DEFICIENCY DISORDER: ICD-10-CM

## 2018-11-19 DIAGNOSIS — J45.20 MILD INTERMITTENT ASTHMA WITHOUT COMPLICATION: ICD-10-CM

## 2018-11-19 DIAGNOSIS — K21.00 GASTROESOPHAGEAL REFLUX DISEASE WITH ESOPHAGITIS: ICD-10-CM

## 2018-11-19 DIAGNOSIS — E11.21 TYPE 2 DIABETES MELLITUS WITH DIABETIC NEPHROPATHY, WITHOUT LONG-TERM CURRENT USE OF INSULIN: Primary | ICD-10-CM

## 2018-11-19 PROCEDURE — G0008 ADMIN INFLUENZA VIRUS VAC: HCPCS | Mod: S$GLB,,, | Performed by: FAMILY MEDICINE

## 2018-11-19 PROCEDURE — 99213 OFFICE O/P EST LOW 20 MIN: CPT | Mod: 25,S$GLB,, | Performed by: FAMILY MEDICINE

## 2018-11-19 PROCEDURE — 90662 IIV NO PRSV INCREASED AG IM: CPT | Mod: S$GLB,,, | Performed by: FAMILY MEDICINE

## 2018-11-19 PROCEDURE — 99999 PR PBB SHADOW E&M-EST. PATIENT-LVL III: CPT | Mod: PBBFAC,,, | Performed by: FAMILY MEDICINE

## 2018-11-19 RX ORDER — PREDNISONE 20 MG/1
TABLET ORAL
Qty: 15 TABLET | Refills: 0 | Status: SHIPPED | OUTPATIENT
Start: 2018-11-19 | End: 2019-07-01 | Stop reason: SDUPTHER

## 2018-11-25 NOTE — PROGRESS NOTES
Subjective:       Patient ID: Darlin Tipton is a 78 y.o. female.    Chief Complaint: Diabetes; Coronary Artery Disease; Hyperlipidemia; Asthma; and Gastroesophageal Reflux    Patient presents here for follow-up of diabetes, hyperlipidemia, CAD, GERD, and asthma.  At her last visit, she was on a life vest due to a significant reduction in her ejection fraction to 31%.  With further follow-up from Cardiology, this has increased to 47% so she is no longer wearing the life vest.  She is felt to have an ischemic cardiomyopathy however.  She denies any chest pains or palpitations and is followed on a regular basis by Cardiology.  As far as her diabetes, this is well controlled on her present medications.  She has no hypoglycemia and she had her eye exam last week and results are pending.  She has diabetic neuropathy as well as diabetic nephropathy.  Her most recent hemoglobin A1c was 5.9%.  As far as her hyperlipidemia, this is well controlled on her present medication, except for an elevated triglycerides.  She is tolerating her medication well.  Her coronary artery disease is stable without chest pains or palpitations or shortness of breath.  Her GERD is well controlled on her present medications as well as her diet.  As far as her asthma, this is also stable and she recently had Singulair added to her regimen.  As far as health maintenance, she is up-to-date with all of her recommended screening exams except she does need her flu vaccine today as well as she needs it shingles vaccine and a tetanus vaccine in the future.  Patient is also having some sciatica type symptoms on the right side similar to which she has had in the past.  She did respond in the past to a short dose of steroids, so this will be tried again.      Review of Systems   Constitutional: Positive for fatigue. Negative for chills, fever and unexpected weight change.   HENT: Negative for congestion, ear pain, postnasal drip and sore throat.    Eyes:  Negative for pain and visual disturbance.        Up-to-date with eye exam   Respiratory: Negative for cough, shortness of breath and wheezing.    Cardiovascular: Negative for chest pain and palpitations.   Gastrointestinal: Negative for abdominal pain, diarrhea, nausea and vomiting.   Endocrine: Negative for polydipsia and polyuria.   Genitourinary: Negative for difficulty urinating, dysuria, flank pain and pelvic pain.   Musculoskeletal: Positive for arthralgias. Negative for back pain.        Sciatica symptoms on the right side   Neurological: Negative for dizziness, light-headedness and headaches.   Hematological: Negative for adenopathy. Bruises/bleeds easily.   Psychiatric/Behavioral: Positive for sleep disturbance. The patient is not nervous/anxious.        Objective:      Physical Exam   Constitutional: She is oriented to person, place, and time. She appears well-developed and well-nourished.   HENT:   Head: Normocephalic and atraumatic.   Right Ear: External ear normal.   Left Ear: External ear normal.   Nose: Nose normal.   Mouth/Throat: Oropharynx is clear and moist.   Neck: Normal range of motion. Neck supple. No thyromegaly present.   Cardiovascular: Normal rate, regular rhythm, normal heart sounds and intact distal pulses.   No murmur heard.  Pulmonary/Chest: Effort normal and breath sounds normal. She has no wheezes. She has no rales.   Musculoskeletal: Normal range of motion. She exhibits no edema or tenderness.   Lymphadenopathy:     She has no cervical adenopathy.   Neurological: She is alert and oriented to person, place, and time. She has normal reflexes. No cranial nerve deficit.   Psychiatric: She has a normal mood and affect. Her behavior is normal.   Vitals reviewed.      Assessment:       1. Type 2 diabetes mellitus with diabetic nephropathy, without long-term current use of insulin    2. Ischemic cardiomyopathy    3. Immune deficiency disorder    4. Mild intermittent asthma without  complication    5. Atherosclerosis of native coronary artery of native heart with angina pectoris    6. Controlled type 2 diabetes mellitus with other neurologic complication, without long-term current use of insulin    7. Gastroesophageal reflux disease with esophagitis    8. Sciatica of right side        Plan:       1.  High-dose flu vaccine today  2.  Continue present medication as all of her chronic medical problems noted above are stable and controlled  3.  Continue diabetic, low-sodium, low-fat low-cholesterol diet and exercise  4.  Continue follow-up with Cardiology and immunology  5.  Trial of prednisone 40 mg daily x5 days then 20 mg daily x5 days then discontinue as treatment for her sciatica on the right side  6.  Follow up with me in 6 months or p.r.n.        Patient readiness: acceptance and barriers:none    During the course of the visit the patient was educated and counseled about the following:     Diabetes:  Addressed ADA diet.  Suggested low cholesterol diet.  Encouraged aerobic exercise.  Discussed foot care.  Reminded to get yearly retinal exam.  Discussed ways to avoid symptomatic hypoglycemia.  Discussed sick day management.  Reminded to bring in blood sugar diary at next visit.  Follow up in 6 months or as needed.  Hypertension:   Dietary sodium restriction.  Regular aerobic exercise.  Follow up: 6 months and as needed.    Goals: Diabetes: Maintain Hemoglobin A1C below 7 and Hypertension: Reduce Blood Pressure    Did patient meet goals/outcomes: Yes    The following self management tools provided: blood glucose log    Patient Instructions (the written plan) was given to the patient/family.     Time spent with patient: 30 minutes    Barriers to medications present (no )    Adverse reactions to current medications (no)    Over the counter medications reviewed (Yes)

## 2018-11-26 ENCOUNTER — TELEPHONE (OUTPATIENT)
Dept: ADMINISTRATIVE | Facility: HOSPITAL | Age: 78
End: 2018-11-26

## 2018-12-03 ENCOUNTER — PATIENT MESSAGE (OUTPATIENT)
Dept: FAMILY MEDICINE | Facility: CLINIC | Age: 78
End: 2018-12-03

## 2018-12-04 ENCOUNTER — TELEPHONE (OUTPATIENT)
Dept: FAMILY MEDICINE | Facility: CLINIC | Age: 78
End: 2018-12-04

## 2018-12-04 NOTE — TELEPHONE ENCOUNTER
----- Message from Fe Arzate sent at 12/4/2018 10:09 AM CST -----  Contact: Isabel from [facility]  Isabel from [facility] need to speak to nurse regarding did fax come to office on 11/27 and 12/3(refaxed) for Free Style Liblie for patient       Please call to advice Isabel from [facility] [phone]

## 2018-12-04 NOTE — TELEPHONE ENCOUNTER
Tereso Hall at [facility], wasn't available. Spoke to Maddy instead. Advised her that we rcvd the fax however our provider is not in today to sign the form and will return next week. She verbalized understanding.

## 2018-12-06 NOTE — TELEPHONE ENCOUNTER
Advised pt that her freestyle strip request form has been faxed to [facility].

## 2018-12-10 ENCOUNTER — TELEPHONE (OUTPATIENT)
Dept: FAMILY MEDICINE | Facility: CLINIC | Age: 78
End: 2018-12-10

## 2018-12-10 NOTE — TELEPHONE ENCOUNTER
----- Message from Verena Arias MA sent at 12/10/2018  7:51 AM CST -----  Contact: Isabel with [facility]      ----- Message -----  From: Verena Arias MA  Sent: 12/7/2018   4:55 PM  To: Verena Arias MA        ----- Message -----  From: Shekhar Fermin  Sent: 12/7/2018   2:47 PM  To: Suzy CARLSON Jr. 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 Needs to know if papre work has been faxed back to [place].  Fax [phone]  Call [phone] to advise

## 2018-12-10 NOTE — TELEPHONE ENCOUNTER
"Tried the number left below, the line didn't go through. The phone says "the number is not supported to your area."  "

## 2018-12-17 DIAGNOSIS — K21.00 GASTROESOPHAGEAL REFLUX DISEASE WITH ESOPHAGITIS: ICD-10-CM

## 2018-12-19 ENCOUNTER — PATIENT MESSAGE (OUTPATIENT)
Dept: PULMONOLOGY | Facility: CLINIC | Age: 78
End: 2018-12-19

## 2018-12-19 DIAGNOSIS — R05.3 CHRONIC COUGH: ICD-10-CM

## 2018-12-19 DIAGNOSIS — R09.89 CHRONIC SINUS COMPLAINTS: ICD-10-CM

## 2018-12-19 RX ORDER — AZELASTINE 1 MG/ML
1 SPRAY, METERED NASAL 2 TIMES DAILY
Qty: 90 ML | Refills: 3 | Status: SHIPPED | OUTPATIENT
Start: 2018-12-19 | End: 2020-01-24 | Stop reason: SDUPTHER

## 2019-01-02 ENCOUNTER — PATIENT MESSAGE (OUTPATIENT)
Dept: FAMILY MEDICINE | Facility: CLINIC | Age: 79
End: 2019-01-02

## 2019-01-29 ENCOUNTER — OFFICE VISIT (OUTPATIENT)
Dept: PULMONOLOGY | Facility: CLINIC | Age: 79
End: 2019-01-29
Payer: MEDICARE

## 2019-01-29 VITALS
OXYGEN SATURATION: 93 % | HEIGHT: 62 IN | DIASTOLIC BLOOD PRESSURE: 62 MMHG | BODY MASS INDEX: 25.19 KG/M2 | HEART RATE: 89 BPM | WEIGHT: 136.88 LBS | SYSTOLIC BLOOD PRESSURE: 131 MMHG

## 2019-01-29 DIAGNOSIS — J45.20 MILD INTERMITTENT ASTHMA WITHOUT COMPLICATION: Primary | ICD-10-CM

## 2019-01-29 DIAGNOSIS — J45.30 MILD PERSISTENT ASTHMA WITHOUT COMPLICATION: ICD-10-CM

## 2019-01-29 DIAGNOSIS — E11.49 CONTROLLED TYPE 2 DIABETES MELLITUS WITH OTHER NEUROLOGIC COMPLICATION, WITHOUT LONG-TERM CURRENT USE OF INSULIN: ICD-10-CM

## 2019-01-29 DIAGNOSIS — D84.9 IMMUNE DEFICIENCY DISORDER: ICD-10-CM

## 2019-01-29 DIAGNOSIS — J41.8 MIXED SIMPLE AND MUCOPURULENT CHRONIC BRONCHITIS: ICD-10-CM

## 2019-01-29 PROCEDURE — 99999 PR PBB SHADOW E&M-EST. PATIENT-LVL V: CPT | Mod: PBBFAC,,, | Performed by: INTERNAL MEDICINE

## 2019-01-29 PROCEDURE — 99213 OFFICE O/P EST LOW 20 MIN: CPT | Mod: S$GLB,,, | Performed by: INTERNAL MEDICINE

## 2019-01-29 PROCEDURE — 99999 PR PBB SHADOW E&M-EST. PATIENT-LVL V: ICD-10-PCS | Mod: PBBFAC,,, | Performed by: INTERNAL MEDICINE

## 2019-01-29 PROCEDURE — 99213 PR OFFICE/OUTPT VISIT, EST, LEVL III, 20-29 MIN: ICD-10-PCS | Mod: S$GLB,,, | Performed by: INTERNAL MEDICINE

## 2019-01-29 RX ORDER — FLUTICASONE FUROATE AND VILANTEROL 200; 25 UG/1; UG/1
1 POWDER RESPIRATORY (INHALATION) DAILY
Qty: 3 EACH | Refills: 3 | Status: ON HOLD | OUTPATIENT
Start: 2019-01-29 | End: 2021-01-27

## 2019-01-29 RX ORDER — PREDNISONE 20 MG/1
TABLET ORAL
Qty: 21 TABLET | Refills: 1 | Status: SHIPPED | OUTPATIENT
Start: 2019-01-29 | End: 2019-07-01 | Stop reason: SDUPTHER

## 2019-01-29 RX ORDER — AMOXICILLIN 875 MG/1
875 TABLET, FILM COATED ORAL EVERY 12 HOURS
Qty: 20 TABLET | Refills: 3 | Status: SHIPPED | OUTPATIENT
Start: 2019-01-29 | End: 2019-07-08 | Stop reason: ALTCHOICE

## 2019-01-29 NOTE — PATIENT INSTRUCTIONS
breo and spiriva has worked well    Use amoxil if yellow  Or infected mucous    Use prednisone if asthma worsens.

## 2019-01-29 NOTE — PROGRESS NOTES
1/29/2019    Darlin Tipton  New Patient Consult    Chief Complaint   Patient presents with    Follow-up     6 month    Cough    Sputum Production     sometimes a little yellow   jan 29, 2019- needed to resume resp meds after skipping, uses regular spiriva and breo.  Had increase lately, no recent prednisone, vague on rescue/noct arousals.      July 26, 2018- took prednisone and abx 2 x or so.  Uses trelegy.  Hear t improved by 4/2018 - 45%+.  Uses neb 3-day.  Having sinus- pnd , never tried singulair?   Follow-up in about 6 months (around 1/26/2019), or if symptoms worsen or fail to improve.    Discussed with patient above for education the following:      breo 200 has more steroid than trelegy-(only 100)-- use up trelegy going to breo 200 later.    spriva may be resumed if needed- 1-2 daily ok    Try singulair daily - should help sinus and asthma    Prednisone if cough/wheeze/sob/respiratory treatments not working or lasting?    Antibiotic if yellow.      Jan30, 2018 - had increase cough with occ sob, better with nebulizer rx. Had cxr dec NSR with early chf changes.  Had another cxr 1/11/18 [facility] with lg cor, but no chf- films viewed.  Echo 1/17 tihe ef 30-35%, pt had lhc and vessel blocked but said to be small.. Sinuses ok,  Sl leg edema.   No prednisone lately.  Took prednisone/abx past - poor response and ppt insomnia..    May 7, 2017- no prednisone since last visit, sinuses better, leg problems,  pft done and nl.  Feb 9, cleared for a few days and relapsed ppt another course prednisone last 3 days,  Uses breo and flonase dailly and albuterol helped.  Sinuses better but still with left sinus pain. z ayala used once.  8/14 pneumo titers are weak, had repeat vaccine last month.  Jan 12, 2017HPI: never smoker, no h/o lung problems, moved to [facility] in [place] with developed allergies-- had cough and wheezes, supine worse, seasonal worse, not nocturnal, only used spiriva- helps a little, no recall albuterol.  Steroids  "helps some.  flonase helps , may have used singulair in past with  No help.    Smell ok, stuffy +, sinus headaches over eyes and left maxilary.    Had cabg in sept .   Cough violent with no problems.  Uses cough supressant with good results.  Tessalon no help.        The chief compliant  problem is new to me",   [facility]:  Past Medical History:   Diagnosis Date    Allergy     Dust mites, Grasses, Trees    Arthritis     Asthma     Blood transfusion     CAD (coronary artery disease)     Cataract     CHRONIC BRONCHITIS     Diabetes mellitus     Diabetes mellitus type II     GERD (gastroesophageal reflux disease)     Hyperlipidemia     Hypertension     Irregular heart beat     Spinal stenosis     Thyroid disease     Hypothyroidism         Past Surgical History:   Procedure Laterality Date    ANOSCOPY N/A 12/3/2012    [facility]formed [facility]    APPENDECTOMY  1968    BLADDER SUSPENSION  1989    CARDIAC SURGERY      CABG    CATARACT EXTRACTION  9/2007 (L) and 10/2207 (R)    COLONOSCOPY N/A 10/18/2017    Per[facility] at [facility]    CORONARY ARTERY BYPASS GRAFT  4/26/2004    x5    CYSTOSCOPY N/A 3/29/2017    [facility] at [facility]    EGD (ESOPHAGOGASTRODUODENOSCOPY) N/A 12/3/2012    Performed by [facility] MD at [facility] ENDO    ESOPHAGEAL DILATION      ESOPHAGOGASTRODUODENOSCOPY (EGD) N/A 9/27/2017    Performed by Es[facility] MD at [facility] ENDO    ESOPHAGOGASTRODUODENOSCOPY (EGD) with Dilatation N/A 4/1/2015    Performed by Es[facility] MD at [facility] ENDO    INJECTION-STEROID-EPIDURAL-TRANSFORAMINAL Right 6/26/2017    [facility] Bj [facility] at [facility] OR    INJECTION-STEROID-EPIDURAL-TRANSFORAMINAL Right 5/9/2017    [facility]formed by Bj [facility] at [facility] OR    SIGMOIDOSCOPY, FLEXIBLE N/A 12/3/2012    Performed by [facility] at [facility] END[facility]    SPINE SURGERY  3/2000    Tumor    WRIST SURGERY  1993 " "    Social History     Tobacco Use    Smoking status: Never Smoker    Smokeless tobacco: Never Used   Substance Use Topics    Alcohol use: Yes     Comment: Rare    Drug use: No     Family History   Problem Relation Age of Onset    Allergic rhinitis Neg Hx     Allergies Neg Hx     Angioedema Neg Hx     Asthma Neg Hx     Eczema Neg Hx     Immunodeficiency Neg Hx     Urticaria Neg Hx     Rhinitis Neg Hx     Atopy Neg Hx      Review of patient's allergies indicates:   Allergen Reactions    Cefaclor      Other reaction(s): rash    Disalcid  [salsalate]      Other reaction(s): rash    Fenofibrate micronized      Other reaction(s): rash    Nitrofurantoin macrocrystalline      Other reaction(s): rash    Ofloxacin      Other reaction(s): rash    Penicillins      Other reaction(s): rash    Phenylfenesin la  [phenylpropanolamine-gg]      Other reaction(s): rash    Sulfa (sulfonamide antibiotics)      Other reaction(s): rash       Performance Status:The patient's activity level is no limits with regular activity.      Review of Systems:  a review of eleven systems covering constitutional, Eye, HEENT, Psych, Respiratory, Cardiac, GI, , Musculoskeletal, Endocrine, Dermatologic was negative except for pertinent findings as listed ABOVE and below:  Aspirates rare and sticking mid chest and severe gerd in past- had endo with dr quiñonez with stricture dilated lasting about a yr.      Exam:Comprehensive exam done.   /62 (BP Location: Left arm, Patient Position: Sitting)   Pulse 89   Ht 5' 2" (1.575 m)   Wt 62.1 kg (136 lb 14.5 oz)   SpO2 (!) 93% Comment: on room air  BMI 25.04 kg/m²   Exam included Vitals as listed, and patient's appearance and affect and alertness and mood, oral exam for yeast and hygiene and pharynx lesions and Mallapatti (M) score, neck with inspection for jvd and masses and thyroid abnormalities and lymph nodes (supraclavicular and infraclavicular nodes and axillary also examined " and noted if abn), chest exam included symmetry and effort and fremitus and percussion and auscultation, cardiac exam included rhythm and gallops and murmur and rubs and jvd and edema, abdominal exam for mass and hepatosplenomegaly and tenderness and hernias and bowel sounds, Musculoskeletal exam with muscle tone and posture and mobility/gait and  strength, and skin for rashes and cyanosis and pallor and turgor, extremity for clubbing.  Findings were normal except for pertinent findings listed below:   M2 and clear chest, no cough, trace edema post farrukh harvest r> l    Radiographs (ct chest and cxr) reviewed: view by direct vision  Post op cabg and ? Copd,  Had cxr dec NSR with early chf changes.  Had another cxr 1/11/18 [facility] with lg cor, but no chf- films viewed.    Labs reviewed         PFT wn 5/5/2017    Plan:  Clinical impression is resonably certain and repeated evaluation prn +/- follow up will be needed as below.    Darlin was seen today for follow-up, cough and sputum production.    Diagnoses and all orders for this visit:    Mild intermittent asthma without complication  -     fluticasone-vilanterol (BREO ELLIPTA) 200-25 mcg/dose DsDv diskus inhaler; Inhale 1 puff into the lungs once daily. Controller    Immune deficiency disorder  -     amoxicillin (AMOXIL) 875 MG tablet; Take 1 tablet (875 mg total) by mouth every 12 (twelve) hours.    Mixed simple and mucopurulent chronic bronchitis  -     predniSONE (DELTASONE) 20 MG tablet; One daily for 3 days and repeat for flare of lung symptoms as intructed  -     amoxicillin (AMOXIL) 875 MG tablet; Take 1 tablet (875 mg total) by mouth every 12 (twelve) hours.    Mild persistent asthma without complication  -     predniSONE (DELTASONE) 20 MG tablet; One daily for 3 days and repeat for flare of lung symptoms as intructed  -     amoxicillin (AMOXIL) 875 MG tablet; Take 1 tablet (875 mg total) by mouth every 12 (twelve) hours.        No Follow-up on  file.    Discussed with patient above for education the following:       breo and spiriva has worked well    Use amoxil if yellow  Or infected mucous    Use prednisone if asthma worsens.

## 2019-03-14 ENCOUNTER — DOCUMENTATION ONLY (OUTPATIENT)
Dept: FAMILY MEDICINE | Facility: CLINIC | Age: 79
End: 2019-03-14

## 2019-03-14 NOTE — PROGRESS NOTES
Pre-Visit Chart Review  For Appointment Scheduled on 3/15/2019.      Health Maintenance Due   Topic Date Due    TETANUS VACCINE  08[birth year]02/1958    Zoster Vaccine  08/02/2000    Foot Exam  02/21/2019

## 2019-03-15 ENCOUNTER — TELEPHONE (OUTPATIENT)
Dept: FAMILY MEDICINE | Facility: CLINIC | Age: 79
End: 2019-03-15

## 2019-03-15 ENCOUNTER — OFFICE VISIT (OUTPATIENT)
Dept: FAMILY MEDICINE | Facility: CLINIC | Age: 79
End: 2019-03-15
Payer: MEDICARE

## 2019-03-15 VITALS
TEMPERATURE: 98 F | BODY MASS INDEX: 24.75 KG/M2 | WEIGHT: 134.5 LBS | HEART RATE: 93 BPM | HEIGHT: 62 IN | DIASTOLIC BLOOD PRESSURE: 55 MMHG | SYSTOLIC BLOOD PRESSURE: 102 MMHG

## 2019-03-15 DIAGNOSIS — J34.89 SINUS DRAINAGE: ICD-10-CM

## 2019-03-15 DIAGNOSIS — T24.219A: Primary | ICD-10-CM

## 2019-03-15 PROCEDURE — 99213 PR OFFICE/OUTPT VISIT, EST, LEVL III, 20-29 MIN: ICD-10-PCS | Mod: S$GLB,,, | Performed by: PHYSICIAN ASSISTANT

## 2019-03-15 PROCEDURE — 99213 OFFICE O/P EST LOW 20 MIN: CPT | Mod: S$GLB,,, | Performed by: PHYSICIAN ASSISTANT

## 2019-03-15 PROCEDURE — 99999 PR PBB SHADOW E&M-EST. PATIENT-LVL V: CPT | Mod: PBBFAC,,, | Performed by: PHYSICIAN ASSISTANT

## 2019-03-15 PROCEDURE — 99999 PR PBB SHADOW E&M-EST. PATIENT-LVL V: ICD-10-PCS | Mod: PBBFAC,,, | Performed by: PHYSICIAN ASSISTANT

## 2019-03-15 RX ORDER — MUPIROCIN 20 MG/G
1 OINTMENT TOPICAL
COMMUNITY
Start: 2019-03-08 | End: 2019-03-18
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
## 2019-03-15 NOTE — TELEPHONE ENCOUNTER
----- Message from Nik De La Torre sent at 3/15/2019  4:06 PM CDT -----  Contact: [facility]  [facility] calling to let clinic know patient is scheduled for 3/21/19 and to thank them for the referral. Please advise  [phone]

## 2019-03-28 DIAGNOSIS — E11.9 TYPE 2 DIABETES MELLITUS WITHOUT COMPLICATION: Chronic | ICD-10-CM

## 2019-04-02 DIAGNOSIS — F51.01 PRIMARY INSOMNIA: Primary | ICD-10-CM

## 2019-04-02 RX ORDER — TRIAZOLAM 0.12 MG/1
0.12 TABLET ORAL NIGHTLY PRN
Qty: 90 TABLET | Refills: 1 | Status: SHIPPED | OUTPATIENT
Start: 2019-04-02 | End: 2019-10-01 | Stop reason: SDUPTHER

## 2019-05-13 RX ORDER — METAXALONE 800 MG/1
TABLET ORAL
Qty: 180 TABLET | Refills: 3 | Status: SHIPPED | OUTPATIENT
Start: 2019-05-13 | End: 2020-09-08

## 2019-06-17 RX ORDER — ESTRADIOL 0.1 MG/G
CREAM VAGINAL
Qty: 127.5 G | Refills: 3 | Status: SHIPPED | OUTPATIENT
Start: 2019-06-17 | End: 2020-01-09 | Stop reason: SDUPTHER

## 2019-06-24 ENCOUNTER — PATIENT OUTREACH (OUTPATIENT)
Dept: ADMINISTRATIVE | Facility: HOSPITAL | Age: 79
End: 2019-06-24

## 2019-06-24 NOTE — LETTER
June 24, 2019    [place] [id number]             [facility]  [address]  [facility] [address]  Phone: [phone] [place] [id number]        Dear, Darlin Tipton      [facility] is committed to your overall health.  To help you get the most out of each of your visits, we will review your information to make sure you are up to date on all of your recommended tests and/or procedures.      Dr. Oumar Amlonte Jr, MD  has found that your chart shows you may be due for     DIABETIC FOOT EXAM   HEMOGLOBIN A1C    If you have had any of the above done at another facility, please bring the records or information with you so that your record at [facility] will be complete.  If you would like to schedule any of these, please contact the clinic at [phone].    If you are currently taking medication, please bring it with you to your appointment for review.    Also, if you have any type of Advanced Directives, please bring them with you to your office visit so we may scan them into your chart.    Thank You,    Your [facility] Team,  MD Divya Solares Jr,  Panel Care Coordinator  [facility] [address]  [place] [address]  Phone [phone]  Fax [phone]

## 2019-06-25 NOTE — TELEPHONE ENCOUNTER
----- Message from Thu Llanes sent at 4/26/2017  4:01 PM CDT -----  Dr. Fernández's office is on the phone asking for the facesheet on patient. Please fax to [phone].   Last Clinic Visit: 5/22/19  Primary Care Clinic    90 day to pharmacy.

## 2019-07-01 DIAGNOSIS — M54.31 SCIATICA OF RIGHT SIDE: ICD-10-CM

## 2019-07-01 DIAGNOSIS — J45.30 MILD PERSISTENT ASTHMA WITHOUT COMPLICATION: ICD-10-CM

## 2019-07-01 DIAGNOSIS — J41.8 MIXED SIMPLE AND MUCOPURULENT CHRONIC BRONCHITIS: ICD-10-CM

## 2019-07-01 RX ORDER — PREDNISONE 20 MG/1
TABLET ORAL
Qty: 30 TABLET | Refills: 1 | Status: SHIPPED | OUTPATIENT
Start: 2019-07-01 | End: 2019-11-13 | Stop reason: SDUPTHER

## 2019-07-01 RX ORDER — PREDNISONE 20 MG/1
TABLET ORAL
Qty: 21 TABLET | Refills: 1 | Status: SHIPPED | OUTPATIENT
Start: 2019-07-01 | End: 2020-09-08

## 2019-07-05 ENCOUNTER — DOCUMENTATION ONLY (OUTPATIENT)
Dept: FAMILY MEDICINE | Facility: CLINIC | Age: 79
End: 2019-07-05

## 2019-07-05 DIAGNOSIS — J41.8 MIXED SIMPLE AND MUCOPURULENT CHRONIC BRONCHITIS: ICD-10-CM

## 2019-07-05 DIAGNOSIS — J45.30 MILD PERSISTENT ASTHMA WITHOUT COMPLICATION: ICD-10-CM

## 2019-07-05 RX ORDER — MONTELUKAST SODIUM 10 MG/1
10 TABLET ORAL NIGHTLY
Qty: 90 TABLET | Refills: 3 | Status: ON HOLD | OUTPATIENT
Start: 2019-07-05 | End: 2019-11-21

## 2019-07-08 ENCOUNTER — OFFICE VISIT (OUTPATIENT)
Dept: FAMILY MEDICINE | Facility: CLINIC | Age: 79
End: 2019-07-08
Payer: MEDICARE

## 2019-07-08 VITALS
HEIGHT: 62 IN | HEART RATE: 87 BPM | DIASTOLIC BLOOD PRESSURE: 72 MMHG | SYSTOLIC BLOOD PRESSURE: 116 MMHG | TEMPERATURE: 98 F | WEIGHT: 140.44 LBS | OXYGEN SATURATION: 95 % | BODY MASS INDEX: 25.84 KG/M2

## 2019-07-08 DIAGNOSIS — E11.9 TYPE 2 DIABETES MELLITUS WITHOUT COMPLICATION: Chronic | ICD-10-CM

## 2019-07-08 DIAGNOSIS — K21.00 GASTROESOPHAGEAL REFLUX DISEASE WITH ESOPHAGITIS: ICD-10-CM

## 2019-07-08 DIAGNOSIS — I25.5 ISCHEMIC CARDIOMYOPATHY: ICD-10-CM

## 2019-07-08 DIAGNOSIS — I25.119 ATHEROSCLEROSIS OF NATIVE CORONARY ARTERY OF NATIVE HEART WITH ANGINA PECTORIS: ICD-10-CM

## 2019-07-08 DIAGNOSIS — J41.8 MIXED SIMPLE AND MUCOPURULENT CHRONIC BRONCHITIS: ICD-10-CM

## 2019-07-08 DIAGNOSIS — E11.21 TYPE 2 DIABETES MELLITUS WITH DIABETIC NEPHROPATHY, WITHOUT LONG-TERM CURRENT USE OF INSULIN: Primary | ICD-10-CM

## 2019-07-08 DIAGNOSIS — R10.9 FLANK PAIN: ICD-10-CM

## 2019-07-08 DIAGNOSIS — J45.30 MILD PERSISTENT ASTHMA WITHOUT COMPLICATION: ICD-10-CM

## 2019-07-08 PROCEDURE — 99214 OFFICE O/P EST MOD 30 MIN: CPT | Mod: S$GLB,,, | Performed by: FAMILY MEDICINE

## 2019-07-08 PROCEDURE — 99214 PR OFFICE/OUTPT VISIT, EST, LEVL IV, 30-39 MIN: ICD-10-PCS | Mod: S$GLB,,, | Performed by: FAMILY MEDICINE

## 2019-07-08 PROCEDURE — 99999 PR PBB SHADOW E&M-EST. PATIENT-LVL III: CPT | Mod: PBBFAC,,, | Performed by: FAMILY MEDICINE

## 2019-07-08 PROCEDURE — 99999 PR PBB SHADOW E&M-EST. PATIENT-LVL III: ICD-10-PCS | Mod: PBBFAC,,, | Performed by: FAMILY MEDICINE

## 2019-07-08 RX ORDER — ALBUTEROL SULFATE 1.25 MG/3ML
1.25 SOLUTION RESPIRATORY (INHALATION) 4 TIMES DAILY PRN
Qty: 360 VIAL | Refills: 3 | Status: SHIPPED | OUTPATIENT
Start: 2019-07-08 | End: 2019-07-16 | Stop reason: SDUPTHER

## 2019-07-09 ENCOUNTER — LAB VISIT (OUTPATIENT)
Dept: LAB | Facility: HOSPITAL | Age: 79
End: 2019-07-09
Attending: FAMILY MEDICINE
Payer: MEDICARE

## 2019-07-09 DIAGNOSIS — E11.21 TYPE 2 DIABETES MELLITUS WITH DIABETIC NEPHROPATHY, WITHOUT LONG-TERM CURRENT USE OF INSULIN: ICD-10-CM

## 2019-07-09 LAB
ALBUMIN SERPL BCP-MCNC: 3.9 G/DL (ref 3.5–5.2)
ALP SERPL-CCNC: 33 U/L (ref 55–135)
ALT SERPL W/O P-5'-P-CCNC: 24 U/L (ref 10–44)
ANION GAP SERPL CALC-SCNC: 10 MMOL/L (ref 8–16)
AST SERPL-CCNC: 20 U/L (ref 10–40)
BILIRUB SERPL-MCNC: 0.3 MG/DL (ref 0.1–1)
BUN SERPL-MCNC: 37 MG/DL (ref 8–23)
CALCIUM SERPL-MCNC: 9.9 MG/DL (ref 8.7–10.5)
CHLORIDE SERPL-SCNC: 102 MMOL/L (ref 95–110)
CHOLEST SERPL-MCNC: 193 MG/DL (ref 120–199)
CHOLEST/HDLC SERPL: 6.4 {RATIO} (ref 2–5)
CO2 SERPL-SCNC: 26 MMOL/L (ref 23–29)
CREAT SERPL-MCNC: 1.2 MG/DL (ref 0.5–1.4)
EST. GFR  (AFRICAN AMERICAN): 50 ML/MIN/1.73 M^2
EST. GFR  (NON AFRICAN AMERICAN): 43.4 ML/MIN/1.73 M^2
ESTIMATED AVG GLUCOSE: 137 MG/DL (ref 68–131)
GLUCOSE SERPL-MCNC: 133 MG/DL (ref 70–110)
HBA1C MFR BLD HPLC: 6.4 % (ref 4–5.6)
HDLC SERPL-MCNC: 30 MG/DL (ref 40–75)
HDLC SERPL: 15.5 % (ref 20–50)
LDLC SERPL CALC-MCNC: 94.2 MG/DL (ref 63–159)
NONHDLC SERPL-MCNC: 163 MG/DL
POTASSIUM SERPL-SCNC: 4.6 MMOL/L (ref 3.5–5.1)
PROT SERPL-MCNC: 7 G/DL (ref 6–8.4)
SODIUM SERPL-SCNC: 138 MMOL/L (ref 136–145)
TRIGL SERPL-MCNC: 344 MG/DL (ref 30–150)

## 2019-07-09 PROCEDURE — 83036 HEMOGLOBIN GLYCOSYLATED A1C: CPT

## 2019-07-09 PROCEDURE — 80061 LIPID PANEL: CPT

## 2019-07-09 PROCEDURE — 80053 COMPREHEN METABOLIC PANEL: CPT

## 2019-07-09 PROCEDURE — 36415 COLL VENOUS BLD VENIPUNCTURE: CPT | Mod: PO

## 2019-07-10 ENCOUNTER — TELEPHONE (OUTPATIENT)
Dept: PULMONOLOGY | Facility: CLINIC | Age: 79
End: 2019-07-10

## 2019-07-10 NOTE — TELEPHONE ENCOUNTER
Instructed to fill largest Rx for titration reasons.     ----- Message from Zia Moore sent at 7/10/2019  9:17 AM CDT -----  Contact: Divya - [facility]  Type:  Pharmacy Calling to Clarify an RX    Name of Caller:  Divya  Pharmacy Name:   [facility] - [address] [address]  Phone: [phone] Fax: [phone]  Prescription Name:  predniSONE (DELTASONE) 20 MG tablet  What do they need to clarify?:  Instructions, duplicate prescriptions  Best Call Back Number:  [phone]  Additional Information:  Reference Number: [id number]

## 2019-07-14 NOTE — PROGRESS NOTES
Subjective:       Patient ID: Darlin Tipton is a 78 y.o. female.    Chief Complaint: Diabetes; Hyperlipidemia; Coronary Artery Disease; Gastroesophageal Reflux; and Asthma    Patient presents here for 6 month follow-up of diabetes, hyperlipidemia, CAD, GERD, asthma, and chronic kidney disease stage 3.  Her diabetes is well controlled on her present dose of metformin as well as her diabetic diet.  She denies any hypoglycemia.  She does have diabetic nephropathy and diabetic neuropathy but no diabetic retinopathy.  Her hyperlipidemia is well controlled with her present dose of simvastatin and her low-fat low-cholesterol diet.  As far as her coronary artery disease, she denies any chest pains or palpitations.  She also does have a mild ischemic cardiomyopathy which is under control at this time also.  Her GERD is well controlled with her present medication and she watches her diet well.  She has not had any significant flare-ups of her asthma over the last 6 months and she is compliant with her medications for the asthma.  Her chronic kidney disease stage 3 is stable at present time.  Treatment is to control her diabetes and hyperlipidemia as well as possible.  As far as health maintenance, she is up-to-date with all of her routine screening and immunizations with the exception of a tetanus vaccine, shingles vaccine, and foot exam.  Ft exam will be done today.  She states that she did have a tetanus shot done within the last year so at [facility], and will try to get the results of this immunization.    Diabetes   She presents for her follow-up diabetic visit. She has type 2 diabetes mellitus. Her disease course has been stable. There are no hypoglycemic associated symptoms. Pertinent negatives for hypoglycemia include no dizziness, headaches or nervousness/anxiousness. There are no diabetic associated symptoms. Pertinent negatives for diabetes include no chest pain, no fatigue, no polydipsia and no polyuria.  There are no hypoglycemic complications. Symptoms are stable. Diabetic complications include heart disease, nephropathy and peripheral neuropathy. Pertinent negatives for diabetic complications include no CVA or PVD. Risk factors for coronary artery disease include diabetes mellitus, dyslipidemia and post-menopausal. Current diabetic treatment includes oral agent (monotherapy). She is compliant with treatment all of the time. Her weight is stable. She is following a diabetic, low fat/cholesterol and low salt diet. Meal planning includes avoidance of concentrated sweets and ADA exchanges. She has had a previous visit with a dietitian. She participates in exercise intermittently. There is no change in her home blood glucose trend. An ACE inhibitor/angiotensin II receptor blocker is being taken. She does not see a podiatrist.Eye exam is current.   Hyperlipidemia   This is a chronic problem. The problem is controlled. Recent lipid tests were reviewed and are normal. Pertinent negatives include no chest pain or shortness of breath. Current antihyperlipidemic treatment includes statins. The current treatment provides moderate improvement of lipids. Compliance problems include adherence to exercise.  Risk factors for coronary artery disease include diabetes mellitus, dyslipidemia and post-menopausal.     Review of Systems   Constitutional: Negative for chills, fatigue, fever and unexpected weight change.   HENT: Negative for congestion, ear pain, postnasal drip and sore throat.    Respiratory: Negative for cough, shortness of breath and wheezing.    Cardiovascular: Negative for chest pain and palpitations.   Gastrointestinal: Negative for abdominal pain, blood in stool, diarrhea, nausea and vomiting.   Endocrine: Negative for polydipsia and polyuria.   Genitourinary: Negative for difficulty urinating, dysuria and flank pain.   Musculoskeletal: Positive for back pain. Negative for arthralgias.   Neurological: Negative for  dizziness, light-headedness and headaches.   Hematological: Negative for adenopathy. Does not bruise/bleed easily.   Psychiatric/Behavioral: Negative for sleep disturbance. The patient is not nervous/anxious.        Objective:      Physical Exam   Constitutional: She is oriented to person, place, and time. She appears well-developed and well-nourished.   HENT:   Head: Normocephalic and atraumatic.   Right Ear: External ear normal.   Left Ear: External ear normal.   Nose: Nose normal.   Mouth/Throat: Oropharynx is clear and moist.   Neck: Normal range of motion. Neck supple. No thyromegaly present.   Cardiovascular: Normal rate, regular rhythm, normal heart sounds and intact distal pulses.   No murmur heard.  Pulses:       Dorsalis pedis pulses are 1+ on the right side, and 1+ on the left side.        Posterior tibial pulses are 1+ on the right side, and 1+ on the left side.   Pulmonary/Chest: Effort normal and breath sounds normal. She has no wheezes. She has no rales.   Musculoskeletal: Normal range of motion. She exhibits no edema or tenderness.        Right foot: There is normal range of motion and no deformity.        Left foot: There is normal range of motion and no deformity.   Feet:   Right Foot:   Protective Sensation: 5 sites tested. 5 sites sensed.   Skin Integrity: Negative for ulcer, skin breakdown or callus.   Left Foot:   Protective Sensation: 5 sites tested. 5 sites sensed.   Skin Integrity: Negative for ulcer, skin breakdown or callus.   Lymphadenopathy:     She has no cervical adenopathy.   Neurological: She is alert and oriented to person, place, and time. She has normal reflexes. No cranial nerve deficit.   Skin: No rash noted.   Psychiatric: She has a normal mood and affect. Her behavior is normal.   Vitals reviewed.      Assessment:       1. Type 2 diabetes mellitus with diabetic nephropathy, without long-term current use of insulin    2. Mild persistent asthma without complication    3.  Ischemic cardiomyopathy    4. Atherosclerosis of native coronary artery of native heart with angina pectoris    5. Mixed simple and mucopurulent chronic bronchitis    6. Gastroesophageal reflux disease with esophagitis    7. Flank pain    8. Type 2 diabetes mellitus without complication        Plan:       1.  Continue present medication as her diabetes, hyperlipidemia, CAD, GERD, asthma, and chronic kidney disease are all stable  2.  Continue low-sodium, low-fat low-cholesterol, diabetic diet and get what ever exercise she can do  3.  Continue follow-up with Cardiology  4.  Patient declines shingles vaccine at this time  5.  Follow up with me in 6 months or p.r.n.        Patient readiness: acceptance and barriers:none    During the course of the visit the patient was educated and counseled about the following:     Diabetes:  Addressed ADA diet.  Suggested low cholesterol diet.  Encouraged aerobic exercise.  Discussed foot care.  Reminded to get yearly retinal exam.  Discussed ways to avoid symptomatic hypoglycemia.  Discussed sick day management.  Reminded to bring in blood sugar diary at next visit.  Follow up in 6 months or as needed.    Goals: Diabetes: Maintain Hemoglobin A1C below 7    Did patient meet goals/outcomes: Yes    The following self management tools provided: blood glucose log    Patient Instructions (the written plan) was given to the patient/family.     Time spent with patient: 30 minutes    Barriers to medications present (no )    Adverse reactions to current medications (no)    Over the counter medications reviewed (Yes)

## 2019-07-16 ENCOUNTER — PATIENT MESSAGE (OUTPATIENT)
Dept: FAMILY MEDICINE | Facility: CLINIC | Age: 79
End: 2019-07-16

## 2019-07-16 DIAGNOSIS — J41.8 MIXED SIMPLE AND MUCOPURULENT CHRONIC BRONCHITIS: ICD-10-CM

## 2019-07-16 DIAGNOSIS — J45.30 MILD PERSISTENT ASTHMA WITHOUT COMPLICATION: ICD-10-CM

## 2019-07-16 RX ORDER — ALBUTEROL SULFATE 1.25 MG/3ML
1.25 SOLUTION RESPIRATORY (INHALATION) 4 TIMES DAILY PRN
Qty: 360 VIAL | Refills: 3 | Status: ON HOLD | OUTPATIENT
Start: 2019-07-16 | End: 2020-02-11

## 2019-07-28 DIAGNOSIS — E11.49 CONTROLLED TYPE 2 DIABETES MELLITUS WITH OTHER NEUROLOGIC COMPLICATION, WITHOUT LONG-TERM CURRENT USE OF INSULIN: ICD-10-CM

## 2019-07-28 RX ORDER — METFORMIN HYDROCHLORIDE 500 MG/1
TABLET ORAL
Qty: 180 TABLET | Refills: 3 | Status: ON HOLD | OUTPATIENT
Start: 2019-07-28 | End: 2020-02-10 | Stop reason: HOSPADM

## 2019-09-05 DIAGNOSIS — Z12.39 SCREENING BREAST EXAMINATION: Primary | ICD-10-CM

## 2019-09-12 ENCOUNTER — HOSPITAL ENCOUNTER (OUTPATIENT)
Dept: RADIOLOGY | Facility: HOSPITAL | Age: 79
Discharge: HOME | End: 2019-09-12
Attending: OBSTETRICS & GYNECOLOGY
Payer: MEDICARE

## 2019-09-12 VITALS — BODY MASS INDEX: 25.76 KG/M2 | HEIGHT: 62 IN | WEIGHT: 140 LBS

## 2019-09-12 DIAGNOSIS — Z12.39 SCREENING BREAST EXAMINATION: ICD-10-CM

## 2019-09-12 PROCEDURE — 77067 SCR MAMMO BI INCL CAD: CPT | Mod: TC,PO

## 2019-10-01 DIAGNOSIS — F51.01 PRIMARY INSOMNIA: ICD-10-CM

## 2019-10-01 RX ORDER — TRIAZOLAM 0.12 MG/1
0.12 TABLET ORAL NIGHTLY PRN
Qty: 90 TABLET | Refills: 1 | Status: SHIPPED | OUTPATIENT
Start: 2019-10-01 | End: 2020-04-14 | Stop reason: SDUPTHER

## 2019-11-13 ENCOUNTER — OFFICE VISIT (OUTPATIENT)
Dept: PAIN MEDICINE | Facility: CLINIC | Age: 79
End: 2019-11-13
Payer: MEDICARE

## 2019-11-13 VITALS
WEIGHT: 137 LBS | BODY MASS INDEX: 25.21 KG/M2 | SYSTOLIC BLOOD PRESSURE: 136 MMHG | DIASTOLIC BLOOD PRESSURE: 71 MMHG | HEART RATE: 92 BPM | HEIGHT: 62 IN

## 2019-11-13 DIAGNOSIS — M47.896 OTHER SPONDYLOSIS, LUMBAR REGION: ICD-10-CM

## 2019-11-13 DIAGNOSIS — M51.36 DDD (DEGENERATIVE DISC DISEASE), LUMBAR: Primary | ICD-10-CM

## 2019-11-13 DIAGNOSIS — M54.16 LUMBAR RADICULITIS: ICD-10-CM

## 2019-11-13 DIAGNOSIS — M54.16 RADICULOPATHY OF LUMBAR REGION: Primary | ICD-10-CM

## 2019-11-13 PROCEDURE — 99999 PR PBB SHADOW E&M-EST. PATIENT-LVL IV: CPT | Mod: PBBFAC,,, | Performed by: ANESTHESIOLOGY

## 2019-11-13 PROCEDURE — 99999 PR PBB SHADOW E&M-EST. PATIENT-LVL IV: ICD-10-PCS | Mod: PBBFAC,,, | Performed by: ANESTHESIOLOGY

## 2019-11-13 PROCEDURE — 99214 PR OFFICE/OUTPT VISIT, EST, LEVL IV, 30-39 MIN: ICD-10-PCS | Mod: S$GLB,,, | Performed by: ANESTHESIOLOGY

## 2019-11-13 PROCEDURE — 99214 OFFICE O/P EST MOD 30 MIN: CPT | Mod: S$GLB,,, | Performed by: ANESTHESIOLOGY

## 2019-11-13 NOTE — H&P (VIEW-ONLY)
Referring Physician: Marco, Kianrefandres    PCP: Oumar Almonte Jr, MD      CC: Right leg pain    Interval History:  Mrs. Tipton is a 79 y.o. female with low back and right leg pain who returns to our clinic.  She was last seen in 2018.  She has history of low back and radicular right leg pain.  She underwent lumbar EDENILSON eyes with us with moderate benefit.  Last procedure was over 2 years ago.  Pain has since worsened recently.  She desires repeat procedure with us.  She denies any weakness.  No bowel bladder changes.    Prior HPI:   Darlin Tipton is a 79 y.o. female referred to us for right leg pain.  She has had lower back pain for over 20 years but states right leg pain has gradually worsened over past 2 months.  No traumatic incident.  She has intermittent burning, grabbing, deep, sharp, shooting pain traveling down her right leg into her right ankle.  Pain worsens with prolonged sitting, standing, walking or flexing.  Pain improves with heat.  MRI lumbar spine was recently ordered.  She denies any worsening weakness.  No bowel bladder changes.  She received start physical therapy with minimal benefit.  She takes Norco very sparingly with mild to moderate benefit.  She rates her pain 6/10 today, 8/10 at worse.    ROS:  CONSTITUTIONAL: No fevers, chills, night sweats, wt. loss, appetite changes  SKIN: no rashes or itching  ENT: No headaches, head trauma, vision changes, or eye pain  LYMPH NODES: None noticed   CV: No chest pain, palpitations.   RESP: No shortness of breath, dyspnea on exertion, cough, wheezing, or hemoptysis  GI: No nausea, emesis, diarrhea, constipation, melena, hematochezia, pain.    : No dysuria, hematuria, urgency, or frequency   HEME: No easy bruising, bleeding problems  PSYCHIATRIC: No depression, anxiety, psychosis, hallucinations.  NEURO: No seizures, memory loss, dizziness or difficulty sleeping  MSK: + History of present illness      Past Medical History:   Diagnosis Date    Allergy      Dust mites, Grasses, Trees    Arthritis     Asthma     Blood transfusion     CAD (coronary artery disease)     Cataract     CHRONIC BRONCHITIS     Diabetes mellitus     Diabetes mellitus type II     GERD (gastroesophageal reflux disease)     Hyperlipidemia     Hypertension     Irregular heart beat     Spinal stenosis     Thyroid disease     Hypothyroidism     Past Surgical History:   Procedure Laterality Date    APPENDECTOMY  1968    BLADDER SUSPENSION  1989    CARDIAC SURGERY      CABG    CATARACT EXTRACTION  9/2007 (L) and 10/2207 (R)    COLONOSCOPY N/A 10/18/2017    Procedure: COLONOSCOPY;  Surgeon: Esme Acuna MD;  Location: [facility];  Service: Endoscopy;  Laterality: N/A;    CORONARY ARTERY BYPASS GRAFT  4/26/2004    x5    ESOPHAGEAL DILATION      SPINE SURGERY  3/2000    Tumor    WRIST SURGERY  1993     Family History   Problem Relation Age of Onset    Breast cancer Mother     Breast cancer Maternal Aunt     Allergic rhinitis Neg Hx     Allergies Neg Hx     Angioedema Neg Hx     Asthma Neg Hx     Eczema Neg Hx     Immunodeficiency Neg Hx     Urticaria Neg Hx     Rhinitis Neg Hx     Atopy Neg Hx      Social History     Socioeconomic History    Marital status:      Spouse name: Not on file    Number of children: Not on file    Years of education: Not on file    Highest education level: Not on file   Occupational History    Not on file   Social Needs    Financial resource strain: Not on file    Food insecurity:     Worry: Not on file     Inability: Not on file    Transportation needs:     Medical: Not on file     Non-medical: Not on file   Tobacco Use    Smoking status: Never Smoker    Smokeless tobacco: Never Used   Substance and Sexual Activity    Alcohol use: Yes     Comment: Rare    Drug use: No    Sexual activity: Not Currently   Lifestyle    Physical activity:     Days per week: Not on file     Minutes per session: Not on file    Stress:  "Not on file   Relationships    Social connections:     Talks on phone: Not on file     Gets together: Not on file     Attends Taoist service: Not on file     Active member of club or organization: Not on file     Attends meetings of clubs or organizations: Not on file     Relationship status: Not on file   Other Topics Concern    Not on file   Social History Narrative    Not on file         Medications/Allergies: See med card    Vitals:    11/13/19 1012   BP: 136/71   Pulse: 92   Weight: 62.1 kg (137 lb)   Height: 5' 2" (1.575 m)   PainSc:   6   PainLoc: Back         Physical exam:    GENERAL: A and O x3, the patient appears well groomed and is in no acute distress.  Skin: No rashes or obvious lesions  HEENT: normocephalic, atraumatic  CARDIOVASCULAR:  RRR  LUNGS: non labored breathing  ABDOMEN: soft, nontender   UPPER EXTREMITIES: Normal alignment, normal range of motion, no atrophy, no skin changes,  hair growth and nail growth normal and equal bilaterally. No swelling, no tenderness.    LOWER EXTREMITIES:  Normal alignment, normal range of motion, no atrophy, no skin changes,  hair growth and nail growth normal and equal bilaterally. No swelling, no tenderness.    LUMBAR SPINE  Lumbar spine: ROM is limited with flexion extension and oblique extension with mild increased pain.    Boyd's test causes no increased pain on either side.    Supine straight leg raise is negative   Internal and external rotation of the hip causes no increased pain on either side.  Myofascial exam: No tenderness to palpation across lumbar paraspinous muscles.      MENTAL STATUS: normal orientation, speech, language, and fund of knowledge for social situation.  Emotional state appropriate.    CRANIAL NERVES:  II:  PERRL bilaterally,   III,IV,VI: EOMI.    V:  Facial sensation equal bilaterally  VII:  Facial motor function normal.  VIII:  Hearing equal to finger rub bilaterally  IX/X: Gag normal, palate symmetric  XI:  Shoulder shrug " equal, head turn equal  XII:  Tongue midline without fasciculations      MOTOR: Tone and bulk: normal bilateral upper and lower Strength: normal   Delt Bi Tri WE WF     R 5 5 5 5 5 5   L 5 5 5 5 5 5     IP ADD ABD Quad TA Gas HAM  R 5 5 5 5 5 5 5  L 5 5 5 5 5 5 5    SENSATION: Light touch and pinprick intact bilaterally  REFLEXES: normal, symmetric, nonbrisk.  Toes down, no clonus. No hoffmans.  GAIT: normal rise, base, steps, and arm swing.        Imaging:  MRI L-spine 4/1/17  L1-L2: There is a broad disc bulge, asymmetric to the left, which extends into both neural foramina, left greater than right.  There is left ligamentum flavum thickening and left facet arthropathy.  There is left lateral recess stenosis.  No central canal stenosis.  There is moderate left neuroforaminal stenosis.  No right neuroforaminal stenosis.      L2-L3: There is a broad disc bulge, asymmetric to the left, which extends into the left neural foramen and left extraforaminal soft tissues.  There is prominent left ligamentum flavum thickening.  There is bilateral hypertrophic facet arthropathy.  There is a superimposed descending left paracentral disc extrusion which extends approximately 6 mm below the disc plane and which measures approximately 4 x 7 mm in size.  There is left lateral recess stenosis.  Mass effect upon the descending left L3 nerve is possible.  Please correlate clinically for symptoms referable to the left L3 nerve.  There is mild-moderate central canal stenosis present.  There is moderate left neuroforaminal stenosis.  No right neuroforaminal stenosis.    L3-L4:  There is a broad disc bulge with a superimposed descending central disc extrusion which extends approximately 7 mm below the disc plane and measures approximately 13 x 4 mm.  There is right lateral recess stenosis.  There is ligamentum flavum thickening and facet arthropathy.  The patient's disc extrusion appears to encroach upon the descending right L4 nerve  and the right lateral recess.  There is mild-moderate central canal stenosis.  There is, at most, mild left and mild-moderate right neuroforaminal stenosis.    L4-L5:  There is a bulging posterior disc osteophyte complex which extends into the neural foramina right greater than left.  There is bilateral lateral recess stenosis.  There is ligamentum flavum thickening and hypertrophic facet arthropathy, right greater than left.  There is abutment of the exited right L4 nerve in the right extraforaminal soft tissues.  Please correlate clinically for symptoms referable to the right L4 nerve.  There is also abutment of the exited left L4 nerve in the left extraforaminal soft tissues.There is moderate-severe right neuroforaminal stenosis.  No left neuroforaminal stenosis.  There is mild central canal stenosis.    L5-S1:  There is a grade I anterolisthesis of L5 on S1 with uncovering of the intervertebral disc.  There is a possible unilateral right-sided pars defect.  There is ligamentum flavum thickening and severe facet arthropathy.  There is a facet joint effusion present.  There is right lateral recess stenosis, and there is abutment of the descending right S1 nerve in the right lateral recess.There is mild overall central canal stenosis.  There is moderate-severe left neuroforaminal stenosis.  There is an extraspinal synovial cyst measuring 10 mm which arises from the anterior margin of the left facet joint and exerts severe mass effect upon the exiting left L5 nerve (series 4 image 13).  Please correlate clinically for symptoms referable to the left L5 nerve.    Assessment:  Mrs Tipton is a 79 y.o. female with right leg pain  1. DDD (degenerative disc disease), lumbar    2. Lumbar radiculitis    3. Other spondylosis, lumbar region        Plan:  1. I have stressed the importance of physical activity and exercise to improve overall health  2. Reviewed pertinent imaging and records with patient  3. I think that the  patient's back pain and radicular leg symptoms are due to degenerative disc disease and have recommended a lumbar epidural steroid injection to the Right L4-5 and L5-s1 level(s).  4. Follow up after procedure

## 2019-11-13 NOTE — PROGRESS NOTES
Referring Physician: Marco, Kianrefandres    PCP: Oumar Almonte Jr, MD      CC: Right leg pain    Interval History:  Mrs. Tipton is a 79 y.o. female with low back and right leg pain who returns to our clinic.  She was last seen in 2018.  She has history of low back and radicular right leg pain.  She underwent lumbar EDENILSON eyes with us with moderate benefit.  Last procedure was over 2 years ago.  Pain has since worsened recently.  She desires repeat procedure with us.  She denies any weakness.  No bowel bladder changes.    Prior HPI:   Darlin Tipton is a 79 y.o. female referred to us for right leg pain.  She has had lower back pain for over 20 years but states right leg pain has gradually worsened over past 2 months.  No traumatic incident.  She has intermittent burning, grabbing, deep, sharp, shooting pain traveling down her right leg into her right ankle.  Pain worsens with prolonged sitting, standing, walking or flexing.  Pain improves with heat.  MRI lumbar spine was recently ordered.  She denies any worsening weakness.  No bowel bladder changes.  She received start physical therapy with minimal benefit.  She takes Norco very sparingly with mild to moderate benefit.  She rates her pain 6/10 today, 8/10 at worse.    ROS:  CONSTITUTIONAL: No fevers, chills, night sweats, wt. loss, appetite changes  SKIN: no rashes or itching  ENT: No headaches, head trauma, vision changes, or eye pain  LYMPH NODES: None noticed   CV: No chest pain, palpitations.   RESP: No shortness of breath, dyspnea on exertion, cough, wheezing, or hemoptysis  GI: No nausea, emesis, diarrhea, constipation, melena, hematochezia, pain.    : No dysuria, hematuria, urgency, or frequency   HEME: No easy bruising, bleeding problems  PSYCHIATRIC: No depression, anxiety, psychosis, hallucinations.  NEURO: No seizures, memory loss, dizziness or difficulty sleeping  MSK: + History of present illness      Past Medical History:   Diagnosis Date    Allergy      Dust mites, Grasses, Trees    Arthritis     Asthma     Blood transfusion     CAD (coronary artery disease)     Cataract     CHRONIC BRONCHITIS     Diabetes mellitus     Diabetes mellitus type II     GERD (gastroesophageal reflux disease)     Hyperlipidemia     Hypertension     Irregular heart beat     Spinal stenosis     Thyroid disease     Hypothyroidism     Past Surgical History:   Procedure Laterality Date    APPENDECTOMY  1968    BLADDER SUSPENSION  1989    CARDIAC SURGERY      CABG    CATARACT EXTRACTION  9/2007 (L) and 10/2207 (R)    COLONOSCOPY N/A 10/18/2017    Procedure: COLONOSCOPY;  Surgeon: Esme Acuna MD;  Location: [facility];  Service: Endoscopy;  Laterality: N/A;    CORONARY ARTERY BYPASS GRAFT  4/26/2004    x5    ESOPHAGEAL DILATION      SPINE SURGERY  3/2000    Tumor    WRIST SURGERY  1993     Family History   Problem Relation Age of Onset    Breast cancer Mother     Breast cancer Maternal Aunt     Allergic rhinitis Neg Hx     Allergies Neg Hx     Angioedema Neg Hx     Asthma Neg Hx     Eczema Neg Hx     Immunodeficiency Neg Hx     Urticaria Neg Hx     Rhinitis Neg Hx     Atopy Neg Hx      Social History     Socioeconomic History    Marital status:      Spouse name: Not on file    Number of children: Not on file    Years of education: Not on file    Highest education level: Not on file   Occupational History    Not on file   Social Needs    Financial resource strain: Not on file    Food insecurity:     Worry: Not on file     Inability: Not on file    Transportation needs:     Medical: Not on file     Non-medical: Not on file   Tobacco Use    Smoking status: Never Smoker    Smokeless tobacco: Never Used   Substance and Sexual Activity    Alcohol use: Yes     Comment: Rare    Drug use: No    Sexual activity: Not Currently   Lifestyle    Physical activity:     Days per week: Not on file     Minutes per session: Not on file    Stress:  "Not on file   Relationships    Social connections:     Talks on phone: Not on file     Gets together: Not on file     Attends Faith service: Not on file     Active member of club or organization: Not on file     Attends meetings of clubs or organizations: Not on file     Relationship status: Not on file   Other Topics Concern    Not on file   Social History Narrative    Not on file         Medications/Allergies: See med card    Vitals:    11/13/19 1012   BP: 136/71   Pulse: 92   Weight: 62.1 kg (137 lb)   Height: 5' 2" (1.575 m)   PainSc:   6   PainLoc: Back         Physical exam:    GENERAL: A and O x3, the patient appears well groomed and is in no acute distress.  Skin: No rashes or obvious lesions  HEENT: normocephalic, atraumatic  CARDIOVASCULAR:  RRR  LUNGS: non labored breathing  ABDOMEN: soft, nontender   UPPER EXTREMITIES: Normal alignment, normal range of motion, no atrophy, no skin changes,  hair growth and nail growth normal and equal bilaterally. No swelling, no tenderness.    LOWER EXTREMITIES:  Normal alignment, normal range of motion, no atrophy, no skin changes,  hair growth and nail growth normal and equal bilaterally. No swelling, no tenderness.    LUMBAR SPINE  Lumbar spine: ROM is limited with flexion extension and oblique extension with mild increased pain.    Boyd's test causes no increased pain on either side.    Supine straight leg raise is negative   Internal and external rotation of the hip causes no increased pain on either side.  Myofascial exam: No tenderness to palpation across lumbar paraspinous muscles.      MENTAL STATUS: normal orientation, speech, language, and fund of knowledge for social situation.  Emotional state appropriate.    CRANIAL NERVES:  II:  PERRL bilaterally,   III,IV,VI: EOMI.    V:  Facial sensation equal bilaterally  VII:  Facial motor function normal.  VIII:  Hearing equal to finger rub bilaterally  IX/X: Gag normal, palate symmetric  XI:  Shoulder shrug " equal, head turn equal  XII:  Tongue midline without fasciculations      MOTOR: Tone and bulk: normal bilateral upper and lower Strength: normal   Delt Bi Tri WE WF     R 5 5 5 5 5 5   L 5 5 5 5 5 5     IP ADD ABD Quad TA Gas HAM  R 5 5 5 5 5 5 5  L 5 5 5 5 5 5 5    SENSATION: Light touch and pinprick intact bilaterally  REFLEXES: normal, symmetric, nonbrisk.  Toes down, no clonus. No hoffmans.  GAIT: normal rise, base, steps, and arm swing.        Imaging:  MRI L-spine 4/1/17  L1-L2: There is a broad disc bulge, asymmetric to the left, which extends into both neural foramina, left greater than right.  There is left ligamentum flavum thickening and left facet arthropathy.  There is left lateral recess stenosis.  No central canal stenosis.  There is moderate left neuroforaminal stenosis.  No right neuroforaminal stenosis.      L2-L3: There is a broad disc bulge, asymmetric to the left, which extends into the left neural foramen and left extraforaminal soft tissues.  There is prominent left ligamentum flavum thickening.  There is bilateral hypertrophic facet arthropathy.  There is a superimposed descending left paracentral disc extrusion which extends approximately 6 mm below the disc plane and which measures approximately 4 x 7 mm in size.  There is left lateral recess stenosis.  Mass effect upon the descending left L3 nerve is possible.  Please correlate clinically for symptoms referable to the left L3 nerve.  There is mild-moderate central canal stenosis present.  There is moderate left neuroforaminal stenosis.  No right neuroforaminal stenosis.    L3-L4:  There is a broad disc bulge with a superimposed descending central disc extrusion which extends approximately 7 mm below the disc plane and measures approximately 13 x 4 mm.  There is right lateral recess stenosis.  There is ligamentum flavum thickening and facet arthropathy.  The patient's disc extrusion appears to encroach upon the descending right L4 nerve  and the right lateral recess.  There is mild-moderate central canal stenosis.  There is, at most, mild left and mild-moderate right neuroforaminal stenosis.    L4-L5:  There is a bulging posterior disc osteophyte complex which extends into the neural foramina right greater than left.  There is bilateral lateral recess stenosis.  There is ligamentum flavum thickening and hypertrophic facet arthropathy, right greater than left.  There is abutment of the exited right L4 nerve in the right extraforaminal soft tissues.  Please correlate clinically for symptoms referable to the right L4 nerve.  There is also abutment of the exited left L4 nerve in the left extraforaminal soft tissues.There is moderate-severe right neuroforaminal stenosis.  No left neuroforaminal stenosis.  There is mild central canal stenosis.    L5-S1:  There is a grade I anterolisthesis of L5 on S1 with uncovering of the intervertebral disc.  There is a possible unilateral right-sided pars defect.  There is ligamentum flavum thickening and severe facet arthropathy.  There is a facet joint effusion present.  There is right lateral recess stenosis, and there is abutment of the descending right S1 nerve in the right lateral recess.There is mild overall central canal stenosis.  There is moderate-severe left neuroforaminal stenosis.  There is an extraspinal synovial cyst measuring 10 mm which arises from the anterior margin of the left facet joint and exerts severe mass effect upon the exiting left L5 nerve (series 4 image 13).  Please correlate clinically for symptoms referable to the left L5 nerve.    Assessment:  Mrs Tipton is a 79 y.o. female with right leg pain  1. DDD (degenerative disc disease), lumbar    2. Lumbar radiculitis    3. Other spondylosis, lumbar region        Plan:  1. I have stressed the importance of physical activity and exercise to improve overall health  2. Reviewed pertinent imaging and records with patient  3. I think that the  patient's back pain and radicular leg symptoms are due to degenerative disc disease and have recommended a lumbar epidural steroid injection to the Right L4-5 and L5-s1 level(s).  4. Follow up after procedure

## 2019-11-21 ENCOUNTER — HOSPITAL ENCOUNTER (OUTPATIENT)
Facility: AMBULARY SURGERY CENTER | Age: 79
Discharge: HOME | End: 2019-11-21
Attending: ANESTHESIOLOGY | Admitting: ANESTHESIOLOGY
Payer: MEDICARE

## 2019-11-21 DIAGNOSIS — M54.16 LUMBAR RADICULITIS: Primary | ICD-10-CM

## 2019-11-21 LAB — POCT GLUCOSE: 119 MG/DL (ref 70–110)

## 2019-11-21 PROCEDURE — 64484 NJX AA&/STRD TFRM EPI L/S EA: CPT | Performed by: ANESTHESIOLOGY

## 2019-11-21 PROCEDURE — 64484 NJX AA&/STRD TFRM EPI L/S EA: CPT | Mod: RT,,, | Performed by: ANESTHESIOLOGY

## 2019-11-21 PROCEDURE — 99152 MOD SED SAME PHYS/QHP 5/>YRS: CPT | Mod: ,,, | Performed by: ANESTHESIOLOGY

## 2019-11-21 PROCEDURE — 99152 PR MOD CONSCIOUS SEDATION, SAME PHYS, 5+ YRS, FIRST 15 MIN: ICD-10-PCS | Mod: ,,, | Performed by: ANESTHESIOLOGY

## 2019-11-21 PROCEDURE — 64484 PRA INJECT ANES/STEROID FORAMEN LUMBAR/SACRAL W IMG GUIDE ,EA ADD LEVEL: ICD-10-PCS | Mod: RT,,, | Performed by: ANESTHESIOLOGY

## 2019-11-21 PROCEDURE — 64483 PR EPIDURAL INJ, ANES/STEROID, TRANSFORAMINAL, LUMB/SACR, SNGL LEVL: ICD-10-PCS | Mod: RT,,, | Performed by: ANESTHESIOLOGY

## 2019-11-21 PROCEDURE — 64483 NJX AA&/STRD TFRM EPI L/S 1: CPT | Mod: RT,,, | Performed by: ANESTHESIOLOGY

## 2019-11-21 PROCEDURE — 64483 NJX AA&/STRD TFRM EPI L/S 1: CPT | Performed by: ANESTHESIOLOGY

## 2019-11-21 RX ORDER — MIDAZOLAM HYDROCHLORIDE 1 MG/ML
INJECTION INTRAMUSCULAR; INTRAVENOUS
Status: DISCONTINUED | OUTPATIENT
Start: 2019-11-21 | End: 2019-11-21 | Stop reason: HOSPADM

## 2019-11-21 RX ORDER — FENTANYL CITRATE 50 UG/ML
INJECTION, SOLUTION INTRAMUSCULAR; INTRAVENOUS
Status: DISCONTINUED | OUTPATIENT
Start: 2019-11-21 | End: 2019-11-21 | Stop reason: HOSPADM

## 2019-11-21 RX ORDER — SODIUM CHLORIDE, SODIUM LACTATE, POTASSIUM CHLORIDE, CALCIUM CHLORIDE 600; 310; 30; 20 MG/100ML; MG/100ML; MG/100ML; MG/100ML
INJECTION, SOLUTION INTRAVENOUS ONCE AS NEEDED
Status: COMPLETED | OUTPATIENT
Start: 2019-11-21 | End: 2019-11-21

## 2019-11-21 RX ORDER — LIDOCAINE HYDROCHLORIDE 10 MG/ML
INJECTION, SOLUTION EPIDURAL; INFILTRATION; INTRACAUDAL; PERINEURAL
Status: DISCONTINUED | OUTPATIENT
Start: 2019-11-21 | End: 2019-11-21 | Stop reason: HOSPADM

## 2019-11-21 RX ORDER — DEXAMETHASONE SODIUM PHOSPHATE 10 MG/ML
INJECTION INTRAMUSCULAR; INTRAVENOUS
Status: DISCONTINUED | OUTPATIENT
Start: 2019-11-21 | End: 2019-11-21 | Stop reason: HOSPADM

## 2019-11-21 RX ORDER — BUPIVACAINE HYDROCHLORIDE 2.5 MG/ML
INJECTION, SOLUTION EPIDURAL; INFILTRATION; INTRACAUDAL
Status: DISCONTINUED | OUTPATIENT
Start: 2019-11-21 | End: 2019-11-21 | Stop reason: HOSPADM

## 2019-11-21 RX ADMIN — SODIUM CHLORIDE, SODIUM LACTATE, POTASSIUM CHLORIDE, CALCIUM CHLORIDE: 600; 310; 30; 20 INJECTION, SOLUTION INTRAVENOUS at 11:11

## 2019-11-21 NOTE — PLAN OF CARE
Patient sitting in wheelchair and states she is ready to go home. She denies pain or nausea. She was able to transfer to wheelchair with minimal assistance and gait belt. She states her rt leg is far less weak then when she got OOB to chair;she can stand and ambulate with minimal assistance.She states she is ready to go home. Patient's vital signs and injection sites are stable. Her spouse is present and states he is ready to take patient home and that he is driving.  All belongings listed on pre op check list have been returned to patient.

## 2019-11-21 NOTE — OP NOTE
PROCEDURE DATE: 11/21/2019    PROCEDURE: Right L4-5 and L5-S1 transforaminal epidural steroid injection under fluoroscopy    DIAGNOSIS: Lumbar disc displacement without myelopathy  Post op diagnosis: Same    PHYSICIAN: Bj Jones MD    MEDICATIONS INJECTED:  Dexamethasone 5mg (0.5ml) and 1.5ml 0.25% bupivicaine at each nerve root.     LOCAL ANESTHETIC INJECTED:  Lidocaine 1%. 2 ml per site.    SEDATION MEDICATIONS: RN IV sedation    ESTIMATED BLOOD LOSS:  None    COMPLICATIONS:  NOne    TECHNIQUE:   A time-out was taken to identify patient and procedure side prior to starting the procedure. The patient was placed in a prone position, prepped and draped in the usual sterile fashion using ChloraPrep and sterile towels.  The area to be injected was determined under fluoroscopic guidance in AP and oblique view.  Local anesthetic was given by raising a wheal and going down to the hub of a 25-gauge 1.5 inch needle.  In oblique view, a 3.5 inch 22-gauge bent-tip spinal needle was introduced towards 6 oclock position of the pedicle of each above named nerve root level.  The needle was walked medially then hinged into the neural foramen and position was confirmed in AP and lateral views.  1ml contrast dye was injected to confirm appropriate placement and that there was no vascular uptake.  After negative aspiration for blood or CSF, the medication was then injected. This was performed at the right L4-5 and L5-S1 level(s). The patient tolerated the procedure well.    The patient was monitored after the procedure.  Patient was given post procedure and discharge instructions to follow at home. The patient was discharged in a stable condition.

## 2019-11-21 NOTE — DISCHARGE INSTRUCTIONS
Before leaving, please make sure you have all your personal belongings such as glasses, purses, wallets, keys, cell phones, jewelry, jackets etc     Pain injection instructions:     This procedure may take a couple weeks to relieve pain  You may get some pain relief from the local anesthetic initally.    No driving for 24 hrs.   Activity as tolerated- gradually increase activities.  Dont lift over 10 lbs for 24 hrs   No heat at injection sites x 2 days. No heating pads, hot tubs, saunas, or swimming in any body of water or pool for 2 days.  Use ice pack for mild swelling and for comfort , apply for 20 minutes, remove for 20 minute intervals. No direct contact of ice itself  to skin.  May shower today. Do not allow shower water to beat on injection sites for 2 days.No tub baths for two days.      Resume Aspirin, Plavix, or Coumadin the day after the procedure unless otherwise instructed.   If diabetic,monitor your glucose carefully as steroids can increase your glucose level    Seek immediate medical help for:   Severe increase in your usual pain or appearance of new pain.  Prolonged (more than 8 hours) or increasing weakness or numbness in the legs or arms. Numbing medicine was injected and can affect the messages to and from the brain and legs or arms.  .    Fever above 100.4degrees F ,Drainage,redness,active bleeding, or increased swelling at the injection site.  Headache, shortness of breath, chest pain, or breathing problems.    After Surgery:  Always be aware that any surgery can cause these symptoms:    Pain- Medication can be prescribed for pain to decrease your pain but may not completely take your pain away. Over the Counter pain medicine my be enough and you can always use Ice and rest to help ease pain.    Bleeding- a very  little bleeding after a surgery is usually within normal.  If there is a lot of blood you need to notify your MD.  Emergency treatments of bleeding are cold application, elevation of  the bleeding site and compression.    Infection- Infection after surgery is NOT a normal occurrence.  Signs of infection are fever, swelling, hot to touch the incision.  If this occurs notify your MD immediately.    Nausea- this can be common after a surgery especially if you have had anesthesia medicine or are taking pain medicine.  Steroids have a side effect of nausea sometimes. Staying on clear liquids, bland foods, gingerale, or over the counter anti nausea medicines can help.  If you vomit more than once, notify your MD.  Anti Nausea medicines can be prescribed.

## 2019-11-21 NOTE — DISCHARGE SUMMARY
[facility]  Discharge Note  Short Stay    Admit Date: 11/21/2019    Discharge Date and Time: 11/21/2019    Attending Physician: Bj Jones MD     Discharge Provider: Bj Jones    Diagnoses:  Active Hospital Problems    Diagnosis  POA    *Lumbar radiculitis [M54.16]  Yes      Resolved Hospital Problems   No resolved problems to display.       Hospital Course: Lumbar EDENILSON  Discharged Condition: Good    Final Diagnoses:   Active Hospital Problems    Diagnosis  POA    *Lumbar radiculitis [M54.16]  Yes      Resolved Hospital Problems   No resolved problems to display.       Disposition: Home or Self Care    Follow up/Patient Instructions:    Medications:  Reconciled Home Medications:      Medication List      CHANGE how you take these medications    diclofenac sodium 1 % Gel  Commonly known as:  Voltaren  APPLY 2 G TOPICALLY 3 (THREE) TIMES DAILY.  What changed:    · when to take this  · reasons to take this        CONTINUE taking these medications    * albuterol 90 mcg/actuation inhaler  Commonly known as:  PROVENTIL/VENTOLIN HFA  2 puffs every 4 hours as needed for cough, wheeze, or shortness of breath     * albuterol 1.25 mg/3 mL Nebu  Commonly known as:  ACCUNEB  Take 3 mLs (1.25 mg total) by nebulization 4 (four) times daily as needed. Rescue     amitriptyline 50 MG tablet  Commonly known as:  ELAVIL  Take 50 mg by mouth every evening.     aspirin 81 MG EC tablet  Commonly known as:  ECOTRIN  Take 81 mg by mouth once daily.     azelastine 137 mcg (0.1 %) nasal spray  Commonly known as:  ASTELIN  1 spray (137 mcg total) by Nasal route 2 (two) times daily.     blood sugar diagnostic Strp  Commonly known as:  FreeStyle Lite Strips  USE TO TEST BLOOD SUGAR TWICE A DAY     cetirizine 10 MG tablet  Commonly known as:  ZYRTEC  Take 10 mg by mouth once daily.     coenzyme Q10 100 mg capsule  Take 100 mg by mouth once daily.     DIABETIC TUSSIN DM ORAL  Take by mouth as needed.     digoxin 250 mcg  tablet  Commonly known as:  LANOXIN  Take 250 mcg by mouth once daily. 1/2 daily     estradiol 0.01 % (0.1 mg/gram) vaginal cream  Commonly known as:  ESTRACE  PLACE 3 GRAMS VAGINALLY TWICE A WEEK     fluticasone furoate-vilanterol 200-25 mcg/dose Dsdv diskus inhaler  Commonly known as:  Breo Ellipta  Inhale 1 puff into the lungs once daily. Controller     fluticasone propionate 50 mcg/actuation nasal spray  Commonly known as:  FLONASE  USE ONE SPRAY IN EACH NOSTRIL DAILY     isosorbide mononitrate 60 MG 24 hr tablet  Commonly known as:  IMDUR  Take 60 mg by mouth every evening.     lancets Misc  1 Units by Misc.(Non-Drug; Combo Route) route 2 (two) times daily.     Levothroid 25 MCG tablet  Generic drug:  levothyroxine  Take 25 mcg by mouth before breakfast. Every day     metaxalone 800 MG tablet  Commonly known as:  SKELAXIN  TAKE 1 TABLET TWICE A DAY     metFORMIN 500 MG tablet  Commonly known as:  GLUCOPHAGE  TAKE 1 TABLET TWICE A DAY WITH MEALS     Nitrostat 0.4 MG SL tablet  Generic drug:  nitroGLYCERIN  0.4mg Sublingual PRN .     ondansetron 8 MG Tbdl  Commonly known as:  ZOFRAN-ODT     POWDER BASE NO.196 (BULK) MISC  by Misc.(Non-Drug; Combo Route) route.     predniSONE 20 MG tablet  Commonly known as:  DELTASONE  One daily for 3 days and repeat for flare of lung symptoms as intructed     PreserVision AREDS [id number] unit-mg-unit Cap  Generic drug:  vitamins  A,C,E-zinc-copper  Take 1 capsule by mouth 2 (two) times daily.     PROMETHAZINE ORAL  Take by mouth.     Refresh 1 % ophthalmic solution  Generic drug:  carboxymethylcellulose  as directed     spironolactone 25 MG tablet  Commonly known as:  ALDACTONE     Theracran 650 mg Cap  Generic drug:  cranberry extract  Take 1 tablet by mouth 2 (two) times daily.     Toprol XL 25 MG 24 hr tablet  Generic drug:  metoprolol succinate  Take 25 mg by mouth once daily.     triazolam 0.125 MG tablet  Commonly known as:  HALCION  Take 1 tablet (0.125 mg total) by  mouth nightly as needed.     Trilipix 135 mg Cpdr  Generic drug:  fenofibric acid  1 capsule once daily. Every day     Tylenol Arthritis 650 MG Tbsr  Generic drug:  acetaminophen  2 Tablet(s) Oral  Every day.     Vitamin D3 50 mcg (2,000 unit) Cap  Generic drug:  cholecalciferol (vitamin D3)  Take 1 capsule by mouth once daily.     Zocor 20 MG tablet  Generic drug:  simvastatin  Take 20 mg by mouth every evening. Every day         * This list has 2 medication(s) that are the same as other medications prescribed for you. Read the directions carefully, and ask your doctor or other care provider to review them with you.              Discharge Procedure Orders   Call MD for:  temperature >100.4     Call MD for:  persistent nausea and vomiting or diarrhea     Call MD for:  severe uncontrolled pain     Call MD for:  redness, tenderness, or signs of infection (pain, swelling, redness, odor or green/yellow discharge around incision site)     Call MD for:  difficulty breathing or increased cough     Call MD for:  severe persistent headache        Follow up with MD in 2-3 weeks    Discharge Procedure Orders (must include Diet, Follow-up, Activity):   Discharge Procedure Orders (must include Diet, Follow-up, Activity)   Call MD for:  temperature >100.4     Call MD for:  persistent nausea and vomiting or diarrhea     Call MD for:  severe uncontrolled pain     Call MD for:  redness, tenderness, or signs of infection (pain, swelling, redness, odor or green/yellow discharge around incision site)     Call MD for:  difficulty breathing or increased cough     Call MD for:  severe persistent headache

## 2019-11-22 VITALS
RESPIRATION RATE: 18 BRPM | OXYGEN SATURATION: 97 % | DIASTOLIC BLOOD PRESSURE: 86 MMHG | TEMPERATURE: 98 F | BODY MASS INDEX: 25.06 KG/M2 | WEIGHT: 137 LBS | SYSTOLIC BLOOD PRESSURE: 166 MMHG | HEART RATE: 65 BPM

## 2019-12-19 ENCOUNTER — OFFICE VISIT (OUTPATIENT)
Dept: PAIN MEDICINE | Facility: CLINIC | Age: 79
End: 2019-12-19
Payer: MEDICARE

## 2019-12-19 VITALS
HEIGHT: 62 IN | SYSTOLIC BLOOD PRESSURE: 112 MMHG | BODY MASS INDEX: 25.21 KG/M2 | DIASTOLIC BLOOD PRESSURE: 62 MMHG | WEIGHT: 137 LBS | HEART RATE: 94 BPM

## 2019-12-19 DIAGNOSIS — M54.16 RADICULOPATHY OF LUMBAR REGION: Primary | ICD-10-CM

## 2019-12-19 DIAGNOSIS — M51.36 DDD (DEGENERATIVE DISC DISEASE), LUMBAR: ICD-10-CM

## 2019-12-19 DIAGNOSIS — M54.16 LUMBAR RADICULOPATHY: Primary | ICD-10-CM

## 2019-12-19 PROCEDURE — 1159F MED LIST DOCD IN RCRD: CPT | Mod: S$GLB,,, | Performed by: PHYSICIAN ASSISTANT

## 2019-12-19 PROCEDURE — 1125F PR PAIN SEVERITY QUANTIFIED, PAIN PRESENT: ICD-10-PCS | Mod: S$GLB,,, | Performed by: PHYSICIAN ASSISTANT

## 2019-12-19 PROCEDURE — 99999 PR PBB SHADOW E&M-EST. PATIENT-LVL IV: CPT | Mod: PBBFAC,,, | Performed by: PHYSICIAN ASSISTANT

## 2019-12-19 PROCEDURE — 99999 PR PBB SHADOW E&M-EST. PATIENT-LVL IV: ICD-10-PCS | Mod: PBBFAC,,, | Performed by: PHYSICIAN ASSISTANT

## 2019-12-19 PROCEDURE — 99213 OFFICE O/P EST LOW 20 MIN: CPT | Mod: S$GLB,,, | Performed by: PHYSICIAN ASSISTANT

## 2019-12-19 PROCEDURE — 99213 PR OFFICE/OUTPT VISIT, EST, LEVL III, 20-29 MIN: ICD-10-PCS | Mod: S$GLB,,, | Performed by: PHYSICIAN ASSISTANT

## 2019-12-19 PROCEDURE — 1159F PR MEDICATION LIST DOCUMENTED IN MEDICAL RECORD: ICD-10-PCS | Mod: S$GLB,,, | Performed by: PHYSICIAN ASSISTANT

## 2019-12-19 PROCEDURE — 1125F AMNT PAIN NOTED PAIN PRSNT: CPT | Mod: S$GLB,,, | Performed by: PHYSICIAN ASSISTANT

## 2019-12-19 NOTE — PROGRESS NOTES
Referring Physician: No ref. provider found    PCP: Oumar Almonte Jr, MD      CC: Right leg pain    Interval History:  Mrs. Tipton is a 79 y.o. female with low back and right leg pain who presents today for f/u s/p lumbar TF EDENILSON on right at L4-5, L5-S1. Reports 40-50% relief. She wishes to try another injection for compounded benefit.  S  She denies any weakness.  No bowel bladder changes. Pain today is rated 6/10.    Prior HPI:   Darlin Tipton is a 79 y.o. female referred to us for right leg pain.  She has had lower back pain for over 20 years but states right leg pain has gradually worsened over past 2 months.  No traumatic incident.  She has intermittent burning, grabbing, deep, sharp, shooting pain traveling down her right leg into her right ankle.  Pain worsens with prolonged sitting, standing, walking or flexing.  Pain improves with heat.  MRI lumbar spine was recently ordered.  She denies any worsening weakness.  No bowel bladder changes.  She received start physical therapy with minimal benefit.  She takes Norco very sparingly with mild to moderate benefit.  She rates her pain 6/10 today, 8/10 at worse.    ROS:  CONSTITUTIONAL: No fevers, chills, night sweats, wt. loss, appetite changes  SKIN: no rashes or itching  ENT: No headaches, head trauma, vision changes, or eye pain  LYMPH NODES: None noticed   CV: No chest pain, palpitations.   RESP: No shortness of breath, dyspnea on exertion, cough, wheezing, or hemoptysis  GI: No nausea, emesis, diarrhea, constipation, melena, hematochezia, pain.    : No dysuria, hematuria, urgency, or frequency   HEME: No easy bruising, bleeding problems  PSYCHIATRIC: No depression, anxiety, psychosis, hallucinations.  NEURO: No seizures, memory loss, dizziness or difficulty sleeping  MSK: + History of present illness      Past Medical History:   Diagnosis Date    Allergy     Dust mites, Grasses, Trees    Arthritis     Asthma     Blood transfusion     CAD (coronary  artery disease)     Cataract     CHRONIC BRONCHITIS     Diabetes mellitus     Diabetes mellitus type II     GERD (gastroesophageal reflux disease)     Hyperlipidemia     Hypertension     Irregular heart beat     Spinal stenosis     Thyroid disease     Hypothyroidism     Past Surgical History:   Procedure Laterality Date    APPENDECTOMY  1968    BLADDER SUSPENSION  1989    CARDIAC SURGERY      CABG    CATARACT EXTRACTION  9/2007 (L) and 10/2207 (R)    COLONOSCOPY N/A 10/18/2017    Procedure: COLONOSCOPY;  Surgeon: Esme Acuna MD;  Location: [facility];  Service: Endoscopy;  Laterality: N/A;    CORONARY ARTERY BYPASS GRAFT  4/26/2004    x5    ESOPHAGEAL DILATION      SPINE SURGERY  3/2000    Tumor    TRANSFORAMINAL EPIDURAL INJECTION OF STEROID Right 11/21/2019    Procedure: Injection,steroid,epidural,transforaminal approach;  Surgeon: Bj Jones MD;  Location[facility];  Service: Pain Management;  Laterality: Right;  L4-5, L5-S1    WRIST SURGERY  1993     Family History   Problem Relation Age of Onset    Breast cancer Mother     Breast cancer Maternal Aunt     Allergic rhinitis Neg Hx     Allergies Neg Hx     Angioedema Neg Hx     Asthma Neg Hx     Eczema Neg Hx     Immunodeficiency Neg Hx     Urticaria Neg Hx     Rhinitis Neg Hx     Atopy Neg Hx      Social History     Socioeconomic History    Marital status:      Spouse name: Not on file    Number of children: Not on file    Years of education: Not on file    Highest education level: Not on file   Occupational History    Not on file   Social Needs    Financial resource strain: Not on file    Food insecurity:     Worry: Not on file     Inability: Not on file    Transportation needs:     Medical: Not on file     Non-medical: Not on file   Tobacco Use    Smoking status: Never Smoker    Smokeless tobacco: Never Used   Substance and Sexual Activity    Alcohol use: Yes     Comment: Rare    Drug use: No    Sexual  "activity: Not Currently   Lifestyle    Physical activity:     Days per week: Not on file     Minutes per session: Not on file    Stress: Not on file   Relationships    Social connections:     Talks on phone: Not on file     Gets together: Not on file     Attends [employer] service: Not on file     Active member of club or organization: Not on file     Attends meetings of clubs or organizations: Not on file     Relationship status: Not on file   Other Topics Concern    Not on file   Social History Narrative    Not on file         Medications/Allergies: See med card    Vitals:    12/19/19 1031   BP: 112/62   Pulse: 94   Weight: 62.1 kg (137 lb)   Height: 5' 2" (1.575 m)   PainSc:   6   PainLoc: Back         Physical exam:    GENERAL: A and O x3, the patient appears well groomed and is in no acute distress.  Skin: No rashes or obvious lesions  HEENT: normocephalic, atraumatic  CARDIOVASCULAR:  RRR  LUNGS: non labored breathing  ABDOMEN: soft, nontender   UPPER EXTREMITIES: Normal alignment, normal range of motion, no atrophy, no skin changes,  hair growth and nail growth normal and equal bilaterally. No swelling, no tenderness.    LOWER EXTREMITIES:  Normal alignment, normal range of motion, no atrophy, no skin changes,  hair growth and nail growth normal and equal bilaterally. No swelling, no tenderness.    LUMBAR SPINE  Lumbar spine: ROM is limited with flexion extension and oblique extension with mild increased pain.    Boyd's test causes no increased pain on either side.    Supine straight leg raise is negative   Internal and external rotation of the hip causes no increased pain on either side.  Myofascial exam: No tenderness to palpation across lumbar paraspinous muscles.      MENTAL STATUS: normal orientation, speech, language, and fund of knowledge for social situation.  Emotional state appropriate.    CRANIAL NERVES:  II:  PERRL bilaterally,   III,IV,VI: EOMI.    V:  Facial sensation equal " bilaterally  VII:  Facial motor function normal.  VIII:  Hearing equal to finger rub bilaterally  IX/X: Gag normal, palate symmetric  XI:  Shoulder shrug equal, head turn equal  XII:  Tongue midline without fasciculations      MOTOR: Tone and bulk: normal bilateral upper and lower Strength: normal   Delt Bi Tri WE WF     R 5 5 5 5 5 5   L 5 5 5 5 5 5     IP ADD ABD Quad TA Gas HAM  R 5 5 5 5 5 5 5  L 5 5 5 5 5 5 5    SENSATION: Light touch and pinprick intact bilaterally  REFLEXES: normal, symmetric, nonbrisk.  Toes down, no clonus. No hoffmans.  GAIT: normal rise, base, steps, and arm swing.        Imaging:  MRI L-spine 4/1/17  L1-L2: There is a broad disc bulge, asymmetric to the left, which extends into both neural foramina, left greater than right.  There is left ligamentum flavum thickening and left facet arthropathy.  There is left lateral recess stenosis.  No central canal stenosis.  There is moderate left neuroforaminal stenosis.  No right neuroforaminal stenosis.      L2-L3: There is a broad disc bulge, asymmetric to the left, which extends into the left neural foramen and left extraforaminal soft tissues.  There is prominent left ligamentum flavum thickening.  There is bilateral hypertrophic facet arthropathy.  There is a superimposed descending left paracentral disc extrusion which extends approximately 6 mm below the disc plane and which measures approximately 4 x 7 mm in size.  There is left lateral recess stenosis.  Mass effect upon the descending left L3 nerve is possible.  Please correlate clinically for symptoms referable to the left L3 nerve.  There is mild-moderate central canal stenosis present.  There is moderate left neuroforaminal stenosis.  No right neuroforaminal stenosis.    L3-L4:  There is a broad disc bulge with a superimposed descending central disc extrusion which extends approximately 7 mm below the disc plane and measures approximately 13 x 4 mm.  There is right lateral recess  stenosis.  There is ligamentum flavum thickening and facet arthropathy.  The patient's disc extrusion appears to encroach upon the descending right L4 nerve and the right lateral recess.  There is mild-moderate central canal stenosis.  There is, at most, mild left and mild-moderate right neuroforaminal stenosis.    L4-L5:  There is a bulging posterior disc osteophyte complex which extends into the neural foramina right greater than left.  There is bilateral lateral recess stenosis.  There is ligamentum flavum thickening and hypertrophic facet arthropathy, right greater than left.  There is abutment of the exited right L4 nerve in the right extraforaminal soft tissues.  Please correlate clinically for symptoms referable to the right L4 nerve.  There is also abutment of the exited left L4 nerve in the left extraforaminal soft tissues.There is moderate-severe right neuroforaminal stenosis.  No left neuroforaminal stenosis.  There is mild central canal stenosis.    L5-S1:  There is a grade I anterolisthesis of L5 on S1 with uncovering of the intervertebral disc.  There is a possible unilateral right-sided pars defect.  There is ligamentum flavum thickening and severe facet arthropathy.  There is a facet joint effusion present.  There is right lateral recess stenosis, and there is abutment of the descending right S1 nerve in the right lateral recess.There is mild overall central canal stenosis.  There is moderate-severe left neuroforaminal stenosis.  There is an extraspinal synovial cyst measuring 10 mm which arises from the anterior margin of the left facet joint and exerts severe mass effect upon the exiting left L5 nerve (series 4 image 13).  Please correlate clinically for symptoms referable to the left L5 nerve.    Assessment:  Mrs Tipton is a 79 y.o. female with right leg pain  1. Radiculopathy of lumbar region    2. DDD (degenerative disc disease), lumbar        Plan:  1. I have stressed the importance of physical  activity and exercise to improve overall health  2. Schedule repeat lumbar epidural steroid injection to the Right L4-5 and L5-S1 level(s). I have explained the risks, benefits, and alternatives of the procedure in detail. The patient voices understanding and all questions have been answered. The patient agrees to proceed as planned. Written Consent obtained.   3. Follow up after procedure

## 2019-12-19 NOTE — H&P (VIEW-ONLY)
Referring Physician: No ref. provider found    PCP: Oumar Almonte Jr, MD      CC: Right leg pain    Interval History:  Mrs. Tipton is a 79 y.o. female with low back and right leg pain who presents today for f/u s/p lumbar TF EDENILSON on right at L4-5, L5-S1. Reports 40-50% relief. She wishes to try another injection for compounded benefit.  S  She denies any weakness.  No bowel bladder changes. Pain today is rated 6/10.    Prior HPI:   Darlin Tipton is a 79 y.o. female referred to us for right leg pain.  She has had lower back pain for over 20 years but states right leg pain has gradually worsened over past 2 months.  No traumatic incident.  She has intermittent burning, grabbing, deep, sharp, shooting pain traveling down her right leg into her right ankle.  Pain worsens with prolonged sitting, standing, walking or flexing.  Pain improves with heat.  MRI lumbar spine was recently ordered.  She denies any worsening weakness.  No bowel bladder changes.  She received start physical therapy with minimal benefit.  She takes Norco very sparingly with mild to moderate benefit.  She rates her pain 6/10 today, 8/10 at worse.    ROS:  CONSTITUTIONAL: No fevers, chills, night sweats, wt. loss, appetite changes  SKIN: no rashes or itching  ENT: No headaches, head trauma, vision changes, or eye pain  LYMPH NODES: None noticed   CV: No chest pain, palpitations.   RESP: No shortness of breath, dyspnea on exertion, cough, wheezing, or hemoptysis  GI: No nausea, emesis, diarrhea, constipation, melena, hematochezia, pain.    : No dysuria, hematuria, urgency, or frequency   HEME: No easy bruising, bleeding problems  PSYCHIATRIC: No depression, anxiety, psychosis, hallucinations.  NEURO: No seizures, memory loss, dizziness or difficulty sleeping  MSK: + History of present illness      Past Medical History:   Diagnosis Date    Allergy     Dust mites, Grasses, Trees    Arthritis     Asthma     Blood transfusion     CAD (coronary  artery disease)     Cataract     CHRONIC BRONCHITIS     Diabetes mellitus     Diabetes mellitus type II     GERD (gastroesophageal reflux disease)     Hyperlipidemia     Hypertension     Irregular heart beat     Spinal stenosis     Thyroid disease     Hypothyroidism     Past Surgical History:   Procedure Laterality Date    APPENDECTOMY  1968    BLADDER SUSPENSION  1989    CARDIAC SURGERY      CABG    CATARACT EXTRACTION  9/2007 (L) and 10/2207 (R)    COLONOSCOPY N/A 10/18/2017    Procedure: COLONOSCOPY;  Surgeon: Esme Acuna MD;  Location: [facility];  Service: Endoscopy;  Laterality: N/A;    CORONARY ARTERY BYPASS GRAFT  4/26/2004    x5    ESOPHAGEAL DILATION      SPINE SURGERY  3/2000    Tumor    TRANSFORAMINAL EPIDURAL INJECTION OF STEROID Right 11/21/2019    Procedure: Injection,steroid,epidural,transforaminal approach;  Surgeon: Bj Jones MD;  Location: [facility];  Service: Pain Management;  Laterality: Right;  L4-5, L5-S1    WRIST SURGERY  1993     Family History   Problem Relation Age of Onset    Breast cancer Mother     Breast cancer Maternal Aunt     Allergic rhinitis Neg Hx     Allergies Neg Hx     Angioedema Neg Hx     Asthma Neg Hx     Eczema Neg Hx     Immunodeficiency Neg Hx     Urticaria Neg Hx     Rhinitis Neg Hx     Atopy Neg Hx      Social History     Socioeconomic History    Marital status:      Spouse name: Not on file    Number of children: Not on file    Years of education: Not on file    Highest education level: Not on file   Occupational History    Not on file   Social Needs    Financial resource strain: Not on file    Food insecurity:     Worry: Not on file     Inability: Not on file    Transportation needs:     Medical: Not on file     Non-medical: Not on file   Tobacco Use    Smoking status: Never Smoker    Smokeless tobacco: Never Used   Substance and Sexual Activity    Alcohol use: Yes     Comment: Rare    Drug use: No    Sexual  "activity: Not Currently   Lifestyle    Physical activity:     Days per week: Not on file     Minutes per session: Not on file    Stress: Not on file   Relationships    Social connections:     Talks on phone: Not on file     Gets together: Not on file     Attends Hoahaoism service: Not on file     Active member of club or organization: Not on file     Attends meetings of clubs or organizations: Not on file     Relationship status: Not on file   Other Topics Concern    Not on file   Social History Narrative    Not on file         Medications/Allergies: See med card    Vitals:    12/19/19 1031   BP: 112/62   Pulse: 94   Weight: 62.1 kg (137 lb)   Height: 5' 2" (1.575 m)   PainSc:   6   PainLoc: Back         Physical exam:    GENERAL: A and O x3, the patient appears well groomed and is in no acute distress.  Skin: No rashes or obvious lesions  HEENT: normocephalic, atraumatic  CARDIOVASCULAR:  RRR  LUNGS: non labored breathing  ABDOMEN: soft, nontender   UPPER EXTREMITIES: Normal alignment, normal range of motion, no atrophy, no skin changes,  hair growth and nail growth normal and equal bilaterally. No swelling, no tenderness.    LOWER EXTREMITIES:  Normal alignment, normal range of motion, no atrophy, no skin changes,  hair growth and nail growth normal and equal bilaterally. No swelling, no tenderness.    LUMBAR SPINE  Lumbar spine: ROM is limited with flexion extension and oblique extension with mild increased pain.    Boyd's test causes no increased pain on either side.    Supine straight leg raise is negative   Internal and external rotation of the hip causes no increased pain on either side.  Myofascial exam: No tenderness to palpation across lumbar paraspinous muscles.      MENTAL STATUS: normal orientation, speech, language, and fund of knowledge for social situation.  Emotional state appropriate.    CRANIAL NERVES:  II:  PERRL bilaterally,   III,IV,VI: EOMI.    V:  Facial sensation equal " bilaterally  VII:  Facial motor function normal.  VIII:  Hearing equal to finger rub bilaterally  IX/X: Gag normal, palate symmetric  XI:  Shoulder shrug equal, head turn equal  XII:  Tongue midline without fasciculations      MOTOR: Tone and bulk: normal bilateral upper and lower Strength: normal   Delt Bi Tri WE WF     R 5 5 5 5 5 5   L 5 5 5 5 5 5     IP ADD ABD Quad TA Gas HAM  R 5 5 5 5 5 5 5  L 5 5 5 5 5 5 5    SENSATION: Light touch and pinprick intact bilaterally  REFLEXES: normal, symmetric, nonbrisk.  Toes down, no clonus. No hoffmans.  GAIT: normal rise, base, steps, and arm swing.        Imaging:  MRI L-spine 4/1/17  L1-L2: There is a broad disc bulge, asymmetric to the left, which extends into both neural foramina, left greater than right.  There is left ligamentum flavum thickening and left facet arthropathy.  There is left lateral recess stenosis.  No central canal stenosis.  There is moderate left neuroforaminal stenosis.  No right neuroforaminal stenosis.      L2-L3: There is a broad disc bulge, asymmetric to the left, which extends into the left neural foramen and left extraforaminal soft tissues.  There is prominent left ligamentum flavum thickening.  There is bilateral hypertrophic facet arthropathy.  There is a superimposed descending left paracentral disc extrusion which extends approximately 6 mm below the disc plane and which measures approximately 4 x 7 mm in size.  There is left lateral recess stenosis.  Mass effect upon the descending left L3 nerve is possible.  Please correlate clinically for symptoms referable to the left L3 nerve.  There is mild-moderate central canal stenosis present.  There is moderate left neuroforaminal stenosis.  No right neuroforaminal stenosis.    L3-L4:  There is a broad disc bulge with a superimposed descending central disc extrusion which extends approximately 7 mm below the disc plane and measures approximately 13 x 4 mm.  There is right lateral recess  stenosis.  There is ligamentum flavum thickening and facet arthropathy.  The patient's disc extrusion appears to encroach upon the descending right L4 nerve and the right lateral recess.  There is mild-moderate central canal stenosis.  There is, at most, mild left and mild-moderate right neuroforaminal stenosis.    L4-L5:  There is a bulging posterior disc osteophyte complex which extends into the neural foramina right greater than left.  There is bilateral lateral recess stenosis.  There is ligamentum flavum thickening and hypertrophic facet arthropathy, right greater than left.  There is abutment of the exited right L4 nerve in the right extraforaminal soft tissues.  Please correlate clinically for symptoms referable to the right L4 nerve.  There is also abutment of the exited left L4 nerve in the left extraforaminal soft tissues.There is moderate-severe right neuroforaminal stenosis.  No left neuroforaminal stenosis.  There is mild central canal stenosis.    L5-S1:  There is a grade I anterolisthesis of L5 on S1 with uncovering of the intervertebral disc.  There is a possible unilateral right-sided pars defect.  There is ligamentum flavum thickening and severe facet arthropathy.  There is a facet joint effusion present.  There is right lateral recess stenosis, and there is abutment of the descending right S1 nerve in the right lateral recess.There is mild overall central canal stenosis.  There is moderate-severe left neuroforaminal stenosis.  There is an extraspinal synovial cyst measuring 10 mm which arises from the anterior margin of the left facet joint and exerts severe mass effect upon the exiting left L5 nerve (series 4 image 13).  Please correlate clinically for symptoms referable to the left L5 nerve.    Assessment:  Mrs Tipton is a 79 y.o. female with right leg pain  1. Radiculopathy of lumbar region    2. DDD (degenerative disc disease), lumbar        Plan:  1. I have stressed the importance of physical  activity and exercise to improve overall health  2. Schedule repeat lumbar epidural steroid injection to the Right L4-5 and L5-S1 level(s). I have explained the risks, benefits, and alternatives of the procedure in detail. The patient voices understanding and all questions have been answered. The patient agrees to proceed as planned. Written Consent obtained.   3. Follow up after procedure

## 2019-12-31 ENCOUNTER — HOSPITAL ENCOUNTER (OUTPATIENT)
Facility: AMBULARY SURGERY CENTER | Age: 79
Discharge: HOME | End: 2019-12-31
Attending: ANESTHESIOLOGY | Admitting: ANESTHESIOLOGY
Payer: MEDICARE

## 2019-12-31 DIAGNOSIS — M54.16 LUMBAR RADICULITIS: Primary | ICD-10-CM

## 2019-12-31 LAB — POCT GLUCOSE: 95 MG/DL (ref 70–110)

## 2019-12-31 PROCEDURE — 99152 MOD SED SAME PHYS/QHP 5/>YRS: CPT | Mod: ,,, | Performed by: ANESTHESIOLOGY

## 2019-12-31 PROCEDURE — 99152 PR MOD CONSCIOUS SEDATION, SAME PHYS, 5+ YRS, FIRST 15 MIN: ICD-10-PCS | Mod: ,,, | Performed by: ANESTHESIOLOGY

## 2019-12-31 PROCEDURE — 64483 NJX AA&/STRD TFRM EPI L/S 1: CPT | Mod: RT,,, | Performed by: ANESTHESIOLOGY

## 2019-12-31 PROCEDURE — 64483 NJX AA&/STRD TFRM EPI L/S 1: CPT | Performed by: ANESTHESIOLOGY

## 2019-12-31 PROCEDURE — 64484 NJX AA&/STRD TFRM EPI L/S EA: CPT | Mod: RT,,, | Performed by: ANESTHESIOLOGY

## 2019-12-31 PROCEDURE — 64484 PRA INJECT ANES/STEROID FORAMEN LUMBAR/SACRAL W IMG GUIDE ,EA ADD LEVEL: ICD-10-PCS | Mod: RT,,, | Performed by: ANESTHESIOLOGY

## 2019-12-31 PROCEDURE — 64484 NJX AA&/STRD TFRM EPI L/S EA: CPT | Performed by: ANESTHESIOLOGY

## 2019-12-31 PROCEDURE — 64483 PR EPIDURAL INJ, ANES/STEROID, TRANSFORAMINAL, LUMB/SACR, SNGL LEVL: ICD-10-PCS | Mod: RT,,, | Performed by: ANESTHESIOLOGY

## 2019-12-31 RX ORDER — SODIUM CHLORIDE, SODIUM LACTATE, POTASSIUM CHLORIDE, CALCIUM CHLORIDE 600; 310; 30; 20 MG/100ML; MG/100ML; MG/100ML; MG/100ML
INJECTION, SOLUTION INTRAVENOUS ONCE AS NEEDED
Status: DISCONTINUED | OUTPATIENT
Start: 2019-12-31 | End: 2019-12-31 | Stop reason: HOSPADM

## 2019-12-31 RX ORDER — DEXAMETHASONE SODIUM PHOSPHATE 10 MG/ML
INJECTION INTRAMUSCULAR; INTRAVENOUS
Status: DISCONTINUED | OUTPATIENT
Start: 2019-12-31 | End: 2019-12-31 | Stop reason: HOSPADM

## 2019-12-31 RX ORDER — FENTANYL CITRATE 50 UG/ML
INJECTION, SOLUTION INTRAMUSCULAR; INTRAVENOUS
Status: DISCONTINUED | OUTPATIENT
Start: 2019-12-31 | End: 2019-12-31 | Stop reason: HOSPADM

## 2019-12-31 RX ORDER — MIDAZOLAM HYDROCHLORIDE 1 MG/ML
INJECTION INTRAMUSCULAR; INTRAVENOUS
Status: DISCONTINUED | OUTPATIENT
Start: 2019-12-31 | End: 2019-12-31 | Stop reason: HOSPADM

## 2019-12-31 RX ORDER — SODIUM CHLORIDE 9 MG/ML
INJECTION, SOLUTION INTRAVENOUS CONTINUOUS
Status: DISCONTINUED | OUTPATIENT
Start: 2019-12-31 | End: 2019-12-31 | Stop reason: HOSPADM

## 2019-12-31 RX ORDER — LIDOCAINE HYDROCHLORIDE 10 MG/ML
INJECTION, SOLUTION EPIDURAL; INFILTRATION; INTRACAUDAL; PERINEURAL
Status: DISCONTINUED | OUTPATIENT
Start: 2019-12-31 | End: 2019-12-31 | Stop reason: HOSPADM

## 2019-12-31 RX ORDER — BUPIVACAINE HYDROCHLORIDE 2.5 MG/ML
INJECTION, SOLUTION EPIDURAL; INFILTRATION; INTRACAUDAL
Status: DISCONTINUED | OUTPATIENT
Start: 2019-12-31 | End: 2019-12-31 | Stop reason: HOSPADM

## 2019-12-31 RX ADMIN — SODIUM CHLORIDE: 9 INJECTION, SOLUTION INTRAVENOUS at 08:12

## 2019-12-31 NOTE — OP NOTE
PROCEDURE DATE: 12/31/2019    PROCEDURE: Right L4-5 and L5-S1 transforaminal epidural steroid injection under fluoroscopy    DIAGNOSIS: Lumbar disc displacement without myelopathy  Post op diagnosis: Same    PHYSICIAN: Bj Jones MD    MEDICATIONS INJECTED:  Dexamethasone 5mg (0.5ml) and 1.5ml 0.25% bupivicaine at each nerve root.     LOCAL ANESTHETIC INJECTED:  Lidocaine 1%. 2 ml per site.    SEDATION MEDICATIONS: RN IV sedation    ESTIMATED BLOOD LOSS:  None    COMPLICATIONS:  None    TECHNIQUE:   A time-out was taken to identify patient and procedure side prior to starting the procedure. The patient was placed in a prone position, prepped and draped in the usual sterile fashion using ChloraPrep and sterile towels.  The area to be injected was determined under fluoroscopic guidance in AP and oblique view.  Local anesthetic was given by raising a wheal and going down to the hub of a 25-gauge 1.5 inch needle.  In oblique view, a 3.5 inch 22-gauge bent-tip spinal needle was introduced towards 6 oclock position of the pedicle of each above named nerve root level.  The needle was walked medially then hinged into the neural foramen and position was confirmed in AP and lateral views.  1ml contrast dye was injected to confirm appropriate placement and that there was no vascular uptake.  After negative aspiration for blood or CSF, the medication was then injected. This was performed at the right L4-5 and L5-S1 level(s). The patient tolerated the procedure well.    The patient was monitored after the procedure.  Patient was given post procedure and discharge instructions to follow at home. The patient was discharged in a stable condition.

## 2019-12-31 NOTE — DISCHARGE INSTRUCTIONS
Pain injection instructions:     This procedure may take a couple weeks to relieve pain  You may get some pain relief from the local anesthetic initally.    No driving for 24 hrs.   Activity as tolerated- gradually increase activities.  Dont lift over 10 lbs for 24 hrs   No heat at injection sites x 2 days. No heating pads, hot tubs, saunas, or swimming in any body of water or pool for 2 days.  Use ice pack for mild swelling and for comfort , apply for 20 minutes, remove for 20 minute intervals. No direct contact of ice itself  to skin.  May shower today. No tub baths for two days.      Resume Aspirin, Plavix, or Coumadin the day after the procedure unless otherwise instructed.   If diabetic,monitor your glucose carefully as steroids can increase your glucose level    Seek immediate medical help for:   Severe increase in your usual pain or appearance of new pain.  Prolonged (mor than 8 hours) or increasing weakness or numbness in the legs or arms. Numbing medicine was injected and can affect the messages to and from the brain and legs or arms.  .    Fever above 101 ,Drainage,redness,active bleeding, or increased swelling at the injection site.  Headache, shortness of breath, chest pain, or breathing problems.    After Surgery:  Always be aware that any surgery can cause these symptoms:    Pain- Medication can be prescribed for pain to decrease your pain but may not completely take your pain away. Over the Counter pain medicine my be enough and you can always use Ice and rest to help ease pain.    Bleeding- a little bleeding after a surgery is usually within normal.  If there is a lot of blood you need to notify your MD.  Emergency treatments of bleeding are cold application, elevation of the bleeding site and compression.    Infection- Infection after surgery is NOT a normal occurrence.  Signs of infection are fever, swelling, hot to touch the incision.  If this occurs notify your MD immediately.    Nausea- this can  be common after a surgery especially if you have had anesthesia medicine or are taking pain medicine.  Steroids have a side effect of nausea sometimes. Staying on clear liquids, bland foods, gingerale, or over the counter anti nausea medicines can help.  If you vomit more than once, notify your MD.  Anti Nausea medicines can be prescribed.

## 2019-12-31 NOTE — DISCHARGE SUMMARY
[facility]  Discharge Note  Short Stay    Admit Date: 12/31/2019    Discharge Date and Time: 12/31/2019    Attending Physician: Bj Jones MD     Discharge Provider: Bj Jones    Diagnoses:  Active Hospital Problems    Diagnosis  POA    *Lumbar radiculitis [M54.16]  Yes      Resolved Hospital Problems   No resolved problems to display.       Hospital Course: Lumbar EDENILSON  Discharged Condition: Good    Final Diagnoses:   Active Hospital Problems    Diagnosis  POA    *Lumbar radiculitis [M54.16]  Yes      Resolved Hospital Problems   No resolved problems to display.       Disposition: Home or Self Care    Follow up/Patient Instructions:    Medications:  Reconciled Home Medications:      Medication List      CHANGE how you take these medications    diclofenac sodium 1 % Gel  Commonly known as:  Voltaren  APPLY 2 G TOPICALLY 3 (THREE) TIMES DAILY.  What changed:    · when to take this  · reasons to take this        CONTINUE taking these medications    * albuterol 90 mcg/actuation inhaler  Commonly known as:  PROVENTIL/VENTOLIN HFA  2 puffs every 4 hours as needed for cough, wheeze, or shortness of breath     * albuterol 1.25 mg/3 mL Nebu  Commonly known as:  ACCUNEB  Take 3 mLs (1.25 mg total) by nebulization 4 (four) times daily as needed. Rescue     amitriptyline 50 MG tablet  Commonly known as:  ELAVIL  Take 50 mg by mouth every evening.     aspirin 81 MG EC tablet  Commonly known as:  ECOTRIN  Take 81 mg by mouth once daily.     azelastine 137 mcg (0.1 %) nasal spray  Commonly known as:  ASTELIN  1 spray (137 mcg total) by Nasal route 2 (two) times daily.     blood sugar diagnostic Strp  Commonly known as:  FreeStyle Lite Strips  USE TO TEST BLOOD SUGAR TWICE A DAY     cetirizine 10 MG tablet  Commonly known as:  ZYRTEC  Take 10 mg by mouth once daily.     coenzyme Q10 100 mg capsule  Take 100 mg by mouth once daily.     DIABETIC TUSSIN DM ORAL  Take by mouth as needed.     digoxin 250 mcg  tablet  Commonly known as:  LANOXIN  Take 250 mcg by mouth once daily. 1/2 daily     estradiol 0.01 % (0.1 mg/gram) vaginal cream  Commonly known as:  ESTRACE  PLACE 3 GRAMS VAGINALLY TWICE A WEEK     fluticasone furoate-vilanterol 200-25 mcg/dose Dsdv diskus inhaler  Commonly known as:  Breo Ellipta  Inhale 1 puff into the lungs once daily. Controller     fluticasone propionate 50 mcg/actuation nasal spray  Commonly known as:  FLONASE  USE ONE SPRAY IN EACH NOSTRIL DAILY     isosorbide mononitrate 60 MG 24 hr tablet  Commonly known as:  IMDUR  Take 60 mg by mouth every evening.     lancets Misc  1 Units by Misc.(Non-Drug; Combo Route) route 2 (two) times daily.     Levothroid 25 MCG tablet  Generic drug:  levothyroxine  Take 25 mcg by mouth before breakfast. Every day     metaxalone 800 MG tablet  Commonly known as:  SKELAXIN  TAKE 1 TABLET TWICE A DAY     metFORMIN 500 MG tablet  Commonly known as:  GLUCOPHAGE  TAKE 1 TABLET TWICE A DAY WITH MEALS     Nitrostat 0.4 MG SL tablet  Generic drug:  nitroGLYCERIN  0.4mg Sublingual PRN .     POWDER BASE NO.196 (BULK) MISC  by Misc.(Non-Drug; Combo Route) route.     predniSONE 20 MG tablet  Commonly known as:  DELTASONE  One daily for 3 days and repeat for flare of lung symptoms as intructed     PreserVision AREDS [phone] unit-mg-unit Cap  Generic drug:  vitamins  A,C,E-zinc-copper  Take 1 capsule by mouth 2 (two) times daily.     PROMETHAZINE ORAL  Take by mouth.     Refresh 1 % ophthalmic solution  Generic drug:  carboxymethylcellulose  as directed     spironolactone 25 MG tablet  Commonly known as:  ALDACTONE     Theracran 650 mg Cap  Generic drug:  cranberry extract  Take 1 tablet by mouth 2 (two) times daily.     Toprol XL 25 MG 24 hr tablet  Generic drug:  metoprolol succinate  Take 25 mg by mouth once daily.     triazolam 0.125 MG tablet  Commonly known as:  HALCION  Take 1 tablet (0.125 mg total) by mouth nightly as needed.     Trilipix 135 mg Cpdr  Generic  drug:  fenofibric acid  1 capsule once daily. Every day     Tylenol Arthritis 650 MG Tbsr  Generic drug:  acetaminophen  2 Tablet(s) Oral  Every day.     Vitamin D3 50 mcg (2,000 unit) Cap  Generic drug:  cholecalciferol (vitamin D3)  Take 1 capsule by mouth once daily.     Zocor 20 MG tablet  Generic drug:  simvastatin  Take 20 mg by mouth every evening. Every day         * This list has 2 medication(s) that are the same as other medications prescribed for you. Read the directions carefully, and ask your doctor or other care provider to review them with you.              Discharge Procedure Orders   Call MD for:  temperature >100.4     Call MD for:  persistent nausea and vomiting or diarrhea     Call MD for:  severe uncontrolled pain     Call MD for:  redness, tenderness, or signs of infection (pain, swelling, redness, odor or green/yellow discharge around incision site)     Call MD for:  difficulty breathing or increased cough     Call MD for:  severe persistent headache        Follow up with MD in 2-3 weeks    Discharge Procedure Orders (must include Diet, Follow-up, Activity):   Discharge Procedure Orders (must include Diet, Follow-up, Activity)   Call MD for:  temperature >100.4     Call MD for:  persistent nausea and vomiting or diarrhea     Call MD for:  severe uncontrolled pain     Call MD for:  redness, tenderness, or signs of infection (pain, swelling, redness, odor or green/yellow discharge around incision site)     Call MD for:  difficulty breathing or increased cough     Call MD for:  severe persistent headache

## 2019-12-31 NOTE — PLAN OF CARE
Dc criteria met. Denies pain. Return to preop BLE noted with small amount of lingering numbness. Home with  .

## 2020-01-02 VITALS
BODY MASS INDEX: 25.21 KG/M2 | TEMPERATURE: 98 F | DIASTOLIC BLOOD PRESSURE: 75 MMHG | OXYGEN SATURATION: 95 % | HEART RATE: 87 BPM | HEIGHT: 62 IN | WEIGHT: 137 LBS | SYSTOLIC BLOOD PRESSURE: 155 MMHG | RESPIRATION RATE: 18 BRPM

## 2020-01-09 ENCOUNTER — OFFICE VISIT (OUTPATIENT)
Dept: UROLOGY | Facility: CLINIC | Age: 80
End: 2020-01-09
Payer: MEDICARE

## 2020-01-09 VITALS
DIASTOLIC BLOOD PRESSURE: 61 MMHG | HEART RATE: 96 BPM | BODY MASS INDEX: 25.1 KG/M2 | HEIGHT: 62 IN | WEIGHT: 136.38 LBS | SYSTOLIC BLOOD PRESSURE: 115 MMHG

## 2020-01-09 DIAGNOSIS — R31.0 GROSS HEMATURIA: Primary | ICD-10-CM

## 2020-01-09 DIAGNOSIS — N39.0 RECURRENT UTI: ICD-10-CM

## 2020-01-09 LAB
BILIRUB SERPL-MCNC: ABNORMAL MG/DL
BLOOD URINE, POC: ABNORMAL
COLOR, POC UA: YELLOW
GLUCOSE UR QL STRIP: ABNORMAL
KETONES UR QL STRIP: ABNORMAL
LEUKOCYTE ESTERASE URINE, POC: ABNORMAL
NITRITE, POC UA: ABNORMAL
PH, POC UA: 5
PROTEIN, POC: ABNORMAL
SPECIFIC GRAVITY, POC UA: 1.02
UROBILINOGEN, POC UA: ABNORMAL

## 2020-01-09 PROCEDURE — 99215 PR OFFICE/OUTPT VISIT, EST, LEVL V, 40-54 MIN: ICD-10-PCS | Mod: 25,S$GLB,, | Performed by: UROLOGY

## 2020-01-09 PROCEDURE — 1159F MED LIST DOCD IN RCRD: CPT | Mod: S$GLB,,, | Performed by: UROLOGY

## 2020-01-09 PROCEDURE — 81001 URINALYSIS AUTO W/SCOPE: CPT | Mod: S$GLB,,, | Performed by: UROLOGY

## 2020-01-09 PROCEDURE — 81002 URINALYSIS NONAUTO W/O SCOPE: CPT | Mod: S$GLB,,, | Performed by: UROLOGY

## 2020-01-09 PROCEDURE — 99215 OFFICE O/P EST HI 40 MIN: CPT | Mod: 25,S$GLB,, | Performed by: UROLOGY

## 2020-01-09 PROCEDURE — 81002 POCT URINE DIPSTICK WITHOUT MICROSCOPE: ICD-10-PCS | Mod: S$GLB,,, | Performed by: UROLOGY

## 2020-01-09 PROCEDURE — 99999 PR PBB SHADOW E&M-EST. PATIENT-LVL V: ICD-10-PCS | Mod: PBBFAC,,, | Performed by: UROLOGY

## 2020-01-09 PROCEDURE — 1159F PR MEDICATION LIST DOCUMENTED IN MEDICAL RECORD: ICD-10-PCS | Mod: S$GLB,,, | Performed by: UROLOGY

## 2020-01-09 PROCEDURE — 99999 PR PBB SHADOW E&M-EST. PATIENT-LVL V: CPT | Mod: PBBFAC,,, | Performed by: UROLOGY

## 2020-01-09 PROCEDURE — 81001 POCT URINE DIP WITH MICROSCOPIC: ICD-10-PCS | Mod: S$GLB,,, | Performed by: UROLOGY

## 2020-01-09 RX ORDER — LIDOCAINE HYDROCHLORIDE 20 MG/ML
JELLY TOPICAL ONCE
Status: CANCELLED | OUTPATIENT
Start: 2020-01-09 | End: 2020-01-09

## 2020-01-09 RX ORDER — ESTRADIOL 0.1 MG/G
CREAM VAGINAL
Qty: 42.5 G | Refills: 1 | Status: ON HOLD | OUTPATIENT
Start: 2020-01-09 | End: 2020-01-27 | Stop reason: HOSPADM

## 2020-01-09 NOTE — PROGRESS NOTES
[facility] Note - [place]  Staff: MD Kamari    Referring provider and please cc:   No referring provider defined for this encounter.     PCP: Oumar Almonte Jr, MD    MyChart Utilization: inactive    Chief Complaint:   Chief Complaint   Patient presents with    Follow-up     dr pablo pt         Subjective:        HPI: Darlin Tipton is a 79 y.o. female     Seen by Uriel since  for small R renal cyst and cystitis (tx with theracran BID) and MH. Has 3 neg urine cytologies. Last one was  and was neg. Also previously seen for OAB but has not been seen since 3/2018. . Had a anterior repair years ago. No mesh. Denies prolapse.     Interval history:   Continues to have GH (pink urine in toilet) a few x a week - has had this for years (more than 5 years). Does have regular paps, last pap was  and was neg. Still has uterus. Has not had a herpes outbreak in years. Has not had a uti in years.   Was on myrbetriq for OAB but doesn't use anymore (previously used for nocturia) and no longer has this issue. Wears 1 pad a day for vaginal discharge (not urine leakage). Does have urgency.     Using estrace cream BID        ua void: neg  Urine history:  19  No cx, void: 1+prot, 1 rbc/few bact  3/14/18 No cx, void: tr prot  17 P.mirabilis, void: nit+/2+wbc/250 bld  3/29/17 No cx, void: 50 gluco  3/6/17  No c,x void: tr bld, cytology: neg  3/7/16  No cx, void: neg  2/5/15  Cytology: neg  12/15/15 No cx, void: neg   10/23/14 E.coli, void: neg  10/3/13 No cx, void: tr bld  9/10/13 Ng  12 Multiple org  11 Ng, cytology: neg, pvr by I&O: 100cc  11/2/10 Multiple org  8/16/10 >100k e.coli  2/11/10 Ng  11/3/09 E.co          REVIEW OF SYSTEMS:  General ROS: no fevers, no chills  Psychological ROS: no depression  Endocrine ROS: no heat or cold  Respiratory ROS: no SOB  Cardiovascular ROS: no CP  Gastrointestinal ROS: no abdominal pain, no constipation, no diarrhea,  noBRBPR  Musculoskeletal ROS: no muscle pain  Neurological ROS: no headaches  Dermatological ROS: no rashes  HEENT: +glasses, no sinus   ROS: per HPI     PMHx:  Past Medical History:   Diagnosis Date    Allergy     Dust mites, Grasses, Trees    Arthritis     Asthma     Blood transfusion     CAD (coronary artery disease)     Cataract     CHRONIC BRONCHITIS     Diabetes mellitus     Diabetes mellitus type II     GERD (gastroesophageal reflux disease)     Hyperlipidemia     Hypertension     Irregular heart beat     Spinal stenosis     Thyroid disease     Hypothyroidism       PSHx:  Past Surgical History:   Procedure Laterality Date    APPENDECTOMY  1968    BLADDER SUSPENSION  1989    CARDIAC SURGERY      CABG    CATARACT EXTRACTION  9/2007 (L) and 10/2207 (R)    COLONOSCOPY N/A 10/18/2017    Procedure: COLONOSCOPY;  Surgeon: Esme Acuna MD;  Location: [facility] ENDO;  Service: Endoscopy;  Laterality: N/A;    CORONARY ARTERY BYPASS GRAFT  4/26/2004    x5    ESOPHAGEAL DILATION      SPINE SURGERY  3/2000    Tumor    TRANSFORAMINAL EPIDURAL INJECTION OF STEROID Right 11/21/2019    Procedure: Injection,steroid,epidural,transforaminal approach;  Surgeon: Bj Jones MD;  Location: [facility] OR;  Service: Pain Management;  Laterality: Right;  L4-5, L5-S1    TRANSFORAMINAL EPIDURAL INJECTION OF STEROID Right 12/31/2019    Procedure: Injection,steroid,epidural,transforaminal approach;  Surgeon: Bj Jones MD;  Location: [facility] OR;  Service: Pain Management;  Laterality: Right;  L4-5, L5-S1    WRIST SURGERY  1993       Stents/Valves/Foreign Bodies/Cardiac Evaluation/Cardiologist:   GI: , last colonoscopy:    Family History   Problem Relation Age of Onset    Breast cancer Mother     Breast cancer Maternal Aunt     Allergic rhinitis Neg Hx     Allergies Neg Hx     Angioedema Neg Hx     Asthma Neg Hx     Eczema Neg Hx     Immunodeficiency Neg Hx     Urticaria Neg Hx      Rhinitis Neg Hx     Atopy Neg Hx       malignancies: none.   kidney stones: none    Soc Hx:  Social History     Tobacco Use    Smoking status: Never Smoker    Smokeless tobacco: Never Used   Substance Use Topics    Alcohol use: Yes     Comment: Rare    Drug use: No       Lives in: [place]  :  Children:4  Patient's occupation: housewife     Allergies:  Sulfa (sulfonamide antibiotics); Cefaclor; Disalcid [salsalate]; Fenofibrate micronized; Nitrofurantoin macrocrystalline; and Phenylfenesin la [phenylpropanolamine-gg]    Anticoagulation/Aspirin: asa 81mg daily     Objective:     Vitals:    01/09/20 1410   BP: 115/61   Pulse: 96       General:WDWN in NAD  Eyes: PERRLA, normal conjunctiva  Respiratory: no increased work on breathing. No wheezing.   Cardiovascular: No obvious extremity edema. Warm and well perfused.   GI: no palpation of masses. No tenderness. No hepatosplenomegaly to palpation.  Musculoskeletal: normal range of motion of bilateral upper extremities. Normal muscle strength and tone.  Skin: no obvious rashes or lesions. No tightening of skin noted.  Neurologic: CN grossly normal. Normal sensation.   Psychiatric: awake, alert and oriented x 3. Mood and affect normal. Cooperative.    Pelvic exam 1/9/19    External Genitalia: normal hair distribution, no lesions  Urethral meatus: normal without prolapse, no caruncle  Urethra: without tenderness or mass  Bladder: without fulness or tenderness  Vagina: normal appearing. No lesions. +anterior prolapse.   Anus and perineum: appear normal    Levator ani tenderness: none  No blood on speculum         LABS REVIEW:  Recent Labs   Lab 12/22/17  1448   WBC 5.21   Hemoglobin 11.4 L   Hematocrit 37.3   Platelets 278   ]  Recent Labs   Lab 12/22/17  1448 07/09/19  1017   Sodium 137 138   Potassium 4.1 4.6   Chloride 101 102   CO2 27 26   BUN, Bld 26 H 37 H   Creatinine 1.0 1.2   Glucose 121 H 133 H   Calcium 10.0 9.9   Alkaline Phosphatase  --  33 L    Total Protein  --  7.0   Albumin  --  3.9   Total Bilirubin  --  0.3   AST  --  20   ALT  --  24   ]    Lab Results   Component Value Date    HGBA1C 6.4 (H) 07/09/2019        Recent Pertinent urologic PATHOLOGY REVIEW:  Voided urine 3/6/17  Negative for malignant cells.  Crystals present.    2/5/15 urine cytology  BENIGN SQUAMOUS AND TRANSITONAL EPITHELIUM.  RARE RED BLOOD CELLS.  NEGATIVE FOR MALIGNANT CELLS.    12/21/11 FINAL PATHOLOGIC DIAGNOSIS  Negative for malignant cells.  Crystals present.  Red blood cells present  li        Recent Pertinent Urologic RADIOGRAPHIC REVIEW: (remainder of images reviewed)  rbus 3/10/17  Findings: The kidneys are normal in size with the right measuring 11.1 cm in length and the left 9.4 cm.  A simple cyst is present within the midpole of the right kidney measuring 1 cm.  No stone, mass or, or hydronephrosis.  The urinary bladder is unremarkable.    Us abdomen 3/11/15  Sagittal and transverse images are obtained through the right abdomen.    Liver is not enlarged but displays increased echogenicity consistent with fatty infiltration.  A small 1.3-cm cyst is seen in the left lobe corresponding to the finding on prior CT scan of 03/09/15.  Other liver masses are not seen.    The gallbladder is of normal size without ultrasound evidence of stone.  The common bile duct is not dilated at 3 mm.  The right kidney measured 11 cm in length and contains a 1.1 cm cyst at the lower pole.  No solid mass or hydronephrosis is noted.  The   pancreas and aorta are obscured by overlying gas'    ctap w 3/9/15  Sternotomy wires are seen.  Mild scarring and atelectasis are present at the lung bases.  There is moderate calcification of the aorta without aneurysm.    There is a 7-mm hypodense focus within the lateral segment of the left hepatic lobe which is too small to characterize.  The spleen, pancreas, gallbladder, and adrenal glands are unremarkable.    A few subcentimeter hypodense foci of the  kidneys are seen which are too small to characterize but likely reflect small cysts.  There is no hydronephrosis.    The small bowel is normal caliber.  The appendix is not seen however there is no right lower quadrant inflammatory change to suggest appendicitis.  The colon is unremarkable.    No free air, free fluid, or lymphadenopathy.  The urinary bladder, uterus, and adnexa are unremarkable.    The bones are osteopenic.  Thoracolumbar scoliosis and advanced degenerative change of the lumbar spine and hips is noted.    rbus 2/9/15  The kidneys are normal in size, cortical thickness, and cortical echogenicity.  The right kidney measures 11 x 5.2 x 5.8 cm and the left kidney measures 9.8 x 4.5 x 4.9 cm.  A simple cyst measuring 1.0-cm is present in the lower pole of the right kidney.    No solid renal masses, calculi, or hydronephrosis are present.  The perinephric soft tissues are unremarkable.    4/26/12 rbus   The right kidney measures 11.3 x 5.3 x 5.7 cm.  The left kidney measures 10.6 x 3.9 x 4.8 cm    There is good definition of the cortical medullary junction and there is no hydronephrosis    There is 7 x 8 x 9 mm right renal cyst.  This corresponds as seen on CT 2/15/2010.    The urinary bladder was scanned and was mildly distended at time of exam with ureteral jets not noted.    Liver appears of increased echogenicity consistent with  fatty infiltration        Assessment:       1. Gross hematuria    2. Recurrent UTI          Plan:     Gross hematuria  -     POCT URINE DIPSTICK WITHOUT MICROSCOPE    continue hormone cream twice a week to prevent uti's- refilled for estrace cream today for 1 year  For blood in urine (although ua neg, with normal vaginal exam) proceed with workup  Ctu  scan and cystoscopy (previously had proteus- look for sotne)   Holding off on urine cytology  cystoscopy scheduled for jan 27  Still questionable from uterus??   No urine needed 1 week prior     Otherwise no oab  Should f/u 1x  a year       Babs Benites MD

## 2020-01-09 NOTE — H&P (VIEW-ONLY)
[facility] Note - [place]  Staff: MD Kamari    Referring provider and please cc:   No referring provider defined for this encounter.     PCP: Oumar Almonte Jr, MD    MyChart Utilization: inactive    Chief Complaint:   Chief Complaint   Patient presents with    Follow-up     dr pablo pt         Subjective:        HPI: Darlin Tipton is a 79 y.o. female     Seen by Uriel since  for small R renal cyst and cystitis (tx with theracran BID) and MH. Has 3 neg urine cytologies. Last one was  and was neg. Also previously seen for OAB but has not been seen since 3/2018. . Had a anterior repair years ago. No mesh. Denies prolapse.     Interval history:   Continues to have GH (pink urine in toilet) a few x a week - has had this for years (more than 5 years). Does have regular paps, last pap was  and was neg. Still has uterus. Has not had a herpes outbreak in years. Has not had a uti in years.   Was on myrbetriq for OAB but doesn't use anymore (previously used for nocturia) and no longer has this issue. Wears 1 pad a day for vaginal discharge (not urine leakage). Does have urgency.     Using estrace cream BID        ua void: neg  Urine history:  19  No cx, void: 1+prot, 1 rbc/few bact  3/14/18 No cx, void: tr prot  17 P.mirabilis, void: nit+/2+wbc/250 bld  3/29/17 No cx, void: 50 gluco  3/6/17  No c,x void: tr bld, cytology: neg  3/7/16  No cx, void: neg  2/5/15  Cytology: neg  12/15/15 No cx, void: neg   10/23/14 E.coli, void: neg  10/3/13 No cx, void: tr bld  9/10/13 Ng  12 Multiple org  11 Ng, cytology: neg, pvr by I&O: 100cc  11/2/10 Multiple org  8/16/10 >100k e.coli  2/11/10 Ng  11/3/09 E.co          REVIEW OF SYSTEMS:  General ROS: no fevers, no chills  Psychological ROS: no depression  Endocrine ROS: no heat or cold  Respiratory ROS: no SOB  Cardiovascular ROS: no CP  Gastrointestinal ROS: no abdominal pain, no constipation, no diarrhea,  noBRBPR  Musculoskeletal ROS: no muscle pain  Neurological ROS: no headaches  Dermatological ROS: no rashes  HEENT: +glasses, no sinus   ROS: per HPI     PMHx:  Past Medical History:   Diagnosis Date    Allergy     Dust mites, Grasses, Trees    Arthritis     Asthma     Blood transfusion     CAD (coronary artery disease)     Cataract     CHRONIC BRONCHITIS     Diabetes mellitus     Diabetes mellitus type II     GERD (gastroesophageal reflux disease)     Hyperlipidemia     Hypertension     Irregular heart beat     Spinal stenosis     Thyroid disease     Hypothyroidism       PSHx:  Past Surgical History:   Procedure Laterality Date    APPENDECTOMY  1968    BLADDER SUSPENSION  1989    CARDIAC SURGERY      CABG    CATARACT EXTRACTION  9/2007 (L) and 10/2207 (R)    COLONOSCOPY N/A 10/18/2017    Procedure: COLONOSCOPY;  Surgeon: Esme Acuna MD;  Location: [facility] ENDO;  Service: Endoscopy;  Laterality: N/A;    CORONARY ARTERY BYPASS GRAFT  4/26/2004    x5    ESOPHAGEAL DILATION      SPINE SURGERY  3/2000    Tumor    TRANSFORAMINAL EPIDURAL INJECTION OF STEROID Right 11/21/2019    Procedure: Injection,steroid,epidural,transforaminal approach;  Surgeon: Bj Jones MD;  Location: [facility] OR;  Service: Pain Management;  Laterality: Right;  L4-5, L5-S1    TRANSFORAMINAL EPIDURAL INJECTION OF STEROID Right 12/31/2019    Procedure: Injection,steroid,epidural,transforaminal approach;  Surgeon: Bj Jones MD;  Location: [facility] OR;  Service: Pain Management;  Laterality: Right;  L4-5, L5-S1    WRIST SURGERY  1993       Stents/Valves/Foreign Bodies/Cardiac Evaluation/Cardiologist:   GI: , last colonoscopy:    Family History   Problem Relation Age of Onset    Breast cancer Mother     Breast cancer Maternal Aunt     Allergic rhinitis Neg Hx     Allergies Neg Hx     Angioedema Neg Hx     Asthma Neg Hx     Eczema Neg Hx     Immunodeficiency Neg Hx     Urticaria Neg Hx      Rhinitis Neg Hx     Atopy Neg Hx       malignancies: none.   kidney stones: none    Soc Hx:  Social History     Tobacco Use    Smoking status: Never Smoker    Smokeless tobacco: Never Used   Substance Use Topics    Alcohol use: Yes     Comment: Rare    Drug use: No       Lives in: [place]  :  Children:4  Patient's occupation: housewife     Allergies:  Sulfa (sulfonamide antibiotics); Cefaclor; Disalcid [salsalate]; Fenofibrate micronized; Nitrofurantoin macrocrystalline; and Phenylfenesin la [phenylpropanolamine-gg]    Anticoagulation/Aspirin: asa 81mg daily     Objective:     Vitals:    01/09/20 1410   BP: 115/61   Pulse: 96       General:WDWN in NAD  Eyes: PERRLA, normal conjunctiva  Respiratory: no increased work on breathing. No wheezing.   Cardiovascular: No obvious extremity edema. Warm and well perfused.   GI: no palpation of masses. No tenderness. No hepatosplenomegaly to palpation.  Musculoskeletal: normal range of motion of bilateral upper extremities. Normal muscle strength and tone.  Skin: no obvious rashes or lesions. No tightening of skin noted.  Neurologic: CN grossly normal. Normal sensation.   Psychiatric: awake, alert and oriented x 3. Mood and affect normal. Cooperative.    Pelvic exam 1/9/19    External Genitalia: normal hair distribution, no lesions  Urethral meatus: normal without prolapse, no caruncle  Urethra: without tenderness or mass  Bladder: without fulness or tenderness  Vagina: normal appearing. No lesions. +anterior prolapse.   Anus and perineum: appear normal    Levator ani tenderness: none  No blood on speculum         LABS REVIEW:  Recent Labs   Lab 12/22/17  1448   WBC 5.21   Hemoglobin 11.4 L   Hematocrit 37.3   Platelets 278   ]  Recent Labs   Lab 12/22/17  1448 07/09/19  1017   Sodium 137 138   Potassium 4.1 4.6   Chloride 101 102   CO2 27 26   BUN, Bld 26 H 37 H   Creatinine 1.0 1.2   Glucose 121 H 133 H   Calcium 10.0 9.9   Alkaline Phosphatase  --  33 L    Total Protein  --  7.0   Albumin  --  3.9   Total Bilirubin  --  0.3   AST  --  20   ALT  --  24   ]    Lab Results   Component Value Date    HGBA1C 6.4 (H) 07/09/2019        Recent Pertinent urologic PATHOLOGY REVIEW:  Voided urine 3/6/17  Negative for malignant cells.  Crystals present.    2/5/15 urine cytology  BENIGN SQUAMOUS AND TRANSITONAL EPITHELIUM.  RARE RED BLOOD CELLS.  NEGATIVE FOR MALIGNANT CELLS.    12/21/11 FINAL PATHOLOGIC DIAGNOSIS  Negative for malignant cells.  Crystals present.  Red blood cells present  li        Recent Pertinent Urologic RADIOGRAPHIC REVIEW: (remainder of images reviewed)  rbus 3/10/17  Findings: The kidneys are normal in size with the right measuring 11.1 cm in length and the left 9.4 cm.  A simple cyst is present within the midpole of the right kidney measuring 1 cm.  No stone, mass or, or hydronephrosis.  The urinary bladder is unremarkable.    Us abdomen 3/11/15  Sagittal and transverse images are obtained through the right abdomen.    Liver is not enlarged but displays increased echogenicity consistent with fatty infiltration.  A small 1.3-cm cyst is seen in the left lobe corresponding to the finding on prior CT scan of 03/09/15.  Other liver masses are not seen.    The gallbladder is of normal size without ultrasound evidence of stone.  The common bile duct is not dilated at 3 mm.  The right kidney measured 11 cm in length and contains a 1.1 cm cyst at the lower pole.  No solid mass or hydronephrosis is noted.  The   pancreas and aorta are obscured by overlying gas'    ctap w 3/9/15  Sternotomy wires are seen.  Mild scarring and atelectasis are present at the lung bases.  There is moderate calcification of the aorta without aneurysm.    There is a 7-mm hypodense focus within the lateral segment of the left hepatic lobe which is too small to characterize.  The spleen, pancreas, gallbladder, and adrenal glands are unremarkable.    A few subcentimeter hypodense foci of the  kidneys are seen which are too small to characterize but likely reflect small cysts.  There is no hydronephrosis.    The small bowel is normal caliber.  The appendix is not seen however there is no right lower quadrant inflammatory change to suggest appendicitis.  The colon is unremarkable.    No free air, free fluid, or lymphadenopathy.  The urinary bladder, uterus, and adnexa are unremarkable.    The bones are osteopenic.  Thoracolumbar scoliosis and advanced degenerative change of the lumbar spine and hips is noted.    rbus 2/9/15  The kidneys are normal in size, cortical thickness, and cortical echogenicity.  The right kidney measures 11 x 5.2 x 5.8 cm and the left kidney measures 9.8 x 4.5 x 4.9 cm.  A simple cyst measuring 1.0-cm is present in the lower pole of the right kidney.    No solid renal masses, calculi, or hydronephrosis are present.  The perinephric soft tissues are unremarkable.    4/26/12 rbus   The right kidney measures 11.3 x 5.3 x 5.7 cm.  The left kidney measures 10.6 x 3.9 x 4.8 cm    There is good definition of the cortical medullary junction and there is no hydronephrosis    There is 7 x 8 x 9 mm right renal cyst.  This corresponds as seen on CT 2/15/2010.    The urinary bladder was scanned and was mildly distended at time of exam with ureteral jets not noted.    Liver appears of increased echogenicity consistent with  fatty infiltration        Assessment:       1. Gross hematuria    2. Recurrent UTI          Plan:     Gross hematuria  -     POCT URINE DIPSTICK WITHOUT MICROSCOPE    continue hormone cream twice a week to prevent uti's- refilled for estrace cream today for 1 year  For blood in urine (although ua neg, with normal vaginal exam) proceed with workup  Ctu  scan and cystoscopy (previously had proteus- look for sotne)   Holding off on urine cytology  cystoscopy scheduled for jan 27  Still questionable from uterus??   No urine needed 1 week prior     Otherwise no oab  Should f/u 1x  a year       Babs Benites MD

## 2020-01-20 ENCOUNTER — HOSPITAL ENCOUNTER (OUTPATIENT)
Dept: RADIOLOGY | Facility: HOSPITAL | Age: 80
Discharge: HOME | End: 2020-01-20
Attending: UROLOGY
Payer: MEDICARE

## 2020-01-20 DIAGNOSIS — R31.0 GROSS HEMATURIA: ICD-10-CM

## 2020-01-20 DIAGNOSIS — N39.0 RECURRENT UTI: ICD-10-CM

## 2020-01-20 PROCEDURE — 74178 CT UROGRAM ABD PELVIS W WO: ICD-10-PCS | Mod: 26,,, | Performed by: RADIOLOGY

## 2020-01-20 PROCEDURE — 74178 CT ABD&PLV WO CNTR FLWD CNTR: CPT | Mod: 26,,, | Performed by: RADIOLOGY

## 2020-01-20 PROCEDURE — 25500020 PHARM REV CODE 255: Performed by: UROLOGY

## 2020-01-20 PROCEDURE — 74178 CT ABD&PLV WO CNTR FLWD CNTR: CPT | Mod: TC

## 2020-01-20 RX ADMIN — IOHEXOL 75 ML: 350 INJECTION, SOLUTION INTRAVENOUS at 10:01

## 2020-01-24 DIAGNOSIS — R09.89 CHRONIC SINUS COMPLAINTS: ICD-10-CM

## 2020-01-24 DIAGNOSIS — J45.30 MILD PERSISTENT ASTHMA WITHOUT COMPLICATION: ICD-10-CM

## 2020-01-24 DIAGNOSIS — R05.3 CHRONIC COUGH: ICD-10-CM

## 2020-01-24 DIAGNOSIS — J41.8 MIXED SIMPLE AND MUCOPURULENT CHRONIC BRONCHITIS: ICD-10-CM

## 2020-01-24 RX ORDER — AZELASTINE 1 MG/ML
1 SPRAY, METERED NASAL 2 TIMES DAILY
Qty: 90 ML | Refills: 3 | Status: SHIPPED | OUTPATIENT
Start: 2020-01-24 | End: 2022-12-12

## 2020-01-26 ENCOUNTER — PATIENT OUTREACH (OUTPATIENT)
Dept: ADMINISTRATIVE | Facility: OTHER | Age: 80
End: 2020-01-26

## 2020-01-26 DIAGNOSIS — E11.21 TYPE 2 DIABETES MELLITUS WITH DIABETIC NEPHROPATHY, WITHOUT LONG-TERM CURRENT USE OF INSULIN: Primary | ICD-10-CM

## 2020-01-27 ENCOUNTER — TELEPHONE (OUTPATIENT)
Dept: UROLOGY | Facility: CLINIC | Age: 80
End: 2020-01-27

## 2020-01-27 ENCOUNTER — HOSPITAL ENCOUNTER (OUTPATIENT)
Facility: AMBULARY SURGERY CENTER | Age: 80
Discharge: HOME | End: 2020-01-27
Attending: UROLOGY | Admitting: UROLOGY
Payer: MEDICARE

## 2020-01-27 DIAGNOSIS — N28.1 HYPERDENSE RENAL CYST: Primary | ICD-10-CM

## 2020-01-27 DIAGNOSIS — R31.0 GROSS HEMATURIA: ICD-10-CM

## 2020-01-27 LAB
BACTERIA SPEC CULT: NORMAL
BILIRUB SERPL-MCNC: NORMAL MG/DL
BLOOD URINE, POC: NORMAL
CASTS: NORMAL
COLOR, POC UA: NORMAL
CRYSTALS: NORMAL
GLUCOSE UR QL STRIP: NORMAL
KETONES UR QL STRIP: NORMAL
LEUKOCYTE ESTERASE URINE, POC: NORMAL
NITRITE, POC UA: NORMAL
PH, POC UA: 5
PROTEIN, POC: NORMAL
RBC CELLS COUNTED: NORMAL
SPECIFIC GRAVITY, POC UA: 1.01
UROBILINOGEN, POC UA: NORMAL
WHITE BLOOD CELLS: NORMAL

## 2020-01-27 PROCEDURE — 52000 CYSTOURETHROSCOPY: CPT | Performed by: UROLOGY

## 2020-01-27 PROCEDURE — 52000 PR CYSTOURETHROSCOPY: ICD-10-PCS | Mod: ,,, | Performed by: UROLOGY

## 2020-01-27 PROCEDURE — 52000 CYSTOURETHROSCOPY: CPT | Mod: ,,, | Performed by: UROLOGY

## 2020-01-27 RX ORDER — WATER 1 ML/ML
IRRIGANT IRRIGATION
Status: DISCONTINUED | OUTPATIENT
Start: 2020-01-27 | End: 2020-01-27 | Stop reason: HOSPADM

## 2020-01-27 RX ORDER — CIPROFLOXACIN 500 MG/1
500 TABLET ORAL ONCE
Status: COMPLETED | OUTPATIENT
Start: 2020-01-27 | End: 2020-01-27

## 2020-01-27 RX ORDER — LIDOCAINE HYDROCHLORIDE 20 MG/ML
JELLY TOPICAL
Status: DISCONTINUED | OUTPATIENT
Start: 2020-01-27 | End: 2020-01-27 | Stop reason: HOSPADM

## 2020-01-27 RX ADMIN — CIPROFLOXACIN 500 MG: 500 TABLET ORAL at 03:01

## 2020-01-27 NOTE — OP NOTE
[facility] [place] Procedure Note- [facility]  Date: 01/27/2020    Procedure:   1. Flexible cysto-uretheroscopy     Pre Procedure Diagnosis: pelvic bleeding    Post Procedure Diagnosis: same, see below for findings    Surgeon: Babs Benites MD    Specimen:  none    Anesthesia: 2% uro-jet lidocaine jelly for local analgesia    Indications: Darlin Tipton is a 79 y.o. female  With above pre-procedure diagnosis. Urine reviewed. H&P reviewed.     Procedure in detail:   Flexible cysto-urethroscopy was performed after consent was obtained.  The risks and benefits were explained.    2% lidocaine urojet was used for local analgesia.  The genitalia was prepped and draped in the sterile fashion with betadine.    The flexible scope was advanced into the urethra and into the bladder.  Bilateral ureteral orifice were evaluated and noted to be normal with clear efflux.  The bladder was completely surveyed in a systematic fashion and the scope was retroflexed.    Cystoscopy findings as below in findings.   No strictures were noted.     Cystoscope placed in vagina with no abnormal findings.      The patient tolerated the procedure well without complication.    Findings: (pictures were uploaded into media)  Cystoscopy showed no tumors. Bladder neck with cystitis  Urethra normal  Vaginoscopy with no abnormal findings     Assesment: Darlin Tipton is a 79 y.o. female with pelvic bleeding, does not appear to be urologic in origin (negative ct, negative cysto). Vaginoscopy no obvious findings.      Plan:    Pt was c/o pink urine but ua's were neg. ctu showed no stones. Cystoscopy negative today. On hormone cream fro recurrent uti's, discontinuing and sending back to gyn to ensure no vaginal bleeding. ctu images avail on epic, pt may need to bring copy of  does not have access    Fax a copy of my last note and my cysto to dr barron   Pt should go to [facility] now for copy of her ctu on a cd- can you call over there and  ask them to get wtarted on this for her?  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

## 2020-01-27 NOTE — DISCHARGE INSTRUCTIONS
Your urologist is: Dr.Jennifer Benites  Office number: [phone]  Address: [address] [place] [address], [address], [facility] [address]      PLEASE READ the following directions and contact our office with any questions via phone or the portal. If you were told that you need an appointment and no appointment was made, call the office the day after surgery and ask to speak with 's nurse to make an appointment.  · Pt was c/o pink urine but ua's were neg. ctu showed no stones. Cystoscopy negative today. On hormoen cream fro recurrent uti's, discontinuing and sending back to gyn to ensure no vaginal bleeding. ctu images avail on epic, pt may need to bring copy of  does not have access   ·   · cipro x 1 in recovery  ·   · Pt should go to [facility] now for copy of her ctu on a cd- can you call over there and ask them to get wtarted on this for her?  ·   · Follow up in 1 year with a rbus prior to monitor complex cyst in L kidney   ·   · Discontinue estrace         After the procedure    · Drink plenty of fluids.  · You may have burning or light bleeding when you urinate--this is normal.  · Medications may be prescribed to ease any discomfort or prevent infection. Take these as directed.  · Call your doctor if you have heavy bleeding or blood clots, burning that lasts more than a day, a fever over 100°F  (38° C), or trouble urinating.    After Surgery:  Always be aware that any surgery can cause these symptoms:    Pain- Medication can be prescribed for pain to decrease your pain but may not completely take your pain away.  Over the Counter pain medicine my be enough and you can always use Ice and rest to help ease pain.    Bleeding- a little bleeding after a surgery is usually within normal.  If there is a lot of blood you need to notify your MD.  Emergency treatments of bleeding are cold application, elevation of the bleeding site and compression.    Infection- Infection after surgery is NOT a normal  occurrence.  Signs of infection are fever, swelling, hot to touch the incision.  If this occurs notify your MD immediately.    Nausea- this can be common after a surgery especially if you have had anesthesia medicine or are taking pain medicine.  Staying on clear liquids, bland foods, gingerale, or over the counter anti nausea medicines can help.  If you vomit more than once, notify your MD.  Anti Nausea medicines can be prescribed.

## 2020-01-27 NOTE — DISCHARGE SUMMARY
[facility]  Urology  Discharge Note - Short Stay      Patient Name: Darlin Tipton  MRN: [id number]  Discharge Date and Time:  01/27/2020 3:05 PM  Attending Physician: Babs Benites,*   Discharging Provider: Babs Benites MD  Primary Care Physician: Oumar Almonte Jr, MD    Final Active Diagnoses:    Diagnosis Date Noted POA    Gross hematuria [R31.0] 01/27/2020 Yes      Problems Resolved During this Admission:       Final Diagnoses: Same as principal problem.    Hospital Course: Patient was admitted for an outpatient procedure and tolerated the procedure well with no complications.*    Procedure(s) (LRB):  CYSTOSCOPY (N/A)     Indwelling Lines/Drains at time of discharge:   Lines/Drains/Airways     None                 Discharged Condition: good    Disposition: home    Follow Up:      Patient Instructions:      US Retroperitoneal Complete (Kidney and   Standing Status: Future Standing Exp. Date: 01/27/21     Order Specific Question Answer Comments   Reason for Exam: L hyperdense cyst    May the Radiologist modify the order per protocol to meet the clinical needs of the patient? Yes        Medications:  Reconciled Home Medications:      Medication List      CHANGE how you take these medications    diclofenac sodium 1 % Gel  Commonly known as:  Voltaren  APPLY 2 G TOPICALLY 3 (THREE) TIMES DAILY.  What changed:    · when to take this  · reasons to take this        CONTINUE taking these medications    * albuterol 1.25 mg/3 mL Nebu  Commonly known as:  ACCUNEB  Take 3 mLs (1.25 mg total) by nebulization 4 (four) times daily as needed. Rescue     * albuterol sulfate 90 mcg/actuation Aepb  Commonly known as:  ProAir RespiClick  Inhale 2 puffs into the lungs every 4 (four) hours as needed (shortness of breath). Rescue     amitriptyline 50 MG tablet  Commonly known as:  ELAVIL  Take 50 mg by mouth every evening.     aspirin 81 MG EC tablet  Commonly known as:  ECOTRIN  Take 81 mg by  mouth once daily.     azelastine 137 mcg (0.1 %) nasal spray  Commonly known as:  ASTELIN  1 spray (137 mcg total) by Nasal route 2 (two) times daily.     blood sugar diagnostic Strp  Commonly known as:  FreeStyle Lite Strips  USE TO TEST BLOOD SUGAR TWICE A DAY     cetirizine 10 MG tablet  Commonly known as:  ZYRTEC  Take 10 mg by mouth once daily.     coenzyme Q10 100 mg capsule  Take 100 mg by mouth once daily.     DIABETIC TUSSIN DM ORAL  Take by mouth as needed.     digoxin 250 mcg tablet  Commonly known as:  LANOXIN  Take 250 mcg by mouth once daily. 1/2 daily     fluticasone furoate-vilanterol 200-25 mcg/dose Dsdv diskus inhaler  Commonly known as:  Breo Ellipta  Inhale 1 puff into the lungs once daily. Controller     fluticasone propionate 50 mcg/actuation nasal spray  Commonly known as:  FLONASE  USE ONE SPRAY IN EACH NOSTRIL DAILY     isosorbide mononitrate 60 MG 24 hr tablet  Commonly known as:  IMDUR  Take 60 mg by mouth every evening.     lancets Misc  1 Units by Misc.(Non-Drug; Combo Route) route 2 (two) times daily.     Levothroid 25 MCG tablet  Generic drug:  levothyroxine  Take 25 mcg by mouth before breakfast. Every day     metaxalone 800 MG tablet  Commonly known as:  SKELAXIN  TAKE 1 TABLET TWICE A DAY     metFORMIN 500 MG tablet  Commonly known as:  GLUCOPHAGE  TAKE 1 TABLET TWICE A DAY WITH MEALS     Nitrostat 0.4 MG SL tablet  Generic drug:  nitroGLYCERIN  0.4mg Sublingual PRN .     POWDER BASE NO.196 (BULK) MISC  by Misc.(Non-Drug; Combo Route) route.     predniSONE 20 MG tablet  Commonly known as:  DELTASONE  One daily for 3 days and repeat for flare of lung symptoms as intructed     PreserVision AREDS [id number] unit-mg-unit Cap  Generic drug:  vitamins  A,C,E-zinc-copper  Take 1 capsule by mouth 2 (two) times daily.     PROMETHAZINE ORAL  Take by mouth.     Refresh 1 % ophthalmic solution  Generic drug:  carboxymethylcellulose  as directed     Theracran 650 mg Cap  Generic drug:   cranberry extract  Take 1 tablet by mouth 2 (two) times daily.     Toprol XL 25 MG 24 hr tablet  Generic drug:  metoprolol succinate  Take 25 mg by mouth once daily.     triazolam 0.125 MG tablet  Commonly known as:  HALCION  Take 1 tablet (0.125 mg total) by mouth nightly as needed.     Trilipix 135 mg Cpdr  Generic drug:  fenofibric acid  1 capsule once daily. Every day     Tylenol Arthritis 650 MG Tbsr  Generic drug:  acetaminophen  2 Tablet(s) Oral  Every day.     Vitamin D3 50 mcg (2,000 unit) Cap  Generic drug:  cholecalciferol (vitamin D3)  Take 1 capsule by mouth once daily.     Zocor 20 MG tablet  Generic drug:  simvastatin  Take 20 mg by mouth every evening. Every day         * This list has 2 medication(s) that are the same as other medications prescribed for you. Read the directions carefully, and ask your doctor or other care provider to review them with you.            STOP taking these medications    estradiol 0.01 % (0.1 mg/gram) vaginal cream  Commonly known as:  ESTRACE            Discharge Procedure Orders (must include Diet, Follow-up, Activity):   Discharge Procedure Orders (must include Diet, Follow-up, Activity)   US Retroperitoneal Complete (Kidney and   Standing Status: Future Standing Exp. Date: 01/27/21     Order Specific Question Answer Comments   Reason for Exam: L hyperdense cyst    May the Radiologist modify the order per protocol to meet the clinical needs of the patient? Yes             Babs Benites MD  Urology  [facility]

## 2020-01-27 NOTE — TELEPHONE ENCOUNTER
----- Message from Babs Benites MD sent at 1/27/2020  3:03 PM CST -----  Pt was c/o pink urine but ua's were neg. ctu showed no stones. Cystoscopy negative today. On hormoen cream fro recurrent uti's, discontinuing and sending back to gyn to ensure no vaginal bleeding. ctu images avail on epic, pt may need to bring copy of  does not have access     Fax a copy of my last note and my cysto to dr barron    Pt should go to [facility] now for copy of her ctu on a cd- can you call over there and ask them to get wtarted on this for her?    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

## 2020-01-27 NOTE — INTERVAL H&P NOTE
The patient has been examined and the H&P has been reviewed:    I concur with the findings and no changes have occurred since H&P was written.    Anesthesia/Surgery risks, benefits and alternative options discussed and understood by patient/family.    This patient has been cleared for surgery in ambulatory surgical facility.   Had a ctu showing small L hyperdense cyst slowly increasing in size 4-10mm    Plan for rbus in 1 year to monitor  Cysto today for h/o GH vs uterine/      Active Hospital Problems    Diagnosis  POA    Gross hematuria [R31.0]  Yes      Resolved Hospital Problems   No resolved problems to display.

## 2020-01-28 ENCOUNTER — OFFICE VISIT (OUTPATIENT)
Dept: PAIN MEDICINE | Facility: CLINIC | Age: 80
End: 2020-01-28
Payer: MEDICARE

## 2020-01-28 VITALS
WEIGHT: 136 LBS | SYSTOLIC BLOOD PRESSURE: 137 MMHG | DIASTOLIC BLOOD PRESSURE: 71 MMHG | HEART RATE: 88 BPM | BODY MASS INDEX: 25.03 KG/M2 | HEIGHT: 62 IN

## 2020-01-28 VITALS
TEMPERATURE: 98 F | BODY MASS INDEX: 25.03 KG/M2 | HEIGHT: 62 IN | RESPIRATION RATE: 18 BRPM | WEIGHT: 136 LBS | HEART RATE: 85 BPM | OXYGEN SATURATION: 96 % | SYSTOLIC BLOOD PRESSURE: 145 MMHG | DIASTOLIC BLOOD PRESSURE: 78 MMHG

## 2020-01-28 DIAGNOSIS — M54.16 LUMBAR RADICULOPATHY: Primary | ICD-10-CM

## 2020-01-28 DIAGNOSIS — J45.30 MILD PERSISTENT ASTHMA WITHOUT COMPLICATION: ICD-10-CM

## 2020-01-28 DIAGNOSIS — J41.8 MIXED SIMPLE AND MUCOPURULENT CHRONIC BRONCHITIS: ICD-10-CM

## 2020-01-28 DIAGNOSIS — M51.36 DDD (DEGENERATIVE DISC DISEASE), LUMBAR: ICD-10-CM

## 2020-01-28 DIAGNOSIS — M47.896 OTHER SPONDYLOSIS, LUMBAR REGION: ICD-10-CM

## 2020-01-28 PROCEDURE — 99213 OFFICE O/P EST LOW 20 MIN: CPT | Mod: S$GLB,,, | Performed by: PHYSICIAN ASSISTANT

## 2020-01-28 PROCEDURE — 1125F AMNT PAIN NOTED PAIN PRSNT: CPT | Mod: S$GLB,,, | Performed by: PHYSICIAN ASSISTANT

## 2020-01-28 PROCEDURE — 1159F PR MEDICATION LIST DOCUMENTED IN MEDICAL RECORD: ICD-10-PCS | Mod: S$GLB,,, | Performed by: PHYSICIAN ASSISTANT

## 2020-01-28 PROCEDURE — 99999 PR PBB SHADOW E&M-EST. PATIENT-LVL IV: ICD-10-PCS | Mod: PBBFAC,,, | Performed by: PHYSICIAN ASSISTANT

## 2020-01-28 PROCEDURE — 99999 PR PBB SHADOW E&M-EST. PATIENT-LVL IV: CPT | Mod: PBBFAC,,, | Performed by: PHYSICIAN ASSISTANT

## 2020-01-28 PROCEDURE — 1159F MED LIST DOCD IN RCRD: CPT | Mod: S$GLB,,, | Performed by: PHYSICIAN ASSISTANT

## 2020-01-28 PROCEDURE — 1125F PR PAIN SEVERITY QUANTIFIED, PAIN PRESENT: ICD-10-PCS | Mod: S$GLB,,, | Performed by: PHYSICIAN ASSISTANT

## 2020-01-28 PROCEDURE — 99213 PR OFFICE/OUTPT VISIT, EST, LEVL III, 20-29 MIN: ICD-10-PCS | Mod: S$GLB,,, | Performed by: PHYSICIAN ASSISTANT

## 2020-01-28 NOTE — H&P (VIEW-ONLY)
Referring Physician: No ref. provider found    PCP: Oumar Almonte Jr, MD      CC: Right leg pain    Interval History:  Mrs. Tipton is a 79 y.o. female with low back and right leg pain who presents today for f/u s/p lumbar TF EDENILSON on right at L4-5, L5-S1. Reports 40-50% for 2 weeks. She continues to have some benefit but it has decreased. She wishes to try another injection for compounded benefit.  She denies any weakness.  No bowel bladder changes. Pain today is rated 4/10.    Prior HPI:   Darlin Tipton is a 79 y.o. female referred to us for right leg pain.  She has had lower back pain for over 20 years but states right leg pain has gradually worsened over past 2 months.  No traumatic incident.  She has intermittent burning, grabbing, deep, sharp, shooting pain traveling down her right leg into her right ankle.  Pain worsens with prolonged sitting, standing, walking or flexing.  Pain improves with heat.  MRI lumbar spine was recently ordered.  She denies any worsening weakness.  No bowel bladder changes.  She received start physical therapy with minimal benefit.  She takes Norco very sparingly with mild to moderate benefit.  She rates her pain 6/10 today, 8/10 at worse.    ROS:  CONSTITUTIONAL: No fevers, chills, night sweats, wt. loss, appetite changes  SKIN: no rashes or itching  ENT: No headaches, head trauma, vision changes, or eye pain  LYMPH NODES: None noticed   CV: No chest pain, palpitations.   RESP: No shortness of breath, dyspnea on exertion, cough, wheezing, or hemoptysis  GI: No nausea, emesis, diarrhea, constipation, melena, hematochezia, pain.    : No dysuria, hematuria, urgency, or frequency   HEME: No easy bruising, bleeding problems  PSYCHIATRIC: No depression, anxiety, psychosis, hallucinations.  NEURO: No seizures, memory loss, dizziness or difficulty sleeping  MSK: + History of present illness      Past Medical History:   Diagnosis Date    Allergy     Dust mites, Grasses, Trees    Arthritis      Asthma     Blood transfusion     CAD (coronary artery disease)     Cataract     CHRONIC BRONCHITIS     Diabetes mellitus     Diabetes mellitus type II     GERD (gastroesophageal reflux disease)     Hyperlipidemia     Hypertension     Irregular heart beat     Spinal stenosis     Thyroid disease     Hypothyroidism     Past Surgical History:   Procedure Laterality Date    APPENDECTOMY  1968    BLADDER SUSPENSION  1989    CARDIAC SURGERY      CABG    CATARACT EXTRACTION  9/2007 (L) and 10/2207 (R)    COLONOSCOPY N/A 10/18/2017    Procedure: COLONOSCOPY;  Surgeon: Esme Acuna MD;  Location: [facility];  Service: Endoscopy;  Laterality: N/A;    CORONARY ARTERY BYPASS GRAFT  4/26/2004    x5    ESOPHAGEAL DILATION      SPINE SURGERY  3/2000    Tumor    TRANSFORAMINAL EPIDURAL INJECTION OF STEROID Right 11/21/2019    Procedure: Injection,steroid,epidural,transforaminal approach;  Surgeon: Bj Jones MD;  Location: [facility];  Service: Pain Management;  Laterality: Right;  L4-5, L5-S1    TRANSFORAMINAL EPIDURAL INJECTION OF STEROID Right 12/31/2019    Procedure: Injection,steroid,epidural,transforaminal approach;  Surgeon: Bj Jones MD;  Location: [facility] OR;  Service: Pain Management;  Laterality: Right;  L4-5, L5-S1    WRIST SURGERY  1993     Family History   Problem Relation Age of Onset    Breast cancer Mother     Breast cancer Maternal Aunt     Allergic rhinitis Neg Hx     Allergies Neg Hx     Angioedema Neg Hx     Asthma Neg Hx     Eczema Neg Hx     Immunodeficiency Neg Hx     Urticaria Neg Hx     Rhinitis Neg Hx     Atopy Neg Hx      Social History     Socioeconomic History    Marital status:      Spouse name: Not on file    Number of children: Not on file    Years of education: Not on file    Highest education level: Not on file   Occupational History    Not on file   Social Needs    Financial resource strain: Not on file    Food insecurity:     Worry: Not on  "file     Inability: Not on file    Transportation needs:     Medical: Not on file     Non-medical: Not on file   Tobacco Use    Smoking status: Never Smoker    Smokeless tobacco: Never Used   Substance and Sexual Activity    Alcohol use: Yes     Comment: Rare    Drug use: No    Sexual activity: Not Currently   Lifestyle    Physical activity:     Days per week: Not on file     Minutes per session: Not on file    Stress: Not on file   Relationships    Social connections:     Talks on phone: Not on file     Gets together: Not on file     Attends [employer] service: Not on file     Active member of club or organization: Not on file     Attends meetings of clubs or organizations: Not on file     Relationship status: Not on file   Other Topics Concern    Not on file   Social History Narrative    Not on file         Medications/Allergies: See med card    Vitals:    01/28/20 0915   BP: 137/71   Pulse: 88   Weight: 61.7 kg (136 lb)   Height: 5' 2" (1.575 m)   PainSc:   4   PainLoc: Back         Physical exam:    GENERAL: A and O x3, the patient appears well groomed and is in no acute distress.  Skin: No rashes or obvious lesions  HEENT: normocephalic, atraumatic  CARDIOVASCULAR:  RRR  LUNGS: non labored breathing  ABDOMEN: soft, nontender   UPPER EXTREMITIES: Normal alignment, normal range of motion, no atrophy, no skin changes,  hair growth and nail growth normal and equal bilaterally. No swelling, no tenderness.    LOWER EXTREMITIES:  Normal alignment, normal range of motion, no atrophy, no skin changes,  hair growth and nail growth normal and equal bilaterally. No swelling, no tenderness.    LUMBAR SPINE  Lumbar spine: ROM is limited with flexion extension and oblique extension with mild increased pain.    Boyd's test causes no increased pain on either side.    Supine straight leg raise is negative   Internal and external rotation of the hip causes no increased pain on either side.  Myofascial exam: No " tenderness to palpation across lumbar paraspinous muscles.      MENTAL STATUS: normal orientation, speech, language, and fund of knowledge for social situation.  Emotional state appropriate.    CRANIAL NERVES:  II:  PERRL bilaterally,   III,IV,VI: EOMI.    V:  Facial sensation equal bilaterally  VII:  Facial motor function normal.  VIII:  Hearing equal to finger rub bilaterally  IX/X: Gag normal, palate symmetric  XI:  Shoulder shrug equal, head turn equal  XII:  Tongue midline without fasciculations      MOTOR: Tone and bulk: normal bilateral upper and lower Strength: normal   Delt Bi Tri WE WF     R 5 5 5 5 5 5   L 5 5 5 5 5 5     IP ADD ABD Quad TA Gas HAM  R 5 5 5 5 5 5 5  L 5 5 5 5 5 5 5    SENSATION: Light touch and pinprick intact bilaterally  REFLEXES: normal, symmetric, nonbrisk.  Toes down, no clonus. No hoffmans.  GAIT: normal rise, base, steps, and arm swing.        Imaging:  MRI L-spine 4/1/17  L1-L2: There is a broad disc bulge, asymmetric to the left, which extends into both neural foramina, left greater than right.  There is left ligamentum flavum thickening and left facet arthropathy.  There is left lateral recess stenosis.  No central canal stenosis.  There is moderate left neuroforaminal stenosis.  No right neuroforaminal stenosis.      L2-L3: There is a broad disc bulge, asymmetric to the left, which extends into the left neural foramen and left extraforaminal soft tissues.  There is prominent left ligamentum flavum thickening.  There is bilateral hypertrophic facet arthropathy.  There is a superimposed descending left paracentral disc extrusion which extends approximately 6 mm below the disc plane and which measures approximately 4 x 7 mm in size.  There is left lateral recess stenosis.  Mass effect upon the descending left L3 nerve is possible.  Please correlate clinically for symptoms referable to the left L3 nerve.  There is mild-moderate central canal stenosis present.  There is moderate  left neuroforaminal stenosis.  No right neuroforaminal stenosis.    L3-L4:  There is a broad disc bulge with a superimposed descending central disc extrusion which extends approximately 7 mm below the disc plane and measures approximately 13 x 4 mm.  There is right lateral recess stenosis.  There is ligamentum flavum thickening and facet arthropathy.  The patient's disc extrusion appears to encroach upon the descending right L4 nerve and the right lateral recess.  There is mild-moderate central canal stenosis.  There is, at most, mild left and mild-moderate right neuroforaminal stenosis.    L4-L5:  There is a bulging posterior disc osteophyte complex which extends into the neural foramina right greater than left.  There is bilateral lateral recess stenosis.  There is ligamentum flavum thickening and hypertrophic facet arthropathy, right greater than left.  There is abutment of the exited right L4 nerve in the right extraforaminal soft tissues.  Please correlate clinically for symptoms referable to the right L4 nerve.  There is also abutment of the exited left L4 nerve in the left extraforaminal soft tissues.There is moderate-severe right neuroforaminal stenosis.  No left neuroforaminal stenosis.  There is mild central canal stenosis.    L5-S1:  There is a grade I anterolisthesis of L5 on S1 with uncovering of the intervertebral disc.  There is a possible unilateral right-sided pars defect.  There is ligamentum flavum thickening and severe facet arthropathy.  There is a facet joint effusion present.  There is right lateral recess stenosis, and there is abutment of the descending right S1 nerve in the right lateral recess.There is mild overall central canal stenosis.  There is moderate-severe left neuroforaminal stenosis.  There is an extraspinal synovial cyst measuring 10 mm which arises from the anterior margin of the left facet joint and exerts severe mass effect upon the exiting left L5 nerve (series 4 image 13).   Please correlate clinically for symptoms referable to the left L5 nerve.    Assessment:  Mrs Tipton is a 79 y.o. female with right leg pain  1. Lumbar radiculopathy    2. DDD (degenerative disc disease), lumbar    3. Other spondylosis, lumbar region        Plan:  1. I have stressed the importance of physical activity and exercise to improve overall health  2. Schedule repeat lumbar epidural steroid injection to the Right L4-5 and L5-S1 level(s). I have explained the risks, benefits, and alternatives of the procedure in detail. The patient voices understanding and all questions have been answered. The patient agrees to proceed as planned. Written Consent obtained.   3. Discussed starting Gabapentin therapy. She declines at this time  4. F/u s/p TF EDENILSON

## 2020-01-28 NOTE — PROGRESS NOTES
Referring Physician: No ref. provider found    PCP: Oumar Almonte Jr, MD      CC: Right leg pain    Interval History:  Mrs. Tipton is a 79 y.o. female with low back and right leg pain who presents today for f/u s/p lumbar TF EDENILSON on right at L4-5, L5-S1. Reports 40-50% for 2 weeks. She continues to have some benefit but it has decreased. She wishes to try another injection for compounded benefit.  She denies any weakness.  No bowel bladder changes. Pain today is rated 4/10.    Prior HPI:   Darlin Tipton is a 79 y.o. female referred to us for right leg pain.  She has had lower back pain for over 20 years but states right leg pain has gradually worsened over past 2 months.  No traumatic incident.  She has intermittent burning, grabbing, deep, sharp, shooting pain traveling down her right leg into her right ankle.  Pain worsens with prolonged sitting, standing, walking or flexing.  Pain improves with heat.  MRI lumbar spine was recently ordered.  She denies any worsening weakness.  No bowel bladder changes.  She received start physical therapy with minimal benefit.  She takes Norco very sparingly with mild to moderate benefit.  She rates her pain 6/10 today, 8/10 at worse.    ROS:  CONSTITUTIONAL: No fevers, chills, night sweats, wt. loss, appetite changes  SKIN: no rashes or itching  ENT: No headaches, head trauma, vision changes, or eye pain  LYMPH NODES: None noticed   CV: No chest pain, palpitations.   RESP: No shortness of breath, dyspnea on exertion, cough, wheezing, or hemoptysis  GI: No nausea, emesis, diarrhea, constipation, melena, hematochezia, pain.    : No dysuria, hematuria, urgency, or frequency   HEME: No easy bruising, bleeding problems  PSYCHIATRIC: No depression, anxiety, psychosis, hallucinations.  NEURO: No seizures, memory loss, dizziness or difficulty sleeping  MSK: + History of present illness      Past Medical History:   Diagnosis Date    Allergy     Dust mites, Grasses, Trees    Arthritis      Asthma     Blood transfusion     CAD (coronary artery disease)     Cataract     CHRONIC BRONCHITIS     Diabetes mellitus     Diabetes mellitus type II     GERD (gastroesophageal reflux disease)     Hyperlipidemia     Hypertension     Irregular heart beat     Spinal stenosis     Thyroid disease     Hypothyroidism     Past Surgical History:   Procedure Laterality Date    APPENDECTOMY  1968    BLADDER SUSPENSION  1989    CARDIAC SURGERY      CABG    CATARACT EXTRACTION  9/2007 (L) and 10/2207 (R)    COLONOSCOPY N/A 10/18/2017    Procedure: COLONOSCOPY;  Surgeon: Esme Acuna MD;  Location: [facility] ENDO;  Service: Endoscopy;  Laterality: N/A;    CORONARY ARTERY BYPASS GRAFT  4/26/2004    x5    ESOPHAGEAL DILATION      SPINE SURGERY  3/2000    Tumor    TRANSFORAMINAL EPIDURAL INJECTION OF STEROID Right 11/21/2019    Procedure: Injection,steroid,epidural,transforaminal approach;  Surgeon: Bj Jones MD;  Location: [facility];  Service: Pain Management;  Laterality: Right;  L4-5, L5-S1    TRANSFORAMINAL EPIDURAL INJECTION OF STEROID Right 12/31/2019    Procedure: Injection,steroid,epidural,transforaminal approach;  Surgeon: Bj Jones MD;  Location: [facility] OR;  Service: Pain Management;  Laterality: Right;  L4-5, L5-S1    WRIST SURGERY  1993     Family History   Problem Relation Age of Onset    Breast cancer Mother     Breast cancer Maternal Aunt     Allergic rhinitis Neg Hx     Allergies Neg Hx     Angioedema Neg Hx     Asthma Neg Hx     Eczema Neg Hx     Immunodeficiency Neg Hx     Urticaria Neg Hx     Rhinitis Neg Hx     Atopy Neg Hx      Social History     Socioeconomic History    Marital status:      Spouse name: Not on file    Number of children: Not on file    Years of education: Not on file    Highest education level: Not on file   Occupational History    Not on file   Social Needs    Financial resource strain: Not on file    Food insecurity:     Worry: Not on  "file     Inability: Not on file    Transportation needs:     Medical: Not on file     Non-medical: Not on file   Tobacco Use    Smoking status: Never Smoker    Smokeless tobacco: Never Used   Substance and Sexual Activity    Alcohol use: Yes     Comment: Rare    Drug use: No    Sexual activity: Not Currently   Lifestyle    Physical activity:     Days per week: Not on file     Minutes per session: Not on file    Stress: Not on file   Relationships    Social connections:     Talks on phone: Not on file     Gets together: Not on file     Attends Spiritism service: Not on file     Active member of club or organization: Not on file     Attends meetings of clubs or organizations: Not on file     Relationship status: Not on file   Other Topics Concern    Not on file   Social History Narrative    Not on file         Medications/Allergies: See med card    Vitals:    01/28/20 0915   BP: 137/71   Pulse: 88   Weight: 61.7 kg (136 lb)   Height: 5' 2" (1.575 m)   PainSc:   4   PainLoc: Back         Physical exam:    GENERAL: A and O x3, the patient appears well groomed and is in no acute distress.  Skin: No rashes or obvious lesions  HEENT: normocephalic, atraumatic  CARDIOVASCULAR:  RRR  LUNGS: non labored breathing  ABDOMEN: soft, nontender   UPPER EXTREMITIES: Normal alignment, normal range of motion, no atrophy, no skin changes,  hair growth and nail growth normal and equal bilaterally. No swelling, no tenderness.    LOWER EXTREMITIES:  Normal alignment, normal range of motion, no atrophy, no skin changes,  hair growth and nail growth normal and equal bilaterally. No swelling, no tenderness.    LUMBAR SPINE  Lumbar spine: ROM is limited with flexion extension and oblique extension with mild increased pain.    Boyd's test causes no increased pain on either side.    Supine straight leg raise is negative   Internal and external rotation of the hip causes no increased pain on either side.  Myofascial exam: No " tenderness to palpation across lumbar paraspinous muscles.      MENTAL STATUS: normal orientation, speech, language, and fund of knowledge for social situation.  Emotional state appropriate.    CRANIAL NERVES:  II:  PERRL bilaterally,   III,IV,VI: EOMI.    V:  Facial sensation equal bilaterally  VII:  Facial motor function normal.  VIII:  Hearing equal to finger rub bilaterally  IX/X: Gag normal, palate symmetric  XI:  Shoulder shrug equal, head turn equal  XII:  Tongue midline without fasciculations      MOTOR: Tone and bulk: normal bilateral upper and lower Strength: normal   Delt Bi Tri WE WF     R 5 5 5 5 5 5   L 5 5 5 5 5 5     IP ADD ABD Quad TA Gas HAM  R 5 5 5 5 5 5 5  L 5 5 5 5 5 5 5    SENSATION: Light touch and pinprick intact bilaterally  REFLEXES: normal, symmetric, nonbrisk.  Toes down, no clonus. No hoffmans.  GAIT: normal rise, base, steps, and arm swing.        Imaging:  MRI L-spine 4/1/17  L1-L2: There is a broad disc bulge, asymmetric to the left, which extends into both neural foramina, left greater than right.  There is left ligamentum flavum thickening and left facet arthropathy.  There is left lateral recess stenosis.  No central canal stenosis.  There is moderate left neuroforaminal stenosis.  No right neuroforaminal stenosis.      L2-L3: There is a broad disc bulge, asymmetric to the left, which extends into the left neural foramen and left extraforaminal soft tissues.  There is prominent left ligamentum flavum thickening.  There is bilateral hypertrophic facet arthropathy.  There is a superimposed descending left paracentral disc extrusion which extends approximately 6 mm below the disc plane and which measures approximately 4 x 7 mm in size.  There is left lateral recess stenosis.  Mass effect upon the descending left L3 nerve is possible.  Please correlate clinically for symptoms referable to the left L3 nerve.  There is mild-moderate central canal stenosis present.  There is moderate  left neuroforaminal stenosis.  No right neuroforaminal stenosis.    L3-L4:  There is a broad disc bulge with a superimposed descending central disc extrusion which extends approximately 7 mm below the disc plane and measures approximately 13 x 4 mm.  There is right lateral recess stenosis.  There is ligamentum flavum thickening and facet arthropathy.  The patient's disc extrusion appears to encroach upon the descending right L4 nerve and the right lateral recess.  There is mild-moderate central canal stenosis.  There is, at most, mild left and mild-moderate right neuroforaminal stenosis.    L4-L5:  There is a bulging posterior disc osteophyte complex which extends into the neural foramina right greater than left.  There is bilateral lateral recess stenosis.  There is ligamentum flavum thickening and hypertrophic facet arthropathy, right greater than left.  There is abutment of the exited right L4 nerve in the right extraforaminal soft tissues.  Please correlate clinically for symptoms referable to the right L4 nerve.  There is also abutment of the exited left L4 nerve in the left extraforaminal soft tissues.There is moderate-severe right neuroforaminal stenosis.  No left neuroforaminal stenosis.  There is mild central canal stenosis.    L5-S1:  There is a grade I anterolisthesis of L5 on S1 with uncovering of the intervertebral disc.  There is a possible unilateral right-sided pars defect.  There is ligamentum flavum thickening and severe facet arthropathy.  There is a facet joint effusion present.  There is right lateral recess stenosis, and there is abutment of the descending right S1 nerve in the right lateral recess.There is mild overall central canal stenosis.  There is moderate-severe left neuroforaminal stenosis.  There is an extraspinal synovial cyst measuring 10 mm which arises from the anterior margin of the left facet joint and exerts severe mass effect upon the exiting left L5 nerve (series 4 image 13).   Please correlate clinically for symptoms referable to the left L5 nerve.    Assessment:  Mrs Tipton is a 79 y.o. female with right leg pain  1. Lumbar radiculopathy    2. DDD (degenerative disc disease), lumbar    3. Other spondylosis, lumbar region        Plan:  1. I have stressed the importance of physical activity and exercise to improve overall health  2. Schedule repeat lumbar epidural steroid injection to the Right L4-5 and L5-S1 level(s). I have explained the risks, benefits, and alternatives of the procedure in detail. The patient voices understanding and all questions have been answered. The patient agrees to proceed as planned. Written Consent obtained.   3. Discussed starting Gabapentin therapy. She declines at this time  4. F/u s/p TF EDENILSON

## 2020-02-04 NOTE — DISCHARGE INSTRUCTIONS
Before leaving, please make sure you have all your personal belongings such as glasses, purses, wallets, keys, cell phones, jewelry, jackets etc     Pain injection instructions:     This procedure may take a couple weeks to relieve pain. You may get some pain relief from the local anesthetic initally.    No driving for 24 hrs.   Activity as tolerated- gradually increase activities.  Dont lift over 10 lbs for 24 hrs   No heat at injection sites x 2 days. No heating pads, hot tubs, saunas, or swimming in any body of water or pool for 2 days.  Use ice pack for mild swelling and for comfort , apply for 20 minutes, remove for 20 minute intervals. No direct contact of ice itself  to skin.  May shower today.  Do not allow shower water to beat on injection sites for 2 days.No tub baths for two days.      Resume Aspirin, Plavix, or Coumadin the day after the procedure unless otherwise instructed.   If diabetic,monitor your glucose carefully as steroids can increase your glucose level    Seek immediate medical help for:   Severe increase in your usual pain or appearance of new pain.  Prolonged (more than 8 hours) or increasing weakness or numbness in the legs or arms. Numbing medicine was injected and can affect the messages to and from the brain and legs or arms.  .    Fever above 100.4 degrees F ,Drainage,redness,active bleeding, or increased swelling at the injection site.  Headache, shortness of breath, chest pain, or breathing problems.    After Surgery:  Always be aware that any surgery can cause these symptoms:    Pain- Medication can be prescribed for pain to decrease your pain but may not completely take your pain away. Over the Counter pain medicine my be enough and you can always use Ice and rest to help ease pain.    Bleeding- a little bleeding after a surgery is usually within normal.  If there is a lot of blood you need to notify your MD.  Emergency treatments of bleeding are cold application, elevation of the  bleeding site and compression.    Infection- Infection after surgery is NOT a normal occurrence.  Signs of infection are fever, swelling, hot to touch the incision.  If this occurs notify your MD immediately.    Nausea- this can be common after a surgery especially if you have had anesthesia medicine or are taking pain medicine.  Steroids have a side effect of nausea sometimes. Staying on clear liquids, bland foods, gingerale, or over the counter anti nausea medicines can help.  If you vomit more than once, notify your MD.  Anti Nausea medicines can be prescribed.

## 2020-02-05 ENCOUNTER — HOSPITAL ENCOUNTER (OUTPATIENT)
Facility: AMBULARY SURGERY CENTER | Age: 80
Discharge: HOME | End: 2020-02-05
Attending: ANESTHESIOLOGY | Admitting: ANESTHESIOLOGY
Payer: MEDICARE

## 2020-02-05 DIAGNOSIS — M54.16 LUMBAR RADICULITIS: Primary | ICD-10-CM

## 2020-02-05 LAB — POCT GLUCOSE: 90 MG/DL (ref 70–110)

## 2020-02-05 PROCEDURE — 64484 PRA INJECT ANES/STEROID FORAMEN LUMBAR/SACRAL W IMG GUIDE ,EA ADD LEVEL: ICD-10-PCS | Mod: RT,,, | Performed by: ANESTHESIOLOGY

## 2020-02-05 PROCEDURE — 99152 PR MOD CONSCIOUS SEDATION, SAME PHYS, 5+ YRS, FIRST 15 MIN: ICD-10-PCS | Mod: ,,, | Performed by: ANESTHESIOLOGY

## 2020-02-05 PROCEDURE — 64484 NJX AA&/STRD TFRM EPI L/S EA: CPT | Mod: RT,,, | Performed by: ANESTHESIOLOGY

## 2020-02-05 PROCEDURE — 64483 NJX AA&/STRD TFRM EPI L/S 1: CPT | Mod: RT,,, | Performed by: ANESTHESIOLOGY

## 2020-02-05 PROCEDURE — 64483 NJX AA&/STRD TFRM EPI L/S 1: CPT | Performed by: ANESTHESIOLOGY

## 2020-02-05 PROCEDURE — 64484 NJX AA&/STRD TFRM EPI L/S EA: CPT | Performed by: ANESTHESIOLOGY

## 2020-02-05 PROCEDURE — 99152 MOD SED SAME PHYS/QHP 5/>YRS: CPT | Mod: ,,, | Performed by: ANESTHESIOLOGY

## 2020-02-05 PROCEDURE — 64483 PR EPIDURAL INJ, ANES/STEROID, TRANSFORAMINAL, LUMB/SACR, SNGL LEVL: ICD-10-PCS | Mod: RT,,, | Performed by: ANESTHESIOLOGY

## 2020-02-05 RX ORDER — MIDAZOLAM HYDROCHLORIDE 1 MG/ML
INJECTION INTRAMUSCULAR; INTRAVENOUS
Status: DISCONTINUED | OUTPATIENT
Start: 2020-02-05 | End: 2020-02-12 | Stop reason: HOSPADM

## 2020-02-05 RX ORDER — LIDOCAINE HYDROCHLORIDE 10 MG/ML
INJECTION, SOLUTION EPIDURAL; INFILTRATION; INTRACAUDAL; PERINEURAL
Status: DISCONTINUED | OUTPATIENT
Start: 2020-02-05 | End: 2020-02-12 | Stop reason: HOSPADM

## 2020-02-05 RX ORDER — DEXAMETHASONE SODIUM PHOSPHATE 10 MG/ML
INJECTION INTRAMUSCULAR; INTRAVENOUS
Status: DISCONTINUED | OUTPATIENT
Start: 2020-02-05 | End: 2020-02-12 | Stop reason: HOSPADM

## 2020-02-05 RX ORDER — FENTANYL CITRATE 50 UG/ML
INJECTION, SOLUTION INTRAMUSCULAR; INTRAVENOUS
Status: DISCONTINUED | OUTPATIENT
Start: 2020-02-05 | End: 2020-02-12 | Stop reason: HOSPADM

## 2020-02-05 RX ORDER — BUPIVACAINE HYDROCHLORIDE 2.5 MG/ML
INJECTION, SOLUTION EPIDURAL; INFILTRATION; INTRACAUDAL
Status: DISCONTINUED | OUTPATIENT
Start: 2020-02-05 | End: 2020-02-12 | Stop reason: HOSPADM

## 2020-02-05 RX ORDER — SODIUM CHLORIDE 9 MG/ML
INJECTION, SOLUTION INTRAVENOUS CONTINUOUS
Status: DISCONTINUED | OUTPATIENT
Start: 2020-02-05 | End: 2020-02-12 | Stop reason: HOSPADM

## 2020-02-05 RX ORDER — SODIUM CHLORIDE, SODIUM LACTATE, POTASSIUM CHLORIDE, CALCIUM CHLORIDE 600; 310; 30; 20 MG/100ML; MG/100ML; MG/100ML; MG/100ML
INJECTION, SOLUTION INTRAVENOUS ONCE AS NEEDED
Status: DISCONTINUED | OUTPATIENT
Start: 2020-02-05 | End: 2020-02-12 | Stop reason: HOSPADM

## 2020-02-05 RX ORDER — SODIUM CHLORIDE 9 MG/ML
INJECTION, SOLUTION INTRAVENOUS CONTINUOUS
Status: DISCONTINUED | OUTPATIENT
Start: 2020-02-05 | End: 2022-06-25

## 2020-02-05 RX ADMIN — SODIUM CHLORIDE: 9 INJECTION, SOLUTION INTRAVENOUS at 12:02

## 2020-02-05 NOTE — OP NOTE
PROCEDURE DATE: 2/5/2020    PROCEDURE: Right L4-5 and L5-S1 transforaminal epidural steroid injection under fluoroscopy    DIAGNOSIS: Lumbar  disc displacement without myelopathy  Post op diagnosis: Same    PHYSICIAN: Bj Jones MD    MEDICATIONS INJECTED:  Dexamethasone 5mg (0.5ml) and 1.5ml 0.25% bupivicaine at each nerve root.     LOCAL ANESTHETIC INJECTED:  Lidocaine 1%. 2 ml per site.    SEDATION MEDICATIONS: RN IV sedation    ESTIMATED BLOOD LOSS:  None    COMPLICATIONS:  NOne    TECHNIQUE:   A time-out was taken to identify patient and procedure side prior to starting the procedure. The patient was placed in a prone position, prepped and draped in the usual sterile fashion using ChloraPrep and sterile towels.  The area to be injected was determined under fluoroscopic guidance in AP and oblique view.  Local anesthetic was given by raising a wheal and going down to the hub of a 25-gauge 1.5 inch needle.  In oblique view, a 3.5 inch 22-gauge bent-tip spinal needle was introduced towards 6 oclock position of the pedicle of each above named nerve root level.  The needle was walked medially then hinged into the neural foramen and position was confirmed in AP and lateral views.  1ml contrast dye was injected to confirm appropriate placement and that there was no vascular uptake.  After negative aspiration for blood or CSF, the medication was then injected. This was performed at the right L4-5 and L5-S1 level(s). The patient tolerated the procedure well.    The patient was monitored after the procedure.  Patient was given post procedure and discharge instructions to follow at home. The patient was discharged in a stable condition.

## 2020-02-05 NOTE — DISCHARGE SUMMARY
[facility]  Discharge Note  Short Stay    Admit Date: 2/5/2020    Discharge Date and Time: 2/5/2020    Attending Physician: Bj Jones MD     Discharge Provider: Bj Jones    Diagnoses:  Active Hospital Problems    Diagnosis  POA    *Lumbar radiculitis [M54.16]  Yes      Resolved Hospital Problems   No resolved problems to display.       Hospital Course: Lumbar EDENILSON  Discharged Condition: Good    Final Diagnoses:   Active Hospital Problems    Diagnosis  POA    *Lumbar radiculitis [M54.16]  Yes      Resolved Hospital Problems   No resolved problems to display.       Disposition: Home or Self Care    Follow up/Patient Instructions:    Medications:  Reconciled Home Medications:      Medication List      CHANGE how you take these medications    diclofenac sodium 1 % Gel  Commonly known as:  Voltaren  APPLY 2 G TOPICALLY 3 (THREE) TIMES DAILY.  What changed:    · when to take this  · reasons to take this        CONTINUE taking these medications    * albuterol 1.25 mg/3 mL Nebu  Commonly known as:  ACCUNEB  Take 3 mLs (1.25 mg total) by nebulization 4 (four) times daily as needed. Rescue     * albuterol sulfate 90 mcg/actuation Aepb  Commonly known as:  ProAir RespiClick  Inhale 2 puffs into the lungs every 4 (four) hours as needed (shortness of breath). Rescue     amitriptyline 50 MG tablet  Commonly known as:  ELAVIL  Take 50 mg by mouth every evening.     aspirin 81 MG EC tablet  Commonly known as:  ECOTRIN  Take 81 mg by mouth once daily.     azelastine 137 mcg (0.1 %) nasal spray  Commonly known as:  ASTELIN  1 spray (137 mcg total) by Nasal route 2 (two) times daily.     blood sugar diagnostic Strp  Commonly known as:  FreeStyle Lite Strips  USE TO TEST BLOOD SUGAR TWICE A DAY     cetirizine 10 MG tablet  Commonly known as:  ZYRTEC  Take 10 mg by mouth once daily.     coenzyme Q10 100 mg capsule  Take 100 mg by mouth once daily.     DIABETIC TUSSIN DM ORAL  Take by mouth as needed.     digoxin 250 mcg  tablet  Commonly known as:  LANOXIN  Take 250 mcg by mouth once daily. 1/2 daily     fluticasone furoate-vilanterol 200-25 mcg/dose Dsdv diskus inhaler  Commonly known as:  Breo Ellipta  Inhale 1 puff into the lungs once daily. Controller     fluticasone propionate 50 mcg/actuation nasal spray  Commonly known as:  FLONASE  USE ONE SPRAY IN EACH NOSTRIL DAILY     isosorbide mononitrate 60 MG 24 hr tablet  Commonly known as:  IMDUR  Take 60 mg by mouth every evening.     lancets Misc  1 Units by Misc.(Non-Drug; Combo Route) route 2 (two) times daily.     Levothroid 25 MCG tablet  Generic drug:  levothyroxine  Take 25 mcg by mouth before breakfast. Every day     metaxalone 800 MG tablet  Commonly known as:  SKELAXIN  TAKE 1 TABLET TWICE A DAY     metFORMIN 500 MG tablet  Commonly known as:  GLUCOPHAGE  TAKE 1 TABLET TWICE A DAY WITH MEALS     Nitrostat 0.4 MG SL tablet  Generic drug:  nitroGLYCERIN  0.4mg Sublingual PRN .     POWDER BASE NO.196 (BULK) MISC  by Misc.(Non-Drug; Combo Route) route.     predniSONE 20 MG tablet  Commonly known as:  DELTASONE  One daily for 3 days and repeat for flare of lung symptoms as intructed     PreserVision AREDS [id number] unit-mg-unit Cap  Generic drug:  vitamins  A,C,E-zinc-copper  Take 1 capsule by mouth 2 (two) times daily.     PROMETHAZINE ORAL  Take by mouth.     Refresh 1 % ophthalmic solution  Generic drug:  carboxymethylcellulose  as directed     Theracran 650 mg Cap  Generic drug:  cranberry extract  Take 1 tablet by mouth 2 (two) times daily.     Toprol XL 25 MG 24 hr tablet  Generic drug:  metoprolol succinate  Take 25 mg by mouth once daily.     triazolam 0.125 MG tablet  Commonly known as:  HALCION  Take 1 tablet (0.125 mg total) by mouth nightly as needed.     Trilipix 135 mg Cpdr  Generic drug:  fenofibric acid  1 capsule once daily. Every day     Tylenol Arthritis 650 MG Tbsr  Generic drug:  acetaminophen  2 Tablet(s) Oral  Every day.     Vitamin D3 50 mcg (2,000  unit) Cap  Generic drug:  cholecalciferol (vitamin D3)  Take 1 capsule by mouth once daily.     Zocor 20 MG tablet  Generic drug:  simvastatin  Take 20 mg by mouth every evening. Every day         * This list has 2 medication(s) that are the same as other medications prescribed for you. Read the directions carefully, and ask your doctor or other care provider to review them with you.              Discharge Procedure Orders   Call MD for:  temperature >100.4     Call MD for:  persistent nausea and vomiting or diarrhea     Call MD for:  severe uncontrolled pain     Call MD for:  redness, tenderness, or signs of infection (pain, swelling, redness, odor or green/yellow discharge around incision site)     Call MD for:  difficulty breathing or increased cough     Call MD for:  severe persistent headache        Follow up with MD in 2-3 weeks    Discharge Procedure Orders (must include Diet, Follow-up, Activity):   Discharge Procedure Orders (must include Diet, Follow-up, Activity)   Call MD for:  temperature >100.4     Call MD for:  persistent nausea and vomiting or diarrhea     Call MD for:  severe uncontrolled pain     Call MD for:  redness, tenderness, or signs of infection (pain, swelling, redness, odor or green/yellow discharge around incision site)     Call MD for:  difficulty breathing or increased cough     Call MD for:  severe persistent headache

## 2020-02-06 VITALS
RESPIRATION RATE: 18 BRPM | HEIGHT: 62 IN | OXYGEN SATURATION: 90 % | DIASTOLIC BLOOD PRESSURE: 74 MMHG | HEART RATE: 81 BPM | TEMPERATURE: 98 F | BODY MASS INDEX: 25.03 KG/M2 | WEIGHT: 136 LBS | SYSTOLIC BLOOD PRESSURE: 156 MMHG

## 2020-02-07 ENCOUNTER — DOCUMENTATION ONLY (OUTPATIENT)
Dept: FAMILY MEDICINE | Facility: CLINIC | Age: 80
End: 2020-02-07

## 2020-02-07 RX ORDER — FLUTICASONE PROPIONATE 50 MCG
1 SPRAY, SUSPENSION (ML) NASAL DAILY
Qty: 48 G | Refills: 3 | Status: SHIPPED | OUTPATIENT
Start: 2020-02-07 | End: 2021-06-13 | Stop reason: SDUPTHER

## 2020-02-07 NOTE — TELEPHONE ENCOUNTER
----- Message from Anca Andres sent at 2/7/2020  1:00 PM CST -----  Contact: Kylie with [employer]  [phone]  Patient is calling for an RX refill or new RX.  Is this a refill or new RX: Refill   RX name and strength: Fluticasone Propionate Nasal Spray 16 gm rx 50 mcg  Directions (copy/paste from chart):  USE ONE SPRAY IN EACH NOSTRIL DAILY  Is this a 30 day or 90 day RX:30    Local pharmacy or mail order pharmacy: mail order   Pharmacy name and phone # (copy/paste from chart): [facility] [phone]    Comments:

## 2020-02-10 ENCOUNTER — OFFICE VISIT (OUTPATIENT)
Dept: FAMILY MEDICINE | Facility: CLINIC | Age: 80
End: 2020-02-10
Payer: MEDICARE

## 2020-02-10 VITALS
DIASTOLIC BLOOD PRESSURE: 72 MMHG | OXYGEN SATURATION: 95 % | WEIGHT: 134.69 LBS | HEIGHT: 62 IN | TEMPERATURE: 98 F | SYSTOLIC BLOOD PRESSURE: 138 MMHG | HEART RATE: 98 BPM | BODY MASS INDEX: 24.78 KG/M2

## 2020-02-10 DIAGNOSIS — E78.1 HYPERTRIGLYCERIDEMIA: ICD-10-CM

## 2020-02-10 DIAGNOSIS — K21.00 GASTROESOPHAGEAL REFLUX DISEASE WITH ESOPHAGITIS: ICD-10-CM

## 2020-02-10 DIAGNOSIS — I25.5 ISCHEMIC CARDIOMYOPATHY: ICD-10-CM

## 2020-02-10 DIAGNOSIS — I25.119 ATHEROSCLEROSIS OF NATIVE CORONARY ARTERY OF NATIVE HEART WITH ANGINA PECTORIS: ICD-10-CM

## 2020-02-10 DIAGNOSIS — E11.21 TYPE 2 DIABETES MELLITUS WITH DIABETIC NEPHROPATHY, WITHOUT LONG-TERM CURRENT USE OF INSULIN: Primary | ICD-10-CM

## 2020-02-10 DIAGNOSIS — N18.30 STAGE 3 CHRONIC KIDNEY DISEASE: ICD-10-CM

## 2020-02-10 PROCEDURE — 99214 PR OFFICE/OUTPT VISIT, EST, LEVL IV, 30-39 MIN: ICD-10-PCS | Mod: S$GLB,,, | Performed by: FAMILY MEDICINE

## 2020-02-10 PROCEDURE — 99999 PR PBB SHADOW E&M-EST. PATIENT-LVL V: ICD-10-PCS | Mod: PBBFAC,,, | Performed by: FAMILY MEDICINE

## 2020-02-10 PROCEDURE — 99999 PR PBB SHADOW E&M-EST. PATIENT-LVL V: CPT | Mod: PBBFAC,,, | Performed by: FAMILY MEDICINE

## 2020-02-10 PROCEDURE — 99214 OFFICE O/P EST MOD 30 MIN: CPT | Mod: S$GLB,,, | Performed by: FAMILY MEDICINE

## 2020-02-10 RX ORDER — AMOXICILLIN 875 MG/1
875 TABLET, FILM COATED ORAL 2 TIMES DAILY
Qty: 20 TABLET | Refills: 1 | Status: SHIPPED | OUTPATIENT
Start: 2020-02-10 | End: 2020-09-08 | Stop reason: ALTCHOICE

## 2020-02-10 NOTE — PROGRESS NOTES
Subjective:       Patient ID: Darlin Tipton is a 79 y.o. female.    Chief Complaint: Diabetes; Hyperlipidemia; Coronary Artery Disease; Gastroesophageal Reflux; and Asthma    Patient presents here for 6 month follow-up of diabetes, hyperlipidemia, CAD, GERD, and asthma.  Her diabetes remains well controlled at this time on her present dose of metformin.  She states she is following her diabetic, low-sodium, low-fat low-cholesterol diet fairly well but has not been able get any exercise due to some chronic back pain. She has no diabetic neuropathy or retinopathy but she does have diabetic nephropathy.  She is up-to-date with her eye exam having had this done by Dr. Hunt on 11/15/2019.  Her hyperlipidemia is well controlled on her present dose of simvastatin as well as her low-fat low-cholesterol diet.  As far as her coronary artery disease, this is stable and she is followed on a regular basis by Cardiology.  Her GERD is well controlled on her present medication.  As far as her asthma, this remains fairly well controlled although she isn't having an increased amount of cough recently which sometimes signals an exacerbation in her case.  She does have antibiotics already in case this should get worse.  Her most recent CMP shows a further decrease in her GFR to 36.  I have suggested that she see a nephrologist for this.  Also with her taking metformin, I think she needs to get off of this at this decreased level of renal function.  I will not start any new diabetic medicine at this time and just monitor how well she does on diet control.  As stated above, she has had some increased back pain and has been seeing Dr. Jones with pain management.  She also recently underwent cystoscope by Dr. Benites and results were noted in the chart.  She has had a total of 3 epidural steroid injections with only mild relief.  Also she is scheduled to have a D&C on 03/12/2020 by Dr. Llanos.  She is clear from my standpoint for the  surgery but she will need cardiac clearance from her cardiologist.  She has an appointment to see him before the surgery.  Also on a recent CT dated 01/20/2020 she does have aortic calcification.    Diabetes   She presents for her follow-up diabetic visit. She has type 2 diabetes mellitus. Her disease course has been stable. There are no hypoglycemic associated symptoms. Pertinent negatives for hypoglycemia include no dizziness, headaches or nervousness/anxiousness. Associated symptoms include weight loss. Pertinent negatives for diabetes include no chest pain, no fatigue, no foot paresthesias, no polydipsia and no polyuria. Symptoms are stable. Diabetic complications include heart disease and nephropathy. Pertinent negatives for diabetic complications include no CVA, peripheral neuropathy or retinopathy. Risk factors for coronary artery disease include diabetes mellitus, dyslipidemia and post-menopausal. Current diabetic treatment includes oral agent (monotherapy). She is compliant with treatment most of the time. Her weight is decreasing steadily. She is following a diabetic, low fat/cholesterol and low salt diet. Meal planning includes avoidance of concentrated sweets. She has had a previous visit with a dietitian. She participates in exercise intermittently. There is no change in her home blood glucose trend. An ACE inhibitor/angiotensin II receptor blocker is not being taken. She does not see a podiatrist.Eye exam is current.   Hyperlipidemia   This is a chronic problem. The problem is controlled. Recent lipid tests were reviewed and are normal. Exacerbating diseases include chronic renal disease and diabetes. Factors aggravating her hyperlipidemia include beta blockers. Pertinent negatives include no chest pain or shortness of breath. Current antihyperlipidemic treatment includes statins. The current treatment provides moderate improvement of lipids. There are no compliance problems.  Risk factors for coronary  artery disease include diabetes mellitus, dyslipidemia and post-menopausal.     Review of Systems   Constitutional: Positive for unexpected weight change and weight loss. Negative for chills, fatigue and fever.   HENT: Negative for congestion, ear pain, postnasal drip and sore throat.    Eyes: Negative for pain and visual disturbance.   Respiratory: Positive for wheezing. Negative for cough and shortness of breath.    Cardiovascular: Negative for chest pain and palpitations.   Gastrointestinal: Negative for abdominal pain, diarrhea, nausea and vomiting.   Endocrine: Negative for polydipsia and polyuria.   Genitourinary: Negative for difficulty urinating, dysuria and flank pain.   Musculoskeletal: Positive for arthralgias and back pain (Has had 3 epidural steroid injections without much help).   Neurological: Negative for dizziness, light-headedness and headaches.   Psychiatric/Behavioral: Negative for sleep disturbance. The patient is not nervous/anxious.        Objective:      Physical Exam   Constitutional: She is oriented to person, place, and time. She appears well-developed and well-nourished.   HENT:   Right Ear: External ear normal.   Left Ear: External ear normal.   Nose: Nose normal.   Mouth/Throat: Oropharynx is clear and moist.   Neck: Normal range of motion. Neck supple. No thyromegaly present.   Cardiovascular: Normal rate, regular rhythm and normal heart sounds.   No murmur heard.  Pulses:       Dorsalis pedis pulses are 1+ on the right side, and 1+ on the left side.        Posterior tibial pulses are 1+ on the right side, and 1+ on the left side.   Pulmonary/Chest: Effort normal and breath sounds normal. She has no wheezes. She has no rales.   Musculoskeletal: Normal range of motion. She exhibits no edema or tenderness.   Lymphadenopathy:     She has no cervical adenopathy.   Neurological: She is alert and oriented to person, place, and time. She has normal reflexes. No cranial nerve deficit.    Psychiatric: She has a normal mood and affect. Her behavior is normal.   Vitals reviewed.      Assessment:       1. Type 2 diabetes mellitus with diabetic nephropathy, without long-term current use of insulin    2. Ischemic cardiomyopathy    3. Atherosclerosis of native coronary artery of native heart with angina pectoris    4. Gastroesophageal reflux disease with esophagitis    5. Stage 3 chronic kidney disease    6. Hypertriglyceridemia        Plan:       1.  Continue present medications as her diabetes, CAD, hyperlipidemia, and GERD are stable and controlled  2.  Chronic kidney disease seems to be worsening  3.  Discontinue metformin due to continue decrease in renal function.  We will monitor her sugar on diet control only at this time  4.  Referral to Nephrology  5.  Patient is encouraged to update her shingles vaccine  6.  Follow up with me in 6 months or p.r.n.        Patient readiness: acceptance and barriers:none    During the course of the visit the patient was educated and counseled about the following:     Diabetes:  Addressed ADA diet.  Suggested low cholesterol diet.  Encouraged aerobic exercise.  Discussed foot care.  Reminded to get yearly retinal exam.  Discussed ways to avoid symptomatic hypoglycemia.  Discussed sick day management.  Discontinued metformin; see  medication orders.  Reminded to bring in blood sugar diary at next visit.  Follow up in 6 months or as needed.    Goals: Diabetes: Maintain Hemoglobin A1C below 7    Did patient meet goals/outcomes: Yes    The following self management tools provided: blood glucose log    Patient Instructions (the written plan) was given to the patient/family.     Time spent with patient: 30 minutes    Barriers to medications present (no )    Adverse reactions to current medications (no)    Over the counter medications reviewed (Yes)

## 2020-02-11 DIAGNOSIS — J45.30 MILD PERSISTENT ASTHMA WITHOUT COMPLICATION: ICD-10-CM

## 2020-02-11 DIAGNOSIS — J41.8 MIXED SIMPLE AND MUCOPURULENT CHRONIC BRONCHITIS: ICD-10-CM

## 2020-02-12 RX ORDER — LIDOCAINE HYDROCHLORIDE 40 MG/ML
INJECTION, SOLUTION RETROBULBAR
Status: DISCONTINUED
Start: 2020-02-12 | End: 2020-02-12 | Stop reason: HOSPADM

## 2020-02-13 ENCOUNTER — TELEPHONE (OUTPATIENT)
Dept: UROLOGY | Facility: CLINIC | Age: 80
End: 2020-02-13

## 2020-02-20 DIAGNOSIS — E11.9 TYPE 2 DIABETES MELLITUS WITHOUT COMPLICATION: Chronic | ICD-10-CM

## 2020-02-20 NOTE — TELEPHONE ENCOUNTER
----- Message from Ivone Wang sent at 2/20/2020  2:25 PM CST -----  Contact: patient  Type:  RX Refill Request  Who Called:  patient  Refill or New Rx:  Refill  RX Name and Strength: lblood sugar diagnostic (FREESTYLE LITE STRIPS) Strp   How is the patient currently taking it? (ex. 1XDay):  ?  Is this a 30 day or 90 day RX:  ?  Preferred Pharmacy with phone number:    [facility] #[id number] - [place] - [address]  [address] [address]  Phone: [phone] Fax: [phone]  Local or Mail Order:  local  Ordering Provider:  barron  Best Call Back Number:  [phone]

## 2020-02-27 ENCOUNTER — HOSPITAL ENCOUNTER (OUTPATIENT)
Dept: RADIOLOGY | Facility: HOSPITAL | Age: 80
Discharge: HOME | End: 2020-02-27
Attending: OBSTETRICS & GYNECOLOGY
Payer: MEDICARE

## 2020-02-27 ENCOUNTER — HOSPITAL ENCOUNTER (OUTPATIENT)
Dept: PREADMISSION TESTING | Facility: HOSPITAL | Age: 80
Discharge: HOME | End: 2020-02-27
Attending: FAMILY MEDICINE
Payer: MEDICARE

## 2020-02-27 ENCOUNTER — PATIENT MESSAGE (OUTPATIENT)
Dept: FAMILY MEDICINE | Facility: CLINIC | Age: 80
End: 2020-02-27

## 2020-02-27 VITALS
HEIGHT: 62 IN | OXYGEN SATURATION: 97 % | WEIGHT: 132.25 LBS | HEART RATE: 72 BPM | DIASTOLIC BLOOD PRESSURE: 77 MMHG | SYSTOLIC BLOOD PRESSURE: 177 MMHG | BODY MASS INDEX: 24.34 KG/M2 | RESPIRATION RATE: 18 BRPM | TEMPERATURE: 98 F

## 2020-02-27 DIAGNOSIS — Z01.818 PREOP TESTING: ICD-10-CM

## 2020-02-27 DIAGNOSIS — Z01.818 PRE-OP TESTING: Primary | ICD-10-CM

## 2020-02-27 DIAGNOSIS — Z41.9 SURGERY, ELECTIVE: ICD-10-CM

## 2020-02-27 DIAGNOSIS — Z01.818 PREOP TESTING: Primary | ICD-10-CM

## 2020-02-27 PROCEDURE — 71046 X-RAY EXAM CHEST 2 VIEWS: CPT | Mod: TC

## 2020-02-27 PROCEDURE — 93005 ELECTROCARDIOGRAM TRACING: CPT

## 2020-02-27 RX ORDER — ACETAMINOPHEN AND PHENYLEPHRINE HCL 325; 5 MG/1; MG/1
1 TABLET ORAL DAILY
COMMUNITY
End: 2020-09-08

## 2020-02-27 RX ORDER — MONTELUKAST SODIUM 10 MG/1
10 TABLET ORAL DAILY
Status: ON HOLD | COMMUNITY
End: 2020-03-12 | Stop reason: CLARIF

## 2020-02-27 NOTE — PRE-PROCEDURE INSTRUCTIONS
States instructed to stop aspirin CO Q 10 Biotin ARED Vital Red  Voltren Gel 1 week prior to surgery

## 2020-02-27 NOTE — DISCHARGE INSTRUCTIONS
To confirm, Your doctor has instructed you that surgery is scheduled for: March 12    Pre-Op will call the afternoon prior to surgery between 4:00 and 6:00 PM with the final arrival time.      Please report to [facility] on [address] the morning of surgery.     Do not eat or drink anything after midnight the night before your surgery - THIS INCLUDES  WATER, GUM, MINTS AND CANDY.  YOU MAY BRUSH YOUR TEETH BUT DO NOT SWALLOW     TAKE ONLY THESE MEDICATIONS WITH A SMALL SIP OF WATER THE MORNING OF YOUR PROCEDURE:  Digoxin ; L-Thyroxine ; Metoprolol; Zyrtec    Stop aspirin and other meds as instructed by doctor.   ONLY if you are diabetic, check your sugar in the morning before your procedure.       Do not take any diabetic medicines or insulin the morning of surgery .     PLEASE NOTE:  The surgery schedule has many variables which may affect the time of your surgery case.  Family members should be available if your surgery time changes.  Plan to be here the day of your procedure between 4-6 hours.      DO NOT TAKE THESE MEDICATIONS 5-7 DAYS PRIOR to your procedure or per your surgeon's request: ASPIRIN, ALEVE, ADVIL, IBUPROFEN,  TYRONE SELTZER, BC , FISH OIL , VITAMIN E, HERBALS  (May take Tylenol)                                                      IMPORTANT INSTRUCTIONS      · Do not smoke, vape or drink alcoholic beverages 24 hours prior to your procedure.  · Shower the night before AND the morning of your procedure with a Chlorhexidine wash such as Hibiclens or Dial antibacterial soap from the neck down.   ·  Do not get it on your face or in your eyes.  You may use your own shampoo and face wash. This helps your skin to be as bacteria free as possible.   ·  DO NOT remove hair from the surgery site.  Do not shave the incision site unless you are given specific instructions to do so.    · Sleep in a bed with clean sheets.  Do not sleep with a pet in the bed.   · If you wear contact lenses, dentures, hearing  aids or glasses, bring a container to put them in during surgery and give to a family member for safe keeping.    · Please leave all jewelry, piercing's and valuables at home.   · If your doctor has scheduled you for an overnight stay, bring a small overnight bag with any personal items you need.    · Make arrangements in advance for transportation home by a responsible adult.      · You must make arrangements for transportation, TAXI'S, UBER'S OR LYFTS ARE NOT ALLOWED.        If you have any questions about these instructions, call (Monday - Friday) Pre-Op Admit  Nursing  at [phone] or the Pre-Op Day Surgery Unit at [phone].

## 2020-03-04 ENCOUNTER — TELEPHONE (OUTPATIENT)
Dept: FAMILY MEDICINE | Facility: CLINIC | Age: 80
End: 2020-03-04

## 2020-03-05 NOTE — TELEPHONE ENCOUNTER
Patient is cleared from a medical standpoint from my viewpoint.  She will need cardiac clearance from her cardiologist.

## 2020-03-12 ENCOUNTER — ANESTHESIA EVENT (OUTPATIENT)
Dept: SURGERY | Facility: HOSPITAL | Age: 80
End: 2020-03-12
Payer: MEDICARE

## 2020-03-12 ENCOUNTER — ANESTHESIA (OUTPATIENT)
Dept: SURGERY | Facility: HOSPITAL | Age: 80
End: 2020-03-12
Payer: MEDICARE

## 2020-03-12 ENCOUNTER — HOSPITAL ENCOUNTER (OUTPATIENT)
Facility: HOSPITAL | Age: 80
Discharge: HOME | End: 2020-03-12
Attending: OBSTETRICS & GYNECOLOGY | Admitting: OBSTETRICS & GYNECOLOGY
Payer: MEDICARE

## 2020-03-12 VITALS
DIASTOLIC BLOOD PRESSURE: 80 MMHG | SYSTOLIC BLOOD PRESSURE: 142 MMHG | WEIGHT: 132.25 LBS | RESPIRATION RATE: 16 BRPM | TEMPERATURE: 98 F | OXYGEN SATURATION: 100 % | HEIGHT: 62 IN | HEART RATE: 70 BPM | BODY MASS INDEX: 24.34 KG/M2

## 2020-03-12 DIAGNOSIS — Z41.9 SURGERY, ELECTIVE: ICD-10-CM

## 2020-03-12 DIAGNOSIS — N95.0 POSTMENOPAUSAL BLEEDING: Primary | ICD-10-CM

## 2020-03-12 LAB — GLUCOSE SERPL-MCNC: 85 MG/DL (ref 70–110)

## 2020-03-12 PROCEDURE — 36000707: Performed by: OBSTETRICS & GYNECOLOGY

## 2020-03-12 PROCEDURE — 71000033 HC RECOVERY, INTIAL HOUR: Performed by: OBSTETRICS & GYNECOLOGY

## 2020-03-12 PROCEDURE — 27201107 HC STYLET, STANDARD: Performed by: ANESTHESIOLOGY

## 2020-03-12 PROCEDURE — 88305 TISSUE EXAM BY PATHOLOGIST: CPT | Mod: TC

## 2020-03-12 PROCEDURE — 71000015 HC POSTOP RECOV 1ST HR: Performed by: OBSTETRICS & GYNECOLOGY

## 2020-03-12 PROCEDURE — 71000039 HC RECOVERY, EACH ADD'L HOUR: Performed by: OBSTETRICS & GYNECOLOGY

## 2020-03-12 PROCEDURE — 25000003 PHARM REV CODE 250: Performed by: NURSE ANESTHETIST, CERTIFIED REGISTERED

## 2020-03-12 PROCEDURE — 36000706: Performed by: OBSTETRICS & GYNECOLOGY

## 2020-03-12 PROCEDURE — 37000008 HC ANESTHESIA 1ST 15 MINUTES: Performed by: OBSTETRICS & GYNECOLOGY

## 2020-03-12 PROCEDURE — S0028 INJECTION, FAMOTIDINE, 20 MG: HCPCS | Performed by: NURSE ANESTHETIST, CERTIFIED REGISTERED

## 2020-03-12 PROCEDURE — 37000009 HC ANESTHESIA EA ADD 15 MINS: Performed by: OBSTETRICS & GYNECOLOGY

## 2020-03-12 PROCEDURE — 27000080 OPTIME MED/SURG SUP & DEVICES GENERAL CLASSIFICATION: Performed by: OBSTETRICS & GYNECOLOGY

## 2020-03-12 PROCEDURE — 25000003 PHARM REV CODE 250: Performed by: ANESTHESIOLOGY

## 2020-03-12 PROCEDURE — 63600175 PHARM REV CODE 636 W HCPCS: Performed by: ANESTHESIOLOGY

## 2020-03-12 PROCEDURE — 27201423 OPTIME MED/SURG SUP & DEVICES STERILE SUPPLY: Performed by: OBSTETRICS & GYNECOLOGY

## 2020-03-12 PROCEDURE — 63600175 PHARM REV CODE 636 W HCPCS: Performed by: NURSE ANESTHETIST, CERTIFIED REGISTERED

## 2020-03-12 PROCEDURE — 27202107 HC XP QUATRO SENSOR: Performed by: ANESTHESIOLOGY

## 2020-03-12 PROCEDURE — 27000673 HC TUBING BLOOD Y: Performed by: ANESTHESIOLOGY

## 2020-03-12 PROCEDURE — 27000671 HC TUBING MICROBORE EXT: Performed by: ANESTHESIOLOGY

## 2020-03-12 RX ORDER — ESMOLOL HYDROCHLORIDE 10 MG/ML
INJECTION INTRAVENOUS
Status: DISCONTINUED | OUTPATIENT
Start: 2020-03-12 | End: 2020-03-12

## 2020-03-12 RX ORDER — SODIUM CHLORIDE, SODIUM LACTATE, POTASSIUM CHLORIDE, CALCIUM CHLORIDE 600; 310; 30; 20 MG/100ML; MG/100ML; MG/100ML; MG/100ML
INJECTION, SOLUTION INTRAVENOUS CONTINUOUS
Status: DISCONTINUED | OUTPATIENT
Start: 2020-03-12 | End: 2020-03-12 | Stop reason: HOSPADM

## 2020-03-12 RX ORDER — FAMOTIDINE 10 MG/ML
INJECTION INTRAVENOUS
Status: DISCONTINUED | OUTPATIENT
Start: 2020-03-12 | End: 2020-03-12

## 2020-03-12 RX ORDER — ONDANSETRON 2 MG/ML
INJECTION INTRAMUSCULAR; INTRAVENOUS
Status: DISCONTINUED | OUTPATIENT
Start: 2020-03-12 | End: 2020-03-12

## 2020-03-12 RX ORDER — ONDANSETRON 2 MG/ML
4 INJECTION INTRAMUSCULAR; INTRAVENOUS DAILY PRN
Status: DISCONTINUED | OUTPATIENT
Start: 2020-03-12 | End: 2020-03-12 | Stop reason: HOSPADM

## 2020-03-12 RX ORDER — FENTANYL CITRATE 50 UG/ML
25 INJECTION, SOLUTION INTRAMUSCULAR; INTRAVENOUS
Status: DISCONTINUED | OUTPATIENT
Start: 2020-03-12 | End: 2020-03-12 | Stop reason: HOSPADM

## 2020-03-12 RX ORDER — SUCCINYLCHOLINE CHLORIDE 20 MG/ML
INJECTION INTRAMUSCULAR; INTRAVENOUS
Status: DISCONTINUED | OUTPATIENT
Start: 2020-03-12 | End: 2020-03-12

## 2020-03-12 RX ORDER — LIDOCAINE HYDROCHLORIDE 40 MG/ML
SOLUTION TOPICAL
Status: DISCONTINUED | OUTPATIENT
Start: 2020-03-12 | End: 2020-03-12

## 2020-03-12 RX ORDER — ETOMIDATE 2 MG/ML
INJECTION INTRAVENOUS
Status: DISCONTINUED | OUTPATIENT
Start: 2020-03-12 | End: 2020-03-12

## 2020-03-12 RX ORDER — FENTANYL CITRATE 50 UG/ML
INJECTION, SOLUTION INTRAMUSCULAR; INTRAVENOUS
Status: DISCONTINUED | OUTPATIENT
Start: 2020-03-12 | End: 2020-03-12

## 2020-03-12 RX ORDER — OXYCODONE HYDROCHLORIDE 5 MG/1
5 TABLET ORAL
Status: DISCONTINUED | OUTPATIENT
Start: 2020-03-12 | End: 2020-03-12 | Stop reason: HOSPADM

## 2020-03-12 RX ORDER — ACETAMINOPHEN 500 MG
1000 TABLET ORAL
Status: COMPLETED | OUTPATIENT
Start: 2020-03-12 | End: 2020-03-12

## 2020-03-12 RX ORDER — DIPHENHYDRAMINE HYDROCHLORIDE 50 MG/ML
6.25 INJECTION INTRAMUSCULAR; INTRAVENOUS
Status: DISCONTINUED | OUTPATIENT
Start: 2020-03-12 | End: 2020-03-12 | Stop reason: HOSPADM

## 2020-03-12 RX ORDER — OXYCODONE AND ACETAMINOPHEN 5; 325 MG/1; MG/1
1 TABLET ORAL EVERY 6 HOURS PRN
Qty: 5 TABLET | Refills: 0
Start: 2020-03-12 | End: 2020-09-08

## 2020-03-12 RX ORDER — SODIUM CHLORIDE 0.9 % (FLUSH) 0.9 %
10 SYRINGE (ML) INJECTION
Status: DISCONTINUED | OUTPATIENT
Start: 2020-03-12 | End: 2020-03-12 | Stop reason: HOSPADM

## 2020-03-12 RX ORDER — SODIUM CHLORIDE, SODIUM LACTATE, POTASSIUM CHLORIDE, CALCIUM CHLORIDE 600; 310; 30; 20 MG/100ML; MG/100ML; MG/100ML; MG/100ML
INJECTION, SOLUTION INTRAVENOUS CONTINUOUS PRN
Status: DISCONTINUED | OUTPATIENT
Start: 2020-03-12 | End: 2020-03-12

## 2020-03-12 RX ORDER — PHENYLEPHRINE HYDROCHLORIDE 10 MG/ML
INJECTION INTRAVENOUS
Status: DISCONTINUED | OUTPATIENT
Start: 2020-03-12 | End: 2020-03-12

## 2020-03-12 RX ORDER — LIDOCAINE HYDROCHLORIDE 20 MG/ML
INJECTION, SOLUTION EPIDURAL; INFILTRATION; INTRACAUDAL; PERINEURAL
Status: DISCONTINUED | OUTPATIENT
Start: 2020-03-12 | End: 2020-03-12

## 2020-03-12 RX ORDER — OXYCODONE AND ACETAMINOPHEN 5; 325 MG/1; MG/1
1 TABLET ORAL EVERY 4 HOURS PRN
Status: CANCELLED | OUTPATIENT
Start: 2020-03-12

## 2020-03-12 RX ADMIN — ONDANSETRON 4 MG: 2 INJECTION INTRAMUSCULAR; INTRAVENOUS at 06:03

## 2020-03-12 RX ADMIN — LIDOCAINE HYDROCHLORIDE 60 MG: 20 INJECTION, SOLUTION EPIDURAL; INFILTRATION; INTRACAUDAL; PERINEURAL at 07:03

## 2020-03-12 RX ADMIN — PHENYLEPHRINE HYDROCHLORIDE 100 MCG: 10 INJECTION INTRAVENOUS at 07:03

## 2020-03-12 RX ADMIN — OXYCODONE HYDROCHLORIDE 5 MG: 5 TABLET ORAL at 08:03

## 2020-03-12 RX ADMIN — ESMOLOL HYDROCHLORIDE 30 MG: 10 INJECTION, SOLUTION INTRAVENOUS at 07:03

## 2020-03-12 RX ADMIN — SUCCINYLCHOLINE CHLORIDE 100 MG: 20 INJECTION, SOLUTION INTRAMUSCULAR; INTRAVENOUS at 07:03

## 2020-03-12 RX ADMIN — FAMOTIDINE 20 MG: 10 INJECTION, SOLUTION INTRAVENOUS at 07:03

## 2020-03-12 RX ADMIN — ETOMIDATE 14 MG: 2 INJECTION, SOLUTION INTRAVENOUS at 07:03

## 2020-03-12 RX ADMIN — SODIUM CHLORIDE, SODIUM LACTATE, POTASSIUM CHLORIDE, AND CALCIUM CHLORIDE: .6; .31; .03; .02 INJECTION, SOLUTION INTRAVENOUS at 06:03

## 2020-03-12 RX ADMIN — FENTANYL CITRATE 25 MCG: 50 INJECTION INTRAMUSCULAR; INTRAVENOUS at 08:03

## 2020-03-12 RX ADMIN — LIDOCAINE HYDROCHLORIDE 4 ML: 40 SOLUTION TOPICAL at 07:03

## 2020-03-12 RX ADMIN — ESMOLOL HYDROCHLORIDE 10 MG: 10 INJECTION, SOLUTION INTRAVENOUS at 07:03

## 2020-03-12 RX ADMIN — ACETAMINOPHEN 1000 MG: 500 TABLET, FILM COATED ORAL at 06:03

## 2020-03-12 RX ADMIN — FENTANYL CITRATE 50 MCG: 50 INJECTION INTRAMUSCULAR; INTRAVENOUS at 07:03

## 2020-03-12 NOTE — TRANSFER OF CARE
"Anesthesia Transfer of Care Note    Patient: Darlin Tipton    Procedure(s) Performed: Procedure(s) (LRB):  HYSTEROSCOPY, WITH DILATION AND CURETTAGE OF UTERUS (N/A)    Patient location: PACU    Anesthesia Type: general    Transport from OR: Transported from OR on room air with adequate spontaneous ventilation    Post pain: adequate analgesia    Post assessment: no apparent anesthetic complications    Post vital signs: stable    Level of consciousness: awake and alert    Nausea/Vomiting: no nausea/vomiting    Complications: none    Transfer of care protocol was followed      Last vitals:   Visit Vitals  BP (!) 148/83 (BP Location: Right arm)   Pulse 81   Temp 36.7 °C (98.1 °F) (Oral)   Resp 16   Ht 5' 2" (1.575 m)   Wt 60 kg (132 lb 4.4 oz)   SpO2 96%   Breastfeeding? No   BMI 24.19 kg/m²     "

## 2020-03-12 NOTE — DISCHARGE SUMMARY
[facility]  Discharge Note  Short Stay    Admit Date: 3/12/2020    Discharge Date and Time: 03/12/2020    Attending Physician: Ramona Llanos MD     Discharge Provider: Ramona Llanos    Diagnoses:  Active Hospital Problems    Diagnosis  POA    *Postmenopausal bleeding [N95.0]  Yes      Resolved Hospital Problems   No resolved problems to display.       Discharged Condition: good    Hospital Course: Patient was admitted for an hysterescopy and D&C procedure and tolerated the procedure well with no complications.    Final Diagnoses: Same as principal problem.    Disposition: Home or Self Care    Follow up/Patient Instructions:    Medications:  Reconciled Home Medications:      Medication List      CHANGE how you take these medications    diclofenac sodium 1 % Gel  Commonly known as:  VOLTAREN  APPLY 2 G TOPICALLY 3 (THREE) TIMES DAILY.  What changed:    · how much to take  · when to take this  · reasons to take this        CONTINUE taking these medications    albuterol sulfate 90 mcg/actuation Aepb  Commonly known as:  PROAIR RESPICLICK  Inhale 2 puffs into the lungs every 4 (four) hours as needed (shortness of breath). Rescue     amitriptyline 50 MG tablet  Commonly known as:  ELAVIL  Take 50 mg by mouth every evening.     amoxicillin 875 MG tablet  Commonly known as:  AMOXIL  Take 1 tablet (875 mg total) by mouth 2 (two) times daily.     aspirin 81 MG EC tablet  Commonly known as:  ECOTRIN  Take 81 mg by mouth once daily.     azelastine 137 mcg (0.1 %) nasal spray  Commonly known as:  ASTELIN  1 spray (137 mcg total) by Nasal route 2 (two) times daily.     biotin 10,000 mcg Cap  Take 1 tablet by mouth once daily.     blood sugar diagnostic Strp  Commonly known as:  FREESTYLE LITE STRIPS  USE TO TEST BLOOD SUGAR TWICE A DAY     cetirizine 10 MG tablet  Commonly known as:  ZYRTEC  Take 10 mg by mouth once daily.     coenzyme Q10 100 mg capsule  Take 100 mg by mouth once daily.     DIABETIC TUSSIN DM  ORAL  Take by mouth as needed.     digoxin 125 mcg tablet  Commonly known as:  LANOXIN  Take 125 mcg by mouth once daily. 1/2 daily     fluticasone furoate-vilanteroL 200-25 mcg/dose Dsdv diskus inhaler  Commonly known as:  BREO ELLIPTA  Inhale 1 puff into the lungs once daily. Controller     fluticasone propionate 50 mcg/actuation nasal spray  Commonly known as:  FLONASE  1 spray (50 mcg total) by Each Nostril route once daily.     isosorbide mononitrate 60 MG 24 hr tablet  Commonly known as:  IMDUR  Take 60 mg by mouth every evening.     lancets Misc  1 Units by Misc.(Non-Drug; Combo Route) route 2 (two) times daily.     LEVOTHROID 25 MCG tablet  Generic drug:  levothyroxine  Take 25 mcg by mouth before breakfast. Every day     metaxalone 800 MG tablet  Commonly known as:  SKELAXIN  TAKE 1 TABLET TWICE A DAY     NITROSTAT 0.4 MG SL tablet  Generic drug:  nitroGLYCERIN  Place 0.4 mg under the tongue every 5 (five) minutes as needed. 0.4mg Sublingual PRN .     POWDER BASE NO.196 (BULK) MISC  by Misc.(Non-Drug; Combo Route) route.     predniSONE 20 MG tablet  Commonly known as:  DELTASONE  One daily for 3 days and repeat for flare of lung symptoms as intructed     PRESERVISION AREDS [phone] unit-mg-unit Cap  Generic drug:  vitamins  A,C,E-zinc-copper  Take 1 capsule by mouth 2 (two) times daily.     PROMETHAZINE ORAL  Take by mouth.     REFRESH 1 % ophthalmic solution  Generic drug:  carboxymethylcellulose  as directed     THERACRAN 650 mg Cap  Generic drug:  cranberry extract  Take 1 tablet by mouth 2 (two) times daily.     TOPROL XL 25 MG 24 hr tablet  Generic drug:  metoprolol succinate  Take 25 mg by mouth once daily.     triazolam 0.125 MG tablet  Commonly known as:  HALCION  Take 1 tablet (0.125 mg total) by mouth nightly as needed.     TRILIPIX 135 mg Cpdr  Generic drug:  fenofibric acid  1 capsule once daily. Every day     TYLENOL ARTHRITIS 650 MG Tbsr  Generic drug:  acetaminophen  Take 650 mg by  mouth once daily. 2 Tablet(s) Oral  Every day.     VITAMIN D3 50 mcg (2,000 unit) Cap  Generic drug:  cholecalciferol (vitamin D3)  Take 1 capsule by mouth once daily.     ZOCOR 20 MG tablet  Generic drug:  simvastatin  Take 20 mg by mouth every evening. Every day          Discharge Procedure Orders   Diet Adult Regular     Pelvic Rest     Follow-up Information     Follow up In 2 weeks.           Follow up In 2 weeks.                 Discharge Procedure Orders (must include Diet, Follow-up, Activity):   Discharge Procedure Orders (must include Diet, Follow-up, Activity)   Diet Adult Regular     Pelvic Rest

## 2020-03-12 NOTE — OP NOTE
[facility]  GYN SURGERY  Operative Note    SUMMARY     Date of Procedure: 3/12/2020     Procedure: Procedure(s) (LRB):  HYSTEROSCOPY, WITH DILATION AND CURETTAGE OF UTERUS (N/A)       Surgeon(s) and Role:     * Ramona Llanos MD - Primary    Assisting Surgeon: None    --Pre-Operative Diagnosis: Postmenopausal bleeding    Post-Operative Diagnosis: Postmenopausal bleeding    Anesthesia: General    Description of the Findings of the Procedure: atrophic endometrium    Complications: No    Estimated Blood Loss (EBL): * No values recorded between 3/12/2020  7:13 AM and 3/12/2020  7:36 AM *           Specimens:   Specimen (12h ago, onward)     Start     Ordered    03/12/20 0788  Specimen to Pathology - Surgery  Once     Comments:  Pre-op Diagnosis: Post-menopausal bleeding [N95.0]Procedure(s):HYSTEROSCOPY, WITH DILATION AND CURETTAGE OF UTERUS Number of specimens: 1Name of specimens: endometrial tissue      03/12/20 0734                        Condition: Good    The patient was taken to the operating room.  SCD hose were placed before undergoing general anesthesia.  She was placed into the dorsal lithotomy position.  The vagina was prepped with betadine and a urinary catheter was placed into her bladder until it was emptied.  She was draped in the usual sterile fashion.  The speculum was placed into the vagina, and the anterior lip of the cervix was grasped with a single tooth tenaculum, but the cervix was too stenotic, so I tried attaching the tenaculum to the posterior cervix.  When that did not help, I used lacrimal duct probes to get into the uterus.  Eventually the uterus was sounded to 7cm.  The cervix was dilated, and the hysteroscope was placed into the uterus and it appears very atrophic. A small sharp currette was placed into the uterus, and minimal to no tissue was collected and sent to path.  I looked back into the uterus and there was no tissue to remove.  Instruments were removed and counts  were correct.  EBL 5cc. She went to recovery room in stable condition.

## 2020-03-12 NOTE — ANESTHESIA PREPROCEDURE EVALUATION
03/12/2020  Darlin Tipton is a 79 y.o., female.  Patient Active Problem List   Diagnosis    Hypertriglyceridemia    Spinal stenosis    Chronic bronchitis    Irregular heart beat    GERD (gastroesophageal reflux disease)    Chronic sinus complaints    Neck pain    Dysphagia    Facial numbness    Spondylosis of cervical joint    First degree AV block    Atherosclerosis of native coronary artery of native heart with angina pectoris    CABG 2004    Chronic cough    Immune deficiency disorder    Hematuria    Scoliosis of lumbar spine    Osteoarthritis of spine with radiculopathy, lumbar region    DDD (degenerative disc disease), lumbar    Hx of colonic polyps    Ischemic cardiomyopathy    Type 2 diabetes mellitus with diabetic nephropathy, without long-term current use of insulin    Mild persistent asthma without complication    Lumbar radiculitis    Gross hematuria       Past Surgical History:   Procedure Laterality Date    APPENDECTOMY  1968    BLADDER SUSPENSION  1989    CARDIAC SURGERY  2016    CABG    CATARACT EXTRACTION  9/2007 (L) and 10/2207 (R)    COLONOSCOPY N/A 10/18/2017    Procedure: COLONOSCOPY;  Surgeon: Esme Acuna MD;  Location: [facility];  Service: Endoscopy;  Laterality: N/A;    CORONARY ARTERY BYPASS GRAFT  4/26/2004    x5    ESOPHAGEAL DILATION      SPINE SURGERY  3/2000    Tumor    TRANSFORAMINAL EPIDURAL INJECTION OF STEROID Right 11/21/2019    Procedure: Injection,steroid,epidural,transforaminal approach;  Surgeon: Bj Jones MD;  Location: [facility];  Service: Pain Management;  Laterality: Right;  L4-5, L5-S1    TRANSFORAMINAL EPIDURAL INJECTION OF STEROID Right 12/31/2019    Procedure: Injection,steroid,epidural,transforaminal approach;  Surgeon: Bj Jones MD;  Location: [facility];  Service: Pain Management;  Laterality: Right;  L4-5, L5-S1     TRANSFORAMINAL EPIDURAL INJECTION OF STEROID Right 2/5/2020    Procedure: Injection,steroid,epidural,transforaminal approach;  Surgeon: Bj Jones MD;  Location: [facility];  Service: Pain Management;  Laterality: Right;  L4-5, L5-S1    WRIST SURGERY  1993    carpal tunnel          Tobacco Use:  The patient  reports that she has never smoked. She has never used smokeless tobacco.     Results for orders placed or performed during the hospital encounter of 02/27/20   EKG 12-lead    Collection Time: 02/27/20 11:32 AM    Narrative    Test Reason : Z01.818,    Vent. Rate : 065 BPM     Atrial Rate : 085 BPM     P-R Int : 256 ms          QRS Dur : 086 ms      QT Int : 354 ms       P-R-T Axes : 029 036 086 degrees     QTc Int : 368 ms    Sinus rhythm with 1st degree A-V block with Blocked Premature atrial  complexes  Nonspecific ST and T wave abnormality mobitz l  Abnormal ECG  When compared with ECG of 05-SEP-2016 17:39,  Premature atrial complexes are now Present  Borderline criteria for Anterior infarct are no longer Present  Nonspecific T wave abnormality has replaced inverted T waves in Lateral  leads  Confirmed by Justin HOLGUIN, Freeman CASTILLO ([id number]) on 2/28/2020 6:19:23 PM    Referred By:  DAILY           Confirmed By:Freeman Anderson MD             Lab Results   Component Value Date    WBC 5.21 12/22/2017    HGB 11.2 (L) 02/27/2020    HCT 35.8 (L) 02/27/2020    MCV 93 12/22/2017     12/22/2017     BMP  Lab Results   Component Value Date     02/27/2020    K 4.3 02/27/2020    CL 99 02/27/2020    CO2 27 02/27/2020    BUN 43 (H) 02/27/2020    CREATININE 1.4 02/27/2020    CALCIUM 9.8 02/27/2020    ANIONGAP 11 02/27/2020    ESTGFRAFRICA 41.2 (A) 02/27/2020    EGFRNONAA 35.8 (A) 02/27/2020         Anesthesia Evaluation    I have reviewed the Patient Summary Reports.    I have reviewed the Nursing Notes.   I have reviewed the Medications.     Review of Systems  Anesthesia Hx:  No problems with previous Anesthesia  History  of prior surgery of interest to airway management or planning: heart surgery. Denies Family Hx of Anesthesia complications.   Denies Personal Hx of Anesthesia complications.   Social:  Alcohol Use, Non-Smoker    Hematology/Oncology:         -- Anemia:   Cardiovascular:   Hypertension, well controlled CAD  CABG/stent Dysrhythmias atrial fibrillation Angina CHF hyperlipidemia ECG has been reviewed. Followed by Dr.V. Coleman seen 2 weeks ago   Cleared ffor procedure by Heriberto HECK   Stable Angina Dr. Coleman aware   Last NTG use 1 week ago    Last EF 45%    Pulmonary:   Asthma moderate Chronic Cough   Chronic Bronchitis   Daily Inhalers and Nebulizer  No Home O2   Followed by Dr. Uriostegui last seen 1 year ago  Prednisone Tx for acute exacerbation last used S,M,Tu. this week   Renal/:   Chronic Renal Disease, CRI CKD  Stage 3    Hepatic/GI:   GERD, well controlled    Musculoskeletal:   Arthritis  Lumbar Radiculopathy  Spine Disorders: lumbar and cervical Degenerative disease and Chronic Pain    Neurological:   Neuromuscular Disease, Left leg Numbness   Peripheral Neuropathy    Endocrine:   Diabetes, well controlled, type 2 Hypothyroidism    Dermatological:  Skin Normal    Psych:  Psychiatric Normal           Physical Exam  General:  Well nourished    Airway/Jaw/Neck:  Airway Findings: Mouth Opening: Normal Tongue: Normal  General Airway Assessment: Adult  Mallampati: III  Improves to III with phonation.  TM Distance: < 4 cm  Jaw/Neck Findings:  Neck ROM: Normal ROM      Dental:  Dental Findings: Molar caps, Lower partial dentures, Upper partial dentures, In tact   Chest/Lungs:  Chest/Lungs Findings: Clear to auscultation, Normal Respiratory Rate     Heart/Vascular:  Heart Findings: Rate: Normal  Rhythm: Regular Rhythm  Sounds: Normal        Mental Status:  Mental Status Findings:  Cooperative, Alert and Oriented         Anesthesia Plan  Type of Anesthesia, risks & benefits discussed:  Anesthesia Type:   general  Patient's Preference: General  Intra-op Monitoring Plan: standard ASA monitors  Intra-op Monitoring Plan Comments:   Post Op Pain Control Plan: multimodal analgesia  Post Op Pain Control Plan Comments:   Induction:   IV  Beta Blocker:  Patient is on a Beta-Blocker and has received one dose within the past 24 hours (No further documentation required).       Informed Consent: Patient understands risks and agrees with Anesthesia plan.  Questions answered. Anesthesia consent signed with patient.  ASA Score: 4     Day of Surgery Review of History & Physical:        Anesthesia Plan Notes: GETA use LTA  Etomidate Induction   Zofran 4mg iv   Pepcid 20mg iv   Sugammadex   PreOP Meds: Tylenol 1000mg po         Ready For Surgery From Anesthesia Perspective.

## 2020-03-12 NOTE — ANESTHESIA PROCEDURE NOTES
Intubation  Performed by: London Israel CRNA  Authorized by: Primo Hughes MD     Intubation:     Induction:  Intravenous    Intubated:  Postinduction    Mask Ventilation:  Easy mask    Attempts:  1    Attempted By:  CRNA    Method of Intubation:  Direct    Blade:  Jimenez 2    Laryngeal View Grade: Grade I - full view of chords      Difficult Airway Encountered?: No      Complications:  None    Airway Device:  Oral endotracheal tube    Airway Device Size:  7.0    Style/Cuff Inflation:  Cuffed    Tube secured:  20    Secured at:  The lips    Placement Verified By:  Capnometry    Complicating Factors:  None    Findings Post-Intubation:  BS equal bilateral and atraumatic/condition of teeth unchanged

## 2020-03-12 NOTE — DISCHARGE INSTRUCTIONS
Discharge Instructions: After Your Surgery    DISCHARGE INSTRUCTIONS    No driving, operating heavy machinery or signing legal documents x 24 hours  No drinking alcohol x 24 hours or while taking pain medication  You should not be driving while taking pain medication  You should not be running a temp greater than 101   You should not be saturating more than 1 pad an hour  For heavy bleeding or clots call md or for after hours go to ER  Pelvic rest x 2 weeks or until instructed by md- no tampons, sexual intercourse, tampons or tub baths        Youve just had surgery. During surgery, you were given medicine called anesthesia to keep you relaxed and free of pain. After surgery, you may have some pain or nausea. This is common. Here are some tips for feeling better and getting well after surgery.     Stay on schedule with your medicine.   Going home  Your healthcare provider will show you how to take care of yourself when you go home. He or she will also answer your questions. Have an adult family member or friend drive you home. For the first 24 hours after your surgery:  · Do not drive or use heavy equipment.  · Do not make important decisions or sign legal papers.  · Do not drink alcohol.  · Have someone stay with you, if needed. He or she can watch for problems and help keep you safe.  Be sure to go to all follow-up visits with your healthcare provider. And rest after your surgery for as long as your healthcare provider tells you to.  Coping with pain  If you have pain after surgery, pain medicine will help you feel better. Take it as told, before pain becomes severe. Also, ask your healthcare provider or pharmacist about other ways to control pain. This might be with heat, ice, or relaxation. And follow any other instructions your surgeon or nurse gives you.  Tips for taking pain medicine  To get the best relief possible, remember these points:  · Pain medicines can upset your stomach. Taking them with a little  food may help.  · Most pain relievers taken by mouth need at least 20 to 30 minutes to start to work.  · Taking medicine on a schedule can help you remember to take it. Try to time your medicine so that you can take it before starting an activity. This might be before you get dressed, go for a walk, or sit down for dinner.  · Constipation is a common side effect of pain medicines. Call your healthcare provider before taking any medicines such as laxatives or stool softeners to help ease constipation. Also ask if you should skip any foods. Drinking lots of fluids and eating foods such as fruits and vegetables that are high in fiber can also help. Remember, do not take laxatives unless your surgeon has prescribed them.  · Drinking alcohol and taking pain medicine can cause dizziness and slow your breathing. It can even be deadly. Do not drink alcohol while taking pain medicine.  · Pain medicine can make you react more slowly to things. Do not drive or run machinery while taking pain medicine.  Your healthcare provider may tell you to take acetaminophen to help ease your pain. Ask him or her how much you are supposed to take each day. Acetaminophen or other pain relievers may interact with your prescription medicines or other over-the-counter (OTC) medicines. Some prescription medicines have acetaminophen and other ingredients. Using both prescription and OTC acetaminophen for pain can cause you to overdose. Read the labels on your OTC medicines with care. This will help you to clearly know the list of ingredients, how much to take, and any warnings. It may also help you not take too much acetaminophen. If you have questions or do not understand the information, ask your pharmacist or healthcare provider to explain it to you before you take the OTC medicine.  Managing nausea  Some people have an upset stomach after surgery. This is often because of anesthesia, pain, or pain medicine, or the stress of surgery. These  tips will help you handle nausea and eat healthy foods as you get better. If you were on a special food plan before surgery, ask your healthcare provider if you should follow it while you get better. These tips may help:  · Do not push yourself to eat. Your body will tell you when to eat and how much.  · Start off with clear liquids and soup. They are easier to digest.  · Next try semi-solid foods, such as mashed potatoes, applesauce, and gelatin, as you feel ready.  · Slowly move to solid foods. Dont eat fatty, rich, or spicy foods at first.  · Do not force yourself to have 3 large meals a day. Instead eat smaller amounts more often.  · Take pain medicines with a small amount of solid food, such as crackers or toast, to avoid nausea.     Call your surgeon if  · You still have pain an hour after taking medicine. The medicine may not be strong enough.  · You feel too sleepy, dizzy, or groggy. The medicine may be too strong.  · You have side effects like nausea, vomiting, or skin changes, such as rash, itching, or hives.       If you have obstructive sleep apnea  You were given anesthesia medicine during surgery to keep you comfortable and free of pain. After surgery, you may have more apnea spells because of this medicine and other medicines you were given. The spells may last longer than usual.   At home:  · Keep using the continuous positive airway pressure (CPAP) device when you sleep. Unless your healthcare provider tells you not to, use it when you sleep, day or night. CPAP is a common device used to treat obstructive sleep apnea.  · Talk with your provider before taking any pain medicine, muscle relaxants, or sedatives. Your provider will tell you about the possible dangers of taking these medicines.  Date Last Reviewed: 12/1/2016  © 4934-5716 [employer]. [address] [place] [address], [place] [address]. All rights reserved. This information is not intended as a substitute for professional medical  care. Always follow your healthcare professional's instructions.

## 2020-03-12 NOTE — ANESTHESIA POSTPROCEDURE EVALUATION
Anesthesia Post Evaluation    Patient: Darlin Tipton    Procedure(s) Performed: Procedure(s) (LRB):  HYSTEROSCOPY, WITH DILATION AND CURETTAGE OF UTERUS (N/A)    Final Anesthesia Type: general    Patient location during evaluation: PACU  Patient participation: Yes- Able to Participate  Level of consciousness: awake and alert, oriented and awake  Post-procedure vital signs: reviewed and stable  Pain management: adequate  Airway patency: patent    PONV status at discharge: No PONV  Anesthetic complications: no      Cardiovascular status: blood pressure returned to baseline, hemodynamically stable and stable  Respiratory status: unassisted, spontaneous ventilation and room air  Hydration status: euvolemic  Follow-up not needed.          Vitals Value Taken Time   /78 3/12/2020  8:30 AM   Temp 36.3 °C (97.4 °F) 3/12/2020  8:15 AM   Pulse 68 3/12/2020  8:44 AM   Resp 14 3/12/2020  8:44 AM   SpO2 100 % 3/12/2020  8:44 AM   Vitals shown include unvalidated device data.      No case tracking events are documented in the log.      Pain/Reagan Score: Pain Rating Prior to Med Admin: 5 (3/12/2020  8:20 AM)  Reagan Score: 10 (3/12/2020  8:15 AM)

## 2020-04-14 DIAGNOSIS — F51.01 PRIMARY INSOMNIA: ICD-10-CM

## 2020-04-14 RX ORDER — TRIAZOLAM 0.12 MG/1
0.12 TABLET ORAL NIGHTLY PRN
Qty: 30 TABLET | Refills: 0 | Status: SHIPPED | OUTPATIENT
Start: 2020-04-14 | End: 2020-05-29 | Stop reason: SDUPTHER

## 2020-04-14 RX ORDER — TRIAZOLAM 0.12 MG/1
0.12 TABLET ORAL NIGHTLY PRN
Qty: 30 TABLET | Refills: 0 | Status: SHIPPED | OUTPATIENT
Start: 2020-06-13 | End: 2020-05-31 | Stop reason: SDUPTHER

## 2020-04-14 RX ORDER — TRIAZOLAM 0.12 MG/1
0.12 TABLET ORAL NIGHTLY PRN
Qty: 30 TABLET | Refills: 0 | Status: SHIPPED | OUTPATIENT
Start: 2020-05-14 | End: 2020-05-31 | Stop reason: SDUPTHER

## 2020-04-29 ENCOUNTER — LAB VISIT (OUTPATIENT)
Dept: LAB | Facility: HOSPITAL | Age: 80
End: 2020-04-29
Attending: FAMILY MEDICINE
Payer: MEDICARE

## 2020-04-29 DIAGNOSIS — E11.21 TYPE 2 DIABETES MELLITUS WITH DIABETIC NEPHROPATHY, WITHOUT LONG-TERM CURRENT USE OF INSULIN: ICD-10-CM

## 2020-04-29 LAB
ESTIMATED AVG GLUCOSE: 163 MG/DL (ref 68–131)
HBA1C MFR BLD HPLC: 7.3 % (ref 4–5.6)

## 2020-04-29 PROCEDURE — 83036 HEMOGLOBIN GLYCOSYLATED A1C: CPT

## 2020-04-29 PROCEDURE — 36415 COLL VENOUS BLD VENIPUNCTURE: CPT | Mod: PO

## 2020-05-05 ENCOUNTER — PATIENT MESSAGE (OUTPATIENT)
Dept: ADMINISTRATIVE | Facility: HOSPITAL | Age: 80
End: 2020-05-05

## 2020-05-07 ENCOUNTER — PATIENT MESSAGE (OUTPATIENT)
Dept: FAMILY MEDICINE | Facility: CLINIC | Age: 80
End: 2020-05-07

## 2020-05-11 NOTE — TELEPHONE ENCOUNTER
I would like her to stay off of the metformin at this time and let see if it can be just controlled with diet.  If it stays where it is, I am happy with that.  If it goes up further when we recheck it in 3 months, we can start her back on the metformin.

## 2020-05-29 DIAGNOSIS — F51.01 PRIMARY INSOMNIA: ICD-10-CM

## 2020-05-29 NOTE — TELEPHONE ENCOUNTER
----- Message from Zia Moore sent at 5/29/2020  3:25 PM CDT -----  Contact: Patient  Type:  RX Refill Request    Who Called:  Patient  Refill or New Rx:  REfill  RX Name and Strength:  triazolam (HALCION) 0.125 MG tablet  How is the patient currently taking it? (ex. 1XDay):  As ordered  Is this a 30 day or 90 day RX:  90  Preferred Pharmacy with phone number:    [facility] - [address] [address]  Phone: [phone] Fax: [phone]  Local or Mail Order:  Mail  Ordering Provider:  Dr. Suzy Boyd Call Back Number:  [phone]  Additional Information:  Patient is completely out of the medication. Patient states they sometimes need the medication multiple times a day. Please call to advise. Thanks!

## 2020-05-29 NOTE — TELEPHONE ENCOUNTER
I spoke to pharmacy. The last refill  They received was in April and patient was eligable for refill as of April 27th. They do not have the other prescriptions that we have on file with start dates that are different.  Pharmacy stated she is up for refill and does not have any in the system.     LOV:02/10/2020  LAB:02/27/2020  Next OV:10/08/2020

## 2020-05-31 RX ORDER — TRIAZOLAM 0.12 MG/1
0.12 TABLET ORAL NIGHTLY PRN
Qty: 90 TABLET | Refills: 1 | Status: SHIPPED | OUTPATIENT
Start: 2020-05-31 | End: 2020-10-08 | Stop reason: DRUGHIGH

## 2020-06-03 ENCOUNTER — TELEPHONE (OUTPATIENT)
Dept: FAMILY MEDICINE | Facility: CLINIC | Age: 80
End: 2020-06-03

## 2020-06-03 NOTE — TELEPHONE ENCOUNTER
Pt called regarding her triazolam 0.125mg. Pt would like to know if dosage can be increased since she's only getting 6-7 hours of sleep at night. She stated she has appt with Dr. Almonte on 10/08/20 and she's okay with waiting until her appointment. Advised pt that I will forward Dr. Almonte this message to see if dosage can be adjusted prior to appointment. Please advise.

## 2020-06-03 NOTE — TELEPHONE ENCOUNTER
----- Message from Radha Cruz sent at 6/3/2020  1:41 PM CDT -----  Type:  Patient Returning Call    Who Called:  patient   Who Left Message for Patient:  Zaria  Does the patient know what this is regarding?:  medication  Best Call Back Number:  [phone] (home)     Additional Information:  Patient requesting refill on triazolam (HALCION) 0.125 MG table she is requesting a stronger dosage

## 2020-06-03 NOTE — TELEPHONE ENCOUNTER
Rcvd message from patient's survey regarding medication refill noting that the patient is unable to get refill for her medication. LM to call back. Need to clarify which medication the patient needs.

## 2020-06-08 ENCOUNTER — LAB VISIT (OUTPATIENT)
Dept: LAB | Facility: HOSPITAL | Age: 80
End: 2020-06-08
Attending: INTERNAL MEDICINE
Payer: MEDICARE

## 2020-06-08 DIAGNOSIS — N18.30 CHRONIC KIDNEY DISEASE, STAGE III (MODERATE): Primary | ICD-10-CM

## 2020-06-08 DIAGNOSIS — N18.9 CHRONIC KIDNEY DISEASE, UNSPECIFIED: ICD-10-CM

## 2020-06-08 DIAGNOSIS — N25.81 SECONDARY HYPERPARATHYROIDISM OF RENAL ORIGIN: ICD-10-CM

## 2020-06-08 DIAGNOSIS — I10 ESSENTIAL HYPERTENSION, MALIGNANT: ICD-10-CM

## 2020-06-08 LAB
25(OH)D3+25(OH)D2 SERPL-MCNC: 30 NG/ML (ref 30–96)
ALBUMIN SERPL BCP-MCNC: 4.1 G/DL (ref 3.5–5.2)
ALBUMIN/CREAT UR: 83.7 UG/MG (ref 0–30)
ANION GAP SERPL CALC-SCNC: 8 MMOL/L (ref 8–16)
BACTERIA #/AREA URNS AUTO: ABNORMAL /HPF
BASOPHILS # BLD AUTO: 0.02 K/UL (ref 0–0.2)
BASOPHILS NFR BLD: 0.4 % (ref 0–1.9)
BILIRUB UR QL STRIP: NEGATIVE
BUN SERPL-MCNC: 43 MG/DL (ref 8–23)
CALCIUM SERPL-MCNC: 10 MG/DL (ref 8.7–10.5)
CHLORIDE SERPL-SCNC: 104 MMOL/L (ref 95–110)
CLARITY UR REFRACT.AUTO: ABNORMAL
CO2 SERPL-SCNC: 28 MMOL/L (ref 23–29)
COLOR UR AUTO: YELLOW
CREAT SERPL-MCNC: 1.6 MG/DL (ref 0.5–1.4)
CREAT UR-MCNC: 135 MG/DL (ref 15–325)
CREAT UR-MCNC: 135 MG/DL (ref 15–325)
DIFFERENTIAL METHOD: ABNORMAL
EOSINOPHIL # BLD AUTO: 0.1 K/UL (ref 0–0.5)
EOSINOPHIL NFR BLD: 1.9 % (ref 0–8)
ERYTHROCYTE [DISTWIDTH] IN BLOOD BY AUTOMATED COUNT: 12.6 % (ref 11.5–14.5)
EST. GFR  (AFRICAN AMERICAN): 35.1 ML/MIN/1.73 M^2
EST. GFR  (NON AFRICAN AMERICAN): 30.4 ML/MIN/1.73 M^2
GLUCOSE SERPL-MCNC: 150 MG/DL (ref 70–110)
GLUCOSE UR QL STRIP: NEGATIVE
HCT VFR BLD AUTO: 38.1 % (ref 37–48.5)
HGB BLD-MCNC: 11.6 G/DL (ref 12–16)
HGB UR QL STRIP: NEGATIVE
HYALINE CASTS UR QL AUTO: 3 /LPF
IMM GRANULOCYTES # BLD AUTO: 0.02 K/UL (ref 0–0.04)
IMM GRANULOCYTES NFR BLD AUTO: 0.4 % (ref 0–0.5)
KETONES UR QL STRIP: NEGATIVE
LEUKOCYTE ESTERASE UR QL STRIP: NEGATIVE
LYMPHOCYTES # BLD AUTO: 1.3 K/UL (ref 1–4.8)
LYMPHOCYTES NFR BLD: 28.2 % (ref 18–48)
MAGNESIUM SERPL-MCNC: 2.3 MG/DL (ref 1.6–2.6)
MCH RBC QN AUTO: 29.5 PG (ref 27–31)
MCHC RBC AUTO-ENTMCNC: 30.4 G/DL (ref 32–36)
MCV RBC AUTO: 97 FL (ref 82–98)
MICROALBUMIN UR DL<=1MG/L-MCNC: 113 UG/ML
MICROSCOPIC COMMENT: ABNORMAL
MONOCYTES # BLD AUTO: 0.6 K/UL (ref 0.3–1)
MONOCYTES NFR BLD: 12.5 % (ref 4–15)
NEUTROPHILS # BLD AUTO: 2.6 K/UL (ref 1.8–7.7)
NEUTROPHILS NFR BLD: 56.6 % (ref 38–73)
NITRITE UR QL STRIP: NEGATIVE
NRBC BLD-RTO: 0 /100 WBC
PH UR STRIP: 5 [PH] (ref 5–8)
PHOSPHATE SERPL-MCNC: 3.8 MG/DL (ref 2.7–4.5)
PLATELET # BLD AUTO: 231 K/UL (ref 150–350)
PMV BLD AUTO: 11.5 FL (ref 9.2–12.9)
POTASSIUM SERPL-SCNC: 4.9 MMOL/L (ref 3.5–5.1)
PROT UR QL STRIP: NEGATIVE
PROT UR-MCNC: 23 MG/DL (ref 0–15)
PROT/CREAT UR: 0.17 MG/G{CREAT} (ref 0–0.2)
PTH-INTACT SERPL-MCNC: 32 PG/ML (ref 9–77)
RBC # BLD AUTO: 3.93 M/UL (ref 4–5.4)
RBC #/AREA URNS AUTO: 0 /HPF (ref 0–4)
SODIUM SERPL-SCNC: 140 MMOL/L (ref 136–145)
SP GR UR STRIP: 1.02 (ref 1–1.03)
SQUAMOUS #/AREA URNS AUTO: 3 /HPF
URATE SERPL-MCNC: 5.2 MG/DL (ref 2.4–5.7)
URN SPEC COLLECT METH UR: ABNORMAL
WBC # BLD AUTO: 4.65 K/UL (ref 3.9–12.7)
WBC #/AREA URNS AUTO: 1 /HPF (ref 0–5)

## 2020-06-08 PROCEDURE — 84156 ASSAY OF PROTEIN URINE: CPT

## 2020-06-08 PROCEDURE — 83735 ASSAY OF MAGNESIUM: CPT

## 2020-06-08 PROCEDURE — 80069 RENAL FUNCTION PANEL: CPT

## 2020-06-08 PROCEDURE — 82043 UR ALBUMIN QUANTITATIVE: CPT

## 2020-06-08 PROCEDURE — 84550 ASSAY OF BLOOD/URIC ACID: CPT

## 2020-06-08 PROCEDURE — 85025 COMPLETE CBC W/AUTO DIFF WBC: CPT

## 2020-06-08 PROCEDURE — 36415 COLL VENOUS BLD VENIPUNCTURE: CPT | Mod: PO

## 2020-06-08 PROCEDURE — 82306 VITAMIN D 25 HYDROXY: CPT

## 2020-06-08 PROCEDURE — 81001 URINALYSIS AUTO W/SCOPE: CPT

## 2020-06-08 PROCEDURE — 83970 ASSAY OF PARATHORMONE: CPT

## 2020-06-09 RX ORDER — METAXALONE 800 MG/1
TABLET ORAL
Qty: 180 TABLET | Refills: 3 | OUTPATIENT
Start: 2020-06-09

## 2020-06-10 ENCOUNTER — LAB VISIT (OUTPATIENT)
Dept: LAB | Facility: HOSPITAL | Age: 80
End: 2020-06-10
Attending: INTERNAL MEDICINE
Payer: MEDICARE

## 2020-06-10 DIAGNOSIS — E11.9 DIABETES MELLITUS WITHOUT COMPLICATION: Primary | ICD-10-CM

## 2020-06-10 LAB
ESTIMATED AVG GLUCOSE: 154 MG/DL (ref 68–131)
HBA1C MFR BLD HPLC: 7 % (ref 4–5.6)

## 2020-06-10 PROCEDURE — 36415 COLL VENOUS BLD VENIPUNCTURE: CPT | Mod: PO

## 2020-06-10 PROCEDURE — 83036 HEMOGLOBIN GLYCOSYLATED A1C: CPT

## 2020-07-17 ENCOUNTER — PATIENT MESSAGE (OUTPATIENT)
Dept: FAMILY MEDICINE | Facility: CLINIC | Age: 80
End: 2020-07-17

## 2020-08-03 ENCOUNTER — LAB VISIT (OUTPATIENT)
Dept: LAB | Facility: HOSPITAL | Age: 80
End: 2020-08-03
Attending: INTERNAL MEDICINE
Payer: MEDICARE

## 2020-08-03 DIAGNOSIS — N18.30 CHRONIC KIDNEY DISEASE, STAGE III (MODERATE): Primary | ICD-10-CM

## 2020-08-03 LAB
ALBUMIN SERPL BCP-MCNC: 3.9 G/DL (ref 3.5–5.2)
ALBUMIN/CREAT UR: 156.9 UG/MG (ref 0–30)
ANION GAP SERPL CALC-SCNC: 9 MMOL/L (ref 8–16)
BACTERIA #/AREA URNS HPF: NEGATIVE /HPF
BILIRUB UR QL STRIP: NEGATIVE
BUN SERPL-MCNC: 43 MG/DL (ref 8–23)
CALCIUM SERPL-MCNC: 9.1 MG/DL (ref 8.7–10.5)
CHLORIDE SERPL-SCNC: 108 MMOL/L (ref 95–110)
CLARITY UR: CLEAR
CO2 SERPL-SCNC: 25 MMOL/L (ref 23–29)
COLOR UR: YELLOW
CREAT SERPL-MCNC: 1.2 MG/DL (ref 0.5–1.4)
CREAT UR-MCNC: 85 MG/DL (ref 15–325)
CREAT UR-MCNC: 85 MG/DL (ref 15–325)
EST. GFR  (AFRICAN AMERICAN): 49.3 ML/MIN/1.73 M^2
EST. GFR  (NON AFRICAN AMERICAN): 42.8 ML/MIN/1.73 M^2
GLUCOSE SERPL-MCNC: 120 MG/DL (ref 70–110)
GLUCOSE UR QL STRIP: NEGATIVE
HGB UR QL STRIP: NEGATIVE
HYALINE CASTS #/AREA URNS LPF: 4 /LPF
KETONES UR QL STRIP: NEGATIVE
LEUKOCYTE ESTERASE UR QL STRIP: ABNORMAL
MICROALBUMIN UR DL<=1MG/L-MCNC: 133.4 UG/ML
MICROSCOPIC COMMENT: ABNORMAL
NITRITE UR QL STRIP: NEGATIVE
PH UR STRIP: 6 [PH] (ref 5–8)
PHOSPHATE SERPL-MCNC: 5.1 MG/DL (ref 2.7–4.5)
POTASSIUM SERPL-SCNC: 4.2 MMOL/L (ref 3.5–5.1)
PROT UR QL STRIP: ABNORMAL
PROT UR-MCNC: 22 MG/DL (ref 6–15)
PROT/CREAT UR: 0.26 MG/G{CREAT} (ref 0–0.2)
RBC #/AREA URNS HPF: 2 /HPF (ref 0–4)
SODIUM SERPL-SCNC: 142 MMOL/L (ref 136–145)
SP GR UR STRIP: 1.02 (ref 1–1.03)
SQUAMOUS #/AREA URNS HPF: 1 /HPF
URN SPEC COLLECT METH UR: ABNORMAL
UROBILINOGEN UR STRIP-ACNC: NEGATIVE EU/DL
WBC #/AREA URNS HPF: 3 /HPF (ref 0–5)

## 2020-08-03 PROCEDURE — 82570 ASSAY OF URINE CREATININE: CPT

## 2020-08-03 PROCEDURE — 82043 UR ALBUMIN QUANTITATIVE: CPT

## 2020-08-03 PROCEDURE — 80069 RENAL FUNCTION PANEL: CPT

## 2020-08-03 PROCEDURE — 36415 COLL VENOUS BLD VENIPUNCTURE: CPT

## 2020-08-03 PROCEDURE — 81001 URINALYSIS AUTO W/SCOPE: CPT

## 2020-09-01 ENCOUNTER — TELEPHONE (OUTPATIENT)
Dept: FAMILY MEDICINE | Facility: CLINIC | Age: 80
End: 2020-09-01

## 2020-09-01 NOTE — TELEPHONE ENCOUNTER
----- Message from Patrica Simon sent at 8/31/2020  2:37 PM CDT -----  Regarding: rx  Contact: self  Type: Needs Medical Advice  Who Called:  self  Symptoms (please be specific):    How long has patient had these symptoms:    Pharmacy name and phone #:  [facility]  Best Call Back Number: [phone]  Additional Information: Patient requesting a new rx for diabetes

## 2020-09-02 ENCOUNTER — PATIENT MESSAGE (OUTPATIENT)
Dept: FAMILY MEDICINE | Facility: CLINIC | Age: 80
End: 2020-09-02

## 2020-09-02 NOTE — TELEPHONE ENCOUNTER
See portal message please advise  I suggest an office visit, and asked her to let us know her readings

## 2020-09-08 ENCOUNTER — OFFICE VISIT (OUTPATIENT)
Dept: FAMILY MEDICINE | Facility: CLINIC | Age: 80
End: 2020-09-08
Payer: MEDICARE

## 2020-09-08 VITALS
OXYGEN SATURATION: 95 % | HEIGHT: 62 IN | DIASTOLIC BLOOD PRESSURE: 70 MMHG | WEIGHT: 142.88 LBS | TEMPERATURE: 97 F | SYSTOLIC BLOOD PRESSURE: 124 MMHG | HEART RATE: 87 BPM | BODY MASS INDEX: 26.29 KG/M2

## 2020-09-08 DIAGNOSIS — E11.21 TYPE 2 DIABETES MELLITUS WITH DIABETIC NEPHROPATHY, WITHOUT LONG-TERM CURRENT USE OF INSULIN: Primary | ICD-10-CM

## 2020-09-08 DIAGNOSIS — I25.5 ISCHEMIC CARDIOMYOPATHY: ICD-10-CM

## 2020-09-08 DIAGNOSIS — N18.30 STAGE 3 CHRONIC KIDNEY DISEASE: ICD-10-CM

## 2020-09-08 PROCEDURE — 99214 PR OFFICE/OUTPT VISIT, EST, LEVL IV, 30-39 MIN: ICD-10-PCS | Mod: S$GLB,,, | Performed by: PHYSICIAN ASSISTANT

## 2020-09-08 PROCEDURE — 99999 PR PBB SHADOW E&M-EST. PATIENT-LVL V: CPT | Mod: PBBFAC,,, | Performed by: PHYSICIAN ASSISTANT

## 2020-09-08 PROCEDURE — 99999 PR PBB SHADOW E&M-EST. PATIENT-LVL V: ICD-10-PCS | Mod: PBBFAC,,, | Performed by: PHYSICIAN ASSISTANT

## 2020-09-08 PROCEDURE — 99214 OFFICE O/P EST MOD 30 MIN: CPT | Mod: S$GLB,,, | Performed by: PHYSICIAN ASSISTANT

## 2020-09-08 RX ORDER — AMLODIPINE BESYLATE 5 MG/1
5 TABLET ORAL NIGHTLY
COMMUNITY
Start: 2020-08-28 | End: 2020-09-08

## 2020-09-08 RX ORDER — AMLODIPINE BESYLATE 10 MG/1
10 TABLET ORAL DAILY
COMMUNITY
End: 2020-10-08 | Stop reason: SDUPTHER

## 2020-09-08 NOTE — PROGRESS NOTES
Subjective:       Patient ID: Darlin Tipton is a 80 y.o. female.    Chief Complaint: Blood Sugar Problem    Patient with CKD 3, type 2 diabetes, hypertension, coronary artery disease, and asthma presents for evaluation of elevated blood sugars.  Patient has been off of metformin for a while due to decreased kidney function and metformin recall.  She states that her blood sugar was remaining controlled with diet however over the past 2 months she has noticed blood sugars into the 250s.  She is attempting to maintain her diet but is also concerned about food intake relating to her kidney function.  Patients patient medical/surgical, social and family histories have been reviewed       Review of Systems   Constitutional: Negative for fatigue.   Respiratory: Negative for chest tightness and shortness of breath.    Cardiovascular: Negative for chest pain, palpitations and leg swelling.   Neurological: Negative for dizziness, light-headedness and headaches.       Objective:      Physical Exam  Constitutional:       General: She is not in acute distress.     Appearance: She is well-developed.   HENT:      Head: Normocephalic and atraumatic.   Cardiovascular:      Rate and Rhythm: Normal rate and regular rhythm.      Heart sounds: Normal heart sounds.   Pulmonary:      Effort: Pulmonary effort is normal.      Breath sounds: Normal breath sounds.   Musculoskeletal:      Right lower leg: No edema.      Left lower leg: No edema.   Skin:     General: Skin is warm and dry.   Neurological:      Mental Status: She is alert.   Psychiatric:         Behavior: Behavior is cooperative.         Assessment:       1. Type 2 diabetes mellitus with diabetic nephropathy, without long-term current use of insulin    2. Stage 3 chronic kidney disease    3. Ischemic cardiomyopathy        Plan:       Darlin was seen today for blood sugar problem.    Diagnoses and all orders for this visit:    Type 2 diabetes mellitus with diabetic nephropathy,  without long-term current use of insulin  -     Basic metabolic panel; Future  -     Lipid Panel; Future  -   Start  SITagliptin (JANUVIA) 50 MG Tab; Take 1 tablet (50 mg total) by mouth once daily.       Ambulatory referral/consult to Diabetes Education; Future    Stage 3 chronic kidney disease  Continue per nephro   Ischemic cardiomyopathy  Continue per cardiology   Other orders  -            Follow up for lab and follow up 3 mo .

## 2020-09-10 ENCOUNTER — PATIENT MESSAGE (OUTPATIENT)
Dept: FAMILY MEDICINE | Facility: CLINIC | Age: 80
End: 2020-09-10

## 2020-09-23 ENCOUNTER — PATIENT MESSAGE (OUTPATIENT)
Dept: FAMILY MEDICINE | Facility: CLINIC | Age: 80
End: 2020-09-23

## 2020-09-29 ENCOUNTER — PATIENT MESSAGE (OUTPATIENT)
Dept: FAMILY MEDICINE | Facility: CLINIC | Age: 80
End: 2020-09-29

## 2020-09-29 ENCOUNTER — TELEPHONE (OUTPATIENT)
Dept: FAMILY MEDICINE | Facility: CLINIC | Age: 80
End: 2020-09-29

## 2020-09-29 ENCOUNTER — NURSE TRIAGE (OUTPATIENT)
Dept: ADMINISTRATIVE | Facility: CLINIC | Age: 80
End: 2020-09-29

## 2020-09-29 ENCOUNTER — OFFICE VISIT (OUTPATIENT)
Dept: FAMILY MEDICINE | Facility: CLINIC | Age: 80
End: 2020-09-29
Payer: MEDICARE

## 2020-09-29 VITALS
HEIGHT: 62 IN | WEIGHT: 145.06 LBS | BODY MASS INDEX: 26.69 KG/M2 | HEART RATE: 88 BPM | OXYGEN SATURATION: 95 % | TEMPERATURE: 98 F | DIASTOLIC BLOOD PRESSURE: 64 MMHG | SYSTOLIC BLOOD PRESSURE: 118 MMHG

## 2020-09-29 DIAGNOSIS — T78.40XA ALLERGIC REACTION TO DRUG, INITIAL ENCOUNTER: ICD-10-CM

## 2020-09-29 DIAGNOSIS — E66.3 OVERWEIGHT WITH BODY MASS INDEX (BMI) 25.0-29.9: ICD-10-CM

## 2020-09-29 DIAGNOSIS — N18.30 STAGE 3 CHRONIC KIDNEY DISEASE: ICD-10-CM

## 2020-09-29 DIAGNOSIS — E11.21 TYPE 2 DIABETES MELLITUS WITH DIABETIC NEPHROPATHY, WITHOUT LONG-TERM CURRENT USE OF INSULIN: Primary | ICD-10-CM

## 2020-09-29 PROCEDURE — 99999 PR PBB SHADOW E&M-EST. PATIENT-LVL V: ICD-10-PCS | Mod: PBBFAC,,, | Performed by: FAMILY MEDICINE

## 2020-09-29 PROCEDURE — 99214 OFFICE O/P EST MOD 30 MIN: CPT | Mod: 25,S$GLB,, | Performed by: FAMILY MEDICINE

## 2020-09-29 PROCEDURE — 99214 PR OFFICE/OUTPT VISIT, EST, LEVL IV, 30-39 MIN: ICD-10-PCS | Mod: 25,S$GLB,, | Performed by: FAMILY MEDICINE

## 2020-09-29 PROCEDURE — 96372 PR INJECTION,THERAP/PROPH/DIAG2ST, IM OR SUBCUT: ICD-10-PCS | Mod: S$GLB,,, | Performed by: FAMILY MEDICINE

## 2020-09-29 PROCEDURE — 99999 PR PBB SHADOW E&M-EST. PATIENT-LVL V: CPT | Mod: PBBFAC,,, | Performed by: FAMILY MEDICINE

## 2020-09-29 PROCEDURE — 96372 THER/PROPH/DIAG INJ SC/IM: CPT | Mod: S$GLB,,, | Performed by: FAMILY MEDICINE

## 2020-09-29 RX ORDER — DIPHENHYDRAMINE HYDROCHLORIDE 50 MG/ML
25 INJECTION INTRAMUSCULAR; INTRAVENOUS ONCE
Status: COMPLETED | OUTPATIENT
Start: 2020-09-29 | End: 2020-09-29

## 2020-09-29 RX ORDER — DIPHENHYDRAMINE HYDROCHLORIDE 50 MG/ML
25 INJECTION INTRAMUSCULAR; INTRAVENOUS
Status: DISCONTINUED | OUTPATIENT
Start: 2020-09-29 | End: 2020-09-29

## 2020-09-29 RX ADMIN — DIPHENHYDRAMINE HYDROCHLORIDE 25 MG: 50 INJECTION INTRAMUSCULAR; INTRAVENOUS at 02:09

## 2020-09-29 NOTE — PROGRESS NOTES
Subjective:       Patient ID: Darlin Tipton is a 80 y.o. female.    Chief Complaint: Medication Problem (januvia)    This patient is new to me.  Ms Saeed presents to the clinic today with complaints of an adverse reaction/allergy to her januvia. Patient states she has been on this about a month and had done good on it but recently noticed she is getting almost like hot flashes from this. Patient states the hot flashes were extremely bad last night. brittaney also reports over the weekend she had a sore throat and thinks this might be related as well. Patient points out during the visit that she is now having a mild rash on her forearms and ankles. Patient states she has gotten to the point that she has to stay in a very cold room and cannot even stand to have the sheets on her. Patient states she took benadryl for this and it has improved. Patient is requesting a benadryl shot for this today.  Patient educated on plan of care, verbalized understanding.    I consulted with Dr Costello on this.     Review of Systems   Constitutional: Negative for activity change, appetite change, chills, diaphoresis and fever.   HENT: Negative for congestion, ear pain, postnasal drip, sinus pressure, sneezing and sore throat.    Eyes: Negative for pain, discharge, redness and itching.   Respiratory: Negative for apnea, cough, chest tightness, shortness of breath and wheezing.    Cardiovascular: Positive for leg swelling. Negative for chest pain.   Gastrointestinal: Negative for abdominal distention, abdominal pain, constipation, diarrhea, nausea and vomiting.   Endocrine: Positive for heat intolerance.   Genitourinary: Negative for difficulty urinating, dysuria, flank pain and frequency.   Skin: Positive for rash. Negative for color change and wound.   Neurological: Negative for dizziness.        Hot flashes       Patient Active Problem List   Diagnosis    Hypertriglyceridemia    Spinal stenosis    Chronic bronchitis    Irregular  heart beat    GERD (gastroesophageal reflux disease)    Chronic sinus complaints    Neck pain    Dysphagia    Facial numbness    Spondylosis of cervical joint    First degree AV block    Atherosclerosis of native coronary artery of native heart with angina pectoris    CABG     Chronic cough    Immune deficiency disorder    Hematuria    Scoliosis of lumbar spine    Osteoarthritis of spine with radiculopathy, lumbar region    DDD (degenerative disc disease), lumbar    Hx of colonic polyps    Ischemic cardiomyopathy    Type 2 diabetes mellitus with diabetic nephropathy, without long-term current use of insulin    Mild persistent asthma without complication    Lumbar radiculitis    Gross hematuria    Postmenopausal bleeding       Objective:      Physical Exam  Vitals signs reviewed.   Constitutional:       General: She is not in acute distress.     Appearance: Normal appearance. She is well-developed.   HENT:      Head: Normocephalic.      Nose: Nose normal.   Eyes:      Conjunctiva/sclera: Conjunctivae normal.      Pupils: Pupils are equal, round, and reactive to light.   Neck:      Musculoskeletal: Normal range of motion and neck supple.   Cardiovascular:      Rate and Rhythm: Normal rate and regular rhythm.      Heart sounds: Normal heart sounds.   Pulmonary:      Effort: Pulmonary effort is normal. No respiratory distress.      Breath sounds: Normal breath sounds.   Abdominal:      General: Bowel sounds are normal. There is no distension.      Palpations: Abdomen is soft.      Tenderness: There is no abdominal tenderness.   Musculoskeletal:      Right lower le+ Edema present.      Left lower le+ Edema present.   Skin:     General: Skin is warm and dry.      Findings: Rash present. Rash is macular.          Neurological:      Mental Status: She is alert and oriented to person, place, and time.   Psychiatric:         Behavior: Behavior normal.         Lab Results   Component Value  Date    WBC 4.65 06/08/2020    HGB 11.6 (L) 06/08/2020    HCT 38.1 06/08/2020     06/08/2020    CHOL 193 07/09/2019    TRIG 344 (H) 07/09/2019    HDL 30 (L) 07/09/2019    ALT 41 02/27/2020    AST 33 02/27/2020     08/03/2020    K 4.2 08/03/2020     08/03/2020    CREATININE 1.2 08/03/2020    BUN 43 (H) 08/03/2020    CO2 25 08/03/2020    TSH 2.018 10/01/2015    INR 0.9 09/05/2016    GLUF 147 (H) 10/25/2006    HGBA1C 7.0 (H) 06/10/2020     The ASCVD Risk score (Jan BAUMANN Jr., et al., 2013) failed to calculate for the following reasons:    The 2013 ASCVD risk score is only valid for ages 40 to 79    Assessment:       1. Type 2 diabetes mellitus with diabetic nephropathy, without long-term current use of insulin    2. Allergic reaction to drug, initial encounter    3. Stage 3 chronic kidney disease    4. Overweight with body mass index (BMI) 25.0-29.9        Plan:       Darlin was seen today for medication problem.    Diagnoses and all orders for this visit:    Type 2 diabetes mellitus with diabetic nephropathy, without long-term current use of insulin  -     canagliflozin (INVOKANA) 100 mg Tab tablet; Take 1 tablet (100 mg total) by mouth once daily.  Continue medications as prescribed.  Follow up with PCP    Allergic reaction to drug, initial encounter  -     diphenhydrAMINE injection 25 mg  Continue PO benadryl as needed    Stage 3 chronic kidney disease  Stable  Continue medications as prescribed.  Follow up with PCP    Overweight with body mass index (BMI) 25.0-29.9  Comments:  today 26.53  Continue healthy diet and regular exercise as tolerated  Continue medications as prescribed.  Follow up with PCP      Follow up if symptoms worsen or fail to improve.

## 2020-09-29 NOTE — TELEPHONE ENCOUNTER
"Started Januvia x 1 month ago, last night face, throat, and arm felt hot, symptoms improved after taking two benadryl last night but took two benadryl this morning with no relief. Pt states scratchy throat started over the weekend and she thought she was just getting a cold. No rash but "burning/itching" sensation. No oral or facial swelling. No difficulty swallowing. Scheduled an appt today at 1340. Took 50 mg of benadryl at 0600. ER precautions given VU.     Reason for Disposition   Patient wants to be seen    Additional Information   Negative: Life-threatening reaction in the past to similar substance (e.g., food, insect bite/sting, medication, etc.) and < 2 hours since exposure   Negative: Wheezing, stridor, hoarseness, or difficulty breathing   Negative: Tightness in the chest or throat and begins within 2 hours of exposure to allergic substance   Negative: Difficulty swallowing, drooling, or slurred speech   Negative: Difficult to awaken or acting confused (e.g., disoriented, slurred speech)   Negative: Unresponsive, passed out, or very weak   Negative: Other symptom of severe allergic reaction (Exception: Hives or facial swelling alone)   Negative: Sounds like a life-threatening emergency to the triager   Negative: Widespread hives and onset > 2 hours after exposure to high-risk allergen (e.g., sting, nuts, 1st dose of antibiotic)   Negative: Widespread itching and onset > 2 hours after exposure to high-risk allergen (e.g., sting, nuts, 1st dose of antibiotic)   Negative: Face swelling and onset > 2 hours after exposure to high-risk allergen (e.g., sting, nuts, 1st dose of antibiotic)   Negative: Widespread hives, itching or facial swelling and onset < 2 hours of exposure to high-risk allergen (e.g., sting, nuts, 1st dose of antibiotic)   Negative: Vomiting or abdominal cramps and onset < 2 hours of exposure to high-risk allergen (e.g., sting, nuts, 1st dose of antibiotic)   Negative: Gave " epinephrine shot and no symptoms now   Negative: Gave asthma inhaler or neb and no symptoms now    Protocols used: [id number]

## 2020-09-29 NOTE — PATIENT INSTRUCTIONS
Canagliflozin oral tablets  What is this medicine?  CANAGLIFLOZIN (GRIS a gli FLOE zin) helps to treat type 2 diabetes. It helps to control blood sugar. Treatment is combined with diet and exercise.  How should I use this medicine?  Take this medicine by mouth with a glass of water. Follow the directions on the prescription label. Take it before the first meal of the day. Take your dose at the same time each day. Do not take more often than directed. Do not stop taking except on your doctor's advice.  A special MedGuide will be given to you by the pharmacist with each prescription and refill. Be sure to read this information carefully each time.  Talk to your pediatrician regarding the use of this medicine in children. Special care may be needed.  What side effects may I notice from receiving this medicine?  Side effects that you should report to your doctor or health care professional as soon as possible:  · allergic reactions like skin rash, itching or hives, swelling of the face, lips, or tongue  · breathing problems  · chest pain  · dizziness  · fast or irregular heartbeat  · feeling faint or lightheaded, falls  · muscle weakness  · nausea, vomiting, unusual stomach upset or pain  · new pain or tenderness, change in skin color, sores or ulcers, or infection in legs or feet  · signs and symptoms of low blood sugar such as feeling anxious, confusion, dizziness, increased hunger, unusually weak or tired, sweating, shakiness, cold, irritable, headache, blurred vision, fast heartbeat, loss of consciousness  · signs and symptoms of a urinary tract infection, such as fever, chills, a burning feeling when urinating, blood in the urine, back pain  · trouble passing urine or change in the amount of urine, including an urgent need to urinate more often, in larger amounts, or at night  · penile discharge, itching, or pain in men  · unusual tiredness  · vaginal discharge, itching, or odor in women  Side effects that usually  do not require medical attention (Report these to your doctor or health care professional if they continue or are bothersome.):  · constipation  · mild increase in urination  · thirsty  What may interact with this medicine?  Do not take this medicine with any of the following medications:  · gatifloxacinThis medicine may also interact with the following medications:  · alcohol  · certain medicines for blood pressure, heart disease  · digoxin  · diuretics  · insulin  · nateglinide  · phenobarbital  · phenytoin  · repaglinide  · rifampin  · ritonavir  · sulfonylureas like glimepiride, glipizide, glyburide  What if I miss a dose?  If you miss a dose, take it as soon as you can. If it is almost time for your next dose, take only that dose. Do not take double or extra doses.  Where should I keep my medicine?  Keep out of the reach of children.  Store at room temperature between 20 and 25 degrees C (68 and 77 degrees F). Throw away any unused medicine after the expiration date.  What should I tell my health care provider before I take this medicine?  They need to know if you have any of these conditions:  · dehydration  · diabetic ketoacidosis  · diet low in salt  · eating less due to illness, surgery, dieting, or any other reason  · having surgery  · high cholesterol  · high levels of potassium in the blood  · history of pancreatitis or pancreas problems  · history of yeast infection of the penis or vagina  · if you often drink alcohol  · infections in the bladder, kidneys, or urinary tract  · kidney disease  · liver disease  · low blood pressure  · on hemodialysis  · problems urinating  · type 1 diabetes  · uncircumcised male  · an unusual or allergic reaction to canagliflozin, other medicines, foods, dyes, or preservatives  · pregnant or trying to get pregnant  · breast-feeding  What should I watch for while using this medicine?  Visit your doctor or health care professional for regular checks on your progress.  This  medicine can cause a serious condition in which there is too much acid in the blood. If you develop nausea, vomiting, stomach pain, unusual tiredness, or breathing problems, stop taking this medicine and call your doctor right away. If possible, use a ketone dipstick to check for ketones in your urine.  A test called the HbA1C (A1C) will be monitored. This is a simple blood test. It measures your blood sugar control over the last 2 to 3 months. You will receive this test every 3 to 6 months.  Learn how to check your blood sugar. Learn the symptoms of low and high blood sugar and how to manage them.  Always carry a quick-source of sugar with you in case you have symptoms of low blood sugar. Examples include hard sugar candy or glucose tablets. Make sure others know that you can choke if you eat or drink when you develop serious symptoms of low blood sugar, such as seizures or unconsciousness. They must get medical help at once.  Tell your doctor or health care professional if you have high blood sugar. You might need to change the dose of your medicine. If you are sick or exercising more than usual, you might need to change the dose of your medicine.  Do not skip meals. Ask your doctor or health care professional if you should avoid alcohol. Many nonprescription cough and cold products contain sugar or alcohol. These can affect blood sugar.  Wear a medical ID bracelet or chain, and carry a card that describes your disease and details of your medicine and dosage times.  NOTE:This sheet is a summary. It may not cover all possible information. If you have questions about this medicine, talk to your doctor, pharmacist, or health care provider. Copyright© 2017 [employer]

## 2020-09-29 NOTE — PROGRESS NOTES
Name and  used to confirm patient identity. Injection procedure explained to patient, understanding verbalized. Diphenhydramine 25mg was administered IM in the right upper outer quad gluteus. No residual bleeding noted at injection site. Patient tolerated procedure well.

## 2020-09-29 NOTE — TELEPHONE ENCOUNTER
----- Message from Vicky Richards sent at 9/29/2020 11:14 AM CDT -----  Patient is calling to schedule a same day appt.  She had a reaction to Januvia, skin feels like it is burning, her throat is scratchy, she feels very thirsty. She took benedryl last night and it helped. The symptoms came back this morning, she took benadryl again and it did not help.  She sent a message to you on [facility] this morning also.  Please call her about fitting her in.  Her number is [phone].  I did put her call through to [facility] on Call dept as well.

## 2020-09-30 ENCOUNTER — TELEPHONE (OUTPATIENT)
Dept: FAMILY MEDICINE | Facility: CLINIC | Age: 80
End: 2020-09-30

## 2020-09-30 ENCOUNTER — LAB VISIT (OUTPATIENT)
Dept: LAB | Facility: HOSPITAL | Age: 80
End: 2020-09-30
Attending: FAMILY MEDICINE
Payer: MEDICARE

## 2020-09-30 DIAGNOSIS — R30.0 DYSURIA: ICD-10-CM

## 2020-09-30 DIAGNOSIS — R30.0 DYSURIA: Primary | ICD-10-CM

## 2020-09-30 LAB
BACTERIA #/AREA URNS AUTO: ABNORMAL /HPF
BILIRUB UR QL STRIP: NEGATIVE
CLARITY UR REFRACT.AUTO: ABNORMAL
COLOR UR AUTO: YELLOW
GLUCOSE UR QL STRIP: NEGATIVE
HGB UR QL STRIP: NEGATIVE
KETONES UR QL STRIP: NEGATIVE
LEUKOCYTE ESTERASE UR QL STRIP: ABNORMAL
MICROSCOPIC COMMENT: ABNORMAL
NITRITE UR QL STRIP: NEGATIVE
PH UR STRIP: 6 [PH] (ref 5–8)
PROT UR QL STRIP: NEGATIVE
RBC #/AREA URNS AUTO: 2 /HPF (ref 0–4)
SP GR UR STRIP: 1.02 (ref 1–1.03)
SQUAMOUS #/AREA URNS AUTO: 1 /HPF
URN SPEC COLLECT METH UR: ABNORMAL
WBC #/AREA URNS AUTO: 12 /HPF (ref 0–5)

## 2020-09-30 PROCEDURE — 87077 CULTURE AEROBIC IDENTIFY: CPT

## 2020-09-30 PROCEDURE — 87088 URINE BACTERIA CULTURE: CPT

## 2020-09-30 PROCEDURE — 87186 SC STD MICRODIL/AGAR DIL: CPT

## 2020-09-30 PROCEDURE — 81001 URINALYSIS AUTO W/SCOPE: CPT

## 2020-09-30 PROCEDURE — 87086 URINE CULTURE/COLONY COUNT: CPT

## 2020-09-30 NOTE — TELEPHONE ENCOUNTER
Patient stated had a positive at home AZO test, denied symptoms. Advised patient UA and urine culture orders placed per NP Tonio, and to report to lab to give a urine specimen. Patient verbalized understanding.

## 2020-09-30 NOTE — TELEPHONE ENCOUNTER
----- Message from Lena Michaels sent at 9/30/2020  1:29 PM CDT -----  Contact: Patient [phone]  Prescription Request:     Name of medication: See Symptoms    Reason for request: UTI    Pharmacy: [facility]/pharmacy #[id number] - [place], [place] - [phone]    Please contact the patient with the status of this request.    Thank You

## 2020-10-02 LAB — BACTERIA UR CULT: ABNORMAL

## 2020-10-05 DIAGNOSIS — N39.0 URINARY TRACT INFECTION WITHOUT HEMATURIA, SITE UNSPECIFIED: Primary | ICD-10-CM

## 2020-10-05 RX ORDER — CIPROFLOXACIN 250 MG/1
250 TABLET, FILM COATED ORAL 2 TIMES DAILY
Qty: 14 TABLET | Refills: 0 | Status: SHIPPED | OUTPATIENT
Start: 2020-10-05 | End: 2020-10-12

## 2020-10-08 ENCOUNTER — OFFICE VISIT (OUTPATIENT)
Dept: FAMILY MEDICINE | Facility: CLINIC | Age: 80
End: 2020-10-08
Payer: MEDICARE

## 2020-10-08 VITALS
HEIGHT: 62 IN | OXYGEN SATURATION: 92 % | DIASTOLIC BLOOD PRESSURE: 58 MMHG | WEIGHT: 146.19 LBS | SYSTOLIC BLOOD PRESSURE: 110 MMHG | TEMPERATURE: 98 F | BODY MASS INDEX: 26.9 KG/M2 | HEART RATE: 96 BPM

## 2020-10-08 DIAGNOSIS — E11.21 TYPE 2 DIABETES MELLITUS WITH DIABETIC NEPHROPATHY, WITHOUT LONG-TERM CURRENT USE OF INSULIN: Primary | ICD-10-CM

## 2020-10-08 DIAGNOSIS — I25.119 ATHEROSCLEROSIS OF NATIVE CORONARY ARTERY OF NATIVE HEART WITH ANGINA PECTORIS: ICD-10-CM

## 2020-10-08 DIAGNOSIS — E78.2 MIXED HYPERLIPIDEMIA: ICD-10-CM

## 2020-10-08 DIAGNOSIS — K21.9 GASTROESOPHAGEAL REFLUX DISEASE WITHOUT ESOPHAGITIS: ICD-10-CM

## 2020-10-08 DIAGNOSIS — L84 CORNS/CALLOSITIES: ICD-10-CM

## 2020-10-08 DIAGNOSIS — J45.30 MILD PERSISTENT ASTHMA WITHOUT COMPLICATION: ICD-10-CM

## 2020-10-08 PROCEDURE — 99999 PR PBB SHADOW E&M-EST. PATIENT-LVL III: CPT | Mod: PBBFAC,,, | Performed by: FAMILY MEDICINE

## 2020-10-08 PROCEDURE — 90694 FLU VACCINE - QUADRIVALENT - ADJUVANTED: ICD-10-PCS | Mod: S$GLB,,, | Performed by: FAMILY MEDICINE

## 2020-10-08 PROCEDURE — G0008 ADMIN INFLUENZA VIRUS VAC: HCPCS | Mod: S$GLB,,, | Performed by: FAMILY MEDICINE

## 2020-10-08 PROCEDURE — 99214 PR OFFICE/OUTPT VISIT, EST, LEVL IV, 30-39 MIN: ICD-10-PCS | Mod: 25,S$GLB,, | Performed by: FAMILY MEDICINE

## 2020-10-08 PROCEDURE — G0008 FLU VACCINE - QUADRIVALENT - ADJUVANTED: ICD-10-PCS | Mod: S$GLB,,, | Performed by: FAMILY MEDICINE

## 2020-10-08 PROCEDURE — 99214 OFFICE O/P EST MOD 30 MIN: CPT | Mod: 25,S$GLB,, | Performed by: FAMILY MEDICINE

## 2020-10-08 PROCEDURE — 99999 PR PBB SHADOW E&M-EST. PATIENT-LVL III: ICD-10-PCS | Mod: PBBFAC,,, | Performed by: FAMILY MEDICINE

## 2020-10-08 PROCEDURE — 90694 VACC AIIV4 NO PRSRV 0.5ML IM: CPT | Mod: S$GLB,,, | Performed by: FAMILY MEDICINE

## 2020-10-08 RX ORDER — AMLODIPINE BESYLATE 10 MG/1
10 TABLET ORAL DAILY
Qty: 90 TABLET | Refills: 3 | Status: SHIPPED | OUTPATIENT
Start: 2020-10-08 | End: 2021-11-04 | Stop reason: DRUGHIGH

## 2020-10-08 RX ORDER — TRIAZOLAM 0.25 MG/1
0.25 TABLET ORAL NIGHTLY PRN
Qty: 90 TABLET | Refills: 1 | Status: SHIPPED | OUTPATIENT
Start: 2020-10-08 | End: 2020-11-07

## 2020-10-08 RX ORDER — HYDROCORTISONE ACETATE PRAMOXINE HCL 1; 1 G/100G; G/100G
CREAM TOPICAL 2 TIMES DAILY
Qty: 3 TUBE | Refills: 3 | Status: SHIPPED | OUTPATIENT
Start: 2020-10-08 | End: 2022-05-05 | Stop reason: SDUPTHER

## 2020-10-08 NOTE — PROGRESS NOTES
ID patient by name and date of birth. Allergies confirmed. Immunization given as per orders, using aseptic technique. Patient tolerated well. Information sheet reviewed and given to patient.

## 2020-10-09 ENCOUNTER — PATIENT MESSAGE (OUTPATIENT)
Dept: FAMILY MEDICINE | Facility: CLINIC | Age: 80
End: 2020-10-09

## 2020-10-09 RX ORDER — FLUCONAZOLE 150 MG/1
150 TABLET ORAL DAILY
Qty: 1 TABLET | Refills: 1 | Status: SHIPPED | OUTPATIENT
Start: 2020-10-09 | End: 2020-10-10

## 2020-10-09 NOTE — TELEPHONE ENCOUNTER
I am not convinced the yeast infection is from the Invokana. I think it is most likely due to the antibiotic you are on, which is not uncommon. It is not an allergy or intolerance. Let's treat the yeast infection, keep taking the Invokana and see what happens. I would like to stop the cipro after today's dose, then take the Diflucan 150 mg one time dose on Ed 10/11/20. You may repeat the dose in 1 week if still with symptoms.

## 2020-10-11 PROBLEM — E78.2 MIXED HYPERLIPIDEMIA: Status: ACTIVE | Noted: 2020-10-11

## 2020-10-11 NOTE — PROGRESS NOTES
Subjective:       Patient ID: Darlin Tipton is a 80 y.o. female.    Chief Complaint: Diabetes, Hyperlipidemia, Coronary Artery Disease, Gastroesophageal Reflux, and Asthma    Patient presents here for 6 month follow-up of diabetes, hyperlipidemia, CAD, GERD, and asthma.  Her chart was reviewed since her last visit.  She did undergo hysterectomy in March of 2020 without problems or complications.  Her diabetes is well controlled with her present dose of Invokana.  She was taken off of metformin due to her chronic kidney disease and had been initially placed on Januvia.  However, she did have problems tolerating Januvia so she was changed to Invokana.  She has been on this for approximately a month without problems.  Her most recent hemoglobin A1c was in June of 2020 and was 7.0%.  She does have diabetic nephropathy but no diabetic neuropathy or retinopathy.  She is up-to-date with her eye exams.  She denies any hypoglycemia.  Her hyperlipidemia has been fairly well controlled with her present dose of simvastatin 20 mg daily.  Her last lipid profile was July 2019 and this needs to be updated.  As far as her coronary artery disease, she continues to be followed regularly by Cardiology and she remains asymptomatic.  She has had coronary artery bypass surgery in the past.  Her GERD is under control at this time without daily medications and she does use some form of medication intermittently as needed.  Her asthma is stable at this time without any recent exacerbations.  As far as health maintenance, she is up-to-date with all of her recommended screening exams and immunizations with exception of shingles vaccine, flu vaccine, and eye exam.  She did have her eye exam and this just needs to be documented from her ophthalmologist.  She will get her flu vaccine today.    Diabetes  She presents for her follow-up diabetic visit. Her disease course has been stable. There are no hypoglycemic associated symptoms. Pertinent  negatives for hypoglycemia include no dizziness, headaches or nervousness/anxiousness. Pertinent negatives for diabetes include no chest pain, no fatigue, no foot paresthesias, no polydipsia, no polyuria and no visual change. Symptoms are stable. Diabetic complications include heart disease and nephropathy. Pertinent negatives for diabetic complications include no CVA, peripheral neuropathy or retinopathy. Risk factors for coronary artery disease include diabetes mellitus, dyslipidemia and post-menopausal. Current diabetic treatment includes oral agent (monotherapy). She is compliant with treatment all of the time. Her weight is stable. She is following a diabetic, low fat/cholesterol and low salt diet. Meal planning includes avoidance of concentrated sweets and ADA exchanges. She has had a previous visit with a dietitian. She rarely participates in exercise. There is no change in her home blood glucose trend. An ACE inhibitor/angiotensin II receptor blocker is not being taken. She does not see a podiatrist.Eye exam is current.   Hyperlipidemia  This is a chronic problem. The problem is controlled. Recent lipid tests were reviewed and are normal. Exacerbating diseases include chronic renal disease and diabetes. Factors aggravating her hyperlipidemia include beta blockers. Pertinent negatives include no chest pain, myalgias or shortness of breath. Current antihyperlipidemic treatment includes statins. The current treatment provides moderate improvement of lipids. Compliance problems include adherence to exercise.  Risk factors for coronary artery disease include diabetes mellitus, dyslipidemia and post-menopausal.     Review of Systems   Constitutional: Negative for chills, fatigue, fever and unexpected weight change.   HENT: Negative for nasal congestion, ear pain, postnasal drip and sore throat.    Eyes: Negative for pain and visual disturbance.        Up-to-date with eye exams   Respiratory: Negative for cough,  shortness of breath and wheezing.    Cardiovascular: Negative for chest pain and palpitations.   Gastrointestinal: Negative for abdominal pain, diarrhea, nausea and vomiting.   Endocrine: Negative for polydipsia and polyuria.   Genitourinary: Negative for difficulty urinating, dysuria, flank pain and pelvic pain.   Musculoskeletal: Positive for back pain. Negative for arthralgias, myalgias and neck pain.   Neurological: Negative for dizziness, syncope, light-headedness and headaches.   Psychiatric/Behavioral: Positive for sleep disturbance. The patient is not nervous/anxious.          Objective:      Physical Exam  Vitals signs reviewed.   Constitutional:       General: She is not in acute distress.     Appearance: Normal appearance. She is well-developed.   HENT:      Right Ear: Tympanic membrane and external ear normal.      Left Ear: Tympanic membrane and external ear normal.      Mouth/Throat:      Pharynx: Oropharynx is clear. No posterior oropharyngeal erythema.   Neck:      Musculoskeletal: Normal range of motion and neck supple.      Thyroid: No thyromegaly.   Cardiovascular:      Rate and Rhythm: Normal rate and regular rhythm.      Pulses: Normal pulses.      Heart sounds: Normal heart sounds. No murmur.   Pulmonary:      Effort: Pulmonary effort is normal.      Breath sounds: Normal breath sounds. No wheezing or rales.   Musculoskeletal: Normal range of motion.         General: No tenderness.      Right lower leg: No edema.      Left lower leg: No edema.      Right foot: Normal range of motion. No deformity.      Left foot: Normal range of motion. No deformity.   Feet:      Right foot:      Protective Sensation: 5 sites tested. 5 sites sensed.      Skin integrity: Skin integrity normal.      Left foot:      Protective Sensation: 5 sites tested. 5 sites sensed.      Skin integrity: Callus present. No ulcer or skin breakdown.   Lymphadenopathy:      Cervical: No cervical adenopathy.   Neurological:       General: No focal deficit present.      Mental Status: She is alert and oriented to person, place, and time.      Cranial Nerves: No cranial nerve deficit.      Deep Tendon Reflexes: Reflexes are normal and symmetric.   Psychiatric:         Mood and Affect: Mood normal.         Behavior: Behavior normal.         Assessment:       1. Type 2 diabetes mellitus with diabetic nephropathy, without long-term current use of insulin    2. Mixed hyperlipidemia    3. Atherosclerosis of native coronary artery of native heart with angina pectoris    4. Mild persistent asthma without complication    5. Gastroesophageal reflux disease without esophagitis    6. Corns/callosities        Plan:       1.  Continue present medication as her diabetes, hyperlipidemia, CAD, asthma, and GERD are stable and controlled  2.  Continue diabetic, low-sodium, low-fat low-cholesterol diet but try to increase exercise  3.  High-dose flu vaccine today  4.  Referral to podiatry for evaluation of callus  5.  Continue follow-up with cardiology as scheduled  6.  Follow up with me in 6 months or p.r.n.        Patient readiness: acceptance and barriers:none    During the course of the visit the patient was educated and counseled about the following:     Diabetes:  Addressed ADA diet.  Suggested low cholesterol diet.  Encouraged aerobic exercise.  Discussed foot care.  Reminded to get yearly retinal exam.  Discussed ways to avoid symptomatic hypoglycemia.  Discussed sick day management.  Follow up in 6 months or as needed.    Goals:  Diabetes    Did patient meet goals/outcomes: Yes    The following self management tools provided: blood glucose log    Patient Instructions (the written plan) was given to the patient/family.     Time spent with patient: 30 minutes    Barriers to medications present (no )    Adverse reactions to current medications (no)    Over the counter medications reviewed (Yes)

## 2020-10-12 ENCOUNTER — TELEPHONE (OUTPATIENT)
Dept: CARDIOLOGY | Facility: CLINIC | Age: 80
End: 2020-10-12

## 2020-10-12 ENCOUNTER — OFFICE VISIT (OUTPATIENT)
Dept: PODIATRY | Facility: CLINIC | Age: 80
End: 2020-10-12
Payer: MEDICARE

## 2020-10-12 VITALS
WEIGHT: 146 LBS | HEIGHT: 62 IN | DIASTOLIC BLOOD PRESSURE: 69 MMHG | BODY MASS INDEX: 26.87 KG/M2 | SYSTOLIC BLOOD PRESSURE: 120 MMHG | TEMPERATURE: 99 F | HEART RATE: 105 BPM

## 2020-10-12 DIAGNOSIS — E11.21 TYPE 2 DIABETES MELLITUS WITH DIABETIC NEPHROPATHY, WITHOUT LONG-TERM CURRENT USE OF INSULIN: Primary | ICD-10-CM

## 2020-10-12 DIAGNOSIS — L85.1 ACQUIRED KERATODERMA: ICD-10-CM

## 2020-10-12 PROCEDURE — 99214 OFFICE O/P EST MOD 30 MIN: CPT | Mod: S$GLB,,, | Performed by: PODIATRIST

## 2020-10-12 PROCEDURE — 99214 PR OFFICE/OUTPT VISIT, EST, LEVL IV, 30-39 MIN: ICD-10-PCS | Mod: S$GLB,,, | Performed by: PODIATRIST

## 2020-10-12 NOTE — PATIENT INSTRUCTIONS
Diabetic Foot Care  Diabetes can lead to a number of foot complications. Fortunately, you can prevent most of these with a little extra foot care. If diabetes is not well controlled, it can cause damage to blood vessels and result in poor circulation to the foot. When the skin does not get enough blood flow, it becomes prone to pressure sores and ulcers, which heal slowly.  Diabetes can also damage nerves, interfering with the ability to feel pain and pressure. When you cant feel your foot normally, it is easy to injure your skin, bones, and joints without knowing it. For these reasons diabetes increases the risk of fungal infections, bunions, and ulcers. An ulcer is a sore or break in the skin. With ulcers, often the skin seems to have worn away. Deep ulcers can lead to bone infection.  Gangrene is the most serious foot complication of diabetes. It usually occurs on the tips of the toes as blackened areas of skin. The black area is dead tissue. In severe cases, gangrene spreads to involve the entire toe, other toes, and the entire foot. Foot or toe amputation may be required. Good foot care and blood sugar control can prevent this.  Home care  · Wear comfortable, well-fitting shoes.  · Wash your feet daily with warm water and mild soap.  · After drying, apply a moisturizing cream or lotion to the top and bottom of your feet. Don't put lotion between toes.  · Check your feet daily for skin breaks, blisters, swelling, or redness. Look between your toes as well. If you cannot see the bottoms of your feet, ask someone to look or use a mirror.  · Wear cotton socks and change them every day.  · Trim toenails carefully, and do not cut your cuticles.  · Strive to keep your blood sugar under control with a combination of medicines, diet, and activity.  · If you smoke and have diabetes, it is very important that you stop. Smoking reduces blood flow to your foot.  · Schedule foot exams at least every year, or more often if  you have foot problems.  · Put your feet up when sitting, wiggle toes, and move ankles to help improve blood flow.  Avoid activities that increase your risk of foot injury:  · Do not walk barefoot.  · Do not use heating pads or hot water bottles on your feet.  · Do not put your foot in a hot tub without first checking the temperature with your hand.  Follow-up care  Follow up with your healthcare provider, or as advised. Be sure to take off your shoes and socks before your appointment starts so your healthcare provider will be sure to check your feet. Report any cut, puncture, scrape, blister, or other injury to your foot. Also report if you have a bunion, hammertoes, ingrown toenail, or ulcer on your foot.  When to seek medical advice  Call your healthcare provider right away if any of these occur:  · Black skin color anywhere on the foot  · Open ulcer with pus draining from the wound  · Increasing foot or leg pain  · New areas of redness or swelling or tender areas of the foot  · Fever of 100.4°F (38°C) or greater  Date Last Reviewed: 5/25/2016  © 1309-8591 [employer]. [address] [place] [address], [place] [address]. All rights reserved. This information is not intended as a substitute for professional medical care. Always follow your healthcare professional's instructions.

## 2020-10-12 NOTE — PROGRESS NOTES
[address] [facility] [address]  [place] [address]  Phone: [phone]   [phone]    Patient's PCP:Oumar Almonte Jr, MD  Referring Provider: Dr. Oumar Almonte Jr.    Subjective:      Chief Complaint:: Foot Pain (ball of foot pain -callus 1st mpj left)    INGRID Tipton is a 80 y.o. female who presents with a complaint of  Callus left foot first mpj lasting for three weeks. Onset of the symptoms was none.  Current symptoms include pain.  Aggravating factors are standing and walking. Symptoms have remained the same.Treatment to date have included none. Patients rates pain  5/10 on pain scale.    Systemic Doctor: Oumar Almonte Jr., MD  Date Last Seen: 10-8-20  Blood Sugar: 107  Hemoglobin A1c: 7.0    Vitals:    10/12/20 1008   BP: 120/69   Pulse: 105   Temp: 98.6 °F (37 °C)     Shoe Size: 8    Past Surgical History:   Procedure Laterality Date    APPENDECTOMY  1968    BLADDER SUSPENSION  1989    CARDIAC SURGERY  2016    CABG    CATARACT EXTRACTION  9/2007 (L) and 10/2207 (R)    COLONOSCOPY N/A 10/18/2017    Procedure: COLONOSCOPY;  Surgeon: Esme Acuna MD;  Location: [facility];  Service: Endoscopy;  Laterality: N/A;    CORONARY ARTERY BYPASS GRAFT  4/26/2004    x5    ESOPHAGEAL DILATION      HYSTEROSCOPY WITH DILATION AND CURETTAGE OF UTERUS N/A 3/12/2020    Procedure: HYSTEROSCOPY, WITH DILATION AND CURETTAGE OF UTERUS;  Surgeon: Ramona Llanos MD;  Location: [facility] OR;  Service: OB/GYN;  Laterality: N/A;    SPINE SURGERY  3/2000    Tumor    TRANSFORAMINAL EPIDURAL INJECTION OF STEROID Right 11/21/2019    Procedure: Injection,steroid,epidural,transforaminal approach;  Surgeon: Bj Jones MD;  Location: [facility] OR;  Service: Pain Management;  Laterality: Right;  L4-5, L5-S1    TRANSFORAMINAL EPIDURAL INJECTION OF STEROID Right 12/31/2019    Procedure: Injection,steroid,epidural,transforaminal approach;  Surgeon: Bj Jones MD;  Location: [facility] OR;  Service: Pain Management;   Laterality: Right;  L4-5, L5-S1    TRANSFORAMINAL EPIDURAL INJECTION OF STEROID Right 2/5/2020    Procedure: Injection,steroid,epidural,transforaminal approach;  Surgeon: Bj Jones MD;  [facility];  Service: Pain Management;  Laterality: Right;  L4-5, L5-S1    WRIST SURGERY  1993    carpal tunnel       Past Medical History:   Diagnosis Date    Allergy     Dust mites, Grasses, Trees    Arthritis     Asthma     Blood transfusion     CAD (coronary artery disease)     Cataract     CHRONIC BRONCHITIS     Diabetes mellitus     Diabetes mellitus type II     GERD (gastroesophageal reflux disease)     Hyperlipidemia     Hypertension     Irregular heart beat     Kidney disease     ckd stage 3  as stated by patient - to see MD    Post-menopausal bleeding 2018    Spinal stenosis     Thyroid disease     Hypothyroidism     Family History   Problem Relation Age of Onset    Breast cancer Mother     Breast cancer Maternal Aunt     Allergic rhinitis Neg Hx     Allergies Neg Hx     Angioedema Neg Hx     Asthma Neg Hx     Eczema Neg Hx     Immunodeficiency Neg Hx     Urticaria Neg Hx     Rhinitis Neg Hx     Atopy Neg Hx         Social History:   Marital Status:   Alcohol History:  reports current alcohol use.  Tobacco History:  reports that she has never smoked. She has never used smokeless tobacco.  Drug History:  reports no history of drug use.    Review of patient's allergies indicates:   Allergen Reactions    Sulfa (sulfonamide antibiotics) Rash    Floxacillin Itching    Januvia [sitagliptin] Other (See Comments)     Hot flashes    Tetracyclines Itching    Cefaclor Itching    Disalcid [salsalate] Rash    Fenofibrate micronized Rash    Nitrofurantoin macrocrystalline Other (See Comments)     unknown    Phenylfenesin la [phenylpropanolamine-gg] Rash       Current Outpatient Medications   Medication Sig Dispense Refill    acetaminophen (TYLENOL ARTHRITIS) 650 MG tablet Take 650  mg by mouth once daily. 2 Tablet(s) Oral  Every day.      albuterol sulfate (PROAIR RESPICLICK) 90 mcg/actuation AePB Inhale 2 puffs into the lungs every 4 (four) hours as needed (shortness of breath). Rescue 1 each 11    amitriptyline (ELAVIL) 50 MG tablet Take 50 mg by mouth every evening.       amLODIPine (NORVASC) 10 MG tablet Take 1 tablet (10 mg total) by mouth once daily. 90 tablet 3    aspirin (ECOTRIN) 81 MG EC tablet Take 81 mg by mouth once daily.      azelastine (ASTELIN) 137 mcg (0.1 %) nasal spray 1 spray (137 mcg total) by Nasal route 2 (two) times daily. 90 mL 3    blood sugar diagnostic (FREESTYLE LITE STRIPS) Strp USE TO TEST BLOOD SUGAR TWICE A  strip 3    canagliflozin (INVOKANA) 100 mg Tab tablet Take 1 tablet (100 mg total) by mouth once daily. 90 tablet 3    carboxymethylcellulose (REFRESH) 1 % ophthalmic solution as directed      cetirizine (ZYRTEC) 10 MG tablet Take 10 mg by mouth once daily.      cholecalciferol, vitamin D3, (VITAMIN D3) 2,000 unit Cap Take 1 capsule by mouth once daily.      coenzyme Q10 100 mg capsule Take 100 mg by mouth once daily.       cranberry extract (THERACRAN) 650 mg Cap Take 1 tablet by mouth 2 (two) times daily.        diclofenac sodium (VOLTAREN) 1 % Gel APPLY 2 G TOPICALLY 3 (THREE) TIMES DAILY. (Patient taking differently: 2 g 3 (three) times daily as needed. APPLY 2 G TOPICALLY 3 (THREE) TIMES DAILY.) 100 g 5    fluticasone propionate (FLONASE) 50 mcg/actuation nasal spray 1 spray (50 mcg total) by Each Nostril route once daily. 48 g 3    fluticasone-vilanterol (BREO ELLIPTA) 200-25 mcg/dose DsDv diskus inhaler Inhale 1 puff into the lungs once daily. Controller 3 each 3    isosorbide mononitrate (IMDUR) 60 MG 24 hr tablet Take 60 mg by mouth every evening.       lancets Misc 1 Units by Misc.(Non-Drug; Combo Route) route 2 (two) times daily. 200 each 3    levothyroxine (LEVOTHROID) 25 MCG tablet Take 25 mcg by mouth before  breakfast. Every day      nitroGLYCERIN (NITROSTAT) 0.4 MG SL tablet Place 0.4 mg under the tongue every 5 (five) minutes as needed. 0.4mg Sublingual PRN .        pramoxine-hydrocortisone (PROCTOCREAM-HC) 1-1 % rectal cream Place rectally 2 (two) times daily. 3 Tube 3    simvastatin (ZOCOR) 20 MG tablet Take 20 mg by mouth every evening. Every day      triazolam (HALCION) 0.25 MG Tab Take 1 tablet (0.25 mg total) by mouth nightly as needed. 90 tablet 1    vitamins  A,C,E-zinc-copper (PRESERVISION AREDS) 14,[id number] unit-mg-unit Cap Take 1 capsule by mouth 2 (two) times daily.       ciprofloxacin HCl (CIPRO) 250 MG tablet Take 1 tablet (250 mg total) by mouth 2 (two) times daily. for 7 days (Patient not taking: Reported on 10/12/2020) 14 tablet 0    TOPROL XL 25 mg 24 hr tablet Take 25 mg by mouth once daily.        No current facility-administered medications for this visit.      Facility-Administered Medications Ordered in Other Visits   Medication Dose Route Frequency Provider Last Rate Last Dose    0.9%  NaCl infusion   Intravenous Continuous Bj Jones MD   Stopped at 02/05/20 1400       Review of Systems      Objective:        Physical Exam:   Foot Exam    General  General Appearance: appears stated age and healthy   Orientation: alert and oriented to person, place, and time   Affect: appropriate   Gait: unimpaired       Right Foot/Ankle     Inspection and Palpation  Ecchymosis: none  Tenderness: none   Swelling: none   Arch: normal  Skin Exam: skin intact;     Neurovascular  Dorsalis pedis: 1+  Posterior tibial: 1+  Saphenous nerve sensation: normal  Tibial nerve sensation: normal  Superficial peroneal nerve sensation: normal  Deep peroneal nerve sensation: normal  Sural nerve sensation: normal    Muscle Strength  Ankle dorsiflexion: 5  Ankle plantar flexion: 5  Ankle inversion: 5  Ankle eversion: 5  Great toe extension: 5  Great toe flexion: 5    Range of Motion    Normal right ankle  ROM    Comments  Nails 1 through 5 slightly elongated without signs of dystrophy    Left Foot/Ankle      Inspection and Palpation  Ecchymosis: none  Tenderness: great toe metatarsophalangeal joint   Swelling: none   Arch: normal  Skin Exam: callus; no ulcer     Neurovascular  Dorsalis pedis: 1+  Posterior tibial: 1+  Saphenous nerve sensation: normal  Tibial nerve sensation: normal  Superficial peroneal nerve sensation: normal  Deep peroneal nerve sensation: normal  Sural nerve sensation: normal    Muscle Strength  Ankle dorsiflexion: 5  Ankle plantar flexion: 5  Ankle inversion: 5  Ankle eversion: 5  Great toe extension: 5  Great toe flexion: 5    Range of Motion    Normal left ankle ROM    Comments  Nails 1 through 5 slightly elongated without signs of dystrophy    Physical Exam   Cardiovascular:   Pulses:       Dorsalis pedis pulses are 1+ on the right side and 1+ on the left side.        Posterior tibial pulses are 1+ on the right side and 1+ on the left side.   Musculoskeletal:        Feet:    Feet:   Left Foot:   Skin Integrity: Positive for callus. Negative for ulcer.       Imaging: none            Assessment:       1. Type 2 diabetes mellitus with diabetic nephropathy, without long-term current use of insulin    2. Acquired keratoderma      Plan:   Type 2 diabetes mellitus with diabetic nephropathy, without long-term current use of insulin  -     Ambulatory referral/consult to Podiatry    Acquired keratoderma      Follow up if symptoms worsen or fail to improve.    No notes on file - None    Recommend pumice stone and skin softening creams.  Patient also given accommodative pad to place over the painful area to decrease pressure.    Counseling:     I provided patient education verbally regarding:   Patient diagnosis, treatment options, as well as alternatives, risks, and benefits.     Counseled patient on the aspects of diabetes and how it pertains to the feet.  I explained the importance of proper diabetic  foot care and how it is essential for the health of their feet.      Shoe inspection. Patient instructed on proper foot hygeine. We discussed wearing proper shoe gear, daily foot inspections, never walking without protective shoe gear, never putting sharp instruments to feet, routine podiatric nail visits every 2-3 months.        This note was created using Dragon voice recognition software that occasionally misinterpreted phrases or words.

## 2020-10-12 NOTE — LETTER
October 12, 2020      Oumar Almonte Jr., MD  [address] [facility] [address]  [place] [facility] [address]           [facility]  [address]  [facility] [address]  Phone: [phone]  Fax: [phone]          Patient: Darlin Tipton   MR Number: [id number]   YOB: 1940   Date of Visit: 10/12/2020       Dear Dr. Oumar Almonte Jr.:    Thank you for referring Darlin Tipton to me for evaluation. Attached you will find relevant portions of my assessment and plan of care.    If you have questions, please do not hesitate to call me. I look forward to following Darlin Tipton along with you.    Sincerely,    Steffen Gordillo DPM    Enclosure  CC:  No Recipients    If you would like to receive this communication electronically, please contact [email or web address] or [phone] to request more information on [employer] Link access.    For providers and/or their staff who would like to refer a patient to [facility], please contact us through our one-stop-shop provider referral line, [facility], at [phone].    If you feel you have received this communication in error or would no longer like to receive these types of communications, please e-mail [email or web address]

## 2020-10-12 NOTE — TELEPHONE ENCOUNTER
----- Message from Yahaira Pires sent at 9/9/2020  9:52 AM CDT -----  Regarding: FW: Appointment Megha  Contact: [phone]    ----- Message -----  From: Gracie Sanchez LPN  Sent: 9/9/2020   9:44 AM CDT  To: Alfreda De La Torre, Yahaira Pires, Desi Fernandez  Subject: Appointment Megha                            Patient advised by Nephrologist  to see  for inconsistent BP readings.  Please call.

## 2020-10-29 ENCOUNTER — TELEPHONE (OUTPATIENT)
Dept: CARDIOLOGY | Facility: CLINIC | Age: 80
End: 2020-10-29

## 2020-10-29 NOTE — TELEPHONE ENCOUNTER
Will see were I can put her           ----- Message from Patrica Simon sent at 10/29/2020 11:15 AM CDT -----  Regarding: appointment  Contact: self  Type:  Sooner Apoointment Request    Caller is requesting a sooner appointment.  Caller declined first available appointment listed below.  Caller will not accept being placed on the waitlist and is requesting a message be sent to doctor.    Name of Caller:  self  When is the first available appointment?    Symptoms:    Best Call Back Number:  [phone]   Additional Information:  Patient requesting to reschedule appointment

## 2020-11-06 ENCOUNTER — LAB VISIT (OUTPATIENT)
Dept: LAB | Facility: HOSPITAL | Age: 80
End: 2020-11-06
Attending: INTERNAL MEDICINE
Payer: MEDICARE

## 2020-11-06 DIAGNOSIS — N18.30 CHRONIC KIDNEY DISEASE, STAGE III (MODERATE): Primary | ICD-10-CM

## 2020-11-06 DIAGNOSIS — M25.569 KNEE PAIN, UNSPECIFIED CHRONICITY, UNSPECIFIED LATERALITY: Primary | ICD-10-CM

## 2020-11-06 DIAGNOSIS — R80.9 PROTEINURIA: ICD-10-CM

## 2020-11-06 LAB
ALBUMIN SERPL BCP-MCNC: 4.2 G/DL (ref 3.5–5.2)
ANION GAP SERPL CALC-SCNC: 12 MMOL/L (ref 8–16)
BACTERIA #/AREA URNS HPF: NEGATIVE /HPF
BILIRUB UR QL STRIP: NEGATIVE
BUN SERPL-MCNC: 46 MG/DL (ref 8–23)
CALCIUM SERPL-MCNC: 9.3 MG/DL (ref 8.7–10.5)
CHLORIDE SERPL-SCNC: 102 MMOL/L (ref 95–110)
CLARITY UR: CLEAR
CO2 SERPL-SCNC: 24 MMOL/L (ref 23–29)
COLOR UR: YELLOW
CREAT SERPL-MCNC: 1.1 MG/DL (ref 0.5–1.4)
CREAT UR-MCNC: 85 MG/DL (ref 15–325)
EST. GFR  (AFRICAN AMERICAN): 54.8 ML/MIN/1.73 M^2
EST. GFR  (NON AFRICAN AMERICAN): 47.5 ML/MIN/1.73 M^2
GLUCOSE SERPL-MCNC: 115 MG/DL (ref 70–110)
GLUCOSE UR QL STRIP: ABNORMAL
HGB UR QL STRIP: NEGATIVE
HYALINE CASTS #/AREA URNS LPF: 5 /LPF
KETONES UR QL STRIP: NEGATIVE
LEUKOCYTE ESTERASE UR QL STRIP: ABNORMAL
MICROSCOPIC COMMENT: ABNORMAL
NITRITE UR QL STRIP: NEGATIVE
PH UR STRIP: 6 [PH] (ref 5–8)
PHOSPHATE SERPL-MCNC: 5 MG/DL (ref 2.7–4.5)
POTASSIUM SERPL-SCNC: 4.3 MMOL/L (ref 3.5–5.1)
PROT UR QL STRIP: NEGATIVE
RBC #/AREA URNS HPF: 7 /HPF (ref 0–4)
SODIUM SERPL-SCNC: 138 MMOL/L (ref 136–145)
SP GR UR STRIP: 1.02 (ref 1–1.03)
SQUAMOUS #/AREA URNS HPF: 1 /HPF
URN SPEC COLLECT METH UR: ABNORMAL
UROBILINOGEN UR STRIP-ACNC: NEGATIVE EU/DL
WBC #/AREA URNS HPF: 84 /HPF (ref 0–5)
YEAST URNS QL MICRO: ABNORMAL

## 2020-11-06 PROCEDURE — 81001 URINALYSIS AUTO W/SCOPE: CPT

## 2020-11-06 PROCEDURE — 80069 RENAL FUNCTION PANEL: CPT

## 2020-11-06 PROCEDURE — 36415 COLL VENOUS BLD VENIPUNCTURE: CPT

## 2020-11-06 PROCEDURE — 82570 ASSAY OF URINE CREATININE: CPT

## 2020-11-08 ENCOUNTER — PATIENT OUTREACH (OUTPATIENT)
Dept: ADMINISTRATIVE | Facility: OTHER | Age: 80
End: 2020-11-08

## 2020-11-08 NOTE — PROGRESS NOTES
Chart was reviewed for overdue Proactive [facility] Encounters (PREETHI)  topics  Updates were requested from care everywhere  Health Maintenance has been updated  LINKS immunization registry triggered

## 2020-11-09 ENCOUNTER — OFFICE VISIT (OUTPATIENT)
Dept: ORTHOPEDICS | Facility: CLINIC | Age: 80
End: 2020-11-09
Payer: MEDICARE

## 2020-11-09 ENCOUNTER — HOSPITAL ENCOUNTER (OUTPATIENT)
Dept: RADIOLOGY | Facility: HOSPITAL | Age: 80
Discharge: HOME | End: 2020-11-09
Attending: ORTHOPAEDIC SURGERY
Payer: MEDICARE

## 2020-11-09 VITALS — BODY MASS INDEX: 26.87 KG/M2 | HEIGHT: 62 IN | WEIGHT: 146 LBS | RESPIRATION RATE: 16 BRPM

## 2020-11-09 DIAGNOSIS — S83.242A ACUTE MEDIAL MENISCAL TEAR, LEFT, INITIAL ENCOUNTER: ICD-10-CM

## 2020-11-09 DIAGNOSIS — M25.569 KNEE PAIN, UNSPECIFIED CHRONICITY, UNSPECIFIED LATERALITY: ICD-10-CM

## 2020-11-09 DIAGNOSIS — M65.342 TRIGGER FINGER, LEFT RING FINGER: Primary | ICD-10-CM

## 2020-11-09 PROCEDURE — 99203 OFFICE O/P NEW LOW 30 MIN: CPT | Mod: 25,S$GLB,, | Performed by: ORTHOPAEDIC SURGERY

## 2020-11-09 PROCEDURE — 73564 X-RAY EXAM KNEE 4 OR MORE: CPT | Mod: TC,50,PN

## 2020-11-09 PROCEDURE — 73564 XR KNEE ORTHO BILAT WITH FLEXION: ICD-10-PCS | Mod: 26,50,, | Performed by: RADIOLOGY

## 2020-11-09 PROCEDURE — 99203 PR OFFICE/OUTPT VISIT, NEW, LEVL III, 30-44 MIN: ICD-10-PCS | Mod: 25,S$GLB,, | Performed by: ORTHOPAEDIC SURGERY

## 2020-11-09 PROCEDURE — 20610 DRAIN/INJ JOINT/BURSA W/O US: CPT | Mod: LT,S$GLB,, | Performed by: ORTHOPAEDIC SURGERY

## 2020-11-09 PROCEDURE — 20550 NJX 1 TENDON SHEATH/LIGAMENT: CPT | Mod: 59,51,F3,S$GLB | Performed by: ORTHOPAEDIC SURGERY

## 2020-11-09 PROCEDURE — 20610 LARGE JOINT ASPIRATION/INJECTION: L KNEE: ICD-10-PCS | Mod: LT,S$GLB,, | Performed by: ORTHOPAEDIC SURGERY

## 2020-11-09 PROCEDURE — 73564 X-RAY EXAM KNEE 4 OR MORE: CPT | Mod: 26,50,, | Performed by: RADIOLOGY

## 2020-11-09 PROCEDURE — 20550 TENDON SHEATH: L RING MCP: ICD-10-PCS | Mod: 59,51,F3,S$GLB | Performed by: ORTHOPAEDIC SURGERY

## 2020-11-09 PROCEDURE — 99999 PR PBB SHADOW E&M-EST. PATIENT-LVL II: CPT | Mod: PBBFAC,,, | Performed by: ORTHOPAEDIC SURGERY

## 2020-11-09 PROCEDURE — 99999 PR PBB SHADOW E&M-EST. PATIENT-LVL II: ICD-10-PCS | Mod: PBBFAC,,, | Performed by: ORTHOPAEDIC SURGERY

## 2020-11-09 RX ORDER — TRIAMCINOLONE ACETONIDE 40 MG/ML
40 INJECTION, SUSPENSION INTRA-ARTICULAR; INTRAMUSCULAR
Status: DISCONTINUED | OUTPATIENT
Start: 2020-11-09 | End: 2020-11-09 | Stop reason: HOSPADM

## 2020-11-09 RX ADMIN — TRIAMCINOLONE ACETONIDE 40 MG: 40 INJECTION, SUSPENSION INTRA-ARTICULAR; INTRAMUSCULAR at 03:11

## 2020-11-09 NOTE — PROCEDURES
Tendon Sheath: L ring MCP    Date/Time: 11/9/2020 3:30 PM  Performed by: Sung Moreno MD  Authorized by: Sung Moreno MD     Consent Done?:  Yes (Verbal)  Indications:  Pain  Site marked: the procedure site was marked    Timeout: prior to procedure the correct patient, procedure, and site was verified    Prep: patient was prepped and draped in usual sterile fashion      Local anesthesia used?: Yes    Anesthesia:  Local infiltration  Local anesthetic:  Lidocaine 1% without epinephrine and bupivacaine 0.25% without epinephrine  Anesthetic total (ml):  2    Location:  Ring finger  Site:  L ring MCP  Ultrasonic guidance for needle placement?: No    Needle size:  22 G  Approach:  Volar  Medications:  40 mg triamcinolone acetonide 40 mg/mL  Patient tolerance:  Patient tolerated the procedure well with no immediate complications

## 2020-11-09 NOTE — PROCEDURES
Large Joint Aspiration/Injection: L knee    Date/Time: 11/9/2020 3:30 PM  Performed by: Sung Moreno MD  Authorized by: Sung Moreno MD     Consent Done?:  Yes (Verbal)  Indications:  Pain  Site marked: the procedure site was marked    Timeout: prior to procedure the correct patient, procedure, and site was verified    Local anesthetic:  Lidocaine 1% without epinephrine and bupivacaine 0.25% without epinephrine  Anesthetic total (ml):  6      Details:  Needle Size:  20 G  Approach:  Anterolateral  Location:  Knee  Site:  L knee  Medications:  40 mg triamcinolone acetonide 40 mg/mL  Patient tolerance:  Patient tolerated the procedure well with no immediate complications

## 2020-11-09 NOTE — PROGRESS NOTES
Past Medical History:   Diagnosis Date    Allergy     Dust mites, Grasses, Trees    Arthritis     Asthma     Blood transfusion     CAD (coronary artery disease)     Cataract     CHRONIC BRONCHITIS     Diabetes mellitus     Diabetes mellitus type II     GERD (gastroesophageal reflux disease)     Hyperlipidemia     Hypertension     Irregular heart beat     Kidney disease     ckd stage 3  as stated by patient - to see MD    Post-menopausal bleeding 2018    Spinal stenosis     Thyroid disease     Hypothyroidism       Past Surgical History:   Procedure Laterality Date    APPENDECTOMY  1968    BLADDER SUSPENSION  1989    CARDIAC SURGERY  2016    CABG    CATARACT EXTRACTION  9/2007 (L) and 10/2207 (R)    COLONOSCOPY N/A 10/18/2017    Procedure: COLONOSCOPY;  Surgeon: Esme Acuna MD;  Location: [facility];  Service: Endoscopy;  Laterality: N/A;    CORONARY ARTERY BYPASS GRAFT  4/26/2004    x5    ESOPHAGEAL DILATION      HYSTEROSCOPY WITH DILATION AND CURETTAGE OF UTERUS N/A 3/12/2020    Procedure: HYSTEROSCOPY, WITH DILATION AND CURETTAGE OF UTERUS;  Surgeon: Ramona Llanos MD;  Location: [facility] OR;  Service: OB/GYN;  Laterality: N/A;    SPINE SURGERY  3/2000    Tumor    TRANSFORAMINAL EPIDURAL INJECTION OF STEROID Right 11/21/2019    Procedure: Injection,steroid,epidural,transforaminal approach;  Surgeon: Bj Jones MD;  Location: [facility] OR;  Service: Pain Management;  Laterality: Right;  L4-5, L5-S1    TRANSFORAMINAL EPIDURAL INJECTION OF STEROID Right 12/31/2019    Procedure: Injection,steroid,epidural,transforaminal approach;  Surgeon: Bj Jones MD;  Location: [facility] OR;  Service: Pain Management;  Laterality: Right;  L4-5, L5-S1    TRANSFORAMINAL EPIDURAL INJECTION OF STEROID Right 2/5/2020    Procedure: Injection,steroid,epidural,transforaminal approach;  Surgeon: Bj Jones MD;  Location: [facility] OR;  Service: Pain Management;  Laterality: Right;  L4-5, L5-S1    WRIST SURGERY  1993     carpal tunnel         Current Outpatient Medications   Medication Sig    acetaminophen (TYLENOL ARTHRITIS) 650 MG tablet Take 650 mg by mouth once daily. 2 Tablet(s) Oral  Every day.    albuterol sulfate (PROAIR RESPICLICK) 90 mcg/actuation AePB Inhale 2 puffs into the lungs every 4 (four) hours as needed (shortness of breath). Rescue    amitriptyline (ELAVIL) 50 MG tablet Take 50 mg by mouth every evening.     amLODIPine (NORVASC) 10 MG tablet Take 1 tablet (10 mg total) by mouth once daily.    aspirin (ECOTRIN) 81 MG EC tablet Take 81 mg by mouth once daily.    azelastine (ASTELIN) 137 mcg (0.1 %) nasal spray 1 spray (137 mcg total) by Nasal route 2 (two) times daily.    blood sugar diagnostic (FREESTYLE LITE STRIPS) Strp USE TO TEST BLOOD SUGAR TWICE A DAY    canagliflozin (INVOKANA) 100 mg Tab tablet Take 1 tablet (100 mg total) by mouth once daily.    carboxymethylcellulose (REFRESH) 1 % ophthalmic solution as directed    cetirizine (ZYRTEC) 10 MG tablet Take 10 mg by mouth once daily.    cholecalciferol, vitamin D3, (VITAMIN D3) 2,000 unit Cap Take 1 capsule by mouth once daily.    coenzyme Q10 100 mg capsule Take 100 mg by mouth once daily.     cranberry extract (THERACRAN) 650 mg Cap Take 1 tablet by mouth 2 (two) times daily.      diclofenac sodium (VOLTAREN) 1 % Gel APPLY 2 G TOPICALLY 3 (THREE) TIMES DAILY. (Patient taking differently: 2 g 3 (three) times daily as needed. APPLY 2 G TOPICALLY 3 (THREE) TIMES DAILY.)    fluticasone propionate (FLONASE) 50 mcg/actuation nasal spray 1 spray (50 mcg total) by Each Nostril route once daily.    fluticasone-vilanterol (BREO ELLIPTA) 200-25 mcg/dose DsDv diskus inhaler Inhale 1 puff into the lungs once daily. Controller    isosorbide mononitrate (IMDUR) 60 MG 24 hr tablet Take 60 mg by mouth every evening.     lancets Misc 1 Units by Misc.(Non-Drug; Combo Route) route 2 (two) times daily.    levothyroxine (LEVOTHROID) 25 MCG tablet Take 25  mcg by mouth before breakfast. Every day    nitroGLYCERIN (NITROSTAT) 0.4 MG SL tablet Place 0.4 mg under the tongue every 5 (five) minutes as needed. 0.4mg Sublingual PRN .      pramoxine-hydrocortisone (PROCTOCREAM-HC) 1-1 % rectal cream Place rectally 2 (two) times daily.    simvastatin (ZOCOR) 20 MG tablet Take 20 mg by mouth every evening. Every day    vitamins  A,C,E-zinc-copper (PRESERVISION AREDS) 14,[id number] unit-mg-unit Cap Take 1 capsule by mouth 2 (two) times daily.     TOPROL XL 25 mg 24 hr tablet Take 25 mg by mouth once daily.      No current facility-administered medications for this visit.      Facility-Administered Medications Ordered in Other Visits   Medication    0.9%  NaCl infusion       Review of patient's allergies indicates:   Allergen Reactions    Cefaclor      Other reaction(s): rash    Disalcid  [salsalate]      Other reaction(s): rash    Fenofibrate micronized      Other reaction(s): rash    Nitrofurantoin macrocrystalline      Other reaction(s): rash    Ofloxacin      Other reaction(s): rash    Penicillins      Other reaction(s): rash    Phenylfenesin la  [phenylpropanolamine-gg]      Other reaction(s): rash    Sulfa (sulfonamide antibiotics)      Other reaction(s): rash       Family History   Problem Relation Age of Onset    Breast cancer Mother     Breast cancer Maternal Aunt     Allergic rhinitis Neg Hx     Allergies Neg Hx     Angioedema Neg Hx     Asthma Neg Hx     Eczema Neg Hx     Immunodeficiency Neg Hx     Urticaria Neg Hx     Rhinitis Neg Hx     Atopy Neg Hx        Social History     Socioeconomic History    Marital status:      Spouse name: Not on file    Number of children: Not on file    Years of education: Not on file    Highest education level: Not on file   Occupational History    Not on file   Social Needs    Financial resource strain: Not on file    Food insecurity     Worry: Not on file     Inability: Not on file     Transportation needs     Medical: Not on file     Non-medical: Not on file   Tobacco Use    Smoking status: Never Smoker    Smokeless tobacco: Never Used   Substance and Sexual Activity    Alcohol use: Yes     Comment: Rare    Drug use: No    Sexual activity: Not Currently   Lifestyle    Physical activity     Days per week: Not on file     Minutes per session: Not on file    Stress: Not on file   Relationships    Social connections     Talks on phone: Not on file     Gets together: Not on file     Attends Zoroastrianism service: Not on file     Active member of club or organization: Not on file     Attends meetings of clubs or organizations: Not on file     Relationship status: Not on file   Other Topics Concern    Not on file   Social History Narrative    Not on file       Chief Complaint:   Chief Complaint   Patient presents with    Knee Pain     TWISTED KNEE       History of present illness: This is a 80-year-old female seen for left knee pain and left ring finger pain.  Patient has a trigger finger that started in July.  It now gets stuck all the time and hurts her more.  The left knee started hurting about 2 weeks ago.  Patient was kneeling down went to get up and hurt it.  She then re-injured it about a week later twisting it.  Pain with squatting or twisting.  Pain along the medial compartment.  It is a little swollen and warm.  Pain with walking of a 9/10.    Review of Systems:    Constitution: Negative for chills, fever, and sweats.  Negative for unexplained weight loss.    HENT:  Negative for headaches and blurry vision.    Cardiovascular:Negative for chest pain or irregular heart beat. Negative for hypertension.    Respiratory:  Negative for cough and shortness of breath.    Gastrointestinal: Negative for abdominal pain, heartburn, melena, nausea, and vomitting.    Genitourinary:  Negative bladder incontinence and dysuria.    Musculoskeletal:  See HPI    Neurological: Negative for  numbness.    Psychiatric/Behavioral: Negative for depression.  The patient is not nervous/anxious.      Endocrine: Negative for polyuria    Hematologic/Lymphatic: Negative for bleeding problem.  Does not bruise/bleed easily.    Skin: Negative for poor would healing and rash      Physical Examination:    Vital Signs:    Vitals:    11/09/20 1530   Resp: 16       Body mass index is 26.7 kg/m².    This a well-developed, well nourished patient in no acute distress.  They are alert and oriented and cooperative to examination.  Pt. walks without an antalgic gait.      Examination of the left knee shows no rashes or erythema. There are no masses ecchymosis is a small effusion and the knee is mildly warm. Patient has full range of motion from 0-130°. Patient is nontender to palpation over lateral joint line and moderately tender to palpation over the medial joint line. Patient has a - Lachman exam, - anterior drawer exam, and - posterior drawer exam. - Marcus's exam. Knee is stable to varus and valgus stress. 5 out of 5 motor strength. Palpable distal pulses. Intact light touch sensation. Negative Patellofemoral crepitus    Examination of the left hand and wrist shows no signs of rashes or erythema. Patient has no masses ecchymosis or effusions. Patient has full range of motion of the wrist in flexion and extension as well as ulnar and radial deviation. The patient also has full range of motion of all joints in the hand. There are 2+ radial pulse and intact light touch sensation in all 5 digits. Nontender over the anatomic snuffbox.  Positive pain over the A1 pulley of the ring finger        X-rays: 4 views of the left knee is ordered and reviewed which show some mild arthritic changes of both knees.  Patient has vascular clips from prior cardiovascular surgery noted.     Assessment:: Left medial meniscal tear  Left ring trigger finger    Plan:  I reviewed the findings with her and her  today.  We talked about  treatment options.  Patient elected for steroid injections both for the trigger finger and for the meniscus.  We talked about it elevating her sugars in to monitor her diet in the meantime.  Follow-up as needed

## 2020-12-08 ENCOUNTER — LAB VISIT (OUTPATIENT)
Dept: LAB | Facility: HOSPITAL | Age: 80
End: 2020-12-08
Attending: PHYSICIAN ASSISTANT
Payer: MEDICARE

## 2020-12-08 DIAGNOSIS — E11.21 TYPE 2 DIABETES MELLITUS WITH DIABETIC NEPHROPATHY, WITHOUT LONG-TERM CURRENT USE OF INSULIN: ICD-10-CM

## 2020-12-08 LAB
ANION GAP SERPL CALC-SCNC: 9 MMOL/L (ref 8–16)
BUN SERPL-MCNC: 43 MG/DL (ref 8–23)
CALCIUM SERPL-MCNC: 9.3 MG/DL (ref 8.7–10.5)
CHLORIDE SERPL-SCNC: 104 MMOL/L (ref 95–110)
CHOLEST SERPL-MCNC: 165 MG/DL (ref 120–199)
CHOLEST/HDLC SERPL: 3.2 {RATIO} (ref 2–5)
CO2 SERPL-SCNC: 27 MMOL/L (ref 23–29)
CREAT SERPL-MCNC: 1.1 MG/DL (ref 0.5–1.4)
EST. GFR  (AFRICAN AMERICAN): 54.8 ML/MIN/1.73 M^2
EST. GFR  (NON AFRICAN AMERICAN): 47.5 ML/MIN/1.73 M^2
GLUCOSE SERPL-MCNC: 94 MG/DL (ref 70–110)
HDLC SERPL-MCNC: 51 MG/DL (ref 40–75)
HDLC SERPL: 30.9 % (ref 20–50)
LDLC SERPL CALC-MCNC: 99.4 MG/DL (ref 63–159)
NONHDLC SERPL-MCNC: 114 MG/DL
POTASSIUM SERPL-SCNC: 4.5 MMOL/L (ref 3.5–5.1)
SODIUM SERPL-SCNC: 140 MMOL/L (ref 136–145)
TRIGL SERPL-MCNC: 73 MG/DL (ref 30–150)

## 2020-12-08 PROCEDURE — 80048 BASIC METABOLIC PNL TOTAL CA: CPT

## 2020-12-08 PROCEDURE — 36415 COLL VENOUS BLD VENIPUNCTURE: CPT | Mod: PO

## 2020-12-08 PROCEDURE — 80061 LIPID PANEL: CPT

## 2020-12-16 LAB
LEFT EYE DM RETINOPATHY: NEGATIVE
RIGHT EYE DM RETINOPATHY: NEGATIVE

## 2021-01-07 ENCOUNTER — PATIENT MESSAGE (OUTPATIENT)
Dept: FAMILY MEDICINE | Facility: CLINIC | Age: 81
End: 2021-01-07

## 2021-01-10 ENCOUNTER — IMMUNIZATION (OUTPATIENT)
Dept: PRIMARY CARE CLINIC | Facility: CLINIC | Age: 81
End: 2021-01-10
Payer: MEDICARE

## 2021-01-10 DIAGNOSIS — Z23 NEED FOR VACCINATION: ICD-10-CM

## 2021-01-10 PROCEDURE — 0001A COVID-19, MRNA, LNP-S, PF, 30 MCG/0.3 ML DOSE VACCINE: CPT | Mod: S$GLB,,, | Performed by: FAMILY MEDICINE

## 2021-01-10 PROCEDURE — 91300 COVID-19, MRNA, LNP-S, PF, 30 MCG/0.3 ML DOSE VACCINE: ICD-10-PCS | Mod: S$GLB,,, | Performed by: FAMILY MEDICINE

## 2021-01-10 PROCEDURE — 91300 COVID-19, MRNA, LNP-S, PF, 30 MCG/0.3 ML DOSE VACCINE: CPT | Mod: S$GLB,,, | Performed by: FAMILY MEDICINE

## 2021-01-10 PROCEDURE — 0001A COVID-19, MRNA, LNP-S, PF, 30 MCG/0.3 ML DOSE VACCINE: ICD-10-PCS | Mod: S$GLB,,, | Performed by: FAMILY MEDICINE

## 2021-01-19 ENCOUNTER — PATIENT OUTREACH (OUTPATIENT)
Dept: ADMINISTRATIVE | Facility: OTHER | Age: 81
End: 2021-01-19

## 2021-01-19 DIAGNOSIS — E11.21 TYPE 2 DIABETES MELLITUS WITH DIABETIC NEPHROPATHY, WITHOUT LONG-TERM CURRENT USE OF INSULIN: Primary | ICD-10-CM

## 2021-01-20 ENCOUNTER — OFFICE VISIT (OUTPATIENT)
Dept: PAIN MEDICINE | Facility: CLINIC | Age: 81
End: 2021-01-20
Payer: MEDICARE

## 2021-01-20 VITALS
SYSTOLIC BLOOD PRESSURE: 109 MMHG | HEART RATE: 78 BPM | BODY MASS INDEX: 26.87 KG/M2 | WEIGHT: 146 LBS | DIASTOLIC BLOOD PRESSURE: 67 MMHG | HEIGHT: 62 IN

## 2021-01-20 DIAGNOSIS — Z01.818 PRE-OP TESTING: ICD-10-CM

## 2021-01-20 DIAGNOSIS — M51.36 DDD (DEGENERATIVE DISC DISEASE), LUMBAR: ICD-10-CM

## 2021-01-20 DIAGNOSIS — M54.16 LUMBAR RADICULITIS: Primary | ICD-10-CM

## 2021-01-20 DIAGNOSIS — M50.30 DDD (DEGENERATIVE DISC DISEASE), CERVICAL: ICD-10-CM

## 2021-01-20 PROCEDURE — 99214 OFFICE O/P EST MOD 30 MIN: CPT | Mod: S$GLB,,, | Performed by: ANESTHESIOLOGY

## 2021-01-20 PROCEDURE — 99999 PR PBB SHADOW E&M-EST. PATIENT-LVL III: CPT | Mod: PBBFAC,,, | Performed by: ANESTHESIOLOGY

## 2021-01-20 PROCEDURE — 99999 PR PBB SHADOW E&M-EST. PATIENT-LVL III: ICD-10-PCS | Mod: PBBFAC,,, | Performed by: ANESTHESIOLOGY

## 2021-01-20 PROCEDURE — 99214 PR OFFICE/OUTPT VISIT, EST, LEVL IV, 30-39 MIN: ICD-10-PCS | Mod: S$GLB,,, | Performed by: ANESTHESIOLOGY

## 2021-01-20 RX ORDER — PRASTERONE 6.5 MG/1
INSERT VAGINAL
COMMUNITY
Start: 2020-11-11 | End: 2021-11-04

## 2021-01-20 RX ORDER — FENOFIBRIC ACID 135 MG/1
CAPSULE, DELAYED RELEASE ORAL
Status: ON HOLD | COMMUNITY
Start: 2020-12-14 | End: 2021-01-27

## 2021-01-20 RX ORDER — TRIAZOLAM 0.25 MG/1
TABLET ORAL
COMMUNITY
Start: 2020-12-14 | End: 2021-04-12 | Stop reason: CLARIF

## 2021-01-20 RX ORDER — CYCLOSPORINE 0.5 MG/ML
1 EMULSION OPHTHALMIC 2 TIMES DAILY
Status: ON HOLD | COMMUNITY
Start: 2020-12-18 | End: 2023-12-13 | Stop reason: HOSPADM

## 2021-01-24 ENCOUNTER — LAB VISIT (OUTPATIENT)
Dept: PRIMARY CARE CLINIC | Facility: CLINIC | Age: 81
End: 2021-01-24
Payer: MEDICARE

## 2021-01-24 DIAGNOSIS — Z01.818 PRE-OP TESTING: ICD-10-CM

## 2021-01-24 LAB — SARS-COV-2 RNA RESP QL NAA+PROBE: NOT DETECTED

## 2021-01-24 PROCEDURE — U0003 INFECTIOUS AGENT DETECTION BY NUCLEIC ACID (DNA OR RNA); SEVERE ACUTE RESPIRATORY SYNDROME CORONAVIRUS 2 (SARS-COV-2) (CORONAVIRUS DISEASE [COVID-19]), AMPLIFIED PROBE TECHNIQUE, MAKING USE OF HIGH THROUGHPUT TECHNOLOGIES AS DESCRIBED BY CMS-2020-01-R: HCPCS

## 2021-01-27 ENCOUNTER — HOSPITAL ENCOUNTER (OUTPATIENT)
Facility: AMBULARY SURGERY CENTER | Age: 81
Discharge: HOME | End: 2021-01-27
Attending: ANESTHESIOLOGY | Admitting: ANESTHESIOLOGY
Payer: MEDICARE

## 2021-01-27 DIAGNOSIS — M54.16 LUMBAR RADICULITIS: Primary | ICD-10-CM

## 2021-01-27 LAB — POCT GLUCOSE: 100 MG/DL (ref 70–110)

## 2021-01-27 PROCEDURE — 64483 NJX AA&/STRD TFRM EPI L/S 1: CPT | Mod: LT,,, | Performed by: ANESTHESIOLOGY

## 2021-01-27 PROCEDURE — 64484 NJX AA&/STRD TFRM EPI L/S EA: CPT | Mod: LT,,, | Performed by: ANESTHESIOLOGY

## 2021-01-27 PROCEDURE — 64484 NJX AA&/STRD TFRM EPI L/S EA: CPT | Performed by: ANESTHESIOLOGY

## 2021-01-27 PROCEDURE — 64484 PRA INJECT ANES/STEROID FORAMEN LUMBAR/SACRAL W IMG GUIDE ,EA ADD LEVEL: ICD-10-PCS | Mod: LT,,, | Performed by: ANESTHESIOLOGY

## 2021-01-27 PROCEDURE — 64483 PR EPIDURAL INJ, ANES/STEROID, TRANSFORAMINAL, LUMB/SACR, SNGL LEVL: ICD-10-PCS | Mod: LT,,, | Performed by: ANESTHESIOLOGY

## 2021-01-27 PROCEDURE — 64483 NJX AA&/STRD TFRM EPI L/S 1: CPT | Performed by: ANESTHESIOLOGY

## 2021-01-27 RX ORDER — SODIUM CHLORIDE, SODIUM LACTATE, POTASSIUM CHLORIDE, CALCIUM CHLORIDE 600; 310; 30; 20 MG/100ML; MG/100ML; MG/100ML; MG/100ML
INJECTION, SOLUTION INTRAVENOUS ONCE AS NEEDED
Status: COMPLETED | OUTPATIENT
Start: 2021-01-27 | End: 2021-01-27

## 2021-01-27 RX ORDER — MIDAZOLAM HYDROCHLORIDE 1 MG/ML
INJECTION INTRAMUSCULAR; INTRAVENOUS
Status: DISCONTINUED | OUTPATIENT
Start: 2021-01-27 | End: 2021-01-27 | Stop reason: HOSPADM

## 2021-01-27 RX ORDER — BUPIVACAINE HYDROCHLORIDE 2.5 MG/ML
INJECTION, SOLUTION EPIDURAL; INFILTRATION; INTRACAUDAL
Status: DISCONTINUED | OUTPATIENT
Start: 2021-01-27 | End: 2021-01-27 | Stop reason: HOSPADM

## 2021-01-27 RX ORDER — LIDOCAINE HYDROCHLORIDE 10 MG/ML
INJECTION, SOLUTION EPIDURAL; INFILTRATION; INTRACAUDAL; PERINEURAL
Status: DISCONTINUED | OUTPATIENT
Start: 2021-01-27 | End: 2021-01-27 | Stop reason: HOSPADM

## 2021-01-27 RX ORDER — FENTANYL CITRATE 50 UG/ML
INJECTION, SOLUTION INTRAMUSCULAR; INTRAVENOUS
Status: DISCONTINUED | OUTPATIENT
Start: 2021-01-27 | End: 2021-01-27 | Stop reason: HOSPADM

## 2021-01-27 RX ORDER — DEXAMETHASONE SODIUM PHOSPHATE 10 MG/ML
INJECTION INTRAMUSCULAR; INTRAVENOUS
Status: DISCONTINUED | OUTPATIENT
Start: 2021-01-27 | End: 2021-01-27 | Stop reason: HOSPADM

## 2021-01-27 RX ADMIN — SODIUM CHLORIDE, SODIUM LACTATE, POTASSIUM CHLORIDE, CALCIUM CHLORIDE: 600; 310; 30; 20 INJECTION, SOLUTION INTRAVENOUS at 02:01

## 2021-01-28 VITALS
BODY MASS INDEX: 26.87 KG/M2 | TEMPERATURE: 98 F | SYSTOLIC BLOOD PRESSURE: 146 MMHG | WEIGHT: 146 LBS | OXYGEN SATURATION: 96 % | RESPIRATION RATE: 17 BRPM | HEIGHT: 62 IN | DIASTOLIC BLOOD PRESSURE: 70 MMHG | HEART RATE: 89 BPM

## 2021-01-31 ENCOUNTER — IMMUNIZATION (OUTPATIENT)
Dept: PRIMARY CARE CLINIC | Facility: CLINIC | Age: 81
End: 2021-01-31
Payer: MEDICARE

## 2021-01-31 DIAGNOSIS — Z23 NEED FOR VACCINATION: Primary | ICD-10-CM

## 2021-01-31 PROCEDURE — 91300 COVID-19, MRNA, LNP-S, PF, 30 MCG/0.3 ML DOSE VACCINE: ICD-10-PCS | Mod: S$GLB,,, | Performed by: FAMILY MEDICINE

## 2021-01-31 PROCEDURE — 91300 COVID-19, MRNA, LNP-S, PF, 30 MCG/0.3 ML DOSE VACCINE: CPT | Mod: S$GLB,,, | Performed by: FAMILY MEDICINE

## 2021-01-31 PROCEDURE — 0002A COVID-19, MRNA, LNP-S, PF, 30 MCG/0.3 ML DOSE VACCINE: CPT | Mod: S$GLB,,, | Performed by: FAMILY MEDICINE

## 2021-01-31 PROCEDURE — 0002A COVID-19, MRNA, LNP-S, PF, 30 MCG/0.3 ML DOSE VACCINE: ICD-10-PCS | Mod: S$GLB,,, | Performed by: FAMILY MEDICINE

## 2021-02-15 ENCOUNTER — LAB VISIT (OUTPATIENT)
Dept: LAB | Facility: HOSPITAL | Age: 81
End: 2021-02-15
Attending: INTERNAL MEDICINE
Payer: MEDICARE

## 2021-02-15 ENCOUNTER — OFFICE VISIT (OUTPATIENT)
Dept: CARDIOLOGY | Facility: CLINIC | Age: 81
End: 2021-02-15
Payer: MEDICARE

## 2021-02-15 VITALS
SYSTOLIC BLOOD PRESSURE: 110 MMHG | HEART RATE: 93 BPM | RESPIRATION RATE: 16 BRPM | WEIGHT: 157 LBS | OXYGEN SATURATION: 95 % | HEIGHT: 62 IN | BODY MASS INDEX: 28.89 KG/M2 | DIASTOLIC BLOOD PRESSURE: 60 MMHG

## 2021-02-15 DIAGNOSIS — E78.5 DYSLIPIDEMIA: ICD-10-CM

## 2021-02-15 DIAGNOSIS — N18.30 CHRONIC KIDNEY DISEASE, STAGE III (MODERATE): ICD-10-CM

## 2021-02-15 DIAGNOSIS — I10 ESSENTIAL HYPERTENSION, MALIGNANT: ICD-10-CM

## 2021-02-15 DIAGNOSIS — N25.81 SECONDARY HYPERPARATHYROIDISM OF RENAL ORIGIN: ICD-10-CM

## 2021-02-15 DIAGNOSIS — E07.9 THYROID DYSFUNCTION: ICD-10-CM

## 2021-02-15 DIAGNOSIS — N18.9 CHRONIC KIDNEY DISEASE, UNSPECIFIED: Primary | ICD-10-CM

## 2021-02-15 DIAGNOSIS — R80.9 PROTEINURIA: ICD-10-CM

## 2021-02-15 DIAGNOSIS — I25.119 ATHEROSCLEROSIS OF NATIVE CORONARY ARTERY OF NATIVE HEART WITH ANGINA PECTORIS: Primary | ICD-10-CM

## 2021-02-15 DIAGNOSIS — Z95.1 HX OF CABG: ICD-10-CM

## 2021-02-15 DIAGNOSIS — I25.5 ISCHEMIC CARDIOMYOPATHY: ICD-10-CM

## 2021-02-15 DIAGNOSIS — R00.2 PALPITATIONS: ICD-10-CM

## 2021-02-15 LAB
25(OH)D3+25(OH)D2 SERPL-MCNC: 30 NG/ML (ref 30–96)
ALBUMIN SERPL BCP-MCNC: 4.4 G/DL (ref 3.5–5.2)
ANION GAP SERPL CALC-SCNC: 15 MMOL/L (ref 8–16)
BACTERIA #/AREA URNS HPF: ABNORMAL /HPF
BASOPHILS # BLD AUTO: 0.02 K/UL (ref 0–0.2)
BASOPHILS NFR BLD: 0.4 % (ref 0–1.9)
BILIRUB UR QL STRIP: NEGATIVE
BUN SERPL-MCNC: 32 MG/DL (ref 8–23)
CALCIUM SERPL-MCNC: 9.5 MG/DL (ref 8.7–10.5)
CHLORIDE SERPL-SCNC: 102 MMOL/L (ref 95–110)
CLARITY UR: CLEAR
CO2 SERPL-SCNC: 22 MMOL/L (ref 23–29)
COLOR UR: YELLOW
CREAT SERPL-MCNC: 1 MG/DL (ref 0.5–1.4)
CREAT UR-MCNC: 72 MG/DL (ref 15–325)
DIFFERENTIAL METHOD: ABNORMAL
EOSINOPHIL # BLD AUTO: 0.1 K/UL (ref 0–0.5)
EOSINOPHIL NFR BLD: 1.6 % (ref 0–8)
ERYTHROCYTE [DISTWIDTH] IN BLOOD BY AUTOMATED COUNT: 12.7 % (ref 11.5–14.5)
EST. GFR  (AFRICAN AMERICAN): >60 ML/MIN/1.73 M^2
EST. GFR  (NON AFRICAN AMERICAN): 53.3 ML/MIN/1.73 M^2
GLUCOSE SERPL-MCNC: 139 MG/DL (ref 70–110)
GLUCOSE UR QL STRIP: ABNORMAL
HCT VFR BLD AUTO: 42.2 % (ref 37–48.5)
HGB BLD-MCNC: 13.4 G/DL (ref 12–16)
HGB UR QL STRIP: NEGATIVE
HYALINE CASTS #/AREA URNS LPF: 4 /LPF
IMM GRANULOCYTES # BLD AUTO: 0.01 K/UL (ref 0–0.04)
IMM GRANULOCYTES NFR BLD AUTO: 0.2 % (ref 0–0.5)
KETONES UR QL STRIP: NEGATIVE
LEUKOCYTE ESTERASE UR QL STRIP: ABNORMAL
LYMPHOCYTES # BLD AUTO: 1.2 K/UL (ref 1–4.8)
LYMPHOCYTES NFR BLD: 22.4 % (ref 18–48)
MAGNESIUM SERPL-MCNC: 2.4 MG/DL (ref 1.6–2.6)
MCH RBC QN AUTO: 29.5 PG (ref 27–31)
MCHC RBC AUTO-ENTMCNC: 31.8 G/DL (ref 32–36)
MCV RBC AUTO: 93 FL (ref 82–98)
MICROSCOPIC COMMENT: ABNORMAL
MONOCYTES # BLD AUTO: 0.7 K/UL (ref 0.3–1)
MONOCYTES NFR BLD: 11.9 % (ref 4–15)
NEUTROPHILS # BLD AUTO: 3.5 K/UL (ref 1.8–7.7)
NEUTROPHILS NFR BLD: 63.5 % (ref 38–73)
NITRITE UR QL STRIP: NEGATIVE
NRBC BLD-RTO: 0 /100 WBC
PH UR STRIP: 6 [PH] (ref 5–8)
PHOSPHATE SERPL-MCNC: 5 MG/DL (ref 2.7–4.5)
PLATELET # BLD AUTO: 208 K/UL (ref 150–350)
PMV BLD AUTO: 10.2 FL (ref 9.2–12.9)
POTASSIUM SERPL-SCNC: 4 MMOL/L (ref 3.5–5.1)
PROT UR QL STRIP: NEGATIVE
PROT UR-MCNC: 12 MG/DL (ref 6–15)
PROT/CREAT UR: 0.17 MG/G{CREAT} (ref 0–0.2)
PTH-INTACT SERPL-MCNC: 50.4 PG/ML (ref 9–77)
RBC # BLD AUTO: 4.55 M/UL (ref 4–5.4)
RBC #/AREA URNS HPF: 1 /HPF (ref 0–4)
SODIUM SERPL-SCNC: 139 MMOL/L (ref 136–145)
SP GR UR STRIP: 1.02 (ref 1–1.03)
SQUAMOUS #/AREA URNS HPF: 4 /HPF
URATE SERPL-MCNC: 4.9 MG/DL (ref 2.4–5.7)
URN SPEC COLLECT METH UR: ABNORMAL
UROBILINOGEN UR STRIP-ACNC: NEGATIVE EU/DL
WBC # BLD AUTO: 5.48 K/UL (ref 3.9–12.7)
WBC #/AREA URNS HPF: 8 /HPF (ref 0–5)
YEAST URNS QL MICRO: ABNORMAL

## 2021-02-15 PROCEDURE — 81001 URINALYSIS AUTO W/SCOPE: CPT

## 2021-02-15 PROCEDURE — 36415 COLL VENOUS BLD VENIPUNCTURE: CPT

## 2021-02-15 PROCEDURE — 99214 OFFICE O/P EST MOD 30 MIN: CPT | Mod: S$GLB,,, | Performed by: INTERNAL MEDICINE

## 2021-02-15 PROCEDURE — 83735 ASSAY OF MAGNESIUM: CPT

## 2021-02-15 PROCEDURE — 85025 COMPLETE CBC W/AUTO DIFF WBC: CPT

## 2021-02-15 PROCEDURE — 84156 ASSAY OF PROTEIN URINE: CPT

## 2021-02-15 PROCEDURE — 99214 PR OFFICE/OUTPT VISIT, EST, LEVL IV, 30-39 MIN: ICD-10-PCS | Mod: S$GLB,,, | Performed by: INTERNAL MEDICINE

## 2021-02-15 PROCEDURE — 84550 ASSAY OF BLOOD/URIC ACID: CPT

## 2021-02-15 PROCEDURE — 82306 VITAMIN D 25 HYDROXY: CPT

## 2021-02-15 PROCEDURE — 83970 ASSAY OF PARATHORMONE: CPT

## 2021-02-15 PROCEDURE — 93000 ELECTROCARDIOGRAM COMPLETE: CPT | Mod: S$GLB,,, | Performed by: SPECIALIST

## 2021-02-15 PROCEDURE — 93000 EKG 12-LEAD: ICD-10-PCS | Mod: S$GLB,,, | Performed by: SPECIALIST

## 2021-02-15 PROCEDURE — 80069 RENAL FUNCTION PANEL: CPT

## 2021-02-15 RX ORDER — DIGOXIN 125 MCG
125 TABLET ORAL DAILY
Qty: 30 TABLET | Refills: 11 | Status: SHIPPED | OUTPATIENT
Start: 2021-02-15 | End: 2021-05-19 | Stop reason: SDUPTHER

## 2021-02-22 ENCOUNTER — PATIENT OUTREACH (OUTPATIENT)
Dept: ADMINISTRATIVE | Facility: OTHER | Age: 81
End: 2021-02-22

## 2021-02-24 ENCOUNTER — OFFICE VISIT (OUTPATIENT)
Dept: PAIN MEDICINE | Facility: CLINIC | Age: 81
End: 2021-02-24
Payer: MEDICARE

## 2021-02-24 VITALS
HEART RATE: 90 BPM | SYSTOLIC BLOOD PRESSURE: 122 MMHG | DIASTOLIC BLOOD PRESSURE: 68 MMHG | BODY MASS INDEX: 28.72 KG/M2 | WEIGHT: 157 LBS

## 2021-02-24 DIAGNOSIS — M51.36 DDD (DEGENERATIVE DISC DISEASE), LUMBAR: ICD-10-CM

## 2021-02-24 DIAGNOSIS — M54.16 LUMBAR RADICULITIS: Primary | ICD-10-CM

## 2021-02-24 DIAGNOSIS — M50.30 DDD (DEGENERATIVE DISC DISEASE), CERVICAL: ICD-10-CM

## 2021-02-24 DIAGNOSIS — M47.896 OTHER SPONDYLOSIS, LUMBAR REGION: ICD-10-CM

## 2021-02-24 PROCEDURE — 99999 PR PBB SHADOW E&M-EST. PATIENT-LVL IV: ICD-10-PCS | Mod: PBBFAC,,, | Performed by: PHYSICIAN ASSISTANT

## 2021-02-24 PROCEDURE — 99999 PR PBB SHADOW E&M-EST. PATIENT-LVL IV: CPT | Mod: PBBFAC,,, | Performed by: PHYSICIAN ASSISTANT

## 2021-02-24 PROCEDURE — 99213 OFFICE O/P EST LOW 20 MIN: CPT | Mod: S$GLB,,, | Performed by: PHYSICIAN ASSISTANT

## 2021-02-24 PROCEDURE — 99213 PR OFFICE/OUTPT VISIT, EST, LEVL III, 20-29 MIN: ICD-10-PCS | Mod: S$GLB,,, | Performed by: PHYSICIAN ASSISTANT

## 2021-03-01 ENCOUNTER — LAB VISIT (OUTPATIENT)
Dept: PRIMARY CARE CLINIC | Facility: CLINIC | Age: 81
End: 2021-03-01
Payer: MEDICARE

## 2021-03-01 DIAGNOSIS — Z01.818 PRE-OP TESTING: ICD-10-CM

## 2021-03-01 PROCEDURE — U0005 INFEC AGEN DETEC AMPLI PROBE: HCPCS

## 2021-03-01 PROCEDURE — U0003 INFECTIOUS AGENT DETECTION BY NUCLEIC ACID (DNA OR RNA); SEVERE ACUTE RESPIRATORY SYNDROME CORONAVIRUS 2 (SARS-COV-2) (CORONAVIRUS DISEASE [COVID-19]), AMPLIFIED PROBE TECHNIQUE, MAKING USE OF HIGH THROUGHPUT TECHNOLOGIES AS DESCRIBED BY CMS-2020-01-R: HCPCS

## 2021-03-02 LAB — SARS-COV-2 RNA RESP QL NAA+PROBE: NOT DETECTED

## 2021-03-04 ENCOUNTER — HOSPITAL ENCOUNTER (OUTPATIENT)
Facility: AMBULARY SURGERY CENTER | Age: 81
Discharge: HOME | End: 2021-03-04
Attending: ANESTHESIOLOGY | Admitting: ANESTHESIOLOGY
Payer: MEDICARE

## 2021-03-04 DIAGNOSIS — M54.16 LUMBAR RADICULITIS: ICD-10-CM

## 2021-03-04 DIAGNOSIS — M51.36 DDD (DEGENERATIVE DISC DISEASE), LUMBAR: Primary | ICD-10-CM

## 2021-03-04 LAB — POCT GLUCOSE: 112 MG/DL (ref 70–110)

## 2021-03-04 PROCEDURE — 64483 PR EPIDURAL INJ, ANES/STEROID, TRANSFORAMINAL, LUMB/SACR, SNGL LEVL: ICD-10-PCS | Mod: RT,,, | Performed by: ANESTHESIOLOGY

## 2021-03-04 PROCEDURE — 64483 NJX AA&/STRD TFRM EPI L/S 1: CPT | Mod: RT,,, | Performed by: ANESTHESIOLOGY

## 2021-03-04 PROCEDURE — 64484 NJX AA&/STRD TFRM EPI L/S EA: CPT | Mod: RT,,, | Performed by: ANESTHESIOLOGY

## 2021-03-04 PROCEDURE — 64483 NJX AA&/STRD TFRM EPI L/S 1: CPT | Performed by: ANESTHESIOLOGY

## 2021-03-04 PROCEDURE — 64484 PRA INJECT ANES/STEROID FORAMEN LUMBAR/SACRAL W IMG GUIDE ,EA ADD LEVEL: ICD-10-PCS | Mod: RT,,, | Performed by: ANESTHESIOLOGY

## 2021-03-04 PROCEDURE — 64484 NJX AA&/STRD TFRM EPI L/S EA: CPT | Performed by: ANESTHESIOLOGY

## 2021-03-04 RX ORDER — DEXAMETHASONE SODIUM PHOSPHATE 10 MG/ML
INJECTION INTRAMUSCULAR; INTRAVENOUS
Status: DISCONTINUED | OUTPATIENT
Start: 2021-03-04 | End: 2021-03-04 | Stop reason: HOSPADM

## 2021-03-04 RX ORDER — LIDOCAINE HYDROCHLORIDE 10 MG/ML
INJECTION, SOLUTION EPIDURAL; INFILTRATION; INTRACAUDAL; PERINEURAL
Status: DISCONTINUED | OUTPATIENT
Start: 2021-03-04 | End: 2021-03-04 | Stop reason: HOSPADM

## 2021-03-04 RX ORDER — MIDAZOLAM HYDROCHLORIDE 1 MG/ML
INJECTION INTRAMUSCULAR; INTRAVENOUS
Status: DISCONTINUED | OUTPATIENT
Start: 2021-03-04 | End: 2021-03-04 | Stop reason: HOSPADM

## 2021-03-04 RX ORDER — SODIUM CHLORIDE, SODIUM LACTATE, POTASSIUM CHLORIDE, CALCIUM CHLORIDE 600; 310; 30; 20 MG/100ML; MG/100ML; MG/100ML; MG/100ML
INJECTION, SOLUTION INTRAVENOUS ONCE AS NEEDED
Status: COMPLETED | OUTPATIENT
Start: 2021-03-04 | End: 2021-03-04

## 2021-03-04 RX ORDER — BUPIVACAINE HYDROCHLORIDE 2.5 MG/ML
INJECTION, SOLUTION EPIDURAL; INFILTRATION; INTRACAUDAL
Status: DISCONTINUED | OUTPATIENT
Start: 2021-03-04 | End: 2021-03-04 | Stop reason: HOSPADM

## 2021-03-04 RX ORDER — FENTANYL CITRATE 50 UG/ML
INJECTION, SOLUTION INTRAMUSCULAR; INTRAVENOUS
Status: DISCONTINUED | OUTPATIENT
Start: 2021-03-04 | End: 2021-03-04 | Stop reason: HOSPADM

## 2021-03-04 RX ADMIN — SODIUM CHLORIDE, SODIUM LACTATE, POTASSIUM CHLORIDE, CALCIUM CHLORIDE: 600; 310; 30; 20 INJECTION, SOLUTION INTRAVENOUS at 12:03

## 2021-03-05 VITALS
OXYGEN SATURATION: 93 % | DIASTOLIC BLOOD PRESSURE: 71 MMHG | SYSTOLIC BLOOD PRESSURE: 163 MMHG | WEIGHT: 156.94 LBS | TEMPERATURE: 98 F | HEART RATE: 67 BPM | RESPIRATION RATE: 18 BRPM | BODY MASS INDEX: 28.71 KG/M2

## 2021-03-30 ENCOUNTER — PATIENT OUTREACH (OUTPATIENT)
Dept: ADMINISTRATIVE | Facility: OTHER | Age: 81
End: 2021-03-30

## 2021-04-01 ENCOUNTER — OFFICE VISIT (OUTPATIENT)
Dept: PAIN MEDICINE | Facility: CLINIC | Age: 81
End: 2021-04-01
Payer: MEDICARE

## 2021-04-01 VITALS
WEIGHT: 146 LBS | HEIGHT: 62 IN | BODY MASS INDEX: 26.87 KG/M2 | DIASTOLIC BLOOD PRESSURE: 64 MMHG | HEART RATE: 88 BPM | SYSTOLIC BLOOD PRESSURE: 120 MMHG

## 2021-04-01 DIAGNOSIS — M51.36 DDD (DEGENERATIVE DISC DISEASE), LUMBAR: ICD-10-CM

## 2021-04-01 DIAGNOSIS — M54.16 LUMBAR RADICULITIS: Primary | ICD-10-CM

## 2021-04-01 DIAGNOSIS — M50.30 DDD (DEGENERATIVE DISC DISEASE), CERVICAL: ICD-10-CM

## 2021-04-01 DIAGNOSIS — M54.9 DORSALGIA, UNSPECIFIED: ICD-10-CM

## 2021-04-01 DIAGNOSIS — M47.896 OTHER SPONDYLOSIS, LUMBAR REGION: ICD-10-CM

## 2021-04-01 PROCEDURE — 99999 PR PBB SHADOW E&M-EST. PATIENT-LVL V: ICD-10-PCS | Mod: PBBFAC,,, | Performed by: PHYSICIAN ASSISTANT

## 2021-04-01 PROCEDURE — 99213 PR OFFICE/OUTPT VISIT, EST, LEVL III, 20-29 MIN: ICD-10-PCS | Mod: S$GLB,,, | Performed by: PHYSICIAN ASSISTANT

## 2021-04-01 PROCEDURE — 99999 PR PBB SHADOW E&M-EST. PATIENT-LVL V: CPT | Mod: PBBFAC,,, | Performed by: PHYSICIAN ASSISTANT

## 2021-04-01 PROCEDURE — 99213 OFFICE O/P EST LOW 20 MIN: CPT | Mod: S$GLB,,, | Performed by: PHYSICIAN ASSISTANT

## 2021-04-09 ENCOUNTER — HOSPITAL ENCOUNTER (OUTPATIENT)
Dept: RADIOLOGY | Facility: HOSPITAL | Age: 81
Discharge: HOME | End: 2021-04-09
Attending: PHYSICIAN ASSISTANT
Payer: MEDICARE

## 2021-04-09 ENCOUNTER — TELEPHONE (OUTPATIENT)
Dept: PAIN MEDICINE | Facility: CLINIC | Age: 81
End: 2021-04-09

## 2021-04-09 DIAGNOSIS — M54.9 DORSALGIA, UNSPECIFIED: ICD-10-CM

## 2021-04-09 PROCEDURE — 72148 MRI LUMBAR SPINE WITHOUT CONTRAST: ICD-10-PCS | Mod: 26,,, | Performed by: RADIOLOGY

## 2021-04-09 PROCEDURE — 72148 MRI LUMBAR SPINE W/O DYE: CPT | Mod: TC

## 2021-04-09 PROCEDURE — 72148 MRI LUMBAR SPINE W/O DYE: CPT | Mod: 26,,, | Performed by: RADIOLOGY

## 2021-04-12 ENCOUNTER — OFFICE VISIT (OUTPATIENT)
Dept: FAMILY MEDICINE | Facility: CLINIC | Age: 81
End: 2021-04-12
Payer: MEDICARE

## 2021-04-12 VITALS
TEMPERATURE: 98 F | BODY MASS INDEX: 27.06 KG/M2 | HEART RATE: 87 BPM | OXYGEN SATURATION: 97 % | DIASTOLIC BLOOD PRESSURE: 84 MMHG | HEIGHT: 62 IN | WEIGHT: 147.06 LBS | SYSTOLIC BLOOD PRESSURE: 130 MMHG

## 2021-04-12 DIAGNOSIS — I25.119 ATHEROSCLEROSIS OF NATIVE CORONARY ARTERY OF NATIVE HEART WITH ANGINA PECTORIS: ICD-10-CM

## 2021-04-12 DIAGNOSIS — E11.9 TYPE 2 DIABETES MELLITUS WITHOUT COMPLICATION: Chronic | ICD-10-CM

## 2021-04-12 DIAGNOSIS — Z78.0 ASYMPTOMATIC MENOPAUSE: ICD-10-CM

## 2021-04-12 DIAGNOSIS — M47.26 OSTEOARTHRITIS OF SPINE WITH RADICULOPATHY, LUMBAR REGION: ICD-10-CM

## 2021-04-12 DIAGNOSIS — J45.30 MILD PERSISTENT ASTHMA WITHOUT COMPLICATION: ICD-10-CM

## 2021-04-12 DIAGNOSIS — E78.2 MIXED HYPERLIPIDEMIA: ICD-10-CM

## 2021-04-12 DIAGNOSIS — E11.21 TYPE 2 DIABETES MELLITUS WITH DIABETIC NEPHROPATHY, WITHOUT LONG-TERM CURRENT USE OF INSULIN: Primary | ICD-10-CM

## 2021-04-12 DIAGNOSIS — K21.9 GASTROESOPHAGEAL REFLUX DISEASE WITHOUT ESOPHAGITIS: ICD-10-CM

## 2021-04-12 DIAGNOSIS — N18.31 STAGE 3A CHRONIC KIDNEY DISEASE: ICD-10-CM

## 2021-04-12 PROCEDURE — 99999 PR PBB SHADOW E&M-EST. PATIENT-LVL V: ICD-10-PCS | Mod: PBBFAC,,, | Performed by: FAMILY MEDICINE

## 2021-04-12 PROCEDURE — 99214 PR OFFICE/OUTPT VISIT, EST, LEVL IV, 30-39 MIN: ICD-10-PCS | Mod: S$GLB,,, | Performed by: FAMILY MEDICINE

## 2021-04-12 PROCEDURE — 99999 PR PBB SHADOW E&M-EST. PATIENT-LVL V: CPT | Mod: PBBFAC,,, | Performed by: FAMILY MEDICINE

## 2021-04-12 PROCEDURE — 99214 OFFICE O/P EST MOD 30 MIN: CPT | Mod: S$GLB,,, | Performed by: FAMILY MEDICINE

## 2021-04-12 RX ORDER — TEMAZEPAM 30 MG/1
30 CAPSULE ORAL NIGHTLY PRN
Qty: 90 CAPSULE | Refills: 1 | Status: SHIPPED | OUTPATIENT
Start: 2021-04-12 | End: 2021-05-12

## 2021-04-12 RX ORDER — BLOOD-GLUCOSE METER
KIT MISCELLANEOUS
Qty: 200 STRIP | Refills: 3 | Status: SHIPPED | OUTPATIENT
Start: 2021-04-12

## 2021-04-13 ENCOUNTER — OFFICE VISIT (OUTPATIENT)
Dept: PAIN MEDICINE | Facility: CLINIC | Age: 81
End: 2021-04-13
Payer: MEDICARE

## 2021-04-13 VITALS
BODY MASS INDEX: 27.05 KG/M2 | WEIGHT: 147 LBS | HEART RATE: 91 BPM | DIASTOLIC BLOOD PRESSURE: 59 MMHG | HEIGHT: 62 IN | SYSTOLIC BLOOD PRESSURE: 123 MMHG

## 2021-04-13 DIAGNOSIS — M54.16 LUMBAR RADICULITIS: ICD-10-CM

## 2021-04-13 DIAGNOSIS — M47.896 OTHER SPONDYLOSIS, LUMBAR REGION: ICD-10-CM

## 2021-04-13 DIAGNOSIS — M50.30 DDD (DEGENERATIVE DISC DISEASE), CERVICAL: ICD-10-CM

## 2021-04-13 DIAGNOSIS — M51.36 DDD (DEGENERATIVE DISC DISEASE), LUMBAR: Primary | ICD-10-CM

## 2021-04-13 PROCEDURE — 99999 PR PBB SHADOW E&M-EST. PATIENT-LVL IV: ICD-10-PCS | Mod: PBBFAC,,, | Performed by: PHYSICIAN ASSISTANT

## 2021-04-13 PROCEDURE — 99213 OFFICE O/P EST LOW 20 MIN: CPT | Mod: S$GLB,,, | Performed by: PHYSICIAN ASSISTANT

## 2021-04-13 PROCEDURE — 99999 PR PBB SHADOW E&M-EST. PATIENT-LVL IV: CPT | Mod: PBBFAC,,, | Performed by: PHYSICIAN ASSISTANT

## 2021-04-13 PROCEDURE — 99213 PR OFFICE/OUTPT VISIT, EST, LEVL III, 20-29 MIN: ICD-10-PCS | Mod: S$GLB,,, | Performed by: PHYSICIAN ASSISTANT

## 2021-04-13 RX ORDER — GABAPENTIN 300 MG/1
300 CAPSULE ORAL 3 TIMES DAILY
Qty: 90 CAPSULE | Refills: 2 | Status: SHIPPED | OUTPATIENT
Start: 2021-03-13 | End: 2021-09-11 | Stop reason: SDUPTHER

## 2021-04-14 ENCOUNTER — DOCUMENTATION ONLY (OUTPATIENT)
Dept: FAMILY MEDICINE | Facility: CLINIC | Age: 81
End: 2021-04-14

## 2021-04-20 ENCOUNTER — HOSPITAL ENCOUNTER (OUTPATIENT)
Dept: RADIOLOGY | Facility: CLINIC | Age: 81
Discharge: HOME | End: 2021-04-20
Attending: FAMILY MEDICINE
Payer: MEDICARE

## 2021-04-20 DIAGNOSIS — Z78.0 ASYMPTOMATIC MENOPAUSE: ICD-10-CM

## 2021-04-20 PROCEDURE — 77080 DXA BONE DENSITY AXIAL: CPT | Mod: 26,,, | Performed by: RADIOLOGY

## 2021-04-20 PROCEDURE — 77080 DXA BONE DENSITY AXIAL: CPT | Mod: TC,PO

## 2021-04-20 PROCEDURE — 77080 DEXA BONE DENSITY SPINE HIP: ICD-10-PCS | Mod: 26,,, | Performed by: RADIOLOGY

## 2021-04-23 ENCOUNTER — PATIENT OUTREACH (OUTPATIENT)
Dept: ADMINISTRATIVE | Facility: HOSPITAL | Age: 81
End: 2021-04-23

## 2021-05-18 ENCOUNTER — LAB VISIT (OUTPATIENT)
Dept: LAB | Facility: HOSPITAL | Age: 81
End: 2021-05-18
Attending: NURSE PRACTITIONER
Payer: MEDICARE

## 2021-05-18 ENCOUNTER — OFFICE VISIT (OUTPATIENT)
Dept: CARDIOLOGY | Facility: CLINIC | Age: 81
End: 2021-05-18
Payer: MEDICARE

## 2021-05-18 VITALS
DIASTOLIC BLOOD PRESSURE: 74 MMHG | HEIGHT: 62 IN | WEIGHT: 147 LBS | OXYGEN SATURATION: 97 % | RESPIRATION RATE: 16 BRPM | BODY MASS INDEX: 27.05 KG/M2 | HEART RATE: 75 BPM | SYSTOLIC BLOOD PRESSURE: 124 MMHG

## 2021-05-18 DIAGNOSIS — E07.9 THYROID DYSFUNCTION: ICD-10-CM

## 2021-05-18 DIAGNOSIS — I25.119 ATHEROSCLEROSIS OF NATIVE CORONARY ARTERY OF NATIVE HEART WITH ANGINA PECTORIS: ICD-10-CM

## 2021-05-18 DIAGNOSIS — R00.2 PALPITATIONS: ICD-10-CM

## 2021-05-18 DIAGNOSIS — I25.5 ISCHEMIC CARDIOMYOPATHY: ICD-10-CM

## 2021-05-18 DIAGNOSIS — Z95.1 HX OF CABG: ICD-10-CM

## 2021-05-18 DIAGNOSIS — E78.5 DYSLIPIDEMIA: ICD-10-CM

## 2021-05-18 DIAGNOSIS — E11.21 TYPE 2 DIABETES MELLITUS WITH DIABETIC NEPHROPATHY, WITHOUT LONG-TERM CURRENT USE OF INSULIN: ICD-10-CM

## 2021-05-18 DIAGNOSIS — E78.2 MIXED HYPERLIPIDEMIA: ICD-10-CM

## 2021-05-18 DIAGNOSIS — R00.2 PALPITATIONS: Primary | ICD-10-CM

## 2021-05-18 LAB — TSH SERPL DL<=0.005 MIU/L-ACNC: 2.08 UIU/ML (ref 0.34–5.6)

## 2021-05-18 PROCEDURE — 99214 OFFICE O/P EST MOD 30 MIN: CPT | Mod: S$GLB,,, | Performed by: NURSE PRACTITIONER

## 2021-05-18 PROCEDURE — 99214 PR OFFICE/OUTPT VISIT, EST, LEVL IV, 30-39 MIN: ICD-10-PCS | Mod: S$GLB,,, | Performed by: NURSE PRACTITIONER

## 2021-05-18 PROCEDURE — 36415 COLL VENOUS BLD VENIPUNCTURE: CPT | Performed by: NURSE PRACTITIONER

## 2021-05-18 PROCEDURE — 84443 ASSAY THYROID STIM HORMONE: CPT | Performed by: NURSE PRACTITIONER

## 2021-05-18 RX ORDER — METOPROLOL SUCCINATE 25 MG/1
25 TABLET, EXTENDED RELEASE ORAL DAILY
Qty: 90 TABLET | Refills: 3 | Status: SHIPPED | OUTPATIENT
Start: 2021-05-18 | End: 2022-06-02 | Stop reason: SDUPTHER

## 2021-05-19 RX ORDER — DIGOXIN 125 MCG
125 TABLET ORAL DAILY
Qty: 90 TABLET | Refills: 3 | Status: SHIPPED | OUTPATIENT
Start: 2021-05-19 | End: 2022-03-21

## 2021-05-21 ENCOUNTER — PATIENT MESSAGE (OUTPATIENT)
Dept: CARDIOLOGY | Facility: CLINIC | Age: 81
End: 2021-05-21

## 2021-05-25 ENCOUNTER — HOSPITAL ENCOUNTER (OUTPATIENT)
Dept: CARDIOLOGY | Facility: CLINIC | Age: 81
Discharge: HOME | End: 2021-05-25
Attending: NURSE PRACTITIONER
Payer: MEDICARE

## 2021-05-25 DIAGNOSIS — R00.2 PALPITATIONS: ICD-10-CM

## 2021-05-25 PROCEDURE — 93224 XTRNL ECG REC UP TO 48 HRS: CPT | Mod: S$GLB,,, | Performed by: INTERNAL MEDICINE

## 2021-05-25 PROCEDURE — 93224 HOLTER MONITOR - 48 HOUR (CUPID ONLY): ICD-10-PCS | Mod: S$GLB,,, | Performed by: INTERNAL MEDICINE

## 2021-05-25 RX ORDER — AMITRIPTYLINE HYDROCHLORIDE 50 MG/1
50 TABLET, FILM COATED ORAL NIGHTLY
Qty: 90 TABLET | Refills: 3 | Status: SHIPPED | OUTPATIENT
Start: 2021-05-25 | End: 2021-12-22 | Stop reason: SDUPTHER

## 2021-05-27 ENCOUNTER — PATIENT MESSAGE (OUTPATIENT)
Dept: CARDIOLOGY | Facility: CLINIC | Age: 81
End: 2021-05-27

## 2021-06-07 ENCOUNTER — PATIENT MESSAGE (OUTPATIENT)
Dept: CARDIOLOGY | Facility: CLINIC | Age: 81
End: 2021-06-07

## 2021-06-10 ENCOUNTER — PATIENT MESSAGE (OUTPATIENT)
Dept: CARDIOLOGY | Facility: CLINIC | Age: 81
End: 2021-06-10

## 2021-06-11 ENCOUNTER — PATIENT MESSAGE (OUTPATIENT)
Dept: FAMILY MEDICINE | Facility: CLINIC | Age: 81
End: 2021-06-11

## 2021-06-13 LAB
OHS CV EVENT MONITOR DAY: 0
OHS CV HOLTER LENGTH DECIMAL HOURS: 48
OHS CV HOLTER LENGTH HOURS: 48
OHS CV HOLTER LENGTH MINUTES: 0

## 2021-06-14 RX ORDER — FLUTICASONE PROPIONATE 50 MCG
1 SPRAY, SUSPENSION (ML) NASAL DAILY
Qty: 48 G | Refills: 3 | Status: SHIPPED | OUTPATIENT
Start: 2021-06-14 | End: 2022-09-22 | Stop reason: SDUPTHER

## 2021-06-17 ENCOUNTER — PATIENT MESSAGE (OUTPATIENT)
Dept: FAMILY MEDICINE | Facility: CLINIC | Age: 81
End: 2021-06-17

## 2021-06-21 ENCOUNTER — PATIENT MESSAGE (OUTPATIENT)
Dept: CARDIOLOGY | Facility: CLINIC | Age: 81
End: 2021-06-21

## 2021-06-21 RX ORDER — TERAZOSIN 1 MG/1
1 CAPSULE ORAL NIGHTLY
Qty: 90 CAPSULE | Refills: 3 | Status: SHIPPED | OUTPATIENT
Start: 2021-06-21 | End: 2021-09-11 | Stop reason: SDUPTHER

## 2021-06-21 RX ORDER — TERAZOSIN 1 MG/1
1 CAPSULE ORAL NIGHTLY
COMMUNITY
End: 2021-06-21 | Stop reason: SDUPTHER

## 2021-06-25 ENCOUNTER — PATIENT MESSAGE (OUTPATIENT)
Dept: CARDIOLOGY | Facility: CLINIC | Age: 81
End: 2021-06-25

## 2021-07-01 ENCOUNTER — TELEPHONE (OUTPATIENT)
Dept: PULMONOLOGY | Facility: CLINIC | Age: 81
End: 2021-07-01

## 2021-07-14 RX ORDER — LEVOTHYROXINE SODIUM 25 UG/1
25 TABLET ORAL
Qty: 90 TABLET | Refills: 3 | Status: SHIPPED | OUTPATIENT
Start: 2021-07-14 | End: 2021-07-16 | Stop reason: SDUPTHER

## 2021-07-19 RX ORDER — LEVOTHYROXINE SODIUM 25 UG/1
25 TABLET ORAL
Qty: 90 TABLET | Refills: 3 | Status: SHIPPED | OUTPATIENT
Start: 2021-07-19 | End: 2021-07-27 | Stop reason: SDUPTHER

## 2021-07-27 RX ORDER — LEVOTHYROXINE SODIUM 25 UG/1
25 TABLET ORAL
Qty: 90 TABLET | Refills: 3 | Status: SHIPPED | OUTPATIENT
Start: 2021-07-27 | End: 2022-08-30

## 2021-08-02 ENCOUNTER — PATIENT MESSAGE (OUTPATIENT)
Dept: FAMILY MEDICINE | Facility: CLINIC | Age: 81
End: 2021-08-02

## 2021-08-02 DIAGNOSIS — E78.2 MIXED HYPERLIPIDEMIA: ICD-10-CM

## 2021-08-02 DIAGNOSIS — E78.2 MIXED HYPERLIPIDEMIA: Primary | ICD-10-CM

## 2021-08-02 RX ORDER — SIMVASTATIN 20 MG/1
20 TABLET, FILM COATED ORAL NIGHTLY
Qty: 90 TABLET | Refills: 3 | Status: SHIPPED | OUTPATIENT
Start: 2021-08-02 | End: 2021-08-02 | Stop reason: SDUPTHER

## 2021-08-03 RX ORDER — SIMVASTATIN 20 MG/1
20 TABLET, FILM COATED ORAL NIGHTLY
Qty: 90 TABLET | Refills: 3 | Status: SHIPPED | OUTPATIENT
Start: 2021-08-03 | End: 2022-03-21

## 2021-08-09 ENCOUNTER — TELEPHONE (OUTPATIENT)
Dept: FAMILY MEDICINE | Facility: CLINIC | Age: 81
End: 2021-08-09

## 2021-08-11 ENCOUNTER — LAB VISIT (OUTPATIENT)
Dept: LAB | Facility: HOSPITAL | Age: 81
End: 2021-08-11
Attending: INTERNAL MEDICINE
Payer: MEDICARE

## 2021-08-11 DIAGNOSIS — R80.9 PROTEINURIA: ICD-10-CM

## 2021-08-11 DIAGNOSIS — N18.30 CHRONIC KIDNEY DISEASE, STAGE III (MODERATE): Primary | ICD-10-CM

## 2021-08-11 LAB
ALBUMIN SERPL BCP-MCNC: 4 G/DL (ref 3.5–5.2)
ANION GAP SERPL CALC-SCNC: 10 MMOL/L (ref 8–16)
BACTERIA #/AREA URNS HPF: NEGATIVE /HPF
BILIRUB UR QL STRIP: NEGATIVE
BUN SERPL-MCNC: 29 MG/DL (ref 8–23)
CALCIUM SERPL-MCNC: 9.3 MG/DL (ref 8.7–10.5)
CHLORIDE SERPL-SCNC: 95 MMOL/L (ref 95–110)
CLARITY UR: CLEAR
CO2 SERPL-SCNC: 27 MMOL/L (ref 23–29)
COLOR UR: YELLOW
CREAT SERPL-MCNC: 1.3 MG/DL (ref 0.5–1.4)
CREAT UR-MCNC: 99 MG/DL (ref 15–325)
EST. GFR  (AFRICAN AMERICAN): 44.5 ML/MIN/1.73 M^2
EST. GFR  (NON AFRICAN AMERICAN): 38.6 ML/MIN/1.73 M^2
GLUCOSE SERPL-MCNC: 230 MG/DL (ref 70–110)
GLUCOSE UR QL STRIP: ABNORMAL
HGB UR QL STRIP: NEGATIVE
HYALINE CASTS #/AREA URNS LPF: 16 /LPF
KETONES UR QL STRIP: NEGATIVE
LEUKOCYTE ESTERASE UR QL STRIP: NEGATIVE
MICROSCOPIC COMMENT: ABNORMAL
NITRITE UR QL STRIP: NEGATIVE
PH UR STRIP: 7 [PH] (ref 5–8)
PHOSPHATE SERPL-MCNC: 3.7 MG/DL (ref 2.7–4.5)
POTASSIUM SERPL-SCNC: 4.5 MMOL/L (ref 3.5–5.1)
PROT UR QL STRIP: ABNORMAL
PROT UR-MCNC: 26 MG/DL (ref 6–15)
PROT/CREAT UR: 0.26 MG/G{CREAT} (ref 0–0.2)
RBC #/AREA URNS HPF: 1 /HPF (ref 0–4)
SODIUM SERPL-SCNC: 132 MMOL/L (ref 136–145)
SP GR UR STRIP: 1.01 (ref 1–1.03)
SQUAMOUS #/AREA URNS HPF: 3 /HPF
URN SPEC COLLECT METH UR: ABNORMAL
UROBILINOGEN UR STRIP-ACNC: NEGATIVE EU/DL
WBC #/AREA URNS HPF: 3 /HPF (ref 0–5)
YEAST URNS QL MICRO: ABNORMAL

## 2021-08-11 PROCEDURE — 36415 COLL VENOUS BLD VENIPUNCTURE: CPT | Performed by: INTERNAL MEDICINE

## 2021-08-11 PROCEDURE — 81001 URINALYSIS AUTO W/SCOPE: CPT | Performed by: INTERNAL MEDICINE

## 2021-08-11 PROCEDURE — 82570 ASSAY OF URINE CREATININE: CPT | Performed by: INTERNAL MEDICINE

## 2021-08-11 PROCEDURE — 80069 RENAL FUNCTION PANEL: CPT | Performed by: INTERNAL MEDICINE

## 2021-09-14 RX ORDER — TERAZOSIN 1 MG/1
1 CAPSULE ORAL NIGHTLY
Qty: 90 CAPSULE | Refills: 3 | Status: SHIPPED | OUTPATIENT
Start: 2021-09-14 | End: 2021-11-01 | Stop reason: SDUPTHER

## 2021-09-16 ENCOUNTER — PATIENT MESSAGE (OUTPATIENT)
Dept: FAMILY MEDICINE | Facility: CLINIC | Age: 81
End: 2021-09-16

## 2021-09-20 ENCOUNTER — PATIENT MESSAGE (OUTPATIENT)
Dept: FAMILY MEDICINE | Facility: CLINIC | Age: 81
End: 2021-09-20

## 2021-09-20 RX ORDER — TRIAZOLAM 0.25 MG/1
0.25 TABLET ORAL NIGHTLY PRN
Qty: 90 TABLET | Refills: 0 | Status: SHIPPED | OUTPATIENT
Start: 2021-09-20 | End: 2021-10-20

## 2021-09-21 ENCOUNTER — OFFICE VISIT (OUTPATIENT)
Dept: CARDIOLOGY | Facility: CLINIC | Age: 81
End: 2021-09-21
Payer: MEDICARE

## 2021-09-21 ENCOUNTER — LAB VISIT (OUTPATIENT)
Dept: LAB | Facility: HOSPITAL | Age: 81
End: 2021-09-21
Attending: NURSE PRACTITIONER
Payer: MEDICARE

## 2021-09-21 VITALS
SYSTOLIC BLOOD PRESSURE: 112 MMHG | BODY MASS INDEX: 26.68 KG/M2 | DIASTOLIC BLOOD PRESSURE: 80 MMHG | WEIGHT: 145 LBS | HEIGHT: 62 IN | HEART RATE: 60 BPM

## 2021-09-21 DIAGNOSIS — E78.5 DYSLIPIDEMIA: ICD-10-CM

## 2021-09-21 DIAGNOSIS — E07.9 THYROID DYSFUNCTION: ICD-10-CM

## 2021-09-21 DIAGNOSIS — R06.09 DOE (DYSPNEA ON EXERTION): Primary | ICD-10-CM

## 2021-09-21 DIAGNOSIS — I25.5 ISCHEMIC CARDIOMYOPATHY: ICD-10-CM

## 2021-09-21 DIAGNOSIS — Z95.1 HX OF CABG: ICD-10-CM

## 2021-09-21 DIAGNOSIS — R06.09 DOE (DYSPNEA ON EXERTION): ICD-10-CM

## 2021-09-21 DIAGNOSIS — I25.119 ATHEROSCLEROSIS OF NATIVE CORONARY ARTERY OF NATIVE HEART WITH ANGINA PECTORIS: ICD-10-CM

## 2021-09-21 DIAGNOSIS — E11.21 TYPE 2 DIABETES MELLITUS WITH DIABETIC NEPHROPATHY, WITHOUT LONG-TERM CURRENT USE OF INSULIN: ICD-10-CM

## 2021-09-21 DIAGNOSIS — E78.2 MIXED HYPERLIPIDEMIA: ICD-10-CM

## 2021-09-21 LAB
BASOPHILS # BLD AUTO: 0.03 K/UL (ref 0–0.2)
BASOPHILS NFR BLD: 0.5 % (ref 0–1.9)
BNP SERPL-MCNC: 210 PG/ML (ref 0–99)
DIFFERENTIAL METHOD: ABNORMAL
EOSINOPHIL # BLD AUTO: 0.1 K/UL (ref 0–0.5)
EOSINOPHIL NFR BLD: 1 % (ref 0–8)
ERYTHROCYTE [DISTWIDTH] IN BLOOD BY AUTOMATED COUNT: 13.3 % (ref 11.5–14.5)
HCT VFR BLD AUTO: 37 % (ref 37–48.5)
HGB BLD-MCNC: 11.8 G/DL (ref 12–16)
IMM GRANULOCYTES # BLD AUTO: 0.01 K/UL (ref 0–0.04)
IMM GRANULOCYTES NFR BLD AUTO: 0.2 % (ref 0–0.5)
LYMPHOCYTES # BLD AUTO: 1.5 K/UL (ref 1–4.8)
LYMPHOCYTES NFR BLD: 25.5 % (ref 18–48)
MCH RBC QN AUTO: 29 PG (ref 27–31)
MCHC RBC AUTO-ENTMCNC: 31.9 G/DL (ref 32–36)
MCV RBC AUTO: 91 FL (ref 82–98)
MONOCYTES # BLD AUTO: 0.6 K/UL (ref 0.3–1)
MONOCYTES NFR BLD: 10 % (ref 4–15)
NEUTROPHILS # BLD AUTO: 3.6 K/UL (ref 1.8–7.7)
NEUTROPHILS NFR BLD: 62.8 % (ref 38–73)
NRBC BLD-RTO: 0 /100 WBC
PLATELET # BLD AUTO: 198 K/UL (ref 150–450)
PMV BLD AUTO: 11.1 FL (ref 9.2–12.9)
RBC # BLD AUTO: 4.07 M/UL (ref 4–5.4)
TSH SERPL DL<=0.005 MIU/L-ACNC: 2.95 UIU/ML (ref 0.34–5.6)
WBC # BLD AUTO: 5.72 K/UL (ref 3.9–12.7)

## 2021-09-21 PROCEDURE — 83880 ASSAY OF NATRIURETIC PEPTIDE: CPT | Performed by: NURSE PRACTITIONER

## 2021-09-21 PROCEDURE — 99214 OFFICE O/P EST MOD 30 MIN: CPT | Mod: S$GLB,,, | Performed by: NURSE PRACTITIONER

## 2021-09-21 PROCEDURE — 99214 PR OFFICE/OUTPT VISIT, EST, LEVL IV, 30-39 MIN: ICD-10-PCS | Mod: S$GLB,,, | Performed by: NURSE PRACTITIONER

## 2021-09-21 PROCEDURE — 85025 COMPLETE CBC W/AUTO DIFF WBC: CPT | Performed by: NURSE PRACTITIONER

## 2021-09-21 PROCEDURE — 84443 ASSAY THYROID STIM HORMONE: CPT | Performed by: NURSE PRACTITIONER

## 2021-09-24 ENCOUNTER — OFFICE VISIT (OUTPATIENT)
Dept: FAMILY MEDICINE | Facility: CLINIC | Age: 81
End: 2021-09-24
Payer: MEDICARE

## 2021-09-24 VITALS
BODY MASS INDEX: 26.53 KG/M2 | WEIGHT: 144.19 LBS | HEIGHT: 62 IN | SYSTOLIC BLOOD PRESSURE: 116 MMHG | OXYGEN SATURATION: 95 % | TEMPERATURE: 98 F | HEART RATE: 80 BPM | DIASTOLIC BLOOD PRESSURE: 78 MMHG

## 2021-09-24 DIAGNOSIS — R39.89 SUSPECTED UTI: Primary | ICD-10-CM

## 2021-09-24 DIAGNOSIS — R35.0 FREQUENCY OF URINATION: ICD-10-CM

## 2021-09-24 LAB
BILIRUB SERPL-MCNC: NEGATIVE MG/DL
BLOOD URINE, POC: NEGATIVE
CLARITY, POC UA: NORMAL
COLOR, POC UA: YELLOW
GLUCOSE UR QL STRIP: 1000
KETONES UR QL STRIP: NEGATIVE
LEUKOCYTE ESTERASE URINE, POC: NORMAL
NITRITE, POC UA: NEGATIVE
PH, POC UA: 6
PROTEIN, POC: NORMAL
SPECIFIC GRAVITY, POC UA: 1.01
UROBILINOGEN, POC UA: NORMAL

## 2021-09-24 PROCEDURE — 87086 URINE CULTURE/COLONY COUNT: CPT | Performed by: PHYSICIAN ASSISTANT

## 2021-09-24 PROCEDURE — 87088 URINE BACTERIA CULTURE: CPT | Performed by: PHYSICIAN ASSISTANT

## 2021-09-24 PROCEDURE — 99999 PR PBB SHADOW E&M-EST. PATIENT-LVL III: CPT | Mod: PBBFAC,,, | Performed by: PHYSICIAN ASSISTANT

## 2021-09-24 PROCEDURE — 99213 OFFICE O/P EST LOW 20 MIN: CPT | Mod: S$GLB,,, | Performed by: PHYSICIAN ASSISTANT

## 2021-09-24 PROCEDURE — 99999 PR PBB SHADOW E&M-EST. PATIENT-LVL III: ICD-10-PCS | Mod: PBBFAC,,, | Performed by: PHYSICIAN ASSISTANT

## 2021-09-24 PROCEDURE — 87186 SC STD MICRODIL/AGAR DIL: CPT | Performed by: PHYSICIAN ASSISTANT

## 2021-09-24 PROCEDURE — 81002 URINALYSIS NONAUTO W/O SCOPE: CPT | Mod: S$GLB,,, | Performed by: PHYSICIAN ASSISTANT

## 2021-09-24 PROCEDURE — 99213 PR OFFICE/OUTPT VISIT, EST, LEVL III, 20-29 MIN: ICD-10-PCS | Mod: S$GLB,,, | Performed by: PHYSICIAN ASSISTANT

## 2021-09-24 PROCEDURE — 87077 CULTURE AEROBIC IDENTIFY: CPT | Performed by: PHYSICIAN ASSISTANT

## 2021-09-24 PROCEDURE — 81002 POCT URINE DIPSTICK WITHOUT MICROSCOPE: ICD-10-PCS | Mod: S$GLB,,, | Performed by: PHYSICIAN ASSISTANT

## 2021-09-24 RX ORDER — CIPROFLOXACIN 250 MG/1
250 TABLET, FILM COATED ORAL 2 TIMES DAILY
Qty: 6 TABLET | Refills: 0 | Status: SHIPPED | OUTPATIENT
Start: 2021-09-24 | End: 2021-11-04 | Stop reason: ALTCHOICE

## 2021-09-27 ENCOUNTER — TELEPHONE (OUTPATIENT)
Dept: FAMILY MEDICINE | Facility: CLINIC | Age: 81
End: 2021-09-27

## 2021-09-27 LAB — BACTERIA UR CULT: ABNORMAL

## 2021-09-27 RX ORDER — VANCOMYCIN HYDROCHLORIDE 125 MG/1
125 CAPSULE ORAL 4 TIMES DAILY
Qty: 28 CAPSULE | Refills: 0 | Status: SHIPPED | OUTPATIENT
Start: 2021-09-27 | End: 2021-10-04

## 2021-09-28 ENCOUNTER — IMMUNIZATION (OUTPATIENT)
Dept: PRIMARY CARE CLINIC | Facility: CLINIC | Age: 81
End: 2021-09-28
Payer: MEDICARE

## 2021-09-28 DIAGNOSIS — Z23 NEED FOR VACCINATION: Primary | ICD-10-CM

## 2021-09-28 PROCEDURE — 91300 COVID-19, MRNA, LNP-S, PF, 30 MCG/0.3 ML DOSE VACCINE: ICD-10-PCS | Mod: S$GLB,,, | Performed by: FAMILY MEDICINE

## 2021-09-28 PROCEDURE — 0003A COVID-19, MRNA, LNP-S, PF, 30 MCG/0.3 ML DOSE VACCINE: ICD-10-PCS | Mod: S$GLB,,, | Performed by: FAMILY MEDICINE

## 2021-09-28 PROCEDURE — 91300 COVID-19, MRNA, LNP-S, PF, 30 MCG/0.3 ML DOSE VACCINE: CPT | Mod: S$GLB,,, | Performed by: FAMILY MEDICINE

## 2021-09-28 PROCEDURE — 0003A COVID-19, MRNA, LNP-S, PF, 30 MCG/0.3 ML DOSE VACCINE: CPT | Mod: S$GLB,,, | Performed by: FAMILY MEDICINE

## 2021-10-04 ENCOUNTER — LAB VISIT (OUTPATIENT)
Dept: LAB | Facility: HOSPITAL | Age: 81
End: 2021-10-04
Attending: INTERNAL MEDICINE
Payer: MEDICARE

## 2021-10-04 DIAGNOSIS — N17.9 ACUTE KIDNEY FAILURE, UNSPECIFIED: Primary | ICD-10-CM

## 2021-10-04 LAB
ALBUMIN SERPL BCP-MCNC: 4.2 G/DL (ref 3.5–5.2)
ANION GAP SERPL CALC-SCNC: 10 MMOL/L (ref 8–16)
BUN SERPL-MCNC: 25 MG/DL (ref 8–23)
CALCIUM SERPL-MCNC: 9.6 MG/DL (ref 8.7–10.5)
CHLORIDE SERPL-SCNC: 104 MMOL/L (ref 95–110)
CO2 SERPL-SCNC: 25 MMOL/L (ref 23–29)
CREAT SERPL-MCNC: 0.9 MG/DL (ref 0.5–1.4)
EST. GFR  (AFRICAN AMERICAN): >60 ML/MIN/1.73 M^2
EST. GFR  (NON AFRICAN AMERICAN): >60 ML/MIN/1.73 M^2
GLUCOSE SERPL-MCNC: 101 MG/DL (ref 70–110)
PHOSPHATE SERPL-MCNC: 4.3 MG/DL (ref 2.7–4.5)
POTASSIUM SERPL-SCNC: 4.3 MMOL/L (ref 3.5–5.1)
SODIUM SERPL-SCNC: 139 MMOL/L (ref 136–145)

## 2021-10-04 PROCEDURE — 80069 RENAL FUNCTION PANEL: CPT | Performed by: INTERNAL MEDICINE

## 2021-10-04 PROCEDURE — 36415 COLL VENOUS BLD VENIPUNCTURE: CPT | Performed by: INTERNAL MEDICINE

## 2021-10-08 DIAGNOSIS — E11.21 TYPE 2 DIABETES MELLITUS WITH DIABETIC NEPHROPATHY, WITHOUT LONG-TERM CURRENT USE OF INSULIN: ICD-10-CM

## 2021-10-11 ENCOUNTER — CLINICAL SUPPORT (OUTPATIENT)
Dept: CARDIOLOGY | Facility: HOSPITAL | Age: 81
End: 2021-10-11
Attending: NURSE PRACTITIONER
Payer: MEDICARE

## 2021-10-11 VITALS — WEIGHT: 144 LBS | BODY MASS INDEX: 26.5 KG/M2 | HEIGHT: 62 IN

## 2021-10-11 DIAGNOSIS — R06.09 DOE (DYSPNEA ON EXERTION): ICD-10-CM

## 2021-10-11 PROCEDURE — 93306 ECHO (CUPID ONLY): ICD-10-PCS | Mod: 26,,, | Performed by: INTERNAL MEDICINE

## 2021-10-11 PROCEDURE — 93306 TTE W/DOPPLER COMPLETE: CPT

## 2021-10-11 PROCEDURE — 93306 TTE W/DOPPLER COMPLETE: CPT | Mod: 26,,, | Performed by: INTERNAL MEDICINE

## 2021-10-13 LAB
AORTIC ROOT ANNULUS: 2.52 CM
AORTIC VALVE CUSP SEPERATION: 0.96 CM
AV INDEX (PROSTH): 0.81
AV MEAN GRADIENT: 13 MMHG
AV PEAK GRADIENT: 19 MMHG
AV VALVE AREA: 2.57 CM2
AV VELOCITY RATIO: 79.13
BSA FOR ECHO PROCEDURE: 1.69 M2
CV ECHO LV RWT: 0.39 CM
DOP CALC AO PEAK VEL: 2.17 M/S
DOP CALC AO VTI: 43.8 CM
DOP CALC LVOT AREA: 3.2 CM2
DOP CALC LVOT DIAMETER: 2.01 CM
DOP CALC LVOT PEAK VEL: 171.72 M/S
DOP CALC LVOT STROKE VOLUME: 112.49 CM3
DOP CALCLVOT PEAK VEL VTI: 35.47 CM
E WAVE DECELERATION TIME: 215.43 MSEC
E/A RATIO: 0.83
E/E' RATIO: 13.86 M/S
ECHO LV POSTERIOR WALL: 1 CM (ref 0.6–1.1)
EJECTION FRACTION: 52 %
FRACTIONAL SHORTENING: 22 % (ref 28–44)
INTERVENTRICULAR SEPTUM: 1 CM (ref 0.6–1.1)
IVRT: 107.74 MSEC
LEFT INTERNAL DIMENSION IN SYSTOLE: 4 CM (ref 2.1–4)
LEFT VENTRICLE MASS INDEX: 114 G/M2
LEFT VENTRICULAR INTERNAL DIMENSION IN DIASTOLE: 5.11 CM (ref 3.5–6)
LEFT VENTRICULAR MASS: 188.63 G
LV LATERAL E/E' RATIO: 10.78 M/S
LV SEPTAL E/E' RATIO: 19.4 M/S
MV PEAK A VEL: 1.17 M/S
MV PEAK E VEL: 0.97 M/S
PISA TR MAX VEL: 2.26 M/S
PV PEAK VELOCITY: 91.67 CM/S
RA PRESSURE: 3 MMHG
RIGHT VENTRICULAR END-DIASTOLIC DIMENSION: 275 CM
TDI LATERAL: 0.09 M/S
TDI SEPTAL: 0.05 M/S
TDI: 0.07 M/S
TR MAX PG: 20 MMHG
TV REST PULMONARY ARTERY PRESSURE: 23 MMHG

## 2021-11-01 RX ORDER — TERAZOSIN 2 MG/1
2 CAPSULE ORAL NIGHTLY
Qty: 90 CAPSULE | Refills: 3 | Status: SHIPPED | OUTPATIENT
Start: 2021-11-01 | End: 2022-05-05

## 2021-11-04 ENCOUNTER — OFFICE VISIT (OUTPATIENT)
Dept: FAMILY MEDICINE | Facility: CLINIC | Age: 81
End: 2021-11-04
Payer: MEDICARE

## 2021-11-04 VITALS
HEART RATE: 59 BPM | WEIGHT: 145.75 LBS | SYSTOLIC BLOOD PRESSURE: 120 MMHG | DIASTOLIC BLOOD PRESSURE: 60 MMHG | OXYGEN SATURATION: 94 % | HEIGHT: 62 IN | BODY MASS INDEX: 26.82 KG/M2 | TEMPERATURE: 99 F

## 2021-11-04 DIAGNOSIS — N18.32 STAGE 3B CHRONIC KIDNEY DISEASE: ICD-10-CM

## 2021-11-04 DIAGNOSIS — I25.119 ATHEROSCLEROSIS OF NATIVE CORONARY ARTERY OF NATIVE HEART WITH ANGINA PECTORIS: ICD-10-CM

## 2021-11-04 DIAGNOSIS — E78.2 MIXED HYPERLIPIDEMIA: ICD-10-CM

## 2021-11-04 DIAGNOSIS — E11.21 TYPE 2 DIABETES MELLITUS WITH DIABETIC NEPHROPATHY, WITHOUT LONG-TERM CURRENT USE OF INSULIN: Primary | ICD-10-CM

## 2021-11-04 DIAGNOSIS — M47.26 OSTEOARTHRITIS OF SPINE WITH RADICULOPATHY, LUMBAR REGION: ICD-10-CM

## 2021-11-04 DIAGNOSIS — J45.30 MILD PERSISTENT ASTHMA WITHOUT COMPLICATION: ICD-10-CM

## 2021-11-04 DIAGNOSIS — K21.9 GASTROESOPHAGEAL REFLUX DISEASE WITHOUT ESOPHAGITIS: ICD-10-CM

## 2021-11-04 PROCEDURE — 99214 PR OFFICE/OUTPT VISIT, EST, LEVL IV, 30-39 MIN: ICD-10-PCS | Mod: S$GLB,,, | Performed by: FAMILY MEDICINE

## 2021-11-04 PROCEDURE — 99999 PR PBB SHADOW E&M-EST. PATIENT-LVL III: CPT | Mod: PBBFAC,,, | Performed by: FAMILY MEDICINE

## 2021-11-04 PROCEDURE — 99214 OFFICE O/P EST MOD 30 MIN: CPT | Mod: S$GLB,,, | Performed by: FAMILY MEDICINE

## 2021-11-04 PROCEDURE — 99999 PR PBB SHADOW E&M-EST. PATIENT-LVL III: ICD-10-PCS | Mod: PBBFAC,,, | Performed by: FAMILY MEDICINE

## 2021-11-04 RX ORDER — ONDANSETRON 4 MG/1
4 TABLET, ORALLY DISINTEGRATING ORAL EVERY 8 HOURS PRN
Qty: 30 TABLET | Refills: 5 | Status: SHIPPED | OUTPATIENT
Start: 2021-11-04 | End: 2022-04-20

## 2021-11-04 RX ORDER — AMLODIPINE BESYLATE 10 MG/1
10 TABLET ORAL DAILY
COMMUNITY
End: 2022-03-21

## 2021-11-04 RX ORDER — TRIAZOLAM 0.25 MG/1
0.12 TABLET ORAL NIGHTLY
COMMUNITY
End: 2021-12-22 | Stop reason: SDUPTHER

## 2021-11-05 ENCOUNTER — TELEPHONE (OUTPATIENT)
Dept: FAMILY MEDICINE | Facility: CLINIC | Age: 81
End: 2021-11-05
Payer: MEDICARE

## 2021-11-07 PROBLEM — N18.32 STAGE 3B CHRONIC KIDNEY DISEASE: Status: ACTIVE | Noted: 2021-11-07

## 2021-11-16 ENCOUNTER — LAB VISIT (OUTPATIENT)
Dept: LAB | Facility: HOSPITAL | Age: 81
End: 2021-11-16
Attending: FAMILY MEDICINE
Payer: MEDICARE

## 2021-11-16 DIAGNOSIS — E11.21 TYPE 2 DIABETES MELLITUS WITH DIABETIC NEPHROPATHY, WITHOUT LONG-TERM CURRENT USE OF INSULIN: ICD-10-CM

## 2021-11-16 LAB
ALBUMIN SERPL BCP-MCNC: 4 G/DL (ref 3.5–5.2)
ALP SERPL-CCNC: 55 U/L (ref 55–135)
ALT SERPL W/O P-5'-P-CCNC: 23 U/L (ref 10–44)
ANION GAP SERPL CALC-SCNC: 7 MMOL/L (ref 8–16)
AST SERPL-CCNC: 20 U/L (ref 10–40)
BILIRUB SERPL-MCNC: 0.3 MG/DL (ref 0.1–1)
BUN SERPL-MCNC: 38 MG/DL (ref 8–23)
CALCIUM SERPL-MCNC: 9.7 MG/DL (ref 8.7–10.5)
CHLORIDE SERPL-SCNC: 105 MMOL/L (ref 95–110)
CHOLEST SERPL-MCNC: 127 MG/DL (ref 120–199)
CHOLEST/HDLC SERPL: 5.8 {RATIO} (ref 2–5)
CO2 SERPL-SCNC: 27 MMOL/L (ref 23–29)
CREAT SERPL-MCNC: 1 MG/DL (ref 0.5–1.4)
EST. GFR  (AFRICAN AMERICAN): >60 ML/MIN/1.73 M^2
EST. GFR  (NON AFRICAN AMERICAN): 53 ML/MIN/1.73 M^2
ESTIMATED AVG GLUCOSE: 134 MG/DL (ref 68–131)
GLUCOSE SERPL-MCNC: 97 MG/DL (ref 70–110)
HBA1C MFR BLD: 6.3 % (ref 4–5.6)
HDLC SERPL-MCNC: 22 MG/DL (ref 40–75)
HDLC SERPL: 17.3 % (ref 20–50)
LDLC SERPL CALC-MCNC: 42.8 MG/DL (ref 63–159)
NONHDLC SERPL-MCNC: 105 MG/DL
POTASSIUM SERPL-SCNC: 4.5 MMOL/L (ref 3.5–5.1)
PROT SERPL-MCNC: 7.3 G/DL (ref 6–8.4)
SODIUM SERPL-SCNC: 139 MMOL/L (ref 136–145)
TRIGL SERPL-MCNC: 311 MG/DL (ref 30–150)

## 2021-11-16 PROCEDURE — 83036 HEMOGLOBIN GLYCOSYLATED A1C: CPT | Performed by: FAMILY MEDICINE

## 2021-11-16 PROCEDURE — 80061 LIPID PANEL: CPT | Performed by: FAMILY MEDICINE

## 2021-11-16 PROCEDURE — 80053 COMPREHEN METABOLIC PANEL: CPT | Performed by: FAMILY MEDICINE

## 2021-11-23 ENCOUNTER — TELEPHONE (OUTPATIENT)
Dept: UROLOGY | Facility: CLINIC | Age: 81
End: 2021-11-23
Payer: MEDICARE

## 2021-11-24 ENCOUNTER — PATIENT OUTREACH (OUTPATIENT)
Dept: ADMINISTRATIVE | Facility: OTHER | Age: 81
End: 2021-11-24
Payer: MEDICARE

## 2021-11-26 ENCOUNTER — TELEPHONE (OUTPATIENT)
Dept: UROLOGY | Facility: CLINIC | Age: 81
End: 2021-11-26
Payer: MEDICARE

## 2021-11-26 DIAGNOSIS — M25.529 ELBOW PAIN, UNSPECIFIED LATERALITY: Primary | ICD-10-CM

## 2021-11-29 ENCOUNTER — OFFICE VISIT (OUTPATIENT)
Dept: ORTHOPEDICS | Facility: CLINIC | Age: 81
End: 2021-11-29
Payer: MEDICARE

## 2021-11-29 ENCOUNTER — HOSPITAL ENCOUNTER (OUTPATIENT)
Dept: RADIOLOGY | Facility: HOSPITAL | Age: 81
Discharge: HOME | End: 2021-11-29
Attending: ORTHOPAEDIC SURGERY
Payer: MEDICARE

## 2021-11-29 VITALS — WEIGHT: 145 LBS | HEIGHT: 62 IN | BODY MASS INDEX: 26.68 KG/M2 | RESPIRATION RATE: 18 BRPM

## 2021-11-29 DIAGNOSIS — M25.529 ELBOW PAIN, UNSPECIFIED LATERALITY: ICD-10-CM

## 2021-11-29 DIAGNOSIS — M65.342 TRIGGER FINGER, LEFT RING FINGER: ICD-10-CM

## 2021-11-29 DIAGNOSIS — M25.529 ELBOW PAIN, UNSPECIFIED LATERALITY: Primary | ICD-10-CM

## 2021-11-29 DIAGNOSIS — M25.521 RIGHT ELBOW PAIN: Primary | ICD-10-CM

## 2021-11-29 PROCEDURE — 73080 X-RAY EXAM OF ELBOW: CPT | Mod: TC,PN,RT

## 2021-11-29 PROCEDURE — 99999 PR PBB SHADOW E&M-EST. PATIENT-LVL III: CPT | Mod: PBBFAC,,, | Performed by: ORTHOPAEDIC SURGERY

## 2021-11-29 PROCEDURE — 20550 NJX 1 TENDON SHEATH/LIGAMENT: CPT | Mod: LT,S$GLB,, | Performed by: ORTHOPAEDIC SURGERY

## 2021-11-29 PROCEDURE — 73080 XR ELBOW COMPLETE 3 VIEW RIGHT: ICD-10-PCS | Mod: 26,RT,, | Performed by: RADIOLOGY

## 2021-11-29 PROCEDURE — 99213 OFFICE O/P EST LOW 20 MIN: CPT | Mod: 25,S$GLB,, | Performed by: ORTHOPAEDIC SURGERY

## 2021-11-29 PROCEDURE — 99999 PR PBB SHADOW E&M-EST. PATIENT-LVL III: ICD-10-PCS | Mod: PBBFAC,,, | Performed by: ORTHOPAEDIC SURGERY

## 2021-11-29 PROCEDURE — 73080 X-RAY EXAM OF ELBOW: CPT | Mod: 26,RT,, | Performed by: RADIOLOGY

## 2021-11-29 PROCEDURE — 99213 PR OFFICE/OUTPT VISIT, EST, LEVL III, 20-29 MIN: ICD-10-PCS | Mod: 25,S$GLB,, | Performed by: ORTHOPAEDIC SURGERY

## 2021-11-29 PROCEDURE — 20550 TRIGGER FINGER: ICD-10-PCS | Mod: LT,S$GLB,, | Performed by: ORTHOPAEDIC SURGERY

## 2021-11-29 RX ORDER — TRIAMCINOLONE ACETONIDE 40 MG/ML
40 INJECTION, SUSPENSION INTRA-ARTICULAR; INTRAMUSCULAR
Status: DISCONTINUED | OUTPATIENT
Start: 2021-11-29 | End: 2021-11-29 | Stop reason: HOSPADM

## 2021-11-29 RX ADMIN — TRIAMCINOLONE ACETONIDE 40 MG: 40 INJECTION, SUSPENSION INTRA-ARTICULAR; INTRAMUSCULAR at 02:11

## 2021-12-02 ENCOUNTER — OFFICE VISIT (OUTPATIENT)
Dept: UROLOGY | Facility: CLINIC | Age: 81
End: 2021-12-02
Payer: MEDICARE

## 2021-12-02 ENCOUNTER — TELEPHONE (OUTPATIENT)
Dept: UROLOGY | Facility: CLINIC | Age: 81
End: 2021-12-02

## 2021-12-02 VITALS — HEIGHT: 62 IN | BODY MASS INDEX: 26.21 KG/M2 | WEIGHT: 142.44 LBS

## 2021-12-02 DIAGNOSIS — E11.21 TYPE 2 DIABETES MELLITUS WITH DIABETIC NEPHROPATHY, WITHOUT LONG-TERM CURRENT USE OF INSULIN: ICD-10-CM

## 2021-12-02 DIAGNOSIS — N28.1 COMPLEX RENAL CYST: ICD-10-CM

## 2021-12-02 DIAGNOSIS — R35.0 URINARY FREQUENCY: Primary | ICD-10-CM

## 2021-12-02 LAB
BILIRUB SERPL-MCNC: ABNORMAL MG/DL
BLOOD URINE, POC: ABNORMAL
CLARITY, POC UA: CLEAR
COLOR, POC UA: ABNORMAL
GLUCOSE UR QL STRIP: 500
KETONES UR QL STRIP: ABNORMAL
LEUKOCYTE ESTERASE URINE, POC: ABNORMAL
NITRITE, POC UA: ABNORMAL
PH, POC UA: 6
PROTEIN, POC: ABNORMAL
SPECIFIC GRAVITY, POC UA: 1.01
UROBILINOGEN, POC UA: 0.2

## 2021-12-02 PROCEDURE — 99999 PR PBB SHADOW E&M-EST. PATIENT-LVL III: CPT | Mod: PBBFAC,,, | Performed by: NURSE PRACTITIONER

## 2021-12-02 PROCEDURE — 99214 PR OFFICE/OUTPT VISIT, EST, LEVL IV, 30-39 MIN: ICD-10-PCS | Mod: S$GLB,,, | Performed by: NURSE PRACTITIONER

## 2021-12-02 PROCEDURE — 81002 POCT URINE DIPSTICK WITHOUT MICROSCOPE: ICD-10-PCS | Mod: S$GLB,,, | Performed by: NURSE PRACTITIONER

## 2021-12-02 PROCEDURE — 81002 URINALYSIS NONAUTO W/O SCOPE: CPT | Mod: S$GLB,,, | Performed by: NURSE PRACTITIONER

## 2021-12-02 PROCEDURE — 99214 OFFICE O/P EST MOD 30 MIN: CPT | Mod: S$GLB,,, | Performed by: NURSE PRACTITIONER

## 2021-12-02 PROCEDURE — 99999 PR PBB SHADOW E&M-EST. PATIENT-LVL III: ICD-10-PCS | Mod: PBBFAC,,, | Performed by: NURSE PRACTITIONER

## 2021-12-09 ENCOUNTER — CLINICAL SUPPORT (OUTPATIENT)
Dept: REHABILITATION | Facility: HOSPITAL | Age: 81
End: 2021-12-09
Attending: ORTHOPAEDIC SURGERY
Payer: MEDICARE

## 2021-12-09 DIAGNOSIS — M25.521 RIGHT ELBOW PAIN: Primary | ICD-10-CM

## 2021-12-09 PROCEDURE — 97165 OT EVAL LOW COMPLEX 30 MIN: CPT

## 2021-12-14 ENCOUNTER — OFFICE VISIT (OUTPATIENT)
Dept: CARDIOLOGY | Facility: CLINIC | Age: 81
End: 2021-12-14
Payer: MEDICARE

## 2021-12-14 VITALS
SYSTOLIC BLOOD PRESSURE: 120 MMHG | DIASTOLIC BLOOD PRESSURE: 70 MMHG | HEART RATE: 60 BPM | WEIGHT: 142 LBS | BODY MASS INDEX: 26.13 KG/M2 | HEIGHT: 62 IN

## 2021-12-14 DIAGNOSIS — E78.2 MIXED HYPERLIPIDEMIA: ICD-10-CM

## 2021-12-14 DIAGNOSIS — I25.5 ISCHEMIC CARDIOMYOPATHY: ICD-10-CM

## 2021-12-14 DIAGNOSIS — Z95.1 HX OF CABG: Primary | ICD-10-CM

## 2021-12-14 DIAGNOSIS — I25.119 ATHEROSCLEROSIS OF NATIVE CORONARY ARTERY OF NATIVE HEART WITH ANGINA PECTORIS: ICD-10-CM

## 2021-12-14 DIAGNOSIS — E07.9 THYROID DYSFUNCTION: ICD-10-CM

## 2021-12-14 PROCEDURE — 99214 PR OFFICE/OUTPT VISIT, EST, LEVL IV, 30-39 MIN: ICD-10-PCS | Mod: S$GLB,,, | Performed by: NURSE PRACTITIONER

## 2021-12-14 PROCEDURE — 99214 OFFICE O/P EST MOD 30 MIN: CPT | Mod: S$GLB,,, | Performed by: NURSE PRACTITIONER

## 2021-12-16 ENCOUNTER — CLINICAL SUPPORT (OUTPATIENT)
Dept: REHABILITATION | Facility: HOSPITAL | Age: 81
End: 2021-12-16
Payer: MEDICARE

## 2021-12-16 DIAGNOSIS — M25.521 RIGHT ELBOW PAIN: Primary | ICD-10-CM

## 2021-12-16 PROCEDURE — 97140 MANUAL THERAPY 1/> REGIONS: CPT

## 2021-12-16 PROCEDURE — 97035 APP MDLTY 1+ULTRASOUND EA 15: CPT

## 2021-12-16 PROCEDURE — 97110 THERAPEUTIC EXERCISES: CPT

## 2021-12-17 ENCOUNTER — HOSPITAL ENCOUNTER (EMERGENCY)
Facility: HOSPITAL | Age: 81
Discharge: HOME | End: 2021-12-17
Attending: EMERGENCY MEDICINE
Payer: MEDICARE

## 2021-12-17 ENCOUNTER — TELEPHONE (OUTPATIENT)
Dept: ORTHOPEDICS | Facility: CLINIC | Age: 81
End: 2021-12-17
Payer: MEDICARE

## 2021-12-17 VITALS
OXYGEN SATURATION: 97 % | BODY MASS INDEX: 25.95 KG/M2 | DIASTOLIC BLOOD PRESSURE: 62 MMHG | RESPIRATION RATE: 20 BRPM | WEIGHT: 141 LBS | HEIGHT: 62 IN | SYSTOLIC BLOOD PRESSURE: 132 MMHG | HEART RATE: 58 BPM | TEMPERATURE: 99 F

## 2021-12-17 DIAGNOSIS — M54.50 ACUTE LOW BACK PAIN, UNSPECIFIED BACK PAIN LATERALITY, UNSPECIFIED WHETHER SCIATICA PRESENT: ICD-10-CM

## 2021-12-17 DIAGNOSIS — M62.838 MUSCLE SPASMS OF BOTH LOWER EXTREMITIES: ICD-10-CM

## 2021-12-17 DIAGNOSIS — R52 PAIN: ICD-10-CM

## 2021-12-17 DIAGNOSIS — R06.02 SOB (SHORTNESS OF BREATH): Primary | ICD-10-CM

## 2021-12-17 LAB
ALBUMIN SERPL BCP-MCNC: 4.4 G/DL (ref 3.5–5.2)
ALP SERPL-CCNC: 58 U/L (ref 55–135)
ALT SERPL W/O P-5'-P-CCNC: 32 U/L (ref 10–44)
ANION GAP SERPL CALC-SCNC: 8 MMOL/L (ref 8–16)
AST SERPL-CCNC: 22 U/L (ref 10–40)
BACTERIA #/AREA URNS HPF: NEGATIVE /HPF
BASOPHILS # BLD AUTO: 0.03 K/UL (ref 0–0.2)
BASOPHILS NFR BLD: 0.4 % (ref 0–1.9)
BILIRUB SERPL-MCNC: 0.7 MG/DL (ref 0.1–1)
BILIRUB UR QL STRIP: NEGATIVE
BNP SERPL-MCNC: 181 PG/ML (ref 0–99)
BUN SERPL-MCNC: 37 MG/DL (ref 8–23)
CALCIUM SERPL-MCNC: 9.3 MG/DL (ref 8.7–10.5)
CHLORIDE SERPL-SCNC: 102 MMOL/L (ref 95–110)
CK SERPL-CCNC: 79 U/L (ref 20–180)
CLARITY UR: CLEAR
CO2 SERPL-SCNC: 26 MMOL/L (ref 23–29)
COLOR UR: COLORLESS
CREAT SERPL-MCNC: 0.9 MG/DL (ref 0.5–1.4)
DIFFERENTIAL METHOD: NORMAL
EOSINOPHIL # BLD AUTO: 0.1 K/UL (ref 0–0.5)
EOSINOPHIL NFR BLD: 1.5 % (ref 0–8)
ERYTHROCYTE [DISTWIDTH] IN BLOOD BY AUTOMATED COUNT: 13.4 % (ref 11.5–14.5)
EST. GFR  (AFRICAN AMERICAN): >60 ML/MIN/1.73 M^2
EST. GFR  (NON AFRICAN AMERICAN): >60 ML/MIN/1.73 M^2
GLUCOSE SERPL-MCNC: 114 MG/DL (ref 70–110)
GLUCOSE UR QL STRIP: ABNORMAL
HCT VFR BLD AUTO: 37.5 % (ref 37–48.5)
HGB BLD-MCNC: 12.1 G/DL (ref 12–16)
HGB UR QL STRIP: NEGATIVE
HYALINE CASTS #/AREA URNS LPF: 1 /LPF
IMM GRANULOCYTES # BLD AUTO: 0.02 K/UL (ref 0–0.04)
IMM GRANULOCYTES NFR BLD AUTO: 0.3 % (ref 0–0.5)
KETONES UR QL STRIP: NEGATIVE
LEUKOCYTE ESTERASE UR QL STRIP: NEGATIVE
LYMPHOCYTES # BLD AUTO: 1.6 K/UL (ref 1–4.8)
LYMPHOCYTES NFR BLD: 22.9 % (ref 18–48)
MAGNESIUM SERPL-MCNC: 2.4 MG/DL (ref 1.6–2.6)
MCH RBC QN AUTO: 29.1 PG (ref 27–31)
MCHC RBC AUTO-ENTMCNC: 32.3 G/DL (ref 32–36)
MCV RBC AUTO: 90 FL (ref 82–98)
MICROSCOPIC COMMENT: NORMAL
MONOCYTES # BLD AUTO: 0.7 K/UL (ref 0.3–1)
MONOCYTES NFR BLD: 9.8 % (ref 4–15)
NEUTROPHILS # BLD AUTO: 4.4 K/UL (ref 1.8–7.7)
NEUTROPHILS NFR BLD: 65.1 % (ref 38–73)
NITRITE UR QL STRIP: NEGATIVE
NRBC BLD-RTO: 0 /100 WBC
PH UR STRIP: 7 [PH] (ref 5–8)
PLATELET # BLD AUTO: 169 K/UL (ref 150–450)
PMV BLD AUTO: 10.8 FL (ref 9.2–12.9)
POTASSIUM SERPL-SCNC: 4.4 MMOL/L (ref 3.5–5.1)
PROT SERPL-MCNC: 7.6 G/DL (ref 6–8.4)
PROT UR QL STRIP: NEGATIVE
RBC # BLD AUTO: 4.16 M/UL (ref 4–5.4)
RBC #/AREA URNS HPF: 0 /HPF (ref 0–4)
SODIUM SERPL-SCNC: 136 MMOL/L (ref 136–145)
SP GR UR STRIP: 1.01 (ref 1–1.03)
SQUAMOUS #/AREA URNS HPF: 0 /HPF
TROPONIN I SERPL DL<=0.01 NG/ML-MCNC: <0.03 NG/ML
URN SPEC COLLECT METH UR: ABNORMAL
UROBILINOGEN UR STRIP-ACNC: NEGATIVE EU/DL
WBC # BLD AUTO: 6.82 K/UL (ref 3.9–12.7)
WBC #/AREA URNS HPF: 0 /HPF (ref 0–5)
YEAST URNS QL MICRO: NORMAL

## 2021-12-17 PROCEDURE — 80053 COMPREHEN METABOLIC PANEL: CPT | Performed by: NURSE PRACTITIONER

## 2021-12-17 PROCEDURE — 93010 ELECTROCARDIOGRAM REPORT: CPT | Mod: ,,, | Performed by: SPECIALIST

## 2021-12-17 PROCEDURE — 84484 ASSAY OF TROPONIN QUANT: CPT | Performed by: NURSE PRACTITIONER

## 2021-12-17 PROCEDURE — 85025 COMPLETE CBC W/AUTO DIFF WBC: CPT | Performed by: NURSE PRACTITIONER

## 2021-12-17 PROCEDURE — 25000003 PHARM REV CODE 250: Performed by: STUDENT IN AN ORGANIZED HEALTH CARE EDUCATION/TRAINING PROGRAM

## 2021-12-17 PROCEDURE — 83880 ASSAY OF NATRIURETIC PEPTIDE: CPT | Performed by: NURSE PRACTITIONER

## 2021-12-17 PROCEDURE — 83735 ASSAY OF MAGNESIUM: CPT | Performed by: NURSE PRACTITIONER

## 2021-12-17 PROCEDURE — 93010 EKG 12-LEAD: ICD-10-PCS | Mod: ,,, | Performed by: SPECIALIST

## 2021-12-17 PROCEDURE — 93005 ELECTROCARDIOGRAM TRACING: CPT | Performed by: SPECIALIST

## 2021-12-17 PROCEDURE — 99284 EMERGENCY DEPT VISIT MOD MDM: CPT | Mod: 25

## 2021-12-17 PROCEDURE — 81001 URINALYSIS AUTO W/SCOPE: CPT | Performed by: STUDENT IN AN ORGANIZED HEALTH CARE EDUCATION/TRAINING PROGRAM

## 2021-12-17 PROCEDURE — 82550 ASSAY OF CK (CPK): CPT | Performed by: NURSE PRACTITIONER

## 2021-12-17 PROCEDURE — 36415 COLL VENOUS BLD VENIPUNCTURE: CPT | Performed by: NURSE PRACTITIONER

## 2021-12-17 RX ORDER — LIDOCAINE 560 MG/1
1 PATCH PERCUTANEOUS; TOPICAL; TRANSDERMAL DAILY
Qty: 5 PATCH | Refills: 1 | Status: SHIPPED | OUTPATIENT
Start: 2021-12-17 | End: 2022-06-15

## 2021-12-17 RX ORDER — METHOCARBAMOL 500 MG/1
1000 TABLET, FILM COATED ORAL
Status: COMPLETED | OUTPATIENT
Start: 2021-12-17 | End: 2021-12-17

## 2021-12-17 RX ORDER — LIDOCAINE 50 MG/G
1 PATCH TOPICAL
Status: DISCONTINUED | OUTPATIENT
Start: 2021-12-17 | End: 2021-12-18 | Stop reason: HOSPADM

## 2021-12-17 RX ORDER — METHOCARBAMOL 500 MG/1
1000 TABLET, FILM COATED ORAL 3 TIMES DAILY
Qty: 30 TABLET | Refills: 0 | Status: SHIPPED | OUTPATIENT
Start: 2021-12-17 | End: 2021-12-22

## 2021-12-17 RX ORDER — ACETAMINOPHEN 500 MG
1000 TABLET ORAL
Status: COMPLETED | OUTPATIENT
Start: 2021-12-17 | End: 2021-12-17

## 2021-12-17 RX ADMIN — METHOCARBAMOL TABLETS 1000 MG: 500 TABLET, COATED ORAL at 08:12

## 2021-12-17 RX ADMIN — LIDOCAINE 5% 1 PATCH: 700 PATCH TOPICAL at 08:12

## 2021-12-17 RX ADMIN — ACETAMINOPHEN 1000 MG: 500 TABLET, FILM COATED ORAL at 08:12

## 2021-12-18 DIAGNOSIS — M51.36 DDD (DEGENERATIVE DISC DISEASE), LUMBAR: ICD-10-CM

## 2021-12-18 DIAGNOSIS — E11.21 TYPE 2 DIABETES MELLITUS WITH DIABETIC NEPHROPATHY, WITHOUT LONG-TERM CURRENT USE OF INSULIN: ICD-10-CM

## 2021-12-19 NOTE — TELEPHONE ENCOUNTER
----- Message from Lety Alessandro sent at 3/28/2019  3:06 PM CDT -----  Contact: Patient  Type:  RX Refill Request    Who Called:  Patient  Refill or New Rx:  New  RX Name and Strength:  FREESTYLE LITE STRIPS Strp  How is the patient currently taking it? (ex. 1XDay):  USE TO TEST BLOOD SUGAR TWICE A DAY  Is this a 30 day or 90 day RX:  200 strip  Preferred Pharmacy with phone number:    [facility]/pharmacy #[id number] - [place] - [address]  [address] [address]  Phone: [phone] Fax: [phone]  Local or Mail Order:  Local  Ordering Provider:  Dr Oumar Almonte  Best Call Back Number:  [phone]  Additional Information:  Calling to request a new prescription. Please advise     no

## 2021-12-20 DIAGNOSIS — E11.21 TYPE 2 DIABETES MELLITUS WITH DIABETIC NEPHROPATHY, WITHOUT LONG-TERM CURRENT USE OF INSULIN: ICD-10-CM

## 2021-12-20 RX ORDER — GABAPENTIN 300 MG/1
300 CAPSULE ORAL 3 TIMES DAILY
Qty: 90 CAPSULE | Refills: 2 | Status: SHIPPED | OUTPATIENT
Start: 2021-12-20 | End: 2022-06-15

## 2021-12-21 ENCOUNTER — CLINICAL SUPPORT (OUTPATIENT)
Dept: REHABILITATION | Facility: HOSPITAL | Age: 81
End: 2021-12-21
Payer: MEDICARE

## 2021-12-21 DIAGNOSIS — M25.521 RIGHT ELBOW PAIN: Primary | ICD-10-CM

## 2021-12-21 PROCEDURE — 97110 THERAPEUTIC EXERCISES: CPT

## 2021-12-21 PROCEDURE — 97140 MANUAL THERAPY 1/> REGIONS: CPT

## 2021-12-21 PROCEDURE — 97035 APP MDLTY 1+ULTRASOUND EA 15: CPT

## 2021-12-22 ENCOUNTER — OFFICE VISIT (OUTPATIENT)
Dept: FAMILY MEDICINE | Facility: CLINIC | Age: 81
End: 2021-12-22
Payer: MEDICARE

## 2021-12-22 VITALS
HEIGHT: 62 IN | DIASTOLIC BLOOD PRESSURE: 68 MMHG | OXYGEN SATURATION: 95 % | WEIGHT: 137.56 LBS | HEART RATE: 80 BPM | SYSTOLIC BLOOD PRESSURE: 134 MMHG | TEMPERATURE: 97 F | BODY MASS INDEX: 25.32 KG/M2

## 2021-12-22 DIAGNOSIS — G47.00 INSOMNIA, UNSPECIFIED TYPE: ICD-10-CM

## 2021-12-22 DIAGNOSIS — H72.92 PERFORATION OF LEFT TYMPANIC MEMBRANE: ICD-10-CM

## 2021-12-22 DIAGNOSIS — M54.16 LUMBAR RADICULOPATHY: Primary | ICD-10-CM

## 2021-12-22 PROCEDURE — 99999 PR PBB SHADOW E&M-EST. PATIENT-LVL III: CPT | Mod: PBBFAC,,, | Performed by: PHYSICIAN ASSISTANT

## 2021-12-22 PROCEDURE — 99214 OFFICE O/P EST MOD 30 MIN: CPT | Mod: S$GLB,,, | Performed by: PHYSICIAN ASSISTANT

## 2021-12-22 PROCEDURE — 99214 PR OFFICE/OUTPT VISIT, EST, LEVL IV, 30-39 MIN: ICD-10-PCS | Mod: S$GLB,,, | Performed by: PHYSICIAN ASSISTANT

## 2021-12-22 PROCEDURE — 99999 PR PBB SHADOW E&M-EST. PATIENT-LVL III: ICD-10-PCS | Mod: PBBFAC,,, | Performed by: PHYSICIAN ASSISTANT

## 2021-12-22 RX ORDER — OFLOXACIN 3 MG/ML
5 SOLUTION AURICULAR (OTIC) 2 TIMES DAILY
Qty: 10 ML | Refills: 0 | Status: SHIPPED | OUTPATIENT
Start: 2021-12-22 | End: 2022-01-06 | Stop reason: ALTCHOICE

## 2021-12-22 RX ORDER — TRIAZOLAM 0.25 MG/1
0.25 TABLET ORAL NIGHTLY PRN
Qty: 90 TABLET | Refills: 0 | Status: SHIPPED | OUTPATIENT
Start: 2021-12-22 | End: 2022-05-05 | Stop reason: SDUPTHER

## 2021-12-22 RX ORDER — AMITRIPTYLINE HYDROCHLORIDE 50 MG/1
50 TABLET, FILM COATED ORAL NIGHTLY
Qty: 90 TABLET | Refills: 3 | Status: SHIPPED | OUTPATIENT
Start: 2021-12-22 | End: 2022-12-12 | Stop reason: DRUGHIGH

## 2021-12-22 RX ORDER — METHOCARBAMOL 500 MG/1
1000 TABLET, FILM COATED ORAL 3 TIMES DAILY
Qty: 60 TABLET | Refills: 0 | Status: SHIPPED | OUTPATIENT
Start: 2021-12-22 | End: 2022-01-01

## 2021-12-23 ENCOUNTER — TELEPHONE (OUTPATIENT)
Dept: FAMILY MEDICINE | Facility: CLINIC | Age: 81
End: 2021-12-23
Payer: MEDICARE

## 2021-12-27 ENCOUNTER — TELEPHONE (OUTPATIENT)
Dept: FAMILY MEDICINE | Facility: CLINIC | Age: 81
End: 2021-12-27
Payer: MEDICARE

## 2021-12-28 ENCOUNTER — PATIENT OUTREACH (OUTPATIENT)
Dept: ADMINISTRATIVE | Facility: OTHER | Age: 81
End: 2021-12-28
Payer: MEDICARE

## 2021-12-29 ENCOUNTER — DOCUMENTATION ONLY (OUTPATIENT)
Dept: REHABILITATION | Facility: HOSPITAL | Age: 81
End: 2021-12-29
Payer: MEDICARE

## 2021-12-29 RX ORDER — CANAGLIFLOZIN 100 MG/1
TABLET, FILM COATED ORAL
Qty: 90 TABLET | Refills: 0 | OUTPATIENT
Start: 2021-12-29

## 2022-01-03 ENCOUNTER — TELEPHONE (OUTPATIENT)
Dept: FAMILY MEDICINE | Facility: CLINIC | Age: 82
End: 2022-01-03
Payer: MEDICARE

## 2022-01-03 NOTE — TELEPHONE ENCOUNTER
----- Message from Nola Velez sent at 1/3/2022  4:32 PM CST -----  Who Called: Patient    What is the reqeust in detail: Requesting call back to schedule EP appointment to be seen as recommended by Kylah Mcwilliams for drops in ear. Please advise.     Can the clinic reply by [facility]? No    Best Call Back Number: [phone] if no answer please call [phone]    Additional Information: Please advise.

## 2022-01-06 ENCOUNTER — OFFICE VISIT (OUTPATIENT)
Dept: FAMILY MEDICINE | Facility: CLINIC | Age: 82
End: 2022-01-06
Payer: MEDICARE

## 2022-01-06 VITALS
WEIGHT: 139.13 LBS | DIASTOLIC BLOOD PRESSURE: 68 MMHG | TEMPERATURE: 98 F | SYSTOLIC BLOOD PRESSURE: 140 MMHG | BODY MASS INDEX: 25.6 KG/M2 | HEIGHT: 62 IN | OXYGEN SATURATION: 95 % | HEART RATE: 66 BPM

## 2022-01-06 DIAGNOSIS — H72.92 PERFORATION OF LEFT TYMPANIC MEMBRANE: Primary | ICD-10-CM

## 2022-01-06 PROCEDURE — 99213 PR OFFICE/OUTPT VISIT, EST, LEVL III, 20-29 MIN: ICD-10-PCS | Mod: S$GLB,,, | Performed by: PHYSICIAN ASSISTANT

## 2022-01-06 PROCEDURE — 99213 OFFICE O/P EST LOW 20 MIN: CPT | Mod: PBBFAC,PO | Performed by: PHYSICIAN ASSISTANT

## 2022-01-06 PROCEDURE — 99999 PR PBB SHADOW E&M-EST. PATIENT-LVL III: ICD-10-PCS | Mod: PBBFAC,,, | Performed by: PHYSICIAN ASSISTANT

## 2022-01-06 PROCEDURE — 99999 PR PBB SHADOW E&M-EST. PATIENT-LVL III: CPT | Mod: PBBFAC,,, | Performed by: PHYSICIAN ASSISTANT

## 2022-01-06 PROCEDURE — 99213 OFFICE O/P EST LOW 20 MIN: CPT | Mod: S$GLB,,, | Performed by: PHYSICIAN ASSISTANT

## 2022-01-06 NOTE — PROGRESS NOTES
"Subjective:       Patient ID: Darlin Tipton is a 81 y.o. female.    Chief Complaint: Follow-up    Patient presents for follow up of presumed left TM perforation.  She was seen last week for follow up back pain when she complained of possibly rupturing her TM while cleaning ear.  She had bleeding from the ear that had since resolved.  Upon exam last week dried blood was noted in canal and was partially  obsecuring the TM.   She was started on abx drops in case of perforation and asked to follow up.  She complete drops and states she had one episode of pain yesterday.  Reports no hearing changes.       Follow-up      Review of Systems    Objective:      Physical Exam  Constitutional:       General: She is not in acute distress.     Appearance: Normal appearance. She is not ill-appearing.   HENT:      Head: Normocephalic and atraumatic.      Ears:      Comments: Left ear canal still with dried blood - attempted manual removal with curette but unsuccessful     Eyes:      Conjunctiva/sclera: Conjunctivae normal.   Pulmonary:      Effort: Pulmonary effort is normal.   Neurological:      Mental Status: She is alert.   Psychiatric:         Mood and Affect: Mood normal.         Assessment:       1. Perforation of left tympanic membrane        Plan:       Darlin was seen today for follow-up.    Diagnoses and all orders for this visit:    Perforation of left tympanic membrane  -     Ambulatory referral/consult to ENT; Future     I did not want to flush the ear canal with water since the integrity of TM is unknown due to obstructed view.  Will refer to ENT       No follow-ups on file.           Documentation entered by me for this encounter may have been done in part using speech-recognition technology. Although I have made an effort to ensure accuracy, "sound like" errors may exist and should be interpreted in context.     "

## 2022-01-11 ENCOUNTER — OFFICE VISIT (OUTPATIENT)
Dept: OTOLARYNGOLOGY | Facility: CLINIC | Age: 82
End: 2022-01-11
Payer: MEDICARE

## 2022-01-11 VITALS — WEIGHT: 139.56 LBS | HEIGHT: 62 IN | BODY MASS INDEX: 25.68 KG/M2

## 2022-01-11 DIAGNOSIS — H92.22 BLOOD IN EAR CANAL, LEFT: Primary | ICD-10-CM

## 2022-01-11 DIAGNOSIS — H72.92 PERFORATION OF LEFT TYMPANIC MEMBRANE: ICD-10-CM

## 2022-01-11 DIAGNOSIS — S00.412A EAR CANAL ABRASION, LEFT, INITIAL ENCOUNTER: ICD-10-CM

## 2022-01-11 PROCEDURE — 99203 PR OFFICE/OUTPT VISIT, NEW, LEVL III, 30-44 MIN: ICD-10-PCS | Mod: 25,S$GLB,, | Performed by: PHYSICIAN ASSISTANT

## 2022-01-11 PROCEDURE — 92504 EAR MICROSCOPY EXAMINATION: CPT | Mod: S$GLB,,, | Performed by: PHYSICIAN ASSISTANT

## 2022-01-11 PROCEDURE — 92504 PR EAR MICROSCOPY EXAMINATION: ICD-10-PCS | Mod: S$GLB,,, | Performed by: PHYSICIAN ASSISTANT

## 2022-01-11 PROCEDURE — 99203 OFFICE O/P NEW LOW 30 MIN: CPT | Mod: 25,S$GLB,, | Performed by: PHYSICIAN ASSISTANT

## 2022-01-11 RX ORDER — CIPROFLOXACIN AND DEXAMETHASONE 3; 1 MG/ML; MG/ML
4 SUSPENSION/ DROPS AURICULAR (OTIC) 2 TIMES DAILY
Qty: 7.5 ML | Refills: 0 | Status: SHIPPED | OUTPATIENT
Start: 2022-01-11 | End: 2022-05-05 | Stop reason: ALTCHOICE

## 2022-01-11 NOTE — PROGRESS NOTES
[facility]    Subjective:      Patient: Darlin Tipton Patient PCP: Oumar Almonte Jr, MD         :  1940     Sex:  female      MRN:  [id number]          Date of Visit: 2022      Chief Complaint: Perforation of left tympanic membrane    Patient ID: Darlin Tipton is a 81 y.o. female who was referred to me by Kylah Mcwilliams in consultation for left TM perforation. Pt saw family medicine 2021 and had cleaned ear and had blood and was found to have left TM perforation.  Pt presents in clinic with her  and states she was cleaning her left ear mid 2021 and noticed blood on the q-tip. Pt states that she has left ear pain every once in a while since the incident. Pt denies decrease in hearing since the incident. Pt denies ear drainage or fever/chills. There is not a prior history of ear surgery. There is not a prior history of ear infections.  She denies a history of significant noise exposure.     Review of Systems   Constitutional: Negative.    HENT: Positive for ear pain.    Eyes: Negative.    Respiratory: Negative.    Cardiovascular: Negative.    Gastrointestinal: Negative.    Endocrine: Negative.    Genitourinary: Negative.    Skin: Negative.    Neurological: Negative.    Hematological: Negative.    Psychiatric/Behavioral: Negative.       Past Medical History  She has a past medical history of Allergy, Arthritis, Asthma, Blood transfusion, CAD (coronary artery disease), Cataract, CHRONIC BRONCHITIS, Diabetes mellitus, Diabetes mellitus type II, GERD (gastroesophageal reflux disease), Hyperlipidemia, Hypertension, Irregular heart beat, Kidney disease, Post-menopausal bleeding, Spinal stenosis, and Thyroid disease.    Family History  Her family history includes Breast cancer in her maternal aunt and mother.    Past Surgical History:   Procedure Laterality Date    APPENDECTOMY      BLADDER SUSPENSION      CARDIAC SURGERY  2016    CABG    CATARACT EXTRACTION  2007 (L)  and 10/2207 (R)    COLONOSCOPY N/A 10/18/2017    Procedure: COLONOSCOPY;  Surgeon: Esme Acuna MD;  Location: [facility];  Service: Endoscopy;  Laterality: N/A;    CORONARY ARTERY BYPASS GRAFT  4/26/2004    x5    ESOPHAGEAL DILATION      HYSTEROSCOPY WITH DILATION AND CURETTAGE OF UTERUS N/A 3/12/2020    Procedure: HYSTEROSCOPY, WITH DILATION AND CURETTAGE OF UTERUS;  Surgeon: Ramona Llanos MD;  Location: [facility] OR;  Service: OB/GYN;  Laterality: N/A;    SPINE SURGERY  3/2000    Tumor    TRANSFORAMINAL EPIDURAL INJECTION OF STEROID Right 11/21/2019    Procedure: Injection,steroid,epidural,transforaminal approach;  Surgeon: Bj Jones MD;  Location: [facility];  Service: Pain Management;  Laterality: Right;  L4-5, L5-S1    TRANSFORAMINAL EPIDURAL INJECTION OF STEROID Right 12/31/2019    Procedure: Injection,steroid,epidural,transforaminal approach;  Surgeon: Bj Jones MD;  Location: [facility];  Service: Pain Management;  Laterality: Right;  L4-5, L5-S1    TRANSFORAMINAL EPIDURAL INJECTION OF STEROID Right 2/5/2020    Procedure: Injection,steroid,epidural,transforaminal approach;  Surgeon: Bj Jones MD;  Location: [facility];  Service: Pain Management;  Laterality: Right;  L4-5, L5-S1    TRANSFORAMINAL EPIDURAL INJECTION OF STEROID Left 1/27/2021    Procedure: Injection,steroid,epidural,transforaminal approach;  Surgeon: Bj Jones MD;  Location: [facility];  Service: Pain Management;  Laterality: Left;  L4-5, L5-S1    TRANSFORAMINAL EPIDURAL INJECTION OF STEROID Right 3/4/2021    Procedure: Injection,steroid,epidural,transforaminal approach;  Surgeon: Bj Jones MD;  Location: [facility];  Service: Pain Management;  Laterality: Right;  L4-L5, L5-S1    WRIST SURGERY  1993    carpal tunnel       Social History     Tobacco Use    Smoking status: Never Smoker    Smokeless tobacco: Never Used   Substance and Sexual Activity    Alcohol use: Yes     Comment: Rare    Drug use: No    Sexual activity: Not  "Currently     Medications  She has a current medication list which includes the following prescription(s): acetaminophen, amitriptyline, amlodipine, aspirin, azelastine, freestyle lite strips, canagliflozin, carboxymethylcellulose, cetirizine, cholecalciferol (vitamin d3), coenzyme q10, cranberry extract, diclofenac sodium, digoxin, fluticasone propionate, gabapentin, isosorbide mononitrate, lancets, levothyroxine, lidocaine, nitroglycerin, ondansetron, pramoxine-hydrocortisone, restasis, simvastatin, terazosin, toprol xl, triazolam, and vitamins  a,c,e-zinc-copper, and the following Facility-Administered Medications: sodium chloride 0.9%.    Review of patient's allergies indicates:   Allergen Reactions    Sulfa (sulfonamide antibiotics) Rash    Floxacillin Itching    Januvia [sitagliptin] Other (See Comments)     Hot flashes    Tetracyclines Itching    Fenofibrate micronized Rash    Nitrofurantoin macrocrystalline Other (See Comments)     unknown    Phenylfenesin la [phenylpropanolamine-gg] Rash     All medications, allergies, and past history have been reviewed.    Objective:      Vitals:  Vitals - 1 value per visit 12/22/2021 1/6/2022 1/11/2022   SYSTOLIC 134 140 -   DIASTOLIC 68 68 -   PULSE 80 66 -   TEMPERATURE 97.4 98.4 -   RESPIRATIONS - - -   SPO2 95 95 -   Weight (lb) 137.57 139.11 139.55   Weight (kg) 62.4 63.1 63.3   HEIGHT 5' 2" 5' 2" 5' 2"   BODY MASS INDEX 25.16 25.44 25.52   VISIT REPORT - - -   Pain Score  8 0 0   Some recent data might be hidden       Body surface area is 1.66 meters squared.    Physical Exam  Constitutional:       General: She is not in acute distress.     Appearance: Normal appearance. She is not ill-appearing.   HENT:      Head: Normocephalic and atraumatic.      Right Ear: External ear normal. No middle ear effusion. Tympanic membrane is not perforated, retracted or bulging.      Left Ear: External ear normal.  No middle ear effusion. Tympanic membrane is not perforated, " retracted or bulging.      Ears:        Comments: Monomeric changes of bilateral TMs. Left TM perforation has healed. Dried blood cleared from left EAC under office microscope with curette. Pt had abrasion of left EAC.     Nose: Nose normal.   Eyes:      General:         Right eye: No discharge.         Left eye: No discharge.      Extraocular Movements: Extraocular movements intact.      Conjunctiva/sclera: Conjunctivae normal.   Pulmonary:      Effort: Pulmonary effort is normal.   Neurological:      General: No focal deficit present.      Mental Status: She is alert and oriented to person, place, and time. Mental status is at baseline.   Psychiatric:         Mood and Affect: Mood normal.         Behavior: Behavior normal.         Thought Content: Thought content normal.         Judgment: Judgment normal.         Procedure:  Left EAC dried blood clot removed in office. Please see procedure note.    Labs:  WBC   Date Value Ref Range Status   12/17/2021 6.82 3.90 - 12.70 K/uL Final     Platelets   Date Value Ref Range Status   12/17/2021 169 150 - 450 K/uL Final     Creatinine   Date Value Ref Range Status   12/17/2021 0.9 0.5 - 1.4 mg/dL Final     TSH   Date Value Ref Range Status   09/21/2021 2.950 0.340 - 5.600 uIU/mL Final     Glucose   Date Value Ref Range Status   12/17/2021 114 (H) 70 - 110 mg/dL Final     Hemoglobin A1C   Date Value Ref Range Status   11/16/2021 6.3 (H) 4.0 - 5.6 % Final     Comment:     ADA Screening Guidelines:  5.7-6.4%  Consistent with prediabetes  >or=6.5%  Consistent with diabetes    High levels of fetal hemoglobin interfere with the HbA1C  assay. Heterozygous hemoglobin variants (HbS, HgC, etc)do  not significantly interfere with this assay.   However, presence of multiple variants may affect accuracy.       All lab results and imaging results have been reviewed.    Assessment:        ICD-10-CM ICD-9-CM   1. Blood in ear canal, left  H92.22 388.69   2. Perforation of left tympanic  membrane-resolved H72.92 384.20   3. Ear canal abrasion, left, initial encounter  S00.412A 910.0            Plan:      Blood in ear canal, left  -     Left EAC blood clot impaction removed today in office under microscope.    Perforation of left tympanic membrane-resolved  -     Monomeric changes of TMs bilaterally noted under office microscope indicating that she had right TM perforations in the past. Pt TMs intact bilaterally. Pt denies any hearing loss since her left TM perforation.     Ear canal abrasion, left, initial encounter  -     ciprofloxacin-dexamethasone 0.3-0.1% (CIPRODEX) 0.3-0.1 % DrpS; Place 4 drops into both ears 2 (two) times daily to prevent infection from left EAC abrasion. Pt suffers from itchy ears her whole life. I have advised pt that once she completes her 10 days of ciprodex, she can begin applying baby oil or mineral oil 1-2 drops to both ear canals once a week for ear canal lubrication.    Pt has been told she can call with any questions or concerns. Pt is to follow up as needed for ENT complaints.

## 2022-01-11 NOTE — PROCEDURES
Left EAC blood clot impaction    Date/Time: 1/11/2022 1:30 PM  Performed by: Yanick Edwards PA-C  Authorized by: Yanick Edwards PA-C     Consent Done?:  Yes (Verbal)    Local anesthetic:  None  Location details:  Left ear  Procedure type comment:  Curette and suction     Procedure Note:    The patient was brought to the minor procedure room and placed under the operating microscope. Using ear curette and suction, the patient's dried blood clot was removed from left EAC. The tympanic membrane was evaluated and was positive for monomeric changes indicating healed TM. Pt had left EAC abrasion. The patient tolerated the procedure well. There were no complications.

## 2022-01-11 NOTE — PATIENT INSTRUCTIONS
Ciprodex ear drops: Apply 4 drops to your left ear two times a day for 10 days.   Itchy ear: Do not start until you are done with you ciprodex drops for 10 days. After done with ciprodex drops, apply 1-2 drops of baby oil or mineral oil to both ear canals once a week for lubrication to help with itchy ears.    Follow up as needed for ENT concerns. You may call the office if you have any questions or concerns.

## 2022-02-01 DIAGNOSIS — M25.569 KNEE PAIN, UNSPECIFIED CHRONICITY, UNSPECIFIED LATERALITY: Primary | ICD-10-CM

## 2022-02-09 LAB
LEFT EYE DM RETINOPATHY: NEGATIVE
RIGHT EYE DM RETINOPATHY: NEGATIVE

## 2022-02-10 ENCOUNTER — HOSPITAL ENCOUNTER (OUTPATIENT)
Dept: RADIOLOGY | Facility: HOSPITAL | Age: 82
Discharge: HOME | End: 2022-02-10
Attending: ORTHOPAEDIC SURGERY
Payer: MEDICARE

## 2022-02-10 ENCOUNTER — OFFICE VISIT (OUTPATIENT)
Dept: ORTHOPEDICS | Facility: CLINIC | Age: 82
End: 2022-02-10
Payer: MEDICARE

## 2022-02-10 VITALS — RESPIRATION RATE: 18 BRPM | WEIGHT: 139 LBS | BODY MASS INDEX: 25.58 KG/M2 | HEIGHT: 62 IN

## 2022-02-10 DIAGNOSIS — M25.569 KNEE PAIN, UNSPECIFIED CHRONICITY, UNSPECIFIED LATERALITY: Primary | ICD-10-CM

## 2022-02-10 DIAGNOSIS — M17.10 ARTHRITIS OF KNEE: ICD-10-CM

## 2022-02-10 DIAGNOSIS — M25.569 KNEE PAIN, UNSPECIFIED CHRONICITY, UNSPECIFIED LATERALITY: ICD-10-CM

## 2022-02-10 PROCEDURE — 1101F PR PT FALLS ASSESS DOC 0-1 FALLS W/OUT INJ PAST YR: ICD-10-PCS | Mod: CPTII,S$GLB,, | Performed by: ORTHOPAEDIC SURGERY

## 2022-02-10 PROCEDURE — 97760 ORTHOTIC MGMT&TRAING 1ST ENC: CPT | Mod: GP,S$GLB,, | Performed by: ORTHOPAEDIC SURGERY

## 2022-02-10 PROCEDURE — 1101F PT FALLS ASSESS-DOCD LE1/YR: CPT | Mod: CPTII,S$GLB,, | Performed by: ORTHOPAEDIC SURGERY

## 2022-02-10 PROCEDURE — 1125F AMNT PAIN NOTED PAIN PRSNT: CPT | Mod: CPTII,S$GLB,, | Performed by: ORTHOPAEDIC SURGERY

## 2022-02-10 PROCEDURE — 99999 PR PBB SHADOW E&M-EST. PATIENT-LVL IV: CPT | Mod: PBBFAC,,, | Performed by: ORTHOPAEDIC SURGERY

## 2022-02-10 PROCEDURE — 3288F FALL RISK ASSESSMENT DOCD: CPT | Mod: CPTII,S$GLB,, | Performed by: ORTHOPAEDIC SURGERY

## 2022-02-10 PROCEDURE — 99999 PR PBB SHADOW E&M-EST. PATIENT-LVL IV: ICD-10-PCS | Mod: PBBFAC,,, | Performed by: ORTHOPAEDIC SURGERY

## 2022-02-10 PROCEDURE — 73562 X-RAY EXAM OF KNEE 3: CPT | Mod: 26,LT,, | Performed by: RADIOLOGY

## 2022-02-10 PROCEDURE — 73564 X-RAY EXAM KNEE 4 OR MORE: CPT | Mod: 26,RT,, | Performed by: RADIOLOGY

## 2022-02-10 PROCEDURE — 1160F PR REVIEW ALL MEDS BY PRESCRIBER/CLIN PHARMACIST DOCUMENTED: ICD-10-PCS | Mod: CPTII,S$GLB,, | Performed by: ORTHOPAEDIC SURGERY

## 2022-02-10 PROCEDURE — 20610 DRAIN/INJ JOINT/BURSA W/O US: CPT | Mod: RT,S$GLB,, | Performed by: ORTHOPAEDIC SURGERY

## 2022-02-10 PROCEDURE — 1125F PR PAIN SEVERITY QUANTIFIED, PAIN PRESENT: ICD-10-PCS | Mod: CPTII,S$GLB,, | Performed by: ORTHOPAEDIC SURGERY

## 2022-02-10 PROCEDURE — 73562 XR KNEE ORTHO RIGHT WITH FLEXION: ICD-10-PCS | Mod: 26,LT,, | Performed by: RADIOLOGY

## 2022-02-10 PROCEDURE — 1159F MED LIST DOCD IN RCRD: CPT | Mod: CPTII,S$GLB,, | Performed by: ORTHOPAEDIC SURGERY

## 2022-02-10 PROCEDURE — 3288F PR FALLS RISK ASSESSMENT DOCUMENTED: ICD-10-PCS | Mod: CPTII,S$GLB,, | Performed by: ORTHOPAEDIC SURGERY

## 2022-02-10 PROCEDURE — 1159F PR MEDICATION LIST DOCUMENTED IN MEDICAL RECORD: ICD-10-PCS | Mod: CPTII,S$GLB,, | Performed by: ORTHOPAEDIC SURGERY

## 2022-02-10 PROCEDURE — 1160F RVW MEDS BY RX/DR IN RCRD: CPT | Mod: CPTII,S$GLB,, | Performed by: ORTHOPAEDIC SURGERY

## 2022-02-10 PROCEDURE — 97760 PR ORTHOTIC MGMT&TRAINJ INITIAL ENC EA 15 MINS: ICD-10-PCS | Mod: GP,S$GLB,, | Performed by: ORTHOPAEDIC SURGERY

## 2022-02-10 PROCEDURE — 20610 LARGE JOINT ASPIRATION/INJECTION: R KNEE: ICD-10-PCS | Mod: RT,S$GLB,, | Performed by: ORTHOPAEDIC SURGERY

## 2022-02-10 PROCEDURE — 73562 X-RAY EXAM OF KNEE 3: CPT | Mod: TC,PN,LT

## 2022-02-10 PROCEDURE — 99213 PR OFFICE/OUTPT VISIT, EST, LEVL III, 20-29 MIN: ICD-10-PCS | Mod: 25,S$GLB,, | Performed by: ORTHOPAEDIC SURGERY

## 2022-02-10 PROCEDURE — 73564 XR KNEE ORTHO RIGHT WITH FLEXION: ICD-10-PCS | Mod: 26,RT,, | Performed by: RADIOLOGY

## 2022-02-10 PROCEDURE — 99213 OFFICE O/P EST LOW 20 MIN: CPT | Mod: 25,S$GLB,, | Performed by: ORTHOPAEDIC SURGERY

## 2022-02-10 RX ORDER — TRIAMCINOLONE ACETONIDE 40 MG/ML
40 INJECTION, SUSPENSION INTRA-ARTICULAR; INTRAMUSCULAR
Status: DISCONTINUED | OUTPATIENT
Start: 2022-02-10 | End: 2022-02-10 | Stop reason: HOSPADM

## 2022-02-10 RX ADMIN — TRIAMCINOLONE ACETONIDE 40 MG: 40 INJECTION, SUSPENSION INTRA-ARTICULAR; INTRAMUSCULAR at 11:02

## 2022-02-10 NOTE — PROCEDURES
Large Joint Aspiration/Injection: R knee    Date/Time: 2/10/2022 11:00 AM  Performed by: Sung Moreno MD  Authorized by: Sung Moreno MD     Consent Done?:  Yes (Verbal)  Indications:  Pain  Site marked: the procedure site was marked    Timeout: prior to procedure the correct patient, procedure, and site was verified    Local anesthetic:  Lidocaine 1% without epinephrine and bupivacaine 0.25% without epinephrine  Anesthetic total (ml):  6      Details:  Needle Size:  20 G  Approach:  Anterolateral  Location:  Knee  Site:  R knee  Medications:  40 mg triamcinolone acetonide 40 mg/mL  Patient tolerance:  Patient tolerated the procedure well with no immediate complications

## 2022-02-10 NOTE — PROGRESS NOTES
Past Medical History:   Diagnosis Date    Allergy     Dust mites, Grasses, Trees    Arthritis     Asthma     Blood transfusion     CAD (coronary artery disease)     Cataract     CHRONIC BRONCHITIS     Diabetes mellitus     Diabetes mellitus type II     GERD (gastroesophageal reflux disease)     Hyperlipidemia     Hypertension     Irregular heart beat     Kidney disease     ckd stage 3  as stated by patient - to see MD    Post-menopausal bleeding 2018    Spinal stenosis     Thyroid disease     Hypothyroidism       Past Surgical History:   Procedure Laterality Date    APPENDECTOMY  1968    BLADDER SUSPENSION  1989    CARDIAC SURGERY  2016    CABG    CATARACT EXTRACTION  9/2007 (L) and 10/2207 (R)    COLONOSCOPY N/A 10/18/2017    Procedure: COLONOSCOPY;  Surgeon: Esme Acuna MD;  Location: [facility];  Service: Endoscopy;  Laterality: N/A;    CORONARY ARTERY BYPASS GRAFT  4/26/2004    x5    ESOPHAGEAL DILATION      HYSTEROSCOPY WITH DILATION AND CURETTAGE OF UTERUS N/A 3/12/2020    Procedure: HYSTEROSCOPY, WITH DILATION AND CURETTAGE OF UTERUS;  Surgeon: Ramona Llanos MD;  Location: [facility] OR;  Service: OB/GYN;  Laterality: N/A;    SPINE SURGERY  3/2000    Tumor    TRANSFORAMINAL EPIDURAL INJECTION OF STEROID Right 11/21/2019    Procedure: Injection,steroid,epidural,transforaminal approach;  Surgeon: Bj Jones MD;  Location: [facility] OR;  Service: Pain Management;  Laterality: Right;  L4-5, L5-S1    TRANSFORAMINAL EPIDURAL INJECTION OF STEROID Right 12/31/2019    Procedure: Injection,steroid,epidural,transforaminal approach;  Surgeon: Bj Jones MD;  Location: [facility] OR;  Service: Pain Management;  Laterality: Right;  L4-5, L5-S1    TRANSFORAMINAL EPIDURAL INJECTION OF STEROID Right 2/5/2020    Procedure: Injection,steroid,epidural,transforaminal approach;  Surgeon: Bj Jones MD;  Location: [facility] OR;  Service: Pain Management;  Laterality: Right;  L4-5, L5-S1    TRANSFORAMINAL  EPIDURAL INJECTION OF STEROID Left 1/27/2021    Procedure: Injection,steroid,epidural,transforaminal approach;  Surgeon: Bj Jones MD;  Location: [facility] OR;  Service: Pain Management;  Laterality: Left;  L4-5, L5-S1    TRANSFORAMINAL EPIDURAL INJECTION OF STEROID Right 3/4/2021    Procedure: Injection,steroid,epidural,transforaminal approach;  Surgeon: Bj Jones MD;  Location: [facility] OR;  Service: Pain Management;  Laterality: Right;  L4-L5, L5-S1    WRIST SURGERY  1993    carpal tunnel         Current Outpatient Medications   Medication Sig    acetaminophen (TYLENOL) 650 MG TbSR Take 650 mg by mouth once daily. 2 Tablet(s) Oral  Every day.    amitriptyline (ELAVIL) 50 MG tablet Take 1 tablet (50 mg total) by mouth every evening.    amLODIPine (NORVASC) 10 MG tablet Take 10 mg by mouth once daily.    aspirin (ECOTRIN) 81 MG EC tablet Take 81 mg by mouth once daily.    azelastine (ASTELIN) 137 mcg (0.1 %) nasal spray 1 spray (137 mcg total) by Nasal route 2 (two) times daily.    blood sugar diagnostic (FREESTYLE LITE STRIPS) Strp USE TO TEST BLOOD SUGAR TWICE A DAY    canagliflozin (INVOKANA) 100 mg Tab tablet Take 1 tablet (100 mg total) by mouth once daily.    carboxymethylcellulose 1 % ophthalmic solution as directed    cetirizine (ZYRTEC) 10 MG tablet Take 10 mg by mouth once daily.    cholecalciferol, vitamin D3, (VITAMIN D3) 50 mcg (2,000 unit) Cap Take 1 capsule by mouth once daily.    ciprofloxacin-dexamethasone 0.3-0.1% (CIPRODEX) 0.3-0.1 % DrpS Place 4 drops into both ears 2 (two) times daily.    coenzyme Q10 100 mg capsule Take 100 mg by mouth once daily.     cranberry extract 650 mg Cap Take 1 tablet by mouth 2 (two) times daily.    diclofenac sodium (VOLTAREN) 1 % Gel APPLY 2 G TOPICALLY 3 (THREE) TIMES DAILY. (Patient taking differently: 2 g 3 (three) times daily as needed. APPLY 2 G TOPICALLY 3 (THREE) TIMES DAILY.)    digoxin (LANOXIN) 125 mcg tablet Take 1 tablet (125 mcg total) by  mouth once daily.    fluticasone propionate (FLONASE) 50 mcg/actuation nasal spray 1 spray (50 mcg total) by Each Nostril route once daily.    isosorbide mononitrate (IMDUR) 120 MG 24 hr tablet Take 120 mg by mouth once daily.    lancets Misc 1 Units by Misc.(Non-Drug; Combo Route) route 2 (two) times daily.    levothyroxine (LEVOTHROID) 25 MCG tablet Take 1 tablet (25 mcg total) by mouth before breakfast. Every day    LIDOcaine 4 % PtMd Apply 1 patch topically once daily.    nitroGLYCERIN (NITROSTAT) 0.4 MG SL tablet Place 0.4 mg under the tongue every 5 (five) minutes as needed. 0.4mg Sublingual PRN .      ondansetron (ZOFRAN-ODT) 4 MG TbDL Take 1 tablet (4 mg total) by mouth every 8 (eight) hours as needed (nausea).    pramoxine-hydrocortisone (PROCTOCREAM-HC) 1-1 % rectal cream Place rectally 2 (two) times daily.    RESTASIS 0.05 % ophthalmic emulsion Place 1 drop into both eyes 2 (two) times daily.    simvastatin (ZOCOR) 20 MG tablet Take 1 tablet (20 mg total) by mouth every evening. Every day    terazosin (HYTRIN) 2 MG capsule Take 1 capsule (2 mg total) by mouth every evening.    TOPROL XL 25 mg 24 hr tablet Take 1 tablet (25 mg total) by mouth once daily.    triazolam (HALCION) 0.25 MG Tab Take 1 tablet (0.25 mg total) by mouth nightly as needed.    vitamins  A,C,E-zinc-copper [id number] unit-mg-unit Cap Take 1 capsule by mouth 2 (two) times daily.     gabapentin (NEURONTIN) 300 MG capsule Take 1 capsule (300 mg total) by mouth 3 (three) times daily.     No current facility-administered medications for this visit.     Facility-Administered Medications Ordered in Other Visits   Medication    0.9%  NaCl infusion       Review of patient's allergies indicates:   Allergen Reactions    Cefaclor      Other reaction(s): rash    Disalcid  [salsalate]      Other reaction(s): rash    Fenofibrate micronized      Other reaction(s): rash    Nitrofurantoin macrocrystalline      Other reaction(s):  rash    Ofloxacin      Other reaction(s): rash    Penicillins      Other reaction(s): rash    Phenylfenesin la  [phenylpropanolamine-gg]      Other reaction(s): rash    Sulfa (sulfonamide antibiotics)      Other reaction(s): rash       Family History   Problem Relation Age of Onset    Breast cancer Mother     Breast cancer Maternal Aunt     Allergic rhinitis Neg Hx     Allergies Neg Hx     Angioedema Neg Hx     Asthma Neg Hx     Eczema Neg Hx     Immunodeficiency Neg Hx     Urticaria Neg Hx     Rhinitis Neg Hx     Atopy Neg Hx        Social History     Socioeconomic History    Marital status:    Tobacco Use    Smoking status: Never Smoker    Smokeless tobacco: Never Used   Substance and Sexual Activity    Alcohol use: Yes     Comment: Rare    Drug use: No    Sexual activity: Not Currently       Chief Complaint:   Chief Complaint   Patient presents with    Right Knee - Pain       History of present illness: This is a 81-year-old female seen for right knee pain.  She describes as a constant pain.  She heard back in December when she was getting onto the toilet and the knee twisted.  Pain over the lateral compartment.  Pain is a 5/10.  Heat helps a lot.   Pain with squatting or twisting.      Review of Systems:    Constitution: Negative for chills, fever, and sweats.  Negative for unexplained weight loss.    HENT:  Negative for headaches and blurry vision.    Cardiovascular:Negative for chest pain or irregular heart beat. Negative for hypertension.    Respiratory:  Negative for cough and shortness of breath.    Gastrointestinal: Negative for abdominal pain, heartburn, melena, nausea, and vomitting.    Genitourinary:  Negative bladder incontinence and dysuria.    Musculoskeletal:  See HPI    Neurological: Negative for numbness.    Psychiatric/Behavioral: Negative for depression.  The patient is not nervous/anxious.      Endocrine: Negative for polyuria    Hematologic/Lymphatic: Negative for  bleeding problem.  Does not bruise/bleed easily.    Skin: Negative for poor would healing and rash      Physical Examination:    Vital Signs:    Vitals:    02/10/22 1045   Resp: 18       Body mass index is 25.42 kg/m².    This a well-developed, well nourished patient in no acute distress.  They are alert and oriented and cooperative to examination.  Pt. walks without an antalgic gait.      Examination of the right knee shows no rashes or erythema. There are no masses ecchymosis is a small effusion and the knee is mildly warm. Patient has full range of motion from 0-130°. Patient is moderately tender to palpation over lateral joint line and minimally tender to palpation over the medial joint line. Patient has a - Lachman exam, - anterior drawer exam, and - posterior drawer exam. - Marcus's exam. Knee is stable to varus and valgus stress. 5 out of 5 motor strength. Palpable distal pulses. Intact light touch sensation. Negative Patellofemoral crepitus    X-rays: 4 views of the right knee is ordered and reviewed which show some mild arthritic changes of both knees.  Patient has vascular clips from prior cardiovascular surgery noted.     Assessment:  Possible right lateral meniscal tear versus arthritic flare up    Plan:  I reviewed the findings with her and her  today.  We talked about treatment options.  Patient elected for steroid injection to the lobe right knee.  Also recommended a bio skin brace.  We talked about it elevating her sugars and to monitor her diet in the meantime.  Follow-up as needed    We performed a custom orthotic/brace fitting, adjusting and training with the patient. The patient demonstrated understanding and proper care. This was performed for 15 minutes.

## 2022-03-04 ENCOUNTER — TELEPHONE (OUTPATIENT)
Dept: CARDIOLOGY | Facility: CLINIC | Age: 82
End: 2022-03-04
Payer: MEDICARE

## 2022-03-04 ENCOUNTER — PATIENT MESSAGE (OUTPATIENT)
Dept: CARDIOLOGY | Facility: CLINIC | Age: 82
End: 2022-03-04
Payer: MEDICARE

## 2022-03-04 NOTE — TELEPHONE ENCOUNTER
----- Message from Axel Irizarry MA sent at 3/4/2022  2:41 PM CST -----  Type: Needs Medical Advice    Who Called:VIANNEY LYNN [[id number]]  Best Call Back Number: [phone]  Inquiry/Question: Would  you kindly Call  regarding a missed call from the clinic Thank you~

## 2022-03-07 ENCOUNTER — HOSPITAL ENCOUNTER (OUTPATIENT)
Dept: RADIOLOGY | Facility: HOSPITAL | Age: 82
Discharge: HOME | End: 2022-03-07
Attending: NURSE PRACTITIONER
Payer: MEDICARE

## 2022-03-07 DIAGNOSIS — N28.1 COMPLEX RENAL CYST: ICD-10-CM

## 2022-03-07 DIAGNOSIS — E11.21 TYPE 2 DIABETES MELLITUS WITH DIABETIC NEPHROPATHY, WITHOUT LONG-TERM CURRENT USE OF INSULIN: ICD-10-CM

## 2022-03-07 DIAGNOSIS — R35.0 URINARY FREQUENCY: ICD-10-CM

## 2022-03-07 PROCEDURE — 76770 US RETROPERITONEAL COMPLETE: ICD-10-PCS | Mod: 26,,, | Performed by: RADIOLOGY

## 2022-03-07 PROCEDURE — 76770 US EXAM ABDO BACK WALL COMP: CPT | Mod: 26,,, | Performed by: RADIOLOGY

## 2022-03-07 PROCEDURE — 76770 US EXAM ABDO BACK WALL COMP: CPT | Mod: TC

## 2022-03-08 ENCOUNTER — TELEPHONE (OUTPATIENT)
Dept: UROLOGY | Facility: CLINIC | Age: 82
End: 2022-03-08
Payer: MEDICARE

## 2022-03-08 NOTE — TELEPHONE ENCOUNTER
----- Message from PIERCE Newsome sent at 3/8/2022  9:13 AM CST -----  Please contact pt or family and advise of the following:  Pt's recent US results showed the right kidney cyst has grown in size, but still a simple cyst at this time.  Therefore no further f/up is indicated on this.  F/up in one year with me as discussed last ov.  Thanks.

## 2022-03-11 ENCOUNTER — TELEPHONE (OUTPATIENT)
Dept: UROLOGY | Facility: CLINIC | Age: 82
End: 2022-03-11
Payer: MEDICARE

## 2022-03-11 NOTE — TELEPHONE ENCOUNTER
----- Message from Jose Angel Harris sent at 3/11/2022  9:21 AM CST -----  Contact: Self  Type: Needs Medical Advice  Who Called:  Patient  Best Call Back Number: [phone]   Additional Information: Pt calling to discuss whether 3/14 appt is needed since US results were given over phone.

## 2022-03-14 ENCOUNTER — OFFICE VISIT (OUTPATIENT)
Dept: UROLOGY | Facility: CLINIC | Age: 82
End: 2022-03-14
Payer: MEDICARE

## 2022-03-14 VITALS
WEIGHT: 138.88 LBS | RESPIRATION RATE: 18 BRPM | DIASTOLIC BLOOD PRESSURE: 62 MMHG | SYSTOLIC BLOOD PRESSURE: 154 MMHG | BODY MASS INDEX: 25.55 KG/M2 | HEART RATE: 62 BPM | HEIGHT: 62 IN

## 2022-03-14 DIAGNOSIS — N28.89 RENAL MASS, LEFT: ICD-10-CM

## 2022-03-14 DIAGNOSIS — N32.81 OAB (OVERACTIVE BLADDER): ICD-10-CM

## 2022-03-14 DIAGNOSIS — R35.0 URINARY FREQUENCY: Primary | ICD-10-CM

## 2022-03-14 LAB
BILIRUB SERPL-MCNC: ABNORMAL MG/DL
BLOOD URINE, POC: ABNORMAL
CLARITY, POC UA: CLEAR
COLOR, POC UA: YELLOW
GLUCOSE UR QL STRIP: 500
KETONES UR QL STRIP: ABNORMAL
LEUKOCYTE ESTERASE URINE, POC: ABNORMAL
NITRITE, POC UA: ABNORMAL
PH, POC UA: 6
POC RESIDUAL URINE VOLUME: 31 ML (ref 0–100)
PROTEIN, POC: ABNORMAL
SPECIFIC GRAVITY, POC UA: 1.01
UROBILINOGEN, POC UA: 0.2

## 2022-03-14 PROCEDURE — 1126F AMNT PAIN NOTED NONE PRSNT: CPT | Mod: CPTII,S$GLB,, | Performed by: UROLOGY

## 2022-03-14 PROCEDURE — 81002 POCT URINE DIPSTICK WITHOUT MICROSCOPE: ICD-10-PCS | Mod: S$GLB,,, | Performed by: UROLOGY

## 2022-03-14 PROCEDURE — 81002 URINALYSIS NONAUTO W/O SCOPE: CPT | Mod: S$GLB,,, | Performed by: UROLOGY

## 2022-03-14 PROCEDURE — 1159F PR MEDICATION LIST DOCUMENTED IN MEDICAL RECORD: ICD-10-PCS | Mod: CPTII,S$GLB,, | Performed by: UROLOGY

## 2022-03-14 PROCEDURE — 99999 PR PBB SHADOW E&M-EST. PATIENT-LVL III: ICD-10-PCS | Mod: PBBFAC,,, | Performed by: UROLOGY

## 2022-03-14 PROCEDURE — 1101F PR PT FALLS ASSESS DOC 0-1 FALLS W/OUT INJ PAST YR: ICD-10-PCS | Mod: CPTII,S$GLB,, | Performed by: UROLOGY

## 2022-03-14 PROCEDURE — 1160F RVW MEDS BY RX/DR IN RCRD: CPT | Mod: CPTII,S$GLB,, | Performed by: UROLOGY

## 2022-03-14 PROCEDURE — 3077F PR MOST RECENT SYSTOLIC BLOOD PRESSURE >= 140 MM HG: ICD-10-PCS | Mod: CPTII,S$GLB,, | Performed by: UROLOGY

## 2022-03-14 PROCEDURE — 99214 OFFICE O/P EST MOD 30 MIN: CPT | Mod: S$GLB,,, | Performed by: UROLOGY

## 2022-03-14 PROCEDURE — 3078F DIAST BP <80 MM HG: CPT | Mod: CPTII,S$GLB,, | Performed by: UROLOGY

## 2022-03-14 PROCEDURE — 99999 PR PBB SHADOW E&M-EST. PATIENT-LVL III: CPT | Mod: PBBFAC,,, | Performed by: UROLOGY

## 2022-03-14 PROCEDURE — 1159F MED LIST DOCD IN RCRD: CPT | Mod: CPTII,S$GLB,, | Performed by: UROLOGY

## 2022-03-14 PROCEDURE — 1126F PR PAIN SEVERITY QUANTIFIED, NO PAIN PRESENT: ICD-10-PCS | Mod: CPTII,S$GLB,, | Performed by: UROLOGY

## 2022-03-14 PROCEDURE — 1160F PR REVIEW ALL MEDS BY PRESCRIBER/CLIN PHARMACIST DOCUMENTED: ICD-10-PCS | Mod: CPTII,S$GLB,, | Performed by: UROLOGY

## 2022-03-14 PROCEDURE — 51798 US URINE CAPACITY MEASURE: CPT | Mod: S$GLB,,, | Performed by: UROLOGY

## 2022-03-14 PROCEDURE — 99214 PR OFFICE/OUTPT VISIT, EST, LEVL IV, 30-39 MIN: ICD-10-PCS | Mod: S$GLB,,, | Performed by: UROLOGY

## 2022-03-14 PROCEDURE — 1101F PT FALLS ASSESS-DOCD LE1/YR: CPT | Mod: CPTII,S$GLB,, | Performed by: UROLOGY

## 2022-03-14 PROCEDURE — 3288F FALL RISK ASSESSMENT DOCD: CPT | Mod: CPTII,S$GLB,, | Performed by: UROLOGY

## 2022-03-14 PROCEDURE — 3077F SYST BP >= 140 MM HG: CPT | Mod: CPTII,S$GLB,, | Performed by: UROLOGY

## 2022-03-14 PROCEDURE — 3078F PR MOST RECENT DIASTOLIC BLOOD PRESSURE < 80 MM HG: ICD-10-PCS | Mod: CPTII,S$GLB,, | Performed by: UROLOGY

## 2022-03-14 PROCEDURE — 3288F PR FALLS RISK ASSESSMENT DOCUMENTED: ICD-10-PCS | Mod: CPTII,S$GLB,, | Performed by: UROLOGY

## 2022-03-14 PROCEDURE — 51798 POCT BLADDER SCAN: ICD-10-PCS | Mod: S$GLB,,, | Performed by: UROLOGY

## 2022-03-14 RX ORDER — MIRABEGRON 50 MG/1
50 TABLET, FILM COATED, EXTENDED RELEASE ORAL DAILY
Qty: 90 TABLET | Refills: 3 | Status: SHIPPED | OUTPATIENT
Start: 2022-03-14 | End: 2022-06-15

## 2022-03-14 RX ORDER — MIRABEGRON 50 MG/1
50 TABLET, FILM COATED, EXTENDED RELEASE ORAL DAILY
Qty: 90 TABLET | Refills: 3 | Status: SHIPPED | OUTPATIENT
Start: 2022-03-14 | End: 2022-03-14 | Stop reason: SDUPTHER

## 2022-03-14 NOTE — PATIENT INSTRUCTIONS
With increasing oab. Ready to try a med and will try myrbetriq 50mg again. Try for at least 4 weeks (previously tried detrol and myrbetriq in past - last time tried was 4 years ago).    Right kidney has simple renal cyst. No further f/u for that. Left kidney with (left posterior mid pole partially exophytic cyst vs mass). Need ct abd to confirm if it's simple/complex/solid mass. If solid and <2cm (previously 1.4 on transverse on mri) then continue to monitor (with rbus with focused us)    1. Urinary frequency    2. Renal mass, left    3. OAB (overactive bladder)          Plan:        Start myrbetriq 50mg daily  Try for at least 4 weeks  Ct abd with and without contrast. Cr prior.   F/u in 2 months to discuss oab ssx and ct    Babs Benites MD

## 2022-03-14 NOTE — PROGRESS NOTES
[facility] Note - [place]  Staff: MD Kamari    Referring provider and please cc:   No referring provider defined for this encounter.     PCP: Oumar Almonte Jr, MD    MyChart Utilization: inactive    Chief Complaint:   No chief complaint on file.        Subjective:        HPI: Darlin Tipton is a 81 y.o. female     Seen by Trevino since  for small R renal cyst and cystitis (tx with theracran BID) and MH. Has 3 neg urine cytologies. Last one was  and was neg. Also previously seen for OAB but has not been seen since 3/2018. . Had a anterior repair years ago. No mesh. Denies prolapse.     · Interval history 20: Continues to have GH (pink urine in toilet) a few x a week - has had this for years (more than 5 years). Does have regular paps, last pap was  and was neg. Still has uterus. Has not had a herpes outbreak in years. Has not had a uti in years.   · Was on myrbetriq for OAB but doesn't use anymore (previously used for nocturia) and no longer has this issue. Wears 1 pad a day for vaginal discharge (not urine leakage). Does have urgency. Using estrace cream BID   · Ctu 20:  There is a 10 mm hyperdense cyst of the midpole of the left kidney, increased in size from 8 mm previously, and from 5 mm in .  A 13 mm simple cyst of the midpole of the right kidney is present.  The ureters and urinary bladder are unremarkable.  · 20 cysto:  Cystoscopy showed no tumors. Bladder neck with cystitis. Urethra normal. Vaginoscopy with no abnormal findings. Recommendation at time of cysto:  Pt was c/o pink urine but ua's were neg. ctu showed no stones. Cystoscopy negative today. On hormone cream fro recurrent uti's, discontinuing and sending back to gyn to ensure no vaginal bleeding. ctu images avail on epic, pt may need to bring copy of  does not have access. F/u in 1 year with rbu sprior to monitor complex renal cyst. Dc estrace.     Interval history  3/14/22:  · Mri L spine 4/9/21: no mention of cyst but images show a posterior renal mass/cyst- 1.4cm.  · 3/7/22 rbus: 1.6cm RMP cyst- simple  · No gross hematuria since I saw her.   · Having increasing urgency. Voiding q1 hour. Drinks 32 oz water, decaf only. Wearing 1 pad a day. #2 pad. Sometimes wet. Sometimes at night has 2 wear 2. Has never tried an oab med. Denies any significant constipation except the last 2 days. Denies any HAIDER. Tried myrbetriq and detrol.   · ua today with 500 glucose. On diabetes meds  · pvr by scan: 31       Lab Results   Component Value Date    HGBA1C 6.3 (H) 11/16/2021         Urine history:  3/14/22 500 glucose, pvr by scan: 31 /8/19  No cx, void: 1+prot, 1 rbc/few bact  3/14/18 No cx, void: tr prot  4/13/17 P.mirabilis, void: nit+/2+wbc/250 bld  3/29/17 No cx, void: 50 gluco  3/6/17  No c,x void: tr bld, cytology: neg  3/7/16  No cx, void: neg  2/5/15  Cytology: neg  12/15/15 No cx, void: neg   10/23/14 E.coli, void: neg  10/3/13 No cx, void: tr bld  9/10/13 Ng  4/24/12 Multiple org  12/21/11 Ng, cytology: neg, pvr by I&O: 100cc  11/2/10 Multiple org  8/16/10 >100k e.coli  2/11/10 Ng  11/3/09 E.co          REVIEW OF SYSTEMS:  General ROS: no fevers, no chills  Psychological ROS: no depression  Endocrine ROS: no heat or cold  Respiratory ROS: no SOB  Cardiovascular ROS: no CP  Gastrointestinal ROS: no abdominal pain, no constipation, no diarrhea, noBRBPR  Musculoskeletal ROS: no muscle pain  Neurological ROS: no headaches  Dermatological ROS: no rashes  HEENT: +glasses, no sinus   ROS: per HPI         Allergies:  Sulfa (sulfonamide antibiotics), Floxacillin, Januvia [sitagliptin], Tetracyclines, Fenofibrate micronized, Nitrofurantoin macrocrystalline, and Phenylfenesin la [phenylpropanolamine-gg]    Anticoagulation/Aspirin: asa 81mg daily     Objective:     Vitals:    03/14/22 1245   BP: (!) 154/62   Pulse: 62   Resp: 18               Assessment:     Darlin Tipton is a 81 y.o.  female     With increasing oab. Ready to try a med and will try myrbetriq 50mg again. Try for at least 4 weeks (previously tried detrol and myrbetriq in past - last time tried was 4 years ago).    Right kidney has simple renal cyst. No further f/u for that. Left kidney with (left posterior mid pole partially exophytic cyst vs mass). Need ct abd to confirm if it's simple/complex/solid mass. If solid and <2cm (previously 1.4 on transverse on mri) then continue to monitor (with rbus with focused us)    1. Urinary frequency    2. Renal mass, left    3. OAB (overactive bladder)          Plan:        Start myrbetriq 50mg daily  Try for at least 4 weeks  Ct abd with and without contrast. Cr prior.   F/u in 2 months to discuss oab ssx and ct    Babs Benites MD

## 2022-03-17 ENCOUNTER — HOSPITAL ENCOUNTER (OUTPATIENT)
Dept: RADIOLOGY | Facility: HOSPITAL | Age: 82
Discharge: HOME | End: 2022-03-17
Attending: UROLOGY
Payer: MEDICARE

## 2022-03-17 DIAGNOSIS — N28.89 RENAL MASS, LEFT: ICD-10-CM

## 2022-03-17 LAB
CREAT SERPL-MCNC: 0.9 MG/DL (ref 0.5–1.4)
SAMPLE: NORMAL

## 2022-03-17 PROCEDURE — 25500020 PHARM REV CODE 255: Performed by: UROLOGY

## 2022-03-17 PROCEDURE — 74178 CT ABD&PLV WO CNTR FLWD CNTR: CPT | Mod: TC

## 2022-03-17 PROCEDURE — 74178 CT ABD&PLV WO CNTR FLWD CNTR: CPT | Mod: 26,,, | Performed by: RADIOLOGY

## 2022-03-17 PROCEDURE — 74178 CT ABDOMEN PELVIS W WO CONTRAST: ICD-10-PCS | Mod: 26,,, | Performed by: RADIOLOGY

## 2022-03-17 RX ADMIN — IOHEXOL 75 ML: 350 INJECTION, SOLUTION INTRAVENOUS at 11:03

## 2022-03-21 ENCOUNTER — OFFICE VISIT (OUTPATIENT)
Dept: CARDIOLOGY | Facility: CLINIC | Age: 82
End: 2022-03-21
Payer: MEDICARE

## 2022-03-21 ENCOUNTER — LAB VISIT (OUTPATIENT)
Dept: LAB | Facility: HOSPITAL | Age: 82
End: 2022-03-21
Attending: NURSE PRACTITIONER
Payer: MEDICARE

## 2022-03-21 VITALS
DIASTOLIC BLOOD PRESSURE: 70 MMHG | SYSTOLIC BLOOD PRESSURE: 120 MMHG | HEIGHT: 62 IN | BODY MASS INDEX: 25.95 KG/M2 | WEIGHT: 141 LBS | HEART RATE: 72 BPM

## 2022-03-21 DIAGNOSIS — E78.2 MIXED HYPERLIPIDEMIA: ICD-10-CM

## 2022-03-21 DIAGNOSIS — E78.5 DYSLIPIDEMIA: ICD-10-CM

## 2022-03-21 DIAGNOSIS — R06.02 SOB (SHORTNESS OF BREATH) ON EXERTION: ICD-10-CM

## 2022-03-21 DIAGNOSIS — I48.0 PAROXYSMAL ATRIAL FIBRILLATION: ICD-10-CM

## 2022-03-21 DIAGNOSIS — Z95.1 HX OF CABG: ICD-10-CM

## 2022-03-21 DIAGNOSIS — E11.21 TYPE 2 DIABETES MELLITUS WITH DIABETIC NEPHROPATHY, WITHOUT LONG-TERM CURRENT USE OF INSULIN: ICD-10-CM

## 2022-03-21 DIAGNOSIS — R06.09 DOE (DYSPNEA ON EXERTION): ICD-10-CM

## 2022-03-21 DIAGNOSIS — I25.5 ISCHEMIC CARDIOMYOPATHY: ICD-10-CM

## 2022-03-21 DIAGNOSIS — I25.119 ATHEROSCLEROSIS OF NATIVE CORONARY ARTERY OF NATIVE HEART WITH ANGINA PECTORIS: ICD-10-CM

## 2022-03-21 DIAGNOSIS — E07.9 THYROID DYSFUNCTION: ICD-10-CM

## 2022-03-21 DIAGNOSIS — R06.02 SOB (SHORTNESS OF BREATH) ON EXERTION: Primary | ICD-10-CM

## 2022-03-21 LAB
ALBUMIN SERPL BCP-MCNC: 4.4 G/DL (ref 3.5–5.2)
ALP SERPL-CCNC: 50 U/L (ref 55–135)
ALT SERPL W/O P-5'-P-CCNC: 25 U/L (ref 10–44)
ANION GAP SERPL CALC-SCNC: 12 MMOL/L (ref 8–16)
AST SERPL-CCNC: 19 U/L (ref 10–40)
BASOPHILS # BLD AUTO: 0.02 K/UL (ref 0–0.2)
BASOPHILS NFR BLD: 0.4 % (ref 0–1.9)
BILIRUB SERPL-MCNC: 0.7 MG/DL (ref 0.1–1)
BNP SERPL-MCNC: 229 PG/ML (ref 0–99)
BUN SERPL-MCNC: 34 MG/DL (ref 8–23)
CALCIUM SERPL-MCNC: 9.6 MG/DL (ref 8.7–10.5)
CHLORIDE SERPL-SCNC: 98 MMOL/L (ref 95–110)
CO2 SERPL-SCNC: 27 MMOL/L (ref 23–29)
CREAT SERPL-MCNC: 0.8 MG/DL (ref 0.5–1.4)
DIFFERENTIAL METHOD: ABNORMAL
EOSINOPHIL # BLD AUTO: 0.1 K/UL (ref 0–0.5)
EOSINOPHIL NFR BLD: 1.3 % (ref 0–8)
ERYTHROCYTE [DISTWIDTH] IN BLOOD BY AUTOMATED COUNT: 13.2 % (ref 11.5–14.5)
EST. GFR  (AFRICAN AMERICAN): >60 ML/MIN/1.73 M^2
EST. GFR  (NON AFRICAN AMERICAN): >60 ML/MIN/1.73 M^2
GLUCOSE SERPL-MCNC: 156 MG/DL (ref 70–110)
HCT VFR BLD AUTO: 37.2 % (ref 37–48.5)
HGB BLD-MCNC: 11.8 G/DL (ref 12–16)
IMM GRANULOCYTES # BLD AUTO: 0.01 K/UL (ref 0–0.04)
IMM GRANULOCYTES NFR BLD AUTO: 0.2 % (ref 0–0.5)
LYMPHOCYTES # BLD AUTO: 1.4 K/UL (ref 1–4.8)
LYMPHOCYTES NFR BLD: 24.5 % (ref 18–48)
MAGNESIUM SERPL-MCNC: 2.2 MG/DL (ref 1.6–2.6)
MCH RBC QN AUTO: 28.4 PG (ref 27–31)
MCHC RBC AUTO-ENTMCNC: 31.7 G/DL (ref 32–36)
MCV RBC AUTO: 90 FL (ref 82–98)
MONOCYTES # BLD AUTO: 0.6 K/UL (ref 0.3–1)
MONOCYTES NFR BLD: 11.2 % (ref 4–15)
NEUTROPHILS # BLD AUTO: 3.5 K/UL (ref 1.8–7.7)
NEUTROPHILS NFR BLD: 62.4 % (ref 38–73)
NRBC BLD-RTO: 0 /100 WBC
PLATELET # BLD AUTO: 189 K/UL (ref 150–450)
PMV BLD AUTO: 11.2 FL (ref 9.2–12.9)
POTASSIUM SERPL-SCNC: 4.2 MMOL/L (ref 3.5–5.1)
PROT SERPL-MCNC: 7.1 G/DL (ref 6–8.4)
RBC # BLD AUTO: 4.15 M/UL (ref 4–5.4)
SODIUM SERPL-SCNC: 137 MMOL/L (ref 136–145)
TSH SERPL DL<=0.005 MIU/L-ACNC: 4.38 UIU/ML (ref 0.34–5.6)
WBC # BLD AUTO: 5.54 K/UL (ref 3.9–12.7)

## 2022-03-21 PROCEDURE — 84443 ASSAY THYROID STIM HORMONE: CPT | Performed by: NURSE PRACTITIONER

## 2022-03-21 PROCEDURE — 85025 COMPLETE CBC W/AUTO DIFF WBC: CPT | Performed by: NURSE PRACTITIONER

## 2022-03-21 PROCEDURE — 80053 COMPREHEN METABOLIC PANEL: CPT | Performed by: NURSE PRACTITIONER

## 2022-03-21 PROCEDURE — 3288F FALL RISK ASSESSMENT DOCD: CPT | Mod: CPTII,S$GLB,, | Performed by: NURSE PRACTITIONER

## 2022-03-21 PROCEDURE — 3074F PR MOST RECENT SYSTOLIC BLOOD PRESSURE < 130 MM HG: ICD-10-PCS | Mod: CPTII,S$GLB,, | Performed by: NURSE PRACTITIONER

## 2022-03-21 PROCEDURE — 3078F DIAST BP <80 MM HG: CPT | Mod: CPTII,S$GLB,, | Performed by: NURSE PRACTITIONER

## 2022-03-21 PROCEDURE — 36415 COLL VENOUS BLD VENIPUNCTURE: CPT | Performed by: NURSE PRACTITIONER

## 2022-03-21 PROCEDURE — 1101F PR PT FALLS ASSESS DOC 0-1 FALLS W/OUT INJ PAST YR: ICD-10-PCS | Mod: CPTII,S$GLB,, | Performed by: NURSE PRACTITIONER

## 2022-03-21 PROCEDURE — 1160F PR REVIEW ALL MEDS BY PRESCRIBER/CLIN PHARMACIST DOCUMENTED: ICD-10-PCS | Mod: CPTII,S$GLB,, | Performed by: NURSE PRACTITIONER

## 2022-03-21 PROCEDURE — 99214 OFFICE O/P EST MOD 30 MIN: CPT | Mod: S$GLB,,, | Performed by: NURSE PRACTITIONER

## 2022-03-21 PROCEDURE — 1126F AMNT PAIN NOTED NONE PRSNT: CPT | Mod: CPTII,S$GLB,, | Performed by: NURSE PRACTITIONER

## 2022-03-21 PROCEDURE — 83880 ASSAY OF NATRIURETIC PEPTIDE: CPT | Performed by: NURSE PRACTITIONER

## 2022-03-21 PROCEDURE — 1160F RVW MEDS BY RX/DR IN RCRD: CPT | Mod: CPTII,S$GLB,, | Performed by: NURSE PRACTITIONER

## 2022-03-21 PROCEDURE — 1101F PT FALLS ASSESS-DOCD LE1/YR: CPT | Mod: CPTII,S$GLB,, | Performed by: NURSE PRACTITIONER

## 2022-03-21 PROCEDURE — 99214 PR OFFICE/OUTPT VISIT, EST, LEVL IV, 30-39 MIN: ICD-10-PCS | Mod: S$GLB,,, | Performed by: NURSE PRACTITIONER

## 2022-03-21 PROCEDURE — 3288F PR FALLS RISK ASSESSMENT DOCUMENTED: ICD-10-PCS | Mod: CPTII,S$GLB,, | Performed by: NURSE PRACTITIONER

## 2022-03-21 PROCEDURE — 1159F MED LIST DOCD IN RCRD: CPT | Mod: CPTII,S$GLB,, | Performed by: NURSE PRACTITIONER

## 2022-03-21 PROCEDURE — 3074F SYST BP LT 130 MM HG: CPT | Mod: CPTII,S$GLB,, | Performed by: NURSE PRACTITIONER

## 2022-03-21 PROCEDURE — 83735 ASSAY OF MAGNESIUM: CPT | Performed by: NURSE PRACTITIONER

## 2022-03-21 PROCEDURE — 3078F PR MOST RECENT DIASTOLIC BLOOD PRESSURE < 80 MM HG: ICD-10-PCS | Mod: CPTII,S$GLB,, | Performed by: NURSE PRACTITIONER

## 2022-03-21 PROCEDURE — 1159F PR MEDICATION LIST DOCUMENTED IN MEDICAL RECORD: ICD-10-PCS | Mod: CPTII,S$GLB,, | Performed by: NURSE PRACTITIONER

## 2022-03-21 PROCEDURE — 1126F PR PAIN SEVERITY QUANTIFIED, NO PAIN PRESENT: ICD-10-PCS | Mod: CPTII,S$GLB,, | Performed by: NURSE PRACTITIONER

## 2022-03-21 RX ORDER — ROSUVASTATIN CALCIUM 10 MG/1
10 TABLET, COATED ORAL DAILY
COMMUNITY
End: 2022-03-21 | Stop reason: SDUPTHER

## 2022-03-21 RX ORDER — AMIODARONE HYDROCHLORIDE 200 MG/1
200 TABLET ORAL 3 TIMES DAILY
Qty: 21 TABLET | Refills: 0 | Status: SHIPPED | OUTPATIENT
Start: 2022-03-21 | End: 2022-03-28

## 2022-03-21 RX ORDER — AMIODARONE HYDROCHLORIDE 200 MG/1
200 TABLET ORAL 2 TIMES DAILY
Qty: 60 TABLET | Refills: 11 | Status: SHIPPED | OUTPATIENT
Start: 2022-03-28 | End: 2022-04-20

## 2022-03-21 RX ORDER — ROSUVASTATIN CALCIUM 10 MG/1
10 TABLET, COATED ORAL DAILY
Qty: 90 TABLET | Refills: 3 | Status: SHIPPED | OUTPATIENT
Start: 2022-03-21 | End: 2023-03-19 | Stop reason: SDUPTHER

## 2022-03-21 RX ORDER — AMLODIPINE BESYLATE 5 MG/1
5 TABLET ORAL DAILY
Qty: 30 TABLET | Refills: 11 | Status: SHIPPED | OUTPATIENT
Start: 2022-03-21 | End: 2022-06-15 | Stop reason: ALTCHOICE

## 2022-03-21 NOTE — TELEPHONE ENCOUNTER
Called pt x 3 , no answer, no voicemail greeting available to leave a message. Would like to go over medication changes to insure pt verbalizes understanding.

## 2022-03-21 NOTE — PROGRESS NOTES
Subjective:    Patient ID:  Darlin Tipton is a 81 y.o. female patient here for evaluation Hyperlipidemia and Cardiomyopathy      History of Present Illness:       Ms. Tipton is here today for a follow-up visit.  She has been having some shortness of breath with minimal exertion.  She is not able to do which she once could.  EKG reveals she is back in atrial fibrillation.  Heart rate is controlled however.  She does have some palpitations at times.  She is not on a blood thinner currently she is only on aspirin.  She has some chest tightness at times with doing things as well.  She also has a cough that have noticed today.  Lungs are clear she does notice some swelling to her right leg more than her left leg at times as well.      Review of patient's allergies indicates:   Allergen Reactions    Sulfa (sulfonamide antibiotics) Rash    Floxacillin Itching    Januvia [sitagliptin] Other (See Comments)     Hot flashes    Tetracyclines Itching    Fenofibrate micronized Rash    Nitrofurantoin macrocrystalline Other (See Comments)     unknown    Phenylfenesin la [phenylpropanolamine-gg] Rash       Past Medical History:   Diagnosis Date    Allergy     Dust mites, Grasses, Trees    Arthritis     Asthma     Blood transfusion     CAD (coronary artery disease)     Cataract     CHRONIC BRONCHITIS     Diabetes mellitus     Diabetes mellitus type II     GERD (gastroesophageal reflux disease)     Hyperlipidemia     Hypertension     Irregular heart beat     Kidney disease     ckd stage 3  as stated by patient - to see MD    Post-menopausal bleeding 2018    Spinal stenosis     Thyroid disease     Hypothyroidism     Past Surgical History:   Procedure Laterality Date    APPENDECTOMY  1968    BLADDER SUSPENSION  1989    CARDIAC SURGERY  2016    CABG    CATARACT EXTRACTION  9/2007 (L) and 10/2207 (R)    COLONOSCOPY N/A 10/18/2017    Procedure: COLONOSCOPY;  Surgeon: Esme Acuna MD;  Location: [facility]  ENDO;  Service: Endoscopy;  Laterality: N/A;    CORONARY ARTERY BYPASS GRAFT  4/26/2004    x5    ESOPHAGEAL DILATION      HYSTEROSCOPY WITH DILATION AND CURETTAGE OF UTERUS N/A 3/12/2020    Procedure: HYSTEROSCOPY, WITH DILATION AND CURETTAGE OF UTERUS;  Surgeon: Ramona Llanos MD;  Location: [facility];  Service: OB/GYN;  Laterality: N/A;    SPINE SURGERY  3/2000    Tumor    TRANSFORAMINAL EPIDURAL INJECTION OF STEROID Right 11/21/2019    Procedure: Injection,steroid,epidural,transforaminal approach;  Surgeon: Bj Jones MD;  Location: [facility];  Service: Pain Management;  Laterality: Right;  L4-5, L5-S1    TRANSFORAMINAL EPIDURAL INJECTION OF STEROID Right 12/31/2019    Procedure: Injection,steroid,epidural,transforaminal approach;  Surgeon: Bj Jones MD;  Location: [facility];  Service: Pain Management;  Laterality: Right;  L4-5, L5-S1    TRANSFORAMINAL EPIDURAL INJECTION OF STEROID Right 2/5/2020    Procedure: Injection,steroid,epidural,transforaminal approach;  Surgeon: Bj Jones MD;  Location: [facility];  Service: Pain Management;  Laterality: Right;  L4-5, L5-S1    TRANSFORAMINAL EPIDURAL INJECTION OF STEROID Left 1/27/2021    Procedure: Injection,steroid,epidural,transforaminal approach;  Surgeon: Bj Jones MD;  Location: [facility];  Service: Pain Management;  Laterality: Left;  L4-5, L5-S1    TRANSFORAMINAL EPIDURAL INJECTION OF STEROID Right 3/4/2021    Procedure: Injection,steroid,epidural,transforaminal approach;  Surgeon: Bj Jones MD;  Location: [facility];  Service: Pain Management;  Laterality: Right;  L4-L5, L5-S1    WRIST SURGERY  1993    carpal tunnel       Social History     Tobacco Use    Smoking status: Never Smoker    Smokeless tobacco: Never Used   Substance Use Topics    Alcohol use: Yes     Comment: Rare    Drug use: No        Review of Systems:    As noted in HPI in addition      REVIEW OF SYSTEMS  CARDIOVASCULAR:  positive palpitations  RESPIRATORY:  positive shortness of  breath with exertion minimal  : No blood in the urine  GI: No Nausea, vomiting, constipation, diarrhea, blood, or reflux.  MUSCULOSKELETAL: No myalgias  NEURO: No lightheadedness or dizziness  EYES: No Double vision, blurry, vision or headache     Positive fatigue and tiredness         Objective        Vitals:    03/21/22 0855   BP: 120/70   Pulse: 72       LIPIDS - LAST 2   Lab Results   Component Value Date    CHOL 127 11/16/2021    CHOL 165 12/08/2020    HDL 22 (L) 11/16/2021    HDL 51 12/08/2020    LDLCALC 42.8 (L) 11/16/2021    LDLCALC 99.4 12/08/2020    TRIG 311 (H) 11/16/2021    TRIG 73 12/08/2020    CHOLHDL 17.3 (L) 11/16/2021    CHOLHDL 30.9 12/08/2020       CBC - LAST 2  Lab Results   Component Value Date    WBC 6.82 12/17/2021    WBC 5.72 09/21/2021    RBC 4.16 12/17/2021    RBC 4.07 09/21/2021    HGB 12.1 12/17/2021    HGB 11.8 (L) 09/21/2021    HCT 37.5 12/17/2021    HCT 37.0 09/21/2021    MCV 90 12/17/2021    MCV 91 09/21/2021    MCH 29.1 12/17/2021    MCH 29.0 09/21/2021    MCHC 32.3 12/17/2021    MCHC 31.9 (L) 09/21/2021    RDW 13.4 12/17/2021    RDW 13.3 09/21/2021     12/17/2021     09/21/2021    MPV 10.8 12/17/2021    MPV 11.1 09/21/2021    GRAN 4.4 12/17/2021    GRAN 65.1 12/17/2021    LYMPH 1.6 12/17/2021    LYMPH 22.9 12/17/2021    MONO 0.7 12/17/2021    MONO 9.8 12/17/2021    BASO 0.03 12/17/2021    BASO 0.03 09/21/2021    NRBC 0 12/17/2021    NRBC 0 09/21/2021       CHEMISTRY & LIVER FUNCTION - LAST 2  Lab Results   Component Value Date     12/17/2021     11/16/2021     11/16/2021    K 4.4 12/17/2021    K 4.5 11/16/2021    K 4.6 11/16/2021     12/17/2021     11/16/2021     11/16/2021    CO2 26 12/17/2021    CO2 27 11/16/2021    CO2 25 11/16/2021    ANIONGAP 8 12/17/2021    ANIONGAP 7 (L) 11/16/2021    ANIONGAP 10 11/16/2021    BUN 37 (H) 12/17/2021    BUN 38 (H) 11/16/2021    BUN 37 (H) 11/16/2021    CREATININE 1.1 03/17/2022    CREATININE  0.9 12/17/2021     (H) 12/17/2021    GLU 97 11/16/2021    GLU 95 11/16/2021    CALCIUM 9.3 12/17/2021    CALCIUM 9.7 11/16/2021    CALCIUM 9.4 11/16/2021    MG 2.4 12/17/2021    MG 2.4 02/15/2021    ALBUMIN 4.4 12/17/2021    ALBUMIN 4.0 11/16/2021    ALBUMIN 4.0 11/16/2021    PROT 7.6 12/17/2021    PROT 7.3 11/16/2021    ALKPHOS 58 12/17/2021    ALKPHOS 55 11/16/2021    ALT 32 12/17/2021    ALT 23 11/16/2021    AST 22 12/17/2021    AST 20 11/16/2021    BILITOT 0.7 12/17/2021    BILITOT 0.3 11/16/2021        CARDIAC PROFILE - LAST 2  Lab Results   Component Value Date     (H) 12/17/2021     (H) 09/21/2021    CPK 79 12/17/2021    TROPONINI <0.030 12/17/2021    TROPONINI 0.049 (H) 09/06/2016        COAGULATION - LAST 2  Lab Results   Component Value Date    INR 0.9 09/05/2016    INR 0.9 08/11/2015    APTT 22.1 08/11/2015       ENDOCRINE & PSA - LAST 2  Lab Results   Component Value Date    HGBA1C 6.3 (H) 11/16/2021    HGBA1C 6.5 (H) 04/20/2021    TSH 2.950 09/21/2021    TSH 2.080 05/18/2021        ECHOCARDIOGRAM RESULTS  Results for orders placed in visit on 10/11/21    Echo    Interpretation Summary  · The left ventricle is normal in size with mild concentric hypertrophy and low normal systolic function.  · The estimated ejection fraction is 52%.  · Grade I left ventricular diastolic dysfunction.  · Normal right ventricular size.  · Mild left atrial enlargement.  · There is mild aortic valve stenosis.  · Aortic valve area is 2.57 cm2; peak velocity is 2.17 m/s; mean gradient is 13 mmHg.  · Mild mitral regurgitation.  · Mild tricuspid regurgitation.  · Normal central venous pressure (3 mmHg).  · The estimated PA systolic pressure is 23 mmHg.      CURRENT/PREVIOUS VISIT EKG  Results for orders placed or performed during the hospital encounter of 12/17/21   EKG 12-lead    Collection Time: 12/17/21  6:14 PM    Narrative    Test Reason : R06.02,    Vent. Rate : 066 BPM     Atrial Rate : 066 BPM     P-R  Int : 240 ms          QRS Dur : 086 ms      QT Int : 386 ms       P-R-T Axes : 009 033 061 degrees     QTc Int : 404 ms    Sinus rhythm with 1st degree A-V block  Nonspecific ST and T wave abnormality  Abnormal ECG  When compared with ECG of 15-FEB-2021 09:47,  Sinus rhythm has replaced Atrial fibrillation  Vent. rate has decreased BY  33 BPM  QT has shortened  Confirmed by Freeman Anderson MD ([id number]) on 12/18/2021 8:43:10 AM    Referred By: BEKA   SELF           Confirmed By:Freeman Anderson MD     PHYSICAL EXAM  CONSTITUTIONAL: Well built, well nourished in no apparent distress  NECK: Left Carotid bruit  LUNGS: CTA  CHEST WALL: no tenderness  HEART:  irregular Systolic murmur   ABDOMEN: soft, non-tender; bowel sounds normal; no masses,  no organomegaly  EXTREMITIES: Extremities normal, no edema, no calf tenderness noted  NEURO: AAO X 3    I HAVE REVIEWED :    The vital signs, nurses notes, and all the pertinent radiology and labs.        Current Outpatient Medications   Medication Instructions    acetaminophen (TYLENOL) 650 mg, Oral, Daily, 2 Tablet(s) Oral  Every day.    amiodarone (PACERONE) 200 mg, Oral, 3 times daily    [START ON 3/28/2022] amiodarone (PACERONE) 200 mg, Oral, 2 times daily    amitriptyline (ELAVIL) 50 mg, Oral, Nightly    amLODIPine (NORVASC) 5 mg, Oral, Daily    apixaban (ELIQUIS) 5 mg, Oral, 2 times daily    aspirin (ECOTRIN) 81 mg, Oral, Daily    azelastine (ASTELIN) 137 mcg, Nasal, 2 times daily    blood sugar diagnostic (FREESTYLE LITE STRIPS) Strp USE TO TEST BLOOD SUGAR TWICE A DAY    canagliflozin (INVOKANA) 100 mg, Oral, Daily    carboxymethylcellulose 1 % ophthalmic solution as directed    cetirizine (ZYRTEC) 10 mg, Oral, Daily    cholecalciferol, vitamin D3, (VITAMIN D3) 50 mcg (2,000 unit) Cap 1 capsule, Oral, Daily    ciprofloxacin-dexamethasone 0.3-0.1% (CIPRODEX) 0.3-0.1 % DrpS 4 drops, Both Ears, 2 times daily    coenzyme Q10 100 mg, Oral, Daily    cranberry  extract 650 mg Cap 1 tablet, Oral, 2 times daily,      diclofenac sodium (VOLTAREN) 1 % Gel APPLY 2 G TOPICALLY 3 (THREE) TIMES DAILY.    fluticasone propionate (FLONASE) 50 mcg, Each Nostril, Daily    gabapentin (NEURONTIN) 300 mg, Oral, 3 times daily    isosorbide mononitrate (IMDUR) 120 mg, Oral, Daily    lancets Misc 1 Units, Misc.(Non-Drug; Combo Route), 2 times daily    levothyroxine (LEVOTHROID) 25 mcg, Oral, Before breakfast, Every day    LIDOcaine 4 % PtMd 1 patch, Topical (Top), Daily    MYRBETRIQ 50 mg, Oral, Daily    nitroGLYCERIN (NITROSTAT) 0.4 mg, Sublingual, Every 5 min PRN, 0.4mg Sublingual PRN .    ondansetron (ZOFRAN-ODT) 4 mg, Oral, Every 8 hours PRN    pramoxine-hydrocortisone (PROCTOCREAM-HC) 1-1 % rectal cream Rectal, 2 times daily    RESTASIS 0.05 % ophthalmic emulsion 1 drop, Both Eyes, 2 times daily    terazosin (HYTRIN) 2 mg, Oral, Nightly    TOPROL XL 25 mg, Oral, Daily    triazolam (HALCION) 0.25 mg, Oral, Nightly PRN    vitamins  A,C,E-zinc-copper [phone] unit-mg-unit Cap 1 capsule, Oral, 2 times daily          Assessment & Plan     Paroxysmal atrial fibrillation  Patient is back in atrial fibrillation today heart rate is within normal limits.  I digoxin and start amiodarone 200 mg p.o. t.i.d. x7 days followed by b.i.d. and thereafter she will go back on Eliquis 5 mg p.o. b.i.d. and stop aspirin  Follow-up EKG in 1 week if patient is in regular rhythm we will maintain with medication if not we will plan for NARCISO cardioversion    Dyslipidemia  Currently on simvastatin I am starting amiodarone therefore will stop simvastatin and start Crestor.    Ischemic cardiomyopathy  History of this currently euvolemic on exam however patient has shortness of breath with minimal exertion I believe this is secondary to her rhythm.    Type 2 diabetes mellitus with diabetic nephropathy, without long-term current use of insulin  Per primary care provider    Thyroid  dysfunction  Recommend checking TSH  Continue current regimen follow-up with PCP currently on levothyroxine 25 mcg daily    SOB (shortness of breath) on exertion  I will get her out atrial fibrillation and get her back into normal sinus rhythm and evaluate may need to repeat a Lexiscan Myoview as well.          No follow-ups on file.

## 2022-03-21 NOTE — ASSESSMENT & PLAN NOTE
Currently on simvastatin I am starting amiodarone therefore will stop simvastatin and start Crestor.

## 2022-03-21 NOTE — ASSESSMENT & PLAN NOTE
Patient is back in atrial fibrillation today heart rate is within normal limits.  I digoxin and start amiodarone 200 mg p.o. t.i.d. x7 days followed by b.i.d. and thereafter she will go back on Eliquis 5 mg p.o. b.i.d. and stop aspirin  Follow-up EKG in 1 week if patient is in regular rhythm we will maintain with medication if not we will plan for NARCISO cardioversion

## 2022-03-21 NOTE — ASSESSMENT & PLAN NOTE
Recommend checking TSH  Continue current regimen follow-up with PCP currently on levothyroxine 25 mcg daily

## 2022-03-21 NOTE — ASSESSMENT & PLAN NOTE
I will get her out atrial fibrillation and get her back into normal sinus rhythm and evaluate may need to repeat a Lexiscan Myoview as well.

## 2022-03-21 NOTE — ASSESSMENT & PLAN NOTE
History of this currently euvolemic on exam however patient has shortness of breath with minimal exertion I believe this is secondary to her rhythm.

## 2022-03-23 ENCOUNTER — OFFICE VISIT (OUTPATIENT)
Dept: ORTHOPEDICS | Facility: CLINIC | Age: 82
End: 2022-03-23
Payer: MEDICARE

## 2022-03-23 VITALS — WEIGHT: 140 LBS | BODY MASS INDEX: 25.76 KG/M2 | HEIGHT: 62 IN

## 2022-03-23 DIAGNOSIS — S83.281A TEAR OF LATERAL MENISCUS OF RIGHT KNEE, UNSPECIFIED TEAR TYPE, UNSPECIFIED WHETHER OLD OR CURRENT TEAR, INITIAL ENCOUNTER: ICD-10-CM

## 2022-03-23 DIAGNOSIS — M47.816 ARTHRITIS OF LUMBAR SPINE: Primary | ICD-10-CM

## 2022-03-23 DIAGNOSIS — M41.9 SCOLIOSIS OF LUMBAR SPINE, UNSPECIFIED SCOLIOSIS TYPE: ICD-10-CM

## 2022-03-23 PROCEDURE — 1160F RVW MEDS BY RX/DR IN RCRD: CPT | Mod: S$GLB,,, | Performed by: ORTHOPAEDIC SURGERY

## 2022-03-23 PROCEDURE — 3288F PR FALLS RISK ASSESSMENT DOCUMENTED: ICD-10-PCS | Mod: S$GLB,,, | Performed by: ORTHOPAEDIC SURGERY

## 2022-03-23 PROCEDURE — 99203 OFFICE O/P NEW LOW 30 MIN: CPT | Mod: S$GLB,,, | Performed by: ORTHOPAEDIC SURGERY

## 2022-03-23 PROCEDURE — 1125F AMNT PAIN NOTED PAIN PRSNT: CPT | Mod: S$GLB,,, | Performed by: ORTHOPAEDIC SURGERY

## 2022-03-23 PROCEDURE — 1101F PT FALLS ASSESS-DOCD LE1/YR: CPT | Mod: S$GLB,,, | Performed by: ORTHOPAEDIC SURGERY

## 2022-03-23 PROCEDURE — 1125F PR PAIN SEVERITY QUANTIFIED, PAIN PRESENT: ICD-10-PCS | Mod: S$GLB,,, | Performed by: ORTHOPAEDIC SURGERY

## 2022-03-23 PROCEDURE — 1160F PR REVIEW ALL MEDS BY PRESCRIBER/CLIN PHARMACIST DOCUMENTED: ICD-10-PCS | Mod: S$GLB,,, | Performed by: ORTHOPAEDIC SURGERY

## 2022-03-23 PROCEDURE — 1159F MED LIST DOCD IN RCRD: CPT | Mod: S$GLB,,, | Performed by: ORTHOPAEDIC SURGERY

## 2022-03-23 PROCEDURE — 1101F PR PT FALLS ASSESS DOC 0-1 FALLS W/OUT INJ PAST YR: ICD-10-PCS | Mod: S$GLB,,, | Performed by: ORTHOPAEDIC SURGERY

## 2022-03-23 PROCEDURE — 3288F FALL RISK ASSESSMENT DOCD: CPT | Mod: S$GLB,,, | Performed by: ORTHOPAEDIC SURGERY

## 2022-03-23 PROCEDURE — 99203 PR OFFICE/OUTPT VISIT, NEW, LEVL III, 30-44 MIN: ICD-10-PCS | Mod: S$GLB,,, | Performed by: ORTHOPAEDIC SURGERY

## 2022-03-23 PROCEDURE — 1159F PR MEDICATION LIST DOCUMENTED IN MEDICAL RECORD: ICD-10-PCS | Mod: S$GLB,,, | Performed by: ORTHOPAEDIC SURGERY

## 2022-03-23 RX ORDER — PROMETHAZINE HYDROCHLORIDE AND CODEINE PHOSPHATE 6.25; 1 MG/5ML; MG/5ML
5 SOLUTION ORAL EVERY 4 HOURS PRN
COMMUNITY
End: 2022-06-15

## 2022-03-23 RX ORDER — TEMAZEPAM 30 MG/1
30 CAPSULE ORAL NIGHTLY PRN
COMMUNITY
End: 2022-05-05

## 2022-03-23 RX ORDER — SIMVASTATIN 20 MG/1
20 TABLET, FILM COATED ORAL NIGHTLY
COMMUNITY
End: 2022-04-20

## 2022-03-23 NOTE — PROGRESS NOTES
Subjective:       Patient ID: Darlin Tipton is a 81 y.o. female.    Chief Complaint: No chief complaint on file.      History of Present Illness    Prior to meeting with the patient I reviewed the medical chart in [facility]. This included reviewing the previous progress notes from our office, review of the patient's last appointment with their primary care provider, review of any visits to the emergency room, and review of any pain management appointments or procedures.   This lady comes in with 2 complaints and chronic history of right knee pain and swelling as well as chronic intermittent back pain.  She saw a physician about a month ago gave her steroid injection right knee but it only helped about 2 days her back pain is chronic in intermittent and nonradiating she has had epidural steroid injections in the past.    Current Medications  Current Outpatient Medications   Medication Sig Dispense Refill    acetaminophen (TYLENOL) 650 MG TbSR Take 650 mg by mouth once daily. 2 Tablet(s) Oral  Every day.      amiodarone (PACERONE) 200 MG Tab Take 1 tablet (200 mg total) by mouth 3 (three) times daily. for 7 days 21 tablet 0    [START ON 3/28/2022] amiodarone (PACERONE) 200 MG Tab Take 1 tablet (200 mg total) by mouth 2 (two) times daily. 60 tablet 11    amitriptyline (ELAVIL) 50 MG tablet Take 1 tablet (50 mg total) by mouth every evening. 90 tablet 3    amLODIPine (NORVASC) 5 MG tablet Take 1 tablet (5 mg total) by mouth once daily. 30 tablet 11    apixaban (ELIQUIS) 5 mg Tab Take 1 tablet (5 mg total) by mouth 2 (two) times daily. 30 tablet 3    azelastine (ASTELIN) 137 mcg (0.1 %) nasal spray 1 spray (137 mcg total) by Nasal route 2 (two) times daily. 90 mL 3    blood sugar diagnostic (FREESTYLE LITE STRIPS) Strp USE TO TEST BLOOD SUGAR TWICE A  strip 3    canagliflozin (INVOKANA) 100 mg Tab tablet Take 1 tablet (100 mg total) by mouth once daily. 90 tablet 3    carboxymethylcellulose 1 % ophthalmic  solution as directed      cetirizine (ZYRTEC) 10 MG tablet Take 10 mg by mouth once daily.      cholecalciferol, vitamin D3, (VITAMIN D3) 50 mcg (2,000 unit) Cap Take 1 capsule by mouth once daily.      coenzyme Q10 100 mg capsule Take 100 mg by mouth once daily.       cranberry extract 650 mg Cap Take 1 tablet by mouth 2 (two) times daily.      diclofenac sodium (VOLTAREN) 1 % Gel APPLY 2 G TOPICALLY 3 (THREE) TIMES DAILY. (Patient taking differently: 2 g 3 (three) times daily as needed. APPLY 2 G TOPICALLY 3 (THREE) TIMES DAILY.) 100 g 5    fluticasone propionate (FLONASE) 50 mcg/actuation nasal spray 1 spray (50 mcg total) by Each Nostril route once daily. 48 g 3    gabapentin (NEURONTIN) 300 MG capsule Take 1 capsule (300 mg total) by mouth 3 (three) times daily. 90 capsule 2    guaifenesin/dextromethorphan (TUSSIN DM ORAL) Take by mouth.      Lactobac no.41/Bifidobact no.7 (PROBIOTIC-10 ORAL) Take by mouth.      lancets Misc 1 Units by Misc.(Non-Drug; Combo Route) route 2 (two) times daily. 200 each 3    levothyroxine (LEVOTHROID) 25 MCG tablet Take 1 tablet (25 mcg total) by mouth before breakfast. Every day 90 tablet 3    magnesium oxide (MAG-OXIDE ORAL) Take by mouth.      nitroGLYCERIN (NITROSTAT) 0.4 MG SL tablet Place 0.4 mg under the tongue every 5 (five) minutes as needed. 0.4mg Sublingual PRN .        promethazine-codeine 6.25-10 mg/5 ml (PHENERGAN WITH CODEINE) 6.25-10 mg/5 mL syrup Take 5 mLs by mouth every 4 (four) hours as needed for Cough.      RESTASIS 0.05 % ophthalmic emulsion Place 1 drop into both eyes 2 (two) times daily.      simvastatin (ZOCOR) 20 MG tablet Take 20 mg by mouth every evening.      temazepam (RESTORIL) 30 mg capsule Take 30 mg by mouth nightly as needed for Insomnia.      terazosin (HYTRIN) 2 MG capsule Take 1 capsule (2 mg total) by mouth every evening. 90 capsule 3    TOPROL XL 25 mg 24 hr tablet Take 1 tablet (25 mg total) by mouth once daily. 90 tablet  3    vitamins  A,C,E-zinc-copper [id number] unit-mg-unit Cap Take 1 capsule by mouth 2 (two) times daily.       aspirin (ECOTRIN) 81 MG EC tablet Take 81 mg by mouth once daily.      ciprofloxacin-dexamethasone 0.3-0.1% (CIPRODEX) 0.3-0.1 % DrpS Place 4 drops into both ears 2 (two) times daily. (Patient not taking: Reported on 3/23/2022) 7.5 mL 0    isosorbide mononitrate (IMDUR) 120 MG 24 hr tablet Take 120 mg by mouth once daily.      LIDOcaine 4 % PtMd Apply 1 patch topically once daily. (Patient not taking: Reported on 3/23/2022) 5 patch 1    mirabegron (MYRBETRIQ) 50 mg Tb24 Take 1 tablet (50 mg total) by mouth once daily. (Patient not taking: No sig reported) 90 tablet 3    ondansetron (ZOFRAN-ODT) 4 MG TbDL Take 1 tablet (4 mg total) by mouth every 8 (eight) hours as needed (nausea). (Patient not taking: Reported on 3/23/2022) 30 tablet 5    pramoxine-hydrocortisone (PROCTOCREAM-HC) 1-1 % rectal cream Place rectally 2 (two) times daily. (Patient not taking: Reported on 3/23/2022) 3 Tube 3    rosuvastatin (CRESTOR) 10 MG tablet Take 1 tablet (10 mg total) by mouth once daily. (Patient not taking: Reported on 3/23/2022) 90 tablet 3    triazolam (HALCION) 0.25 MG Tab Take 1 tablet (0.25 mg total) by mouth nightly as needed. (Patient not taking: Reported on 3/23/2022) 90 tablet 0     No current facility-administered medications for this visit.     Facility-Administered Medications Ordered in Other Visits   Medication Dose Route Frequency Provider Last Rate Last Admin    0.9%  NaCl infusion   Intravenous Continuous Bj Jones MD   Stopped at 02/05/20 1400       Allergies  Review of patient's allergies indicates:   Allergen Reactions    Sulfa (sulfonamide antibiotics) Rash    Floxacillin Itching    Januvia [sitagliptin] Other (See Comments)     Hot flashes    Tetracyclines Itching    Fenofibrate micronized Rash    Nitrofurantoin macrocrystalline Other (See Comments)     unknown     Phenylfenesin la [phenylpropanolamine-gg] Rash       Past Medical History  Past Medical History:   Diagnosis Date    Allergy     Dust mites, Grasses, Trees    Arthritis     Asthma     Blood transfusion     CAD (coronary artery disease)     Cataract     CHRONIC BRONCHITIS     Diabetes mellitus     Diabetes mellitus type II     GERD (gastroesophageal reflux disease)     Hyperlipidemia     Hypertension     Irregular heart beat     Kidney disease     ckd stage 3  as stated by patient - to see MD    Post-menopausal bleeding 2018    Spinal stenosis     Thyroid disease     Hypothyroidism       Surgical History  Past Surgical History:   Procedure Laterality Date    APPENDECTOMY  1968    BLADDER SUSPENSION  1989    CARDIAC SURGERY  2016    CABG    CATARACT EXTRACTION  9/2007 (L) and 10/2207 (R)    COLONOSCOPY N/A 10/18/2017    Procedure: COLONOSCOPY;  Surgeon: Esme Acuna MD;  Location: [facility];  Service: Endoscopy;  Laterality: N/A;    CORONARY ARTERY BYPASS GRAFT  4/26/2004    x5    ESOPHAGEAL DILATION      HYSTEROSCOPY WITH DILATION AND CURETTAGE OF UTERUS N/A 3/12/2020    Procedure: HYSTEROSCOPY, WITH DILATION AND CURETTAGE OF UTERUS;  Surgeon: Ramona Llanos MD;  Location: [facility] OR;  Service: OB/GYN;  Laterality: N/A;    SPINE SURGERY  3/2000    Tumor    TRANSFORAMINAL EPIDURAL INJECTION OF STEROID Right 11/21/2019    Procedure: Injection,steroid,epidural,transforaminal approach;  Surgeon: Bj Jones MD;  Location: [facility] OR;  Service: Pain Management;  Laterality: Right;  L4-5, L5-S1    TRANSFORAMINAL EPIDURAL INJECTION OF STEROID Right 12/31/2019    Procedure: Injection,steroid,epidural,transforaminal approach;  Surgeon: Bj Jones MD;  Location: [facility] OR;  Service: Pain Management;  Laterality: Right;  L4-5, L5-S1    TRANSFORAMINAL EPIDURAL INJECTION OF STEROID Right 2/5/2020    Procedure: Injection,steroid,epidural,transforaminal approach;  Surgeon: Bj Jones MD;  Location:  [facility] OR;  Service: Pain Management;  Laterality: Right;  L4-5, L5-S1    TRANSFORAMINAL EPIDURAL INJECTION OF STEROID Left 1/27/2021    Procedure: Injection,steroid,epidural,transforaminal approach;  Surgeon: Bj Jones MD;  Location: [facility] OR;  Service: Pain Management;  Laterality: Left;  L4-5, L5-S1    TRANSFORAMINAL EPIDURAL INJECTION OF STEROID Right 3/4/2021    Procedure: Injection,steroid,epidural,transforaminal approach;  Surgeon: Bj Jones MD;  Location: [facility] OR;  Service: Pain Management;  Laterality: Right;  L4-L5, L5-S1    WRIST SURGERY  1993    carpal tunnel         Family History:   Family History   Problem Relation Age of Onset    Breast cancer Mother     Breast cancer Maternal Aunt     Allergic rhinitis Neg Hx     Allergies Neg Hx     Angioedema Neg Hx     Asthma Neg Hx     Eczema Neg Hx     Immunodeficiency Neg Hx     Urticaria Neg Hx     Rhinitis Neg Hx     Atopy Neg Hx        Social History:   Social History     Socioeconomic History    Marital status:    Tobacco Use    Smoking status: Never Smoker    Smokeless tobacco: Never Used   Substance and Sexual Activity    Alcohol use: Yes     Comment: Rare    Drug use: No    Sexual activity: Not Currently       Hospitalization/Major Diagnostic Procedure:     Review of Systems     General/Constitutional:  Chills denies. Fatigue denies. Fever denies. Weight gain denies. Weight loss denies.    Respiratory:  Shortness of breath denies.    Cardiovascular:  Chest pain denies.    Gastrointestinal:  Constipation denies. Diarrhea denies. Nausea denies. Vomiting denies.     Hematology:  Easy bruising denies. Prolonged bleeding denies.     Genitourinary:  Frequent urination denies. Pain in lower back denies. Painful urination denies.     Musculoskeletal:  See HPI for details    Skin:  Rash denies.    Neurologic:  Dizziness denies. Gait abnormalities denies. Seizures denies. Tingling/Numbess denies.    Psychiatric:  Anxiety denies.  Depressed mood denies.     Objective:   Vital Signs: There were no vitals filed for this visit.     Physical Exam      General Examination:     Constitutional: The patient is alert and oriented to lace person and time. Mood is pleasant.     Head/Face: Normal facial features normal eyebrows    Eyes: Normal extraocular motion bilaterally    Lungs: Respirations are equal and unlabored    Gait is coordinated.    Cardiovascular: There are no swelling or varicosities present.    Lymphatic: Negative for adenopathy    Skin: Normal    Neurological: Level of consciousness normal. Oriented to place person and time and situation    Psychiatric: Oriented to time place person and situation    Lumbar exam shows some tenderness both posterior iliac spines and midline lumbosacral junction without spasm mild pain at endpoints of movement noted range of motion slightly limited right knee shows a small effusion of the right knee tenderness or lateral joint line slight valgus alignment of the right knee.  Marcus's test positive on the right  AP pelvis x-ray was taken today. Indications low back pain and/or hip pain. Findings AP pelvis x-ray appears to be normal with no evidence of fractures or significant degenerative disease  Hip range of motion is normal straight leg raising maneuver is negative.  XRAY Report/ Interpretation :  Prior knee x-rays taken in February were personally reviewed they are grossly normal.  AP and lateral x-rays of lumbar spine will performed today. Indications low back pain. Findings:  Moderate scoliosis involving upper and lower lumbar spine with multilevel disc degeneration and facet arthropathy noted  AP pelvis x-ray was taken today. Indications low back pain and/or hip pain. Findings AP pelvis x-ray appears to be normal with no evidence of fractures or significant degenerative disease    Assessment:       1. Arthritis of lumbar spine    2. Scoliosis of lumbar spine, unspecified scoliosis type    3. Tear  of lateral meniscus of right knee, unspecified tear type, unspecified whether old or current tear, initial encounter        Plan:       Diagnoses and all orders for this visit:    Arthritis of lumbar spine  -     X-Ray Lumbar Spine Ap And Lateral  -     X-Ray Pelvis Routine AP    Scoliosis of lumbar spine, unspecified scoliosis type    Tear of lateral meniscus of right knee, unspecified tear type, unspecified whether old or current tear, initial encounter  -     MRI Knee Without Contrast Right; Future         Follow up in about 3 weeks (around 4/13/2022) for MRI Results Rt knee.    Since she did not get prolonged relief of her knee pain with a steroid injection I have suggested an MRI I suspect she may have a meniscus tear certainly the x-rays do not show any profound arthritis.  If it does show some moderate arthritis not evident on x-ray we might suggest a viscosupplementation injection.  His if she has a meniscus tear we might suggest the arthroscopic surgery.  Regards to lower back we offered to refer her back to Dr. Jones did her prior lumbar injections however she wants to defer that referral until she sees the results of her knee MRI  Treatment options were discussed with regards to the nature of the medical condition. Conservative pain intervention and surgical options were discussed in detail. The probability of success of each separate treatment option was discussed. The patient expressed a clear understanding of the treatment options. With regards to surgery, the procedure risk, benefits, complications, and outcomes were discussed. No guarantees were given with regards to surgical outcome.   The risk of complications, morbidity, and mortality of patient management decisions have been made at the time of this visit. These are associated with the patient's problems, diagnostic procedures and treatment options. This includes the possible management options selected and those considered but not selected by the  patient after shared medical decision making we discussed with the patient.   This note was created using Dragon voice recognition software that occasionally misinterpreted phrases or words.

## 2022-03-28 ENCOUNTER — CLINICAL SUPPORT (OUTPATIENT)
Dept: CARDIOLOGY | Facility: CLINIC | Age: 82
End: 2022-03-28
Payer: MEDICARE

## 2022-03-28 DIAGNOSIS — I48.91 ATRIAL FIBRILLATION, UNSPECIFIED TYPE: Primary | ICD-10-CM

## 2022-03-28 PROCEDURE — 93000 EKG 12-LEAD: ICD-10-PCS | Mod: S$GLB,,, | Performed by: INTERNAL MEDICINE

## 2022-03-28 PROCEDURE — 93000 ELECTROCARDIOGRAM COMPLETE: CPT | Mod: S$GLB,,, | Performed by: INTERNAL MEDICINE

## 2022-03-29 NOTE — PROGRESS NOTES
Pt presents today for an EKG for sinus rhythm after medication amiodarone x 2 daily was added to daily regimen. Follow up appointment is schedule with Babs for a repeat EKG in 3 weeks time and increased to 2 x daily.  Spoke with pt ,  verbalized understanding.

## 2022-04-05 ENCOUNTER — HOSPITAL ENCOUNTER (OUTPATIENT)
Dept: RADIOLOGY | Facility: HOSPITAL | Age: 82
Discharge: HOME | End: 2022-04-05
Attending: ORTHOPAEDIC SURGERY
Payer: MEDICARE

## 2022-04-05 DIAGNOSIS — S83.281A TEAR OF LATERAL MENISCUS OF RIGHT KNEE, UNSPECIFIED TEAR TYPE, UNSPECIFIED WHETHER OLD OR CURRENT TEAR, INITIAL ENCOUNTER: ICD-10-CM

## 2022-04-05 PROCEDURE — 73721 MRI JNT OF LWR EXTRE W/O DYE: CPT | Mod: TC,PO,RT

## 2022-04-13 ENCOUNTER — OFFICE VISIT (OUTPATIENT)
Dept: ORTHOPEDICS | Facility: CLINIC | Age: 82
End: 2022-04-13
Payer: MEDICARE

## 2022-04-13 VITALS — HEIGHT: 62 IN | WEIGHT: 140 LBS | BODY MASS INDEX: 25.76 KG/M2

## 2022-04-13 DIAGNOSIS — M23.203 DEGENERATIVE TEAR OF MEDIAL MENISCUS OF RIGHT KNEE: Primary | ICD-10-CM

## 2022-04-13 DIAGNOSIS — I48.91 ATRIAL FIBRILLATION, UNSPECIFIED TYPE: ICD-10-CM

## 2022-04-13 DIAGNOSIS — M17.11 PRIMARY OSTEOARTHRITIS OF RIGHT KNEE: ICD-10-CM

## 2022-04-13 DIAGNOSIS — M23.300 DEGENERATIVE TEAR OF LATERAL MENISCUS OF RIGHT KNEE: ICD-10-CM

## 2022-04-13 PROCEDURE — 1160F PR REVIEW ALL MEDS BY PRESCRIBER/CLIN PHARMACIST DOCUMENTED: ICD-10-PCS | Mod: S$GLB,,, | Performed by: ORTHOPAEDIC SURGERY

## 2022-04-13 PROCEDURE — 1101F PT FALLS ASSESS-DOCD LE1/YR: CPT | Mod: S$GLB,,, | Performed by: ORTHOPAEDIC SURGERY

## 2022-04-13 PROCEDURE — 1101F PR PT FALLS ASSESS DOC 0-1 FALLS W/OUT INJ PAST YR: ICD-10-PCS | Mod: S$GLB,,, | Performed by: ORTHOPAEDIC SURGERY

## 2022-04-13 PROCEDURE — 99213 PR OFFICE/OUTPT VISIT, EST, LEVL III, 20-29 MIN: ICD-10-PCS | Mod: S$GLB,,, | Performed by: ORTHOPAEDIC SURGERY

## 2022-04-13 PROCEDURE — 3288F FALL RISK ASSESSMENT DOCD: CPT | Mod: S$GLB,,, | Performed by: ORTHOPAEDIC SURGERY

## 2022-04-13 PROCEDURE — 1125F AMNT PAIN NOTED PAIN PRSNT: CPT | Mod: S$GLB,,, | Performed by: ORTHOPAEDIC SURGERY

## 2022-04-13 PROCEDURE — 3288F PR FALLS RISK ASSESSMENT DOCUMENTED: ICD-10-PCS | Mod: S$GLB,,, | Performed by: ORTHOPAEDIC SURGERY

## 2022-04-13 PROCEDURE — 1159F MED LIST DOCD IN RCRD: CPT | Mod: S$GLB,,, | Performed by: ORTHOPAEDIC SURGERY

## 2022-04-13 PROCEDURE — 99213 OFFICE O/P EST LOW 20 MIN: CPT | Mod: S$GLB,,, | Performed by: ORTHOPAEDIC SURGERY

## 2022-04-13 PROCEDURE — 1159F PR MEDICATION LIST DOCUMENTED IN MEDICAL RECORD: ICD-10-PCS | Mod: S$GLB,,, | Performed by: ORTHOPAEDIC SURGERY

## 2022-04-13 PROCEDURE — 1125F PR PAIN SEVERITY QUANTIFIED, PAIN PRESENT: ICD-10-PCS | Mod: S$GLB,,, | Performed by: ORTHOPAEDIC SURGERY

## 2022-04-13 PROCEDURE — 1160F RVW MEDS BY RX/DR IN RCRD: CPT | Mod: S$GLB,,, | Performed by: ORTHOPAEDIC SURGERY

## 2022-04-13 RX ORDER — TRAMADOL HYDROCHLORIDE 50 MG/1
50 TABLET ORAL EVERY 6 HOURS PRN
Qty: 28 TABLET | Refills: 0 | Status: SHIPPED | OUTPATIENT
Start: 2022-04-13 | End: 2022-04-20

## 2022-04-13 RX ORDER — TERAZOSIN 1 MG/1
CAPSULE ORAL
COMMUNITY
Start: 2022-04-12 | End: 2022-05-05

## 2022-04-13 NOTE — PROGRESS NOTES
Subjective:       Patient ID: Darlin Tipton is a 81 y.o. female.    Chief Complaint: Pain of the Lumbar Spine (Lumbar pain follow up. States that her back is doing better) and Pain of the Right Knee (Right knee pain. Here for MRI results review)      History of Present Illness    Prior to meeting with the patient I reviewed the medical chart in [facility]. This included reviewing the previous progress notes from our office, review of the patient's last appointment with their primary care provider, review of any visits to the emergency room, and review of any pain management appointments or procedures.   Patient states back is feeling better however has continued complaints of knee pains he discuss results of the MRI pain hurts medially laterally and posteriorly.  Recently diagnosed with atrial fibrillation and now has been placed on Eliquis by Dr. Navarro office over the last couple of weeks    Current Medications  Current Outpatient Medications   Medication Sig Dispense Refill    acetaminophen (TYLENOL) 650 MG TbSR Take 650 mg by mouth once daily. 2 Tablet(s) Oral  Every day.      amiodarone (PACERONE) 200 MG Tab Take 1 tablet (200 mg total) by mouth 2 (two) times daily. 60 tablet 11    amitriptyline (ELAVIL) 50 MG tablet Take 1 tablet (50 mg total) by mouth every evening. 90 tablet 3    amLODIPine (NORVASC) 5 MG tablet Take 1 tablet (5 mg total) by mouth once daily. 30 tablet 11    apixaban (ELIQUIS) 5 mg Tab Take 1 tablet (5 mg total) by mouth 2 (two) times daily. 30 tablet 3    azelastine (ASTELIN) 137 mcg (0.1 %) nasal spray 1 spray (137 mcg total) by Nasal route 2 (two) times daily. 90 mL 3    blood sugar diagnostic (FREESTYLE LITE STRIPS) Strp USE TO TEST BLOOD SUGAR TWICE A  strip 3    canagliflozin (INVOKANA) 100 mg Tab tablet Take 1 tablet (100 mg total) by mouth once daily. 90 tablet 3    carboxymethylcellulose 1 % ophthalmic solution as directed      cetirizine (ZYRTEC) 10 MG tablet Take 10  mg by mouth once daily.      cholecalciferol, vitamin D3, (VITAMIN D3) 50 mcg (2,000 unit) Cap Take 1 capsule by mouth once daily.      coenzyme Q10 100 mg capsule Take 100 mg by mouth once daily.       cranberry extract 650 mg Cap Take 1 tablet by mouth 2 (two) times daily.      diclofenac sodium (VOLTAREN) 1 % Gel APPLY 2 G TOPICALLY 3 (THREE) TIMES DAILY. (Patient taking differently: 2 g 3 (three) times daily as needed. APPLY 2 G TOPICALLY 3 (THREE) TIMES DAILY.) 100 g 5    fluticasone propionate (FLONASE) 50 mcg/actuation nasal spray 1 spray (50 mcg total) by Each Nostril route once daily. 48 g 3    guaifenesin/dextromethorphan (TUSSIN DM ORAL) Take by mouth.      isosorbide mononitrate (IMDUR) 120 MG 24 hr tablet Take 120 mg by mouth once daily.      Lactobac no.41/Bifidobact no.7 (PROBIOTIC-10 ORAL) Take by mouth.      lancets Misc 1 Units by Misc.(Non-Drug; Combo Route) route 2 (two) times daily. 200 each 3    levothyroxine (LEVOTHROID) 25 MCG tablet Take 1 tablet (25 mcg total) by mouth before breakfast. Every day 90 tablet 3    mirabegron (MYRBETRIQ) 50 mg Tb24 Take 1 tablet (50 mg total) by mouth once daily. 90 tablet 3    nitroGLYCERIN (NITROSTAT) 0.4 MG SL tablet Place 0.4 mg under the tongue every 5 (five) minutes as needed. 0.4mg Sublingual PRN .        promethazine-codeine 6.25-10 mg/5 ml (PHENERGAN WITH CODEINE) 6.25-10 mg/5 mL syrup Take 5 mLs by mouth every 4 (four) hours as needed for Cough.      RESTASIS 0.05 % ophthalmic emulsion Place 1 drop into both eyes 2 (two) times daily.      rosuvastatin (CRESTOR) 10 MG tablet Take 1 tablet (10 mg total) by mouth once daily. 90 tablet 3    simvastatin (ZOCOR) 20 MG tablet Take 20 mg by mouth every evening.      temazepam (RESTORIL) 30 mg capsule Take 30 mg by mouth nightly as needed for Insomnia.      terazosin (HYTRIN) 2 MG capsule Take 1 capsule (2 mg total) by mouth every evening. 90 capsule 3    TOPROL XL 25 mg 24 hr tablet Take 1  tablet (25 mg total) by mouth once daily. 90 tablet 3    vitamins  A,C,E-zinc-copper [id number] unit-mg-unit Cap Take 1 capsule by mouth 2 (two) times daily.       aspirin (ECOTRIN) 81 MG EC tablet Take 81 mg by mouth once daily.      ciprofloxacin-dexamethasone 0.3-0.1% (CIPRODEX) 0.3-0.1 % DrpS Place 4 drops into both ears 2 (two) times daily. (Patient not taking: No sig reported) 7.5 mL 0    gabapentin (NEURONTIN) 300 MG capsule Take 1 capsule (300 mg total) by mouth 3 (three) times daily. 90 capsule 2    LIDOcaine 4 % PtMd Apply 1 patch topically once daily. (Patient not taking: No sig reported) 5 patch 1    magnesium oxide (MAG-OXIDE ORAL) Take by mouth.      ondansetron (ZOFRAN-ODT) 4 MG TbDL Take 1 tablet (4 mg total) by mouth every 8 (eight) hours as needed (nausea). (Patient not taking: No sig reported) 30 tablet 5    pramoxine-hydrocortisone (PROCTOCREAM-HC) 1-1 % rectal cream Place rectally 2 (two) times daily. (Patient not taking: No sig reported) 3 Tube 3    terazosin (HYTRIN) 1 MG capsule       traMADoL (ULTRAM) 50 mg tablet Take 1 tablet (50 mg total) by mouth every 6 (six) hours as needed. 28 tablet 0    triazolam (HALCION) 0.25 MG Tab Take 1 tablet (0.25 mg total) by mouth nightly as needed. (Patient not taking: No sig reported) 90 tablet 0     No current facility-administered medications for this visit.     Facility-Administered Medications Ordered in Other Visits   Medication Dose Route Frequency Provider Last Rate Last Admin    0.9%  NaCl infusion   Intravenous Continuous Bj Jones MD   Stopped at 02/05/20 1400       Allergies  Review of patient's allergies indicates:   Allergen Reactions    Sulfa (sulfonamide antibiotics) Rash    Floxacillin Itching    Januvia [sitagliptin] Other (See Comments)     Hot flashes    Tetracyclines Itching    Fenofibrate micronized Rash    Nitrofurantoin macrocrystalline Other (See Comments)     unknown    Phenylfenesin la  [phenylpropanolamine-gg] Rash       Past Medical History  Past Medical History:   Diagnosis Date    Allergy     Dust mites, Grasses, Trees    Arthritis     Asthma     Blood transfusion     CAD (coronary artery disease)     Cataract     CHRONIC BRONCHITIS     Diabetes mellitus     Diabetes mellitus type II     GERD (gastroesophageal reflux disease)     Hyperlipidemia     Hypertension     Irregular heart beat     Kidney disease     ckd stage 3  as stated by patient - to see MD    Post-menopausal bleeding 2018    Spinal stenosis     Thyroid disease     Hypothyroidism       Surgical History  Past Surgical History:   Procedure Laterality Date    APPENDECTOMY  1968    BLADDER SUSPENSION  1989    CARDIAC SURGERY  2016    CABG    CATARACT EXTRACTION  9/2007 (L) and 10/2207 (R)    COLONOSCOPY N/A 10/18/2017    Procedure: COLONOSCOPY;  Surgeon: Esme Acuna MD;  Location[facility];  Service: Endoscopy;  Laterality: N/A;    CORONARY ARTERY BYPASS GRAFT  4/26/2004    x5    ESOPHAGEAL DILATION      HYSTEROSCOPY WITH DILATION AND CURETTAGE OF UTERUS N/A 3/12/2020    Procedure: HYSTEROSCOPY, WITH DILATION AND CURETTAGE OF UTERUS;  Surgeon: Ramona Llanos MD;  [facility];  Service: OB/GYN;  Laterality: N/A;    SPINE SURGERY  3/2000    Tumor    TRANSFORAMINAL EPIDURAL INJECTION OF STEROID Right 11/21/2019    Procedure: Injection,steroid,epidural,transforaminal approach;  Surgeon: Bj Jones MD;  [facility]  Service: [facility] Management;  Laterality: Right;  L4-5, L5-S1    TRANSFORAMINAL EPIDURAL INJECTION OF STEROID Right 12/31/2019    Procedure: Injection,steroid,epidural,transforaminal approach;  Surgeon: Bj Jones MD;  [facility];  Service: Pain Management;  Laterality: Right;  L4-5, L5-S1    TRANSFORAMINAL EPIDURAL INJECTION OF STEROID Right 2/5/2020    Procedure: Injection,steroid,epidural,transforaminal approach;  Surgeon: Bj Jones MD;  [facility];  Service:  Pain Management;  Laterality: Right;  L4-5, L5-S1    TRANSFORAMINAL EPIDURAL INJECTION OF STEROID Left 1/27/2021    Procedure: Injection,steroid,epidural,transforaminal approach;  Surgeon: Bj Jones MD;  Location: [facility] OR;  Service: Pain Management;  Laterality: Left;  L4-5, L5-S1    TRANSFORAMINAL EPIDURAL INJECTION OF STEROID Right 3/4/2021    Procedure: Injection,steroid,epidural,transforaminal approach;  Surgeon: Bj Jones MD;  Location: [facility] OR;  Service: Pain Management;  Laterality: Right;  L4-L5, L5-S1    WRIST SURGERY  1993    carpal tunnel         Family History:   Family History   Problem Relation Age of Onset    Breast cancer Mother     Breast cancer Maternal Aunt     Allergic rhinitis Neg Hx     Allergies Neg Hx     Angioedema Neg Hx     Asthma Neg Hx     Eczema Neg Hx     Immunodeficiency Neg Hx     Urticaria Neg Hx     Rhinitis Neg Hx     Atopy Neg Hx        Social History:   Social History     Socioeconomic History    Marital status:    Tobacco Use    Smoking status: Never Smoker    Smokeless tobacco: Never Used   Substance and Sexual Activity    Alcohol use: Yes     Comment: Rare    Drug use: No    Sexual activity: Not Currently       Hospitalization/Major Diagnostic Procedure:     Review of Systems     General/Constitutional:  Chills denies. Fatigue denies. Fever denies. Weight gain denies. Weight loss denies.    Respiratory:  Shortness of breath denies.    Cardiovascular:  Chest pain denies.    Gastrointestinal:  Constipation denies. Diarrhea denies. Nausea denies. Vomiting denies.     Hematology:  Easy bruising denies. Prolonged bleeding denies.     Genitourinary:  Frequent urination denies. Pain in lower back denies. Painful urination denies.     Musculoskeletal:  See HPI for details    Skin:  Rash denies.    Neurologic:  Dizziness denies. Gait abnormalities denies. Seizures denies. Tingling/Numbess denies.    Psychiatric:  Anxiety denies. Depressed mood denies.      Objective:   Vital Signs: There were no vitals filed for this visit.     Physical Exam      General Examination:     Constitutional: The patient is alert and oriented to lace person and time. Mood is pleasant.     Head/Face: Normal facial features normal eyebrows    Eyes: Normal extraocular motion bilaterally    Lungs: Respirations are equal and unlabored    Gait is coordinated.    Cardiovascular: There are no swelling or varicosities present.    Lymphatic: Negative for adenopathy    Skin: Normal    Neurological: Level of consciousness normal. Oriented to place person and time and situation    Psychiatric: Oriented to time place person and situation    Valgus alignment right knee tender mediolateral joint lines full extension limited flexion positive Marcus's test  XRAY Report/ Interpretation:  MRI personally reviewed please see report for details mediolateral meniscus tear is noted and some mild tricompartmental osteoarthritis.      Assessment:       1. Degenerative tear of medial meniscus of right knee    2. Primary osteoarthritis of right knee    3. Degenerative tear of lateral meniscus of right knee    4. Atrial fibrillation, unspecified type        Plan:       Darlin was seen today for pain and pain.    Diagnoses and all orders for this visit:    Degenerative tear of medial meniscus of right knee  -     traMADoL (ULTRAM) 50 mg tablet; Take 1 tablet (50 mg total) by mouth every 6 (six) hours as needed.    Primary osteoarthritis of right knee  -     traMADoL (ULTRAM) 50 mg tablet; Take 1 tablet (50 mg total) by mouth every 6 (six) hours as needed.    Degenerative tear of lateral meniscus of right knee  -     traMADoL (ULTRAM) 50 mg tablet; Take 1 tablet (50 mg total) by mouth every 6 (six) hours as needed.    Atrial fibrillation, unspecified type         Follow up in about 4 weeks (around 5/11/2022) for RT knee f/u, sched sx.    Abdominal problems mediolateral meniscus tear as I do not think the  osteoarthritis is a cause of her symptoms because of the acuity of the onset of her knee pain she has recently been diagnosed with atrial fibrillation is now on Eliquis she is scheduled for a stress test in 2 weeks.  I advised that she come back in 1 month and then we make sure that she passive stress testing will have to get clarity from her cardiologist about whether she can temporarily discontinue Eliquis in order to have arthroscopic surgery of the right knee.  Patient will manage conservatively until then      Treatment options were discussed with regards to the nature of the medical condition. Conservative pain intervention and surgical options were discussed in detail. The probability of success of each separate treatment option was discussed. The patient expressed a clear understanding of the treatment options. With regards to surgery, the procedure risk, benefits, complications, and outcomes were discussed. No guarantees were given with regards to surgical outcome.   The risk of complications, morbidity, and mortality of patient management decisions have been made at the time of this visit. These are associated with the patient's problems, diagnostic procedures and treatment options. This includes the possible management options selected and those considered but not selected by the patient after shared medical decision making we discussed with the patient.     This note was created using Dragon voice recognition software that occasionally misinterpreted phrases or words.

## 2022-04-20 ENCOUNTER — OFFICE VISIT (OUTPATIENT)
Dept: CARDIOLOGY | Facility: CLINIC | Age: 82
End: 2022-04-20
Payer: MEDICARE

## 2022-04-20 ENCOUNTER — HOSPITAL ENCOUNTER (OUTPATIENT)
Dept: RADIOLOGY | Facility: HOSPITAL | Age: 82
Discharge: HOME | End: 2022-04-20
Attending: NURSE PRACTITIONER
Payer: MEDICARE

## 2022-04-20 VITALS
HEART RATE: 60 BPM | SYSTOLIC BLOOD PRESSURE: 120 MMHG | DIASTOLIC BLOOD PRESSURE: 60 MMHG | WEIGHT: 141 LBS | HEIGHT: 62 IN | BODY MASS INDEX: 25.95 KG/M2

## 2022-04-20 DIAGNOSIS — E11.21 TYPE 2 DIABETES MELLITUS WITH DIABETIC NEPHROPATHY, WITHOUT LONG-TERM CURRENT USE OF INSULIN: ICD-10-CM

## 2022-04-20 DIAGNOSIS — R06.09 DOE (DYSPNEA ON EXERTION): ICD-10-CM

## 2022-04-20 DIAGNOSIS — R05.9 COUGH: Primary | ICD-10-CM

## 2022-04-20 DIAGNOSIS — R05.9 COUGH: ICD-10-CM

## 2022-04-20 DIAGNOSIS — I48.0 PAROXYSMAL ATRIAL FIBRILLATION: ICD-10-CM

## 2022-04-20 DIAGNOSIS — Z95.1 HX OF CABG: ICD-10-CM

## 2022-04-20 DIAGNOSIS — E78.2 MIXED HYPERLIPIDEMIA: ICD-10-CM

## 2022-04-20 DIAGNOSIS — E07.9 THYROID DYSFUNCTION: ICD-10-CM

## 2022-04-20 DIAGNOSIS — R09.89 LEFT CAROTID BRUIT: ICD-10-CM

## 2022-04-20 PROCEDURE — 93000 ELECTROCARDIOGRAM COMPLETE: CPT | Mod: S$GLB,,, | Performed by: INTERNAL MEDICINE

## 2022-04-20 PROCEDURE — 1101F PR PT FALLS ASSESS DOC 0-1 FALLS W/OUT INJ PAST YR: ICD-10-PCS | Mod: CPTII,S$GLB,, | Performed by: NURSE PRACTITIONER

## 2022-04-20 PROCEDURE — 3078F PR MOST RECENT DIASTOLIC BLOOD PRESSURE < 80 MM HG: ICD-10-PCS | Mod: CPTII,S$GLB,, | Performed by: NURSE PRACTITIONER

## 2022-04-20 PROCEDURE — 71046 X-RAY EXAM CHEST 2 VIEWS: CPT | Mod: TC

## 2022-04-20 PROCEDURE — 3074F PR MOST RECENT SYSTOLIC BLOOD PRESSURE < 130 MM HG: ICD-10-PCS | Mod: CPTII,S$GLB,, | Performed by: NURSE PRACTITIONER

## 2022-04-20 PROCEDURE — 1126F PR PAIN SEVERITY QUANTIFIED, NO PAIN PRESENT: ICD-10-PCS | Mod: CPTII,S$GLB,, | Performed by: NURSE PRACTITIONER

## 2022-04-20 PROCEDURE — 3074F SYST BP LT 130 MM HG: CPT | Mod: CPTII,S$GLB,, | Performed by: NURSE PRACTITIONER

## 2022-04-20 PROCEDURE — 99214 PR OFFICE/OUTPT VISIT, EST, LEVL IV, 30-39 MIN: ICD-10-PCS | Mod: S$GLB,,, | Performed by: NURSE PRACTITIONER

## 2022-04-20 PROCEDURE — 3288F FALL RISK ASSESSMENT DOCD: CPT | Mod: CPTII,S$GLB,, | Performed by: NURSE PRACTITIONER

## 2022-04-20 PROCEDURE — 1101F PT FALLS ASSESS-DOCD LE1/YR: CPT | Mod: CPTII,S$GLB,, | Performed by: NURSE PRACTITIONER

## 2022-04-20 PROCEDURE — 93000 EKG 12-LEAD: ICD-10-PCS | Mod: S$GLB,,, | Performed by: INTERNAL MEDICINE

## 2022-04-20 PROCEDURE — 1126F AMNT PAIN NOTED NONE PRSNT: CPT | Mod: CPTII,S$GLB,, | Performed by: NURSE PRACTITIONER

## 2022-04-20 PROCEDURE — 3288F PR FALLS RISK ASSESSMENT DOCUMENTED: ICD-10-PCS | Mod: CPTII,S$GLB,, | Performed by: NURSE PRACTITIONER

## 2022-04-20 PROCEDURE — 99214 OFFICE O/P EST MOD 30 MIN: CPT | Mod: S$GLB,,, | Performed by: NURSE PRACTITIONER

## 2022-04-20 PROCEDURE — 3078F DIAST BP <80 MM HG: CPT | Mod: CPTII,S$GLB,, | Performed by: NURSE PRACTITIONER

## 2022-04-20 NOTE — ASSESSMENT & PLAN NOTE
Currently in NSR  Will switch Amiodarone to Multaq because of side effect profile on Amidarone  Continue Eliquis 5 mg PO BID

## 2022-04-20 NOTE — ASSESSMENT & PLAN NOTE
Currently with no angina she is medically managed  Continue simvastatin 20 mg every night and aspirin 81 daily

## 2022-04-20 NOTE — PROGRESS NOTES
Subjective:    Patient ID:  Darlin Tipton is a 81 y.o. female patient here for evaluation Atrial Fibrillation, Hyperlipidemia, and Fatigue      History of Present Illness:         Ms. Tipton is here today for her follow up visit. She is back in NSR. She does have a cough and has some SOB walking.  She does have allergies and attributes some of this to that.  Now that she is in regular sinus rhythm I may consider transferring her to [facility].  She is on Eliquis with no bleeding tendencies.  She is in need for knee surgery and is having a stress test on May 4th.    Review of patient's allergies indicates:   Allergen Reactions    Sulfa (sulfonamide antibiotics) Rash    Floxacillin Itching    Januvia [sitagliptin] Other (See Comments)     Hot flashes    Tetracyclines Itching    Fenofibrate micronized Rash    Nitrofurantoin macrocrystalline Other (See Comments)     unknown    Phenylfenesin la [phenylpropanolamine-gg] Rash       Past Medical History:   Diagnosis Date    Allergy     Dust mites, Grasses, Trees    Arthritis     Asthma     Blood transfusion     CAD (coronary artery disease)     Cataract     CHRONIC BRONCHITIS     Diabetes mellitus     Diabetes mellitus type II     GERD (gastroesophageal reflux disease)     Hyperlipidemia     Hypertension     Irregular heart beat     Kidney disease     ckd stage 3  as stated by patient - to see MD    Post-menopausal bleeding 2018    Spinal stenosis     Thyroid disease     Hypothyroidism     Past Surgical History:   Procedure Laterality Date    APPENDECTOMY  1968    BLADDER SUSPENSION  1989    CARDIAC SURGERY  2016    CABG    CATARACT EXTRACTION  9/2007 (L) and 10/2207 (R)    COLONOSCOPY N/A 10/18/2017    Procedure: COLONOSCOPY;  Surgeon: Esme Acuna MD;  Location: [facility];  Service: Endoscopy;  Laterality: N/A;    CORONARY ARTERY BYPASS GRAFT  4/26/2004    x5    ESOPHAGEAL DILATION      HYSTEROSCOPY WITH DILATION AND CURETTAGE OF UTERUS  N/A 3/12/2020    Procedure: HYSTEROSCOPY, WITH DILATION AND CURETTAGE OF UTERUS;  Surgeon: Ramona Llanos MD;  Location: [facility] OR;  Service: OB/GYN;  Laterality: N/A;    SPINE SURGERY  3/2000    Tumor    TRANSFORAMINAL EPIDURAL INJECTION OF STEROID Right 11/21/2019    Procedure: Injection,steroid,epidural,transforaminal approach;  Surgeon: Bj Jones MD;  Location: [facility] OR;  Service: Pain Management;  Laterality: Right;  L4-5, L5-S1    TRANSFORAMINAL EPIDURAL INJECTION OF STEROID Right 12/31/2019    Procedure: Injection,steroid,epidural,transforaminal approach;  Surgeon: Bj Jones MD;  Location: [facility] OR;  Service: Pain Management;  Laterality: Right;  L4-5, L5-S1    TRANSFORAMINAL EPIDURAL INJECTION OF STEROID Right 2/5/2020    Procedure: Injection,steroid,epidural,transforaminal approach;  Surgeon: Bj Jones MD;  Location: [facility];  Service: Pain Management;  Laterality: Right;  L4-5, L5-S1    TRANSFORAMINAL EPIDURAL INJECTION OF STEROID Left 1/27/2021    Procedure: Injection,steroid,epidural,transforaminal approach;  Surgeon: Bj Jones MD;  Location: [facility] OR;  Service: Pain Management;  Laterality: Left;  L4-5, L5-S1    TRANSFORAMINAL EPIDURAL INJECTION OF STEROID Right 3/4/2021    Procedure: Injection,steroid,epidural,transforaminal approach;  Surgeon: Bj Jones MD;  Location: [facility];  Service: Pain Management;  Laterality: Right;  L4-L5, L5-S1    WRIST SURGERY  1993    carpal tunnel       Social History     Tobacco Use    Smoking status: Never Smoker    Smokeless tobacco: Never Used   Substance Use Topics    Alcohol use: Yes     Comment: Rare    Drug use: No        Review of Systems:    As noted in HPI in addition      REVIEW OF SYSTEMS  CARDIOVASCULAR: No recent chest pain, palpitations, arm, neck, or jaw pain  RESPIRATORY: +SOB with exertion  : No blood in the urine  GI: No Nausea, vomiting, constipation, diarrhea, blood, or reflux.  MUSCULOSKELETAL: No myalgias  NEURO: No  lightheadedness or dizziness  EYES: No Double vision, blurry, vision or headache              Objective        Vitals:    04/20/22 1124   BP: 120/60   Pulse: 60       LIPIDS - LAST 2   Lab Results   Component Value Date    CHOL 127 11/16/2021    CHOL 165 12/08/2020    HDL 22 (L) 11/16/2021    HDL 51 12/08/2020    LDLCALC 42.8 (L) 11/16/2021    LDLCALC 99.4 12/08/2020    TRIG 311 (H) 11/16/2021    TRIG 73 12/08/2020    CHOLHDL 17.3 (L) 11/16/2021    CHOLHDL 30.9 12/08/2020       CBC - LAST 2  Lab Results   Component Value Date    WBC 5.54 03/21/2022    WBC 6.82 12/17/2021    RBC 4.15 03/21/2022    RBC 4.16 12/17/2021    HGB 11.8 (L) 03/21/2022    HGB 12.1 12/17/2021    HCT 37.2 03/21/2022    HCT 37.5 12/17/2021    MCV 90 03/21/2022    MCV 90 12/17/2021    MCH 28.4 03/21/2022    MCH 29.1 12/17/2021    MCHC 31.7 (L) 03/21/2022    MCHC 32.3 12/17/2021    RDW 13.2 03/21/2022    RDW 13.4 12/17/2021     03/21/2022     12/17/2021    MPV 11.2 03/21/2022    MPV 10.8 12/17/2021    GRAN 3.5 03/21/2022    GRAN 62.4 03/21/2022    LYMPH 1.4 03/21/2022    LYMPH 24.5 03/21/2022    MONO 0.6 03/21/2022    MONO 11.2 03/21/2022    BASO 0.02 03/21/2022    BASO 0.03 12/17/2021    NRBC 0 03/21/2022    NRBC 0 12/17/2021       CHEMISTRY & LIVER FUNCTION - LAST 2  Lab Results   Component Value Date     03/21/2022     12/17/2021    K 4.2 03/21/2022    K 4.4 12/17/2021    CL 98 03/21/2022     12/17/2021    CO2 27 03/21/2022    CO2 26 12/17/2021    ANIONGAP 12 03/21/2022    ANIONGAP 8 12/17/2021    BUN 34 (H) 03/21/2022    BUN 37 (H) 12/17/2021    CREATININE 0.8 03/21/2022    CREATININE 1.1 03/17/2022     (H) 03/21/2022     (H) 12/17/2021    CALCIUM 9.6 03/21/2022    CALCIUM 9.3 12/17/2021    MG 2.2 03/21/2022    MG 2.4 12/17/2021    ALBUMIN 4.4 03/21/2022    ALBUMIN 4.4 12/17/2021    PROT 7.1 03/21/2022    PROT 7.6 12/17/2021    ALKPHOS 50 (L) 03/21/2022    ALKPHOS 58 12/17/2021    ALT 25 03/21/2022     ALT 32 12/17/2021    AST 19 03/21/2022    AST 22 12/17/2021    BILITOT 0.7 03/21/2022    BILITOT 0.7 12/17/2021        CARDIAC PROFILE - LAST 2  Lab Results   Component Value Date     (H) 03/21/2022     (H) 12/17/2021    CPK 79 12/17/2021    TROPONINI <0.030 12/17/2021    TROPONINI 0.049 (H) 09/06/2016        COAGULATION - LAST 2  Lab Results   Component Value Date    INR 0.9 09/05/2016    INR 0.9 08/11/2015    APTT 22.1 08/11/2015       ENDOCRINE & PSA - LAST 2  Lab Results   Component Value Date    HGBA1C 6.3 (H) 11/16/2021    HGBA1C 6.5 (H) 04/20/2021    TSH 4.380 03/21/2022    TSH 2.950 09/21/2021        ECHOCARDIOGRAM RESULTS  Results for orders placed in visit on 10/11/21    Echo    Interpretation Summary  · The left ventricle is normal in size with mild concentric hypertrophy and low normal systolic function.  · The estimated ejection fraction is 52%.  · Grade I left ventricular diastolic dysfunction.  · Normal right ventricular size.  · Mild left atrial enlargement.  · There is mild aortic valve stenosis.  · Aortic valve area is 2.57 cm2; peak velocity is 2.17 m/s; mean gradient is 13 mmHg.  · Mild mitral regurgitation.  · Mild tricuspid regurgitation.  · Normal central venous pressure (3 mmHg).  · The estimated PA systolic pressure is 23 mmHg.      CURRENT/PREVIOUS VISIT EKG  Results for orders placed or performed in visit on 03/28/22   IN OFFICE EKG 12-LEAD (to [place])    Collection Time: 03/28/22 10:29 AM    Narrative    Test Reason : I48.91,    Vent. Rate : 058 BPM     Atrial Rate : 058 BPM     P-R Int : 224 ms          QRS Dur : 096 ms      QT Int : 432 ms       P-R-T Axes : 000 -17 061 degrees     QTc Int : 424 ms    Sinus bradycardia with 1st degree A-V block  Nonspecific ST and T wave abnormality  Abnormal ECG  When compared with ECG of 17-DEC-2021 18:14,  No significant change was found  Confirmed by Akhil Coleman MD ([id number]) on 4/10/2022 8:24:14 PM    Referred By:  SKYLAR            Confirmed By:Akhil Coleman MD       PHYSICAL EXAM  CONSTITUTIONAL: Well built, well nourished in no apparent distress  NECK: Left Carotid Bruit  LUNGS: CTA  CHEST WALL: no tenderness  HEART: regular rate and rhythm, S1, S2 normal, soft systolic murmur  ABDOMEN: soft, non-tender; bowel sounds normal; no masses,  no organomegaly  EXTREMITIES: Extremities normal, no edema, no calf tenderness noted  NEURO: AAO X 3    I HAVE REVIEWED :    The vital signs, nurses notes, and all the pertinent radiology and labs.        Current Outpatient Medications   Medication Instructions    acetaminophen (TYLENOL) 650 mg, Oral, Daily, 2 Tablet(s) Oral  Every day.    amitriptyline (ELAVIL) 50 mg, Oral, Nightly    amLODIPine (NORVASC) 5 mg, Oral, Daily    apixaban (ELIQUIS) 5 mg, Oral, 2 times daily    azelastine (ASTELIN) 137 mcg, Nasal, 2 times daily    blood sugar diagnostic (FREESTYLE LITE STRIPS) Strp USE TO TEST BLOOD SUGAR TWICE A DAY    canagliflozin (INVOKANA) 100 mg, Oral, Daily    carboxymethylcellulose 1 % ophthalmic solution as directed    cetirizine (ZYRTEC) 10 mg, Oral, Daily    cholecalciferol, vitamin D3, (VITAMIN D3) 50 mcg (2,000 unit) Cap 1 capsule, Oral, Daily    ciprofloxacin-dexamethasone 0.3-0.1% (CIPRODEX) 0.3-0.1 % DrpS 4 drops, Both Ears, 2 times daily    coenzyme Q10 100 mg, Oral, Daily    cranberry extract 650 mg Cap 1 tablet, Oral, 2 times daily,      diclofenac sodium (VOLTAREN) 1 % Gel APPLY 2 G TOPICALLY 3 (THREE) TIMES DAILY.    dronedarone (MULTAQ) 400 mg, Oral, 2 times daily with meals    fluticasone propionate (FLONASE) 50 mcg, Each Nostril, Daily    gabapentin (NEURONTIN) 300 mg, Oral, 3 times daily    guaifenesin/dextromethorphan (TUSSIN DM ORAL) Oral    isosorbide mononitrate (IMDUR) 120 mg, Oral, Daily    Lactobac no.41/Bifidobact no.7 (PROBIOTIC-10 ORAL) Oral    lancets Misc 1 Units, Misc.(Non-Drug; Combo Route), 2 times daily    levothyroxine (LEVOTHROID) 25 mcg, Oral,  Before breakfast, Every day    LIDOcaine 4 % PtMd 1 patch, Topical (Top), Daily    magnesium oxide (MAG-OXIDE ORAL) 400 mg, Oral, Daily    MYRBETRIQ 50 mg, Oral, Daily    nitroGLYCERIN (NITROSTAT) 0.4 mg, Sublingual, Every 5 min PRN, 0.4mg Sublingual PRN .    pramoxine-hydrocortisone (PROCTOCREAM-HC) 1-1 % rectal cream Rectal, 2 times daily    promethazine-codeine 6.25-10 mg/5 ml (PHENERGAN WITH CODEINE) 6.25-10 mg/5 mL syrup 5 mLs, Oral, Every 4 hours PRN    RESTASIS 0.05 % ophthalmic emulsion 1 drop, Both Eyes, 2 times daily    rosuvastatin (CRESTOR) 10 mg, Oral, Daily    temazepam (RESTORIL) 30 mg, Oral, Nightly PRN    terazosin (HYTRIN) 1 MG capsule No dose, route, or frequency recorded.    terazosin (HYTRIN) 2 mg, Oral, Nightly    TOPROL XL 25 mg, Oral, Daily    traMADoL (ULTRAM) 50 mg, Oral, Every 6 hours PRN    triazolam (HALCION) 0.25 mg, Oral, Nightly PRN    vitamins  A,C,E-zinc-copper [phone] unit-mg-unit Cap 1 capsule, Oral, 2 times daily          Assessment & Plan     Mixed hyperlipidemia  Controlled on present regimen  Check liver and lipid profile    ALVAREZ (dyspnea on exertion)  Currently in NSR  Obtain Ally Myoview    CABG 2004  Currently with no angina she is medically managed  Continue simvastatin 20 mg every night and aspirin 81 daily    Left carotid bruit  Obtain Carotid US    Cough  Obtain CXR     Paroxysmal atrial fibrillation  Currently in NSR  Will switch Amiodarone to Multaq because of side effect profile on Amidarone  Continue Eliquis 5 mg PO BID            No follow-ups on file.

## 2022-04-25 ENCOUNTER — PATIENT MESSAGE (OUTPATIENT)
Dept: CARDIOLOGY | Facility: CLINIC | Age: 82
End: 2022-04-25
Payer: MEDICARE

## 2022-05-04 ENCOUNTER — HOSPITAL ENCOUNTER (OUTPATIENT)
Dept: RADIOLOGY | Facility: CLINIC | Age: 82
Discharge: HOME | End: 2022-05-04
Attending: NURSE PRACTITIONER
Payer: MEDICARE

## 2022-05-04 ENCOUNTER — HOSPITAL ENCOUNTER (OUTPATIENT)
Dept: CARDIOLOGY | Facility: CLINIC | Age: 82
Discharge: HOME | End: 2022-05-04
Attending: NURSE PRACTITIONER
Payer: MEDICARE

## 2022-05-04 DIAGNOSIS — R06.02 SOB (SHORTNESS OF BREATH) ON EXERTION: ICD-10-CM

## 2022-05-04 PROCEDURE — 78452 STRESS TEST WITH MYOCARDIAL PERFUSION (CUPID ONLY): ICD-10-PCS | Mod: S$GLB,,, | Performed by: INTERNAL MEDICINE

## 2022-05-04 PROCEDURE — 78452 HT MUSCLE IMAGE SPECT MULT: CPT | Mod: S$GLB,,, | Performed by: INTERNAL MEDICINE

## 2022-05-04 PROCEDURE — 93015 CV STRESS TEST SUPVJ I&R: CPT | Mod: S$GLB,,, | Performed by: INTERNAL MEDICINE

## 2022-05-04 PROCEDURE — 93015 STRESS TEST WITH MYOCARDIAL PERFUSION (CUPID ONLY): ICD-10-PCS | Mod: S$GLB,,, | Performed by: INTERNAL MEDICINE

## 2022-05-04 PROCEDURE — A9502 TC99M TETROFOSMIN: HCPCS | Mod: S$GLB,,, | Performed by: INTERNAL MEDICINE

## 2022-05-04 PROCEDURE — A9502 STRESS TEST WITH MYOCARDIAL PERFUSION (CUPID ONLY): ICD-10-PCS | Mod: S$GLB,,, | Performed by: INTERNAL MEDICINE

## 2022-05-04 RX ORDER — REGADENOSON 0.08 MG/ML
0.4 INJECTION, SOLUTION INTRAVENOUS ONCE
Status: COMPLETED | OUTPATIENT
Start: 2022-05-04 | End: 2022-05-04

## 2022-05-04 RX ADMIN — REGADENOSON 0.4 MG: 0.08 INJECTION, SOLUTION INTRAVENOUS at 09:05

## 2022-05-05 ENCOUNTER — TELEPHONE (OUTPATIENT)
Dept: CARDIOLOGY | Facility: CLINIC | Age: 82
End: 2022-05-05
Payer: MEDICARE

## 2022-05-05 ENCOUNTER — PATIENT MESSAGE (OUTPATIENT)
Dept: CARDIOLOGY | Facility: CLINIC | Age: 82
End: 2022-05-05
Payer: MEDICARE

## 2022-05-05 ENCOUNTER — OFFICE VISIT (OUTPATIENT)
Dept: FAMILY MEDICINE | Facility: CLINIC | Age: 82
End: 2022-05-05
Payer: MEDICARE

## 2022-05-05 VITALS
HEIGHT: 62 IN | BODY MASS INDEX: 26.13 KG/M2 | OXYGEN SATURATION: 95 % | HEART RATE: 58 BPM | DIASTOLIC BLOOD PRESSURE: 70 MMHG | TEMPERATURE: 98 F | SYSTOLIC BLOOD PRESSURE: 110 MMHG | WEIGHT: 142 LBS

## 2022-05-05 DIAGNOSIS — I25.119 ATHEROSCLEROSIS OF NATIVE CORONARY ARTERY OF NATIVE HEART WITH ANGINA PECTORIS: ICD-10-CM

## 2022-05-05 DIAGNOSIS — K21.9 GASTROESOPHAGEAL REFLUX DISEASE WITHOUT ESOPHAGITIS: ICD-10-CM

## 2022-05-05 DIAGNOSIS — N18.32 STAGE 3B CHRONIC KIDNEY DISEASE: ICD-10-CM

## 2022-05-05 DIAGNOSIS — I48.0 PAROXYSMAL ATRIAL FIBRILLATION: ICD-10-CM

## 2022-05-05 DIAGNOSIS — E11.21 TYPE 2 DIABETES MELLITUS WITH DIABETIC NEPHROPATHY, WITHOUT LONG-TERM CURRENT USE OF INSULIN: Primary | ICD-10-CM

## 2022-05-05 DIAGNOSIS — J45.30 MILD PERSISTENT ASTHMA WITHOUT COMPLICATION: ICD-10-CM

## 2022-05-05 DIAGNOSIS — G47.00 INSOMNIA, UNSPECIFIED TYPE: ICD-10-CM

## 2022-05-05 DIAGNOSIS — E78.2 MIXED HYPERLIPIDEMIA: ICD-10-CM

## 2022-05-05 LAB
CV PHARM DOSE: 0.4 MG
CV STRESS BASE HR: 71 BPM
DIASTOLIC BLOOD PRESSURE: 68 MMHG
NUC STRESS DIASTOLIC VOLUME INDEX: 93
NUC STRESS EJECTION FRACTION: 53 %
NUC STRESS SYSTOLIC VOLUME INDEX: 43
OHS CV CPX 1 MINUTE RECOVERY HEART RATE: 77 BPM
OHS CV CPX 85 PERCENT MAX PREDICTED HEART RATE MALE: 115
OHS CV CPX MAX PREDICTED HEART RATE: 135
OHS CV CPX PATIENT IS FEMALE: 1
OHS CV CPX PATIENT IS MALE: 0
OHS CV CPX PEAK DIASTOLIC BLOOD PRESSURE: 60 MMHG
OHS CV CPX PEAK HEAR RATE: 77 BPM
OHS CV CPX PEAK RATE PRESSURE PRODUCT: 9240
OHS CV CPX PEAK SYSTOLIC BLOOD PRESSURE: 120 MMHG
OHS CV CPX PERCENT MAX PREDICTED HEART RATE ACHIEVED: 57
OHS CV CPX RATE PRESSURE PRODUCT PRESENTING: 9372
SYSTOLIC BLOOD PRESSURE: 132 MMHG

## 2022-05-05 PROCEDURE — 99214 PR OFFICE/OUTPT VISIT, EST, LEVL IV, 30-39 MIN: ICD-10-PCS | Mod: S$GLB,,, | Performed by: FAMILY MEDICINE

## 2022-05-05 PROCEDURE — 99999 PR PBB SHADOW E&M-EST. PATIENT-LVL III: CPT | Mod: PBBFAC,,, | Performed by: FAMILY MEDICINE

## 2022-05-05 PROCEDURE — 3288F FALL RISK ASSESSMENT DOCD: CPT | Mod: CPTII,S$GLB,, | Performed by: FAMILY MEDICINE

## 2022-05-05 PROCEDURE — 1101F PT FALLS ASSESS-DOCD LE1/YR: CPT | Mod: CPTII,S$GLB,, | Performed by: FAMILY MEDICINE

## 2022-05-05 PROCEDURE — 99999 PR PBB SHADOW E&M-EST. PATIENT-LVL III: ICD-10-PCS | Mod: PBBFAC,,, | Performed by: FAMILY MEDICINE

## 2022-05-05 PROCEDURE — 3288F PR FALLS RISK ASSESSMENT DOCUMENTED: ICD-10-PCS | Mod: CPTII,S$GLB,, | Performed by: FAMILY MEDICINE

## 2022-05-05 PROCEDURE — 1125F PR PAIN SEVERITY QUANTIFIED, PAIN PRESENT: ICD-10-PCS | Mod: CPTII,S$GLB,, | Performed by: FAMILY MEDICINE

## 2022-05-05 PROCEDURE — 99214 OFFICE O/P EST MOD 30 MIN: CPT | Mod: S$GLB,,, | Performed by: FAMILY MEDICINE

## 2022-05-05 PROCEDURE — 3078F PR MOST RECENT DIASTOLIC BLOOD PRESSURE < 80 MM HG: ICD-10-PCS | Mod: CPTII,S$GLB,, | Performed by: FAMILY MEDICINE

## 2022-05-05 PROCEDURE — 3074F SYST BP LT 130 MM HG: CPT | Mod: CPTII,S$GLB,, | Performed by: FAMILY MEDICINE

## 2022-05-05 PROCEDURE — 3074F PR MOST RECENT SYSTOLIC BLOOD PRESSURE < 130 MM HG: ICD-10-PCS | Mod: CPTII,S$GLB,, | Performed by: FAMILY MEDICINE

## 2022-05-05 PROCEDURE — 1101F PR PT FALLS ASSESS DOC 0-1 FALLS W/OUT INJ PAST YR: ICD-10-PCS | Mod: CPTII,S$GLB,, | Performed by: FAMILY MEDICINE

## 2022-05-05 PROCEDURE — 3078F DIAST BP <80 MM HG: CPT | Mod: CPTII,S$GLB,, | Performed by: FAMILY MEDICINE

## 2022-05-05 PROCEDURE — 1125F AMNT PAIN NOTED PAIN PRSNT: CPT | Mod: CPTII,S$GLB,, | Performed by: FAMILY MEDICINE

## 2022-05-05 RX ORDER — ISOSORBIDE MONONITRATE 60 MG/1
60 TABLET, EXTENDED RELEASE ORAL NIGHTLY
COMMUNITY
End: 2022-06-15

## 2022-05-05 RX ORDER — TRIAZOLAM 0.12 MG/1
0.12 TABLET ORAL NIGHTLY
COMMUNITY
End: 2022-05-05 | Stop reason: SDUPTHER

## 2022-05-05 RX ORDER — HYDROCORTISONE ACETATE PRAMOXINE HCL 1; 1 G/100G; G/100G
CREAM TOPICAL 2 TIMES DAILY
Qty: 3 EACH | Refills: 3 | Status: SHIPPED | OUTPATIENT
Start: 2022-05-05 | End: 2023-04-07

## 2022-05-05 RX ORDER — TRIAZOLAM 0.25 MG/1
0.25 TABLET ORAL NIGHTLY PRN
Qty: 90 TABLET | Refills: 1 | Status: SHIPPED | OUTPATIENT
Start: 2022-05-05 | End: 2022-10-17 | Stop reason: SDUPTHER

## 2022-05-06 ENCOUNTER — OFFICE VISIT (OUTPATIENT)
Dept: CARDIOLOGY | Facility: CLINIC | Age: 82
End: 2022-05-06
Payer: MEDICARE

## 2022-05-06 VITALS
DIASTOLIC BLOOD PRESSURE: 58 MMHG | WEIGHT: 146 LBS | HEIGHT: 62 IN | BODY MASS INDEX: 26.87 KG/M2 | HEART RATE: 59 BPM | SYSTOLIC BLOOD PRESSURE: 116 MMHG | OXYGEN SATURATION: 97 %

## 2022-05-06 DIAGNOSIS — I48.0 PAROXYSMAL ATRIAL FIBRILLATION: ICD-10-CM

## 2022-05-06 DIAGNOSIS — E78.5 DYSLIPIDEMIA: ICD-10-CM

## 2022-05-06 DIAGNOSIS — Z95.1 HX OF CABG: ICD-10-CM

## 2022-05-06 DIAGNOSIS — R06.09 DOE (DYSPNEA ON EXERTION): ICD-10-CM

## 2022-05-06 DIAGNOSIS — R94.39 POSITIVE CARDIAC STRESS TEST: Primary | ICD-10-CM

## 2022-05-06 DIAGNOSIS — R09.89 LEFT CAROTID BRUIT: ICD-10-CM

## 2022-05-06 DIAGNOSIS — I20.89 STABLE ANGINA PECTORIS: ICD-10-CM

## 2022-05-06 PROCEDURE — 1160F PR REVIEW ALL MEDS BY PRESCRIBER/CLIN PHARMACIST DOCUMENTED: ICD-10-PCS | Mod: CPTII,S$GLB,, | Performed by: NURSE PRACTITIONER

## 2022-05-06 PROCEDURE — 1159F PR MEDICATION LIST DOCUMENTED IN MEDICAL RECORD: ICD-10-PCS | Mod: CPTII,S$GLB,, | Performed by: NURSE PRACTITIONER

## 2022-05-06 PROCEDURE — 3074F PR MOST RECENT SYSTOLIC BLOOD PRESSURE < 130 MM HG: ICD-10-PCS | Mod: CPTII,S$GLB,, | Performed by: NURSE PRACTITIONER

## 2022-05-06 PROCEDURE — 1159F MED LIST DOCD IN RCRD: CPT | Mod: CPTII,S$GLB,, | Performed by: NURSE PRACTITIONER

## 2022-05-06 PROCEDURE — 1101F PT FALLS ASSESS-DOCD LE1/YR: CPT | Mod: CPTII,S$GLB,, | Performed by: NURSE PRACTITIONER

## 2022-05-06 PROCEDURE — 3288F PR FALLS RISK ASSESSMENT DOCUMENTED: ICD-10-PCS | Mod: CPTII,S$GLB,, | Performed by: NURSE PRACTITIONER

## 2022-05-06 PROCEDURE — 99214 PR OFFICE/OUTPT VISIT, EST, LEVL IV, 30-39 MIN: ICD-10-PCS | Mod: S$GLB,,, | Performed by: NURSE PRACTITIONER

## 2022-05-06 PROCEDURE — 3078F DIAST BP <80 MM HG: CPT | Mod: CPTII,S$GLB,, | Performed by: NURSE PRACTITIONER

## 2022-05-06 PROCEDURE — 1101F PR PT FALLS ASSESS DOC 0-1 FALLS W/OUT INJ PAST YR: ICD-10-PCS | Mod: CPTII,S$GLB,, | Performed by: NURSE PRACTITIONER

## 2022-05-06 PROCEDURE — 99214 OFFICE O/P EST MOD 30 MIN: CPT | Mod: S$GLB,,, | Performed by: NURSE PRACTITIONER

## 2022-05-06 PROCEDURE — 3078F PR MOST RECENT DIASTOLIC BLOOD PRESSURE < 80 MM HG: ICD-10-PCS | Mod: CPTII,S$GLB,, | Performed by: NURSE PRACTITIONER

## 2022-05-06 PROCEDURE — 1160F RVW MEDS BY RX/DR IN RCRD: CPT | Mod: CPTII,S$GLB,, | Performed by: NURSE PRACTITIONER

## 2022-05-06 PROCEDURE — 3288F FALL RISK ASSESSMENT DOCD: CPT | Mod: CPTII,S$GLB,, | Performed by: NURSE PRACTITIONER

## 2022-05-06 PROCEDURE — 3074F SYST BP LT 130 MM HG: CPT | Mod: CPTII,S$GLB,, | Performed by: NURSE PRACTITIONER

## 2022-05-06 RX ORDER — DIPHENHYDRAMINE HCL 25 MG
50 CAPSULE ORAL
Status: CANCELLED | OUTPATIENT
Start: 2022-05-06

## 2022-05-06 RX ORDER — SODIUM CHLORIDE 9 MG/ML
INJECTION, SOLUTION INTRAVENOUS ONCE
Status: CANCELLED | OUTPATIENT
Start: 2022-05-06 | End: 2022-05-06

## 2022-05-06 NOTE — ASSESSMENT & PLAN NOTE
Discussed with patient the risks benefits and options and risks include but not limited to bleeding, vascular damage, renal failure, stroke, myocardial infarction, emergency bypass surgery and death.  All questions have been answered to patient's satisfaction.  Patient has voiced understanding of the procedure and agrees to move forward.  Informed Consent Obtained  See orders for further details.  Hold Eliquis 3 days prior    Patient is in need of knee surgery  She will need angiographic assessment for more definitive diagnosis as stress test indicates she has a reversible ischemia in the anterior wall.  Patient understands of stent is placed she will need to wait 6 months therefore if lesion is not critical and patient could make it through surgery we could stage this.  Continue Imdur 60 mg daily  Continue Crestor 10 mg every night

## 2022-05-06 NOTE — PROGRESS NOTES
Subjective:    Patient ID:  Darlin Tipton is a 81 y.o. female patient here for evaluation Results (stress)      History of Present Illness:       Ms. Lin is here today for her follow-up visit.  She is here to obtain the recent results of her stress test.  She had a stress test because she was in need of cardiac clearance.  She has also been having some fatigue and tiredness some shortness of breath with exertion and anginal like symptoms.  Stress test shows some reversible ischemia in the anterior wall.  She is needing to take breaks when doing things.  She cannot do what she wants was able to.  She is also here to obtain the results of her carotid ultrasound I do not have these results available today as she only had this done 2 days ago.  She understands this.  Once available I will make her aware.  Because she needs surgery we will need to do an angiographic assessment and if lesion is not extreme a knee day try to make it through surgery and then post surgery come back for stenting if able.      Review of patient's allergies indicates:   Allergen Reactions    Sulfa (sulfonamide antibiotics) Rash    Floxacillin Itching    Januvia [sitagliptin] Other (See Comments)     Hot flashes    Tetracyclines Itching    Fenofibrate micronized Rash    Nitrofurantoin macrocrystalline Other (See Comments)     unknown    Phenylfenesin la [phenylpropanolamine-gg] Rash       Past Medical History:   Diagnosis Date    Allergy     Dust mites, Grasses, Trees    Arthritis     Asthma     Blood transfusion     CAD (coronary artery disease)     Cataract     CHRONIC BRONCHITIS     Diabetes mellitus     Diabetes mellitus type II     GERD (gastroesophageal reflux disease)     Hyperlipidemia     Hypertension     Irregular heart beat     Kidney disease     ckd stage 3  as stated by patient - to see MD    Post-menopausal bleeding 2018    Spinal stenosis     Thyroid disease     Hypothyroidism     Past Surgical  History:   Procedure Laterality Date    APPENDECTOMY  1968    BLADDER SUSPENSION  1989    CARDIAC SURGERY  2016    CABG    CATARACT EXTRACTION  9/2007 (L) and 10/2207 (R)    COLONOSCOPY N/A 10/18/2017    Procedure: COLONOSCOPY;  Surgeon: Esme Acuna MD;  [facility];  Service: Endoscopy;  Laterality: N/A;    CORONARY ARTERY BYPASS GRAFT  4/26/2004    x5    ESOPHAGEAL DILATION      HYSTEROSCOPY WITH DILATION AND CURETTAGE OF UTERUS N/A 3/12/2020    Procedure: HYSTEROSCOPY, WITH DILATION AND CURETTAGE OF UTERUS;  Surgeon: Ramona Llanos MD;  Location: [facility];  Service: OB/GYN;  Laterality: N/A;    SPINE SURGERY  3/2000    Tumor    TRANSFORAMINAL EPIDURAL INJECTION OF STEROID Right 11/21/2019    Procedure: Injection,steroid,epidural,transforaminal approach;  Surgeon: Bj Jones MD;  Location: [facility];  Service: Pain Management;  Laterality: Right;  L4-5, L5-S1    TRANSFORAMINAL EPIDURAL INJECTION OF STEROID Right 12/31/2019    Procedure: Injection,steroid,epidural,transforaminal approach;  Surgeon: Bj Jones MD;  Location: [facility];  Service: Pain Management;  Laterality: Right;  L4-5, L5-S1    TRANSFORAMINAL EPIDURAL INJECTION OF STEROID Right 2/5/2020    Procedure: Injection,steroid,epidural,transforaminal approach;  Surgeon: Bj Jones MD;  Location: [facility];  Service: Pain Management;  Laterality: Right;  L4-5, L5-S1    TRANSFORAMINAL EPIDURAL INJECTION OF STEROID Left 1/27/2021    Procedure: Injection,steroid,epidural,transforaminal approach;  Surgeon: Bj Jones MD;  Location: [facility];  Service: Pain Management;  Laterality: Left;  L4-5, L5-S1    TRANSFORAMINAL EPIDURAL INJECTION OF STEROID Right 3/4/2021    Procedure: Injection,steroid,epidural,transforaminal approach;  Surgeon: Bj Jones MD;  Location: [facility];  Service: Pain Management;  Laterality: Right;  L4-L5, L5-S1    WRIST SURGERY  1993    carpal tunnel       Social History     Tobacco Use    Smoking status: Never  Smoker    Smokeless tobacco: Never Used   Substance Use Topics    Alcohol use: Yes     Comment: Rare    Drug use: No        Review of Systems:    As noted in HPI in addition      REVIEW OF SYSTEMS  CARDIOVASCULAR:  chest pains at times with exertion  RESPIRATORY:  shortness of breath with exertion  : No blood in the urine  GI: No Nausea, vomiting, constipation, diarrhea, blood, or reflux.  MUSCULOSKELETAL:  knee pain needs knee replacement  NEURO: No lightheadedness or dizziness  EYES: No Double vision, blurry, vision or headache     Extreme fatigue and tiredness         Objective        Vitals:    05/06/22 1119   BP: (!) 116/58   Pulse: (!) 59       LIPIDS - LAST 2   Lab Results   Component Value Date    CHOL 127 11/16/2021    CHOL 165 12/08/2020    HDL 22 (L) 11/16/2021    HDL 51 12/08/2020    LDLCALC 42.8 (L) 11/16/2021    LDLCALC 99.4 12/08/2020    TRIG 311 (H) 11/16/2021    TRIG 73 12/08/2020    CHOLHDL 17.3 (L) 11/16/2021    CHOLHDL 30.9 12/08/2020       CBC - LAST 2  Lab Results   Component Value Date    WBC 5.54 03/21/2022    WBC 6.82 12/17/2021    RBC 4.15 03/21/2022    RBC 4.16 12/17/2021    HGB 11.8 (L) 03/21/2022    HGB 12.1 12/17/2021    HCT 37.2 03/21/2022    HCT 37.5 12/17/2021    MCV 90 03/21/2022    MCV 90 12/17/2021    MCH 28.4 03/21/2022    MCH 29.1 12/17/2021    MCHC 31.7 (L) 03/21/2022    MCHC 32.3 12/17/2021    RDW 13.2 03/21/2022    RDW 13.4 12/17/2021     03/21/2022     12/17/2021    MPV 11.2 03/21/2022    MPV 10.8 12/17/2021    GRAN 3.5 03/21/2022    GRAN 62.4 03/21/2022    LYMPH 1.4 03/21/2022    LYMPH 24.5 03/21/2022    MONO 0.6 03/21/2022    MONO 11.2 03/21/2022    BASO 0.02 03/21/2022    BASO 0.03 12/17/2021    NRBC 0 03/21/2022    NRBC 0 12/17/2021       CHEMISTRY & LIVER FUNCTION - LAST 2  Lab Results   Component Value Date     03/21/2022     12/17/2021    K 4.2 03/21/2022    K 4.4 12/17/2021    CL 98 03/21/2022     12/17/2021    CO2 27 03/21/2022     CO2 26 12/17/2021    ANIONGAP 12 03/21/2022    ANIONGAP 8 12/17/2021    BUN 34 (H) 03/21/2022    BUN 37 (H) 12/17/2021    CREATININE 0.8 03/21/2022    CREATININE 1.1 03/17/2022     (H) 03/21/2022     (H) 12/17/2021    CALCIUM 9.6 03/21/2022    CALCIUM 9.3 12/17/2021    MG 2.2 03/21/2022    MG 2.4 12/17/2021    ALBUMIN 4.4 03/21/2022    ALBUMIN 4.4 12/17/2021    PROT 7.1 03/21/2022    PROT 7.6 12/17/2021    ALKPHOS 50 (L) 03/21/2022    ALKPHOS 58 12/17/2021    ALT 25 03/21/2022    ALT 32 12/17/2021    AST 19 03/21/2022    AST 22 12/17/2021    BILITOT 0.7 03/21/2022    BILITOT 0.7 12/17/2021        CARDIAC PROFILE - LAST 2  Lab Results   Component Value Date     (H) 03/21/2022     (H) 12/17/2021    CPK 79 12/17/2021    TROPONINI <0.030 12/17/2021    TROPONINI 0.049 (H) 09/06/2016        COAGULATION - LAST 2  Lab Results   Component Value Date    INR 0.9 09/05/2016    INR 0.9 08/11/2015    APTT 22.1 08/11/2015       ENDOCRINE & PSA - LAST 2  Lab Results   Component Value Date    HGBA1C 6.3 (H) 11/16/2021    HGBA1C 6.5 (H) 04/20/2021    TSH 4.380 03/21/2022    TSH 2.950 09/21/2021        ECHOCARDIOGRAM RESULTS  Results for orders placed in visit on 10/11/21    Echo    Interpretation Summary  · The left ventricle is normal in size with mild concentric hypertrophy and low normal systolic function.  · The estimated ejection fraction is 52%.  · Grade I left ventricular diastolic dysfunction.  · Normal right ventricular size.  · Mild left atrial enlargement.  · There is mild aortic valve stenosis.  · Aortic valve area is 2.57 cm2; peak velocity is 2.17 m/s; mean gradient is 13 mmHg.  · Mild mitral regurgitation.  · Mild tricuspid regurgitation.  · Normal central venous pressure (3 mmHg).  · The estimated PA systolic pressure is 23 mmHg.      CURRENT/PREVIOUS VISIT EKG  Results for orders placed or performed in visit on 04/20/22   IN OFFICE EKG 12-LEAD (to [place])    Collection Time: 04/20/22  11:40 AM    Narrative    Test Reason : R05.9,    Vent. Rate : 060 BPM     Atrial Rate : 060 BPM     P-R Int : 202 ms          QRS Dur : 094 ms      QT Int : 468 ms       P-R-T Axes : -13 -21 069 degrees     QTc Int : 468 ms    Normal sinus rhythm  Septal infarct ,age undetermined  Abnormal ECG  When compared with ECG of 28-MAR-2022 10:29,  QT has lengthened  Confirmed by Akhil Coleman MD ([id number]) on 4/26/2022 6:01:10 PM    Referred By:             Confirmed By:Akhil Coleman MD     No valid procedures specified.   Results for orders placed during the hospital encounter of 05/04/22    Nuclear Stress - Cardiology Interpreted    Interpretation Summary    Abnormal myocardial perfusion scan.    There is a moderate intensity, moderate sized, mostly reversible perfusion abnormality with some fixed areas in the basal to apical anterior wall(s).    There are no other significant perfusion abnormalities.    The gated perfusion images showed an ejection fraction of 53% post stress.    There is normal wall motion post stress.    LV cavity size is  and normal at stress.    The EKG portion of this study is negative for ischemia.    The patient reported chest pain during the stress test.    During stress, occasional PVCs are noted.    No valid procedures specified.    PHYSICAL EXAM  CONSTITUTIONAL: Well built, well nourished in no apparent distress  NECK:  carotid bruit to the right  LUNGS: CTA  CHEST WALL: no tenderness  HEART: regular rate and rhythm, S1, S2 normal, systolic murmur present  ABDOMEN: soft, non-tender; bowel sounds normal; no masses,  no organomegaly  EXTREMITIES: Extremities normal, no edema, no calf tenderness noted  NEURO: AAO X 3    I HAVE REVIEWED :    The vital signs, nurses notes, and all the pertinent radiology and labs.        Current Outpatient Medications   Medication Instructions    acetaminophen (TYLENOL) 650 mg, Oral, Daily, 2 Tablet(s) Oral  Every day.    amitriptyline (ELAVIL) 50 mg,  Oral, Nightly    amLODIPine (NORVASC) 5 mg, Oral, Daily    apixaban (ELIQUIS) 5 mg, Oral, 2 times daily    azelastine (ASTELIN) 137 mcg, Nasal, 2 times daily    blood sugar diagnostic (FREESTYLE LITE STRIPS) Strp USE TO TEST BLOOD SUGAR TWICE A DAY    canagliflozin (INVOKANA) 100 mg, Oral, Daily    carboxymethylcellulose 1 % ophthalmic solution as directed    cetirizine (ZYRTEC) 10 mg, Oral, Daily    cholecalciferol, vitamin D3, (VITAMIN D3) 50 mcg (2,000 unit) Cap 1 capsule, Oral, Daily    coenzyme Q10 100 mg, Oral, Daily    cranberry extract 650 mg Cap 1 tablet, Oral, 2 times daily,      diclofenac sodium (VOLTAREN) 1 % Gel APPLY 2 G TOPICALLY 3 (THREE) TIMES DAILY.    dronedarone (MULTAQ) 400 mg, Oral, 2 times daily with meals    fluticasone propionate (FLONASE) 50 mcg, Each Nostril, Daily    gabapentin (NEURONTIN) 300 mg, Oral, 3 times daily    guaifenesin/dextromethorphan (TUSSIN DM ORAL) Oral    isosorbide mononitrate (IMDUR) 60 mg, Oral, Nightly    Lactobac no.41/Bifidobact no.7 (PROBIOTIC-10 ORAL) Oral    lancets Misc 1 Units, Misc.(Non-Drug; Combo Route), 2 times daily    levothyroxine (LEVOTHROID) 25 mcg, Oral, Before breakfast, Every day    LIDOcaine 4 % PtMd 1 patch, Topical (Top), Daily    magnesium oxide (MAG-OXIDE ORAL) 400 mg, Oral, Daily    MYRBETRIQ 50 mg, Oral, Daily    nitroGLYCERIN (NITROSTAT) 0.4 mg, Sublingual, Every 5 min PRN, 0.4mg Sublingual PRN .    pramoxine-hydrocortisone (PROCTOCREAM-HC) 1-1 % rectal cream Rectal, 2 times daily    promethazine-codeine 6.25-10 mg/5 ml (PHENERGAN WITH CODEINE) 6.25-10 mg/5 mL syrup 5 mLs, Oral, Every 4 hours PRN    RESTASIS 0.05 % ophthalmic emulsion 1 drop, Both Eyes, 2 times daily    rosuvastatin (CRESTOR) 10 mg, Oral, Daily    TOPROL XL 25 mg, Oral, Daily    triazolam (HALCION) 0.25 mg, Oral, Nightly PRN    UNABLE TO FIND Daily, Vital red    vitamins  A,C,E-zinc-copper [phone] unit-mg-unit Cap 1 capsule, Oral, 2  times daily          Assessment & Plan     Paroxysmal atrial fibrillation  Currently in normal sinus rhythm  Continue Multaq 400 mg p.o. b.i.d.  Continue Eliquis 5 mg p.o. b.i.d.  She will need to come off of Eliquis 3 days prior to angiographic assessment    ALVAREZ (dyspnea on exertion)  Anginal equivalent?  Now with a positive stress test  See positive stress test    Positive cardiac stress test  Discussed with patient the risks benefits and options and risks include but not limited to bleeding, vascular damage, renal failure, stroke, myocardial infarction, emergency bypass surgery and death.  All questions have been answered to patient's satisfaction.  Patient has voiced understanding of the procedure and agrees to move forward.  Informed Consent Obtained  See orders for further details.  Hold Eliquis 3 days prior    Patient is in need of knee surgery  She will need angiographic assessment for more definitive diagnosis as stress test indicates she has a reversible ischemia in the anterior wall.  Patient understands of stent is placed she will need to wait 6 months therefore if lesion is not critical and patient could make it through surgery we could stage this.  Continue Imdur 60 mg daily  Continue Crestor 10 mg every night    Dyslipidemia  Continue Crestor daily    CABG 2004  Now with positive stress test and need of cardiac clearance further total knee replacement see positive stress test    Left carotid bruit  Awaiting final carotid ultrasound read  Continue Statin therapy            No follow-ups on file.

## 2022-05-06 NOTE — ASSESSMENT & PLAN NOTE
Now with positive stress test and need of cardiac clearance further total knee replacement see positive stress test

## 2022-05-06 NOTE — ASSESSMENT & PLAN NOTE
Currently in normal sinus rhythm  Continue Multaq 400 mg p.o. b.i.d.  Continue Eliquis 5 mg p.o. b.i.d.  She will need to come off of Eliquis 3 days prior to angiographic assessment

## 2022-05-08 NOTE — PROGRESS NOTES
Subjective:       Patient ID: Darlin Tipton is a 81 y.o. female.    Chief Complaint: Diabetes, Hyperlipidemia, Coronary Artery Disease, Asthma, Gastroesophageal Reflux, and Chronic Kidney Disease    Patient presents here for 6 month follow-up of diabetes, hyperlipidemia, CAD, asthma, GERD, and chronic kidney disease.  She states her blood sugar has been under good control and her blood sugar this morning was 93. Her last hemoglobin A1c in November of 2021 was 6.2%.  She is following her diet well and her weight has decreased 4 lb.  She denies any hypoglycemia.  She does have diabetic nephropathy but no diabetic neuropathy or retinopathy.  Her hyperlipidemia is very well controlled with her present use of Crestor 10 mg daily as well as her low-fat low-cholesterol diet.  Her CAD is stable and she is followed by Cardiology.  She recently underwent stress test in preparation for knee surgery in the near future.  Stress test results are not in the chart at this time.  She denies any chest pains or palpitations.  Her asthma is stable without any recent exacerbations.  Her GERD is stable with her use of her present medication.  She does have chronic disease but this is also stable at the present time.  As far as health maintenance, she is up-to-date with all of her recommended screening exams and immunizations with exception of shingles vaccine, eye exam, 2nd COVID booster, and foot exam.  She does state that she did have her eye exam in December of 2021 by Dr. Hunt.    Diabetes  She presents for her follow-up diabetic visit. She has type 2 diabetes mellitus. Her disease course has been stable. There are no hypoglycemic associated symptoms. Pertinent negatives for hypoglycemia include no dizziness, headaches or nervousness/anxiousness. Associated symptoms include fatigue. Pertinent negatives for diabetes include no chest pain and no visual change. Symptoms are stable. Diabetic complications include heart disease and  nephropathy. Pertinent negatives for diabetic complications include no CVA, peripheral neuropathy or retinopathy. Risk factors for coronary artery disease include diabetes mellitus, dyslipidemia, post-menopausal and sedentary lifestyle. Current diabetic treatment includes oral agent (monotherapy). She is compliant with treatment most of the time. Her weight is stable. She is following a diabetic, low fat/cholesterol and low salt diet. Meal planning includes avoidance of concentrated sweets. She has had a previous visit with a dietitian. She rarely participates in exercise. There is no change in her home blood glucose trend. An ACE inhibitor/angiotensin II receptor blocker is not being taken. She does not see a podiatrist.Eye exam is current.   Hyperlipidemia  This is a chronic problem. The problem is controlled. Recent lipid tests were reviewed and are normal. Exacerbating diseases include chronic renal disease and diabetes. Factors aggravating her hyperlipidemia include thiazides and beta blockers. Pertinent negatives include no chest pain or shortness of breath. Current antihyperlipidemic treatment includes statins. The current treatment provides significant improvement of lipids. Compliance problems include adherence to exercise.  Risk factors for coronary artery disease include diabetes mellitus, dyslipidemia, post-menopausal and a sedentary lifestyle.     Review of Systems   Constitutional: Positive for fatigue. Negative for chills, fever and unexpected weight change.   HENT: Negative for nasal congestion, postnasal drip and sore throat.    Eyes: Negative for pain and visual disturbance.   Respiratory: Negative for cough and shortness of breath.    Cardiovascular: Negative for chest pain and palpitations.   Gastrointestinal: Negative for abdominal pain, blood in stool, constipation, diarrhea and nausea.   Genitourinary: Negative for difficulty urinating, dysuria and flank pain.   Musculoskeletal: Positive for  arthralgias. Negative for back pain (Knee pain).   Neurological: Negative for dizziness, light-headedness and headaches.   Psychiatric/Behavioral: Positive for sleep disturbance. The patient is not nervous/anxious.          Objective:      Physical Exam  Constitutional:       General: She is not in acute distress.     Appearance: Normal appearance. She is well-developed.   HENT:      Right Ear: Tympanic membrane and external ear normal.      Left Ear: Tympanic membrane and external ear normal.      Mouth/Throat:      Pharynx: Oropharynx is clear. No posterior oropharyngeal erythema.   Neck:      Thyroid: No thyromegaly.   Cardiovascular:      Rate and Rhythm: Normal rate and regular rhythm.      Pulses:           Dorsalis pedis pulses are 2+ on the right side and 2+ on the left side.      Heart sounds: Normal heart sounds. No murmur heard.  Pulmonary:      Effort: Pulmonary effort is normal.      Breath sounds: Normal breath sounds. No wheezing or rales.   Musculoskeletal:         General: No tenderness. Normal range of motion.      Cervical back: Normal range of motion and neck supple.      Right lower leg: No edema.      Left lower leg: Edema present.      Right foot: Normal range of motion. No deformity.      Left foot: Normal range of motion. No deformity.   Feet:      Right foot:      Protective Sensation: 5 sites tested. 5 sites sensed.      Skin integrity: Skin integrity normal.      Left foot:      Protective Sensation: 5 sites tested. 5 sites sensed.      Skin integrity: Skin integrity normal.   Lymphadenopathy:      Cervical: No cervical adenopathy.   Neurological:      General: No focal deficit present.      Mental Status: She is alert and oriented to person, place, and time.      Cranial Nerves: No cranial nerve deficit.      Deep Tendon Reflexes: Reflexes are normal and symmetric.         Assessment:       Problem List Items Addressed This Visit     GERD (gastroesophageal reflux disease)     Atherosclerosis of native coronary artery of native heart with angina pectoris    Type 2 diabetes mellitus with diabetic nephropathy, without long-term current use of insulin - Primary    Mild persistent asthma without complication    Mixed hyperlipidemia    Stage 3b chronic kidney disease    Paroxysmal atrial fibrillation      Other Visit Diagnoses     Insomnia, unspecified type        Relevant Medications    triazolam (HALCION) 0.25 MG Tab          Plan:       1. Continue present medication as all her chronic medical problems noted above are stable and controlled  2. Continue diabetic, low-sodium, low-fat low-cholesterol diet  3. Patient recently had stress test done by Cardiology in anticipation of her knee surgery.  If she is cleared by Cardiology from a cardiology standpoint, she is clear by me  4. Follow-up in 6 months or p.r.n.

## 2022-05-09 ENCOUNTER — PATIENT OUTREACH (OUTPATIENT)
Dept: ADMINISTRATIVE | Facility: HOSPITAL | Age: 82
End: 2022-05-09
Payer: MEDICARE

## 2022-05-10 ENCOUNTER — HOSPITAL ENCOUNTER (INPATIENT)
Facility: HOSPITAL | Age: 82
LOS: 5 days | Discharge: HOME | DRG: 286 | End: 2022-05-17
Attending: EMERGENCY MEDICINE | Admitting: INTERNAL MEDICINE
Payer: MEDICARE

## 2022-05-10 DIAGNOSIS — R07.9 CHEST PAIN, UNSPECIFIED TYPE: Primary | ICD-10-CM

## 2022-05-10 DIAGNOSIS — R07.9 CHEST PAIN: ICD-10-CM

## 2022-05-10 DIAGNOSIS — R07.89 OTHER CHEST PAIN: ICD-10-CM

## 2022-05-10 DIAGNOSIS — J40 BRONCHITIS: ICD-10-CM

## 2022-05-10 DIAGNOSIS — R94.39 POSITIVE CARDIAC STRESS TEST: ICD-10-CM

## 2022-05-10 DIAGNOSIS — I25.10 CAD (CORONARY ARTERY DISEASE): ICD-10-CM

## 2022-05-10 DIAGNOSIS — I50.9 ACUTE ON CHRONIC HEART FAILURE: ICD-10-CM

## 2022-05-10 DIAGNOSIS — R06.02 SOB (SHORTNESS OF BREATH): ICD-10-CM

## 2022-05-10 DIAGNOSIS — I50.21 ACUTE SYSTOLIC CONGESTIVE HEART FAILURE: ICD-10-CM

## 2022-05-10 DIAGNOSIS — I20.89 STABLE ANGINA PECTORIS: ICD-10-CM

## 2022-05-10 DIAGNOSIS — I50.9 HEART FAILURE: ICD-10-CM

## 2022-05-10 DIAGNOSIS — I48.0 PAROXYSMAL ATRIAL FIBRILLATION: ICD-10-CM

## 2022-05-10 DIAGNOSIS — J20.9 ACUTE BRONCHITIS DUE TO INFECTION: ICD-10-CM

## 2022-05-10 LAB
ALBUMIN SERPL BCP-MCNC: 3.9 G/DL (ref 3.5–5.2)
ALP SERPL-CCNC: 57 U/L (ref 55–135)
ALT SERPL W/O P-5'-P-CCNC: 65 U/L (ref 10–44)
AMYLASE SERPL-CCNC: 42 U/L (ref 20–110)
ANION GAP SERPL CALC-SCNC: 9 MMOL/L (ref 8–16)
AST SERPL-CCNC: 44 U/L (ref 10–40)
BASOPHILS # BLD AUTO: 0.02 K/UL (ref 0–0.2)
BASOPHILS NFR BLD: 0.4 % (ref 0–1.9)
BILIRUB SERPL-MCNC: 0.8 MG/DL (ref 0.1–1)
BNP SERPL-MCNC: 521 PG/ML (ref 0–99)
BUN SERPL-MCNC: 37 MG/DL (ref 8–23)
CALCIUM SERPL-MCNC: 8.4 MG/DL (ref 8.7–10.5)
CHLORIDE SERPL-SCNC: 106 MMOL/L (ref 95–110)
CO2 SERPL-SCNC: 23 MMOL/L (ref 23–29)
CREAT SERPL-MCNC: 1.3 MG/DL (ref 0.5–1.4)
DIFFERENTIAL METHOD: ABNORMAL
EOSINOPHIL # BLD AUTO: 0.1 K/UL (ref 0–0.5)
EOSINOPHIL NFR BLD: 2 % (ref 0–8)
ERYTHROCYTE [DISTWIDTH] IN BLOOD BY AUTOMATED COUNT: 14.4 % (ref 11.5–14.5)
EST. GFR  (AFRICAN AMERICAN): 44.5 ML/MIN/1.73 M^2
EST. GFR  (NON AFRICAN AMERICAN): 38.6 ML/MIN/1.73 M^2
GLUCOSE SERPL-MCNC: 106 MG/DL (ref 70–110)
HCT VFR BLD AUTO: 34.8 % (ref 37–48.5)
HGB BLD-MCNC: 11.2 G/DL (ref 12–16)
IMM GRANULOCYTES # BLD AUTO: 0.02 K/UL (ref 0–0.04)
IMM GRANULOCYTES NFR BLD AUTO: 0.4 % (ref 0–0.5)
LACTATE SERPL-SCNC: 0.7 MMOL/L (ref 0.5–1.9)
LACTATE SERPL-SCNC: 0.8 MMOL/L (ref 0.5–1.9)
LIPASE SERPL-CCNC: 37 U/L (ref 4–60)
LYMPHOCYTES # BLD AUTO: 1.1 K/UL (ref 1–4.8)
LYMPHOCYTES NFR BLD: 21.4 % (ref 18–48)
MCH RBC QN AUTO: 28.7 PG (ref 27–31)
MCHC RBC AUTO-ENTMCNC: 32.2 G/DL (ref 32–36)
MCV RBC AUTO: 89 FL (ref 82–98)
MONOCYTES # BLD AUTO: 0.5 K/UL (ref 0.3–1)
MONOCYTES NFR BLD: 10.1 % (ref 4–15)
NEUTROPHILS # BLD AUTO: 3.3 K/UL (ref 1.8–7.7)
NEUTROPHILS NFR BLD: 65.7 % (ref 38–73)
NRBC BLD-RTO: 0 /100 WBC
PLATELET # BLD AUTO: 156 K/UL (ref 150–450)
PMV BLD AUTO: 10.6 FL (ref 9.2–12.9)
POTASSIUM SERPL-SCNC: 4.3 MMOL/L (ref 3.5–5.1)
PROT SERPL-MCNC: 6.7 G/DL (ref 6–8.4)
RBC # BLD AUTO: 3.9 M/UL (ref 4–5.4)
SARS-COV-2 RDRP RESP QL NAA+PROBE: NEGATIVE
SODIUM SERPL-SCNC: 138 MMOL/L (ref 136–145)
TROPONIN I SERPL DL<=0.01 NG/ML-MCNC: <0.03 NG/ML
TROPONIN I SERPL DL<=0.01 NG/ML-MCNC: <0.03 NG/ML
WBC # BLD AUTO: 4.95 K/UL (ref 3.9–12.7)

## 2022-05-10 PROCEDURE — 93005 ELECTROCARDIOGRAM TRACING: CPT | Performed by: INTERNAL MEDICINE

## 2022-05-10 PROCEDURE — G0378 HOSPITAL OBSERVATION PER HR: HCPCS

## 2022-05-10 PROCEDURE — 96365 THER/PROPH/DIAG IV INF INIT: CPT

## 2022-05-10 PROCEDURE — 82150 ASSAY OF AMYLASE: CPT | Performed by: NURSE PRACTITIONER

## 2022-05-10 PROCEDURE — 93010 ELECTROCARDIOGRAM REPORT: CPT | Mod: ,,, | Performed by: INTERNAL MEDICINE

## 2022-05-10 PROCEDURE — 84484 ASSAY OF TROPONIN QUANT: CPT | Performed by: EMERGENCY MEDICINE

## 2022-05-10 PROCEDURE — 80053 COMPREHEN METABOLIC PANEL: CPT | Performed by: EMERGENCY MEDICINE

## 2022-05-10 PROCEDURE — 25000003 PHARM REV CODE 250: Performed by: EMERGENCY MEDICINE

## 2022-05-10 PROCEDURE — 80074 ACUTE HEPATITIS PANEL: CPT | Performed by: NURSE PRACTITIONER

## 2022-05-10 PROCEDURE — 83880 ASSAY OF NATRIURETIC PEPTIDE: CPT | Performed by: EMERGENCY MEDICINE

## 2022-05-10 PROCEDURE — 99285 EMERGENCY DEPT VISIT HI MDM: CPT | Mod: 25

## 2022-05-10 PROCEDURE — 83605 ASSAY OF LACTIC ACID: CPT | Mod: 91 | Performed by: EMERGENCY MEDICINE

## 2022-05-10 PROCEDURE — 85025 COMPLETE CBC W/AUTO DIFF WBC: CPT | Performed by: EMERGENCY MEDICINE

## 2022-05-10 PROCEDURE — 93010 EKG 12-LEAD: ICD-10-PCS | Mod: ,,, | Performed by: INTERNAL MEDICINE

## 2022-05-10 PROCEDURE — 63600175 PHARM REV CODE 636 W HCPCS: Performed by: EMERGENCY MEDICINE

## 2022-05-10 PROCEDURE — 87040 BLOOD CULTURE FOR BACTERIA: CPT | Performed by: EMERGENCY MEDICINE

## 2022-05-10 PROCEDURE — 83690 ASSAY OF LIPASE: CPT | Performed by: NURSE PRACTITIONER

## 2022-05-10 PROCEDURE — U0002 COVID-19 LAB TEST NON-CDC: HCPCS | Performed by: EMERGENCY MEDICINE

## 2022-05-10 PROCEDURE — 96366 THER/PROPH/DIAG IV INF ADDON: CPT

## 2022-05-10 PROCEDURE — 94761 N-INVAS EAR/PLS OXIMETRY MLT: CPT

## 2022-05-10 PROCEDURE — 99900031 HC PATIENT EDUCATION (STAT)

## 2022-05-10 PROCEDURE — 94799 UNLISTED PULMONARY SVC/PX: CPT

## 2022-05-10 PROCEDURE — 25000003 PHARM REV CODE 250: Performed by: NURSE PRACTITIONER

## 2022-05-10 PROCEDURE — 99900035 HC TECH TIME PER 15 MIN (STAT)

## 2022-05-10 PROCEDURE — 96375 TX/PRO/DX INJ NEW DRUG ADDON: CPT

## 2022-05-10 RX ORDER — TALC
6 POWDER (GRAM) TOPICAL NIGHTLY PRN
Status: DISCONTINUED | OUTPATIENT
Start: 2022-05-10 | End: 2022-05-17 | Stop reason: HOSPADM

## 2022-05-10 RX ORDER — INSULIN ASPART 100 [IU]/ML
0-5 INJECTION, SOLUTION INTRAVENOUS; SUBCUTANEOUS
Status: DISCONTINUED | OUTPATIENT
Start: 2022-05-10 | End: 2022-05-17 | Stop reason: HOSPADM

## 2022-05-10 RX ORDER — MORPHINE SULFATE 4 MG/ML
4 INJECTION, SOLUTION INTRAMUSCULAR; INTRAVENOUS
Status: COMPLETED | OUTPATIENT
Start: 2022-05-10 | End: 2022-05-10

## 2022-05-10 RX ORDER — LEVOTHYROXINE SODIUM 25 UG/1
25 TABLET ORAL
Status: DISCONTINUED | OUTPATIENT
Start: 2022-05-11 | End: 2022-05-17 | Stop reason: HOSPADM

## 2022-05-10 RX ORDER — ATORVASTATIN CALCIUM 20 MG/1
20 TABLET, FILM COATED ORAL DAILY
Refills: 3 | Status: DISCONTINUED | OUTPATIENT
Start: 2022-05-10 | End: 2022-05-17 | Stop reason: HOSPADM

## 2022-05-10 RX ORDER — FUROSEMIDE 10 MG/ML
40 INJECTION INTRAMUSCULAR; INTRAVENOUS
Status: COMPLETED | OUTPATIENT
Start: 2022-05-10 | End: 2022-05-10

## 2022-05-10 RX ORDER — IBUPROFEN 200 MG
16 TABLET ORAL
Status: DISCONTINUED | OUTPATIENT
Start: 2022-05-10 | End: 2022-05-17 | Stop reason: HOSPADM

## 2022-05-10 RX ORDER — ONDANSETRON 2 MG/ML
4 INJECTION INTRAMUSCULAR; INTRAVENOUS EVERY 8 HOURS PRN
Status: DISCONTINUED | OUTPATIENT
Start: 2022-05-10 | End: 2022-05-14

## 2022-05-10 RX ORDER — AMLODIPINE BESYLATE 5 MG/1
5 TABLET ORAL DAILY
Status: DISCONTINUED | OUTPATIENT
Start: 2022-05-11 | End: 2022-05-17 | Stop reason: HOSPADM

## 2022-05-10 RX ORDER — MORPHINE SULFATE 2 MG/ML
1 INJECTION, SOLUTION INTRAMUSCULAR; INTRAVENOUS EVERY 6 HOURS PRN
Status: DISCONTINUED | OUTPATIENT
Start: 2022-05-10 | End: 2022-05-17 | Stop reason: HOSPADM

## 2022-05-10 RX ORDER — ASPIRIN 325 MG
325 TABLET ORAL
Status: COMPLETED | OUTPATIENT
Start: 2022-05-10 | End: 2022-05-10

## 2022-05-10 RX ORDER — IBUPROFEN 200 MG
24 TABLET ORAL
Status: DISCONTINUED | OUTPATIENT
Start: 2022-05-10 | End: 2022-05-17 | Stop reason: HOSPADM

## 2022-05-10 RX ORDER — METOPROLOL SUCCINATE 25 MG/1
25 TABLET, EXTENDED RELEASE ORAL DAILY
Status: DISCONTINUED | OUTPATIENT
Start: 2022-05-11 | End: 2022-05-17 | Stop reason: HOSPADM

## 2022-05-10 RX ORDER — AMITRIPTYLINE HYDROCHLORIDE 25 MG/1
50 TABLET, FILM COATED ORAL NIGHTLY
Status: DISCONTINUED | OUTPATIENT
Start: 2022-05-10 | End: 2022-05-17 | Stop reason: HOSPADM

## 2022-05-10 RX ORDER — ACETAMINOPHEN 500 MG
2000 TABLET ORAL DAILY
Status: DISCONTINUED | OUTPATIENT
Start: 2022-05-11 | End: 2022-05-17 | Stop reason: HOSPADM

## 2022-05-10 RX ORDER — GLUCAGON 1 MG
1 KIT INJECTION
Status: DISCONTINUED | OUTPATIENT
Start: 2022-05-10 | End: 2022-05-17 | Stop reason: HOSPADM

## 2022-05-10 RX ORDER — ISOSORBIDE MONONITRATE 60 MG/1
60 TABLET, EXTENDED RELEASE ORAL NIGHTLY
Status: DISCONTINUED | OUTPATIENT
Start: 2022-05-10 | End: 2022-05-17 | Stop reason: HOSPADM

## 2022-05-10 RX ORDER — METHYLPREDNISOLONE SOD SUCC 125 MG
125 VIAL (EA) INJECTION
Status: COMPLETED | OUTPATIENT
Start: 2022-05-10 | End: 2022-05-10

## 2022-05-10 RX ORDER — SODIUM CHLORIDE 0.9 % (FLUSH) 0.9 %
10 SYRINGE (ML) INJECTION
Status: DISCONTINUED | OUTPATIENT
Start: 2022-05-10 | End: 2022-05-17 | Stop reason: HOSPADM

## 2022-05-10 RX ORDER — NITROGLYCERIN 0.4 MG/1
0.4 TABLET SUBLINGUAL EVERY 5 MIN PRN
Status: DISCONTINUED | OUTPATIENT
Start: 2022-05-10 | End: 2022-05-17 | Stop reason: HOSPADM

## 2022-05-10 RX ADMIN — ASPIRIN 325 MG ORAL TABLET 325 MG: 325 PILL ORAL at 04:05

## 2022-05-10 RX ADMIN — AZITHROMYCIN 500 MG: 500 INJECTION, POWDER, LYOPHILIZED, FOR SOLUTION INTRAVENOUS at 07:05

## 2022-05-10 RX ADMIN — APIXABAN 5 MG: 5 TABLET, FILM COATED ORAL at 11:05

## 2022-05-10 RX ADMIN — NITROGLYCERIN 0.5 INCH: 20 OINTMENT TOPICAL at 06:05

## 2022-05-10 RX ADMIN — ISOSORBIDE MONONITRATE 60 MG: 30 TABLET, EXTENDED RELEASE ORAL at 11:05

## 2022-05-10 RX ADMIN — ATORVASTATIN CALCIUM 20 MG: 20 TABLET, FILM COATED ORAL at 11:05

## 2022-05-10 RX ADMIN — FUROSEMIDE 40 MG: 10 INJECTION, SOLUTION INTRAMUSCULAR; INTRAVENOUS at 06:05

## 2022-05-10 RX ADMIN — MORPHINE SULFATE 4 MG: 4 INJECTION, SOLUTION INTRAMUSCULAR; INTRAVENOUS at 06:05

## 2022-05-10 RX ADMIN — METHYLPREDNISOLONE SODIUM SUCCINATE 125 MG: 125 INJECTION, POWDER, FOR SOLUTION INTRAMUSCULAR; INTRAVENOUS at 04:05

## 2022-05-10 RX ADMIN — AMITRIPTYLINE HYDROCHLORIDE 50 MG: 25 TABLET, FILM COATED ORAL at 11:05

## 2022-05-10 NOTE — Clinical Note
The catheter was repositioned into the ostial LIMA graft. An angiography was performed of the left coronary arteries. The angiography was performed via power injection. The injected amount was 8 mL contrast at 4 mL/s. The PSI from the power injection was 450. The catheter was unable to engage the area. and The result was suboptimal..LIMA to LAD

## 2022-05-10 NOTE — Clinical Note
The catheter was repositioned into the ostial SVG graft. An angiography was performed of the right coronary arteries. Multiple views were taken. The angiography was performed via power injection. The injected amount was 6 mL contrast at 3 mL/s. The PSI from the power injection was 300. SVG to RCA

## 2022-05-10 NOTE — Clinical Note
The catheter was repositioned into the ostial SVG graft. An angiography was performed of the left coronary arteries. Multiple views were taken. The angiography was performed via power injection. The injected amount was 8 mL contrast at 4 mL/s. The PSI from the power injection was 450. SVG to Ramus

## 2022-05-10 NOTE — Clinical Note
The catheter was inserted into the ostial SVG graft. An angiography was performed of the graft. Multiple views were taken. The angiography was performed via power injection. The injected amount was 8 mL contrast at 4 mL/s. The PSI from the power injection was 450. SVG to Ramus

## 2022-05-10 NOTE — Clinical Note
The catheter was inserted into the ostial SVG graft. Multiple passes made [FreeTextEntry1] : 74 year old man with a history hypertension, hyperlipidemia, ectopy, CKD, spinal stenosis presents for a followup for evaluation. \par \par Since his last visit, he had a COVID and did not require any adjunctive treatment. He has been suffering from back pain that limits his activity. He bowls 2 times a week. He   denies any chest pain, dyspnea, PND, orthopnea, lower extremity edema, near syncope, syncope, strokelike symptoms.\par  He is compliant with his medications.  Medication reconciliation performed.

## 2022-05-10 NOTE — ED PROVIDER NOTES
Encounter Date: 5/10/2022       History     Chief Complaint   Patient presents with    Chest Pain     Complains  of chest pain all day ,reports a cough for a few days     81-year-old female presented emergency department with substernal chest pressure across the chest which started this morning and has been constant since then.  Denies fever or chills or nausea vomiting.  Patient said she had been coughing for the past 3-4 days which is worse than usual.  Denies dysuria or hematuria.  Patient does have swelling of the legs and edema which is chronic as well.  Patient had a stress test recently which was positive and patient is scheduled to get an angiogram meanwhile started having chest pain so presented here.  Denies dysuria or hematuria.  Denies focal weakness or numbness        Review of patient's allergies indicates:   Allergen Reactions    Sulfa (sulfonamide antibiotics) Rash    Floxacillin Itching    Januvia [sitagliptin] Other (See Comments)     Hot flashes    Tetracyclines Itching    Fenofibrate micronized Rash    Nitrofurantoin macrocrystalline Other (See Comments)     unknown    Phenylfenesin la [phenylpropanolamine-gg] Rash     Past Medical History:   Diagnosis Date    Allergy     Dust mites, Grasses, Trees    Arthritis     Asthma     Blood transfusion     CAD (coronary artery disease)     Cataract     CHRONIC BRONCHITIS     Diabetes mellitus     Diabetes mellitus type II     GERD (gastroesophageal reflux disease)     Hyperlipidemia     Hypertension     Irregular heart beat     Kidney disease     ckd stage 3  as stated by patient - to see MD    Post-menopausal bleeding 2018    Spinal stenosis     Thyroid disease     Hypothyroidism     Past Surgical History:   Procedure Laterality Date    APPENDECTOMY  1968    BLADDER SUSPENSION  1989    CARDIAC SURGERY  2016    CABG    CATARACT EXTRACTION  9/2007 (L) and 10/2207 (R)    COLONOSCOPY N/A 10/18/2017    Procedure: COLONOSCOPY;   Surgeon: Esme Acuna MD;  Location: [facility];  Service: Endoscopy;  Laterality: N/A;    CORONARY ARTERY BYPASS GRAFT  4/26/2004    x5    ESOPHAGEAL DILATION      HYSTEROSCOPY WITH DILATION AND CURETTAGE OF UTERUS N/A 3/12/2020    Procedure: HYSTEROSCOPY, WITH DILATION AND CURETTAGE OF UTERUS;  Surgeon: Ramona Llanos MD;  Location: [facility] OR;  Service: OB/GYN;  Laterality: N/A;    SPINE SURGERY  3/2000    Tumor    TRANSFORAMINAL EPIDURAL INJECTION OF STEROID Right 11/21/2019    Procedure: Injection,steroid,epidural,transforaminal approach;  Surgeon: Bj Jones MD;  Location: [facility];  Service: Pain Management;  Laterality: Right;  L4-5, L5-S1    TRANSFORAMINAL EPIDURAL INJECTION OF STEROID Right 12/31/2019    Procedure: Injection,steroid,epidural,transforaminal approach;  Surgeon: jB Jones MD;  Location: [facility];  Service: Pain Management;  Laterality: Right;  L4-5, L5-S1    TRANSFORAMINAL EPIDURAL INJECTION OF STEROID Right 2/5/2020    Procedure: Injection,steroid,epidural,transforaminal approach;  Surgeon: Bj Jones MD;  Location: [facility];  Service: Pain Management;  Laterality: Right;  L4-5, L5-S1    TRANSFORAMINAL EPIDURAL INJECTION OF STEROID Left 1/27/2021    Procedure: Injection,steroid,epidural,transforaminal approach;  Surgeon: Bj Jones MD;  Location: [facility];  Service: Pain Management;  Laterality: Left;  L4-5, L5-S1    TRANSFORAMINAL EPIDURAL INJECTION OF STEROID Right 3/4/2021    Procedure: Injection,steroid,epidural,transforaminal approach;  Surgeon: Bj Jones MD;  Location: [facility];  Service: Pain Management;  Laterality: Right;  L4-L5, L5-S1    WRIST SURGERY  1993    carpal tunnel       Family History   Problem Relation Age of Onset    Breast cancer Mother     Breast cancer Maternal Aunt     Allergic rhinitis Neg Hx     Allergies Neg Hx     Angioedema Neg Hx     Asthma Neg Hx     Eczema Neg Hx     Immunodeficiency Neg Hx     Urticaria Neg Hx     Rhinitis Neg Hx      Atopy Neg Hx      Social History     Tobacco Use    Smoking status: Never Smoker    Smokeless tobacco: Never Used   Substance Use Topics    Alcohol use: Yes     Comment: Rare    Drug use: No     Review of Systems   Constitutional: Negative.    HENT: Negative.    Eyes: Negative.    Respiratory: Positive for cough and shortness of breath.    Cardiovascular: Positive for chest pain and leg swelling.   Gastrointestinal: Negative.    Endocrine: Negative.    Genitourinary: Negative.    Musculoskeletal: Negative.    Skin: Negative.    Allergic/Immunologic: Negative.    Neurological: Negative.    Hematological: Negative.    Psychiatric/Behavioral: Negative.    All other systems reviewed and are negative.      Physical Exam     Initial Vitals [05/10/22 1621]   BP Pulse Resp Temp SpO2   138/85 66 20 98.1 °F (36.7 °C) (!) 90 %      MAP       --         Physical Exam    Nursing note and vitals reviewed.  Constitutional: She appears well-developed and well-nourished.   HENT:   Head: Normocephalic and atraumatic.   Nose: Nose normal.   Mouth/Throat: Oropharynx is clear and moist.   Eyes: Conjunctivae and EOM are normal. Pupils are equal, round, and reactive to light.   Neck: Neck supple. No thyromegaly present. No tracheal deviation present. No JVD present.   Normal range of motion.  Cardiovascular: Normal rate, regular rhythm, normal heart sounds and intact distal pulses.   No murmur heard.  Pulmonary/Chest: No stridor. No respiratory distress. She has no wheezes. She has rales.   Abdominal: Abdomen is soft. Bowel sounds are normal. She exhibits no distension. There is no abdominal tenderness.   Musculoskeletal:         General: Edema present. No tenderness. Normal range of motion.      Cervical back: Normal range of motion and neck supple.     Neurological: She is alert and oriented to person, place, and time. She has normal strength. No cranial nerve deficit or sensory deficit. GCS score is 15. GCS eye subscore is 4.  GCS verbal subscore is 5. GCS motor subscore is 6.   Skin: Skin is warm. Capillary refill takes less than 2 seconds.   Psychiatric: She has a normal mood and affect. Thought content normal.         ED Course   Procedures  Labs Reviewed   CBC W/ AUTO DIFFERENTIAL - Abnormal; Notable for the following components:       Result Value    RBC 3.90 (*)     Hemoglobin 11.2 (*)     Hematocrit 34.8 (*)     All other components within normal limits   COMPREHENSIVE METABOLIC PANEL - Abnormal; Notable for the following components:    BUN 37 (*)     Calcium 8.4 (*)     AST 44 (*)     ALT 65 (*)     eGFR if  44.5 (*)     eGFR if non  38.6 (*)     All other components within normal limits   B-TYPE NATRIURETIC PEPTIDE - Abnormal; Notable for the following components:     (*)     All other components within normal limits   CULTURE, BLOOD   CULTURE, BLOOD   TROPONIN I   SARS-COV-2 RNA AMPLIFICATION, QUAL   LACTIC ACID, PLASMA   TROPONIN I   LACTIC ACID, PLASMA     EKG Readings: (Independently Interpreted)   Initial Reading: No STEMI. Rhythm: Normal Sinus Rhythm. Ectopy: No Ectopy. Conduction: Normal.     ECG Results          EKG 12-lead (In process)  Result time 05/10/22 16:43:17    In process by Interface, Lab In [facility] (05/10/22 16:43:17)                 Narrative:    Test Reason : R07.9,    Vent. Rate : 065 BPM     Atrial Rate : 060 BPM     P-R Int : 000 ms          QRS Dur : 094 ms      QT Int : 422 ms       P-R-T Axes : 000 -26 095 degrees     QTc Int : 438 ms    Junctional rhythm with Premature ventricular complexes or Fusion complexes  Low voltage QRS  Septal infarct (cited on or before 20-APR-2022)  Abnormal ECG  When compared with ECG of 20-APR-2022 11:40,  Junctional rhythm has replaced Sinus rhythm    Referred By: [email or web address]   SELF           Confirmed By:                             Imaging Results          X-Ray Chest AP Portable (Final result)  Result time 05/10/22 17:45:52    Final  result by Sebastian Juarez MD (05/10/22 17:45:52)                 Narrative:    HISTORY: Chest pain,  cough and dyspnea.    FINDINGS: Portable chest radiograph at 1710 hours compared to 04/20/2022 shows median sternotomy wires and mediastinal surgical clips from prior CABG, with stable enlarged cardiac silhouette and normal pulmonary vascularity. There are scattered aortic vascular calcifications.    The lungs are symmetrically expanded, with right infrahilar opacities, and bibasilar reticulonodular densities, with mild blunting of the costophrenic angles. The mid and upper lung zones are clear, with no consolidation or evidence of nomi interstitial pulmonary edema. No pneumothorax.    There are no acute fractures or destructive osseous lesions. The bones are diffusely osteopenic.    IMPRESSION: Unchanged cardiomegaly, with nonspecific lower lung opacities suggesting atelectasis and/or pneumonitis. Tiny pleural effusions are not excluded.    Electronically signed by:  Sebastian Juarez MD  5/10/2022 5:45 PM CDT Workstation: [id number]                               Medications   furosemide injection 40 mg (has no administration in time range)   nitroGLYCERIN 2% TD oint ointment 0.5 inch (has no administration in time range)   azithromycin 500 mg in dextrose 5 % 250 mL IVPB (ready to mix system) (has no administration in time range)   morphine injection 4 mg (has no administration in time range)   aspirin tablet 325 mg (325 mg Oral Given 5/10/22 1651)   methylPREDNISolone sodium succinate injection 125 mg (125 mg Intravenous Given 5/10/22 1651)     Medical Decision Making:   Differential Diagnosis:   Patient with the anterior chest pain which feels like pressure.  Patient recently had a positive stress test and waiting for angiogram meanwhile having chest pain today.  Patient also has symptoms of bronchitis.  Given presentation started on steroid antibiotic.  Pain control.  Symptoms improved with treatment.  Screening  cardiac workup done.  BNP elevated.  Patient has history of congestive heart failure as well.  Lasix given.  Hospital Medicine consulted for evaluation for admission and further management as symptoms improved.  Clinical Tests:   Lab Tests: Reviewed  Radiological Study: Reviewed  Medical Tests: Reviewed                      Clinical Impression:   Final diagnoses:  [R07.9] Chest pain  [R07.9] Chest pain, unspecified type (Primary)  [J20.9] Acute bronchitis due to infection  [I50.21] Acute systolic congestive heart failure          ED Disposition Condition    Admit               Brendon Ramirez MD  05/10/22 7623

## 2022-05-10 NOTE — Clinical Note
The catheter was inserted into the ostial LIMA graft. An angiography was performed of the left coronary arteries. Multiple views were taken. The angiography was performed via power injection. The injected amount was 8 mL contrast at 4 mL/s. The PSI from the power injection was 450. LIMA to LAD

## 2022-05-11 LAB
ANION GAP SERPL CALC-SCNC: 13 MMOL/L (ref 8–16)
APTT PPP: 32.5 SEC (ref 23.3–35.1)
BASOPHILS # BLD AUTO: 0 K/UL (ref 0–0.2)
BASOPHILS NFR BLD: 0 % (ref 0–1.9)
BNP SERPL-MCNC: 674 PG/ML (ref 0–99)
BUN SERPL-MCNC: 40 MG/DL (ref 8–23)
CALCIUM SERPL-MCNC: 8.9 MG/DL (ref 8.7–10.5)
CHLORIDE SERPL-SCNC: 100 MMOL/L (ref 95–110)
CO2 SERPL-SCNC: 23 MMOL/L (ref 23–29)
CREAT SERPL-MCNC: 1.3 MG/DL (ref 0.5–1.4)
DIFFERENTIAL METHOD: ABNORMAL
EOSINOPHIL # BLD AUTO: 0 K/UL (ref 0–0.5)
EOSINOPHIL NFR BLD: 0 % (ref 0–8)
ERYTHROCYTE [DISTWIDTH] IN BLOOD BY AUTOMATED COUNT: 14.4 % (ref 11.5–14.5)
EST. GFR  (AFRICAN AMERICAN): 44.5 ML/MIN/1.73 M^2
EST. GFR  (NON AFRICAN AMERICAN): 38.6 ML/MIN/1.73 M^2
GLUCOSE SERPL-MCNC: 164 MG/DL (ref 70–110)
GLUCOSE SERPL-MCNC: 171 MG/DL (ref 70–110)
GLUCOSE SERPL-MCNC: 183 MG/DL (ref 70–110)
GLUCOSE SERPL-MCNC: 188 MG/DL (ref 70–110)
GLUCOSE SERPL-MCNC: 234 MG/DL (ref 70–110)
HCT VFR BLD AUTO: 34.5 % (ref 37–48.5)
HGB BLD-MCNC: 11.2 G/DL (ref 12–16)
IMM GRANULOCYTES # BLD AUTO: 0.02 K/UL (ref 0–0.04)
IMM GRANULOCYTES NFR BLD AUTO: 0.5 % (ref 0–0.5)
INR PPP: 1.9
LYMPHOCYTES # BLD AUTO: 0.3 K/UL (ref 1–4.8)
LYMPHOCYTES NFR BLD: 6.9 % (ref 18–48)
MAGNESIUM SERPL-MCNC: 2.4 MG/DL (ref 1.6–2.6)
MCH RBC QN AUTO: 28.6 PG (ref 27–31)
MCHC RBC AUTO-ENTMCNC: 32.5 G/DL (ref 32–36)
MCV RBC AUTO: 88 FL (ref 82–98)
MONOCYTES # BLD AUTO: 0 K/UL (ref 0.3–1)
MONOCYTES NFR BLD: 0.5 % (ref 4–15)
NEUTROPHILS # BLD AUTO: 4 K/UL (ref 1.8–7.7)
NEUTROPHILS NFR BLD: 92.1 % (ref 38–73)
NRBC BLD-RTO: 0 /100 WBC
PHOSPHATE SERPL-MCNC: 4.9 MG/DL (ref 2.7–4.5)
PLATELET # BLD AUTO: 174 K/UL (ref 150–450)
PMV BLD AUTO: 10.6 FL (ref 9.2–12.9)
POTASSIUM SERPL-SCNC: 3.9 MMOL/L (ref 3.5–5.1)
PROCALCITONIN SERPL IA-MCNC: 0.09 NG/ML (ref 0–0.5)
PROTHROMBIN TIME: 20.7 SEC (ref 11.4–13.7)
RBC # BLD AUTO: 3.92 M/UL (ref 4–5.4)
SODIUM SERPL-SCNC: 136 MMOL/L (ref 136–145)
TROPONIN I SERPL DL<=0.01 NG/ML-MCNC: <0.03 NG/ML
WBC # BLD AUTO: 4.36 K/UL (ref 3.9–12.7)

## 2022-05-11 PROCEDURE — 84100 ASSAY OF PHOSPHORUS: CPT | Performed by: NURSE PRACTITIONER

## 2022-05-11 PROCEDURE — 85730 THROMBOPLASTIN TIME PARTIAL: CPT | Performed by: INTERNAL MEDICINE

## 2022-05-11 PROCEDURE — 25000003 PHARM REV CODE 250: Performed by: FAMILY MEDICINE

## 2022-05-11 PROCEDURE — 84145 PROCALCITONIN (PCT): CPT | Performed by: INTERNAL MEDICINE

## 2022-05-11 PROCEDURE — 25000003 PHARM REV CODE 250: Performed by: INTERNAL MEDICINE

## 2022-05-11 PROCEDURE — 83880 ASSAY OF NATRIURETIC PEPTIDE: CPT | Performed by: NURSE PRACTITIONER

## 2022-05-11 PROCEDURE — 80048 BASIC METABOLIC PNL TOTAL CA: CPT | Performed by: NURSE PRACTITIONER

## 2022-05-11 PROCEDURE — 83735 ASSAY OF MAGNESIUM: CPT | Performed by: NURSE PRACTITIONER

## 2022-05-11 PROCEDURE — 96376 TX/PRO/DX INJ SAME DRUG ADON: CPT

## 2022-05-11 PROCEDURE — 36415 COLL VENOUS BLD VENIPUNCTURE: CPT | Performed by: INTERNAL MEDICINE

## 2022-05-11 PROCEDURE — 99214 OFFICE O/P EST MOD 30 MIN: CPT | Mod: ,,, | Performed by: INTERNAL MEDICINE

## 2022-05-11 PROCEDURE — 25000003 PHARM REV CODE 250: Performed by: NURSE PRACTITIONER

## 2022-05-11 PROCEDURE — 63600175 PHARM REV CODE 636 W HCPCS: Performed by: NURSE PRACTITIONER

## 2022-05-11 PROCEDURE — 83036 HEMOGLOBIN GLYCOSYLATED A1C: CPT | Performed by: NURSE PRACTITIONER

## 2022-05-11 PROCEDURE — 85610 PROTHROMBIN TIME: CPT | Performed by: INTERNAL MEDICINE

## 2022-05-11 PROCEDURE — 96375 TX/PRO/DX INJ NEW DRUG ADDON: CPT

## 2022-05-11 PROCEDURE — G0378 HOSPITAL OBSERVATION PER HR: HCPCS

## 2022-05-11 PROCEDURE — 36415 COLL VENOUS BLD VENIPUNCTURE: CPT | Performed by: NURSE PRACTITIONER

## 2022-05-11 PROCEDURE — 63600175 PHARM REV CODE 636 W HCPCS: Performed by: INTERNAL MEDICINE

## 2022-05-11 PROCEDURE — 27000221 HC OXYGEN, UP TO 24 HOURS

## 2022-05-11 PROCEDURE — 84484 ASSAY OF TROPONIN QUANT: CPT | Performed by: NURSE PRACTITIONER

## 2022-05-11 PROCEDURE — 85025 COMPLETE CBC W/AUTO DIFF WBC: CPT | Performed by: NURSE PRACTITIONER

## 2022-05-11 PROCEDURE — 82962 GLUCOSE BLOOD TEST: CPT

## 2022-05-11 PROCEDURE — 96372 THER/PROPH/DIAG INJ SC/IM: CPT | Performed by: INTERNAL MEDICINE

## 2022-05-11 PROCEDURE — 99214 PR OFFICE/OUTPT VISIT, EST, LEVL IV, 30-39 MIN: ICD-10-PCS | Mod: ,,, | Performed by: INTERNAL MEDICINE

## 2022-05-11 PROCEDURE — 99900035 HC TECH TIME PER 15 MIN (STAT)

## 2022-05-11 PROCEDURE — 94761 N-INVAS EAR/PLS OXIMETRY MLT: CPT

## 2022-05-11 RX ORDER — POTASSIUM CHLORIDE 20 MEQ/1
20 TABLET, EXTENDED RELEASE ORAL ONCE
Status: COMPLETED | OUTPATIENT
Start: 2022-05-12 | End: 2022-05-12

## 2022-05-11 RX ORDER — HEPARIN SODIUM 5000 [USP'U]/ML
5000 INJECTION, SOLUTION INTRAVENOUS; SUBCUTANEOUS EVERY 8 HOURS
Status: DISCONTINUED | OUTPATIENT
Start: 2022-05-11 | End: 2022-05-11

## 2022-05-11 RX ORDER — FUROSEMIDE 10 MG/ML
40 INJECTION INTRAMUSCULAR; INTRAVENOUS DAILY
Status: DISCONTINUED | OUTPATIENT
Start: 2022-05-12 | End: 2022-05-13

## 2022-05-11 RX ORDER — NAPROXEN SODIUM 220 MG/1
81 TABLET, FILM COATED ORAL DAILY
Status: DISCONTINUED | OUTPATIENT
Start: 2022-05-11 | End: 2022-05-17 | Stop reason: HOSPADM

## 2022-05-11 RX ORDER — BENZONATATE 100 MG/1
200 CAPSULE ORAL 3 TIMES DAILY PRN
Status: DISCONTINUED | OUTPATIENT
Start: 2022-05-11 | End: 2022-05-17 | Stop reason: HOSPADM

## 2022-05-11 RX ORDER — BENZONATATE 100 MG/1
100 CAPSULE ORAL 3 TIMES DAILY PRN
Status: DISCONTINUED | OUTPATIENT
Start: 2022-05-11 | End: 2022-05-11

## 2022-05-11 RX ORDER — HEPARIN SODIUM,PORCINE/D5W 25000/250
0-40 INTRAVENOUS SOLUTION INTRAVENOUS CONTINUOUS
Status: DISCONTINUED | OUTPATIENT
Start: 2022-05-11 | End: 2022-05-14

## 2022-05-11 RX ADMIN — DRONEDARONE 400 MG: 400 TABLET, FILM COATED ORAL at 05:05

## 2022-05-11 RX ADMIN — ATORVASTATIN CALCIUM 20 MG: 20 TABLET, FILM COATED ORAL at 08:05

## 2022-05-11 RX ADMIN — AMLODIPINE BESYLATE 5 MG: 5 TABLET ORAL at 08:05

## 2022-05-11 RX ADMIN — MORPHINE SULFATE 1 MG: 2 INJECTION, SOLUTION INTRAMUSCULAR; INTRAVENOUS at 01:05

## 2022-05-11 RX ADMIN — METOPROLOL SUCCINATE 25 MG: 25 TABLET, FILM COATED, EXTENDED RELEASE ORAL at 08:05

## 2022-05-11 RX ADMIN — BENZONATATE 200 MG: 100 CAPSULE ORAL at 01:05

## 2022-05-11 RX ADMIN — BENZONATATE 200 MG: 100 CAPSULE ORAL at 03:05

## 2022-05-11 RX ADMIN — APIXABAN 5 MG: 5 TABLET, FILM COATED ORAL at 08:05

## 2022-05-11 RX ADMIN — AMITRIPTYLINE HYDROCHLORIDE 50 MG: 25 TABLET, FILM COATED ORAL at 08:05

## 2022-05-11 RX ADMIN — HEPARIN SODIUM 12 UNITS/KG/HR: 10000 INJECTION, SOLUTION INTRAVENOUS at 08:05

## 2022-05-11 RX ADMIN — LEVOTHYROXINE SODIUM 25 MCG: 0.03 TABLET ORAL at 05:05

## 2022-05-11 RX ADMIN — ASPIRIN 81 MG 81 MG: 81 TABLET ORAL at 11:05

## 2022-05-11 RX ADMIN — HEPARIN SODIUM 5000 UNITS: 5000 INJECTION INTRAVENOUS; SUBCUTANEOUS at 02:05

## 2022-05-11 RX ADMIN — DRONEDARONE 400 MG: 400 TABLET, FILM COATED ORAL at 08:05

## 2022-05-11 RX ADMIN — ISOSORBIDE MONONITRATE 60 MG: 30 TABLET, EXTENDED RELEASE ORAL at 08:05

## 2022-05-11 RX ADMIN — Medication 2000 UNITS: at 08:05

## 2022-05-11 NOTE — PLAN OF CARE
Medicare Outpatient Observation Notice was signed, explained and given to patient/caregiver on 05/11/2022 at 9:01am     addressed any questions or concerns.    Medicare Outpatient Observation Notice will be scanned into patient's medical record

## 2022-05-11 NOTE — CARE UPDATE
05/11/22 0807   PRE-TX-O2   O2 Device (Oxygen Therapy) nasal cannula   $ Is the patient on Low Flow Oxygen? Yes   Flow (L/min) 3   Pulse Oximetry Type Continuous   $ Pulse Oximetry - Multiple Charge Pulse Oximetry - Multiple   Resp 15   Respiratory Evaluation   $ Care Plan Tech Time 15 min

## 2022-05-11 NOTE — PLAN OF CARE
[facility]  Initial Discharge Assessment       Primary Care Provider: Oumar Almonte Jr, MD    Admission Diagnosis: Chest pain, unspecified type [R07.9]    Admission Date: 5/10/2022  Expected Discharge Date: 5/11/2022    Discharge Barriers Identified: (P) None    Assessment completed at bedside with pt and her , Dwayne Tipton, [phone]. Pt lives with her  and plans to return home when discharged. Pt uses a rollator at home and is not requesting any additional DME for discharge. Pt states her  and oldest son is listed as POA.     Payor: [employer] MANAGED MEDICARE / Plan: [employer] MEDICARE PLAN PPO / Product Type: Medicare Advantage /     Extended Emergency Contact Information  Primary Emergency Contact: Dwayne Tipton  Address: [address] [place] [address] [facility]  Home Phone: [phone]  Mobile Phone: [phone]  Relation: Spouse  Preferred language: English   needed? No    Discharge Plan A: (P) Home with family  Discharge Plan B: (P) Home with family      [id number] - [place] - [address]  [address]  [facility] [address]  Phone: [phone] Fax: [phone]    [facility] - [place], [place] - [address]  [address]  [facility] [address]  Phone: [phone] Fax: [phone]      Initial Assessment (most recent)       Adult Discharge Assessment - 05/11/22 0947          Discharge Assessment    Assessment Type Discharge Planning Assessment (P)      Confirmed/corrected address, phone number and insurance Yes (P)      Confirmed Demographics Correct on Facesheet (P)      Source of Information patient;family (P)      When was your last doctors appointment? -- (P)    early May, Dr. Almonte; Dr Louis    Communicated SHIRA with patient/caregiver Date not available/Unable to determine (P)      Reason For Admission chest pains (P)      Lives With spouse (P)      Facility  Arrived From: home (P)      Do you expect to return to your current living situation? Yes (P)      Do you have help at home or someone to help you manage your care at home? Yes (P)      Who are your caregiver(s) and their phone number(s)? Dwayne Tipton, [phone], spouse (P)      Prior to hospitilization cognitive status: Alert/Oriented (P)      Current cognitive status: Alert/Oriented (P)      Walking or Climbing Stairs Difficulty ambulation difficulty, requires equipment (P)      Mobility Management rollator (P)      Dressing/Bathing Difficulty none (P)      Home Accessibility wheelchair accessible (P)      Home Layout Able to live on 1st floor (P)      Equipment Currently Used at Home none (P)      Readmission within 30 days? No (P)      Patient currently being followed by outpatient case management? No (P)      Do you currently have service(s) that help you manage your care at home? No (P)      Do you take prescription medications? Yes (P)      Do you have prescription coverage? Yes (P)      Coverage Medicare (P)      Do you have any problems affording any of your prescribed medications? No (P)      Is the patient taking medications as prescribed? yes (P)      Who is going to help you get home at discharge? Dwayne Tipton, [phone], spouse (P)      How do you get to doctors appointments? family or friend will provide (P)      Are you on dialysis? No (P)      Do you take coumadin? No (P)      Discharge Plan A Home with family (P)      Discharge Plan B Home with family (P)      DME Needed Upon Discharge  none (P)      Discharge Plan discussed with: Patient;Spouse/sig other (P)      Name(s) and Number(s) Dwayne Tipton, [phone], spouse (P)      Discharge Barriers Identified None (P)

## 2022-05-11 NOTE — CONSULTS
[facility]  Department of Cardiology  Consult Note      PATIENT NAME: Darlin Tipton  MRN: [id number]  TODAY'S DATE: 05/11/2022  ADMIT DATE: 5/10/2022                          CONSULT REQUESTED BY: Lamont Mccullough MD    SUBJECTIVE     PRINCIPAL PROBLEM: Acute on chronic heart failure      REASON FOR CONSULT:  Acute on Chronic CHF      HPI:    Ms. Tipton is an 81 year old female patient with a PMH significant for CAD, PAF, DM, GERD, Dyslipidemia and chronic bronchitis admitted to hospital with coughing and chest pain. She recently had a positive stress test and is planned for angiographic assessment on the 19th. Troponin is negative. BNP on admit in the 600s CXR shows some bronchitis.       FROM H AND P     Darlin Tipton is a 81 y.o. old female who  has a past medical history of Allergy, Arthritis, Asthma, Blood transfusion, CAD (coronary artery disease), Cataract, CHRONIC BRONCHITIS, Diabetes mellitus, Diabetes mellitus type II, GERD (gastroesophageal reflux disease), Hyperlipidemia, Hypertension, Irregular heart beat, Kidney disease, Post-menopausal bleeding (2018), Spinal stenosis, and Thyroid disease.. The patient presented to [facility] on 5/10/2022 with a primary complaint of Chest Pain (Complains  of chest pain all day ,reports a cough for a few days)  .      81year old  female presents to emergency room with coughing and chest pain.  The patient states for the past 3-4 days she has been having increasing shortness of breath with coughing.  She describes her cough is productive in nature and yellow in color.  She denies fever chills sore throat headaches loss of sense of smell or taste no nausea vomiting or diarrhea.     The patient describes her chest discomfort as a pressure sensation has been constant for the past 2-3 days.  The pain radiates across her chest.     The patient states she recently had a stress test and was told was positive and is planning an angiogram  with Dr. Coleman     In the emergency room she was given IV Lasix and a dose of IV antibiotics for possible bronchitis     Other past medical history includes chronic kidney disease coronary artery disease cardiac arrhythmias atrial arrhythmias, hypertension hyperlipidemia diabetes      Review of patient's allergies indicates:   Allergen Reactions    Sulfa (sulfonamide antibiotics) Rash    Floxacillin Itching    Januvia [sitagliptin] Other (See Comments)     Hot flashes    Tetracyclines Itching    Fenofibrate micronized Rash    Nitrofurantoin macrocrystalline Other (See Comments)     unknown    Phenylfenesin la [phenylpropanolamine-gg] Rash       Past Medical History:   Diagnosis Date    Allergy     Dust mites, Grasses, Trees    Arthritis     Asthma     Blood transfusion     CAD (coronary artery disease)     Cataract     CHRONIC BRONCHITIS     Diabetes mellitus     Diabetes mellitus type II     GERD (gastroesophageal reflux disease)     Hyperlipidemia     Hypertension     Irregular heart beat     Kidney disease     ckd stage 3  as stated by patient - to see MD    Post-menopausal bleeding 2018    Spinal stenosis     Thyroid disease     Hypothyroidism     Past Surgical History:   Procedure Laterality Date    APPENDECTOMY  1968    BLADDER SUSPENSION  1989    CARDIAC SURGERY  2016    CABG    CATARACT EXTRACTION  9/2007 (L) and 10/2207 (R)    COLONOSCOPY N/A 10/18/2017    Procedure: COLONOSCOPY;  Surgeon: Esme Acuna MD;  Location: [facility];  Service: Endoscopy;  Laterality: N/A;    CORONARY ARTERY BYPASS GRAFT  4/26/2004    x5    ESOPHAGEAL DILATION      HYSTEROSCOPY WITH DILATION AND CURETTAGE OF UTERUS N/A 3/12/2020    Procedure: HYSTEROSCOPY, WITH DILATION AND CURETTAGE OF UTERUS;  Surgeon: Ramona Llanos MD;  Location: [facility] OR;  Service: OB/GYN;  Laterality: N/A;    SPINE SURGERY  3/2000    Tumor    TRANSFORAMINAL EPIDURAL INJECTION OF STEROID Right 11/21/2019     Procedure: Injection,steroid,epidural,transforaminal approach;  Surgeon: Bj Jones MD;  Location: [facility] OR;  Service: Pain Management;  Laterality: Right;  L4-5, L5-S1    TRANSFORAMINAL EPIDURAL INJECTION OF STEROID Right 12/31/2019    Procedure: Injection,steroid,epidural,transforaminal approach;  Surgeon: Bj Jones MD;  Location: [facility] OR;  Service: Pain Management;  Laterality: Right;  L4-5, L5-S1    TRANSFORAMINAL EPIDURAL INJECTION OF STEROID Right 2/5/2020    Procedure: Injection,steroid,epidural,transforaminal approach;  Surgeon: Bj Jones MD;  Location: [facility] OR;  Service: Pain Management;  Laterality: Right;  L4-5, L5-S1    TRANSFORAMINAL EPIDURAL INJECTION OF STEROID Left 1/27/2021    Procedure: Injection,steroid,epidural,transforaminal approach;  Surgeon: Bj Jones MD;  Location: [facility] OR;  Service: Pain Management;  Laterality: Left;  L4-5, L5-S1    TRANSFORAMINAL EPIDURAL INJECTION OF STEROID Right 3/4/2021    Procedure: Injection,steroid,epidural,transforaminal approach;  Surgeon: Bj Jones MD;  Location: [facility];  Service: Pain Management;  Laterality: Right;  L4-L5, L5-S1    WRIST SURGERY  1993    carpal tunnel       Social History     Tobacco Use    Smoking status: Never Smoker    Smokeless tobacco: Never Used   Substance Use Topics    Alcohol use: Yes     Comment: Rare    Drug use: No        REVIEW OF SYSTEMS  CONSTITUTIONAL: Negative for chills, fatigue and fever.   EYES: No double vision, No blurred vision  NEURO: No headaches, No dizziness  RESPIRATORY: Negative for cough, shortness of breath and wheezing.    CARDIOVASCULAR: Negative for chest pain. Negative for palpitations and leg swelling.   GI: Negative for abdominal pain, No melena, diarrhea, nausea and vomiting.   : Negative for dysuria and frequency, Negative for hematuria  SKIN: Negative for bruising, Negative for edema or discoloration noted.   ENDOCRINE: Negative for polyphagia, Negative for heat intolerance, Negative  for cold intolerance  PSYCHIATRIC: Negative for depression, Negative for anxiety, Negative for memory loss  MUSCULOSKELETAL: Negative for neck pain, Negative for muscle weakness, Negative for back pain     OBJECTIVE     VITAL SIGNS (Most Recent)  Temp: 97.9 °F (36.6 °C) (05/11/22 1138)  Pulse: 61 (05/11/22 1138)  Resp: (!) 24 (05/11/22 1138)  BP: (!) 145/64 (05/11/22 1138)  SpO2: (!) 93 % (05/11/22 1138)    VENTILATION STATUS  Resp: (!) 24 (05/11/22 1138)  SpO2: (!) 93 % (05/11/22 1138)       I & O (Last 24H):    Intake/Output Summary (Last 24 hours) at 5/11/2022 1521  Last data filed at 5/11/2022 1450  Gross per 24 hour   Intake 660 ml   Output 300 ml   Net 360 ml       WEIGHTS  Wt Readings from Last 3 Encounters:   05/11/22 0856 67.9 kg (149 lb 11.1 oz)   05/11/22 0500 67.9 kg (149 lb 11.1 oz)   05/11/22 0043 68.2 kg (150 lb 5.7 oz)   05/10/22 1621 64 kg (141 lb)   05/06/22 1119 66.2 kg (146 lb)   05/05/22 1004 64.4 kg (141 lb 15.6 oz)       PHYSICAL EXAM  GENERAL: well built, well nourished, well-developed in no apparent distress alert and oriented.   HEENT: Normocephalic. Pupils normal and conjunctivae normal.  Mucous membranes normal, no cyanosis or icterus, trachea central,no pallor or icterus is noted..   NECK: No JVD. No bruit..   THYROID: Thyroid not enlarged. No nodules present..   CARDIAC: Regular rate and rhythm. S1 is normal.S2 is normal.No gallops, clicks or murmurs noted at this time.  CHEST ANATOMY: normal.   LUNGS: Clear to auscultation. No wheezing or rhonchi..   ABDOMEN: Soft no masses or organomegaly.  No abdomen pulsations or bruits.  Normal bowel sounds. No pulsations and no masses felt, No guarding or rebound.   URINARY: No euceda catheter   EXTREMITIES: No cyanosis, clubbing or edema noted at this time., no calf tenderness bilaterally.   PERIPHERAL VASCULAR SYSTEM: Good palpable distal pulses.   CENTRAL NERVOUS SYSTEM: No focal motor or sensory deficits noted.   SKIN: Skin without lesions,  moist, well perfused.   MUSCLE STRENGTH & TONE: No noteable weakness, atrophy or abnormal movement.     HOME MEDICATIONS:  Current Facility-Administered Medications on File Prior to Encounter   Medication Dose Route Frequency Provider Last Rate Last Admin    0.9%  NaCl infusion   Intravenous Continuous Bj Jones MD   Stopped at 02/05/20 1400     Current Outpatient Medications on File Prior to Encounter   Medication Sig Dispense Refill    acetaminophen (TYLENOL) 650 MG TbSR Take 1,300 mg by mouth once daily. 2 Tablet(s) Oral  Every day.      amitriptyline (ELAVIL) 50 MG tablet Take 1 tablet (50 mg total) by mouth every evening. 90 tablet 3    amLODIPine (NORVASC) 5 MG tablet Take 1 tablet (5 mg total) by mouth once daily. 30 tablet 11    apixaban (ELIQUIS) 5 mg Tab Take 1 tablet (5 mg total) by mouth 2 (two) times daily. 30 tablet 3    azelastine (ASTELIN) 137 mcg (0.1 %) nasal spray 1 spray (137 mcg total) by Nasal route 2 (two) times daily. 90 mL 3    canagliflozin (INVOKANA) 100 mg Tab tablet Take 1 tablet (100 mg total) by mouth once daily. 90 tablet 3    carboxymethylcellulose 1 % ophthalmic solution Apply 1 drop to eye As instructed. as directed      cetirizine (ZYRTEC) 10 MG tablet Take 10 mg by mouth once daily.      cholecalciferol, vitamin D3, (VITAMIN D3) 50 mcg (2,000 unit) Cap Take 1 capsule by mouth once daily.      coenzyme Q10 100 mg capsule Take 100 mg by mouth once daily.       cranberry extract 650 mg Cap Take 1 tablet by mouth 2 (two) times daily.      diclofenac sodium (VOLTAREN) 1 % Gel APPLY 2 G TOPICALLY 3 (THREE) TIMES DAILY. (Patient taking differently: Apply 2 g topically 3 (three) times daily as needed. APPLY 2 G TOPICALLY 3 (THREE) TIMES DAILY.) 100 g 5    dronedarone (MULTAQ) 400 mg Tab Take 1 tablet (400 mg total) by mouth 2 (two) times daily with meals. 180 tablet 3    fluticasone propionate (FLONASE) 50 mcg/actuation nasal spray 1 spray (50 mcg total) by Each Nostril  route once daily. 48 g 3    isosorbide mononitrate (IMDUR) 60 MG 24 hr tablet Take 60 mg by mouth every evening.      levothyroxine (LEVOTHROID) 25 MCG tablet Take 1 tablet (25 mcg total) by mouth before breakfast. Every day 90 tablet 3    RESTASIS 0.05 % ophthalmic emulsion Place 1 drop into both eyes 2 (two) times daily.      rosuvastatin (CRESTOR) 10 MG tablet Take 1 tablet (10 mg total) by mouth once daily. 90 tablet 3    TOPROL XL 25 mg 24 hr tablet Take 1 tablet (25 mg total) by mouth once daily. 90 tablet 3    triazolam (HALCION) 0.25 MG Tab Take 1 tablet (0.25 mg total) by mouth nightly as needed (sleep). 90 tablet 1    UNABLE TO FIND Take 1 capsule by mouth once daily. Vital red      vitamins  A,C,E-zinc-copper [id number] unit-mg-unit Cap Take 1 capsule by mouth 2 (two) times daily.       blood sugar diagnostic (FREESTYLE LITE STRIPS) Strp USE TO TEST BLOOD SUGAR TWICE A  strip 3    gabapentin (NEURONTIN) 300 MG capsule Take 1 capsule (300 mg total) by mouth 3 (three) times daily. 90 capsule 2    guaifenesin/dextromethorphan (TUSSIN DM ORAL) Take by mouth.      Lactobac no.41/Bifidobact no.7 (PROBIOTIC-10 ORAL) Take by mouth.      lancets Misc 1 Units by Misc.(Non-Drug; Combo Route) route 2 (two) times daily. 200 each 3    LIDOcaine 4 % PtMd Apply 1 patch topically once daily. (Patient not taking: No sig reported) 5 patch 1    magnesium oxide (MAG-OXIDE ORAL) Take 400 mg by mouth once daily.      mirabegron (MYRBETRIQ) 50 mg Tb24 Take 1 tablet (50 mg total) by mouth once daily. (Patient not taking: No sig reported) 90 tablet 3    nitroGLYCERIN (NITROSTAT) 0.4 MG SL tablet Place 0.4 mg under the tongue every 5 (five) minutes as needed. 0.4mg Sublingual PRN .        pramoxine-hydrocortisone (PROCTOCREAM-HC) 1-1 % rectal cream Place rectally 2 (two) times daily. (Patient taking differently: Place 1 application rectally 2 (two) times daily.) 3 each 3    promethazine-codeine 6.25-10  mg/5 ml (PHENERGAN WITH CODEINE) 6.25-10 mg/5 mL syrup Take 5 mLs by mouth every 4 (four) hours as needed for Cough.         SCHEDULED MEDS:   amitriptyline  50 mg Oral QHS    amLODIPine  5 mg Oral Daily    aspirin  81 mg Oral Daily    atorvastatin  20 mg Oral Daily    cholecalciferol (vitamin D3)  2,000 Units Oral Daily    dronedarone  400 mg Oral BID WM    heparin (porcine)  5,000 Units Subcutaneous Q8H    isosorbide mononitrate  60 mg Oral QHS    levothyroxine  25 mcg Oral Before breakfast    metoprolol succinate  25 mg Oral Daily       CONTINUOUS INFUSIONS:    PRN MEDS:benzonatate, dextrose 50%, dextrose 50%, glucagon (human recombinant), glucose, glucose, insulin aspart U-100, melatonin, morphine, nitroGLYCERIN, ondansetron, sodium chloride 0.9%    LABS AND DIAGNOSTICS     CBC LAST 3 DAYS  Recent Labs   Lab 05/10/22  1637 05/11/22  0133   WBC 4.95 4.36   RBC 3.90* 3.92*   HGB 11.2* 11.2*   HCT 34.8* 34.5*   MCV 89 88   MCH 28.7 28.6   MCHC 32.2 32.5   RDW 14.4 14.4    174   MPV 10.6 10.6   GRAN 65.7  3.3 92.1*  4.0   LYMPH 21.4  1.1 6.9*  0.3*   MONO 10.1  0.5 0.5*  0.0*   BASO 0.02 0.00   NRBC 0 0       COAGULATION LAST 3 DAYS  No results for input(s): LABPT, INR, APTT in the last 168 hours.    CHEMISTRY LAST 3 DAYS  Recent Labs   Lab 05/10/22  1637 05/11/22  0133    136   K 4.3 3.9    100   CO2 23 23   ANIONGAP 9 13   BUN 37* 40*   CREATININE 1.3 1.3    188*   CALCIUM 8.4* 8.9   MG  --  2.4   ALBUMIN 3.9  --    PROT 6.7  --    ALKPHOS 57  --    ALT 65*  --    AST 44*  --    BILITOT 0.8  --        CARDIAC PROFILE LAST 3 DAYS  Recent Labs   Lab 05/10/22  1637 05/10/22  1935 05/11/22  0133   *  --  674*   TROPONINI <0.030 <0.030 <0.030       ENDOCRINE LAST 3 DAYS  No results for input(s): TSH, PROCAL in the last 168 hours.    LAST ARTERIAL BLOOD GAS  ABG  No results for input(s): PH, PO2, PCO2, HCO3, BE in the last 168 hours.    LAST 7 DAYS MICROBIOLOGY    Microbiology Results (last 7 days)     Procedure Component Value Units Date/Time    Blood culture [[id number]] Collected: 05/10/22 1906    Order Status: Completed Specimen: Blood from Peripheral, Forearm, Left Updated: 05/11/22 0517     Blood Culture, Routine No Growth to date    Blood culture [[id number]] Collected: 05/10/22 1641    Order Status: Completed Specimen: Blood from Peripheral, Forearm, Right Updated: 05/10/22 2317     Blood Culture, Routine No Growth to date          MOST RECENT IMAGING  CT Chest Without Contrast  CT chest without contrast    CLINICAL DATA: Cough, shortness of breath    CMS MANDATED QUALITY DATA - CT RADIATION  436    All CT scans at this facility utilize dose modulation, iterative reconstruction, and/or weight based dosing when appropriate to reduce radiation dose to as low as reasonably achievable.    Findings: Thin section axial noncontrast images are compared to a chest radiograph from May 10, as well as a previous CT chest from 2016.    The heart is mildly enlarged. Extensive coronary artery atherosclerotic calcification is noted. There has been previous median sternotomy with chronic sternal nonunion, similar to the prior CT. No mediastinal mass or pathologic lymphadenopathy is identified.    Small left and small-to-moderate right pleural effusion are noted. There is mild bilateral dependent atelectasis, right greater than left. Tiny granuloma is noted at the right lung apex, unchanged. No other nodules are identified. No acute abnormalities in the upper abdomen are identified. Hyperdense mid pole left renal mass, partially included in the field-of-view of this examination, is unchanged when compared to a pre and post infusion CT abdomen from March 17, 2022 and is probably a complex/hyperdense cyst.    IMPRESSION:  1. Small left and small-to-moderate right pleural effusion with scattered foci bilateral atelectasis.  2. Cardiomegaly with post sternotomy changes and chronic sternal  nonunion.  3. Additional findings as above.    Electronically signed by:  Cong Lazaro MD  5/11/2022 2:22 PM CDT Workstation: [id number]      ECHOCARDIOGRAM RESULTS (last 5)  Results for orders placed in visit on 10/11/21    Echo    Interpretation Summary  · The left ventricle is normal in size with mild concentric hypertrophy and low normal systolic function.  · The estimated ejection fraction is 52%.  · Grade I left ventricular diastolic dysfunction.  · Normal right ventricular size.  · Mild left atrial enlargement.  · There is mild aortic valve stenosis.  · Aortic valve area is 2.57 cm2; peak velocity is 2.17 m/s; mean gradient is 13 mmHg.  · Mild mitral regurgitation.  · Mild tricuspid regurgitation.  · Normal central venous pressure (3 mmHg).  · The estimated PA systolic pressure is 23 mmHg.      CURRENT/PREVIOUS VISIT EKG  Results for orders placed or performed during the hospital encounter of 05/10/22   EKG 12-lead    Collection Time: 05/10/22  4:27 PM    Narrative    Test Reason : R07.9,    Vent. Rate : 065 BPM     Atrial Rate : 060 BPM     P-R Int : 000 ms          QRS Dur : 094 ms      QT Int : 422 ms       P-R-T Axes : 000 -26 095 degrees     QTc Int : 438 ms    Junctional rhythm with Premature ventricular complexes or Fusion complexes  Low voltage QRS  Septal infarct (cited on or before 20-APR-2022)  Abnormal ECG  When compared with ECG of 20-APR-2022 11:40,  Junctional rhythm has replaced Sinus rhythm    Referred By: [email or web address]   SELF           Confirmed By:            ASSESSMENT/PLAN:     Active Hospital Problems    Diagnosis    *Acute on chronic heart failure    Atypical chest pain    Bronchitis    Positive cardiac stress test    Paroxysmal atrial fibrillation    Stage 3b chronic kidney disease    Type 2 diabetes mellitus with diabetic nephropathy, without long-term current use of insulin       ASSESSMENT & PLAN:   1. Decompensated HFpEF  2. CP, likely due to HF + underlying CAD  3. Recent  Positive cardiac stress, ? LAD territory  4. ? Bronchitis, appears more likely heart failure  5. PAF- Currently SR-SB  6. Stage III CKD    RECOMMENDATIONS:    - c/w aspirin  - needs IV diuresis, give 40 iv now  - c/w rest treatment. Will need to find out about her last coronary bypass report for further planning of angiogram, potentially Friday.    - c/w systemic anticoagulation    Babs Louis NP  [facility]  Department of Cardiology  Date of Service: 05/11/2022        I have personally interviewed and examined the patient, I have reviewed the Nurse Practitioner's history and physical, assessment, and plan. I agree with the findings and plan.      LIAT Patton M.D.  [facility]  Department of Cardiology  Date of Service: 05/11/2022  3:21 PM

## 2022-05-11 NOTE — H&P
Critical access hospital Medicine History & Physical Examination   Patient Name: Darlin Tipton  MRN: [id number]  Patient Class: Emergency   Admission Date: 5/10/2022  4:20 PM  Length of Stay: 0  Attending Physician:   Primary Care Provider: Oumar Almonte Jr, MD  Face-to-Face encounter date: 05/10/2022  Code Status: Full Code  MPOA:  Chief Complaint: Chest Pain (Complains  of chest pain all day ,reports a cough for a few days)        Patient information was obtained from patient, past medical records and ER records.   HISTORY OF PRESENT ILLNESS:   Darlin Tipton is a 81 y.o. old female who  has a past medical history of Allergy, Arthritis, Asthma, Blood transfusion, CAD (coronary artery disease), Cataract, CHRONIC BRONCHITIS, Diabetes mellitus, Diabetes mellitus type II, GERD (gastroesophageal reflux disease), Hyperlipidemia, Hypertension, Irregular heart beat, Kidney disease, Post-menopausal bleeding (2018), Spinal stenosis, and Thyroid disease.. The patient presented to [facility] on 5/10/2022 with a primary complaint of Chest Pain (Complains  of chest pain all day ,reports a cough for a few days)  .     81year old  female presents to emergency room with coughing and chest pain.  The patient states for the past 3-4 days she has been having increasing shortness of breath with coughing.  She describes her cough is productive in nature and yellow in color.  She denies fever chills sore throat headaches loss of sense of smell or taste no nausea vomiting or diarrhea.    The patient describes her chest discomfort as a pressure sensation has been constant for the past 2-3 days.  The pain radiates across her chest.    The patient states she recently had a stress test and was told was positive and is planning an angiogram with Dr. Coleman    In the emergency room she was given IV Lasix and a dose of IV antibiotics for possible bronchitis    Other past medical history includes chronic  kidney disease coronary artery disease cardiac arrhythmias atrial arrhythmias, hypertension hyperlipidemia diabetes  REVIEW OF SYSTEMS:   10 Point Review of System was performed and was found to be negative except for that mentioned already in the HPI and   Review of Systems (Negative unless checked off)  Review of Systems   Constitutional: Positive for malaise/fatigue.   HENT: Negative.    Eyes: Negative.    Respiratory: Positive for cough, sputum production and shortness of breath.    Cardiovascular: Positive for leg swelling.   Gastrointestinal: Negative.    Genitourinary: Negative.    Musculoskeletal: Negative.    Skin: Negative.    Neurological: Negative.    Endo/Heme/Allergies: Negative.    Psychiatric/Behavioral: Negative.            PAST MEDICAL HISTORY:     Past Medical History:   Diagnosis Date    Allergy     Dust mites, Grasses, Trees    Arthritis     Asthma     Blood transfusion     CAD (coronary artery disease)     Cataract     CHRONIC BRONCHITIS     Diabetes mellitus     Diabetes mellitus type II     GERD (gastroesophageal reflux disease)     Hyperlipidemia     Hypertension     Irregular heart beat     Kidney disease     ckd stage 3  as stated by patient - to see MD    Post-menopausal bleeding 2018    Spinal stenosis     Thyroid disease     Hypothyroidism       PAST SURGICAL HISTORY:     Past Surgical History:   Procedure Laterality Date    APPENDECTOMY  1968    BLADDER SUSPENSION  1989    CARDIAC SURGERY  2016    CABG    CATARACT EXTRACTION  9/2007 (L) and 10/2207 (R)    COLONOSCOPY N/A 10/18/2017    Procedure: COLONOSCOPY;  Surgeon: Esme Acuna MD;  Location: [facility];  Service: Endoscopy;  Laterality: N/A;    CORONARY ARTERY BYPASS GRAFT  4/26/2004    x5    ESOPHAGEAL DILATION      HYSTEROSCOPY WITH DILATION AND CURETTAGE OF UTERUS N/A 3/12/2020    Procedure: HYSTEROSCOPY, WITH DILATION AND CURETTAGE OF UTERUS;  Surgeon: Ramona Llanos MD;  [facility];   Service: OB/GYN;  Laterality: N/A;    SPINE SURGERY  3/2000    Tumor    TRANSFORAMINAL EPIDURAL INJECTION OF STEROID Right 11/21/2019    Procedure: Injection,steroid,epidural,transforaminal approach;  Surgeon: Bj Jones MD;  Location: [facility] OR;  Service: Pain Management;  Laterality: Right;  L4-5, L5-S1    TRANSFORAMINAL EPIDURAL INJECTION OF STEROID Right 12/31/2019    Procedure: Injection,steroid,epidural,transforaminal approach;  Surgeon: Bj Jones MD;  Location: [facility] OR;  Service: Pain Management;  Laterality: Right;  L4-5, L5-S1    TRANSFORAMINAL EPIDURAL INJECTION OF STEROID Right 2/5/2020    Procedure: Injection,steroid,epidural,transforaminal approach;  Surgeon: Bj Jones MD;  Location: [facility] OR;  Service: Pain Management;  Laterality: Right;  L4-5, L5-S1    TRANSFORAMINAL EPIDURAL INJECTION OF STEROID Left 1/27/2021    Procedure: Injection,steroid,epidural,transforaminal approach;  Surgeon: Bj Jones MD;  Location: [facility] OR;  Service: Pain Management;  Laterality: Left;  L4-5, L5-S1    TRANSFORAMINAL EPIDURAL INJECTION OF STEROID Right 3/4/2021    Procedure: Injection,steroid,epidural,transforaminal approach;  Surgeon: Bj Jones MD;  Location: [facility] OR;  Service: Pain Management;  Laterality: Right;  L4-L5, L5-S1    WRIST SURGERY  1993    carpal tunnel         ALLERGIES:   Sulfa (sulfonamide antibiotics), Floxacillin, Januvia [sitagliptin], Tetracyclines, Fenofibrate micronized, Nitrofurantoin macrocrystalline, and Phenylfenesin la [phenylpropanolamine-gg]    FAMILY HISTORY:     Family History   Problem Relation Age of Onset    Breast cancer Mother     Breast cancer Maternal Aunt     Allergic rhinitis Neg Hx     Allergies Neg Hx     Angioedema Neg Hx     Asthma Neg Hx     Eczema Neg Hx     Immunodeficiency Neg Hx     Urticaria Neg Hx     Rhinitis Neg Hx     Atopy Neg Hx        SOCIAL HISTORY:     Social History     Tobacco Use    Smoking status: Never Smoker    Smokeless tobacco:  Never Used   Substance Use Topics    Alcohol use: Yes     Comment: Rare        Social History     Substance and Sexual Activity   Sexual Activity Not Currently        HOME MEDICATIONS:     Prior to Admission medications    Medication Sig Start Date End Date Taking? Authorizing Provider   acetaminophen (TYLENOL) 650 MG TbSR Take 1,300 mg by mouth once daily. 2 Tablet(s) Oral  Every day. 11/29/11  Yes Historical Provider   amitriptyline (ELAVIL) 50 MG tablet Take 1 tablet (50 mg total) by mouth every evening. 12/22/21  Yes ASHU Murillo   amLODIPine (NORVASC) 5 MG tablet Take 1 tablet (5 mg total) by mouth once daily. 3/21/22 3/21/23 Yes Babs Louis NP   apixaban (ELIQUIS) 5 mg Tab Take 1 tablet (5 mg total) by mouth 2 (two) times daily. 3/21/22  Yes Babs Louis NP   azelastine (ASTELIN) 137 mcg (0.1 %) nasal spray 1 spray (137 mcg total) by Nasal route 2 (two) times daily. 1/24/20  Yes Naheed Bernal NP   canagliflozin (INVOKANA) 100 mg Tab tablet Take 1 tablet (100 mg total) by mouth once daily. 12/20/21 12/20/22 Yes Oumar Almonte Jr., MD   carboxymethylcellulose 1 % ophthalmic solution Apply 1 drop to eye As instructed. as directed 11/29/11  Yes Historical Provider   cetirizine (ZYRTEC) 10 MG tablet Take 10 mg by mouth once daily.   Yes Historical Provider   cholecalciferol, vitamin D3, (VITAMIN D3) 50 mcg (2,000 unit) Cap Take 1 capsule by mouth once daily.   Yes Historical Provider   coenzyme Q10 100 mg capsule Take 100 mg by mouth once daily.  11/29/11  Yes Historical Provider   cranberry extract 650 mg Cap Take 1 tablet by mouth 2 (two) times daily.   Yes Historical Provider   diclofenac sodium (VOLTAREN) 1 % Gel APPLY 2 G TOPICALLY 3 (THREE) TIMES DAILY.  Patient taking differently: Apply 2 g topically 3 (three) times daily as needed. APPLY 2 G TOPICALLY 3 (THREE) TIMES DAILY. 6/30/15  Yes Oumar Almonte Jr., MD   dronedarone (MULTAQ) 400 mg Tab Take 1 tablet (400 mg total) by  mouth 2 (two) times daily with meals. 4/25/22 4/25/23 Yes Babs Louis NP   fluticasone propionate (FLONASE) 50 mcg/actuation nasal spray 1 spray (50 mcg total) by Each Nostril route once daily. 6/14/21  Yes Oumar Almonte Jr., MD   isosorbide mononitrate (IMDUR) 60 MG 24 hr tablet Take 60 mg by mouth every evening.   Yes Historical Provider   levothyroxine (LEVOTHROID) 25 MCG tablet Take 1 tablet (25 mcg total) by mouth before breakfast. Every day 7/27/21  Yes Akhil Coleman MD   RESTASIS 0.05 % ophthalmic emulsion Place 1 drop into both eyes 2 (two) times daily. 12/18/20  Yes Historical Provider   rosuvastatin (CRESTOR) 10 MG tablet Take 1 tablet (10 mg total) by mouth once daily. 3/21/22  Yes Babs Louis NP   TOPROL XL 25 mg 24 hr tablet Take 1 tablet (25 mg total) by mouth once daily. 5/18/21  Yes Babs Louis NP   triazolam (HALCION) 0.25 MG Tab Take 1 tablet (0.25 mg total) by mouth nightly as needed (sleep). 5/5/22  Yes Oumar Almonte Jr., MD   UNABLE TO FIND Take 1 capsule by mouth once daily. Vital red   Yes Historical Provider   vitamins  A,C,E-zinc-copper 14,320-226-200 unit-mg-unit Cap Take 1 capsule by mouth 2 (two) times daily.    Yes Historical Provider   blood sugar diagnostic (FREESTYLE LITE STRIPS) Strp USE TO TEST BLOOD SUGAR TWICE A DAY 4/12/21   Oumar Almonte Jr., MD   gabapentin (NEURONTIN) 300 MG capsule Take 1 capsule (300 mg total) by mouth 3 (three) times daily. 12/20/21 1/19/22  Gabrielle Pulido PA-C   guaifenesin/dextromethorphan (TUSSIN DM ORAL) Take by mouth.    Historical Provider   Lactobac no.41/Bifidobact no.7 (PROBIOTIC-10 ORAL) Take by mouth.    Historical Provider   lancets Misc 1 Units by Misc.(Non-Drug; Combo Route) route 2 (two) times daily. 8/5/13   Oumar Almonte Jr., MD   LIDOcaine 4 % PtMd Apply 1 patch topically once daily.  Patient not taking: No sig reported 12/17/21   Bj Resendiz MD   magnesium oxide (MAG-OXIDE ORAL) Take 400 mg by mouth once  "daily.    Historical Provider   mirabegron (MYRBETRIQ) 50 mg Tb24 Take 1 tablet (50 mg total) by mouth once daily.  Patient not taking: No sig reported 3/14/22 3/14/23  Babs Benites MD   nitroGLYCERIN (NITROSTAT) 0.4 MG SL tablet Place 0.4 mg under the tongue every 5 (five) minutes as needed. 0.4mg Sublingual PRN .   11/29/11   Historical Provider   pramoxine-hydrocortisone (PROCTOCREAM-HC) 1-1 % rectal cream Place rectally 2 (two) times daily.  Patient taking differently: Place 1 application rectally 2 (two) times daily. 5/5/22   Oumar Almonte Jr., MD   promethazine-codeine 6.25-10 mg/5 ml (PHENERGAN WITH CODEINE) 6.25-10 mg/5 mL syrup Take 5 mLs by mouth every 4 (four) hours as needed for Cough.    Historical Provider         PHYSICAL EXAM:   /85 (BP Location: Left arm, Patient Position: Sitting)   Pulse 64   Temp 98.1 °F (36.7 °C) (Oral)   Resp (!) 27   Ht 5' 2" (1.575 m)   Wt 64 kg (141 lb)   SpO2 (!) 88% Comment: ROOM AIR O2 SATS 2LNC PLACED  BMI 25.79 kg/m²   Vitals Reviewed  General appearance:  Fatigued appearing female in no apparent distress.  Skin: No Rash.   Neuro: Motor and sensory exams grossly intact. Good tone. Power in all 4 extremities 5/5.   HENT: Atraumatic head. Moist mucous membranes of oral cavity.  Eyes: Normal extraocular movements.   Neck: Supple. No evidence of lymphadenopathy. No thyroidomegaly.  Lungs:  Rhonchi throughout bilaterally. No wheezing present.   Heart: Regular rate and rhythm. S1 and S2 present with positive murmurs/gallop/rub.  Positive pedal edema. No JVD present.   Abdomen: Soft, non-distended, non-tender. No rebound tenderness/guarding. No masses or organomegaly. Bowel sounds are normal. Bladder is not palpable.   Extremities: No cyanosis, clubbing, positive edema.  Psych/mental status: Alert and oriented. Cooperative. Responds appropriately to questions.   EMERGENCY DEPARTMENT LABS AND IMAGING:   Following labs were Reviewed   Recent Labs "   Lab 05/10/22  1637   WBC 4.95   HGB 11.2*   HCT 34.8*      CALCIUM 8.4*   ALBUMIN 3.9   PROT 6.7      K 4.3   CO2 23      BUN 37*   CREATININE 1.3   ALKPHOS 57   ALT 65*   AST 44*   BILITOT 0.8         BMP:   Recent Labs   Lab 05/10/22  1637         K 4.3      CO2 23   BUN 37*   CREATININE 1.3   CALCIUM 8.4*   , CMP   Recent Labs   Lab 05/10/22  1637      K 4.3      CO2 23      BUN 37*   CREATININE 1.3   CALCIUM 8.4*   PROT 6.7   ALBUMIN 3.9   BILITOT 0.8   ALKPHOS 57   AST 44*   ALT 65*   ANIONGAP 9   ESTGFRAFRICA 44.5*   EGFRNONAA 38.6*   , CBC   Recent Labs   Lab 05/10/22  1637   WBC 4.95   HGB 11.2*   HCT 34.8*      , INR   Lab Results   Component Value Date    INR 0.9 09/05/2016    INR 0.9 08/11/2015   , Lipid Panel   Lab Results   Component Value Date    CHOL 127 11/16/2021    HDL 22 (L) 11/16/2021    LDLCALC 42.8 (L) 11/16/2021    TRIG 311 (H) 11/16/2021    CHOLHDL 17.3 (L) 11/16/2021   , Troponin   Recent Labs   Lab 05/10/22  1637   TROPONINI <0.030   , A1C:   Recent Labs   Lab 11/16/21  0922   HGBA1C 6.3*    and All labs within the past 24 hours have been reviewed  Microbiology Results (last 7 days)     Procedure Component Value Units Date/Time    Blood culture [[id number]] Collected: 05/10/22 1906    Order Status: Sent Specimen: Blood from Peripheral, Forearm, Left Updated: 05/10/22 1918    Blood culture [[id number]] Collected: 05/10/22 1641    Order Status: Sent Specimen: Blood from Peripheral, Forearm, Right Updated: 05/10/22 1649        X-Ray Chest AP Portable   Final Result        X-Ray Chest PA And Lateral    Result Date: 4/20/2022  2 view chest CLINICAL DATA: Productive cough FINDINGS: PA and lateral views are compared to February 2020. The heart is enlarged. There has been previous median sternotomy. No infiltrates are identified. There is a trace amount of pleural fluid on the right. No acute osseous abnormality is demonstrated.  IMPRESSION: 1. Trace amount of pleural fluid on the right. 2. Cardiomegaly and post sternotomy changes. Electronically signed by:  Cong Lazaro MD  4/20/2022 1:18 PM CDT Workstation: [id number]    X-Ray Chest AP Portable    Result Date: 5/10/2022  HISTORY: Chest pain,  cough and dyspnea. FINDINGS: Portable chest radiograph at 1710 hours compared to 04/20/2022 shows median sternotomy wires and mediastinal surgical clips from prior CABG, with stable enlarged cardiac silhouette and normal pulmonary vascularity. There are scattered aortic vascular calcifications. The lungs are symmetrically expanded, with right infrahilar opacities, and bibasilar reticulonodular densities, with mild blunting of the costophrenic angles. The mid and upper lung zones are clear, with no consolidation or evidence of nomi interstitial pulmonary edema. No pneumothorax. There are no acute fractures or destructive osseous lesions. The bones are diffusely osteopenic. IMPRESSION: Unchanged cardiomegaly, with nonspecific lower lung opacities suggesting atelectasis and/or pneumonitis. Tiny pleural effusions are not excluded. Electronically signed by:  Sebastian Juarez MD  5/10/2022 5:45 PM CDT Workstation: [id number]    Nuclear Stress - Cardiology Interpreted    Result Date: 5/5/2022    Abnormal myocardial perfusion scan.   There is a moderate intensity, moderate sized, mostly reversible perfusion abnormality with some fixed areas in the basal to apical anterior wall(s).   There are no other significant perfusion abnormalities.   The gated perfusion images showed an ejection fraction of 53% post stress.   There is normal wall motion post stress.   LV cavity size is  and normal at stress.   The EKG portion of this study is negative for ischemia.   The patient reported chest pain during the stress test.   During stress, occasional PVCs are noted.     I personally reviewed and agree with the radiologist's finding    Twelve lead EKG reveals  a sinus rhythm with first-degree AV block there is a left axis deviation this poor R-wave progression this occasional PVCs this lateral ST depression and T-wave inversion rate 65  milliseconds  ASSESSMENT & PLAN:   Darlin Tipton is a 81 y.o. female admitted for    1. Atypical Chest Pain  -trend cardiac enzymes and troponin  -2D echo complete if not current  -consult Cardiology  -continue cardioprotective medication    2. Bronchitis  -IV Zithromax  -DuoNeb treatment    3. Recent Abnormal Stress Test  -consult Cardiology    4. Acute on chronic renal Failure  -renal dose all medications  -avoid nephrotoxic medications    5. Type II Diabetes  -low dose NovoLog insulin sliding scale  -sliding scale protocol  -diabetic cardiac diet renal diet    6.Transaminitis  -monitor  -acute hepatitis panel    7. Hyperlipidemia  -continue statins for now    8. Essential HTN  -continue home medications to manage    9. Hypothyroidism  -continue levothyroxine at current dose    DVT Prophylaxis: will be placed on Eliquis for DVT prophylaxis and will be advised to be as mobile as possible and sit in a chair as tolerated.   _____________________________________________________________  Face-to-Face encounter date: 05/10/2022  Encounter included review of the medical records, interviewing and examining the patient face-to-face, discussion with family and other health care providers including emergency medicine physician, admission orders, interpreting lab/test results and formulating a plan of care.   Medical Decision Making during this encounter was  [_] Low Complexity  [_] Moderate Complexity  [x] High Complexity  _________________________________________________________________________________    INPATIENT LIST OF MEDICATIONS     Current Facility-Administered Medications:     azithromycin 500 mg in dextrose 5 % 250 mL IVPB (ready to mix system), 500 mg, Intravenous, Once, Brendon Ramirez MD    Current Outpatient Medications:      acetaminophen (TYLENOL) 650 MG TbSR, Take 1,300 mg by mouth once daily. 2 Tablet(s) Oral  Every day., Disp: , Rfl:     amitriptyline (ELAVIL) 50 MG tablet, Take 1 tablet (50 mg total) by mouth every evening., Disp: 90 tablet, Rfl: 3    amLODIPine (NORVASC) 5 MG tablet, Take 1 tablet (5 mg total) by mouth once daily., Disp: 30 tablet, Rfl: 11    apixaban (ELIQUIS) 5 mg Tab, Take 1 tablet (5 mg total) by mouth 2 (two) times daily., Disp: 30 tablet, Rfl: 3    azelastine (ASTELIN) 137 mcg (0.1 %) nasal spray, 1 spray (137 mcg total) by Nasal route 2 (two) times daily., Disp: 90 mL, Rfl: 3    canagliflozin (INVOKANA) 100 mg Tab tablet, Take 1 tablet (100 mg total) by mouth once daily., Disp: 90 tablet, Rfl: 3    carboxymethylcellulose 1 % ophthalmic solution, Apply 1 drop to eye As instructed. as directed, Disp: , Rfl:     cetirizine (ZYRTEC) 10 MG tablet, Take 10 mg by mouth once daily., Disp: , Rfl:     cholecalciferol, vitamin D3, (VITAMIN D3) 50 mcg (2,000 unit) Cap, Take 1 capsule by mouth once daily., Disp: , Rfl:     coenzyme Q10 100 mg capsule, Take 100 mg by mouth once daily. , Disp: , Rfl:     cranberry extract 650 mg Cap, Take 1 tablet by mouth 2 (two) times daily., Disp: , Rfl:     diclofenac sodium (VOLTAREN) 1 % Gel, APPLY 2 G TOPICALLY 3 (THREE) TIMES DAILY. (Patient taking differently: Apply 2 g topically 3 (three) times daily as needed. APPLY 2 G TOPICALLY 3 (THREE) TIMES DAILY.), Disp: 100 g, Rfl: 5    dronedarone (MULTAQ) 400 mg Tab, Take 1 tablet (400 mg total) by mouth 2 (two) times daily with meals., Disp: 180 tablet, Rfl: 3    fluticasone propionate (FLONASE) 50 mcg/actuation nasal spray, 1 spray (50 mcg total) by Each Nostril route once daily., Disp: 48 g, Rfl: 3    isosorbide mononitrate (IMDUR) 60 MG 24 hr tablet, Take 60 mg by mouth every evening., Disp: , Rfl:     levothyroxine (LEVOTHROID) 25 MCG tablet, Take 1 tablet (25 mcg total) by mouth before breakfast. Every day, Disp:  90 tablet, Rfl: 3    RESTASIS 0.05 % ophthalmic emulsion, Place 1 drop into both eyes 2 (two) times daily., Disp: , Rfl:     rosuvastatin (CRESTOR) 10 MG tablet, Take 1 tablet (10 mg total) by mouth once daily., Disp: 90 tablet, Rfl: 3    TOPROL XL 25 mg 24 hr tablet, Take 1 tablet (25 mg total) by mouth once daily., Disp: 90 tablet, Rfl: 3    triazolam (HALCION) 0.25 MG Tab, Take 1 tablet (0.25 mg total) by mouth nightly as needed (sleep)., Disp: 90 tablet, Rfl: 1    UNABLE TO FIND, Take 1 capsule by mouth once daily. Vital red, Disp: , Rfl:     vitamins  A,C,E-zinc-copper 14,320-226-200 unit-mg-unit Cap, Take 1 capsule by mouth 2 (two) times daily. , Disp: , Rfl:     blood sugar diagnostic (FREESTYLE LITE STRIPS) Strp, USE TO TEST BLOOD SUGAR TWICE A DAY, Disp: 200 strip, Rfl: 3    gabapentin (NEURONTIN) 300 MG capsule, Take 1 capsule (300 mg total) by mouth 3 (three) times daily., Disp: 90 capsule, Rfl: 2    guaifenesin/dextromethorphan (TUSSIN DM ORAL), Take by mouth., Disp: , Rfl:     Lactobac no.41/Bifidobact no.7 (PROBIOTIC-10 ORAL), Take by mouth., Disp: , Rfl:     lancets Misc, 1 Units by Misc.(Non-Drug; Combo Route) route 2 (two) times daily., Disp: 200 each, Rfl: 3    LIDOcaine 4 % PtMd, Apply 1 patch topically once daily. (Patient not taking: No sig reported), Disp: 5 patch, Rfl: 1    magnesium oxide (MAG-OXIDE ORAL), Take 400 mg by mouth once daily., Disp: , Rfl:     mirabegron (MYRBETRIQ) 50 mg Tb24, Take 1 tablet (50 mg total) by mouth once daily. (Patient not taking: No sig reported), Disp: 90 tablet, Rfl: 3    nitroGLYCERIN (NITROSTAT) 0.4 MG SL tablet, Place 0.4 mg under the tongue every 5 (five) minutes as needed. 0.4mg Sublingual PRN .  , Disp: , Rfl:     pramoxine-hydrocortisone (PROCTOCREAM-HC) 1-1 % rectal cream, Place rectally 2 (two) times daily. (Patient taking differently: Place 1 application rectally 2 (two) times daily.), Disp: 3 each, Rfl: 3    promethazine-codeine  6.25-10 mg/5 ml (PHENERGAN WITH CODEINE) 6.25-10 mg/5 mL syrup, Take 5 mLs by mouth every 4 (four) hours as needed for Cough., Disp: , Rfl:     Facility-Administered Medications Ordered in Other Encounters:     0.9%  NaCl infusion, , Intravenous, Continuous, Bj Jones MD, Stopped at 02/05/20 1400      Scheduled Meds:   azithromycin  500 mg Intravenous Once     Continuous Infusions:  PRN Meds:.      Roro Torres  [facility] Hospitalist NP  05/10/2022

## 2022-05-11 NOTE — PROGRESS NOTES
[facility] Medicine  Progress Note    Patient Name: Darlin Tipton  MRN: [id number]  Patient Class: OP- Observation   Admission Date: 5/10/2022  Length of Stay: 0 days  Attending Physician: Lamont Mccullough MD  Primary Care Provider: Oumar Almonte Jr, MD        Subjective:     Principal Problem:Acute on chronic heart failure  No cp, sob improved, still w/ cough  CT chest with small-to-mod R pl effusions    Review of Systems  Objective:     Vital Signs (Most Recent):  Temp: 97.9 °F (36.6 °C) (05/11/22 1138)  Pulse: 61 (05/11/22 1138)  Resp: (!) 24 (05/11/22 1138)  BP: (!) 145/64 (05/11/22 1138)  SpO2: (!) 93 % (05/11/22 1138) Vital Signs (24h Range):  Temp:  [97.1 °F (36.2 °C)-98.4 °F (36.9 °C)] 97.9 °F (36.6 °C)  Pulse:  [58-69] 61  Resp:  [15-27] 24  SpO2:  [88 %-99 %] 93 %  BP: (134-157)/(61-70) 145/64     Weight: 67.9 kg (149 lb 11.1 oz)  Body mass index is 27.38 kg/m².    Intake/Output Summary (Last 24 hours) at 5/11/2022 1831  Last data filed at 5/11/2022 1450  Gross per 24 hour   Intake 660 ml   Output 300 ml   Net 360 ml      Physical Exam  General appearance:  Fatigued appearing female in no apparent distress.  Skin: No Rash.   Neuro: Motor and sensory exams grossly intact. Good tone. Power in all 4 extremities 5/5.   HENT: Atraumatic head. Moist mucous membranes of oral cavity.  Eyes: Normal extraocular movements.   Neck: Supple. No evidence of lymphadenopathy. No thyroidomegaly.  Lungs:  Rhonchi throughout bilaterally. No wheezing present.   Heart: Regular rate and rhythm. S1 and S2 present with positive murmurs/gallop/rub.  Positive pedal edema. No JVD present.   Abdomen: Soft, non-distended, non-tender. No rebound tenderness/guarding. No masses or organomegaly. Bowel sounds are normal. Bladder is not palpable.   Extremities: No cyanosis, clubbing, positive edema.  Psych/mental status: Alert and oriented. Cooperative. Responds appropriately to questions.          Significant Labs: All pertinent labs within the past 24 hours have been reviewed.    Significant Imaging: I have reviewed all pertinent imaging results/findings within the past 24 hours.    Assessment/Plan:      Active Diagnoses:    Diagnosis Date Noted POA    PRINCIPAL PROBLEM:  Acute on chronic heart failure [I50.9] 05/10/2022 Yes    Atypical chest pain [R07.89] 05/10/2022 Yes    Bronchitis [J40] 05/10/2022 Yes    Positive cardiac stress test [R94.39] 05/06/2022 Yes    Paroxysmal atrial fibrillation [I48.0] 03/21/2022 Yes    Stage 3b chronic kidney disease [N18.32] 11/07/2021 Yes    Type 2 diabetes mellitus with diabetic nephropathy, without long-term current use of insulin [E11.21] 05/05/2018 Yes      Problems Resolved During this Admission:     VTE Risk Mitigation (From admission, onward)         Ordered     heparin (porcine) injection 5,000 Units  Every 8 hours         05/11/22 0918     Reason for No Pharmacological VTE Prophylaxis  Once        Question:  Reasons:  Answer:  Already adequately anticoagulated on oral Anticoagulants    05/10/22 2150     IP VTE HIGH RISK PATIENT  Once         05/10/22 2150                  1. Atypical Chest Pain  -trend cardiac enzymes and troponin  -2D echo   -consult Cardiology  -continue cardioprotective medication     2. Cough, possibly s/t Pleural effusoins  -IV Zithromax  -may need thora, but is on eliquis will hold     3. Recent Abnormal Stress Test  -consult Cardiology, to determine if inpatient LHC needed   -heparin gtt pending cardiology eval    4. Acute on chronic renal Failure  -renal dose all medications  -avoid nephrotoxic medications     5. Type II Diabetes  -low dose NovoLog insulin sliding scale  -sliding scale protocol  -diabetic cardiac diet renal diet     6.Transaminitis  -monitor  -acute hepatitis panel     7. Hyperlipidemia  -continue statins for now     8. Essential HTN  -continue home medications to manage     9. Hypothyroidism  -continue  levothyroxine at current dose     DVT Prophylaxis: will be placed on Eliquis for DVT prophylaxis and will be advised to be as mobile as possible and sit in a chair as tolerated.       Lamont Mccullough MD  Department of Hospital Medicine   [facility]

## 2022-05-11 NOTE — CONSULTS
"[facility]  Adult Nutrition   Consult Note (Nutrition Education)     SUMMARY     Recommendations  Recommendation/Intervention:   1) RD educated patient on diet for CHF verbally and gave written information along with my contact number if needed. 2) Continue current cardiac diet as tolerated.   3)  continue to obtain meal pref's daily.    Goals:   ) Patient to understand the diet for CHF and be able to follow it to control her CHF. 2) Patient to meet >75% of her nutritional needs.    Nutrition Goal Status: progressing towards goal    Communication of RD Recs: other (comment) (patient and )    Dietitian Rounds Brief  Consult for Education for CHF: Educated patient on diet for CHF verbally and gave written information along with my contact number if needed. She verbalized understanding with intent to comply. She is eating 100% amd her LBM was yesterday. Will follow prn.    Diet order:   Current Diet Order: Cardiac     % Intake of Estimated Energy Needs: 75 - 100 %  % Meal Intake: 75 - 100 %    Energy Calories Required: meeting needs  Protein Required: meeting needs  Fluid Required: meeting needs  Tolerance: tolerating    Anthropometrics  Temp: 97.9 °F (36.6 °C)  Height Method: Stated  Height: 5' 2" (157.5 cm)  Height (inches): 62 in  Weight Method: Bed Scale  Weight: 67.9 kg (149 lb 11.1 oz)  Weight (lb): 149.69 lb  Ideal Body Weight (IBW), Female: 110 lb  % Ideal Body Weight, Female (lb): 136.08 %  BMI (Calculated): 27.4  BMI Grade: 25 - 29.9 - overweight       Estimated/Assessed Needs  Weight Used For Calorie Calculations: 67.9 kg (149 lb 11.1 oz)  Energy Calorie Requirements (kcal): 1194-4647 kcals/day (20-25 kcals/kg ABW)  Energy Need Method: Kcal/kg  Protein Requirements: 60-75 g/day (1.2-1.5 g/kg IBW)  Weight Used For Protein Calculations: 50 kg (110 lb 3.7 oz)     Estimated Fluid Requirement Method: RDA Method  RDA Method (mL): 1358       Reason for Assessment  Reason For Assessment: " consult  Diagnosis: other (see comments) (Acute on chronic heart failure)  Relevant Medical History: Diabetes mellitus, CAD (coronary artery disease), GERD (gastroesophageal reflux disease), Thyroid disease, Hypothyroidism, Hypertension, Hyperlipidemia, Spinal stenosis, Chronic bronchitis, Allergy to Dust mites, Grasses, Trees, Arthritis, Asthma, Blood transfusion, Cataract, Irregular heart beat, Diabetes mellitus type II, Post-menopausal bleeding, Kidney disease  Interdisciplinary Rounds: attended    Nutrition/Diet History  Spiritual, Cultural Beliefs, Rastafari Practices, Values that Affect Care: no  Food Allergies: NKFA  Factors Affecting Nutritional Intake: NPO    Weight History:  Wt Readings from Last 5 Encounters:   05/11/22 67.9 kg (149 lb 11.1 oz)   05/06/22 66.2 kg (146 lb)   05/05/22 64.4 kg (141 lb 15.6 oz)   05/04/22 64 kg (141 lb 1.5 oz)   04/20/22 64 kg (141 lb)        Lab/Procedures/Meds: Pertinent Labs/Meds Reviewed    Medications:Pertinent Medications Reviewed  Scheduled Meds:   amitriptyline  50 mg Oral QHS    amLODIPine  5 mg Oral Daily    aspirin  81 mg Oral Daily    atorvastatin  20 mg Oral Daily    cholecalciferol (vitamin D3)  2,000 Units Oral Daily    dronedarone  400 mg Oral BID WM    heparin (porcine)  5,000 Units Subcutaneous Q8H    isosorbide mononitrate  60 mg Oral QHS    levothyroxine  25 mcg Oral Before breakfast    metoprolol succinate  25 mg Oral Daily     Continuous Infusions:  PRN Meds:.benzonatate, dextrose 50%, dextrose 50%, glucagon (human recombinant), glucose, glucose, insulin aspart U-100, melatonin, morphine, nitroGLYCERIN, ondansetron, sodium chloride 0.9%    Labs: Pertinent Labs Reviewed  Clinical Chemistry:  Recent Labs   Lab 05/10/22  1637 05/10/22  1935 05/11/22  0133     --  136   K 4.3  --  3.9     --  100   CO2 23  --  23     --  188*   BUN 37*  --  40*   CREATININE 1.3  --  1.3   CALCIUM 8.4*  --  8.9   PROT 6.7  --   --    ALBUMIN 3.9   --   --    BILITOT 0.8  --   --    ALKPHOS 57  --   --    AST 44*  --   --    ALT 65*  --   --    ANIONGAP 9  --  13   ESTGFRAFRICA 44.5*  --  44.5*   EGFRNONAA 38.6*  --  38.6*   MG  --   --  2.4   PHOS  --   --  4.9*   AMYLASE  --  42  --    LIPASE  --  37  --      CBC:   Recent Labs   Lab 05/11/22  0133   WBC 4.36   RBC 3.92*   HGB 11.2*   HCT 34.5*      MCV 88   MCH 28.6   MCHC 32.5     Cardiac Profile:  Recent Labs   Lab 05/10/22  1637 05/10/22  1935 05/11/22  0133   *  --  674*   TROPONINI <0.030 <0.030 <0.030     Monitor and Evaluation  Food and Nutrient Intake: food and beverage intake, energy intake  Food and Nutrient Adminstration: diet order  Knowledge/Beliefs/Attitudes: food and nutrition knowledge/skill, beliefs and attitudes  Physical Activity and Function: nutrition-related ADLs and IADLs, factors affecting access to physical activity  Anthropometric Measurements: weight, weight change, body mass index  Biochemical Data, Medical Tests and Procedures: lipid profile, inflammatory profile, glucose/endocrine profile, gastrointestinal profile, electrolyte and renal panel  Nutrition-Focused Physical Findings: overall appearance     Nutrition Risk  Level of Risk/Frequency of Follow-up: low     Nutrition Follow-Up  RD Follow-up?: Yes      Hillary Vu, SHERIN 05/11/2022 1:40 PM

## 2022-05-12 ENCOUNTER — CLINICAL SUPPORT (OUTPATIENT)
Dept: CARDIOLOGY | Facility: HOSPITAL | Age: 82
DRG: 286 | End: 2022-05-12
Attending: INTERNAL MEDICINE
Payer: MEDICARE

## 2022-05-12 LAB
ANION GAP SERPL CALC-SCNC: 13 MMOL/L (ref 8–16)
APTT PPP: 112.4 SEC (ref 23.3–35.1)
APTT PPP: 50 SEC (ref 23.3–35.1)
APTT PPP: 73.4 SEC (ref 23.3–35.1)
AV INDEX (PROSTH): 0.33
AV MEAN GRADIENT: 10 MMHG
AV VALVE AREA: 1.06 CM2
BSA FOR ECHO PROCEDURE: 1.72 M2
BUN SERPL-MCNC: 57 MG/DL (ref 8–23)
CALCIUM SERPL-MCNC: 8.4 MG/DL (ref 8.7–10.5)
CHLORIDE SERPL-SCNC: 100 MMOL/L (ref 95–110)
CO2 SERPL-SCNC: 25 MMOL/L (ref 23–29)
CREAT SERPL-MCNC: 1.4 MG/DL (ref 0.5–1.4)
CV ECHO LV RWT: 0.47 CM
DOP CALC AO VTI: 51.44 CM
DOP CALC LVOT AREA: 3.2 CM2
DOP CALC LVOT DIAMETER: 2.01 CM
DOP CALC LVOT PEAK VEL: 72.84 M/S
DOP CALC LVOT STROKE VOLUME: 54.61 CM3
DOP CALCLVOT PEAK VEL VTI: 17.22 CM
E WAVE DECELERATION TIME: 178.19 MSEC
E/A RATIO: 2.22
E/E' RATIO: 23.45 M/S
ECHO LV POSTERIOR WALL: 1.08 CM (ref 0.6–1.1)
EJECTION FRACTION: 40 %
ERYTHROCYTE [DISTWIDTH] IN BLOOD BY AUTOMATED COUNT: 14.6 % (ref 11.5–14.5)
EST. GFR  (AFRICAN AMERICAN): 40.6 ML/MIN/1.73 M^2
EST. GFR  (NON AFRICAN AMERICAN): 35.3 ML/MIN/1.73 M^2
ESTIMATED AVG GLUCOSE: 134 MG/DL (ref 68–131)
FRACTIONAL SHORTENING: 17 % (ref 28–44)
GLUCOSE SERPL-MCNC: 103 MG/DL (ref 70–110)
GLUCOSE SERPL-MCNC: 124 MG/DL (ref 70–110)
GLUCOSE SERPL-MCNC: 125 MG/DL (ref 70–110)
GLUCOSE SERPL-MCNC: 126 MG/DL (ref 70–110)
GLUCOSE SERPL-MCNC: 138 MG/DL (ref 70–110)
HAV IGM SERPL QL IA: NEGATIVE
HBA1C MFR BLD: 6.3 % (ref 4.5–6.2)
HBV CORE IGM SERPL QL IA: NEGATIVE
HBV SURFACE AG SERPL QL IA: NEGATIVE
HCT VFR BLD AUTO: 32.2 % (ref 37–48.5)
HCV AB S/CO SERPL IA: <0.1 S/CO RATIO (ref 0–0.9)
HCV AB SERPL QL IA: NORMAL
HGB BLD-MCNC: 10.1 G/DL (ref 12–16)
INTERVENTRICULAR SEPTUM: 1.07 CM (ref 0.6–1.1)
LEFT ATRIUM SIZE: 4.29 CM
LEFT INTERNAL DIMENSION IN SYSTOLE: 3.84 CM (ref 2.1–4)
LEFT VENTRICLE MASS INDEX: 106 G/M2
LEFT VENTRICULAR INTERNAL DIMENSION IN DIASTOLE: 4.63 CM (ref 3.5–6)
LEFT VENTRICULAR MASS: 177.33 G
LV LATERAL E/E' RATIO: 18.43 M/S
LV SEPTAL E/E' RATIO: 32.25 M/S
MCH RBC QN AUTO: 28.8 PG (ref 27–31)
MCHC RBC AUTO-ENTMCNC: 31.4 G/DL (ref 32–36)
MCV RBC AUTO: 92 FL (ref 82–98)
MV PEAK A VEL: 0.58 M/S
MV PEAK E VEL: 1.29 M/S
PISA TR MAX VEL: 2.44 M/S
PLATELET # BLD AUTO: 175 K/UL (ref 150–450)
PMV BLD AUTO: 10.7 FL (ref 9.2–12.9)
POTASSIUM SERPL-SCNC: 4 MMOL/L (ref 3.5–5.1)
RA PRESSURE: 8 MMHG
RBC # BLD AUTO: 3.51 M/UL (ref 4–5.4)
RIGHT VENTRICULAR END-DIASTOLIC DIMENSION: 255 CM
SODIUM SERPL-SCNC: 138 MMOL/L (ref 136–145)
TDI LATERAL: 0.07 M/S
TDI SEPTAL: 0.04 M/S
TDI: 0.06 M/S
TR MAX PG: 24 MMHG
TRICUSPID ANNULAR PLANE SYSTOLIC EXCURSION: 144 CM
TV REST PULMONARY ARTERY PRESSURE: 32 MMHG
WBC # BLD AUTO: 7.82 K/UL (ref 3.9–12.7)

## 2022-05-12 PROCEDURE — 63600175 PHARM REV CODE 636 W HCPCS: Performed by: INTERNAL MEDICINE

## 2022-05-12 PROCEDURE — 93306 TTE W/DOPPLER COMPLETE: CPT | Mod: 26,,, | Performed by: INTERNAL MEDICINE

## 2022-05-12 PROCEDURE — 63600175 PHARM REV CODE 636 W HCPCS: Performed by: NURSE PRACTITIONER

## 2022-05-12 PROCEDURE — 99900035 HC TECH TIME PER 15 MIN (STAT)

## 2022-05-12 PROCEDURE — 93306 TTE W/DOPPLER COMPLETE: CPT

## 2022-05-12 PROCEDURE — 96376 TX/PRO/DX INJ SAME DRUG ADON: CPT

## 2022-05-12 PROCEDURE — 25000003 PHARM REV CODE 250: Performed by: FAMILY MEDICINE

## 2022-05-12 PROCEDURE — 85730 THROMBOPLASTIN TIME PARTIAL: CPT | Mod: 91 | Performed by: INTERNAL MEDICINE

## 2022-05-12 PROCEDURE — 99232 PR SUBSEQUENT HOSPITAL CARE,LEVL II: ICD-10-PCS | Mod: 25,,, | Performed by: INTERNAL MEDICINE

## 2022-05-12 PROCEDURE — 99232 SBSQ HOSP IP/OBS MODERATE 35: CPT | Mod: 25,,, | Performed by: INTERNAL MEDICINE

## 2022-05-12 PROCEDURE — 85027 COMPLETE CBC AUTOMATED: CPT | Performed by: INTERNAL MEDICINE

## 2022-05-12 PROCEDURE — 94761 N-INVAS EAR/PLS OXIMETRY MLT: CPT

## 2022-05-12 PROCEDURE — 27000221 HC OXYGEN, UP TO 24 HOURS

## 2022-05-12 PROCEDURE — 25000003 PHARM REV CODE 250: Performed by: NURSE PRACTITIONER

## 2022-05-12 PROCEDURE — 25000003 PHARM REV CODE 250: Performed by: INTERNAL MEDICINE

## 2022-05-12 PROCEDURE — 21000000 HC CCU ICU ROOM CHARGE

## 2022-05-12 PROCEDURE — 93306 ECHO (CUPID ONLY): ICD-10-PCS | Mod: 26,,, | Performed by: INTERNAL MEDICINE

## 2022-05-12 PROCEDURE — 82962 GLUCOSE BLOOD TEST: CPT

## 2022-05-12 PROCEDURE — 36415 COLL VENOUS BLD VENIPUNCTURE: CPT | Performed by: INTERNAL MEDICINE

## 2022-05-12 PROCEDURE — 80048 BASIC METABOLIC PNL TOTAL CA: CPT | Performed by: NURSE PRACTITIONER

## 2022-05-12 PROCEDURE — 63600175 PHARM REV CODE 636 W HCPCS: Performed by: FAMILY MEDICINE

## 2022-05-12 RX ORDER — ACETAMINOPHEN 325 MG/1
650 TABLET ORAL EVERY 6 HOURS PRN
Status: DISCONTINUED | OUTPATIENT
Start: 2022-05-12 | End: 2022-05-17 | Stop reason: HOSPADM

## 2022-05-12 RX ORDER — FUROSEMIDE 10 MG/ML
40 INJECTION INTRAMUSCULAR; INTRAVENOUS ONCE
Status: COMPLETED | OUTPATIENT
Start: 2022-05-12 | End: 2022-05-12

## 2022-05-12 RX ADMIN — ISOSORBIDE MONONITRATE 60 MG: 30 TABLET, EXTENDED RELEASE ORAL at 08:05

## 2022-05-12 RX ADMIN — FUROSEMIDE 40 MG: 10 INJECTION, SOLUTION INTRAMUSCULAR; INTRAVENOUS at 03:05

## 2022-05-12 RX ADMIN — ACETAMINOPHEN 650 MG: 325 TABLET ORAL at 09:05

## 2022-05-12 RX ADMIN — AZITHROMYCIN 500 MG: 500 INJECTION, POWDER, LYOPHILIZED, FOR SOLUTION INTRAVENOUS at 11:05

## 2022-05-12 RX ADMIN — FUROSEMIDE 40 MG: 10 INJECTION, SOLUTION INTRAMUSCULAR; INTRAVENOUS at 05:05

## 2022-05-12 RX ADMIN — Medication 2000 UNITS: at 08:05

## 2022-05-12 RX ADMIN — LEVOTHYROXINE SODIUM 25 MCG: 0.03 TABLET ORAL at 05:05

## 2022-05-12 RX ADMIN — DRONEDARONE 400 MG: 400 TABLET, FILM COATED ORAL at 08:05

## 2022-05-12 RX ADMIN — ASPIRIN 81 MG 81 MG: 81 TABLET ORAL at 08:05

## 2022-05-12 RX ADMIN — ONDANSETRON 4 MG: 2 INJECTION INTRAMUSCULAR; INTRAVENOUS at 06:05

## 2022-05-12 RX ADMIN — AMLODIPINE BESYLATE 5 MG: 5 TABLET ORAL at 08:05

## 2022-05-12 RX ADMIN — METOPROLOL SUCCINATE 25 MG: 25 TABLET, FILM COATED, EXTENDED RELEASE ORAL at 08:05

## 2022-05-12 RX ADMIN — CEFTRIAXONE 1 G: 1 INJECTION, POWDER, FOR SOLUTION INTRAMUSCULAR; INTRAVENOUS at 09:05

## 2022-05-12 RX ADMIN — AMITRIPTYLINE HYDROCHLORIDE 50 MG: 25 TABLET, FILM COATED ORAL at 08:05

## 2022-05-12 RX ADMIN — ATORVASTATIN CALCIUM 20 MG: 20 TABLET, FILM COATED ORAL at 08:05

## 2022-05-12 RX ADMIN — HEPARIN SODIUM 13 UNITS/KG/HR: 10000 INJECTION, SOLUTION INTRAVENOUS at 08:05

## 2022-05-12 RX ADMIN — BENZONATATE 200 MG: 100 CAPSULE ORAL at 08:05

## 2022-05-12 RX ADMIN — DRONEDARONE 400 MG: 400 TABLET, FILM COATED ORAL at 06:05

## 2022-05-12 RX ADMIN — POTASSIUM CHLORIDE 20 MEQ: 1500 TABLET, EXTENDED RELEASE ORAL at 05:05

## 2022-05-12 NOTE — PROGRESS NOTES
[facility]  Progress Note    Patient Name: Darlin Tipton  MRN: [id number]  Patient Class: IP- Inpatient   Admission Date: 5/10/2022  Length of Stay: 0 days  Attending Physician: Lamont Mccullough MD  Primary Care Provider: Oumar Almonte Jr, MD        Subjective:     Principal Problem:Acute on chronic heart failure  No cp, sob improved, still w/ cough  CT chest with small-to-mod R pl effusions    Review of Systems  Objective:     Vital Signs (Most Recent):  Temp: 97.8 °F (36.6 °C) (05/12/22 1522)  Pulse: (!) 58 (05/12/22 1522)  Resp: (!) 27 (05/12/22 1522)  BP: 139/66 (05/12/22 1522)  SpO2: 95 % (05/12/22 1522) Vital Signs (24h Range):  Temp:  [97.5 °F (36.4 °C)-98.2 °F (36.8 °C)] 97.8 °F (36.6 °C)  Pulse:  [55-62] 58  Resp:  [17-28] 27  SpO2:  [90 %-97 %] 95 %  BP: (126-144)/(60-66) 139/66     Weight: 66 kg (145 lb 8.1 oz)  Body mass index is 26.61 kg/m².    Intake/Output Summary (Last 24 hours) at 5/12/2022 1558  Last data filed at 5/12/2022 1228  Gross per 24 hour   Intake 720 ml   Output 1200 ml   Net -480 ml      Physical Exam  General appearance:  Fatigued appearing female in no apparent distress.  Skin: No Rash.   Neuro: Motor and sensory exams grossly intact. Good tone. Power in all 4 extremities 5/5.   HENT: Atraumatic head. Moist mucous membranes of oral cavity.  Eyes: Normal extraocular movements.   Neck: Supple. No evidence of lymphadenopathy. No thyroidomegaly.  Lungs:  Rhonchi throughout bilaterally. No wheezing present.   Heart: Regular rate and rhythm. S1 and S2 present with positive murmurs/gallop/rub.  Positive pedal edema. No JVD present.   Abdomen: Soft, non-distended, non-tender. No rebound tenderness/guarding. No masses or organomegaly. Bowel sounds are normal. Bladder is not palpable.   Extremities: No cyanosis, clubbing, positive edema.  Psych/mental status: Alert and oriented. Cooperative. Responds appropriately to questions.         Significant Labs:  All pertinent labs within the past 24 hours have been reviewed.    Significant Imaging: I have reviewed all pertinent imaging results/findings within the past 24 hours.    Assessment/Plan:      Active Diagnoses:    Diagnosis Date Noted POA    PRINCIPAL PROBLEM:  Acute on chronic heart failure [I50.9] 05/10/2022 Yes    Atypical chest pain [R07.89] 05/10/2022 Yes    Bronchitis [J40] 05/10/2022 Yes    Positive cardiac stress test [R94.39] 05/06/2022 Yes    Paroxysmal atrial fibrillation [I48.0] 03/21/2022 Yes    Stage 3b chronic kidney disease [N18.32] 11/07/2021 Yes    Type 2 diabetes mellitus with diabetic nephropathy, without long-term current use of insulin [E11.21] 05/05/2018 Yes      Problems Resolved During this Admission:     VTE Risk Mitigation (From admission, onward)         Ordered     heparin 25,000 units in dextrose 5% 250 mL (100 units/mL) infusion LOW INTENSITY nomogram - [facility]  Continuous        Question Answer Comment   Angiotensin II Infusion Adjustment (DO NOT MODIFY ANSWER) \\[facility] ([email or web address]\epic\Images\Pharmacy\HeparinInfusions\heparin LOW INTENSITY nomogram for [facility] [id number]    Begin at (in units/kg/hr) 12        05/11/22 1837     heparin 25,000 units in dextrose 5% (100 units/ml) IV bolus from bag - ADDITIONAL PRN BOLUS - 60 units/kg  As needed (PRN)        Question:  Angiotensin II Infusion Adjustment (DO NOT MODIFY ANSWER)  Answer:  \[email or web address]\Images\Pharmacy\HeparinInfusions\heparin LOW INTENSITY nomogram for [facility] [id number].[email or web address]    05/11/22 1837     heparin 25,000 units in dextrose 5% (100 units/ml) IV bolus from bag - ADDITIONAL PRN BOLUS - 30 units/kg  As needed (PRN)        Question:  Angiotensin II Infusion Adjustment (DO NOT MODIFY ANSWER)  Answer:  \\[email or web address]\epic\Images\Pharmacy\HeparinInfusions\heparin LOW INTENSITY nomogram for [facility] [id number].pdf    05/11/22 1837     Reason for No Pharmacological VTE Prophylaxis  Once        Question:  Reasons:  Answer:  Already adequately  anticoagulated on oral Anticoagulants    05/10/22 2150     IP VTE HIGH RISK PATIENT  Once         05/10/22 2150                  Atypical Chest Pain, Recent Abnormal Stress Test  -trend cardiac enzymes and troponin  -2D echo   -for LHC tomorrow      Cough, possibly s/t Pleural effusions  -IV Zithromax  -may need thora, but is on eliquis will hold  -consider thora tomorrow after [facility]     Recent Abnormal Stress Test  -consult Cardiology, to determine if inpatient LHC needed   -heparin gtt pending cardiology eval    Acute on chronic renal Failure  -renal dose all medications  -avoid nephrotoxic medications     Type II Diabetes  -low dose NovoLog insulin sliding scale  -sliding scale protocol  -diabetic cardiac diet renal diet     Transaminitis  -monitor  -acute hepatitis panel      Hyperlipidemia  -continue statins for now     Essential HTN  -continue home medications to manage      Hypothyroidism  -continue levothyroxine at current dose     DVT Prophylaxis: will be on heparin iv        Lamont Mccullough MD  Department of Hospital Medicine   [facility]

## 2022-05-12 NOTE — PLAN OF CARE
05/12/22 0835   Patient Assessment/Suction   Level of Consciousness (AVPU) alert   Respiratory Effort Unlabored   PRE-TX-O2   O2 Device (Oxygen Therapy) nasal cannula  (in use prn)   $ Is the patient on Low Flow Oxygen? Yes   Flow (L/min) 2   SpO2 (!) 93 %  (on room air at this time)   Pulse Oximetry Type Continuous   $ Pulse Oximetry - Multiple Charge Pulse Oximetry - Multiple   Pulse 62   Resp (!) 24   Respiratory Evaluation   $ Care Plan Tech Time 15 min

## 2022-05-12 NOTE — CONSULTS
Discharge planning assessment completed 5/11/22 by JOAQUÍN Olivares. Case management to continue to follow.

## 2022-05-12 NOTE — PROGRESS NOTES
[facility]  Department of Cardiology  Consult Note      PATIENT NAME: Darlin Tipton  MRN: [id number]  TODAY'S DATE: 05/12/2022  ADMIT DATE: 5/10/2022                          CONSULT REQUESTED BY: Lamont Mccullough MD    SUBJECTIVE     PRINCIPAL PROBLEM: Acute on chronic heart failure      REASON FOR CONSULT:  Acute on Chronic CHF    5/12/2022    Patient is stable currently. Breathing easier. Wishing to stay and have angiogram tomorrow. Last cath in 2017 showed patent INGRID-LAD patent saphenous vein to ramus, patent graft to Main right and occluded graft to diagonal branch. She had a CABG X 4 in 2016. Currently on heparin gtt. Denies active CP.         HPI:    Ms. Tipton is an 81 year old female patient with a PMH significant for CAD, PAF, DM, GERD, Dyslipidemia and chronic bronchitis admitted to hospital with coughing and chest pain. She recently had a positive stress test and is planned for angiographic assessment on the 19th. Troponin is negative. BNP on admit in the 600s CXR shows some bronchitis.       FROM H AND P     Darlin Tipton is a 81 y.o. old female who  has a past medical history of Allergy, Arthritis, Asthma, Blood transfusion, CAD (coronary artery disease), Cataract, CHRONIC BRONCHITIS, Diabetes mellitus, Diabetes mellitus type II, GERD (gastroesophageal reflux disease), Hyperlipidemia, Hypertension, Irregular heart beat, Kidney disease, Post-menopausal bleeding (2018), Spinal stenosis, and Thyroid disease.. The patient presented to [facility] on 5/10/2022 with a primary complaint of Chest Pain (Complains  of chest pain all day ,reports a cough for a few days)  .      81year old  female presents to emergency room with coughing and chest pain.  The patient states for the past 3-4 days she has been having increasing shortness of breath with coughing.  She describes her cough is productive in nature and yellow in color.  She denies fever chills sore throat headaches  loss of sense of smell or taste no nausea vomiting or diarrhea.     The patient describes her chest discomfort as a pressure sensation has been constant for the past 2-3 days.  The pain radiates across her chest.     The patient states she recently had a stress test and was told was positive and is planning an angiogram with Dr. Coleman     In the emergency room she was given IV Lasix and a dose of IV antibiotics for possible bronchitis     Other past medical history includes chronic kidney disease coronary artery disease cardiac arrhythmias atrial arrhythmias, hypertension hyperlipidemia diabetes      Review of patient's allergies indicates:   Allergen Reactions    Sulfa (sulfonamide antibiotics) Rash    Floxacillin Itching    Januvia [sitagliptin] Other (See Comments)     Hot flashes    Tetracyclines Itching    Fenofibrate micronized Rash    Nitrofurantoin macrocrystalline Other (See Comments)     unknown    Phenylfenesin la [phenylpropanolamine-gg] Rash       Past Medical History:   Diagnosis Date    Allergy     Dust mites, Grasses, Trees    Arthritis     Asthma     Blood transfusion     CAD (coronary artery disease)     Cataract     CHRONIC BRONCHITIS     Diabetes mellitus     Diabetes mellitus type II     GERD (gastroesophageal reflux disease)     Hyperlipidemia     Hypertension     Irregular heart beat     Kidney disease     ckd stage 3  as stated by patient - to see MD    Post-menopausal bleeding 2018    Spinal stenosis     Thyroid disease     Hypothyroidism     Past Surgical History:   Procedure Laterality Date    APPENDECTOMY  1968    BLADDER SUSPENSION  1989    CARDIAC SURGERY  2016    CABG    CATARACT EXTRACTION  9/2007 (L) and 10/2207 (R)    COLONOSCOPY N/A 10/18/2017    Procedure: COLONOSCOPY;  Surgeon: Esme Acuna MD;  Location: [facility];  Service: Endoscopy;  Laterality: N/A;    CORONARY ARTERY BYPASS GRAFT  4/26/2004    x5    ESOPHAGEAL DILATION      HYSTEROSCOPY WITH DILATION AND CURETTAGE  OF UTERUS N/A 3/12/2020    Procedure: HYSTEROSCOPY, WITH DILATION AND CURETTAGE OF UTERUS;  Surgeon: Ramona Llanos MD;  Location: [facility] OR;  Service: OB/GYN;  Laterality: N/A;    SPINE SURGERY  3/2000    Tumor    TRANSFORAMINAL EPIDURAL INJECTION OF STEROID Right 11/21/2019    Procedure: Injection,steroid,epidural,transforaminal approach;  Surgeon: Bj Jones MD;  [facility];  Service: Pain Management;  Laterality: Right;  L4-5, L5-S1    TRANSFORAMINAL EPIDURAL INJECTION OF STEROID Right 12/31/2019    Procedure: Injection,steroid,epidural,transforaminal approach;  Surgeon: Bj Jones MD;  [facility];  Service: Pain Management;  Laterality: Right;  L4-5, L5-S1    TRANSFORAMINAL EPIDURAL INJECTION OF STEROID Right 2/5/2020    Procedure: Injection,steroid,epidural,transforaminal approach;  Surgeon: Bj Jones MD;  Location: [facility];  Service: Pain Management;  Laterality: Right;  L4-5, L5-S1    TRANSFORAMINAL EPIDURAL INJECTION OF STEROID Left 1/27/2021    Procedure: Injection,steroid,epidural,transforaminal approach;  Surgeon: Bj Jones MD;  [facility];  Service: Pain Management;  Laterality: Left;  L4-5, L5-S1    TRANSFORAMINAL EPIDURAL INJECTION OF STEROID Right 3/4/2021    Procedure: Injection,steroid,epidural,transforaminal approach;  Surgeon: Bj Jones MD;  Location: [facility];  Service: Pain Management;  Laterality: Right;  L4-L5, L5-S1    WRIST SURGERY  1993    carpal tunnel       Social History     Tobacco Use    Smoking status: Never Smoker    Smokeless tobacco: Never Used   Substance Use Topics    Alcohol use: Yes     Comment: Rare    Drug use: No        REVIEW OF SYSTEMS  CONSTITUTIONAL: Negative for chills, fatigue and fever.   EYES: No double vision, No blurred vision  NEURO: No headaches, No dizziness  RESPIRATORY: Negative for cough, shortness of breath and wheezing.    CARDIOVASCULAR: Negative for chest pain. Negative for palpitations and leg swelling.   GI: Negative for  abdominal pain, No melena, diarrhea, nausea and vomiting.   : Negative for dysuria and frequency, Negative for hematuria  SKIN: Negative for bruising, Negative for edema or discoloration noted.   ENDOCRINE: Negative for polyphagia, Negative for heat intolerance, Negative for cold intolerance  PSYCHIATRIC: Negative for depression, Negative for anxiety, Negative for memory loss  MUSCULOSKELETAL: Negative for neck pain, Negative for muscle weakness, Negative for back pain     OBJECTIVE     VITAL SIGNS (Most Recent)  Temp: 97.5 °F (36.4 °C) (05/12/22 0805)  Pulse: 62 (05/12/22 0835)  Resp: (!) 24 (05/12/22 0835)  BP: (!) 142/65 (05/12/22 0805)  SpO2: (!) 93 % (on room air at this time) (05/12/22 0835)    VENTILATION STATUS  Resp: (!) 24 (05/12/22 0835)  SpO2: (!) 93 % (on room air at this time) (05/12/22 0835)       I & O (Last 24H):    Intake/Output Summary (Last 24 hours) at 5/12/2022 1101  Last data filed at 5/12/2022 1001  Gross per 24 hour   Intake 960 ml   Output 400 ml   Net 560 ml       WEIGHTS  Wt Readings from Last 3 Encounters:   05/12/22 0334 66 kg (145 lb 8.1 oz)   05/11/22 0856 67.9 kg (149 lb 11.1 oz)   05/11/22 0500 67.9 kg (149 lb 11.1 oz)   05/11/22 0043 68.2 kg (150 lb 5.7 oz)   05/10/22 1621 64 kg (141 lb)   05/06/22 1119 66.2 kg (146 lb)   05/05/22 1004 64.4 kg (141 lb 15.6 oz)       PHYSICAL EXAM  GENERAL: well built, well nourished, well-developed in no apparent distress alert and oriented.   HEENT: Normocephalic. Pupils normal and conjunctivae normal.  Mucous membranes normal, no cyanosis or icterus, trachea central,no pallor or icterus is noted..   NECK: No JVD. No bruit..   THYROID: Thyroid not enlarged. No nodules present..   CARDIAC: Regular rate and rhythm. S1 is normal.S2 is normal.No gallops, clicks or murmurs noted at this time.  CHEST ANATOMY: normal.   LUNGS: Clear to auscultation. No wheezing or rhonchi..   ABDOMEN: Soft no masses or organomegaly.  No abdomen pulsations or bruits.   Normal bowel sounds. No pulsations and no masses felt, No guarding or rebound.   URINARY: No euceda catheter   EXTREMITIES: No cyanosis, clubbing or edema noted at this time., no calf tenderness bilaterally.   PERIPHERAL VASCULAR SYSTEM: Good palpable distal pulses.   CENTRAL NERVOUS SYSTEM: No focal motor or sensory deficits noted.   SKIN: Skin without lesions, moist, well perfused.   MUSCLE STRENGTH & TONE: No noteable weakness, atrophy or abnormal movement.     HOME MEDICATIONS:  Current Facility-Administered Medications on File Prior to Encounter   Medication Dose Route Frequency Provider Last Rate Last Admin    0.9%  NaCl infusion   Intravenous Continuous Bj Jones MD   Stopped at 02/05/20 1400     Current Outpatient Medications on File Prior to Encounter   Medication Sig Dispense Refill    acetaminophen (TYLENOL) 650 MG TbSR Take 1,300 mg by mouth once daily. 2 Tablet(s) Oral  Every day.      amitriptyline (ELAVIL) 50 MG tablet Take 1 tablet (50 mg total) by mouth every evening. 90 tablet 3    amLODIPine (NORVASC) 5 MG tablet Take 1 tablet (5 mg total) by mouth once daily. 30 tablet 11    apixaban (ELIQUIS) 5 mg Tab Take 1 tablet (5 mg total) by mouth 2 (two) times daily. 30 tablet 3    azelastine (ASTELIN) 137 mcg (0.1 %) nasal spray 1 spray (137 mcg total) by Nasal route 2 (two) times daily. 90 mL 3    canagliflozin (INVOKANA) 100 mg Tab tablet Take 1 tablet (100 mg total) by mouth once daily. 90 tablet 3    carboxymethylcellulose 1 % ophthalmic solution Apply 1 drop to eye As instructed. as directed      cetirizine (ZYRTEC) 10 MG tablet Take 10 mg by mouth once daily.      cholecalciferol, vitamin D3, (VITAMIN D3) 50 mcg (2,000 unit) Cap Take 1 capsule by mouth once daily.      coenzyme Q10 100 mg capsule Take 100 mg by mouth once daily.       cranberry extract 650 mg Cap Take 1 tablet by mouth 2 (two) times daily.      diclofenac sodium (VOLTAREN) 1 % Gel APPLY 2 G TOPICALLY 3 (THREE) TIMES DAILY.  (Patient taking differently: Apply 2 g topically 3 (three) times daily as needed. APPLY 2 G TOPICALLY 3 (THREE) TIMES DAILY.) 100 g 5    dronedarone (MULTAQ) 400 mg Tab Take 1 tablet (400 mg total) by mouth 2 (two) times daily with meals. 180 tablet 3    fluticasone propionate (FLONASE) 50 mcg/actuation nasal spray 1 spray (50 mcg total) by Each Nostril route once daily. 48 g 3    isosorbide mononitrate (IMDUR) 60 MG 24 hr tablet Take 60 mg by mouth every evening.      levothyroxine (LEVOTHROID) 25 MCG tablet Take 1 tablet (25 mcg total) by mouth before breakfast. Every day 90 tablet 3    RESTASIS 0.05 % ophthalmic emulsion Place 1 drop into both eyes 2 (two) times daily.      rosuvastatin (CRESTOR) 10 MG tablet Take 1 tablet (10 mg total) by mouth once daily. 90 tablet 3    TOPROL XL 25 mg 24 hr tablet Take 1 tablet (25 mg total) by mouth once daily. 90 tablet 3    triazolam (HALCION) 0.25 MG Tab Take 1 tablet (0.25 mg total) by mouth nightly as needed (sleep). 90 tablet 1    UNABLE TO FIND Take 1 capsule by mouth once daily. Vital red      vitamins  A,C,E-zinc-copper [id number] unit-mg-unit Cap Take 1 capsule by mouth 2 (two) times daily.       blood sugar diagnostic (FREESTYLE LITE STRIPS) Strp USE TO TEST BLOOD SUGAR TWICE A  strip 3    gabapentin (NEURONTIN) 300 MG capsule Take 1 capsule (300 mg total) by mouth 3 (three) times daily. 90 capsule 2    guaifenesin/dextromethorphan (TUSSIN DM ORAL) Take by mouth.      Lactobac no.41/Bifidobact no.7 (PROBIOTIC-10 ORAL) Take by mouth.      lancets Misc 1 Units by Misc.(Non-Drug; Combo Route) route 2 (two) times daily. 200 each 3    LIDOcaine 4 % PtMd Apply 1 patch topically once daily. (Patient not taking: No sig reported) 5 patch 1    magnesium oxide (MAG-OXIDE ORAL) Take 400 mg by mouth once daily.      mirabegron (MYRBETRIQ) 50 mg Tb24 Take 1 tablet (50 mg total) by mouth once daily. (Patient not taking: No sig reported) 90 tablet 3    nitroGLYCERIN  (NITROSTAT) 0.4 MG SL tablet Place 0.4 mg under the tongue every 5 (five) minutes as needed. 0.4mg Sublingual PRN .        pramoxine-hydrocortisone (PROCTOCREAM-HC) 1-1 % rectal cream Place rectally 2 (two) times daily. (Patient taking differently: Place 1 application rectally 2 (two) times daily.) 3 each 3    promethazine-codeine 6.25-10 mg/5 ml (PHENERGAN WITH CODEINE) 6.25-10 mg/5 mL syrup Take 5 mLs by mouth every 4 (four) hours as needed for Cough.         SCHEDULED MEDS:   amitriptyline  50 mg Oral QHS    amLODIPine  5 mg Oral Daily    aspirin  81 mg Oral Daily    atorvastatin  20 mg Oral Daily    cholecalciferol (vitamin D3)  2,000 Units Oral Daily    dronedarone  400 mg Oral BID WM    furosemide (LASIX) injection  40 mg Intravenous Daily    isosorbide mononitrate  60 mg Oral QHS    levothyroxine  25 mcg Oral Before breakfast    metoprolol succinate  25 mg Oral Daily       CONTINUOUS INFUSIONS:   heparin (porcine) in D5W 9.999 Units/kg/hr (05/12/22 0356)       PRN MEDS:benzonatate, dextrose 50%, dextrose 50%, glucagon (human recombinant), glucose, glucose, heparin (PORCINE), heparin (PORCINE), insulin aspart U-100, melatonin, morphine, nitroGLYCERIN, ondansetron, sodium chloride 0.9%    LABS AND DIAGNOSTICS     CBC LAST 3 DAYS  Recent Labs   Lab 05/10/22  1637 05/11/22  0133 05/12/22  0311   WBC 4.95 4.36 7.82   RBC 3.90* 3.92* 3.51*   HGB 11.2* 11.2* 10.1*   HCT 34.8* 34.5* 32.2*   MCV 89 88 92   MCH 28.7 28.6 28.8   MCHC 32.2 32.5 31.4*   RDW 14.4 14.4 14.6*    174 175   MPV 10.6 10.6 10.7   GRAN 65.7  3.3 92.1*  4.0  --    LYMPH 21.4  1.1 6.9*  0.3*  --    MONO 10.1  0.5 0.5*  0.0*  --    BASO 0.02 0.00  --    NRBC 0 0  --        COAGULATION LAST 3 DAYS  Recent Labs   Lab 05/11/22  1918 05/12/22  0311 05/12/22  0944   LABPT 20.7*  --   --    INR 1.9  --   --    APTT 32.5 112.4* 73.4*       CHEMISTRY LAST 3 DAYS  Recent Labs   Lab 05/10/22  1637 05/11/22  0133 05/12/22  0311    136 138    K 4.3 3.9 4.0    100 100   CO2 23 23 25   ANIONGAP 9 13 13   BUN 37* 40* 57*   CREATININE 1.3 1.3 1.4    188* 126*   CALCIUM 8.4* 8.9 8.4*   MG  --  2.4  --    ALBUMIN 3.9  --   --    PROT 6.7  --   --    ALKPHOS 57  --   --    ALT 65*  --   --    AST 44*  --   --    BILITOT 0.8  --   --        CARDIAC PROFILE LAST 3 DAYS  Recent Labs   Lab 05/10/22  1637 05/10/22  1935 05/11/22  0133   *  --  674*   TROPONINI <0.030 <0.030 <0.030       ENDOCRINE LAST 3 DAYS  Recent Labs   Lab 05/11/22  1312   PROCAL 0.09       LAST ARTERIAL BLOOD GAS  ABG  No results for input(s): PH, PO2, PCO2, HCO3, BE in the last 168 hours.    LAST 7 DAYS MICROBIOLOGY   Microbiology Results (last 7 days)       Procedure Component Value Units Date/Time    Blood culture [[id number]] Collected: 05/10/22 1906    Order Status: Completed Specimen: Blood from Peripheral, Forearm, Left Updated: 05/12/22 0232     Blood Culture, Routine No Growth to date      No Growth to date    Blood culture [[id number]] Collected: 05/10/22 1641    Order Status: Completed Specimen: Blood from Peripheral, Forearm, Right Updated: 05/11/22 1832     Blood Culture, Routine No Growth to date      No Growth to date            MOST RECENT IMAGING  CT Chest Without Contrast  CT chest without contrast    CLINICAL DATA: Cough, shortness of breath    CMS MANDATED QUALITY DATA - CT RADIATION  436    All CT scans at this facility utilize dose modulation, iterative reconstruction, and/or weight based dosing when appropriate to reduce radiation dose to as low as reasonably achievable.    Findings: Thin section axial noncontrast images are compared to a chest radiograph from May 10, as well as a previous CT chest from 2016.    The heart is mildly enlarged. Extensive coronary artery atherosclerotic calcification is noted. There has been previous median sternotomy with chronic sternal nonunion, similar to the prior CT. No mediastinal mass or pathologic lymphadenopathy  is identified.    Small left and small-to-moderate right pleural effusion are noted. There is mild bilateral dependent atelectasis, right greater than left. Tiny granuloma is noted at the right lung apex, unchanged. No other nodules are identified. No acute abnormalities in the upper abdomen are identified. Hyperdense mid pole left renal mass, partially included in the field-of-view of this examination, is unchanged when compared to a pre and post infusion CT abdomen from March 17, 2022 and is probably a complex/hyperdense cyst.    IMPRESSION:  1. Small left and small-to-moderate right pleural effusion with scattered foci bilateral atelectasis.  2. Cardiomegaly with post sternotomy changes and chronic sternal nonunion.  3. Additional findings as above.    Electronically signed by:  Cong Lazaro MD  5/11/2022 2:22 PM CDT Workstation: [id number]      ECHOCARDIOGRAM RESULTS (last 5)  Results for orders placed in visit on 10/11/21    Echo    Interpretation Summary  · The left ventricle is normal in size with mild concentric hypertrophy and low normal systolic function.  · The estimated ejection fraction is 52%.  · Grade I left ventricular diastolic dysfunction.  · Normal right ventricular size.  · Mild left atrial enlargement.  · There is mild aortic valve stenosis.  · Aortic valve area is 2.57 cm2; peak velocity is 2.17 m/s; mean gradient is 13 mmHg.  · Mild mitral regurgitation.  · Mild tricuspid regurgitation.  · Normal central venous pressure (3 mmHg).  · The estimated PA systolic pressure is 23 mmHg.      CURRENT/PREVIOUS VISIT EKG  Results for orders placed or performed during the hospital encounter of 05/10/22   EKG 12-lead    Collection Time: 05/10/22  4:27 PM    Narrative    Test Reason : R07.9,    Vent. Rate : 065 BPM     Atrial Rate : 060 BPM     P-R Int : 000 ms          QRS Dur : 094 ms      QT Int : 422 ms       P-R-T Axes : 000 -26 095 degrees     QTc Int : 438 ms    Junctional rhythm with Premature  ventricular complexes or Fusion complexes  Low voltage QRS  Septal infarct (cited on or before 20-APR-2022)  Abnormal ECG  When compared with ECG of 20-APR-2022 11:40,  Junctional rhythm has replaced Sinus rhythm    Referred By: [email or web address]   SELF           Confirmed By:            ASSESSMENT/PLAN:     Active Hospital Problems    Diagnosis    *Acute on chronic heart failure    Atypical chest pain    Bronchitis    Positive cardiac stress test    Paroxysmal atrial fibrillation    Stage 3b chronic kidney disease    Type 2 diabetes mellitus with diabetic nephropathy, without long-term current use of insulin       ASSESSMENT & PLAN:   1. Decompensated HFrEF, new  2. CP, likely due to HF + underlying CAD  3. Recent Positive cardiac stress, ? LAD territory  4. ? Bronchitis, appears more likely heart failure  5. PAF- Currently SR-SB  6. Stage III CKD  7. CAD H/O CABG- Last cath in 2017 showed patent INGRID-LAD patent saphenous vein to ramus, patent graft to Main right and occluded graft to diagonal branch. She had a CABG X 4 in 2016.     RECOMMENDATIONS:      Give another dose of IV lasix today  Plan for angiographic assessment tomorrow if fluid status is optimum  Discussed with patient the risks and benefits of the procedure including, but not limited to, the following 1:1000 risk of Heart attack, stroke and death with 3-5% Risk of bleeding, vessel damage, and the need for emergent CABG Surgery.  All questions have been answered to patient's satisfaction.  Informed consent obtained  Will keep her nothing by mouth post midnight and proceed with the coronary angiography possible PCI in the morning.    Babs Louis NP  [facility]  Department of Cardiology  Date of Service: 05/12/2022

## 2022-05-13 LAB
ANION GAP SERPL CALC-SCNC: 11 MMOL/L (ref 8–16)
APTT PPP: 143.3 SEC (ref 23.3–35.1)
APTT PPP: 188 SEC (ref 23.3–35.1)
APTT PPP: 49.1 SEC (ref 23.3–35.1)
BASOPHILS # BLD AUTO: 0.02 K/UL (ref 0–0.2)
BASOPHILS NFR BLD: 0.3 % (ref 0–1.9)
BILIRUB UR QL STRIP: NEGATIVE
BNP SERPL-MCNC: 482 PG/ML (ref 0–99)
BUN SERPL-MCNC: 57 MG/DL (ref 8–23)
CALCIUM SERPL-MCNC: 8.5 MG/DL (ref 8.7–10.5)
CHLORIDE SERPL-SCNC: 98 MMOL/L (ref 95–110)
CLARITY UR: CLEAR
CO2 SERPL-SCNC: 30 MMOL/L (ref 23–29)
COLOR UR: YELLOW
CREAT SERPL-MCNC: 1.6 MG/DL (ref 0.5–1.4)
DIFFERENTIAL METHOD: ABNORMAL
EOSINOPHIL # BLD AUTO: 0.1 K/UL (ref 0–0.5)
EOSINOPHIL NFR BLD: 1.4 % (ref 0–8)
ERYTHROCYTE [DISTWIDTH] IN BLOOD BY AUTOMATED COUNT: 14.6 % (ref 11.5–14.5)
EST. GFR  (AFRICAN AMERICAN): 34.6 ML/MIN/1.73 M^2
EST. GFR  (NON AFRICAN AMERICAN): 30 ML/MIN/1.73 M^2
GLUCOSE SERPL-MCNC: 89 MG/DL (ref 70–110)
GLUCOSE SERPL-MCNC: 93 MG/DL (ref 70–110)
GLUCOSE SERPL-MCNC: 94 MG/DL (ref 70–110)
GLUCOSE SERPL-MCNC: 99 MG/DL (ref 70–110)
GLUCOSE UR QL STRIP: NEGATIVE
HCT VFR BLD AUTO: 32.6 % (ref 37–48.5)
HGB BLD-MCNC: 10.3 G/DL (ref 12–16)
HGB UR QL STRIP: NEGATIVE
IMM GRANULOCYTES # BLD AUTO: 0.02 K/UL (ref 0–0.04)
IMM GRANULOCYTES NFR BLD AUTO: 0.3 % (ref 0–0.5)
KETONES UR QL STRIP: NEGATIVE
LACTATE SERPL-SCNC: 0.8 MMOL/L (ref 0.5–1.9)
LEUKOCYTE ESTERASE UR QL STRIP: NEGATIVE
LYMPHOCYTES # BLD AUTO: 1.5 K/UL (ref 1–4.8)
LYMPHOCYTES NFR BLD: 21.5 % (ref 18–48)
MCH RBC QN AUTO: 28.2 PG (ref 27–31)
MCHC RBC AUTO-ENTMCNC: 31.6 G/DL (ref 32–36)
MCV RBC AUTO: 89 FL (ref 82–98)
MONOCYTES # BLD AUTO: 0.9 K/UL (ref 0.3–1)
MONOCYTES NFR BLD: 12.4 % (ref 4–15)
MRSA SCREEN BY PCR: NEGATIVE
NEUTROPHILS # BLD AUTO: 4.4 K/UL (ref 1.8–7.7)
NEUTROPHILS NFR BLD: 64.1 % (ref 38–73)
NITRITE UR QL STRIP: NEGATIVE
NRBC BLD-RTO: 0 /100 WBC
PH UR STRIP: 6 [PH] (ref 5–8)
PLATELET # BLD AUTO: 175 K/UL (ref 150–450)
PMV BLD AUTO: 10.5 FL (ref 9.2–12.9)
POC ACTIVATED CLOTTING TIME K: 231 SEC (ref 74–137)
POTASSIUM SERPL-SCNC: 4.1 MMOL/L (ref 3.5–5.1)
PROCALCITONIN SERPL IA-MCNC: 0.16 NG/ML (ref 0–0.5)
PROT UR QL STRIP: ABNORMAL
RBC # BLD AUTO: 3.65 M/UL (ref 4–5.4)
SAMPLE: ABNORMAL
SODIUM SERPL-SCNC: 139 MMOL/L (ref 136–145)
SP GR UR STRIP: 1.01 (ref 1–1.03)
URN SPEC COLLECT METH UR: ABNORMAL
UROBILINOGEN UR STRIP-ACNC: NEGATIVE EU/DL
WBC # BLD AUTO: 6.92 K/UL (ref 3.9–12.7)

## 2022-05-13 PROCEDURE — 36415 COLL VENOUS BLD VENIPUNCTURE: CPT | Performed by: INTERNAL MEDICINE

## 2022-05-13 PROCEDURE — 94761 N-INVAS EAR/PLS OXIMETRY MLT: CPT

## 2022-05-13 PROCEDURE — 87040 BLOOD CULTURE FOR BACTERIA: CPT | Mod: 59 | Performed by: INTERNAL MEDICINE

## 2022-05-13 PROCEDURE — 92978 PR IVUS, CORONARY, 1ST VESSEL: ICD-10-PCS | Mod: 26,RI,, | Performed by: INTERNAL MEDICINE

## 2022-05-13 PROCEDURE — 63600175 PHARM REV CODE 636 W HCPCS: Mod: JG | Performed by: INTERNAL MEDICINE

## 2022-05-13 PROCEDURE — 63600175 PHARM REV CODE 636 W HCPCS: Performed by: INTERNAL MEDICINE

## 2022-05-13 PROCEDURE — C1753 CATH, INTRAVAS ULTRASOUND: HCPCS | Performed by: INTERNAL MEDICINE

## 2022-05-13 PROCEDURE — 99152 MOD SED SAME PHYS/QHP 5/>YRS: CPT | Mod: ,,, | Performed by: INTERNAL MEDICINE

## 2022-05-13 PROCEDURE — P9047 ALBUMIN (HUMAN), 25%, 50ML: HCPCS | Mod: JG | Performed by: INTERNAL MEDICINE

## 2022-05-13 PROCEDURE — 85730 THROMBOPLASTIN TIME PARTIAL: CPT | Performed by: INTERNAL MEDICINE

## 2022-05-13 PROCEDURE — 83880 ASSAY OF NATRIURETIC PEPTIDE: CPT | Performed by: NURSE PRACTITIONER

## 2022-05-13 PROCEDURE — 25500020 PHARM REV CODE 255: Performed by: INTERNAL MEDICINE

## 2022-05-13 PROCEDURE — 93459 L HRT ART/GRFT ANGIO: CPT | Performed by: INTERNAL MEDICINE

## 2022-05-13 PROCEDURE — 87086 URINE CULTURE/COLONY COUNT: CPT | Performed by: INTERNAL MEDICINE

## 2022-05-13 PROCEDURE — 99900035 HC TECH TIME PER 15 MIN (STAT)

## 2022-05-13 PROCEDURE — 87641 MR-STAPH DNA AMP PROBE: CPT | Performed by: INTERNAL MEDICINE

## 2022-05-13 PROCEDURE — 93459 L HRT ART/GRFT ANGIO: CPT | Mod: 26,,, | Performed by: INTERNAL MEDICINE

## 2022-05-13 PROCEDURE — 83605 ASSAY OF LACTIC ACID: CPT | Performed by: INTERNAL MEDICINE

## 2022-05-13 PROCEDURE — 25000003 PHARM REV CODE 250: Performed by: NURSE PRACTITIONER

## 2022-05-13 PROCEDURE — 92978 ENDOLUMINL IVUS OCT C 1ST: CPT | Performed by: INTERNAL MEDICINE

## 2022-05-13 PROCEDURE — 25000003 PHARM REV CODE 250: Performed by: INTERNAL MEDICINE

## 2022-05-13 PROCEDURE — C1887 CATHETER, GUIDING: HCPCS | Performed by: INTERNAL MEDICINE

## 2022-05-13 PROCEDURE — 99153 MOD SED SAME PHYS/QHP EA: CPT | Performed by: INTERNAL MEDICINE

## 2022-05-13 PROCEDURE — 85730 THROMBOPLASTIN TIME PARTIAL: CPT | Mod: 91 | Performed by: INTERNAL MEDICINE

## 2022-05-13 PROCEDURE — 99152 PR MOD CONSCIOUS SEDATION, SAME PHYS, 5+ YRS, FIRST 15 MIN: ICD-10-PCS | Mod: ,,, | Performed by: INTERNAL MEDICINE

## 2022-05-13 PROCEDURE — 84145 PROCALCITONIN (PCT): CPT | Performed by: FAMILY MEDICINE

## 2022-05-13 PROCEDURE — 36415 COLL VENOUS BLD VENIPUNCTURE: CPT | Performed by: NURSE PRACTITIONER

## 2022-05-13 PROCEDURE — 36415 COLL VENOUS BLD VENIPUNCTURE: CPT | Performed by: FAMILY MEDICINE

## 2022-05-13 PROCEDURE — 27000221 HC OXYGEN, UP TO 24 HOURS

## 2022-05-13 PROCEDURE — 92978 ENDOLUMINL IVUS OCT C 1ST: CPT | Mod: 26,RI,, | Performed by: INTERNAL MEDICINE

## 2022-05-13 PROCEDURE — 81003 URINALYSIS AUTO W/O SCOPE: CPT | Performed by: INTERNAL MEDICINE

## 2022-05-13 PROCEDURE — C1769 GUIDE WIRE: HCPCS | Performed by: INTERNAL MEDICINE

## 2022-05-13 PROCEDURE — 85025 COMPLETE CBC W/AUTO DIFF WBC: CPT | Performed by: NURSE PRACTITIONER

## 2022-05-13 PROCEDURE — 21000000 HC CCU ICU ROOM CHARGE

## 2022-05-13 PROCEDURE — C1894 INTRO/SHEATH, NON-LASER: HCPCS | Performed by: INTERNAL MEDICINE

## 2022-05-13 PROCEDURE — 99152 MOD SED SAME PHYS/QHP 5/>YRS: CPT | Performed by: INTERNAL MEDICINE

## 2022-05-13 PROCEDURE — C1760 CLOSURE DEV, VASC: HCPCS | Performed by: INTERNAL MEDICINE

## 2022-05-13 PROCEDURE — 93459: ICD-10-PCS | Mod: 26,,, | Performed by: INTERNAL MEDICINE

## 2022-05-13 PROCEDURE — 87040 BLOOD CULTURE FOR BACTERIA: CPT | Performed by: FAMILY MEDICINE

## 2022-05-13 PROCEDURE — 80048 BASIC METABOLIC PNL TOTAL CA: CPT | Performed by: NURSE PRACTITIONER

## 2022-05-13 DEVICE — DEVICE CLOSURE  ANGIO-SEAL 6FR 610130: Type: IMPLANTABLE DEVICE | Site: GROIN | Status: FUNCTIONAL

## 2022-05-13 RX ORDER — MIDAZOLAM HYDROCHLORIDE 1 MG/ML
INJECTION INTRAMUSCULAR; INTRAVENOUS
Status: DISCONTINUED | OUTPATIENT
Start: 2022-05-13 | End: 2022-05-13

## 2022-05-13 RX ORDER — HEPARIN SODIUM 10000 [USP'U]/ML
INJECTION, SOLUTION INTRAVENOUS; SUBCUTANEOUS
Status: DISCONTINUED | OUTPATIENT
Start: 2022-05-13 | End: 2022-05-13

## 2022-05-13 RX ORDER — ALBUMIN HUMAN 250 G/1000ML
25 SOLUTION INTRAVENOUS ONCE
Status: COMPLETED | OUTPATIENT
Start: 2022-05-13 | End: 2022-05-13

## 2022-05-13 RX ORDER — IODIXANOL 320 MG/ML
INJECTION, SOLUTION INTRAVASCULAR
Status: DISCONTINUED | OUTPATIENT
Start: 2022-05-13 | End: 2022-05-13

## 2022-05-13 RX ORDER — FENTANYL CITRATE 50 UG/ML
INJECTION, SOLUTION INTRAMUSCULAR; INTRAVENOUS
Status: DISCONTINUED | OUTPATIENT
Start: 2022-05-13 | End: 2022-05-13

## 2022-05-13 RX ORDER — LIDOCAINE HYDROCHLORIDE 10 MG/ML
INJECTION INFILTRATION; PERINEURAL
Status: DISCONTINUED | OUTPATIENT
Start: 2022-05-13 | End: 2022-05-13

## 2022-05-13 RX ORDER — SODIUM CHLORIDE 9 MG/ML
INJECTION, SOLUTION INTRAVENOUS ONCE
Status: COMPLETED | OUTPATIENT
Start: 2022-05-13 | End: 2022-05-13

## 2022-05-13 RX ORDER — DIPHENHYDRAMINE HCL 25 MG
50 CAPSULE ORAL
Status: DISCONTINUED | OUTPATIENT
Start: 2022-05-13 | End: 2022-05-13

## 2022-05-13 RX ADMIN — CEFTRIAXONE 1 G: 1 INJECTION, POWDER, FOR SOLUTION INTRAMUSCULAR; INTRAVENOUS at 10:05

## 2022-05-13 RX ADMIN — ASPIRIN 81 MG 81 MG: 81 TABLET ORAL at 10:05

## 2022-05-13 RX ADMIN — AMITRIPTYLINE HYDROCHLORIDE 50 MG: 25 TABLET, FILM COATED ORAL at 09:05

## 2022-05-13 RX ADMIN — ALBUMIN (HUMAN) 25 G: 12.5 SOLUTION INTRAVENOUS at 10:05

## 2022-05-13 RX ADMIN — AMLODIPINE BESYLATE 5 MG: 5 TABLET ORAL at 10:05

## 2022-05-13 RX ADMIN — ISOSORBIDE MONONITRATE 60 MG: 30 TABLET, EXTENDED RELEASE ORAL at 09:05

## 2022-05-13 RX ADMIN — SODIUM CHLORIDE: 0.9 INJECTION, SOLUTION INTRAVENOUS at 02:05

## 2022-05-13 RX ADMIN — Medication 2000 UNITS: at 10:05

## 2022-05-13 RX ADMIN — DRONEDARONE 400 MG: 400 TABLET, FILM COATED ORAL at 10:05

## 2022-05-13 RX ADMIN — ATORVASTATIN CALCIUM 20 MG: 20 TABLET, FILM COATED ORAL at 09:05

## 2022-05-13 RX ADMIN — AZITHROMYCIN 500 MG: 500 INJECTION, POWDER, LYOPHILIZED, FOR SOLUTION INTRAVENOUS at 10:05

## 2022-05-13 RX ADMIN — LEVOTHYROXINE SODIUM 25 MCG: 0.03 TABLET ORAL at 05:05

## 2022-05-13 NOTE — NURSING
Critical PTT of 188 at 0730. Infusion held and stat recollect ordered per protocol. No signs of bleeding noted. Dr. Mccullough and Dr. Patton notified.     Repeat .3 at 0838, will recheck in 2 hours per protocol.

## 2022-05-13 NOTE — NURSING
Patient returned from angiogram at 1715. Right groin site clean dry and intact, soft with no hematoma noted. Pulses present. Called Dr. Patton about heparin infusion and MD stated will resume patients blood thinner tomorrow.  Educated patient on bed rest. Family at bedside. Will continue to monitor.

## 2022-05-13 NOTE — CARE UPDATE
05/13/22 0314   Patient Assessment/Suction   Level of Consciousness (AVPU)   (resting quietly with nadn)   Respiratory Effort Normal;Unlabored   Expansion/Accessory Muscles/Retractions no use of accessory muscles   PRE-TX-O2   O2 Device (Oxygen Therapy) nasal cannula   Flow (L/min) 2   SpO2 96 %   Pulse Oximetry Type Continuous   $ Pulse Oximetry - Multiple Charge Pulse Oximetry - Multiple   Pulse (!) 24   Resp 19   Maintain adequate oxygenation

## 2022-05-13 NOTE — PLAN OF CARE
There was a one time elevated temperature 100.4 and Dr Swenson was notified, who ordered tylenol, blood cultures and antibiotic therapy. Pt is still on heparin drip, prepped for the angiogram this morning while still npo. .

## 2022-05-13 NOTE — PROGRESS NOTES
[facility] Medicine  Progress Note    Patient Name: Darlni Tipton  MRN: [id number]  Patient Class: IP- Inpatient   Admission Date: 5/10/2022  Length of Stay: 1 days  Attending Physician: Lamont Mccullough MD  Primary Care Provider: Oumar Almonte Jr, MD        Subjective:     Principal Problem:Acute on chronic heart failure  T-max 100.4° overnight, still with cough and shortness of breath. for left heart catheterization today.  CT chest with small-to-mod R pl effusions, for possible thoracentesis today.    Review of Systems  Objective:     Vital Signs (Most Recent):  Temp: 98.3 °F (36.8 °C) (05/13/22 1545)  Pulse: (!) 57 (05/13/22 1600)  Resp: 19 (05/13/22 1600)  BP: 133/61 (05/13/22 1600)  SpO2: (!) 91 % (05/13/22 1600) Vital Signs (24h Range):  Temp:  [97.9 °F (36.6 °C)-100.4 °F (38 °C)] 98.3 °F (36.8 °C)  Pulse:  [24-63] 57  Resp:  [18-38] 19  SpO2:  [90 %-96 %] 91 %  BP: (104-151)/(50-67) 133/61     Weight: 66 kg (145 lb 8.1 oz)  Body mass index is 26.61 kg/m².    Intake/Output Summary (Last 24 hours) at 5/13/2022 1616  Last data filed at 5/13/2022 1211  Gross per 24 hour   Intake 200 ml   Output 1450 ml   Net -1250 ml      Physical Exam  General appearance:  Fatigued appearing female in no apparent distress.  Skin: No Rash.   Neuro: Motor and sensory exams grossly intact. Good tone. Power in all 4 extremities 5/5.   HENT: Atraumatic head. Moist mucous membranes of oral cavity.  Eyes: Normal extraocular movements.   Neck: Supple. No evidence of lymphadenopathy. No thyroidomegaly.  Lungs:  Rhonchi throughout bilaterally. No wheezing present.   Heart: Regular rate and rhythm. S1 and S2 present with positive murmurs/gallop/rub.  Positive pedal edema. No JVD present.   Abdomen: Soft, non-distended, non-tender. No rebound tenderness/guarding. No masses or organomegaly. Bowel sounds are normal. Bladder is not palpable.   Extremities: No cyanosis, clubbing, positive edema.  Psych/mental  status: Alert and oriented. Cooperative. Responds appropriately to questions.         Significant Labs: All pertinent labs within the past 24 hours have been reviewed.    Significant Imaging: I have reviewed all pertinent imaging results/findings within the past 24 hours.    Assessment/Plan:      Active Diagnoses:    Diagnosis Date Noted POA    PRINCIPAL PROBLEM:  Acute on chronic heart failure [I50.9] 05/10/2022 Yes    Atypical chest pain [R07.89] 05/10/2022 Yes    Bronchitis [J40] 05/10/2022 Yes    Positive cardiac stress test [R94.39] 05/06/2022 Yes    Paroxysmal atrial fibrillation [I48.0] 03/21/2022 Yes    Stage 3b chronic kidney disease [N18.32] 11/07/2021 Yes    Type 2 diabetes mellitus with diabetic nephropathy, without long-term current use of insulin [E11.21] 05/05/2018 Yes      Problems Resolved During this Admission:     VTE Risk Mitigation (From admission, onward)         Ordered     heparin (porcine) injection  As needed (PRN)         05/13/22 1506     heparin 25,000 units in dextrose 5% 250 mL (100 units/mL) infusion LOW INTENSITY nomogram - [facility]  Continuous        Question Answer Comment   Angiotensin II Infusion Adjustment (DO NOT MODIFY ANSWER) [email or web address]\Images\[email or web address]\HeparinInfusions\heparin LOW INTENSITY nomogram for [facility] [email or web address]    Begin at (in units/kg/hr) 12        05/11/22 1837     heparin 25,000 units in dextrose 5% (100 units/ml) IV bolus from bag - ADDITIONAL PRN BOLUS - 60 units/kg  As needed (PRN)        Question:  Angiotensin II Infusion Adjustment (DO NOT MODIFY ANSWER)  Answer:  \[email or web address]\Images\[email or web address]\HeparinInfusions\heparin LOW INTENSITY nomogram for [facility] [id number]    05/11/22 1837     heparin 25,000 units in dextrose 5% (100 units/ml) IV bolus from bag - ADDITIONAL PRN BOLUS - 30 units/kg  As needed (PRN)        Question:  Angiotensin II Infusion Adjustment (DO NOT MODIFY ANSWER)  Answer:  [email or web address]\Images\[email or web address]\HeparinInfusions\heparin  LOW INTENSITY nomogram for [facility] [id number]    05/11/22 1837     Reason for No Pharmacological VTE Prophylaxis  Once        Question:  Reasons:  Answer:  Already adequately anticoagulated on oral Anticoagulants    05/10/22 2150     IP VTE HIGH RISK PATIENT  Once         05/10/22 2150                  Atypical Chest Pain, Recent Abnormal Stress Test  -trend cardiac enzymes and troponin  -2D echo   -for [facility] today      Cough, possibly s/t Pleural effusions  -IV Zithromax  -may need thora, but is on eliquis will hold  -consider thora tomorrow after [facility]    Fever-rocephin, azithromycin, likely will need thora for Pl effusion       Acute on chronic renal Failure  -renal dose all medications  -avoid nephrotoxic medications  -hold Lasix for elevated creatinine     Type II Diabetes  -low dose NovoLog insulin sliding scale  -sliding scale protocol  -diabetic cardiac diet renal diet     Transaminitis  -monitor  -acute hepatitis panel      Hyperlipidemia  -continue statins for now     Essential HTN  -continue home medications to manage      Hypothyroidism  -continue levothyroxine at current dose     DVT Prophylaxis: will be on heparin iv        Lamont Mccullough MD  Department of Hospital Medicine   [facility]

## 2022-05-14 LAB
ANION GAP SERPL CALC-SCNC: 9 MMOL/L (ref 8–16)
APTT PPP: 29.3 SEC (ref 23.3–35.1)
BUN SERPL-MCNC: 38 MG/DL (ref 8–23)
CALCIUM SERPL-MCNC: 8.9 MG/DL (ref 8.7–10.5)
CHLORIDE SERPL-SCNC: 98 MMOL/L (ref 95–110)
CO2 SERPL-SCNC: 31 MMOL/L (ref 23–29)
CREAT SERPL-MCNC: 1.3 MG/DL (ref 0.5–1.4)
ERYTHROCYTE [DISTWIDTH] IN BLOOD BY AUTOMATED COUNT: 14.5 % (ref 11.5–14.5)
EST. GFR  (AFRICAN AMERICAN): 44.5 ML/MIN/1.73 M^2
EST. GFR  (NON AFRICAN AMERICAN): 38.6 ML/MIN/1.73 M^2
GLUCOSE SERPL-MCNC: 113 MG/DL (ref 70–110)
GLUCOSE SERPL-MCNC: 134 MG/DL (ref 70–110)
GLUCOSE SERPL-MCNC: 144 MG/DL (ref 70–110)
GLUCOSE SERPL-MCNC: 92 MG/DL (ref 70–110)
GLUCOSE SERPL-MCNC: 98 MG/DL (ref 70–110)
HCT VFR BLD AUTO: 32.5 % (ref 37–48.5)
HGB BLD-MCNC: 9.9 G/DL (ref 12–16)
MCH RBC QN AUTO: 28.1 PG (ref 27–31)
MCHC RBC AUTO-ENTMCNC: 30.5 G/DL (ref 32–36)
MCV RBC AUTO: 92 FL (ref 82–98)
PLATELET # BLD AUTO: 149 K/UL (ref 150–450)
PMV BLD AUTO: 10.3 FL (ref 9.2–12.9)
POTASSIUM SERPL-SCNC: 4.7 MMOL/L (ref 3.5–5.1)
RBC # BLD AUTO: 3.52 M/UL (ref 4–5.4)
SODIUM SERPL-SCNC: 138 MMOL/L (ref 136–145)
WBC # BLD AUTO: 6.13 K/UL (ref 3.9–12.7)

## 2022-05-14 PROCEDURE — 63600175 PHARM REV CODE 636 W HCPCS: Performed by: INTERNAL MEDICINE

## 2022-05-14 PROCEDURE — 80048 BASIC METABOLIC PNL TOTAL CA: CPT | Performed by: NURSE PRACTITIONER

## 2022-05-14 PROCEDURE — 25000003 PHARM REV CODE 250: Performed by: NURSE PRACTITIONER

## 2022-05-14 PROCEDURE — 63600175 PHARM REV CODE 636 W HCPCS: Performed by: FAMILY MEDICINE

## 2022-05-14 PROCEDURE — 99233 PR SUBSEQUENT HOSPITAL CARE,LEVL III: ICD-10-PCS | Mod: ,,, | Performed by: INTERNAL MEDICINE

## 2022-05-14 PROCEDURE — 25000003 PHARM REV CODE 250: Performed by: INTERNAL MEDICINE

## 2022-05-14 PROCEDURE — 85730 THROMBOPLASTIN TIME PARTIAL: CPT | Performed by: INTERNAL MEDICINE

## 2022-05-14 PROCEDURE — 85027 COMPLETE CBC AUTOMATED: CPT | Performed by: INTERNAL MEDICINE

## 2022-05-14 PROCEDURE — 36415 COLL VENOUS BLD VENIPUNCTURE: CPT | Performed by: INTERNAL MEDICINE

## 2022-05-14 PROCEDURE — 99233 SBSQ HOSP IP/OBS HIGH 50: CPT | Mod: ,,, | Performed by: INTERNAL MEDICINE

## 2022-05-14 PROCEDURE — 21000000 HC CCU ICU ROOM CHARGE

## 2022-05-14 PROCEDURE — 63600175 PHARM REV CODE 636 W HCPCS: Performed by: NURSE PRACTITIONER

## 2022-05-14 RX ORDER — ENOXAPARIN SODIUM 100 MG/ML
1 INJECTION SUBCUTANEOUS
Status: DISCONTINUED | OUTPATIENT
Start: 2022-05-14 | End: 2022-05-15

## 2022-05-14 RX ORDER — SPIRONOLACTONE 25 MG/1
25 TABLET ORAL DAILY
Status: DISCONTINUED | OUTPATIENT
Start: 2022-05-15 | End: 2022-05-17 | Stop reason: HOSPADM

## 2022-05-14 RX ORDER — PANTOPRAZOLE SODIUM 40 MG/1
40 TABLET, DELAYED RELEASE ORAL
Status: DISCONTINUED | OUTPATIENT
Start: 2022-05-14 | End: 2022-05-17 | Stop reason: HOSPADM

## 2022-05-14 RX ORDER — ONDANSETRON 2 MG/ML
4 INJECTION INTRAMUSCULAR; INTRAVENOUS EVERY 6 HOURS PRN
Status: DISCONTINUED | OUTPATIENT
Start: 2022-05-15 | End: 2022-05-17 | Stop reason: HOSPADM

## 2022-05-14 RX ORDER — FUROSEMIDE 10 MG/ML
20 INJECTION INTRAMUSCULAR; INTRAVENOUS DAILY
Status: DISCONTINUED | OUTPATIENT
Start: 2022-05-15 | End: 2022-05-15

## 2022-05-14 RX ADMIN — DRONEDARONE 400 MG: 400 TABLET, FILM COATED ORAL at 05:05

## 2022-05-14 RX ADMIN — DRONEDARONE 400 MG: 400 TABLET, FILM COATED ORAL at 09:05

## 2022-05-14 RX ADMIN — ASPIRIN 81 MG 81 MG: 81 TABLET ORAL at 09:05

## 2022-05-14 RX ADMIN — Medication 2000 UNITS: at 09:05

## 2022-05-14 RX ADMIN — CEFTRIAXONE 1 G: 1 INJECTION, POWDER, FOR SOLUTION INTRAMUSCULAR; INTRAVENOUS at 09:05

## 2022-05-14 RX ADMIN — METOPROLOL SUCCINATE 25 MG: 25 TABLET, FILM COATED, EXTENDED RELEASE ORAL at 09:05

## 2022-05-14 RX ADMIN — ONDANSETRON 4 MG: 2 INJECTION INTRAMUSCULAR; INTRAVENOUS at 07:05

## 2022-05-14 RX ADMIN — ENOXAPARIN SODIUM 70 MG: 80 INJECTION SUBCUTANEOUS at 09:05

## 2022-05-14 RX ADMIN — ONDANSETRON 4 MG: 2 INJECTION INTRAMUSCULAR; INTRAVENOUS at 12:05

## 2022-05-14 RX ADMIN — AMLODIPINE BESYLATE 5 MG: 5 TABLET ORAL at 09:05

## 2022-05-14 RX ADMIN — ONDANSETRON 4 MG: 2 INJECTION INTRAMUSCULAR; INTRAVENOUS at 11:05

## 2022-05-14 RX ADMIN — ISOSORBIDE MONONITRATE 60 MG: 30 TABLET, EXTENDED RELEASE ORAL at 09:05

## 2022-05-14 RX ADMIN — PANTOPRAZOLE SODIUM 40 MG: 40 TABLET, DELAYED RELEASE ORAL at 09:05

## 2022-05-14 RX ADMIN — AMITRIPTYLINE HYDROCHLORIDE 50 MG: 25 TABLET, FILM COATED ORAL at 09:05

## 2022-05-14 RX ADMIN — AZITHROMYCIN 500 MG: 500 INJECTION, POWDER, LYOPHILIZED, FOR SOLUTION INTRAVENOUS at 10:05

## 2022-05-14 RX ADMIN — LEVOTHYROXINE SODIUM 25 MCG: 0.03 TABLET ORAL at 05:05

## 2022-05-14 RX ADMIN — ATORVASTATIN CALCIUM 20 MG: 20 TABLET, FILM COATED ORAL at 09:05

## 2022-05-14 NOTE — PROGRESS NOTES
[facility]  Department of Cardiology  Consult Note      PATIENT NAME: Darlin Tipton  MRN: [id number]  TODAY'S DATE: 05/14/2022  ADMIT DATE: 5/10/2022                          CONSULT REQUESTED BY: Lamont Mccullough MD    SUBJECTIVE     PRINCIPAL PROBLEM: Acute on chronic heart failure      REASON FOR CONSULT:  Acute on Chronic CHF  5/14/2022  [facility] revelaed patent grafts yesterday, medical management recommends. Patient still require supplemental O2. Sats down into low to mid 80s on room air just resting while patient was being evaluated.. No chest pain or tenderness at femoral access site     5/12/2022    Patient is stable currently. Breathing easier. Wishing to stay and have angiogram tomorrow. Last cath in 2017 showed patent INGRID-LAD patent saphenous vein to ramus, patent graft to Main right and occluded graft to diagonal branch. She had a CABG X 4 in 2016. Currently on heparin gtt. Denies active CP.         HPI:    Ms. Tipton is an 81 year old female patient with a PMH significant for CAD, PAF, DM, GERD, Dyslipidemia and chronic bronchitis admitted to hospital with coughing and chest pain. She recently had a positive stress test and is planned for angiographic assessment on the 19th. Troponin is negative. BNP on admit in the 600s CXR shows some bronchitis.       FROM H AND P     Darlin Tipton is a 81 y.o. old female who  has a past medical history of Allergy, Arthritis, Asthma, Blood transfusion, CAD (coronary artery disease), Cataract, CHRONIC BRONCHITIS, Diabetes mellitus, Diabetes mellitus type II, GERD (gastroesophageal reflux disease), Hyperlipidemia, Hypertension, Irregular heart beat, Kidney disease, Post-menopausal bleeding (2018), Spinal stenosis, and Thyroid disease.. The patient presented to [facility] on 5/10/2022 with a primary complaint of Chest Pain (Complains  of chest pain all day ,reports a cough for a few days)  .      81year old  female presents to  emergency room with coughing and chest pain.  The patient states for the past 3-4 days she has been having increasing shortness of breath with coughing.  She describes her cough is productive in nature and yellow in color.  She denies fever chills sore throat headaches loss of sense of smell or taste no nausea vomiting or diarrhea.     The patient describes her chest discomfort as a pressure sensation has been constant for the past 2-3 days.  The pain radiates across her chest.     The patient states she recently had a stress test and was told was positive and is planning an angiogram with Dr. Coleman     In the emergency room she was given IV Lasix and a dose of IV antibiotics for possible bronchitis     Other past medical history includes chronic kidney disease coronary artery disease cardiac arrhythmias atrial arrhythmias, hypertension hyperlipidemia diabetes      Review of patient's allergies indicates:   Allergen Reactions    Sulfa (sulfonamide antibiotics) Rash    Floxacillin Itching    Januvia [sitagliptin] Other (See Comments)     Hot flashes    Tetracyclines Itching    Fenofibrate micronized Rash    Nitrofurantoin macrocrystalline Other (See Comments)     unknown    Phenylfenesin la [phenylpropanolamine-gg] Rash       Past Medical History:   Diagnosis Date    Allergy     Dust mites, Grasses, Trees    Arthritis     Asthma     Blood transfusion     CAD (coronary artery disease)     Cataract     CHRONIC BRONCHITIS     Diabetes mellitus     Diabetes mellitus type II     GERD (gastroesophageal reflux disease)     Hyperlipidemia     Hypertension     Irregular heart beat     Kidney disease     ckd stage 3  as stated by patient - to see MD    Post-menopausal bleeding 2018    Spinal stenosis     Thyroid disease     Hypothyroidism     Past Surgical History:   Procedure Laterality Date    APPENDECTOMY  1968    BLADDER SUSPENSION  1989    CARDIAC SURGERY  2016    CABG    CATARACT EXTRACTION   9/2007 (L) and 10/2207 (R)    COLONOSCOPY N/A 10/18/2017    Procedure: COLONOSCOPY;  Surgeon: Esme Acuna MD;  Location: [facility];  Service: Endoscopy;  Laterality: N/A;    CORONARY ARTERY BYPASS GRAFT  4/26/2004    x5    ESOPHAGEAL DILATION      HYSTEROSCOPY WITH DILATION AND CURETTAGE OF UTERUS N/A 3/12/2020    Procedure: HYSTEROSCOPY, WITH DILATION AND CURETTAGE OF UTERUS;  Surgeon: Ramona Llanos MD;  Location: [facility] OR;  Service: OB/GYN;  Laterality: N/A;    SPINE SURGERY  3/2000    Tumor    TRANSFORAMINAL EPIDURAL INJECTION OF STEROID Right 11/21/2019    Procedure: Injection,steroid,epidural,transforaminal approach;  Surgeon: Bj Jones MD;  Location: [facility];  Service: Pain Management;  Laterality: Right;  L4-5, L5-S1    TRANSFORAMINAL EPIDURAL INJECTION OF STEROID Right 12/31/2019    Procedure: Injection,steroid,epidural,transforaminal approach;  Surgeon: Bj Jones MD;  Location: [facility];  Service: Pain Management;  Laterality: Right;  L4-5, L5-S1    TRANSFORAMINAL EPIDURAL INJECTION OF STEROID Right 2/5/2020    Procedure: Injection,steroid,epidural,transforaminal approach;  Surgeon: Bj Jones MD;  Location: [facility];  Service: Pain Management;  Laterality: Right;  L4-5, L5-S1    TRANSFORAMINAL EPIDURAL INJECTION OF STEROID Left 1/27/2021    Procedure: Injection,steroid,epidural,transforaminal approach;  Surgeon: Bj Jones MD;  Location: [facility];  Service: Pain Management;  Laterality: Left;  L4-5, L5-S1    TRANSFORAMINAL EPIDURAL INJECTION OF STEROID Right 3/4/2021    Procedure: Injection,steroid,epidural,transforaminal approach;  Surgeon: Bj Jones MD;  Location: [facility];  Service: Pain Management;  Laterality: Right;  L4-L5, L5-S1    WRIST SURGERY  1993    carpal tunnel       Social History     Tobacco Use    Smoking status: Never Smoker    Smokeless tobacco: Never Used   Substance Use Topics    Alcohol use: Yes     Comment: Rare    Drug use: No        REVIEW OF  SYSTEMS  CONSTITUTIONAL: Negative for chills, fatigue and fever.   EYES: No double vision, No blurred vision  NEURO: No headaches, No dizziness  RESPIRATORY:  Patient has persistent cough, for long period of time associated with shortness of breath and no obvious wheezing..    CARDIOVASCULAR:  Chest pain that occurred on admission has improved Negative for palpitations and leg swelling.   GI: Negative for abdominal pain, No melena, diarrhea, nausea and vomiting.   : Negative for dysuria and frequency, Negative for hematuria  SKIN: Negative for bruising, Negative for edema or discoloration noted.   ENDOCRINE: Negative for polyphagia, Negative for heat intolerance, Negative for cold intolerance  PSYCHIATRIC: Negative for depression, Negative for anxiety, Negative for memory loss  MUSCULOSKELETAL: Negative for neck pain, Negative for muscle weakness, Negative for back pain     OBJECTIVE     VITAL SIGNS (Most Recent)  Temp: 97.3 °F (36.3 °C) (05/14/22 1157)  Pulse: 62 (05/14/22 0811)  Resp: (!) 26 (05/14/22 1157)  BP: 131/62 (05/14/22 1157)  SpO2: (!) 92 % (05/14/22 0811)    VENTILATION STATUS  Resp: (!) 26 (05/14/22 1157)  SpO2: (!) 92 % (05/14/22 0811)       I & O (Last 24H):    Intake/Output Summary (Last 24 hours) at 5/14/2022 1534  Last data filed at 5/14/2022 0810  Gross per 24 hour   Intake 320 ml   Output 200 ml   Net 120 ml       WEIGHTS  Wt Readings from Last 3 Encounters:   05/14/22 0514 66 kg (145 lb 8.1 oz)   05/13/22 0314 66 kg (145 lb 8.1 oz)   05/12/22 0334 66 kg (145 lb 8.1 oz)   05/11/22 0856 67.9 kg (149 lb 11.1 oz)   05/11/22 0500 67.9 kg (149 lb 11.1 oz)   05/11/22 0043 68.2 kg (150 lb 5.7 oz)   05/10/22 1621 64 kg (141 lb)   05/06/22 1119 66.2 kg (146 lb)   05/05/22 1004 64.4 kg (141 lb 15.6 oz)       Physical examination:      Constitutional:  Well-built well-nourished in no apparent distress, alert and oriented    Neck: no carotid bruit, no JVD, no masses    Lungs: diminished breath sounds  bibasilar, rhonchi bilaterally  Chest Wall: no tenderness  CARDIAC:  regular rate and rhythm, S1, S2 normal,   Abdomen: soft, non-tender; bowel sounds normal; no masses,  no organomegaly, no guarding or rebound noted  Extremities: Extremities normal, atraumatic, no cyanosis, clubbing, or edema  No hematoma  Skin: Skin color, texture, turgor normal. No rashes or lesions  Neuro: Alert and responsive.  Moving all extremities       HOME MEDICATIONS:  Current Facility-Administered Medications on File Prior to Encounter   Medication Dose Route Frequency Provider Last Rate Last Admin    0.9%  NaCl infusion   Intravenous Continuous Bj Jones MD   Stopped at 02/05/20 1400     Current Outpatient Medications on File Prior to Encounter   Medication Sig Dispense Refill    acetaminophen (TYLENOL) 650 MG TbSR Take 1,300 mg by mouth once daily. 2 Tablet(s) Oral  Every day.      amitriptyline (ELAVIL) 50 MG tablet Take 1 tablet (50 mg total) by mouth every evening. 90 tablet 3    amLODIPine (NORVASC) 5 MG tablet Take 1 tablet (5 mg total) by mouth once daily. 30 tablet 11    apixaban (ELIQUIS) 5 mg Tab Take 1 tablet (5 mg total) by mouth 2 (two) times daily. 30 tablet 3    azelastine (ASTELIN) 137 mcg (0.1 %) nasal spray 1 spray (137 mcg total) by Nasal route 2 (two) times daily. 90 mL 3    canagliflozin (INVOKANA) 100 mg Tab tablet Take 1 tablet (100 mg total) by mouth once daily. 90 tablet 3    carboxymethylcellulose 1 % ophthalmic solution Apply 1 drop to eye As instructed. as directed      cetirizine (ZYRTEC) 10 MG tablet Take 10 mg by mouth once daily.      cholecalciferol, vitamin D3, (VITAMIN D3) 50 mcg (2,000 unit) Cap Take 1 capsule by mouth once daily.      coenzyme Q10 100 mg capsule Take 100 mg by mouth once daily.       cranberry extract 650 mg Cap Take 1 tablet by mouth 2 (two) times daily.      diclofenac sodium (VOLTAREN) 1 % Gel APPLY 2 G TOPICALLY 3 (THREE) TIMES DAILY. (Patient taking differently:  Apply 2 g topically 3 (three) times daily as needed. APPLY 2 G TOPICALLY 3 (THREE) TIMES DAILY.) 100 g 5    dronedarone (MULTAQ) 400 mg Tab Take 1 tablet (400 mg total) by mouth 2 (two) times daily with meals. 180 tablet 3    fluticasone propionate (FLONASE) 50 mcg/actuation nasal spray 1 spray (50 mcg total) by Each Nostril route once daily. 48 g 3    isosorbide mononitrate (IMDUR) 60 MG 24 hr tablet Take 60 mg by mouth every evening.      levothyroxine (LEVOTHROID) 25 MCG tablet Take 1 tablet (25 mcg total) by mouth before breakfast. Every day 90 tablet 3    RESTASIS 0.05 % ophthalmic emulsion Place 1 drop into both eyes 2 (two) times daily.      rosuvastatin (CRESTOR) 10 MG tablet Take 1 tablet (10 mg total) by mouth once daily. 90 tablet 3    TOPROL XL 25 mg 24 hr tablet Take 1 tablet (25 mg total) by mouth once daily. 90 tablet 3    triazolam (HALCION) 0.25 MG Tab Take 1 tablet (0.25 mg total) by mouth nightly as needed (sleep). 90 tablet 1    UNABLE TO FIND Take 1 capsule by mouth once daily. Vital red      vitamins  A,C,E-zinc-copper [id number] unit-mg-unit Cap Take 1 capsule by mouth 2 (two) times daily.       blood sugar diagnostic (FREESTYLE LITE STRIPS) Strp USE TO TEST BLOOD SUGAR TWICE A  strip 3    gabapentin (NEURONTIN) 300 MG capsule Take 1 capsule (300 mg total) by mouth 3 (three) times daily. 90 capsule 2    guaifenesin/dextromethorphan (TUSSIN DM ORAL) Take by mouth.      Lactobac no.41/Bifidobact no.7 (PROBIOTIC-10 ORAL) Take by mouth.      lancets Misc 1 Units by Misc.(Non-Drug; Combo Route) route 2 (two) times daily. 200 each 3    LIDOcaine 4 % PtMd Apply 1 patch topically once daily. (Patient not taking: No sig reported) 5 patch 1    magnesium oxide (MAG-OXIDE ORAL) Take 400 mg by mouth once daily.      mirabegron (MYRBETRIQ) 50 mg Tb24 Take 1 tablet (50 mg total) by mouth once daily. (Patient not taking: No sig reported) 90 tablet 3    nitroGLYCERIN (NITROSTAT)  0.4 MG SL tablet Place 0.4 mg under the tongue every 5 (five) minutes as needed. 0.4mg Sublingual PRN .        pramoxine-hydrocortisone (PROCTOCREAM-HC) 1-1 % rectal cream Place rectally 2 (two) times daily. (Patient taking differently: Place 1 application rectally 2 (two) times daily.) 3 each 3    promethazine-codeine 6.25-10 mg/5 ml (PHENERGAN WITH CODEINE) 6.25-10 mg/5 mL syrup Take 5 mLs by mouth every 4 (four) hours as needed for Cough.         SCHEDULED MEDS:   amitriptyline  50 mg Oral QHS    amLODIPine  5 mg Oral Daily    aspirin  81 mg Oral Daily    atorvastatin  20 mg Oral Daily    azithromycin  500 mg Intravenous Q24H    cefTRIAXone (ROCEPHIN) IVPB  1 g Intravenous Q24H    cholecalciferol (vitamin D3)  2,000 Units Oral Daily    dronedarone  400 mg Oral BID WM    isosorbide mononitrate  60 mg Oral QHS    levothyroxine  25 mcg Oral Before breakfast    metoprolol succinate  25 mg Oral Daily       CONTINUOUS INFUSIONS:   heparin (porcine) in D5W Stopped (05/13/22 0730)       PRN MEDS:acetaminophen, benzonatate, dextrose 50%, dextrose 50%, glucagon (human recombinant), glucose, glucose, heparin (PORCINE), heparin (PORCINE), insulin aspart U-100, melatonin, morphine, nitroGLYCERIN, ondansetron, sodium chloride 0.9%    LABS AND DIAGNOSTICS     CBC LAST 3 DAYS  Recent Labs   Lab 05/10/22  1637 05/11/22  0133 05/12/22  0311 05/13/22  0537 05/14/22  0433   WBC 4.95 4.36 7.82 6.92 6.13   RBC 3.90* 3.92* 3.51* 3.65* 3.52*   HGB 11.2* 11.2* 10.1* 10.3* 9.9*   HCT 34.8* 34.5* 32.2* 32.6* 32.5*   MCV 89 88 92 89 92   MCH 28.7 28.6 28.8 28.2 28.1   MCHC 32.2 32.5 31.4* 31.6* 30.5*   RDW 14.4 14.4 14.6* 14.6* 14.5    174 175 175 149*   MPV 10.6 10.6 10.7 10.5 10.3   GRAN 65.7  3.3 92.1*  4.0  --  64.1  4.4  --    LYMPH 21.4  1.1 6.9*  0.3*  --  21.5  1.5  --    MONO 10.1  0.5 0.5*  0.0*  --  12.4  0.9  --    BASO 0.02 0.00  --  0.02  --    NRBC 0 0  --  0  --        COAGULATION LAST 3  DAYS  Recent Labs   Lab 05/11/22  1918 05/12/22  0311 05/13/22  0803 05/13/22  1037 05/14/22  0433   LABPT 20.7*  --   --   --   --    INR 1.9  --   --   --   --    APTT 32.5   < > 143.3* 49.1* 29.3    < > = values in this interval not displayed.       CHEMISTRY LAST 3 DAYS  Recent Labs   Lab 05/10/22  1637 05/11/22  0133 05/12/22 0311 05/13/22  0537 05/14/22  0433    136 138 139 138   K 4.3 3.9 4.0 4.1 4.7    100 100 98 98   CO2 23 23 25 30* 31*   ANIONGAP 9 13 13 11 9   BUN 37* 40* 57* 57* 38*   CREATININE 1.3 1.3 1.4 1.6* 1.3    188* 126* 89 98   CALCIUM 8.4* 8.9 8.4* 8.5* 8.9   MG  --  2.4  --   --   --    ALBUMIN 3.9  --   --   --   --    PROT 6.7  --   --   --   --    ALKPHOS 57  --   --   --   --    ALT 65*  --   --   --   --    AST 44*  --   --   --   --    BILITOT 0.8  --   --   --   --        CARDIAC PROFILE LAST 3 DAYS  Recent Labs   Lab 05/10/22  1637 05/10/22  1935 05/11/22 0133 05/13/22  0537   *  --  674* 482*   TROPONINI <0.030 <0.030 <0.030  --        ENDOCRINE LAST 3 DAYS  Recent Labs   Lab 05/11/22  1312 05/13/22  0025   PROCAL 0.09 0.16       LAST ARTERIAL BLOOD GAS  ABG  No results for input(s): PH, PO2, PCO2, HCO3, BE in the last 168 hours.    LAST 7 DAYS MICROBIOLOGY   Microbiology Results (last 7 days)     Procedure Component Value Units Date/Time    Blood culture [[id number]] Collected: 05/13/22 1003    Order Status: Completed Specimen: Blood Updated: 05/14/22 1032     Blood Culture, Routine No Growth to date      No Growth to date    Urine Culture High Risk [[id number]] Collected: 05/13/22 1051    Order Status: Sent Specimen: Urine, Clean Catch Updated: 05/14/22 0840    Blood culture [[id number]] Collected: 05/13/22 0023    Order Status: Completed Specimen: Blood Updated: 05/14/22 0432     Blood Culture, Routine No Growth to date      No Growth to date    Blood culture [[id number]] Collected: 05/13/22 0024    Order Status: Completed Specimen: Blood Updated: 05/14/22  0432     Blood Culture, Routine No Growth to date      No Growth to date    Blood culture [[id number]] Collected: 05/10/22 1906    Order Status: Completed Specimen: Blood from Peripheral, Forearm, Left Updated: 05/14/22 0232     Blood Culture, Routine No Growth to date      No Growth to date      No Growth to date      No Growth to date    Blood culture [[id number]] Collected: 05/10/22 1641    Order Status: Completed Specimen: Blood from Peripheral, Forearm, Right Updated: 05/13/22 1832     Blood Culture, Routine No Growth to date      No Growth to date      No Growth to date      No Growth to date    MRSA Screen by PCR [[id number]] Collected: 05/13/22 1052    Order Status: Completed Specimen: Nasopharyngeal Swab from Nasal Updated: 05/13/22 1240     MRSA SCREEN BY PCR Negative          MOST RECENT IMAGING  Cardiac catheterization  · The Origin to Prox Graft lesion was 100% stenosed.  · The pre-procedure left ventricular end diastolic pressure was 11.  · Severe native multivessel coronary artery disease including severe   disease of proximal LAD and proximal RCA. Ramus not visualized, possibly   ostial occluded  · Patent INGRID to lay LAD, patent SVG to proximal RCA, patent SVG to ramus  · Equivocal disease of ostial vein graft to ramus, minimal luminal area by   intravascular ultrasound was 4.5-5 mm2     The procedure log was documented by Documenter: RT Vandana and   verified by Davon Patton MD.    Date: 5/13/2022  Time: 5:39 PM  X-Ray Chest PA And Lateral  Reason: cough, sob cough, sob. Chest Pain. Acute on chronic heart failure    FINDINGS:  PA and lateral chest compared with 5/10/2022 show unchanged cardiomediastinal silhouette with postsurgical changes of CABG.    Tiny bilateral pleural effusions have not significantly changed. No new alveolar consolidation or pneumothorax. Pulmonary vasculature is normal. Mild degenerative changes affect the spine.    IMPRESSION:  Unchanged tiny bilateral pleural  effusions, with improved aeration of bibasilar airspace opacities.    Electronically signed by:  Dada Chin MD  5/13/2022 10:27 AM CDT Workstation: [id number]      ECHOCARDIOGRAM RESULTS (last 5)  Results for orders placed in visit on 10/11/21    Echo    Interpretation Summary  · The left ventricle is normal in size with mild concentric hypertrophy and low normal systolic function.  · The estimated ejection fraction is 52%.  · Grade I left ventricular diastolic dysfunction.  · Normal right ventricular size.  · Mild left atrial enlargement.  · There is mild aortic valve stenosis.  · Aortic valve area is 2.57 cm2; peak velocity is 2.17 m/s; mean gradient is 13 mmHg.  · Mild mitral regurgitation.  · Mild tricuspid regurgitation.  · Normal central venous pressure (3 mmHg).  · The estimated PA systolic pressure is 23 mmHg.      CURRENT/PREVIOUS VISIT EKG  Results for orders placed or performed during the hospital encounter of 05/10/22   EKG 12-lead    Collection Time: 05/10/22  4:27 PM    Narrative    Test Reason : R07.9,    Vent. Rate : 065 BPM     Atrial Rate : 060 BPM     P-R Int : 000 ms          QRS Dur : 094 ms      QT Int : 422 ms       P-R-T Axes : 000 -26 095 degrees     QTc Int : 438 ms    Junctional rhythm with Premature ventricular complexes or Fusion complexes  Low voltage QRS  Septal infarct (cited on or before 20-APR-2022)  Abnormal ECG  When compared with ECG of 20-APR-2022 11:40,  Junctional rhythm has replaced Sinus rhythm    Referred By: [email or web address]   SELF           Confirmed By:            ASSESSMENT/PLAN:     Active Hospital Problems    Diagnosis    *Acute on chronic heart failure    Atypical chest pain    Bronchitis    Positive cardiac stress test    Paroxysmal atrial fibrillation    Stage 3b chronic kidney disease    Type 2 diabetes mellitus with diabetic nephropathy, without long-term current use of insulin       ASSESSMENT & PLAN:   1. Decompensated HFrEF, new  2. CP, likely due to HF +  underlying CAD  3. Recent Positive cardiac stress, ? LAD territory  4. ? Bronchitis, appears more likely heart failure  5. PAF- Currently SR-SB  6. Stage III CKD  7. CAD H/O CABG- Last cath in 2017 showed patent INGRID-LAD patent saphenous vein to ramus, patent graft to Main right and occluded graft to diagonal branch. She had a CABG X 4 in 2016.     RECOMMENDATIONS:  Home O2 eval- patient sats in lower 80s on room air just lyign around   Restart eliquis 5 mg BID and continue on Multaq 400 mg p.o. b.i.d.  Continue amlodipine 5 mg, isosorbide mononitrate 60 mg daily  Continue on Toprol-XL 25 mg a day  Add Aldactone 25 mg p.o. to her regimen.          Jailyn Hendrickson PA-C  [facility]  Department of Cardiology  Date of Service: 05/14/2022      I have personally interviewed and examined the patient, I have reviewed the Nurse Practitioner's history and physical, assessment, and plan. I have personally evaluated the patient at bedside and agree with the findings and made appropriate changes as necessary in recommendations.    Akhil Coleman MD.    Department of Cardiology  [facility]  Date of service:  05/14/2022

## 2022-05-14 NOTE — CARE UPDATE
05/13/22 4436   Patient Assessment/Suction   Level of Consciousness (AVPU) alert   Respiratory Effort Normal;Unlabored   Expansion/Accessory Muscles/Retractions no use of accessory muscles   PRE-TX-O2   O2 Device (Oxygen Therapy) nasal cannula   $ Is the patient on Low Flow Oxygen? Yes   Flow (L/min) 4   SpO2 (!) 92 %   Pulse Oximetry Type Continuous   $ Pulse Oximetry - Multiple Charge Pulse Oximetry - Multiple   Pulse 80   Resp (!) 25   Respiratory Evaluation   $ Care Plan Tech Time 15 min

## 2022-05-14 NOTE — PROGRESS NOTES
[facility] Medicine  Progress Note    Patient Name: Darlin Tipton  MRN: [id number]  Patient Class: IP- Inpatient   Admission Date: 5/10/2022  Length of Stay: 2 days  Attending Physician: Lamont Mccullough MD  Primary Care Provider: Oumar Almonte Jr, MD        Subjective:     Principal Problem:Acute on chronic heart failure    Patient is afebrile today, still requiring further oxygen.  She is not on home oxygen.  There is no capacity for thoracentesis on the weekend.    Review of Systems  Objective:     Vital Signs (Most Recent):  Temp: 98.2 °F (36.8 °C) (05/14/22 1600)  Pulse: 62 (05/14/22 0811)  Resp: (!) 25 (05/14/22 1600)  BP: (!) 128/58 (05/14/22 1600)  SpO2: (!) 91 % (05/14/22 1600) Vital Signs (24h Range):  Temp:  [97.3 °F (36.3 °C)-98.7 °F (37.1 °C)] 98.2 °F (36.8 °C)  Pulse:  [60-80] 62  Resp:  [22-34] 25  SpO2:  [90 %-93 %] 91 %  BP: (122-131)/(57-62) 128/58     Weight: 66 kg (145 lb 8.1 oz)  Body mass index is 26.61 kg/m².    Intake/Output Summary (Last 24 hours) at 5/14/2022 1844  Last data filed at 5/14/2022 1559  Gross per 24 hour   Intake 440 ml   Output 200 ml   Net 240 ml      Physical Exam  General appearance:  Fatigued appearing female in no apparent distress.  Skin: No Rash.   Neuro: Motor and sensory exams grossly intact. Good tone. Power in all 4 extremities 5/5.   HENT: Atraumatic head. Moist mucous membranes of oral cavity.  Eyes: Normal extraocular movements.   Neck: Supple. No evidence of lymphadenopathy. No thyroidomegaly.  Lungs:  Rhonchi throughout bilaterally. No wheezing present.   Heart: Regular rate and rhythm. S1 and S2 present with positive murmurs/gallop/rub.  Positive pedal edema. No JVD present.   Abdomen: Soft, non-distended, non-tender. No rebound tenderness/guarding. No masses or organomegaly. Bowel sounds are normal. Bladder is not palpable.   Extremities: No cyanosis, clubbing, positive edema.  Psych/mental status: Alert and oriented.  Cooperative. Responds appropriately to questions.         Significant Labs: All pertinent labs within the past 24 hours have been reviewed.    Significant Imaging: I have reviewed all pertinent imaging results/findings within the past 24 hours.    Assessment/Plan:      Active Diagnoses:    Diagnosis Date Noted POA    PRINCIPAL PROBLEM:  Acute on chronic heart failure [I50.9] 05/10/2022 Yes    Atypical chest pain [R07.89] 05/10/2022 Yes    Bronchitis [J40] 05/10/2022 Yes    Positive cardiac stress test [R94.39] 05/06/2022 Yes    Paroxysmal atrial fibrillation [I48.0] 03/21/2022 Yes    Stage 3b chronic kidney disease [N18.32] 11/07/2021 Yes    Type 2 diabetes mellitus with diabetic nephropathy, without long-term current use of insulin [E11.21] 05/05/2018 Yes      Problems Resolved During this Admission:     VTE Risk Mitigation (From admission, onward)         Ordered     enoxaparin injection 70 mg  Every 12 hours (non-standard times)         05/14/22 1843     Reason for No Pharmacological VTE Prophylaxis  Once        Question:  Reasons:  Answer:  Already adequately anticoagulated on oral Anticoagulants    05/10/22 2150     IP VTE HIGH RISK PATIENT  Once         05/10/22 2150                  Atypical Chest Pain, Recent Abnormal Stress Test,  History of CABG  - Echo with EF 40%, grade 3 left ventricular diastolic dysfunction, moderate aortic valve stenosis, mild to moderate mitral regurgitation, moderate tricuspid regurgitation, PASP of 32 mmHg.  -  Left heart catheterization with patent grafts from CABG      Cough, suspected PNA  -IV Zithromax/Ceftriaxone    -would need thora given Pl effusion    Fever-rocephin, azithromycin, Follow up culture data     Acute on chronic renal Failure  -renal dose all medications  -avoid nephrotoxic medications  -hold Lasix for elevated creatinine     Type II Diabetes  -low dose NovoLog insulin sliding scale  -sliding scale protocol  -diabetic cardiac diet renal  diet     Transaminitis  -monitor  -acute hepatitis panel      Hyperlipidemia  -continue statins for now     Essential HTN  -continue home medications to manage      Hypothyroidism  -continue levothyroxine at current dose     DVT Prophylaxis: will be on heparin iv        Lamont Mccullough MD  Department of Hospital Medicine   [facility]

## 2022-05-15 LAB
ANION GAP SERPL CALC-SCNC: 10 MMOL/L (ref 8–16)
BACTERIA BLD CULT: NORMAL
BNP SERPL-MCNC: 584 PG/ML (ref 0–99)
BUN SERPL-MCNC: 42 MG/DL (ref 8–23)
CALCIUM SERPL-MCNC: 8.5 MG/DL (ref 8.7–10.5)
CHLORIDE SERPL-SCNC: 91 MMOL/L (ref 95–110)
CO2 SERPL-SCNC: 29 MMOL/L (ref 23–29)
CREAT SERPL-MCNC: 1.4 MG/DL (ref 0.5–1.4)
ERYTHROCYTE [DISTWIDTH] IN BLOOD BY AUTOMATED COUNT: 14.1 % (ref 11.5–14.5)
EST. GFR  (AFRICAN AMERICAN): 40.6 ML/MIN/1.73 M^2
EST. GFR  (NON AFRICAN AMERICAN): 35.3 ML/MIN/1.73 M^2
GLUCOSE SERPL-MCNC: 106 MG/DL (ref 70–110)
GLUCOSE SERPL-MCNC: 108 MG/DL (ref 70–110)
GLUCOSE SERPL-MCNC: 129 MG/DL (ref 70–110)
GLUCOSE SERPL-MCNC: 149 MG/DL (ref 70–110)
GLUCOSE SERPL-MCNC: 149 MG/DL (ref 70–110)
HCT VFR BLD AUTO: 29.7 % (ref 37–48.5)
HGB BLD-MCNC: 9.3 G/DL (ref 12–16)
MCH RBC QN AUTO: 28 PG (ref 27–31)
MCHC RBC AUTO-ENTMCNC: 31.3 G/DL (ref 32–36)
MCV RBC AUTO: 90 FL (ref 82–98)
PLATELET # BLD AUTO: 130 K/UL (ref 150–450)
PMV BLD AUTO: 10.9 FL (ref 9.2–12.9)
POTASSIUM SERPL-SCNC: 4.3 MMOL/L (ref 3.5–5.1)
RBC # BLD AUTO: 3.32 M/UL (ref 4–5.4)
SODIUM SERPL-SCNC: 128 MMOL/L (ref 136–145)
SODIUM SERPL-SCNC: 130 MMOL/L (ref 136–145)
SODIUM SERPL-SCNC: 131 MMOL/L (ref 136–145)
SODIUM SERPL-SCNC: 131 MMOL/L (ref 136–145)
SODIUM UR-SCNC: 23 MMOL/L (ref 20–250)
TSH SERPL DL<=0.005 MIU/L-ACNC: 1.75 UIU/ML (ref 0.34–5.6)
URATE SERPL-MCNC: 5.5 MG/DL (ref 2.4–5.7)
WBC # BLD AUTO: 5.16 K/UL (ref 3.9–12.7)

## 2022-05-15 PROCEDURE — 99900031 HC PATIENT EDUCATION (STAT)

## 2022-05-15 PROCEDURE — 94761 N-INVAS EAR/PLS OXIMETRY MLT: CPT

## 2022-05-15 PROCEDURE — 25000003 PHARM REV CODE 250: Performed by: INTERNAL MEDICINE

## 2022-05-15 PROCEDURE — 36415 COLL VENOUS BLD VENIPUNCTURE: CPT | Performed by: INTERNAL MEDICINE

## 2022-05-15 PROCEDURE — 63600175 PHARM REV CODE 636 W HCPCS: Performed by: INTERNAL MEDICINE

## 2022-05-15 PROCEDURE — 80048 BASIC METABOLIC PNL TOTAL CA: CPT | Performed by: NURSE PRACTITIONER

## 2022-05-15 PROCEDURE — 25000003 PHARM REV CODE 250: Performed by: NURSE PRACTITIONER

## 2022-05-15 PROCEDURE — 21000000 HC CCU ICU ROOM CHARGE

## 2022-05-15 PROCEDURE — 94799 UNLISTED PULMONARY SVC/PX: CPT

## 2022-05-15 PROCEDURE — 99900035 HC TECH TIME PER 15 MIN (STAT)

## 2022-05-15 PROCEDURE — 27000221 HC OXYGEN, UP TO 24 HOURS

## 2022-05-15 PROCEDURE — 99233 PR SUBSEQUENT HOSPITAL CARE,LEVL III: ICD-10-PCS | Mod: ,,, | Performed by: INTERNAL MEDICINE

## 2022-05-15 PROCEDURE — 88112 CYTOPATH CELL ENHANCE TECH: CPT | Mod: TC

## 2022-05-15 PROCEDURE — 88305 TISSUE EXAM BY PATHOLOGIST: CPT | Mod: TC

## 2022-05-15 PROCEDURE — 85027 COMPLETE CBC AUTOMATED: CPT | Performed by: INTERNAL MEDICINE

## 2022-05-15 PROCEDURE — 84300 ASSAY OF URINE SODIUM: CPT | Performed by: INTERNAL MEDICINE

## 2022-05-15 PROCEDURE — 99233 SBSQ HOSP IP/OBS HIGH 50: CPT | Mod: ,,, | Performed by: INTERNAL MEDICINE

## 2022-05-15 PROCEDURE — 84295 ASSAY OF SERUM SODIUM: CPT | Mod: 91 | Performed by: INTERNAL MEDICINE

## 2022-05-15 PROCEDURE — 36415 COLL VENOUS BLD VENIPUNCTURE: CPT | Performed by: NURSE PRACTITIONER

## 2022-05-15 PROCEDURE — 84443 ASSAY THYROID STIM HORMONE: CPT | Performed by: INTERNAL MEDICINE

## 2022-05-15 PROCEDURE — 84550 ASSAY OF BLOOD/URIC ACID: CPT | Performed by: INTERNAL MEDICINE

## 2022-05-15 PROCEDURE — 83880 ASSAY OF NATRIURETIC PEPTIDE: CPT | Performed by: INTERNAL MEDICINE

## 2022-05-15 RX ORDER — ENOXAPARIN SODIUM 100 MG/ML
1 INJECTION SUBCUTANEOUS
Status: DISCONTINUED | OUTPATIENT
Start: 2022-05-16 | End: 2022-05-15

## 2022-05-15 RX ORDER — FUROSEMIDE 20 MG/1
20 TABLET ORAL ONCE
Status: COMPLETED | OUTPATIENT
Start: 2022-05-15 | End: 2022-05-15

## 2022-05-15 RX ORDER — FUROSEMIDE 20 MG/1
20 TABLET ORAL DAILY
Status: DISCONTINUED | OUTPATIENT
Start: 2022-05-15 | End: 2022-05-15

## 2022-05-15 RX ADMIN — DRONEDARONE 400 MG: 400 TABLET, FILM COATED ORAL at 04:05

## 2022-05-15 RX ADMIN — CEFTRIAXONE 1 G: 1 INJECTION, POWDER, FOR SOLUTION INTRAMUSCULAR; INTRAVENOUS at 09:05

## 2022-05-15 RX ADMIN — DRONEDARONE 400 MG: 400 TABLET, FILM COATED ORAL at 08:05

## 2022-05-15 RX ADMIN — SPIRONOLACTONE 25 MG: 25 TABLET ORAL at 08:05

## 2022-05-15 RX ADMIN — ISOSORBIDE MONONITRATE 60 MG: 30 TABLET, EXTENDED RELEASE ORAL at 08:05

## 2022-05-15 RX ADMIN — AMITRIPTYLINE HYDROCHLORIDE 50 MG: 25 TABLET, FILM COATED ORAL at 08:05

## 2022-05-15 RX ADMIN — ASPIRIN 81 MG 81 MG: 81 TABLET ORAL at 08:05

## 2022-05-15 RX ADMIN — Medication 2000 UNITS: at 08:05

## 2022-05-15 RX ADMIN — AZITHROMYCIN 500 MG: 500 INJECTION, POWDER, LYOPHILIZED, FOR SOLUTION INTRAVENOUS at 10:05

## 2022-05-15 RX ADMIN — FUROSEMIDE 20 MG: 20 TABLET ORAL at 08:05

## 2022-05-15 RX ADMIN — AMLODIPINE BESYLATE 5 MG: 5 TABLET ORAL at 08:05

## 2022-05-15 RX ADMIN — LEVOTHYROXINE SODIUM 25 MCG: 0.03 TABLET ORAL at 05:05

## 2022-05-15 RX ADMIN — ENOXAPARIN SODIUM 70 MG: 80 INJECTION SUBCUTANEOUS at 08:05

## 2022-05-15 RX ADMIN — ATORVASTATIN CALCIUM 20 MG: 20 TABLET, FILM COATED ORAL at 08:05

## 2022-05-15 RX ADMIN — FUROSEMIDE 20 MG: 20 INJECTION, SOLUTION INTRAMUSCULAR; INTRAVENOUS at 08:05

## 2022-05-15 NOTE — PROGRESS NOTES
[facility]  Progress Note    Patient Name: Darlin Tipton  MRN: [id number]  Patient Class: IP- Inpatient   Admission Date: 5/10/2022  Length of Stay: 3 days  Attending Physician: Lamont Mccullough MD  Primary Care Provider: Oumar Almonte Jr, MD        Subjective:     Principal Problem:Acute on chronic heart failure    Patient is afebrile today, still requiring further oxygen.  Na is lower  Review of Systems  Objective:     Vital Signs (Most Recent):  Temp: 97.8 °F (36.6 °C) (05/15/22 1558)  Pulse: 62 (05/15/22 1558)  Resp: 20 (05/15/22 1558)  BP: (!) 133/58 (05/15/22 1558)  SpO2: (!) 92 % (05/15/22 1248) Vital Signs (24h Range):  Temp:  [97.6 °F (36.4 °C)-98.9 °F (37.2 °C)] 97.8 °F (36.6 °C)  Pulse:  [54-62] 62  Resp:  [20-29] 20  SpO2:  [90 %-98 %] 92 %  BP: (101-143)/(51-76) 133/58     Weight: 67 kg (147 lb 11.3 oz)  Body mass index is 27.02 kg/m².    Intake/Output Summary (Last 24 hours) at 5/15/2022 1800  Last data filed at 5/15/2022 1247  Gross per 24 hour   Intake 1450 ml   Output --   Net 1450 ml      Physical Exam  General appearance:  Fatigued appearing female in no apparent distress.  Skin: No Rash.   Neuro: Motor and sensory exams grossly intact. Good tone. Power in all 4 extremities 5/5.   HENT: Atraumatic head. Moist mucous membranes of oral cavity.  Eyes: Normal extraocular movements.   Neck: Supple. No evidence of lymphadenopathy. No thyroidomegaly.  Lungs:  Rhonchi throughout bilaterally. No wheezing present.   Heart: Regular rate and rhythm. S1 and S2 present with positive murmurs/gallop/rub.  Positive pedal edema. No JVD present.   Abdomen: Soft, non-distended, non-tender. No rebound tenderness/guarding. No masses or organomegaly. Bowel sounds are normal. Bladder is not palpable.   Extremities: No cyanosis, clubbing, positive edema.  Psych/mental status: Alert and oriented. Cooperative. Responds appropriately to questions.         Significant Labs: All  pertinent labs within the past 24 hours have been reviewed.    Significant Imaging: I have reviewed all pertinent imaging results/findings within the past 24 hours.    Assessment/Plan:      Active Diagnoses:    Diagnosis Date Noted POA    PRINCIPAL PROBLEM:  Acute on chronic heart failure [I50.9] 05/10/2022 Yes    Atypical chest pain [R07.89] 05/10/2022 Yes    Bronchitis [J40] 05/10/2022 Yes    Positive cardiac stress test [R94.39] 05/06/2022 Yes    Paroxysmal atrial fibrillation [I48.0] 03/21/2022 Yes    Stage 3b chronic kidney disease [N18.32] 11/07/2021 Yes    Type 2 diabetes mellitus with diabetic nephropathy, without long-term current use of insulin [E11.21] 05/05/2018 Yes      Problems Resolved During this Admission:     VTE Risk Mitigation (From admission, onward)         Ordered     Reason for No Pharmacological VTE Prophylaxis  Once        Question:  Reasons:  Answer:  Already adequately anticoagulated on oral Anticoagulants    05/10/22 2150     IP VTE HIGH RISK PATIENT  Once         05/10/22 2150                  Atypical Chest Pain, Recent Abnormal Stress Test,  History of CABG  - Echo with EF 40%, grade 3 left ventricular diastolic dysfunction, moderate aortic valve stenosis, mild to moderate mitral regurgitation, moderate tricuspid regurgitation, PASP of 32 mmHg.  -  Left heart catheterization with patent grafts from CABG    Acute hypoxic resp failure  -R thora for tomorrow      Cough, suspected PNA  -IV Zithromax/Ceftriaxone    -would need thora given Pl effusion    Fever-rocephin, azithromycin, Follow up culture data     Hypervolemic hyponatremia  -lasix, monitor q4h, consider salt tablets.      Acute on chronic renal Failure  -renal dose all medications  -avoid nephrotoxic medications  -hold Lasix for elevated creatinine     Type II Diabetes  -low dose NovoLog insulin sliding scale  -sliding scale protocol  -diabetic cardiac diet renal diet     Transaminitis  -monitor  -acute hepatitis  panel      Hyperlipidemia  -continue statins for now     Essential HTN  -continue home medications to manage      Hypothyroidism  -continue levothyroxine at current dose     DVT Prophylaxis: will be on heparin iv        Lamont Mccullough MD  Department of Hospital Medicine   [facility]

## 2022-05-15 NOTE — PLAN OF CARE
05/15/22 0950   Patient Assessment/Suction   Level of Consciousness (AVPU) alert   Respiratory Effort Normal   PRE-TX-O2   O2 Device (Oxygen Therapy) nasal cannula w/ humidification   $ Is the patient on Low Flow Oxygen? Yes   Flow (L/min) 3   SpO2 (!) 94 %   Pulse (!) 59   Resp (!) 28   Home Oxygen Qualification   $ Home O2 Qualification Tech time 15 minutes   Room Air SpO2 At Rest (!) 57 %   Room Air SpO2 During Ambulation (!) 87 %   SpO2 During Ambulation on O2 92 %   Heart Rate on O2 66 bpm   Ambulation O2 LPM 3 LPM   SpO2 Post Ambulation 94 %   Post Ambulation Heart Rate 60 bpm   Post Ambulation O2 LPM 3 LPM   Home O2 Eval Comments Patient requires 3 liters of oxygen while walking

## 2022-05-15 NOTE — PROGRESS NOTES
[facility]  Department of Cardiology  Consult Note      PATIENT NAME: Darlin Tipton  MRN: [id number]  TODAY'S DATE: 05/15/2022  ADMIT DATE: 5/10/2022                          CONSULT REQUESTED BY: Lamont Mccullough MD    SUBJECTIVE     PRINCIPAL PROBLEM: Acute on chronic heart failure      REASON FOR CONSULT:  Acute on Chronic CHF  5/15/2022  Patient remains with 3 L of oxygen requirement.  She is feeling okay this morning with no complaints.  She is wanting to go home.  Vital signs are stable    5/14/2022  [facility] revelaed patent grafts yesterday, medical management recommends. Patient still require supplemental O2. Sats down into low to mid 80s on room air just resting while patient was being evaluated.. No chest pain or tenderness at femoral access site     5/12/2022    Patient is stable currently. Breathing easier. Wishing to stay and have angiogram tomorrow. Last cath in 2017 showed patent INGRID-LAD patent saphenous vein to ramus, patent graft to Main right and occluded graft to diagonal branch. She had a CABG X 4 in 2016. Currently on heparin gtt. Denies active CP.         HPI:    Ms. Tipton is an 81 year old female patient with a PMH significant for CAD, PAF, DM, GERD, Dyslipidemia and chronic bronchitis admitted to hospital with coughing and chest pain. She recently had a positive stress test and is planned for angiographic assessment on the 19th. Troponin is negative. BNP on admit in the 600s CXR shows some bronchitis.       FROM H AND P     Darlin Tipton is a 81 y.o. old female who  has a past medical history of Allergy, Arthritis, Asthma, Blood transfusion, CAD (coronary artery disease), Cataract, CHRONIC BRONCHITIS, Diabetes mellitus, Diabetes mellitus type II, GERD (gastroesophageal reflux disease), Hyperlipidemia, Hypertension, Irregular heart beat, Kidney disease, Post-menopausal bleeding (2018), Spinal stenosis, and Thyroid disease.. The patient presented to [facility] on  5/10/2022 with a primary complaint of Chest Pain (Complains  of chest pain all day ,reports a cough for a few days)  .      81year old  female presents to emergency room with coughing and chest pain.  The patient states for the past 3-4 days she has been having increasing shortness of breath with coughing.  She describes her cough is productive in nature and yellow in color.  She denies fever chills sore throat headaches loss of sense of smell or taste no nausea vomiting or diarrhea.     The patient describes her chest discomfort as a pressure sensation has been constant for the past 2-3 days.  The pain radiates across her chest.     The patient states she recently had a stress test and was told was positive and is planning an angiogram with Dr. Coleman     In the emergency room she was given IV Lasix and a dose of IV antibiotics for possible bronchitis     Other past medical history includes chronic kidney disease coronary artery disease cardiac arrhythmias atrial arrhythmias, hypertension hyperlipidemia diabetes      Review of patient's allergies indicates:   Allergen Reactions    Sulfa (sulfonamide antibiotics) Rash    Floxacillin Itching    Januvia [sitagliptin] Other (See Comments)     Hot flashes    Tetracyclines Itching    Fenofibrate micronized Rash    Nitrofurantoin macrocrystalline Other (See Comments)     unknown    Phenylfenesin la [phenylpropanolamine-gg] Rash       Past Medical History:   Diagnosis Date    Allergy     Dust mites, Grasses, Trees    Arthritis     Asthma     Blood transfusion     CAD (coronary artery disease)     Cataract     CHRONIC BRONCHITIS     Diabetes mellitus     Diabetes mellitus type II     GERD (gastroesophageal reflux disease)     Hyperlipidemia     Hypertension     Irregular heart beat     Kidney disease     ckd stage 3  as stated by patient - to see MD    Post-menopausal bleeding 2018    Spinal stenosis     Thyroid disease     Hypothyroidism      Past Surgical History:   Procedure Laterality Date    APPENDECTOMY  1968    BLADDER SUSPENSION  1989    CARDIAC SURGERY  2016    CABG    CATARACT EXTRACTION  9/2007 (L) and 10/2207 (R)    COLONOSCOPY N/A 10/18/2017    Procedure: COLONOSCOPY;  Surgeon: Esme Acuna MD;  Location: [facility];  Service: Endoscopy;  Laterality: N/A;    CORONARY ARTERY BYPASS GRAFT  4/26/2004    x5    ESOPHAGEAL DILATION      HYSTEROSCOPY WITH DILATION AND CURETTAGE OF UTERUS N/A 3/12/2020    Procedure: HYSTEROSCOPY, WITH DILATION AND CURETTAGE OF UTERUS;  Surgeon: aRmona Llanos MD;  Location: [facility];  Service: OB/GYN;  Laterality: N/A;    SPINE SURGERY  3/2000    Tumor    TRANSFORAMINAL EPIDURAL INJECTION OF STEROID Right 11/21/2019    Procedure: Injection,steroid,epidural,transforaminal approach;  Surgeon: Bj Jones MD;  Location: [facility];  Service: Pain Management;  Laterality: Right;  L4-5, L5-S1    TRANSFORAMINAL EPIDURAL INJECTION OF STEROID Right 12/31/2019    Procedure: Injection,steroid,epidural,transforaminal approach;  Surgeon: Bj Jones MD;  Location: [facility];  Service: Pain Management;  Laterality: Right;  L4-5, L5-S1    TRANSFORAMINAL EPIDURAL INJECTION OF STEROID Right 2/5/2020    Procedure: Injection,steroid,epidural,transforaminal approach;  Surgeon: Bj Jones MD;  Location: [facility];  Service: Pain Management;  Laterality: Right;  L4-5, L5-S1    TRANSFORAMINAL EPIDURAL INJECTION OF STEROID Left 1/27/2021    Procedure: Injection,steroid,epidural,transforaminal approach;  Surgeon: Bj Jones MD;  Location: [facility];  Service: Pain Management;  Laterality: Left;  L4-5, L5-S1    TRANSFORAMINAL EPIDURAL INJECTION OF STEROID Right 3/4/2021    Procedure: Injection,steroid,epidural,transforaminal approach;  Surgeon: Bj Jones MD;  Location: [facility];  Service: Pain Management;  Laterality: Right;  L4-L5, L5-S1    WRIST SURGERY  1993    carpal tunnel       Social History     Tobacco Use     Smoking status: Never Smoker    Smokeless tobacco: Never Used   Substance Use Topics    Alcohol use: Yes     Comment: Rare    Drug use: No        REVIEW OF SYSTEMS  CONSTITUTIONAL: Negative for chills, fatigue and fever.   EYES: No double vision, No blurred vision  NEURO: No headaches, No dizziness  RESPIRATORY:  Patient has persistent cough, for long period of time associated with shortness of breath and no obvious wheezing..    CARDIOVASCULAR:  Chest pain that occurred on admission has improved Negative for palpitations and leg swelling.   GI: Negative for abdominal pain, No melena, diarrhea, nausea and vomiting.   : Negative for dysuria and frequency, Negative for hematuria  SKIN: Negative for bruising, Negative for edema or discoloration noted.   ENDOCRINE: Negative for polyphagia, Negative for heat intolerance, Negative for cold intolerance  PSYCHIATRIC: Negative for depression, Negative for anxiety, Negative for memory loss  MUSCULOSKELETAL: Negative for neck pain, Negative for muscle weakness, Negative for back pain     OBJECTIVE     VITAL SIGNS (Most Recent)  Temp: 97.6 °F (36.4 °C) (05/15/22 0826)  Pulse: (!) 59 (05/15/22 0950)  Resp: (!) 28 (05/15/22 0950)  BP: 134/61 (05/15/22 0826)  SpO2: (!) 94 % (05/15/22 0950)    VENTILATION STATUS  Resp: (!) 28 (05/15/22 0950)  SpO2: (!) 94 % (05/15/22 0950)       I & O (Last 24H):    Intake/Output Summary (Last 24 hours) at 5/15/2022 1113  Last data filed at 5/15/2022 0826  Gross per 24 hour   Intake 1210 ml   Output --   Net 1210 ml       WEIGHTS  Wt Readings from Last 3 Encounters:   05/14/22 2300 67 kg (147 lb 11.3 oz)   05/14/22 0514 66 kg (145 lb 8.1 oz)   05/13/22 0314 66 kg (145 lb 8.1 oz)   05/12/22 0334 66 kg (145 lb 8.1 oz)   05/11/22 0856 67.9 kg (149 lb 11.1 oz)   05/11/22 0500 67.9 kg (149 lb 11.1 oz)   05/11/22 0043 68.2 kg (150 lb 5.7 oz)   05/10/22 1621 64 kg (141 lb)   05/06/22 1119 66.2 kg (146 lb)   05/05/22 1004 64.4 kg (141 lb 15.6 oz)        Physical examination:      Constitutional:  Well-built well-nourished in no apparent distress, alert and oriented    Neck: no carotid bruit, no JVD, no masses    Lungs: diminished breath sounds bibasilar, rhonchi bilaterally  Chest Wall: no tenderness  CARDIAC:  regular rate and rhythm, S1, S2 normal,   Abdomen: soft, non-tender; bowel sounds normal; no masses,  no organomegaly, no guarding or rebound noted  Extremities: Extremities normal, atraumatic, no cyanosis, clubbing, or edema  No hematoma  Skin: Skin color, texture, turgor normal. No rashes or lesions  Neuro: Alert and responsive.  Moving all extremities       HOME MEDICATIONS:  Current Facility-Administered Medications on File Prior to Encounter   Medication Dose Route Frequency Provider Last Rate Last Admin    0.9%  NaCl infusion   Intravenous Continuous Bj Jones MD   Stopped at 02/05/20 1400     Current Outpatient Medications on File Prior to Encounter   Medication Sig Dispense Refill    acetaminophen (TYLENOL) 650 MG TbSR Take 1,300 mg by mouth once daily. 2 Tablet(s) Oral  Every day.      amitriptyline (ELAVIL) 50 MG tablet Take 1 tablet (50 mg total) by mouth every evening. 90 tablet 3    amLODIPine (NORVASC) 5 MG tablet Take 1 tablet (5 mg total) by mouth once daily. 30 tablet 11    apixaban (ELIQUIS) 5 mg Tab Take 1 tablet (5 mg total) by mouth 2 (two) times daily. 30 tablet 3    azelastine (ASTELIN) 137 mcg (0.1 %) nasal spray 1 spray (137 mcg total) by Nasal route 2 (two) times daily. 90 mL 3    canagliflozin (INVOKANA) 100 mg Tab tablet Take 1 tablet (100 mg total) by mouth once daily. 90 tablet 3    carboxymethylcellulose 1 % ophthalmic solution Apply 1 drop to eye As instructed. as directed      cetirizine (ZYRTEC) 10 MG tablet Take 10 mg by mouth once daily.      cholecalciferol, vitamin D3, (VITAMIN D3) 50 mcg (2,000 unit) Cap Take 1 capsule by mouth once daily.      coenzyme Q10 100 mg capsule Take 100 mg by mouth once  daily.       cranberry extract 650 mg Cap Take 1 tablet by mouth 2 (two) times daily.      diclofenac sodium (VOLTAREN) 1 % Gel APPLY 2 G TOPICALLY 3 (THREE) TIMES DAILY. (Patient taking differently: Apply 2 g topically 3 (three) times daily as needed. APPLY 2 G TOPICALLY 3 (THREE) TIMES DAILY.) 100 g 5    dronedarone (MULTAQ) 400 mg Tab Take 1 tablet (400 mg total) by mouth 2 (two) times daily with meals. 180 tablet 3    fluticasone propionate (FLONASE) 50 mcg/actuation nasal spray 1 spray (50 mcg total) by Each Nostril route once daily. 48 g 3    isosorbide mononitrate (IMDUR) 60 MG 24 hr tablet Take 60 mg by mouth every evening.      levothyroxine (LEVOTHROID) 25 MCG tablet Take 1 tablet (25 mcg total) by mouth before breakfast. Every day 90 tablet 3    RESTASIS 0.05 % ophthalmic emulsion Place 1 drop into both eyes 2 (two) times daily.      rosuvastatin (CRESTOR) 10 MG tablet Take 1 tablet (10 mg total) by mouth once daily. 90 tablet 3    TOPROL XL 25 mg 24 hr tablet Take 1 tablet (25 mg total) by mouth once daily. 90 tablet 3    triazolam (HALCION) 0.25 MG Tab Take 1 tablet (0.25 mg total) by mouth nightly as needed (sleep). 90 tablet 1    UNABLE TO FIND Take 1 capsule by mouth once daily. Vital red      vitamins  A,C,E-zinc-copper [phone] unit-mg-unit Cap Take 1 capsule by mouth 2 (two) times daily.       blood sugar diagnostic (FREESTYLE LITE STRIPS) Strp USE TO TEST BLOOD SUGAR TWICE A  strip 3    gabapentin (NEURONTIN) 300 MG capsule Take 1 capsule (300 mg total) by mouth 3 (three) times daily. 90 capsule 2    guaifenesin/dextromethorphan (TUSSIN DM ORAL) Take by mouth.      Lactobac no.41/Bifidobact no.7 (PROBIOTIC-10 ORAL) Take by mouth.      lancets Misc 1 Units by Misc.(Non-Drug; Combo Route) route 2 (two) times daily. 200 each 3    LIDOcaine 4 % PtMd Apply 1 patch topically once daily. (Patient not taking: No sig reported) 5 patch 1    magnesium oxide (MAG-OXIDE ORAL)  Take 400 mg by mouth once daily.      mirabegron (MYRBETRIQ) 50 mg Tb24 Take 1 tablet (50 mg total) by mouth once daily. (Patient not taking: No sig reported) 90 tablet 3    nitroGLYCERIN (NITROSTAT) 0.4 MG SL tablet Place 0.4 mg under the tongue every 5 (five) minutes as needed. 0.4mg Sublingual PRN .        pramoxine-hydrocortisone (PROCTOCREAM-HC) 1-1 % rectal cream Place rectally 2 (two) times daily. (Patient taking differently: Place 1 application rectally 2 (two) times daily.) 3 each 3    promethazine-codeine 6.25-10 mg/5 ml (PHENERGAN WITH CODEINE) 6.25-10 mg/5 mL syrup Take 5 mLs by mouth every 4 (four) hours as needed for Cough.         SCHEDULED MEDS:   amitriptyline  50 mg Oral QHS    amLODIPine  5 mg Oral Daily    aspirin  81 mg Oral Daily    atorvastatin  20 mg Oral Daily    azithromycin  500 mg Intravenous Q24H    cefTRIAXone (ROCEPHIN) IVPB  1 g Intravenous Q24H    cholecalciferol (vitamin D3)  2,000 Units Oral Daily    dronedarone  400 mg Oral BID WM    enoxparin  1 mg/kg Subcutaneous Q12H    isosorbide mononitrate  60 mg Oral QHS    levothyroxine  25 mcg Oral Before breakfast    metoprolol succinate  25 mg Oral Daily    pantoprazole  40 mg Oral Before breakfast    spironolactone  25 mg Oral Daily       CONTINUOUS INFUSIONS:      PRN MEDS:acetaminophen, benzonatate, dextrose 50%, dextrose 50%, glucagon (human recombinant), glucose, glucose, insulin aspart U-100, melatonin, morphine, nitroGLYCERIN, ondansetron, promethazine (PHENERGAN) IVPB, sodium chloride 0.9%    LABS AND DIAGNOSTICS     CBC LAST 3 DAYS  Recent Labs   Lab 05/10/22  1637 05/11/22  0133 05/12/22  0311 05/13/22  0537 05/14/22  0433 05/15/22  0459   WBC 4.95 4.36   < > 6.92 6.13 5.16   RBC 3.90* 3.92*   < > 3.65* 3.52* 3.32*   HGB 11.2* 11.2*   < > 10.3* 9.9* 9.3*   HCT 34.8* 34.5*   < > 32.6* 32.5* 29.7*   MCV 89 88   < > 89 92 90   MCH 28.7 28.6   < > 28.2 28.1 28.0   MCHC 32.2 32.5   < > 31.6* 30.5* 31.3*   RDW 14.4  14.4   < > 14.6* 14.5 14.1    174   < > 175 149* 130*   MPV 10.6 10.6   < > 10.5 10.3 10.9   GRAN 65.7  3.3 92.1*  4.0  --  64.1  4.4  --   --    LYMPH 21.4  1.1 6.9*  0.3*  --  21.5  1.5  --   --    MONO 10.1  0.5 0.5*  0.0*  --  12.4  0.9  --   --    BASO 0.02 0.00  --  0.02  --   --    NRBC 0 0  --  0  --   --     < > = values in this interval not displayed.       COAGULATION LAST 3 DAYS  Recent Labs   Lab 05/11/22  1918 05/12/22 0311 05/13/22  0803 05/13/22  1037 05/14/22  0433   LABPT 20.7*  --   --   --   --    INR 1.9  --   --   --   --    APTT 32.5   < > 143.3* 49.1* 29.3    < > = values in this interval not displayed.       CHEMISTRY LAST 3 DAYS  Recent Labs   Lab 05/10/22  1637 05/11/22  0133 05/12/22 0311 05/13/22  0537 05/14/22  0433 05/15/22  0459 05/15/22  0901    136   < > 139 138 130* 128*   K 4.3 3.9   < > 4.1 4.7 4.3  --     100   < > 98 98 91*  --    CO2 23 23   < > 30* 31* 29  --    ANIONGAP 9 13   < > 11 9 10  --    BUN 37* 40*   < > 57* 38* 42*  --    CREATININE 1.3 1.3   < > 1.6* 1.3 1.4  --     188*   < > 89 98 108  --    CALCIUM 8.4* 8.9   < > 8.5* 8.9 8.5*  --    MG  --  2.4  --   --   --   --   --    ALBUMIN 3.9  --   --   --   --   --   --    PROT 6.7  --   --   --   --   --   --    ALKPHOS 57  --   --   --   --   --   --    ALT 65*  --   --   --   --   --   --    AST 44*  --   --   --   --   --   --    BILITOT 0.8  --   --   --   --   --   --     < > = values in this interval not displayed.       CARDIAC PROFILE LAST 3 DAYS  Recent Labs   Lab 05/10/22  1637 05/10/22  1935 05/11/22  0133 05/13/22  0537 05/15/22  0459   *  --  674* 482* 584*   TROPONINI <0.030 <0.030 <0.030  --   --        ENDOCRINE LAST 3 DAYS  Recent Labs   Lab 05/11/22  1312 05/13/22  0025 05/15/22  0459   TSH  --   --  1.750   PROCAL 0.09 0.16  --        LAST ARTERIAL BLOOD GAS  ABG  No results for input(s): PH, PO2, PCO2, HCO3, BE in the last 168 hours.    LAST 7 DAYS  MICROBIOLOGY   Microbiology Results (last 7 days)     Procedure Component Value Units Date/Time    Blood culture [[id number]] Collected: 05/13/22 1003    Order Status: Completed Specimen: Blood Updated: 05/15/22 1032     Blood Culture, Routine No Growth to date      No Growth to date      No Growth to date    Urine Culture High Risk [[id number]] Collected: 05/13/22 1051    Order Status: Completed Specimen: Urine, Clean Catch Updated: 05/15/22 0715     Urine Culture, Routine No growth to date    Narrative:      Indicated criteria for high risk culture:->Other  Other (specify):->fever    Blood culture [[id number]] Collected: 05/13/22 0024    Order Status: Completed Specimen: Blood Updated: 05/15/22 0432     Blood Culture, Routine No Growth to date      No Growth to date      No Growth to date    Blood culture [[id number]] Collected: 05/13/22 0023    Order Status: Completed Specimen: Blood Updated: 05/15/22 0432     Blood Culture, Routine No Growth to date      No Growth to date      No Growth to date    Blood culture [[id number]] Collected: 05/10/22 1906    Order Status: Completed Specimen: Blood from Peripheral, Forearm, Left Updated: 05/15/22 0232     Blood Culture, Routine No Growth to date      No Growth to date      No Growth to date      No Growth to date      No Growth to date    Blood culture [[id number]] Collected: 05/10/22 1641    Order Status: Completed Specimen: Blood from Peripheral, Forearm, Right Updated: 05/14/22 1832     Blood Culture, Routine No Growth to date      No Growth to date      No Growth to date      No Growth to date      No Growth to date    MRSA Screen by PCR [[id number]] Collected: 05/13/22 1052    Order Status: Completed Specimen: Nasopharyngeal Swab from Nasal Updated: 05/13/22 1240     MRSA SCREEN BY PCR Negative          MOST RECENT IMAGING  X-Ray Chest AP Portable  Portable chest x-ray at 3:22 PM is compared to prior study 5/13/2022    Clinical history is shortness of breath    The heart  is enlarged. There are median sternotomy wires.    There are small pleural effusions with atelectasis in the left lung base. The upper lobes are clear. There are no acute osseous abnormalities.    IMPRESSION: Cardiomegaly with tiny pleural effusions and left lower lobe atelectasis which is stable    Electronically signed by:  Maria Luisa Jacome MD  5/14/2022 3:43 PM CDT Workstation: [id number]      ECHOCARDIOGRAM RESULTS (last 5)  Results for orders placed in visit on 10/11/21    Echo    Interpretation Summary  · The left ventricle is normal in size with mild concentric hypertrophy and low normal systolic function.  · The estimated ejection fraction is 52%.  · Grade I left ventricular diastolic dysfunction.  · Normal right ventricular size.  · Mild left atrial enlargement.  · There is mild aortic valve stenosis.  · Aortic valve area is 2.57 cm2; peak velocity is 2.17 m/s; mean gradient is 13 mmHg.  · Mild mitral regurgitation.  · Mild tricuspid regurgitation.  · Normal central venous pressure (3 mmHg).  · The estimated PA systolic pressure is 23 mmHg.      CURRENT/PREVIOUS VISIT EKG  Results for orders placed or performed during the hospital encounter of 05/10/22   EKG 12-lead    Collection Time: 05/10/22  4:27 PM    Narrative    Test Reason : R07.9,    Vent. Rate : 065 BPM     Atrial Rate : 060 BPM     P-R Int : 000 ms          QRS Dur : 094 ms      QT Int : 422 ms       P-R-T Axes : 000 -26 095 degrees     QTc Int : 438 ms    Junctional rhythm with Premature ventricular complexes or Fusion complexes  Low voltage QRS  Septal infarct (cited on or before 20-APR-2022)  Abnormal ECG  When compared with ECG of 20-APR-2022 11:40,  Junctional rhythm has replaced Sinus rhythm  Confirmed by Akhil Coleman MD ([id number]) on 5/14/2022 6:04:55 PM    Referred By: [email or web address]   SELF           Confirmed By:Akhil Coleman MD           ASSESSMENT/PLAN:     Active Hospital Problems    Diagnosis    *Acute on chronic heart failure     Atypical chest pain    Bronchitis    Positive cardiac stress test    Paroxysmal atrial fibrillation    Stage 3b chronic kidney disease    Type 2 diabetes mellitus with diabetic nephropathy, without long-term current use of insulin       ASSESSMENT & PLAN:   1. Decompensated HFrEF, new  2. CP, likely due to HF + underlying CAD  3. Recent Positive cardiac stress, ? LAD territory  4. ? Bronchitis, appears more likely heart failure  5. PAF- Currently SR-SB  6. Stage III CKD  7. CAD H/O CABG- Last cath in 2017 showed patent INGRID-LAD patent saphenous vein to ramus, patent graft to Main right and occluded graft to diagonal branch. She had a CABG X 4 in 2016.     RECOMMENDATIONS:  Patient qualified for home oxygen this a.m.  Chest x-ray stable   Continue eliquis 5 mg BID and continue on Multaq 400 mg p.o. b.i.d.  Continue amlodipine 5 mg, isosorbide mononitrate 60 mg daily  Continue on Toprol-XL 25 mg a day  Continue Aldactone 25 mg p.o. to her regimen.  From a cardiac standpoint she is able to DC home with follow-up in our office 2-4 weeks time  Home oxygen set up is in progress.  Explained to the patient's family that there  is no indication for thoracentesis in this patient at this time.          Jailyn Hendrickson PA-C  [facility]  Department of Cardiology  Date of Service: 05/15/2022      Extended discussion with the patient's  and her daughter at bedside appear to understand the clinical condition and the need for oxygen on the at least on a short-term basis patient is encouraged to follow-up with Dr. Uriostegui as an outpatient  I have personally interviewed and examined the patient, I have reviewed the Nurse Practitioner's history and physical, assessment, and plan. I have personally evaluated the patient at bedside and agree with the findings and made appropriate changes as necessary in recommendations.    Akhil Coleman MD.    Department of Cardiology  [facility]  Date of service:   05/14/2022

## 2022-05-15 NOTE — CARE UPDATE
05/15/22 0745   Patient Assessment/Suction   Level of Consciousness (AVPU) alert   Respiratory Effort Unlabored   Expansion/Accessory Muscles/Retractions no use of accessory muscles   All Lung Fields Breath Sounds clear   Rhythm/Pattern, Respiratory unlabored;pattern regular   Cough Frequency infrequent   Cough Type nonproductive   PRE-TX-O2   O2 Device (Oxygen Therapy) nasal cannula   $ Is the patient on Low Flow Oxygen? Yes   Flow (L/min) 3   SpO2 (!) 93 %   Pulse Oximetry Type Continuous   $ Pulse Oximetry - Multiple Charge Pulse Oximetry - Multiple   Pulse 62   Resp (!) 26   Education   $ Education DME Oxygen   Respiratory Evaluation   $ Care Plan Tech Time 15 min   $ Eval/Re-eval Charges Evaluation   Evaluation For New Orders

## 2022-05-16 ENCOUNTER — PATIENT MESSAGE (OUTPATIENT)
Dept: CARDIOLOGY | Facility: CLINIC | Age: 82
End: 2022-05-16
Payer: MEDICARE

## 2022-05-16 LAB
ANION GAP SERPL CALC-SCNC: 10 MMOL/L (ref 8–16)
BACTERIA BLD CULT: NORMAL
BACTERIA UR CULT: NO GROWTH
BUN SERPL-MCNC: 40 MG/DL (ref 8–23)
CALCIUM SERPL-MCNC: 8.3 MG/DL (ref 8.7–10.5)
CHLORIDE SERPL-SCNC: 92 MMOL/L (ref 95–110)
CO2 SERPL-SCNC: 30 MMOL/L (ref 23–29)
CREAT SERPL-MCNC: 1.4 MG/DL (ref 0.5–1.4)
ERYTHROCYTE [DISTWIDTH] IN BLOOD BY AUTOMATED COUNT: 13.9 % (ref 11.5–14.5)
EST. GFR  (AFRICAN AMERICAN): 40.6 ML/MIN/1.73 M^2
EST. GFR  (NON AFRICAN AMERICAN): 35.3 ML/MIN/1.73 M^2
GLUCOSE SERPL-MCNC: 110 MG/DL (ref 70–110)
GLUCOSE SERPL-MCNC: 130 MG/DL (ref 70–110)
GLUCOSE SERPL-MCNC: 139 MG/DL (ref 70–110)
GLUCOSE SERPL-MCNC: 142 MG/DL (ref 70–110)
GLUCOSE SERPL-MCNC: 95 MG/DL (ref 70–110)
HCT VFR BLD AUTO: 29.4 % (ref 37–48.5)
HGB BLD-MCNC: 9.3 G/DL (ref 12–16)
MCH RBC QN AUTO: 28.2 PG (ref 27–31)
MCHC RBC AUTO-ENTMCNC: 31.6 G/DL (ref 32–36)
MCV RBC AUTO: 89 FL (ref 82–98)
PLATELET # BLD AUTO: 146 K/UL (ref 150–450)
PMV BLD AUTO: 11.1 FL (ref 9.2–12.9)
POTASSIUM SERPL-SCNC: 4.4 MMOL/L (ref 3.5–5.1)
RBC # BLD AUTO: 3.3 M/UL (ref 4–5.4)
SODIUM SERPL-SCNC: 131 MMOL/L (ref 136–145)
SODIUM SERPL-SCNC: 132 MMOL/L (ref 136–145)
SODIUM SERPL-SCNC: 133 MMOL/L (ref 136–145)
SODIUM SERPL-SCNC: 134 MMOL/L (ref 136–145)
WBC # BLD AUTO: 4.97 K/UL (ref 3.9–12.7)

## 2022-05-16 PROCEDURE — 99900031 HC PATIENT EDUCATION (STAT)

## 2022-05-16 PROCEDURE — 80048 BASIC METABOLIC PNL TOTAL CA: CPT | Performed by: NURSE PRACTITIONER

## 2022-05-16 PROCEDURE — 25000003 PHARM REV CODE 250: Performed by: INTERNAL MEDICINE

## 2022-05-16 PROCEDURE — 21000000 HC CCU ICU ROOM CHARGE

## 2022-05-16 PROCEDURE — 27000221 HC OXYGEN, UP TO 24 HOURS

## 2022-05-16 PROCEDURE — 63600175 PHARM REV CODE 636 W HCPCS: Performed by: INTERNAL MEDICINE

## 2022-05-16 PROCEDURE — 99900035 HC TECH TIME PER 15 MIN (STAT)

## 2022-05-16 PROCEDURE — 85027 COMPLETE CBC AUTOMATED: CPT | Performed by: INTERNAL MEDICINE

## 2022-05-16 PROCEDURE — 36415 COLL VENOUS BLD VENIPUNCTURE: CPT | Performed by: NURSE PRACTITIONER

## 2022-05-16 PROCEDURE — 94761 N-INVAS EAR/PLS OXIMETRY MLT: CPT

## 2022-05-16 PROCEDURE — 25000003 PHARM REV CODE 250: Performed by: NURSE PRACTITIONER

## 2022-05-16 PROCEDURE — 84295 ASSAY OF SERUM SODIUM: CPT | Mod: 91 | Performed by: INTERNAL MEDICINE

## 2022-05-16 PROCEDURE — 36415 COLL VENOUS BLD VENIPUNCTURE: CPT | Performed by: INTERNAL MEDICINE

## 2022-05-16 RX ORDER — FUROSEMIDE 20 MG/1
20 TABLET ORAL DAILY
Status: DISCONTINUED | OUTPATIENT
Start: 2022-05-17 | End: 2022-05-17 | Stop reason: HOSPADM

## 2022-05-16 RX ADMIN — DRONEDARONE 400 MG: 400 TABLET, FILM COATED ORAL at 04:05

## 2022-05-16 RX ADMIN — AZITHROMYCIN 500 MG: 500 INJECTION, POWDER, LYOPHILIZED, FOR SOLUTION INTRAVENOUS at 10:05

## 2022-05-16 RX ADMIN — DRONEDARONE 400 MG: 400 TABLET, FILM COATED ORAL at 08:05

## 2022-05-16 RX ADMIN — BENZONATATE 200 MG: 100 CAPSULE ORAL at 10:05

## 2022-05-16 RX ADMIN — AMLODIPINE BESYLATE 5 MG: 5 TABLET ORAL at 08:05

## 2022-05-16 RX ADMIN — ATORVASTATIN CALCIUM 20 MG: 20 TABLET, FILM COATED ORAL at 08:05

## 2022-05-16 RX ADMIN — LEVOTHYROXINE SODIUM 25 MCG: 0.03 TABLET ORAL at 05:05

## 2022-05-16 RX ADMIN — CEFTRIAXONE 1 G: 1 INJECTION, POWDER, FOR SOLUTION INTRAMUSCULAR; INTRAVENOUS at 09:05

## 2022-05-16 RX ADMIN — SPIRONOLACTONE 25 MG: 25 TABLET ORAL at 08:05

## 2022-05-16 RX ADMIN — ISOSORBIDE MONONITRATE 60 MG: 30 TABLET, EXTENDED RELEASE ORAL at 08:05

## 2022-05-16 RX ADMIN — ASPIRIN 81 MG 81 MG: 81 TABLET ORAL at 08:05

## 2022-05-16 RX ADMIN — AMITRIPTYLINE HYDROCHLORIDE 50 MG: 25 TABLET, FILM COATED ORAL at 08:05

## 2022-05-16 RX ADMIN — Medication 2000 UNITS: at 08:05

## 2022-05-16 RX ADMIN — PANTOPRAZOLE SODIUM 40 MG: 40 TABLET, DELAYED RELEASE ORAL at 05:05

## 2022-05-16 NOTE — CARE UPDATE
05/16/22 1412   PRE-TX-O2   O2 Device (Oxygen Therapy) nasal cannula w/ humidification   $ Is the patient on Low Flow Oxygen? Yes   Flow (L/min) 3   Pulse Oximetry Type Continuous   $ Pulse Oximetry - Multiple Charge Pulse Oximetry - Multiple   Respiratory Evaluation   $ Care Plan Tech Time 15 min

## 2022-05-16 NOTE — RESPIRATORY THERAPY
05/15/22 1916   PRE-TX-O2   O2 Device (Oxygen Therapy) High Flow nasal Cannula   $ Is the patient on Low Flow Oxygen? Yes   Flow (L/min) 4   SpO2 (!) 91 %   Pulse Oximetry Type Continuous   $ Pulse Oximetry - Multiple Charge Pulse Oximetry - Multiple   Pulse 69   Resp (!) 29   Education   $ Education DME Oxygen;15 min   Respiratory Evaluation   $ Care Plan Tech Time 15 min

## 2022-05-16 NOTE — PLAN OF CARE
Received request for home O2. As per RT documentation, pt needs 3L with ambulation.    Referral submitted to Keenan via fax in Epic. As per Carlos/Keenan, he is on his was to deliver O2.     Met with pt and spouse at bedside and informed of request for home O2 and portable O2 tank will be delivered to pt's room. . Both verbalized understanding. Verified if any additional home needs and both decline.     As per Carlos/Teja Home O2 portable tank delivered to pt's room.       05/16/22 1044   Discharge Reassessment   Assessment Type Discharge Planning Reassessment   Did the patient's condition or plan change since previous assessment? No   Discharge Plan discussed with: Patient;Spouse/sig other   Name(s) and Number(s) Dwayne Tipton (Spouse)   [phone] (Mobile   Discharge Plan A Home with family   Discharge Plan B Home with family   DME Needed Upon Discharge  oxygen   Discharge Barriers Identified None   Why the patient remains in the hospital Requires continued medical care   Post-Acute Status   Post-Acute Authorization E   [facility] Status Referrals Sent   Discharge Delays None known at this time

## 2022-05-17 VITALS
DIASTOLIC BLOOD PRESSURE: 65 MMHG | RESPIRATION RATE: 22 BRPM | SYSTOLIC BLOOD PRESSURE: 145 MMHG | HEART RATE: 69 BPM | OXYGEN SATURATION: 96 % | WEIGHT: 144.81 LBS | BODY MASS INDEX: 26.65 KG/M2 | HEIGHT: 62 IN | TEMPERATURE: 98 F

## 2022-05-17 LAB
ANION GAP SERPL CALC-SCNC: 9 MMOL/L (ref 8–16)
APPEARANCE FLD: CLEAR
BODY FLD TYPE: NORMAL
BUN SERPL-MCNC: 35 MG/DL (ref 8–23)
CALCIUM SERPL-MCNC: 8.4 MG/DL (ref 8.7–10.5)
CHLORIDE SERPL-SCNC: 95 MMOL/L (ref 95–110)
CO2 SERPL-SCNC: 29 MMOL/L (ref 23–29)
COLOR FLD: YELLOW
CREAT SERPL-MCNC: 1.2 MG/DL (ref 0.5–1.4)
ERYTHROCYTE [DISTWIDTH] IN BLOOD BY AUTOMATED COUNT: 13.8 % (ref 11.5–14.5)
EST. GFR  (AFRICAN AMERICAN): 49 ML/MIN/1.73 M^2
EST. GFR  (NON AFRICAN AMERICAN): 42.5 ML/MIN/1.73 M^2
GLUCOSE SERPL-MCNC: 102 MG/DL (ref 70–110)
GLUCOSE SERPL-MCNC: 162 MG/DL (ref 70–110)
GLUCOSE SERPL-MCNC: 95 MG/DL (ref 70–110)
HCT VFR BLD AUTO: 30.8 % (ref 37–48.5)
HGB BLD-MCNC: 9.6 G/DL (ref 12–16)
LYMPHOCYTES NFR FLD MANUAL: 54 %
MCH RBC QN AUTO: 28.2 PG (ref 27–31)
MCHC RBC AUTO-ENTMCNC: 31.2 G/DL (ref 32–36)
MCV RBC AUTO: 90 FL (ref 82–98)
MESOTHL CELL NFR FLD MANUAL: 7 %
MONOS+MACROS NFR FLD MANUAL: 35 %
NEUTROPHILS NFR FLD MANUAL: 4 %
PLATELET # BLD AUTO: 165 K/UL (ref 150–450)
PMV BLD AUTO: 11.4 FL (ref 9.2–12.9)
POTASSIUM SERPL-SCNC: 4.4 MMOL/L (ref 3.5–5.1)
RBC # BLD AUTO: 3.41 M/UL (ref 4–5.4)
SODIUM SERPL-SCNC: 132 MMOL/L (ref 136–145)
SODIUM SERPL-SCNC: 133 MMOL/L (ref 136–145)
SODIUM SERPL-SCNC: 133 MMOL/L (ref 136–145)
SODIUM SERPL-SCNC: 134 MMOL/L (ref 136–145)
WBC # BLD AUTO: 6.07 K/UL (ref 3.9–12.7)
WBC # FLD: 96 /CU MM

## 2022-05-17 PROCEDURE — 87116 MYCOBACTERIA CULTURE: CPT | Performed by: INTERNAL MEDICINE

## 2022-05-17 PROCEDURE — 87206 SMEAR FLUORESCENT/ACID STAI: CPT | Performed by: INTERNAL MEDICINE

## 2022-05-17 PROCEDURE — 27000221 HC OXYGEN, UP TO 24 HOURS

## 2022-05-17 PROCEDURE — 82962 GLUCOSE BLOOD TEST: CPT

## 2022-05-17 PROCEDURE — 99900035 HC TECH TIME PER 15 MIN (STAT)

## 2022-05-17 PROCEDURE — 84295 ASSAY OF SERUM SODIUM: CPT | Performed by: INTERNAL MEDICINE

## 2022-05-17 PROCEDURE — 25000003 PHARM REV CODE 250: Performed by: INTERNAL MEDICINE

## 2022-05-17 PROCEDURE — 36415 COLL VENOUS BLD VENIPUNCTURE: CPT | Performed by: INTERNAL MEDICINE

## 2022-05-17 PROCEDURE — 87070 CULTURE OTHR SPECIMN AEROBIC: CPT | Performed by: INTERNAL MEDICINE

## 2022-05-17 PROCEDURE — 89051 BODY FLUID CELL COUNT: CPT | Performed by: INTERNAL MEDICINE

## 2022-05-17 PROCEDURE — 87205 SMEAR GRAM STAIN: CPT | Performed by: INTERNAL MEDICINE

## 2022-05-17 PROCEDURE — 94761 N-INVAS EAR/PLS OXIMETRY MLT: CPT

## 2022-05-17 PROCEDURE — 80048 BASIC METABOLIC PNL TOTAL CA: CPT | Performed by: NURSE PRACTITIONER

## 2022-05-17 PROCEDURE — 36415 COLL VENOUS BLD VENIPUNCTURE: CPT | Performed by: NURSE PRACTITIONER

## 2022-05-17 PROCEDURE — 87075 CULTR BACTERIA EXCEPT BLOOD: CPT | Performed by: INTERNAL MEDICINE

## 2022-05-17 PROCEDURE — 87118 MYCOBACTERIC IDENTIFICATION: CPT | Performed by: INTERNAL MEDICINE

## 2022-05-17 PROCEDURE — 25000003 PHARM REV CODE 250: Performed by: NURSE PRACTITIONER

## 2022-05-17 PROCEDURE — 85027 COMPLETE CBC AUTOMATED: CPT | Performed by: INTERNAL MEDICINE

## 2022-05-17 RX ORDER — ONDANSETRON 4 MG/1
4 TABLET, FILM COATED ORAL EVERY 8 HOURS PRN
Qty: 30 TABLET | Refills: 1 | Status: SHIPPED | OUTPATIENT
Start: 2022-05-17 | End: 2022-06-16

## 2022-05-17 RX ORDER — SPIRONOLACTONE 25 MG/1
25 TABLET ORAL DAILY
Qty: 30 TABLET | Refills: 1 | Status: SHIPPED | OUTPATIENT
Start: 2022-05-18 | End: 2022-06-27

## 2022-05-17 RX ADMIN — SPIRONOLACTONE 25 MG: 25 TABLET ORAL at 09:05

## 2022-05-17 RX ADMIN — AMLODIPINE BESYLATE 5 MG: 5 TABLET ORAL at 09:05

## 2022-05-17 RX ADMIN — Medication 2000 UNITS: at 09:05

## 2022-05-17 RX ADMIN — DRONEDARONE 400 MG: 400 TABLET, FILM COATED ORAL at 09:05

## 2022-05-17 RX ADMIN — LEVOTHYROXINE SODIUM 25 MCG: 0.03 TABLET ORAL at 05:05

## 2022-05-17 RX ADMIN — METOPROLOL SUCCINATE 25 MG: 25 TABLET, FILM COATED, EXTENDED RELEASE ORAL at 09:05

## 2022-05-17 RX ADMIN — PANTOPRAZOLE SODIUM 40 MG: 40 TABLET, DELAYED RELEASE ORAL at 05:05

## 2022-05-17 RX ADMIN — FUROSEMIDE 20 MG: 20 TABLET ORAL at 09:05

## 2022-05-17 RX ADMIN — ASPIRIN 81 MG 81 MG: 81 TABLET ORAL at 09:05

## 2022-05-17 NOTE — PLAN OF CARE
Report given to Andreia DESIR on cardiology A.  Transferred back to room via w/c with transporter in attendance.

## 2022-05-17 NOTE — CARE UPDATE
05/17/22 1039   PRE-TX-O2   O2 Device (Oxygen Therapy) nasal cannula   $ Is the patient on Low Flow Oxygen? Yes   Flow (L/min) 3   Pulse Oximetry Type Continuous   $ Pulse Oximetry - Multiple Charge Pulse Oximetry - Multiple   Resp 16   Respiratory Evaluation   $ Care Plan Tech Time 15 min

## 2022-05-17 NOTE — PROGRESS NOTES
[facility] Medicine  Progress Note    Patient Name: Darlin Tipton  MRN: [id number]  Patient Class: IP- Inpatient   Admission Date: 5/10/2022  Length of Stay: 4 days  Attending Physician: Lamont Mccullough MD  Primary Care Provider: Oumar Almonte Jr, MD        Subjective:     Principal Problem:Acute on chronic heart failure     Still requiring oxygen.  Pending right thoracentesis.  Hyponatremia is improving on Lasix.    Review of Systems  Objective:     Vital Signs (Most Recent):  Temp: 98.4 °F (36.9 °C) (05/16/22 1959)  Pulse: 68 (05/16/22 1959)  Resp: 19 (05/16/22 1959)  BP: 133/61 (05/16/22 1959)  SpO2: (!) 94 % (05/16/22 1959) Vital Signs (24h Range):  Temp:  [98.3 °F (36.8 °C)-98.7 °F (37.1 °C)] 98.4 °F (36.9 °C)  Pulse:  [57-68] 68  Resp:  [19-28] 19  SpO2:  [94 %-97 %] 94 %  BP: (118-133)/(58-76) 133/61     Weight: 68 kg (149 lb 14.6 oz)  Body mass index is 27.42 kg/m².    Intake/Output Summary (Last 24 hours) at 5/16/2022 2003  Last data filed at 5/16/2022 1500  Gross per 24 hour   Intake 240 ml   Output 250 ml   Net -10 ml      Physical Exam  General appearance:  Fatigued appearing female in no apparent distress.  Skin: No Rash.   Neuro: Motor and sensory exams grossly intact. Good tone. Power in all 4 extremities 5/5.   HENT: Atraumatic head. Moist mucous membranes of oral cavity.  Eyes: Normal extraocular movements.   Neck: Supple. No evidence of lymphadenopathy. No thyroidomegaly.  Lungs:  Rhonchi throughout bilaterally. No wheezing present.   Heart: Regular rate and rhythm. S1 and S2 present with positive murmurs/gallop/rub.  Positive pedal edema. No JVD present.   Abdomen: Soft, non-distended, non-tender. No rebound tenderness/guarding. No masses or organomegaly. Bowel sounds are normal. Bladder is not palpable.   Extremities: No cyanosis, clubbing, positive edema.  Psych/mental status: Alert and oriented. Cooperative. Responds appropriately to questions.          Significant Labs: All pertinent labs within the past 24 hours have been reviewed.    Significant Imaging: I have reviewed all pertinent imaging results/findings within the past 24 hours.    Assessment/Plan:      Active Diagnoses:    Diagnosis Date Noted POA    PRINCIPAL PROBLEM:  Acute on chronic heart failure [I50.9] 05/10/2022 Yes    Atypical chest pain [R07.89] 05/10/2022 Yes    Bronchitis [J40] 05/10/2022 Yes    Positive cardiac stress test [R94.39] 05/06/2022 Yes    Paroxysmal atrial fibrillation [I48.0] 03/21/2022 Yes    Stage 3b chronic kidney disease [N18.32] 11/07/2021 Yes    Type 2 diabetes mellitus with diabetic nephropathy, without long-term current use of insulin [E11.21] 05/05/2018 Yes      Problems Resolved During this Admission:     VTE Risk Mitigation (From admission, onward)         Ordered     Reason for No Pharmacological VTE Prophylaxis  Once        Question:  Reasons:  Answer:  Already adequately anticoagulated on oral Anticoagulants    05/10/22 2150     IP VTE HIGH RISK PATIENT  Once         05/10/22 2150                  Atypical Chest Pain, Recent Abnormal Stress Test,  History of CABG  - Echo with EF 40%, grade 3 left ventricular diastolic dysfunction, moderate aortic valve stenosis, mild to moderate mitral regurgitation, moderate tricuspid regurgitation, PASP of 32 mmHg.  -  Left heart catheterization with patent grafts from CABG    Acute hypoxic resp failure  -R thora for tomorrow      Cough/fever, suspected early PNA  -IV Zithromax/Ceftriaxone  -would need thora given Pl effusion, likely pneumonia    Fever-rocephin, azithromycin, Follow up culture data     Hypervolemic hyponatremia  -lasix, monitor q4h, consider salt tablets.    Paroxysmal atrial fibrillation-hold PTA Eliquis for thoracentesis, restart after thoracentesis.    Acute on chronic renal Failure  -renal dose all medications  -avoid nephrotoxic medications  -hold Lasix for elevated creatinine     Type II  Diabetes  -low dose NovoLog insulin sliding scale  -sliding scale protocol  -diabetic cardiac diet renal diet     Transaminitis  -monitor  -acute hepatitis panel      Hyperlipidemia  -continue statins for now     Essential HTN  -continue home medications to manage      Hypothyroidism  -continue levothyroxine at current dose     DVT Prophylaxis: will be on heparin iv      Dispo:  DC after thoracentesis, outpatient follow-up with pulmonology, Cardiology, PMD.    Lamont Mccullough MD  Department of Hospital Medicine   [facility]

## 2022-05-17 NOTE — NURSING
0940-pt arrived back to floor from radiology. Band aid to right upper back CDI. Pt denies any pain to site or SOB. VSS. O2 @3lL. Family at bedside. AM meds given at this time.

## 2022-05-17 NOTE — PLAN OF CARE
05/17/22 1609   Final Note   Assessment Type Final Discharge Note   Anticipated Discharge Disposition Home   What phone number can be called within the next 1-3 days to see how you are doing after discharge? [phone]   Post-Acute Status   Discharge Delays None known at this time     Discharge orders and chart reviewed with no further post-acute discharge needs identified at this time.  At this time, patient is cleared for discharge from Case Management standpoint.

## 2022-05-17 NOTE — PLAN OF CARE
05/16/22 2008   Patient Assessment/Suction   Level of Consciousness (AVPU) alert   Respiratory Effort Normal;Unlabored   Expansion/Accessory Muscles/Retractions no use of accessory muscles   All Lung Fields Breath Sounds Anterior:;diminished   Rhythm/Pattern, Respiratory unlabored   Cough Frequency infrequent   PRE-TX-O2   O2 Device (Oxygen Therapy) nasal cannula w/ humidification   $ Is the patient on Low Flow Oxygen? Yes   Flow (L/min) 3   SpO2 (!) 92 %   Pulse Oximetry Type Continuous   $ Pulse Oximetry - Multiple Charge Pulse Oximetry - Multiple   Pulse 71   Resp (!) 39   Education   $ Education Bronchodilator;15 min   Respiratory Evaluation   $ Care Plan Tech Time 15 min

## 2022-05-17 NOTE — PLAN OF CARE
Tolerated well.  VSS.  700cc clear yellow fluid drained and sample sent for testing.  0930 cxr done

## 2022-05-18 LAB
BACTERIA BLD CULT: NORMAL

## 2022-05-18 NOTE — DISCHARGE SUMMARY
[facility] Medicine  Discharge Summary      Patient Name: Darlin Tipton  MRN: [id number]  Patient Class: IP- Inpatient  Admission Date: 5/10/2022  Hospital Length of Stay: 5 days  Discharge Date and Time: 5/17/2022  2:25 PM  Attending Physician: No att. providers found   Discharging Provider: Meagan Gann MD  Primary Care Provider: Oumar Almonte Jr, MD      HPI:   No notes on file    Procedure(s) (LRB):  Left heart cath (Left)  IVUS, Coronary      Hospital Course:   I, Dr. Gann, have assumed care on the day of discharge.  Patient treated for acute on chronic HF as well as Afib.  She will continue Amiodarone, Toprol xl, other cardiac medication.  Aldactone added to home regimen per cardiology recommendations and Rx has been provided.  She had a fever and was treated for possible pneumonia.  She completed 5 days of IV abx during her hospital stay.  Hospital course also significant for CP evaluation with LHC done.  She had patent grafts.  She had thoracentesis done 5/17 and 700cc removed.  Fluid studies in progress. Referral to pulmonary made and patient will follow up with Cardiology and/or PCP for results.  He qualified for home O2 and this has been arranged per .  She will folllow up with either Cardiology of PCP to assist in evaluation for continued need or if she can wean off. She would like to go home and has been cleared by consultants.  Testing and medication reviewed with patient and family.  Questions answered.  Examined on day of discharge and alert, NAD, comfortable respirations on O2 per NC.  Return precautions given.       Goals of Care Treatment Preferences:  Code Status: Full Code      Consults:   Consults (From admission, onward)        Status Ordering Provider     Inpatient consult to Cardiology  Once        Provider:  Jeffrey Cintron MD    Completed JENNIFER PRICE consult to case management  Once        Provider:  (Not yet assigned)    Completed MARGA  ZUHAIR GIVENS     Inpatient consult to Registered Dietitian/Nutritionist  Once        Provider:  (Not yet assigned)    Completed NIEVES CARVER          No new Assessment & Plan notes have been filed under this hospital service since the last note was generated.  Service: Hospital Medicine    Final Active Diagnoses:    Diagnosis Date Noted POA    PRINCIPAL PROBLEM:  Acute on chronic heart failure [I50.9] 05/10/2022 Yes    Atypical chest pain [R07.89] 05/10/2022 Yes    Bronchitis [J40] 05/10/2022 Yes    Positive cardiac stress test [R94.39] 05/06/2022 Yes    Paroxysmal atrial fibrillation [I48.0] 03/21/2022 Yes    Stage 3b chronic kidney disease [N18.32] 11/07/2021 Yes    Type 2 diabetes mellitus with diabetic nephropathy, without long-term current use of insulin [E11.21] 05/05/2018 Yes      Problems Resolved During this Admission:       Discharged Condition: good    Disposition: Home or Self Care    Follow Up:   Follow-up Information     Akhil Coleman MD Follow up in 2 week(s).    Specialties: Interventional Cardiology, Cardiology  Why: post hospital discharge follow up for afib, pleural effusion  Contact information:  [address] [facility]  [address]  [facility]  [place] [address]  [phone]             Pulmonary Follow up.    Why: for pleural effusion.  a referal has been sent and you will be contacted for an appointment.           Zuhair Almonte Jr, MD Follow up in 3 week(s).    Specialty: Family Medicine  Why: post hospital dicharge follow up  Contact information:  [address] [facility] [facility]  [address]  [place] [address] [phone]                       Patient Instructions:      OXYGEN FOR HOME USE     Order Specific Question Answer Comments   Liter Flow 3    Duration Continuous    Qualifying Test Performed at: Activity    Oxygen saturation at rest 57    Oxygen saturation with activity 87    Oxygen saturation with activity on oxygen 92    Portable mode: continuous    Route nasal cannula    Device: home  "concentrator with portable tanks    Length of need (in months): 99 mos    Patient condition with qualifying saturation CHF    Height: 5' 2" (1.575 m)    Weight: 68 kg (149 lb 14.6 oz)    Alternative treatment measures have been tried or considered and deemed clinically ineffective. Yes      Ambulatory referral/consult to Pulmonology   Standing Status: Future   Referral Priority: Routine Referral Type: Consultation   Referral Reason: Specialty Services Required   Requested Specialty: Pulmonary Disease   Number of Visits Requested: 1     Diet Cardiac     Notify your health care provider if you experience any of the following:  difficulty breathing or increased cough     Activity as tolerated       Significant Diagnostic Studies: Labs:   CMP   Recent Labs   Lab 05/16/22  0155 05/16/22  0805 05/16/22  2351 05/17/22  0452 05/17/22  1012   *  132*   < > 132* 133*  133* 134*   K 4.4  --   --  4.4  --    CL 92*  --   --  95  --    CO2 30*  --   --  29  --      --   --  95  --    BUN 40*  --   --  35*  --    CREATININE 1.4  --   --  1.2  --    CALCIUM 8.3*  --   --  8.4*  --    ANIONGAP 10  --   --  9  --    ESTGFRAFRICA 40.6*  --   --  49.0*  --    EGFRNONAA 35.3*  --   --  42.5*  --     < > = values in this interval not displayed.    and CBC   Recent Labs   Lab 05/16/22 0155 05/17/22  0452   WBC 4.97 6.07   HGB 9.3* 9.6*   HCT 29.4* 30.8*   * 165       Pending Diagnostic Studies:     Procedure Component Value Units Date/Time    Cytology Specimen-Medical Cytology (Fluid/Wash/Brush) [[id number]] Collected: 05/17/22 0926    Order Status: Sent Lab Status: No result     Specimen: Pleural Fluid          Medications:  Reconciled Home Medications:      Medication List      START taking these medications    ondansetron 4 MG tablet  Commonly known as: ZOFRAN  Take 1 tablet (4 mg total) by mouth every 8 (eight) hours as needed for Nausea.     spironolactone 25 MG tablet  Commonly known as: ALDACTONE  Take 1 " tablet (25 mg total) by mouth once daily.        CHANGE how you take these medications    diclofenac sodium 1 % Gel  Commonly known as: VOLTAREN  APPLY 2 G TOPICALLY 3 (THREE) TIMES DAILY.  What changed:   · how much to take  · how to take this  · when to take this  · reasons to take this     pramoxine-hydrocortisone 1-1 % rectal cream  Commonly known as: PROCTOCREAM-HC  Place rectally 2 (two) times daily.  What changed: how much to take        CONTINUE taking these medications    acetaminophen 650 MG Tbsr  Commonly known as: TYLENOL  Take 1,300 mg by mouth once daily. 2 Tablet(s) Oral  Every day.     amitriptyline 50 MG tablet  Commonly known as: ELAVIL  Take 1 tablet (50 mg total) by mouth every evening.     amLODIPine 5 MG tablet  Commonly known as: NORVASC  Take 1 tablet (5 mg total) by mouth once daily.     apixaban 5 mg Tab  Commonly known as: ELIQUIS  Take 1 tablet (5 mg total) by mouth 2 (two) times daily.     azelastine 137 mcg (0.1 %) nasal spray  Commonly known as: ASTELIN  1 spray (137 mcg total) by Nasal route 2 (two) times daily.     canagliflozin 100 mg Tab tablet  Commonly known as: INVOKANA  Take 1 tablet (100 mg total) by mouth once daily.     carboxymethylcellulose 1 % ophthalmic solution  Apply 1 drop to eye As instructed. as directed     cetirizine 10 MG tablet  Commonly known as: ZYRTEC  Take 10 mg by mouth once daily.     cholecalciferol (vitamin D3) 50 mcg (2,000 unit) Cap capsule  Commonly known as: VITAMIN D3  Take 1 capsule by mouth once daily.     coenzyme Q10 100 mg capsule  Take 100 mg by mouth once daily.     cranberry extract 650 mg Cap  Take 1 tablet by mouth 2 (two) times daily.     dronedarone 400 mg Tab  Commonly known as: MULTAQ  Take 1 tablet (400 mg total) by mouth 2 (two) times daily with meals.     fluticasone propionate 50 mcg/actuation nasal spray  Commonly known as: FLONASE  1 spray (50 mcg total) by Each Nostril route once daily.     FREESTYLE LITE STRIPS Strp  Generic  drug: blood sugar diagnostic  USE TO TEST BLOOD SUGAR TWICE A DAY     gabapentin 300 MG capsule  Commonly known as: NEURONTIN  Take 1 capsule (300 mg total) by mouth 3 (three) times daily.     isosorbide mononitrate 60 MG 24 hr tablet  Commonly known as: IMDUR  Take 60 mg by mouth every evening.     lancets Misc  1 Units by Misc.(Non-Drug; Combo Route) route 2 (two) times daily.     levothyroxine 25 MCG tablet  Commonly known as: LEVOTHROID  Take 1 tablet (25 mcg total) by mouth before breakfast. Every day     LIDOcaine 4 % Ptmd  Apply 1 patch topically once daily.     MAG-OXIDE ORAL  Take 400 mg by mouth once daily.     MYRBETRIQ 50 mg Tb24  Generic drug: mirabegron  Take 1 tablet (50 mg total) by mouth once daily.     nitroGLYCERIN 0.4 MG SL tablet  Commonly known as: NITROSTAT  Place 0.4 mg under the tongue every 5 (five) minutes as needed. 0.4mg Sublingual PRN .     PROBIOTIC-10 ORAL  Take by mouth.     promethazine-codeine 6.25-10 mg/5 ml 6.25-10 mg/5 mL syrup  Commonly known as: PHENERGAN with CODEINE  Take 5 mLs by mouth every 4 (four) hours as needed for Cough.     RESTASIS 0.05 % ophthalmic emulsion  Generic drug: cycloSPORINE  Place 1 drop into both eyes 2 (two) times daily.     rosuvastatin 10 MG tablet  Commonly known as: CRESTOR  Take 1 tablet (10 mg total) by mouth once daily.     TOPROL XL 25 MG 24 hr tablet  Generic drug: metoprolol succinate  Take 1 tablet (25 mg total) by mouth once daily.     triazolam 0.25 MG Tab  Commonly known as: HALCION  Take 1 tablet (0.25 mg total) by mouth nightly as needed (sleep).     TUSSIN DM ORAL  Take by mouth.     UNABLE TO FIND  Take 1 capsule by mouth once daily. Vital red     vitamins  A,C,E-zinc-copper 14,320-226-200 unit-mg-unit Cap  Take 1 capsule by mouth 2 (two) times daily.            Indwelling Lines/Drains at time of discharge:   Lines/Drains/Airways     None                 Time spent on the discharge of patient: 40 minutes         Meagan Gann,  MD  Department of Hospital Medicine  [facility]

## 2022-05-18 NOTE — HOSPITAL COURSE
I, Dr. Gann, have assumed care on the day of discharge.  Patient treated for acute on chronic HF as well as Afib.  She will continue Amiodarone, Toprol xl, other cardiac medication.  Aldactone added to home regimen per cardiology recommendations and Rx has been provided.  She had a fever and was treated for possible pneumonia.  She completed 5 days of IV abx during her hospital stay.  Hospital course also significant for CP evaluation with LHC done.  She had patent grafts.  She had thoracentesis done 5/17 and 700cc removed.  Fluid studies in progress. Referral to pulmonary made and patient will follow up with Cardiology and/or PCP for results.  He qualified for home O2 and this has been arranged per .  She will folllow up with either Cardiology of PCP to assist in evaluation for continued need or if she can wean off. She would like to go home and has been cleared by consultants.  Testing and medication reviewed with patient and family.  Questions answered.  Examined on day of discharge and alert, NAD, comfortable respirations on O2 per NC.  Return precautions given.

## 2022-05-19 ENCOUNTER — PATIENT MESSAGE (OUTPATIENT)
Dept: CARDIOLOGY | Facility: CLINIC | Age: 82
End: 2022-05-19
Payer: MEDICARE

## 2022-05-19 ENCOUNTER — TELEPHONE (OUTPATIENT)
Dept: CARDIOLOGY | Facility: CLINIC | Age: 82
End: 2022-05-19
Payer: MEDICARE

## 2022-05-19 LAB — BACTERIA SPEC ANAEROBE CULT: NORMAL

## 2022-05-19 NOTE — TELEPHONE ENCOUNTER
----- Message from Xander Hicks sent at 5/19/2022 11:07 AM CDT -----  Contact: MAX (MAIL SERVICE) FLO  Type: Needs Medical Advice    Who Called:  MAX (MAIL SERVICE) FLO [facility] - [place], [place] - [address] AT [place] [address] [place]  [address]  [facility] [address]  Phone: [phone] Fax: [phone]        Additional Information: Requesting callback regarding Rx conflicts with another   dronedarone (MULTAQ) 400 mg Tab needing an ok from    Please Advise-Thank you

## 2022-05-20 ENCOUNTER — TELEPHONE (OUTPATIENT)
Dept: CARDIOLOGY | Facility: CLINIC | Age: 82
End: 2022-05-20
Payer: MEDICARE

## 2022-05-20 NOTE — TELEPHONE ENCOUNTER
----- Message from Xander Hicks sent at 5/20/2022 10:54 AM CDT -----  [facility]  [facility]armacy is calling back for Rx approval if it is ok to fill for pt needs call back from nurse asap, previous message below     Contact: MAX (MAIL SERVICE) FLO  Type: Needs Medical Advice     Who Called:  MAX (MAIL SERVICE) [facility] [place] [place] - [address] AT [place] [address] [place]  [address]  [facility] [address]  Phone: [phone] Fax: [phone]        Additional Information: Requesting callback regarding Rx conflicts with another   dronedarone (MULTAQ) 400 mg Tab needing an ok from    Please Advise-Thank you

## 2022-05-21 LAB
BACTERIA FLD AEROBE CULT: NO GROWTH
GRAM STN SPEC: NORMAL
GRAM STN SPEC: NORMAL

## 2022-05-30 ENCOUNTER — PATIENT MESSAGE (OUTPATIENT)
Dept: CARDIOLOGY | Facility: CLINIC | Age: 82
End: 2022-05-30
Payer: MEDICARE

## 2022-06-02 RX ORDER — METOPROLOL SUCCINATE 25 MG/1
25 TABLET, EXTENDED RELEASE ORAL DAILY
Qty: 90 TABLET | Refills: 3 | Status: SHIPPED | OUTPATIENT
Start: 2022-06-02 | End: 2022-06-06 | Stop reason: SDUPTHER

## 2022-06-02 NOTE — TELEPHONE ENCOUNTER
----- Message from Xander Hicks sent at 6/2/2022  9:28 AM CDT -----  Contact: [facility]  Type: Rx Refill Request    Who Called: Kylah diez/  HAYDEE (MAIL SERVICE) [facility] [place], [place] - [address] AT [address]  [address]  [facility] [address]  Phone: [phone] Fax: [phone]      Refill or New Rx:Refill  Rx Name and Strength:    metoprolol succinate (TOPROL-XL) 24 hr tablet 25 mg     How is the patient currently taking it?(ex.1xday): As Directed  Is this a 30 day or 90 day Rx: As Directed  Preferred Pharmacy with Phone Number:      [facility] (MAIL [facility]ICE) [facility] [place], [place] - [address]  [address]  [facility] [address]  Phone: [phone] Fax: [phone]

## 2022-06-07 RX ORDER — METOPROLOL SUCCINATE 25 MG/1
25 TABLET, EXTENDED RELEASE ORAL DAILY
Qty: 90 TABLET | Refills: 3 | Status: SHIPPED | OUTPATIENT
Start: 2022-06-07

## 2022-06-15 ENCOUNTER — OFFICE VISIT (OUTPATIENT)
Dept: FAMILY MEDICINE | Facility: CLINIC | Age: 82
End: 2022-06-15
Payer: MEDICARE

## 2022-06-15 ENCOUNTER — LAB VISIT (OUTPATIENT)
Dept: LAB | Facility: HOSPITAL | Age: 82
End: 2022-06-15
Attending: PHYSICIAN ASSISTANT
Payer: MEDICARE

## 2022-06-15 VITALS
TEMPERATURE: 98 F | DIASTOLIC BLOOD PRESSURE: 66 MMHG | OXYGEN SATURATION: 96 % | HEART RATE: 83 BPM | SYSTOLIC BLOOD PRESSURE: 134 MMHG | HEIGHT: 62 IN | BODY MASS INDEX: 26.33 KG/M2 | WEIGHT: 143.06 LBS

## 2022-06-15 DIAGNOSIS — I48.0 PAROXYSMAL ATRIAL FIBRILLATION: ICD-10-CM

## 2022-06-15 DIAGNOSIS — D64.9 ANEMIA, UNSPECIFIED TYPE: ICD-10-CM

## 2022-06-15 DIAGNOSIS — R11.0 NAUSEA: Primary | ICD-10-CM

## 2022-06-15 DIAGNOSIS — R19.4 BOWEL HABIT CHANGES: ICD-10-CM

## 2022-06-15 DIAGNOSIS — J90 PLEURAL EFFUSION: ICD-10-CM

## 2022-06-15 DIAGNOSIS — E11.21 TYPE 2 DIABETES MELLITUS WITH DIABETIC NEPHROPATHY, WITHOUT LONG-TERM CURRENT USE OF INSULIN: ICD-10-CM

## 2022-06-15 LAB
BASOPHILS # BLD AUTO: 0.02 K/UL (ref 0–0.2)
BASOPHILS NFR BLD: 0.4 % (ref 0–1.9)
DIFFERENTIAL METHOD: ABNORMAL
EOSINOPHIL # BLD AUTO: 0.1 K/UL (ref 0–0.5)
EOSINOPHIL NFR BLD: 1.1 % (ref 0–8)
ERYTHROCYTE [DISTWIDTH] IN BLOOD BY AUTOMATED COUNT: 15.2 % (ref 11.5–14.5)
HCT VFR BLD AUTO: 36.3 % (ref 37–48.5)
HGB BLD-MCNC: 10.9 G/DL (ref 12–16)
IMM GRANULOCYTES # BLD AUTO: 0 K/UL (ref 0–0.04)
IMM GRANULOCYTES NFR BLD AUTO: 0 % (ref 0–0.5)
LYMPHOCYTES # BLD AUTO: 1.3 K/UL (ref 1–4.8)
LYMPHOCYTES NFR BLD: 29.3 % (ref 18–48)
MCH RBC QN AUTO: 27.7 PG (ref 27–31)
MCHC RBC AUTO-ENTMCNC: 30 G/DL (ref 32–36)
MCV RBC AUTO: 92 FL (ref 82–98)
MONOCYTES # BLD AUTO: 0.8 K/UL (ref 0.3–1)
MONOCYTES NFR BLD: 17.7 % (ref 4–15)
NEUTROPHILS # BLD AUTO: 2.4 K/UL (ref 1.8–7.7)
NEUTROPHILS NFR BLD: 51.5 % (ref 38–73)
NRBC BLD-RTO: 0 /100 WBC
PLATELET # BLD AUTO: 175 K/UL (ref 150–450)
PMV BLD AUTO: 11.7 FL (ref 9.2–12.9)
RBC # BLD AUTO: 3.93 M/UL (ref 4–5.4)
WBC # BLD AUTO: 4.58 K/UL (ref 3.9–12.7)

## 2022-06-15 PROCEDURE — 1101F PT FALLS ASSESS-DOCD LE1/YR: CPT | Mod: CPTII,S$GLB,, | Performed by: PHYSICIAN ASSISTANT

## 2022-06-15 PROCEDURE — 1126F PR PAIN SEVERITY QUANTIFIED, NO PAIN PRESENT: ICD-10-PCS | Mod: CPTII,S$GLB,, | Performed by: PHYSICIAN ASSISTANT

## 2022-06-15 PROCEDURE — 99999 PR PBB SHADOW E&M-EST. PATIENT-LVL V: ICD-10-PCS | Mod: PBBFAC,,, | Performed by: PHYSICIAN ASSISTANT

## 2022-06-15 PROCEDURE — 99214 OFFICE O/P EST MOD 30 MIN: CPT | Mod: S$GLB,,, | Performed by: PHYSICIAN ASSISTANT

## 2022-06-15 PROCEDURE — 36415 COLL VENOUS BLD VENIPUNCTURE: CPT | Mod: PO | Performed by: PHYSICIAN ASSISTANT

## 2022-06-15 PROCEDURE — 1111F PR DISCHARGE MEDS RECONCILED W/ CURRENT OUTPATIENT MED LIST: ICD-10-PCS | Mod: CPTII,S$GLB,, | Performed by: PHYSICIAN ASSISTANT

## 2022-06-15 PROCEDURE — 3075F PR MOST RECENT SYSTOLIC BLOOD PRESS GE 130-139MM HG: ICD-10-PCS | Mod: CPTII,S$GLB,, | Performed by: PHYSICIAN ASSISTANT

## 2022-06-15 PROCEDURE — 3075F SYST BP GE 130 - 139MM HG: CPT | Mod: CPTII,S$GLB,, | Performed by: PHYSICIAN ASSISTANT

## 2022-06-15 PROCEDURE — 85025 COMPLETE CBC W/AUTO DIFF WBC: CPT | Performed by: PHYSICIAN ASSISTANT

## 2022-06-15 PROCEDURE — 1126F AMNT PAIN NOTED NONE PRSNT: CPT | Mod: CPTII,S$GLB,, | Performed by: PHYSICIAN ASSISTANT

## 2022-06-15 PROCEDURE — 1111F DSCHRG MED/CURRENT MED MERGE: CPT | Mod: CPTII,S$GLB,, | Performed by: PHYSICIAN ASSISTANT

## 2022-06-15 PROCEDURE — 3288F PR FALLS RISK ASSESSMENT DOCUMENTED: ICD-10-PCS | Mod: CPTII,S$GLB,, | Performed by: PHYSICIAN ASSISTANT

## 2022-06-15 PROCEDURE — 3078F PR MOST RECENT DIASTOLIC BLOOD PRESSURE < 80 MM HG: ICD-10-PCS | Mod: CPTII,S$GLB,, | Performed by: PHYSICIAN ASSISTANT

## 2022-06-15 PROCEDURE — 3288F FALL RISK ASSESSMENT DOCD: CPT | Mod: CPTII,S$GLB,, | Performed by: PHYSICIAN ASSISTANT

## 2022-06-15 PROCEDURE — 1159F PR MEDICATION LIST DOCUMENTED IN MEDICAL RECORD: ICD-10-PCS | Mod: CPTII,S$GLB,, | Performed by: PHYSICIAN ASSISTANT

## 2022-06-15 PROCEDURE — 3078F DIAST BP <80 MM HG: CPT | Mod: CPTII,S$GLB,, | Performed by: PHYSICIAN ASSISTANT

## 2022-06-15 PROCEDURE — 99214 PR OFFICE/OUTPT VISIT, EST, LEVL IV, 30-39 MIN: ICD-10-PCS | Mod: S$GLB,,, | Performed by: PHYSICIAN ASSISTANT

## 2022-06-15 PROCEDURE — 1159F MED LIST DOCD IN RCRD: CPT | Mod: CPTII,S$GLB,, | Performed by: PHYSICIAN ASSISTANT

## 2022-06-15 PROCEDURE — 99999 PR PBB SHADOW E&M-EST. PATIENT-LVL V: CPT | Mod: PBBFAC,,, | Performed by: PHYSICIAN ASSISTANT

## 2022-06-15 PROCEDURE — 1101F PR PT FALLS ASSESS DOC 0-1 FALLS W/OUT INJ PAST YR: ICD-10-PCS | Mod: CPTII,S$GLB,, | Performed by: PHYSICIAN ASSISTANT

## 2022-06-15 NOTE — PROGRESS NOTES
Subjective:       Patient ID: Darlin Tipton is a 81 y.o. female.    Chief Complaint: Hospital Follow Up    Patient with controlled type 2 diabetes, PAF, and CHF presents for hospital follow up . She was admitted for acute on chronic HF as well as Afib.   Amiodarone, Toprol xl, other cardiac medication continued.  Aldactone added to home regimen per cardiology recommendations.  She had a fever and was treated for possible pneumonia.  She completed 5 days of IV abx during her hospital stay.  Hospital course also significant for CP evaluation with LHC done.  She had patent grafts.  She had thoracentesis done 5/17 and 700cc removed.   SHe qualified for home O2 and was arranged per .    Since discharge she has seen cardiologist.  She continues to feel poorly and complains of constant nausea, no vomiting and soft stools.  Cardiologist concerned about gastroparesis and recommended gastric emptying study and CGM.  Her last a1c was 6.1. last EGD 2017 with gastric polyps and esophagitis took PPI for a while but no longer.  Patients patient medical/surgical, social and family histories have been reviewed       Review of Systems   Constitutional: Negative for activity change and unexpected weight change.   HENT: Negative for hearing loss, rhinorrhea and trouble swallowing.    Eyes: Negative for discharge and visual disturbance.   Respiratory: Negative for chest tightness and wheezing.    Cardiovascular: Negative for chest pain and palpitations.   Gastrointestinal: Positive for nausea. Negative for abdominal distention, abdominal pain, blood in stool, constipation, diarrhea and vomiting.   Endocrine: Negative for polydipsia and polyuria.   Genitourinary: Negative for difficulty urinating, dysuria, hematuria and menstrual problem.   Musculoskeletal: Positive for arthralgias. Negative for joint swelling and neck pain.   Neurological: Positive for weakness. Negative for headaches.   Psychiatric/Behavioral: Negative for confusion  and dysphoric mood.       Objective:      Physical Exam  Constitutional:       General: She is not in acute distress.     Appearance: Normal appearance. She is well-developed. She is not ill-appearing.   HENT:      Head: Normocephalic and atraumatic.   Eyes:      Conjunctiva/sclera: Conjunctivae normal.   Cardiovascular:      Rate and Rhythm: Normal rate and regular rhythm.      Heart sounds: Normal heart sounds.   Pulmonary:      Effort: Pulmonary effort is normal.      Breath sounds: Normal breath sounds.   Musculoskeletal:      Right lower leg: No edema.      Left lower leg: No edema.   Skin:     General: Skin is warm and dry.   Neurological:      Mental Status: She is alert.   Psychiatric:         Mood and Affect: Mood normal.         Behavior: Behavior is cooperative.         Assessment:       1. Nausea    2. Bowel habit changes    3. Type 2 diabetes mellitus with diabetic nephropathy, without long-term current use of insulin    4. Pleural effusion    5. Paroxysmal atrial fibrillation    6. Anemia, unspecified type        Plan:       Darlin was seen today for hospital follow up.    Diagnoses and all orders for this visit:    Nausea  -     NM Gastric Emptying; Future  -    Bowel habit changes  -     NM Gastric Emptying; Future  zofran prn nausea and resume PPI   Follow up GI will be needed though   -   Type 2 diabetes mellitus with diabetic nephropathy, without long-term current use of insulin  -     NM Gastric Emptying; Future  -  -     Ambulatory referral/consult to Endocrinology; Future  a1c at goal   Pleural effusion  Follow up pulmonary as scheduled   Paroxysmal atrial fibrillation  Continue per cardiology   Anemia, unspecified type  -     CBC Auto Differential; Future           Follow up for endocrine for CGM please,  cbc today ok .           Documentation entered by me for this encounter may have been done in part using speech-recognition technology. Although I have made an effort to ensure accuracy,  ""sound like" errors may exist and should be interpreted in context.   I spent a total of 49  minutes on the day of the visit.This includes face to face time and non-face to face time preparing to see the patient (eg, review of tests), obtaining and/or reviewing separately obtained history, documenting clinical information in the electronic or other health record, independently interpreting results and communicating results to the patient/family/caregiver, or care coordinator.    "

## 2022-06-16 ENCOUNTER — PATIENT MESSAGE (OUTPATIENT)
Dept: FAMILY MEDICINE | Facility: CLINIC | Age: 82
End: 2022-06-16
Payer: MEDICARE

## 2022-06-16 DIAGNOSIS — E11.9 DIABETES MELLITUS WITHOUT COMPLICATION: Primary | ICD-10-CM

## 2022-06-16 DIAGNOSIS — R11.0 NAUSEA: Primary | ICD-10-CM

## 2022-06-16 RX ORDER — PROMETHAZINE HYDROCHLORIDE 25 MG/1
25 TABLET ORAL EVERY 6 HOURS PRN
Qty: 20 TABLET | Refills: 0 | Status: SHIPPED | OUTPATIENT
Start: 2022-06-16 | End: 2022-06-24

## 2022-06-16 RX ORDER — OMEPRAZOLE 40 MG/1
40 CAPSULE, DELAYED RELEASE ORAL DAILY
Qty: 30 CAPSULE | Refills: 2 | Status: SHIPPED | OUTPATIENT
Start: 2022-06-16 | End: 2022-07-13

## 2022-06-24 ENCOUNTER — PATIENT MESSAGE (OUTPATIENT)
Dept: FAMILY MEDICINE | Facility: CLINIC | Age: 82
End: 2022-06-24
Payer: MEDICARE

## 2022-06-24 ENCOUNTER — NURSE TRIAGE (OUTPATIENT)
Dept: ADMINISTRATIVE | Facility: CLINIC | Age: 82
End: 2022-06-24
Payer: MEDICARE

## 2022-06-24 DIAGNOSIS — R11.0 NAUSEA: Primary | ICD-10-CM

## 2022-06-24 PROCEDURE — 85025 COMPLETE CBC W/AUTO DIFF WBC: CPT | Performed by: PHYSICIAN ASSISTANT

## 2022-06-24 PROCEDURE — 83690 ASSAY OF LIPASE: CPT | Performed by: PHYSICIAN ASSISTANT

## 2022-06-24 PROCEDURE — 80053 COMPREHEN METABOLIC PANEL: CPT | Performed by: PHYSICIAN ASSISTANT

## 2022-06-24 PROCEDURE — 84484 ASSAY OF TROPONIN QUANT: CPT | Performed by: PHYSICIAN ASSISTANT

## 2022-06-24 PROCEDURE — 36415 COLL VENOUS BLD VENIPUNCTURE: CPT | Performed by: PHYSICIAN ASSISTANT

## 2022-06-24 PROCEDURE — 99285 EMERGENCY DEPT VISIT HI MDM: CPT | Mod: 25

## 2022-06-24 RX ORDER — METOCLOPRAMIDE 5 MG/1
5 TABLET ORAL 2 TIMES DAILY PRN
Qty: 30 TABLET | Refills: 0 | Status: ON HOLD | OUTPATIENT
Start: 2022-06-24 | End: 2022-06-27 | Stop reason: HOSPADM

## 2022-06-25 ENCOUNTER — HOSPITAL ENCOUNTER (OUTPATIENT)
Facility: HOSPITAL | Age: 82
Discharge: HOME | DRG: 641 | End: 2022-06-27
Attending: EMERGENCY MEDICINE | Admitting: STUDENT IN AN ORGANIZED HEALTH CARE EDUCATION/TRAINING PROGRAM
Payer: MEDICARE

## 2022-06-25 DIAGNOSIS — R10.10 PAIN OF UPPER ABDOMEN: Primary | ICD-10-CM

## 2022-06-25 DIAGNOSIS — R11.0 NAUSEA: ICD-10-CM

## 2022-06-25 DIAGNOSIS — E87.1 HYPONATREMIA: ICD-10-CM

## 2022-06-25 DIAGNOSIS — I50.33 ACUTE ON CHRONIC DIASTOLIC HEART FAILURE: ICD-10-CM

## 2022-06-25 DIAGNOSIS — R07.89 CHEST DISCOMFORT: ICD-10-CM

## 2022-06-25 DIAGNOSIS — R07.9 CHEST PAIN OF UNCERTAIN ETIOLOGY: ICD-10-CM

## 2022-06-25 DIAGNOSIS — E87.5 HYPERKALEMIA: ICD-10-CM

## 2022-06-25 DIAGNOSIS — R06.02 SHORTNESS OF BREATH: ICD-10-CM

## 2022-06-25 DIAGNOSIS — N17.9 AKI (ACUTE KIDNEY INJURY): ICD-10-CM

## 2022-06-25 DIAGNOSIS — R07.9 CHEST PAIN: ICD-10-CM

## 2022-06-25 LAB
ALBUMIN SERPL BCP-MCNC: 3.8 G/DL (ref 3.5–5.2)
ALBUMIN SERPL BCP-MCNC: 4.1 G/DL (ref 3.5–5.2)
ALBUMIN SERPL BCP-MCNC: 4.2 G/DL (ref 3.5–5.2)
ALBUMIN SERPL BCP-MCNC: 4.2 G/DL (ref 3.5–5.2)
ALBUMIN SERPL BCP-MCNC: 4.3 G/DL (ref 3.5–5.2)
ALBUMIN SERPL BCP-MCNC: 4.5 G/DL (ref 3.5–5.2)
ALP SERPL-CCNC: 71 U/L (ref 55–135)
ALP SERPL-CCNC: 73 U/L (ref 55–135)
ALP SERPL-CCNC: 75 U/L (ref 55–135)
ALP SERPL-CCNC: 76 U/L (ref 55–135)
ALP SERPL-CCNC: 81 U/L (ref 55–135)
ALT SERPL W/O P-5'-P-CCNC: 93 U/L (ref 10–44)
ALT SERPL W/O P-5'-P-CCNC: 94 U/L (ref 10–44)
ALT SERPL W/O P-5'-P-CCNC: 94 U/L (ref 10–44)
ALT SERPL W/O P-5'-P-CCNC: 96 U/L (ref 10–44)
ALT SERPL W/O P-5'-P-CCNC: 99 U/L (ref 10–44)
ANION GAP SERPL CALC-SCNC: 10 MMOL/L (ref 8–16)
ANION GAP SERPL CALC-SCNC: 11 MMOL/L (ref 8–16)
ANION GAP SERPL CALC-SCNC: 12 MMOL/L (ref 8–16)
ANION GAP SERPL CALC-SCNC: 8 MMOL/L (ref 8–16)
ANION GAP SERPL CALC-SCNC: 9 MMOL/L (ref 8–16)
ANION GAP SERPL CALC-SCNC: 9 MMOL/L (ref 8–16)
AST SERPL-CCNC: 52 U/L (ref 10–40)
AST SERPL-CCNC: 52 U/L (ref 10–40)
AST SERPL-CCNC: 53 U/L (ref 10–40)
AST SERPL-CCNC: 55 U/L (ref 10–40)
AST SERPL-CCNC: 59 U/L (ref 10–40)
BACTERIA #/AREA URNS HPF: NORMAL /HPF
BASOPHILS # BLD AUTO: 0.01 K/UL (ref 0–0.2)
BASOPHILS # BLD AUTO: 0.02 K/UL (ref 0–0.2)
BASOPHILS NFR BLD: 0.2 % (ref 0–1.9)
BASOPHILS NFR BLD: 0.4 % (ref 0–1.9)
BILIRUB SERPL-MCNC: 0.7 MG/DL (ref 0.1–1)
BILIRUB SERPL-MCNC: 0.9 MG/DL (ref 0.1–1)
BILIRUB SERPL-MCNC: 0.9 MG/DL (ref 0.1–1)
BILIRUB SERPL-MCNC: 1 MG/DL (ref 0.1–1)
BILIRUB SERPL-MCNC: 1.1 MG/DL (ref 0.1–1)
BILIRUB UR QL STRIP: NEGATIVE
BUN SERPL-MCNC: 21 MG/DL (ref 8–23)
BUN SERPL-MCNC: 23 MG/DL (ref 8–23)
BUN SERPL-MCNC: 23 MG/DL (ref 8–23)
BUN SERPL-MCNC: 26 MG/DL (ref 8–23)
BUN SERPL-MCNC: 27 MG/DL (ref 8–23)
BUN SERPL-MCNC: 29 MG/DL (ref 8–23)
CALCIUM SERPL-MCNC: 8.9 MG/DL (ref 8.7–10.5)
CALCIUM SERPL-MCNC: 9.3 MG/DL (ref 8.7–10.5)
CALCIUM SERPL-MCNC: 9.4 MG/DL (ref 8.7–10.5)
CALCIUM SERPL-MCNC: 9.9 MG/DL (ref 8.7–10.5)
CALCIUM UR-MCNC: <2 MG/DL (ref 0–15)
CHLORIDE SERPL-SCNC: 93 MMOL/L (ref 95–110)
CHLORIDE SERPL-SCNC: 96 MMOL/L (ref 95–110)
CHLORIDE SERPL-SCNC: 97 MMOL/L (ref 95–110)
CHLORIDE SERPL-SCNC: 97 MMOL/L (ref 95–110)
CLARITY UR: CLEAR
CO2 SERPL-SCNC: 21 MMOL/L (ref 23–29)
CO2 SERPL-SCNC: 22 MMOL/L (ref 23–29)
CO2 SERPL-SCNC: 22 MMOL/L (ref 23–29)
CO2 SERPL-SCNC: 23 MMOL/L (ref 23–29)
CO2 SERPL-SCNC: 23 MMOL/L (ref 23–29)
CO2 SERPL-SCNC: 24 MMOL/L (ref 23–29)
COLOR UR: YELLOW
CREAT SERPL-MCNC: 1.2 MG/DL (ref 0.5–1.4)
CREAT SERPL-MCNC: 1.2 MG/DL (ref 0.5–1.4)
CREAT SERPL-MCNC: 1.3 MG/DL (ref 0.5–1.4)
CREAT SERPL-MCNC: 1.5 MG/DL (ref 0.5–1.4)
DIFFERENTIAL METHOD: ABNORMAL
DIFFERENTIAL METHOD: ABNORMAL
EOSINOPHIL # BLD AUTO: 0 K/UL (ref 0–0.5)
EOSINOPHIL # BLD AUTO: 0 K/UL (ref 0–0.5)
EOSINOPHIL NFR BLD: 0.2 % (ref 0–8)
EOSINOPHIL NFR BLD: 0.7 % (ref 0–8)
ERYTHROCYTE [DISTWIDTH] IN BLOOD BY AUTOMATED COUNT: 13.9 % (ref 11.5–14.5)
ERYTHROCYTE [DISTWIDTH] IN BLOOD BY AUTOMATED COUNT: 14.1 % (ref 11.5–14.5)
EST. GFR  (AFRICAN AMERICAN): 37 ML/MIN/1.73 M^2
EST. GFR  (AFRICAN AMERICAN): 44 ML/MIN/1.73 M^2
EST. GFR  (AFRICAN AMERICAN): 49 ML/MIN/1.73 M^2
EST. GFR  (AFRICAN AMERICAN): 49 ML/MIN/1.73 M^2
EST. GFR  (NON AFRICAN AMERICAN): 32 ML/MIN/1.73 M^2
EST. GFR  (NON AFRICAN AMERICAN): 39 ML/MIN/1.73 M^2
EST. GFR  (NON AFRICAN AMERICAN): 42 ML/MIN/1.73 M^2
EST. GFR  (NON AFRICAN AMERICAN): 42 ML/MIN/1.73 M^2
GLUCOSE SERPL-MCNC: 104 MG/DL (ref 70–110)
GLUCOSE SERPL-MCNC: 146 MG/DL (ref 70–110)
GLUCOSE SERPL-MCNC: 83 MG/DL (ref 70–110)
GLUCOSE SERPL-MCNC: 83 MG/DL (ref 70–110)
GLUCOSE SERPL-MCNC: 94 MG/DL (ref 70–110)
GLUCOSE SERPL-MCNC: 95 MG/DL (ref 70–110)
GLUCOSE UR QL STRIP: ABNORMAL
HCT VFR BLD AUTO: 34.4 % (ref 37–48.5)
HCT VFR BLD AUTO: 36.5 % (ref 37–48.5)
HGB BLD-MCNC: 11.5 G/DL (ref 12–16)
HGB BLD-MCNC: 12.3 G/DL (ref 12–16)
HGB UR QL STRIP: ABNORMAL
IMM GRANULOCYTES # BLD AUTO: 0 K/UL (ref 0–0.04)
IMM GRANULOCYTES # BLD AUTO: 0.01 K/UL (ref 0–0.04)
IMM GRANULOCYTES NFR BLD AUTO: 0 % (ref 0–0.5)
IMM GRANULOCYTES NFR BLD AUTO: 0.2 % (ref 0–0.5)
KETONES UR QL STRIP: ABNORMAL
LEUKOCYTE ESTERASE UR QL STRIP: NEGATIVE
LIPASE SERPL-CCNC: 52 U/L (ref 4–60)
LYMPHOCYTES # BLD AUTO: 0.8 K/UL (ref 1–4.8)
LYMPHOCYTES # BLD AUTO: 0.9 K/UL (ref 1–4.8)
LYMPHOCYTES NFR BLD: 18 % (ref 18–48)
LYMPHOCYTES NFR BLD: 19.9 % (ref 18–48)
MAGNESIUM SERPL-MCNC: 2.2 MG/DL (ref 1.6–2.6)
MCH RBC QN AUTO: 28.5 PG (ref 27–31)
MCH RBC QN AUTO: 28.7 PG (ref 27–31)
MCHC RBC AUTO-ENTMCNC: 33.4 G/DL (ref 32–36)
MCHC RBC AUTO-ENTMCNC: 33.7 G/DL (ref 32–36)
MCV RBC AUTO: 85 FL (ref 82–98)
MCV RBC AUTO: 85 FL (ref 82–98)
MICROSCOPIC COMMENT: NORMAL
MONOCYTES # BLD AUTO: 0.5 K/UL (ref 0.3–1)
MONOCYTES # BLD AUTO: 0.5 K/UL (ref 0.3–1)
MONOCYTES NFR BLD: 11 % (ref 4–15)
MONOCYTES NFR BLD: 11.6 % (ref 4–15)
NEUTROPHILS # BLD AUTO: 3.1 K/UL (ref 1.8–7.7)
NEUTROPHILS # BLD AUTO: 3.2 K/UL (ref 1.8–7.7)
NEUTROPHILS NFR BLD: 67.2 % (ref 38–73)
NEUTROPHILS NFR BLD: 70.6 % (ref 38–73)
NITRITE UR QL STRIP: NEGATIVE
NRBC BLD-RTO: 0 /100 WBC
NRBC BLD-RTO: 0 /100 WBC
PH UR STRIP: 6 [PH] (ref 5–8)
PHOSPHATE SERPL-MCNC: 3.6 MG/DL (ref 2.7–4.5)
PHOSPHATE SERPL-MCNC: 4.1 MG/DL (ref 2.7–4.5)
PLATELET # BLD AUTO: 217 K/UL (ref 150–450)
PLATELET # BLD AUTO: 229 K/UL (ref 150–450)
PMV BLD AUTO: 10.4 FL (ref 9.2–12.9)
PMV BLD AUTO: 10.7 FL (ref 9.2–12.9)
POCT GLUCOSE: 123 MG/DL (ref 70–110)
POCT GLUCOSE: 81 MG/DL (ref 70–110)
POCT GLUCOSE: 91 MG/DL (ref 70–110)
POCT GLUCOSE: 99 MG/DL (ref 70–110)
POTASSIUM SERPL-SCNC: 5.2 MMOL/L (ref 3.5–5.1)
POTASSIUM SERPL-SCNC: 5.3 MMOL/L (ref 3.5–5.1)
POTASSIUM SERPL-SCNC: 5.4 MMOL/L (ref 3.5–5.1)
POTASSIUM SERPL-SCNC: 5.4 MMOL/L (ref 3.5–5.1)
POTASSIUM SERPL-SCNC: 6.1 MMOL/L (ref 3.5–5.1)
POTASSIUM SERPL-SCNC: 6.2 MMOL/L (ref 3.5–5.1)
POTASSIUM UR-SCNC: <10 MMOL/L (ref 15–95)
PROT SERPL-MCNC: 6.3 G/DL (ref 6–8.4)
PROT SERPL-MCNC: 7.1 G/DL (ref 6–8.4)
PROT SERPL-MCNC: 7.3 G/DL (ref 6–8.4)
PROT SERPL-MCNC: 7.3 G/DL (ref 6–8.4)
PROT SERPL-MCNC: 7.8 G/DL (ref 6–8.4)
PROT UR QL STRIP: ABNORMAL
RBC # BLD AUTO: 4.04 M/UL (ref 4–5.4)
RBC # BLD AUTO: 4.29 M/UL (ref 4–5.4)
RBC #/AREA URNS HPF: 3 /HPF (ref 0–4)
SARS-COV-2 RDRP RESP QL NAA+PROBE: NEGATIVE
SODIUM SERPL-SCNC: 127 MMOL/L (ref 136–145)
SODIUM SERPL-SCNC: 127 MMOL/L (ref 136–145)
SODIUM SERPL-SCNC: 128 MMOL/L (ref 136–145)
SODIUM SERPL-SCNC: 128 MMOL/L (ref 136–145)
SODIUM SERPL-SCNC: 129 MMOL/L (ref 136–145)
SODIUM SERPL-SCNC: 130 MMOL/L (ref 136–145)
SODIUM UR-SCNC: 39 MMOL/L (ref 20–250)
SP GR UR STRIP: 1.01 (ref 1–1.03)
TROPONIN I SERPL DL<=0.01 NG/ML-MCNC: 0.01 NG/ML (ref 0–0.03)
TROPONIN I SERPL DL<=0.01 NG/ML-MCNC: <0.006 NG/ML (ref 0–0.03)
URN SPEC COLLECT METH UR: ABNORMAL
UROBILINOGEN UR STRIP-ACNC: NEGATIVE EU/DL
WBC # BLD AUTO: 4.55 K/UL (ref 3.9–12.7)
WBC # BLD AUTO: 4.58 K/UL (ref 3.9–12.7)
WBC #/AREA URNS HPF: 1 /HPF (ref 0–5)
YEAST URNS QL MICRO: NORMAL

## 2022-06-25 PROCEDURE — 96365 THER/PROPH/DIAG IV INF INIT: CPT

## 2022-06-25 PROCEDURE — 63600175 PHARM REV CODE 636 W HCPCS: Performed by: NURSE PRACTITIONER

## 2022-06-25 PROCEDURE — G0378 HOSPITAL OBSERVATION PER HR: HCPCS

## 2022-06-25 PROCEDURE — 36415 COLL VENOUS BLD VENIPUNCTURE: CPT | Performed by: NURSE PRACTITIONER

## 2022-06-25 PROCEDURE — 25000003 PHARM REV CODE 250: Performed by: NURSE PRACTITIONER

## 2022-06-25 PROCEDURE — 96375 TX/PRO/DX INJ NEW DRUG ADDON: CPT

## 2022-06-25 PROCEDURE — 83935 ASSAY OF URINE OSMOLALITY: CPT | Performed by: STUDENT IN AN ORGANIZED HEALTH CARE EDUCATION/TRAINING PROGRAM

## 2022-06-25 PROCEDURE — 94761 N-INVAS EAR/PLS OXIMETRY MLT: CPT

## 2022-06-25 PROCEDURE — 84100 ASSAY OF PHOSPHORUS: CPT | Performed by: NURSE PRACTITIONER

## 2022-06-25 PROCEDURE — 63600175 PHARM REV CODE 636 W HCPCS: Performed by: STUDENT IN AN ORGANIZED HEALTH CARE EDUCATION/TRAINING PROGRAM

## 2022-06-25 PROCEDURE — 93010 ELECTROCARDIOGRAM REPORT: CPT | Mod: 76,,, | Performed by: SPECIALIST

## 2022-06-25 PROCEDURE — 83735 ASSAY OF MAGNESIUM: CPT | Performed by: NURSE PRACTITIONER

## 2022-06-25 PROCEDURE — 84100 ASSAY OF PHOSPHORUS: CPT | Mod: 91 | Performed by: INTERNAL MEDICINE

## 2022-06-25 PROCEDURE — 93005 ELECTROCARDIOGRAM TRACING: CPT

## 2022-06-25 PROCEDURE — 25000003 PHARM REV CODE 250: Performed by: EMERGENCY MEDICINE

## 2022-06-25 PROCEDURE — 12000002 HC ACUTE/MED SURGE SEMI-PRIVATE ROOM

## 2022-06-25 PROCEDURE — 99223 1ST HOSP IP/OBS HIGH 75: CPT | Mod: ,,, | Performed by: INTERNAL MEDICINE

## 2022-06-25 PROCEDURE — 84300 ASSAY OF URINE SODIUM: CPT | Performed by: NURSE PRACTITIONER

## 2022-06-25 PROCEDURE — 63600175 PHARM REV CODE 636 W HCPCS: Performed by: EMERGENCY MEDICINE

## 2022-06-25 PROCEDURE — 36415 COLL VENOUS BLD VENIPUNCTURE: CPT | Performed by: INTERNAL MEDICINE

## 2022-06-25 PROCEDURE — 82340 ASSAY OF CALCIUM IN URINE: CPT | Performed by: NURSE PRACTITIONER

## 2022-06-25 PROCEDURE — 84484 ASSAY OF TROPONIN QUANT: CPT | Performed by: NURSE PRACTITIONER

## 2022-06-25 PROCEDURE — 27000221 HC OXYGEN, UP TO 24 HOURS

## 2022-06-25 PROCEDURE — 93010 ELECTROCARDIOGRAM REPORT: CPT | Mod: ,,, | Performed by: SPECIALIST

## 2022-06-25 PROCEDURE — 81000 URINALYSIS NONAUTO W/SCOPE: CPT | Performed by: PHYSICIAN ASSISTANT

## 2022-06-25 PROCEDURE — 99223 PR INITIAL HOSPITAL CARE,LEVL III: ICD-10-PCS | Mod: ,,, | Performed by: INTERNAL MEDICINE

## 2022-06-25 PROCEDURE — 85025 COMPLETE CBC W/AUTO DIFF WBC: CPT | Performed by: NURSE PRACTITIONER

## 2022-06-25 PROCEDURE — 25000003 PHARM REV CODE 250: Performed by: STUDENT IN AN ORGANIZED HEALTH CARE EDUCATION/TRAINING PROGRAM

## 2022-06-25 PROCEDURE — 84133 ASSAY OF URINE POTASSIUM: CPT | Performed by: NURSE PRACTITIONER

## 2022-06-25 PROCEDURE — 93010 EKG 12-LEAD: ICD-10-PCS | Mod: 76,,, | Performed by: SPECIALIST

## 2022-06-25 PROCEDURE — 25000003 PHARM REV CODE 250: Performed by: INTERNAL MEDICINE

## 2022-06-25 PROCEDURE — U0002 COVID-19 LAB TEST NON-CDC: HCPCS | Performed by: PHYSICIAN ASSISTANT

## 2022-06-25 PROCEDURE — 80053 COMPREHEN METABOLIC PANEL: CPT | Performed by: NURSE PRACTITIONER

## 2022-06-25 PROCEDURE — 96361 HYDRATE IV INFUSION ADD-ON: CPT

## 2022-06-25 RX ORDER — LANOLIN ALCOHOL/MO/W.PET/CERES
800 CREAM (GRAM) TOPICAL
Status: DISCONTINUED | OUTPATIENT
Start: 2022-06-25 | End: 2022-06-27 | Stop reason: HOSPADM

## 2022-06-25 RX ORDER — SODIUM,POTASSIUM PHOSPHATES 280-250MG
2 POWDER IN PACKET (EA) ORAL
Status: DISCONTINUED | OUTPATIENT
Start: 2022-06-25 | End: 2022-06-27 | Stop reason: HOSPADM

## 2022-06-25 RX ORDER — SIMETHICONE 80 MG
1 TABLET,CHEWABLE ORAL 4 TIMES DAILY PRN
Status: DISCONTINUED | OUTPATIENT
Start: 2022-06-25 | End: 2022-06-27 | Stop reason: HOSPADM

## 2022-06-25 RX ORDER — SODIUM CHLORIDE 9 MG/ML
INJECTION, SOLUTION INTRAVENOUS ONCE
Status: COMPLETED | OUTPATIENT
Start: 2022-06-25 | End: 2022-06-25

## 2022-06-25 RX ORDER — MAG HYDROX/ALUMINUM HYD/SIMETH 200-200-20
30 SUSPENSION, ORAL (FINAL DOSE FORM) ORAL 4 TIMES DAILY PRN
Status: DISCONTINUED | OUTPATIENT
Start: 2022-06-25 | End: 2022-06-27 | Stop reason: HOSPADM

## 2022-06-25 RX ORDER — PROCHLORPERAZINE EDISYLATE 5 MG/ML
5 INJECTION INTRAMUSCULAR; INTRAVENOUS EVERY 6 HOURS PRN
Status: DISCONTINUED | OUTPATIENT
Start: 2022-06-25 | End: 2022-06-25

## 2022-06-25 RX ORDER — ONDANSETRON 4 MG/1
4 TABLET, ORALLY DISINTEGRATING ORAL
Status: COMPLETED | OUTPATIENT
Start: 2022-06-25 | End: 2022-06-25

## 2022-06-25 RX ORDER — SODIUM CHLORIDE 0.9 % (FLUSH) 0.9 %
10 SYRINGE (ML) INJECTION EVERY 8 HOURS PRN
Status: DISCONTINUED | OUTPATIENT
Start: 2022-06-25 | End: 2022-06-27 | Stop reason: HOSPADM

## 2022-06-25 RX ORDER — SPIRONOLACTONE 25 MG/1
25 TABLET ORAL DAILY
Status: DISCONTINUED | OUTPATIENT
Start: 2022-06-25 | End: 2022-06-25

## 2022-06-25 RX ORDER — MAG HYDROX/ALUMINUM HYD/SIMETH 200-200-20
30 SUSPENSION, ORAL (FINAL DOSE FORM) ORAL ONCE
Status: COMPLETED | OUTPATIENT
Start: 2022-06-25 | End: 2022-06-25

## 2022-06-25 RX ORDER — ONDANSETRON 2 MG/ML
4 INJECTION INTRAMUSCULAR; INTRAVENOUS EVERY 6 HOURS PRN
Status: DISCONTINUED | OUTPATIENT
Start: 2022-06-25 | End: 2022-06-25

## 2022-06-25 RX ORDER — METOPROLOL SUCCINATE 50 MG/1
50 TABLET, EXTENDED RELEASE ORAL DAILY
Status: DISCONTINUED | OUTPATIENT
Start: 2022-06-26 | End: 2022-06-25

## 2022-06-25 RX ORDER — LEVOTHYROXINE SODIUM 25 UG/1
25 TABLET ORAL
Status: DISCONTINUED | OUTPATIENT
Start: 2022-06-25 | End: 2022-06-27 | Stop reason: HOSPADM

## 2022-06-25 RX ORDER — LIDOCAINE HYDROCHLORIDE 20 MG/ML
10 SOLUTION OROPHARYNGEAL ONCE
Status: COMPLETED | OUTPATIENT
Start: 2022-06-25 | End: 2022-06-25

## 2022-06-25 RX ORDER — PANTOPRAZOLE SODIUM 40 MG/1
40 TABLET, DELAYED RELEASE ORAL DAILY
Status: DISCONTINUED | OUTPATIENT
Start: 2022-06-26 | End: 2022-06-27 | Stop reason: HOSPADM

## 2022-06-25 RX ORDER — AMOXICILLIN 250 MG
1 CAPSULE ORAL 2 TIMES DAILY
Status: DISCONTINUED | OUTPATIENT
Start: 2022-06-25 | End: 2022-06-26

## 2022-06-25 RX ORDER — NALOXONE HCL 0.4 MG/ML
0.02 VIAL (ML) INJECTION
Status: DISCONTINUED | OUTPATIENT
Start: 2022-06-25 | End: 2022-06-27 | Stop reason: HOSPADM

## 2022-06-25 RX ORDER — TALC
6 POWDER (GRAM) TOPICAL NIGHTLY PRN
Status: DISCONTINUED | OUTPATIENT
Start: 2022-06-25 | End: 2022-06-27 | Stop reason: HOSPADM

## 2022-06-25 RX ORDER — IBUPROFEN 200 MG
16 TABLET ORAL
Status: DISCONTINUED | OUTPATIENT
Start: 2022-06-25 | End: 2022-06-27 | Stop reason: HOSPADM

## 2022-06-25 RX ORDER — INSULIN ASPART 100 [IU]/ML
0-5 INJECTION, SOLUTION INTRAVENOUS; SUBCUTANEOUS
Status: DISCONTINUED | OUTPATIENT
Start: 2022-06-25 | End: 2022-06-27 | Stop reason: HOSPADM

## 2022-06-25 RX ORDER — AMITRIPTYLINE HYDROCHLORIDE 25 MG/1
50 TABLET, FILM COATED ORAL NIGHTLY
Status: DISCONTINUED | OUTPATIENT
Start: 2022-06-25 | End: 2022-06-27 | Stop reason: HOSPADM

## 2022-06-25 RX ORDER — GLUCAGON 1 MG
1 KIT INJECTION
Status: DISCONTINUED | OUTPATIENT
Start: 2022-06-25 | End: 2022-06-27 | Stop reason: HOSPADM

## 2022-06-25 RX ORDER — ISOSORBIDE MONONITRATE 60 MG/1
60 TABLET, EXTENDED RELEASE ORAL DAILY
Status: DISCONTINUED | OUTPATIENT
Start: 2022-06-25 | End: 2022-06-27 | Stop reason: HOSPADM

## 2022-06-25 RX ORDER — ACETAMINOPHEN 325 MG/1
650 TABLET ORAL EVERY 8 HOURS PRN
Status: DISCONTINUED | OUTPATIENT
Start: 2022-06-25 | End: 2022-06-27 | Stop reason: HOSPADM

## 2022-06-25 RX ORDER — NITROGLYCERIN 0.4 MG/1
0.4 TABLET SUBLINGUAL EVERY 5 MIN PRN
Status: DISCONTINUED | OUTPATIENT
Start: 2022-06-25 | End: 2022-06-27 | Stop reason: HOSPADM

## 2022-06-25 RX ORDER — IBUPROFEN 200 MG
24 TABLET ORAL
Status: DISCONTINUED | OUTPATIENT
Start: 2022-06-25 | End: 2022-06-27 | Stop reason: HOSPADM

## 2022-06-25 RX ADMIN — PROCHLORPERAZINE EDISYLATE 5 MG: 5 INJECTION INTRAMUSCULAR; INTRAVENOUS at 06:06

## 2022-06-25 RX ADMIN — PROCHLORPERAZINE EDISYLATE 5 MG: 5 INJECTION INTRAMUSCULAR; INTRAVENOUS at 05:06

## 2022-06-25 RX ADMIN — APIXABAN 5 MG: 2.5 TABLET, FILM COATED ORAL at 09:06

## 2022-06-25 RX ADMIN — AMITRIPTYLINE HYDROCHLORIDE 50 MG: 25 TABLET, FILM COATED ORAL at 08:06

## 2022-06-25 RX ADMIN — APIXABAN 5 MG: 2.5 TABLET, FILM COATED ORAL at 08:06

## 2022-06-25 RX ADMIN — SODIUM CHLORIDE 500 ML: 9 INJECTION, SOLUTION INTRAVENOUS at 01:06

## 2022-06-25 RX ADMIN — ALUMINUM HYDROXIDE, MAGNESIUM HYDROXIDE, AND SIMETHICONE 30 ML: 200; 200; 20 SUSPENSION ORAL at 09:06

## 2022-06-25 RX ADMIN — LIDOCAINE HYDROCHLORIDE 10 ML: 20 SOLUTION ORAL; TOPICAL at 01:06

## 2022-06-25 RX ADMIN — POTASSIUM & SODIUM PHOSPHATES POWDER PACK 280-160-250 MG 2 PACKET: 280-160-250 PACK at 09:06

## 2022-06-25 RX ADMIN — DRONEDARONE 400 MG: 400 TABLET, FILM COATED ORAL at 10:06

## 2022-06-25 RX ADMIN — METOPROLOL SUCCINATE 25 MG: 25 TABLET, EXTENDED RELEASE ORAL at 09:06

## 2022-06-25 RX ADMIN — LEVOTHYROXINE SODIUM 25 MCG: 25 TABLET ORAL at 05:06

## 2022-06-25 RX ADMIN — PROMETHAZINE HYDROCHLORIDE 6.25 MG: 25 INJECTION INTRAMUSCULAR; INTRAVENOUS at 10:06

## 2022-06-25 RX ADMIN — DRONEDARONE 400 MG: 400 TABLET, FILM COATED ORAL at 05:06

## 2022-06-25 RX ADMIN — ALUMINUM HYDROXIDE, MAGNESIUM HYDROXIDE, DIMETHICONE 30 ML: 200; 200; 20 SUSPENSION ORAL at 01:06

## 2022-06-25 RX ADMIN — ISOSORBIDE MONONITRATE 60 MG: 60 TABLET, EXTENDED RELEASE ORAL at 11:06

## 2022-06-25 RX ADMIN — SODIUM BICARBONATE: 84 INJECTION, SOLUTION INTRAVENOUS at 02:06

## 2022-06-25 RX ADMIN — ONDANSETRON 4 MG: 4 TABLET, ORALLY DISINTEGRATING ORAL at 12:06

## 2022-06-25 RX ADMIN — PROMETHAZINE HYDROCHLORIDE 6.25 MG: 25 INJECTION INTRAMUSCULAR; INTRAVENOUS at 09:06

## 2022-06-25 RX ADMIN — DOCUSATE SODIUM AND SENNOSIDES 1 TABLET: 8.6; 5 TABLET, FILM COATED ORAL at 09:06

## 2022-06-25 RX ADMIN — PROMETHAZINE HYDROCHLORIDE 6.25 MG: 25 INJECTION INTRAMUSCULAR; INTRAVENOUS at 01:06

## 2022-06-25 RX ADMIN — SODIUM CHLORIDE: 0.9 INJECTION, SOLUTION INTRAVENOUS at 05:06

## 2022-06-25 NOTE — HPI
Darlin Tipton is an 81-year-old female with a past medical history of AFib, hypothyroidism, CAD, DM type 2, GERD, CKD 3, and hypertension presenting with chronic nausea and intermittent vomiting over the past several weeks and soft stools for the past few days.  Patient was admitted to [facility] in May for CHF and required a thoracentesis during her hospital stay.  Patient has followed up with Cardiology and has had recent visits with her PCP for ongoing nausea.  She had been prescribed p.o. Phenergan and omeprazole with no relief.  Patient reports gas type feeling in the mid epigastrum and denies abdominal pain and tenderness.  She states she has been drinking a lot of water to help her belch and this relieves her gas type pain temporarily.  ED workup revealed hyponatremia with a sodium of 127 and an LINDSAY with creatinine 1.5.  AST and ALT were mildly elevated, lipase negative.  While in the ED, patient received 500 cc bolus and IV Phenergan and Zofran with minimal relief.  Patient was referred to Hospital Medicine and seen in the ED.  She reports ongoing nausea and states she does not feel well.  She reports having a headache and denies abdominal pain other than gas type feeling with no abdominal tenderness.  Patient also denies chest pain, shortness a breath, vomiting, diarrhea, fever, and chills.  Patient will be admitted to Hospital Medicine for further evaluation and management.

## 2022-06-25 NOTE — CONSULTS
CC: nausea    HPI 81 y.o. female with a past medical history of AFib, hypothyroidism, CAD, DM type 2, GERD, CKD 3, and hypertension presenting with chronic, progressively worsening nausea associated with intermittent vomiting over the past three weeks as well as bloating and gas.    Patient was recently started on Multaq about 3 weeks ago and has been the only recent change in medication that she can attribute to nausea. She had been taking Phenergan and omeprazole oral at home without any improvement.  Patient reports gas pain and bloating in the mid epigastrium. No significant abdominal pain.    She was found to have hyponatremia with a sodium of 127, K 5.3 which increased to 6.1. She also had an LINDSAY with creatinine 1.5. Tbili 1.1, AST 52, ALT 99. H/H 12.3/36.5.     She has not had EGD or colonoscopy since 2017. EGD at that time with LA Grade B esophagitis, multiple gastric polyps, otherwise normal duodenum. Colonoscopy with 5 mm polyp at the hepatic flexure, external and internal hemorrhoids, diverticulosis in SC.      Medical records reviewed. Additional history supplemented by nursing.     Past Medical History:   Diagnosis Date    Allergy     Dust mites, Grasses, Trees    Arthritis     Asthma     Blood transfusion     CAD (coronary artery disease)     Cataract     CHRONIC BRONCHITIS     Diabetes mellitus     Diabetes mellitus type II     GERD (gastroesophageal reflux disease)     Hyperlipidemia     Hypertension     Irregular heart beat     Kidney disease     ckd stage 3  as stated by patient - to see MD    Post-menopausal bleeding 2018    Spinal stenosis     Thyroid disease     Hypothyroidism         Past Surgical History:   Procedure Laterality Date    APPENDECTOMY  1968    BLADDER SUSPENSION  1989    CARDIAC SURGERY  2016    CABG    CATARACT EXTRACTION  9/2007 (L) and 10/2207 (R)    COLONOSCOPY N/A 10/18/2017    Procedure: COLONOSCOPY;  Surgeon: Esme Acuna MD;  Location: [facility];  Service:  Endoscopy;  Laterality: N/A;    CORONARY ANGIOGRAPHY INCLUDING BYPASS GRAFTS WITH CATHETERIZATION OF LEFT HEART Left 5/13/2022    Procedure: Left heart cath;  Surgeon: Davon Patton MD;  Location: [facility] CATH/EP LAB;  Service: Cardiology;  Laterality: Left;    CORONARY ARTERY BYPASS GRAFT  4/26/2004    x5    ESOPHAGEAL DILATION      HYSTEROSCOPY WITH DILATION AND CURETTAGE OF UTERUS N/A 3/12/2020    Procedure: HYSTEROSCOPY, WITH DILATION AND CURETTAGE OF UTERUS;  Surgeon: Ramona Llanos MD;  Location: [facility] OR;  Service: OB/GYN;  Laterality: N/A;    SPINE SURGERY  3/2000    Tumor    TRANSFORAMINAL EPIDURAL INJECTION OF STEROID Right 11/21/2019    Procedure: Injection,steroid,epidural,transforaminal approach;  Surgeon: Bj Jones MD;  Location: [facility];  Service: Pain Management;  Laterality: Right;  L4-5, L5-S1    TRANSFORAMINAL EPIDURAL INJECTION OF STEROID Right 12/31/2019    Procedure: Injection,steroid,epidural,transforaminal approach;  Surgeon: Bj Jones MD;  Location: [facility];  Service: Pain Management;  Laterality: Right;  L4-5, L5-S1    TRANSFORAMINAL EPIDURAL INJECTION OF STEROID Right 2/5/2020    Procedure: Injection,steroid,epidural,transforaminal approach;  Surgeon: Bj Jones MD;  Location: [facility];  Service: Pain Management;  Laterality: Right;  L4-5, L5-S1    TRANSFORAMINAL EPIDURAL INJECTION OF STEROID Left 1/27/2021    Procedure: Injection,steroid,epidural,transforaminal approach;  Surgeon: Bj Jones MD;  Location: [facility];  Service: Pain Management;  Laterality: Left;  L4-5, L5-S1    TRANSFORAMINAL EPIDURAL INJECTION OF STEROID Right 3/4/2021    Procedure: Injection,steroid,epidural,transforaminal approach;  Surgeon: Bj Jones MD;  Location: [facility];  Service: Pain Management;  Laterality: Right;  L4-L5, L5-S1    WRIST SURGERY  1993    carpal tunnel         Social History  Social History     Tobacco Use    Smoking status: Never Smoker    Smokeless tobacco: Never Used   Substance Use Topics  "   Alcohol use: Yes     Comment: Rare    Drug use: No         Family History   Problem Relation Age of Onset    Breast cancer Mother     Breast cancer Maternal Aunt     Allergic rhinitis Neg Hx     Allergies Neg Hx     Angioedema Neg Hx     Asthma Neg Hx     Eczema Neg Hx     Immunodeficiency Neg Hx     Urticaria Neg Hx     Rhinitis Neg Hx     Atopy Neg Hx        Review of Systems  General ROS: negative for chills, fever or weight loss  Psychological ROS: negative for hallucination, depression or suicidal ideation  Ophthalmic ROS: negative for blurry vision, photophobia or eye pain  ENT ROS: negative for epistaxis, sore throat or rhinorrhea  Respiratory ROS: no cough, shortness of breath, or wheezing  Cardiovascular ROS: no chest pain or dyspnea on exertion  Gastrointestinal ROS: +nausea, +epigastric burning, +gas, +bloatingk, no abdominal pain, change in bowel habits, or black/ bloody stools  Genito-Urinary ROS: no dysuria, trouble voiding, or hematuria  Musculoskeletal ROS: negative for gait disturbance or muscular weakness  Neurological ROS: no syncope or seizures; no ataxia  Dermatological ROS: negative for pruritis, rash and jaundice    Physical Examination  BP (!) 146/66 (BP Location: Left arm, Patient Position: Lying)   Pulse 60   Temp 98 °F (36.7 °C) (Oral)   Resp 18   Ht 5' 2" (1.575 m)   Wt 64.8 kg (142 lb 13.7 oz)   SpO2 98%   BMI 26.13 kg/m²   General appearance: elderly female, alert, cooperative, no distress  HENT: NC in place, Normocephalic, atraumatic, neck symmetrical, no nasal discharge   Eyes: conjunctivae/corneas clear, PERRL, EOM's intact  Lungs: no labored breathing, symmetric chest wall expansion bilaterally  Heart: regular rate and rhythm without rub; no displacement of the PMI   Abdomen: soft, non-tender; bowel sounds normoactive; no organomegaly  Extremities: extremities symmetric; no clubbing, cyanosis, or edema  Integument: Skin color, texture, turgor normal; no rashes; hair " distrubution normal  Neurologic: Alert and oriented X 3, did not test gait  Psychiatric: no pressured speech; normal affect; no evidence of impaired cognition     Labs:  Lab Results   Component Value Date    WBC 4.55 06/25/2022    HGB 11.5 (L) 06/25/2022    HCT 34.4 (L) 06/25/2022    MCV 85 06/25/2022     06/25/2022       CMP  Sodium   Date Value Ref Range Status   06/25/2022 128 (L) 136 - 145 mmol/L Final     Potassium   Date Value Ref Range Status   06/25/2022 6.1 (H) 3.5 - 5.1 mmol/L Final     Chloride   Date Value Ref Range Status   06/25/2022 96 95 - 110 mmol/L Final     CO2   Date Value Ref Range Status   06/25/2022 22 (L) 23 - 29 mmol/L Final     Glucose   Date Value Ref Range Status   06/25/2022 83 70 - 110 mg/dL Final     BUN   Date Value Ref Range Status   06/25/2022 23 8 - 23 mg/dL Final     Creatinine   Date Value Ref Range Status   06/25/2022 1.2 0.5 - 1.4 mg/dL Final     Calcium   Date Value Ref Range Status   06/25/2022 9.4 8.7 - 10.5 mg/dL Final     Total Protein   Date Value Ref Range Status   06/25/2022 7.3 6.0 - 8.4 g/dL Final     Albumin   Date Value Ref Range Status   06/25/2022 4.3 3.5 - 5.2 g/dL Final     Total Bilirubin   Date Value Ref Range Status   06/25/2022 0.9 0.1 - 1.0 mg/dL Final     Comment:     For infants and newborns, interpretation of results should be based  on gestational age, weight and in agreement with clinical  observations.    Premature Infant recommended reference ranges:  Up to 24 hours.............<8.0 mg/dL  Up to 48 hours............<12.0 mg/dL  3-5 days..................<15.0 mg/dL  6-29 days.................<15.0 mg/dL       Alkaline Phosphatase   Date Value Ref Range Status   06/25/2022 75 55 - 135 U/L Final     AST   Date Value Ref Range Status   06/25/2022 55 (H) 10 - 40 U/L Final     ALT   Date Value Ref Range Status   06/25/2022 96 (H) 10 - 44 U/L Final     Anion Gap   Date Value Ref Range Status   06/25/2022 10 8 - 16 mmol/L Final     eGFR if African  American   Date Value Ref Range Status   06/25/2022 49 (A) >60 mL/min/1.73 m^2 Final     eGFR if non    Date Value Ref Range Status   06/25/2022 42 (A) >60 mL/min/1.73 m^2 Final     Comment:     Calculation used to obtain the estimated glomerular filtration  rate (eGFR) is the CKD-EPI equation.          Imaging:   CXR:  Borderline heart size.  Prior sternotomy.  Trace right pleural effusion     Independently reviewed    Assessment:   81 y.o. female with a past medical history of AFib, hypothyroidism, CAD, DM type 2, GERD, CKD 3, and hypertension presenting with worsening nausea, vomiting over the past three weeks as well as bloating and gas. Could potentially be medication related given she has been started on Multaq in the past several weeks which is the only change in medication. Will plan endoscopic evaluation to rule out other etiologies.    Plan:   -Zofran alternating with phenergan as needed for nausea  -Reglan did not help patient's symptoms therefore discontinue  -Symptoms may be related to dronedarone given timeline of initiation  -PPI IV daily  -Plan EGD on Monday for evaluation and to rule out other etiologies    Skip Nj MD  [facility]  [address]  [place] [address]  Office: [phone]  Fax: [phone]

## 2022-06-25 NOTE — ED PROVIDER NOTES
Encounter Date: 6/24/2022       History     Chief Complaint   Patient presents with    Headache     With nausea and other symptoms for a few days     This patient is an 81-year-old female Past Medical History:  No date: Allergy      Comment:  Dust mites, Grasses, Trees  No date: Arthritis  No date: Asthma  No date: Blood transfusion  No date: CAD (coronary artery disease)  No date: Cataract  No date: CHRONIC BRONCHITIS  No date: Diabetes mellitus  No date: Diabetes mellitus type II  No date: GERD (gastroesophageal reflux disease)  No date: Hyperlipidemia  No date: Hypertension  No date: Irregular heart beat  No date: Kidney disease      Comment:  ckd stage 3  as stated by patient - to see MD  2018: Post-menopausal bleeding  No date: Spinal stenosis  No date: Thyroid disease      Comment:  Hypothyroidism  Presenting with complaints of intermittent nausea and vomiting over the last few weeks.  She additionally reports having diarrhea over the last few days but not being able have a bowel movement yesterday.  She denies focal abdominal pain currently does endorse generalized headache without change in strength or sensation in any extremities or confusion.  She does endorse upper stomach pain that she thinks may be associated with ongoing reflux that has not been improved by over-the-counter antacid medications.  She reports a recent admission [facility] within the past 2 months where she received negative cardiac rule out.  She denies taking anything for her symptoms prior to arrival tonight.        Review of patient's allergies indicates:   Allergen Reactions    Sulfa (sulfonamide antibiotics) Rash    Floxacillin Itching    Januvia [sitagliptin] Other (See Comments)     Hot flashes    Tetracyclines Itching    Fenofibrate micronized Rash    Nitrofurantoin macrocrystalline Other (See Comments)     unknown    Phenylfenesin la [phenylpropanolamine-gg] Rash     Past Medical History:   Diagnosis Date     Allergy     Dust mites, Grasses, Trees    Arthritis     Asthma     Blood transfusion     CAD (coronary artery disease)     Cataract     CHRONIC BRONCHITIS     Diabetes mellitus     Diabetes mellitus type II     GERD (gastroesophageal reflux disease)     Hyperlipidemia     Hypertension     Irregular heart beat     Kidney disease     ckd stage 3  as stated by patient - to see MD    Post-menopausal bleeding 2018    Spinal stenosis     Thyroid disease     Hypothyroidism     Past Surgical History:   Procedure Laterality Date    APPENDECTOMY  1968    BLADDER SUSPENSION  1989    CARDIAC SURGERY  2016    CABG    CATARACT EXTRACTION  9/2007 (L) and 10/2207 (R)    COLONOSCOPY N/A 10/18/2017    Procedure: COLONOSCOPY;  Surgeon: Esme Acuna MD;  [facility]: [facility] ENDO;  Service: Endoscopy;  Laterality: N/A;    CORONARY ANGIOGRAPHY INCLUDING BYPASS GRAFTS WITH CATHETERIZATION OF LEFT HEART Left 5/13/2022    Procedure: Left heart cath;  Surgeon: Davon Patton MD;  Location: [facility] CATH/EP LAB;  Service: Cardiology;  Laterality: Left;    CORONARY ARTERY BYPASS GRAFT  4/26/2004    x5    ESOPHAGEAL DILATION      HYSTEROSCOPY WITH DILATION AND CURETTAGE OF UTERUS N/A 3/12/2020    Procedure: HYSTEROSCOPY, WITH DILATION AND CURETTAGE OF UTERUS;  Surgeon: Ramona Llanos MD;  Location: [facility] OR;  Service: OB/GYN;  Laterality: N/A;    SPINE SURGERY  3/2000    Tumor    TRANSFORAMINAL EPIDURAL INJECTION OF STEROID Right 11/21/2019    Procedure: Injection,steroid,epidural,transforaminal approach;  Surgeon: Bj Jones MD;  [facility]: [facility] OR;  Service: Pain Management;  Laterality: Right;  L4-5, L5-S1    TRANSFORAMINAL EPIDURAL INJECTION OF STEROID Right 12/31/2019    Procedure: Injection,steroid,epidural,transforaminal approach;  Surgeon: Bj Jones MD;  Location: [facility] OR;  Service: Pain Management;  Laterality: Right;  L4-5, L5-S1    TRANSFORAMINAL EPIDURAL INJECTION OF STEROID Right 2/5/2020    Procedure:  Injection,steroid,epidural,transforaminal approach;  Surgeon: Bj Jones MD;  Location: [facility] OR;  Service: Pain Management;  Laterality: Right;  L4-5, L5-S1    TRANSFORAMINAL EPIDURAL INJECTION OF STEROID Left 1/27/2021    Procedure: Injection,steroid,epidural,transforaminal approach;  Surgeon: Bj Jones MD;  Location: [facility] OR;  Service: Pain Management;  Laterality: Left;  L4-5, L5-S1    TRANSFORAMINAL EPIDURAL INJECTION OF STEROID Right 3/4/2021    Procedure: Injection,steroid,epidural,transforaminal approach;  Surgeon: Bj Jones MD;  Location: [facility] OR;  Service: Pain Management;  Laterality: Right;  L4-L5, L5-S1    WRIST SURGERY  1993    carpal tunnel       Family History   Problem Relation Age of Onset    Breast cancer Mother     Breast cancer Maternal Aunt     Allergic rhinitis Neg Hx     Allergies Neg Hx     Angioedema Neg Hx     Asthma Neg Hx     Eczema Neg Hx     Immunodeficiency Neg Hx     Urticaria Neg Hx     Rhinitis Neg Hx     Atopy Neg Hx      Social History     Tobacco Use    Smoking status: Never Smoker    Smokeless tobacco: Never Used   Substance Use Topics    Alcohol use: Yes     Comment: Rare    Drug use: No     Review of Systems   Constitutional: Negative for fever.   HENT: Negative for congestion.    Respiratory: Negative for shortness of breath.    Cardiovascular: Positive for chest pain.   Gastrointestinal: Positive for abdominal pain.   Endocrine: Negative for polyphagia.   Genitourinary: Negative for dysuria.   Musculoskeletal: Negative for back pain.   Skin: Negative for color change.   Neurological: Positive for weakness.        Generalized   Hematological: Does not bruise/bleed easily.   Psychiatric/Behavioral: Negative for confusion.       Physical Exam     Initial Vitals [06/24/22 2308]   BP Pulse Resp Temp SpO2   (!) 145/67 (!) 56 20 97.8 °F (36.6 °C) 99 %      MAP       --         Physical Exam    Nursing note and vitals reviewed.  Constitutional: Vital signs are  normal. She appears well-nourished.   HENT:   Head: Normocephalic and atraumatic.   Eyes: Conjunctivae and EOM are normal.   Neck: Neck supple. No thyroid mass present.   Normal range of motion.  Cardiovascular: Normal rate, regular rhythm and normal heart sounds. Exam reveals no gallop and no friction rub.    No murmur heard.  Pulmonary/Chest: Breath sounds normal. She has no wheezes. She has no rhonchi. She has no rales.   Abdominal: Abdomen is soft. Bowel sounds are normal. There is no abdominal tenderness.   Musculoskeletal:         General: No edema. Normal range of motion.      Cervical back: Normal range of motion and neck supple.     Neurological: She is alert and oriented to person, place, and time. She has normal strength. No cranial nerve deficit or sensory deficit. GCS score is 15. GCS eye subscore is 4. GCS verbal subscore is 5. GCS motor subscore is 6.   Skin: Skin is warm and dry. No rash noted. No erythema.   Psychiatric: She has a normal mood and affect. Her speech is normal. Cognition and memory are normal.         ED Course   Procedures  Labs Reviewed   CBC W/ AUTO DIFFERENTIAL - Abnormal; Notable for the following components:       Result Value    Hematocrit 36.5 (*)     Lymph # 0.9 (*)     All other components within normal limits   COMPREHENSIVE METABOLIC PANEL - Abnormal; Notable for the following components:    Sodium 127 (*)     Potassium 5.3 (*)     Chloride 93 (*)     CO2 22 (*)     BUN 29 (*)     Creatinine 1.5 (*)     Total Bilirubin 1.1 (*)     AST 52 (*)     ALT 99 (*)     eGFR if  37 (*)     eGFR if non  32 (*)     All other components within normal limits   URINALYSIS, REFLEX TO URINE CULTURE - Abnormal; Notable for the following components:    Protein, UA Trace (*)     Glucose, UA 3+ (*)     Ketones, UA 1+ (*)     Occult Blood UA Trace (*)     All other components within normal limits    Narrative:     Specimen Source->Urine   LIPASE   TROPONIN I    SARS-COV-2 RNA AMPLIFICATION, QUAL   URINALYSIS MICROSCOPIC    Narrative:     Specimen Source->Urine   MAGNESIUM   PHOSPHORUS   COMPREHENSIVE METABOLIC PANEL   CBC W/ AUTO DIFFERENTIAL   SODIUM, URINE, RANDOM   POTASSIUM, URINE, RANDOM   CALCIUM, URINE, RANDOM   POCT GLUCOSE MONITORING CONTINUOUS     EKG Readings: (Independently Interpreted)   Undetermined axis, 58 beats per minute, appearing like bigeminy, multiple PVCs, possible anterior infarct, age undetermined, no STEMI       Imaging Results          X-Ray Chest AP Portable (In process)                  Medications   dronedarone tablet 400 mg (has no administration in time range)   levothyroxine tablet 25 mcg (has no administration in time range)   metoprolol succinate (TOPROL-XL) 24 hr tablet 25 mg (has no administration in time range)   apixaban tablet 5 mg (has no administration in time range)   amitriptyline tablet 50 mg (has no administration in time range)   dextrose 10% bolus 250 mL (has no administration in time range)   sodium chloride 0.9% flush 10 mL (has no administration in time range)   melatonin tablet 6 mg (has no administration in time range)   senna-docusate 8.6-50 mg per tablet 1 tablet (has no administration in time range)   acetaminophen tablet 650 mg (has no administration in time range)   simethicone chewable tablet 80 mg (has no administration in time range)   aluminum-magnesium hydroxide-simethicone 200-200-20 mg/5 mL suspension 30 mL (has no administration in time range)   naloxone 0.4 mg/mL injection 0.02 mg (has no administration in time range)   potassium bicarbonate disintegrating tablet 35 mEq (has no administration in time range)   magnesium oxide tablet 800 mg (has no administration in time range)   magnesium oxide tablet 800 mg (has no administration in time range)   potassium, sodium phosphates 280-160-250 mg packet 2 packet (has no administration in time range)   potassium, sodium phosphates 280-160-250 mg packet 2 packet  (has no administration in time range)   potassium, sodium phosphates 280-160-250 mg packet 2 packet (has no administration in time range)   glucose chewable tablet 16 g (has no administration in time range)   glucose chewable tablet 24 g (has no administration in time range)   glucagon (human recombinant) injection 1 mg (has no administration in time range)   dextrose 10% bolus 125 mL (has no administration in time range)   0.9%  NaCl infusion (has no administration in time range)   potassium bicarbonate disintegrating tablet 50 mEq (has no administration in time range)   potassium bicarbonate disintegrating tablet 60 mEq (has no administration in time range)   prochlorperazine injection Soln 5 mg (has no administration in time range)   insulin aspart U-100 pen 0-5 Units (has no administration in time range)   promethazine (PHENERGAN) 6.25 mg in dextrose 5 % 50 mL IVPB (has no administration in time range)   sodium chloride 0.9% bolus 500 mL (0 mLs Intravenous Stopped 6/25/22 0206)   ondansetron disintegrating tablet 4 mg (4 mg Oral Given 6/25/22 0059)   aluminum-magnesium hydroxide-simethicone 200-200-20 mg/5 mL suspension 30 mL (30 mLs Oral Given 6/25/22 0145)     And   LIDOcaine HCl 2% oral solution 10 mL (10 mLs Oral Given 6/25/22 0145)   promethazine (PHENERGAN) 6.25 mg in dextrose 5 % 50 mL IVPB (0 mg Intravenous Stopped 6/25/22 0201)     Medical Decision Making:   ED Management:  This patient was emergently so shortly after arrival.  Initial vital signs are stable.  The patient is provided bolus hydration and IV Zofran.  Screening labs indicate evidence of volume contraction and dehydration, including mild acute kidney injury, hyponatremia to 127.  The patient warrants admission for further stabilization of her symptoms and electrolytes.  The case discussed with and accepted by the on-call nurse practitioner hospitalist.  Patient and daughter are in agreement with this disposition and she will be transported  to a telemetry bed in guarded condition.                      Clinical Impression:   Final diagnoses:  [R07.9] Chest pain  [R07.89] Chest discomfort  [E87.1] Hyponatremia          ED Disposition Condition    Observation               Radhames Spring MD  06/25/22 0358

## 2022-06-25 NOTE — CONSULTS
INPATIENT NEPHROLOGY CONSULT   [facility]    Patient Name: Darlin Tipton  Date: 06/25/2022    Reason for consultation: Hyponatremia    Chief Complaint:   Chief Complaint   Patient presents with    Headache     With nausea and other symptoms for a few days       History of Present Illness:  Darlin Tipton is an 81-year-old female with a past medical history of AFib, hypothyroidism, CAD, DM type 2, GERD, CKD 3, and hypertension presenting with chronic nausea and intermittent vomiting over the past several weeks and soft stools for the past few days.  Patient was admitted to [facility] in May for CHF and required a thoracentesis during her hospital stay. Patient has followed up with Cardiology and has had recent visits with her PCP for ongoing nausea.  She had been prescribed p.o. Phenergan and omeprazole with no relief.  Patient reports gas type feeling in the mid epigastrum and denies abdominal pain and tenderness.  She states she has been drinking a lot of water to help her belch and this relieves her gas type pain temporarily.  ED workup revealed hyponatremia with a sodium of 127 and an LINDSAY with creatinine 1.5.  AST and ALT were mildly elevated, lipase negative.  While in the ED, patient received 500 cc bolus and IV Phenergan and Zofran with minimal relief.  Patient was referred to Hospital Medicine and seen in the ED.  She reports ongoing nausea and states she does not feel well.  She reports having a headache and denies abdominal pain other than gas type feeling with no abdominal tenderness.  Patient also denies chest pain, shortness a breath, vomiting, diarrhea, fever, and chills. Consulted for hyponatremia.    Interval History:  6/25 VSS, on 2L NC, UOP 300cc, c/o nausea- GI eval is pending    Plan of Care:    Assessment:  LINDSAY  Hyponatremia  Hyperkalemia  Transaminitis  Anemia    Plan:    - Got fluid bolus in ER.  - Labs really not that improved.  - Going to give 500cc bicarb gtt.  - Hold  aldactone.  - Repeat renal panel at 6pm.  - GI consulted for nausea. Trend LFTs. H/H stable.    Thank you for allowing us to participate in this patient's care. We will continue to follow.    Vital Signs:  Temp Readings from Last 3 Encounters:   06/25/22 98 °F (36.7 °C) (Oral)   06/15/22 97.6 °F (36.4 °C)   05/17/22 98.1 °F (36.7 °C) (Oral)       Pulse Readings from Last 3 Encounters:   06/25/22 (!) 59   06/15/22 83   05/17/22 69       BP Readings from Last 3 Encounters:   06/25/22 (!) 147/61   06/15/22 134/66   05/17/22 (!) 145/65       Weight:  Wt Readings from Last 3 Encounters:   06/25/22 64.8 kg (142 lb 13.7 oz)   06/15/22 64.9 kg (143 lb 1.3 oz)   05/17/22 65.7 kg (144 lb 13.5 oz)       INS/OUTS:  I/O last 3 completed shifts:  In: 163.3 [P.O.:60; I.V.:103.3]  Out: 300 [Urine:300]  No intake/output data recorded.    Past Medical & Surgical History:  Past Medical History:   Diagnosis Date    Allergy     Dust mites, Grasses, Trees    Arthritis     Asthma     Blood transfusion     CAD (coronary artery disease)     Cataract     CHRONIC BRONCHITIS     Diabetes mellitus     Diabetes mellitus type II     GERD (gastroesophageal reflux disease)     Hyperlipidemia     Hypertension     Irregular heart beat     Kidney disease     ckd stage 3  as stated by patient - to see MD    Post-menopausal bleeding 2018    Spinal stenosis     Thyroid disease     Hypothyroidism       Past Surgical History:   Procedure Laterality Date    APPENDECTOMY  1968    BLADDER SUSPENSION  1989    CARDIAC SURGERY  2016    CABG    CATARACT EXTRACTION  9/2007 (L) and 10/2207 (R)    COLONOSCOPY N/A 10/18/2017    Procedure: COLONOSCOPY;  Surgeon: Esme Acuna MD;  Location: [facility];  Service: Endoscopy;  Laterality: N/A;    CORONARY ANGIOGRAPHY INCLUDING BYPASS GRAFTS WITH CATHETERIZATION OF LEFT HEART Left 5/13/2022    Procedure: Left heart cath;  Surgeon: Davon Patton MD;  Location: [facility] CATH[facility]EP LAB;  Service:  Cardiology;  Laterality: Left;    CORONARY ARTERY BYPASS GRAFT  4/26/2004    x5    ESOPHAGEAL DILATION      HYSTEROSCOPY WITH DILATION AND CURETTAGE OF UTERUS N/A 3/12/2020    Procedure: HYSTEROSCOPY, WITH DILATION AND CURETTAGE OF UTERUS;  Surgeon: Ramona Llanos MD;  Location: [facility];  Service: OB/GYN;  Laterality: N/A;    SPINE SURGERY  3/2000    Tumor    TRANSFORAMINAL EPIDURAL INJECTION OF STEROID Right 11/21/2019    Procedure: Injection,steroid,epidural,transforaminal approach;  Surgeon: Bj Jones MD;  Location: [facility];  Service: Pain Management;  Laterality: Right;  L4-5, L5-S1    TRANSFORAMINAL EPIDURAL INJECTION OF STEROID Right 12/31/2019    Procedure: Injection,steroid,epidural,transforaminal approach;  Surgeon: Bj Jones MD;  Location: [facility];  Service: Pain Management;  Laterality: Right;  L4-5, L5-S1    TRANSFORAMINAL EPIDURAL INJECTION OF STEROID Right 2/5/2020    Procedure: Injection,steroid,epidural,transforaminal approach;  Surgeon: Bj Jones MD;  Location: [facility];  Service: Pain Management;  Laterality: Right;  L4-5, L5-S1    TRANSFORAMINAL EPIDURAL INJECTION OF STEROID Left 1/27/2021    Procedure: Injection,steroid,epidural,transforaminal approach;  Surgeon: Bj Jones MD;  Location: [facility];  Service: Pain Management;  Laterality: Left;  L4-5, L5-S1    TRANSFORAMINAL EPIDURAL INJECTION OF STEROID Right 3/4/2021    Procedure: Injection,steroid,epidural,transforaminal approach;  Surgeon: Bj Jones MD;  Location: [facility];  Service: Pain Management;  Laterality: Right;  L4-L5, L5-S1    WRIST SURGERY  1993    carpal tunnel         Past Social History:  Social History     Socioeconomic History    Marital status:    Tobacco Use    Smoking status: Never Smoker    Smokeless tobacco: Never Used   Substance and Sexual Activity    Alcohol use: Yes     Comment: Rare    Drug use: No    Sexual activity: Not Currently       Medications:  Scheduled Meds:   amitriptyline  50  mg Oral QHS    apixaban  5 mg Oral BID    dronedarone  400 mg Oral BID WM    isosorbide mononitrate  60 mg Oral Daily    levothyroxine  25 mcg Oral Before breakfast    [START ON 6/26/2022] metoprolol succinate  25 mg Oral Daily    senna-docusate 8.6-50 mg  1 tablet Oral BID     Continuous Infusions:  PRN Meds:.acetaminophen, aluminum-magnesium hydroxide-simethicone, dextrose 10%, dextrose 10%, glucagon (human recombinant), glucose, glucose, insulin aspart U-100, magnesium oxide, magnesium oxide, melatonin, naloxone, nitroGLYCERIN, potassium bicarbonate, potassium bicarbonate, potassium bicarbonate, potassium, sodium phosphates, potassium, sodium phosphates, potassium, sodium phosphates, prochlorperazine, promethazine (PHENERGAN) IVPB, simethicone, sodium chloride 0.9%  Current Facility-Administered Medications on File Prior to Encounter   Medication Dose Route Frequency Provider Last Rate Last Admin    [DISCONTINUED] 0.9%  NaCl infusion   Intravenous Continuous Bj Jones MD   Stopped at 02/05/20 1400     Current Outpatient Medications on File Prior to Encounter   Medication Sig Dispense Refill    amitriptyline (ELAVIL) 50 MG tablet Take 1 tablet (50 mg total) by mouth every evening. 90 tablet 3    apixaban (ELIQUIS) 5 mg Tab Take 1 tablet (5 mg total) by mouth 2 (two) times daily. 180 tablet 3    azelastine (ASTELIN) 137 mcg (0.1 %) nasal spray 1 spray (137 mcg total) by Nasal route 2 (two) times daily. 90 mL 3    blood sugar diagnostic (FREESTYLE LITE STRIPS) Strp USE TO TEST BLOOD SUGAR TWICE A  strip 3    canagliflozin (INVOKANA) 100 mg Tab tablet Take 1 tablet (100 mg total) by mouth once daily. 90 tablet 3    dronedarone (MULTAQ) 400 mg Tab Take 1 tablet (400 mg total) by mouth 2 (two) times daily with meals. 180 tablet 3    fluticasone propionate (FLONASE) 50 mcg/actuation nasal spray 1 spray (50 mcg total) by Each Nostril route once daily. 48 g 3    lancets Misc 1 Units by  Misc.(Non-Drug; Combo Route) route 2 (two) times daily. 200 each 3    levothyroxine (LEVOTHROID) 25 MCG tablet Take 1 tablet (25 mcg total) by mouth before breakfast. Every day 90 tablet 3    metoclopramide HCl (REGLAN) 5 MG tablet Take 1 tablet (5 mg total) by mouth 2 (two) times daily as needed (nausea). 30 tablet 0    metoprolol succinate (TOPROL XL) 25 MG 24 hr tablet Take 1 tablet (25 mg total) by mouth once daily. 90 tablet 3    omeprazole (PRILOSEC) 40 MG capsule Take 1 capsule (40 mg total) by mouth once daily. 30 capsule 2    pramoxine-hydrocortisone (PROCTOCREAM-HC) 1-1 % rectal cream Place rectally 2 (two) times daily. (Patient taking differently: Place 1 application rectally 2 (two) times daily.) 3 each 3    rosuvastatin (CRESTOR) 10 MG tablet Take 1 tablet (10 mg total) by mouth once daily. 90 tablet 3    spironolactone (ALDACTONE) 25 MG tablet Take 1 tablet (25 mg total) by mouth once daily. 30 tablet 1    triazolam (HALCION) 0.25 MG Tab Take 1 tablet (0.25 mg total) by mouth nightly as needed (sleep). 90 tablet 1    acetaminophen (TYLENOL) 650 MG TbSR Take 1,300 mg by mouth once daily. 2 Tablet(s) Oral  Every day.      carboxymethylcellulose 1 % ophthalmic solution Apply 1 drop to eye As instructed. as directed      cetirizine (ZYRTEC) 10 MG tablet Take 10 mg by mouth once daily.      cholecalciferol, vitamin D3, (VITAMIN D3) 50 mcg (2,000 unit) Cap Take 1 capsule by mouth once daily.      coenzyme Q10 100 mg capsule Take 100 mg by mouth once daily.       cranberry extract 650 mg Cap Take 1 tablet by mouth 2 (two) times daily.      Lactobac no.41/Bifidobact no.7 (PROBIOTIC-10 ORAL) Take by mouth.      magnesium oxide (MAG-OXIDE ORAL) Take 400 mg by mouth once daily.      nitroGLYCERIN (NITROSTAT) 0.4 MG SL tablet Place 0.4 mg under the tongue every 5 (five) minutes as needed. 0.4mg Sublingual PRN .        RESTASIS 0.05 % ophthalmic emulsion Place 1 drop into both eyes 2 (two) times  "daily.      UNABLE TO FIND Take 1 capsule by mouth once daily. Vital red      vitamins  A,C,E-zinc-copper [id number] unit-mg-unit Cap Take 1 capsule by mouth 2 (two) times daily.          Allergies:  Sulfa (sulfonamide antibiotics), Floxacillin, Januvia [sitagliptin], Tetracyclines, Fenofibrate micronized, Nitrofurantoin macrocrystalline, and Phenylfenesin la [phenylpropanolamine-gg]    Past Family History:  Reviewed; refer to Hospitalist Admission Note    Review of Systems:  Review of Systems - All 14 systems reviewed and negative, except as noted in HPI    Physical Exam:  BP (!) 147/61 (BP Location: Right arm, Patient Position: Lying)   Pulse (!) 59   Temp 98 °F (36.7 °C) (Oral)   Resp 17   Ht 5' 2" (1.575 m)   Wt 64.8 kg (142 lb 13.7 oz)   SpO2 97%   BMI 26.13 kg/m²     General Appearance:    NAD, AAO x 3, cooperative, appears stated age   Head:    Normocephalic, atraumatic   Eyes:    PER, EOMI, and conjunctiva/sclera clear bilaterally        Mouth:   Moist mucus membranes, no thrush or oral lesions, normal      dentition   Back:     No CVA tenderness   Lungs:     Clear to auscultation bilaterally, no wheeze, crackles, rales      or rhonchi, symmetric air movement, respirations unlabored   Heart:    Regular rate and rhythm, S1 and S2 normal, no murmur, rub   or gallop   Abdomen:     Soft, non-tender, non-distended, bowel sounds active all four   quadrants, no RT or guarding, no masses, no organomegaly   Extremities:   Warm and well perfused, distal pulses intact, no cyanosis or    peripheral edema   MSK:   No joint or muscle swelling, tenderness or deformity   Skin:   Skin color, texture, turgor normal, no rashes or lesions   Neurologic/Psychiatric:   CNII-XII intact, normal strength and sensation       throughout, no asterixis; normal affect, memory, judgement     and insight     Results:  Lab Results   Component Value Date     (L) 06/25/2022    K 6.1 (H) 06/25/2022    CL 96 06/25/2022    CO2 " 22 (L) 06/25/2022    BUN 23 06/25/2022    CREATININE 1.2 06/25/2022    CALCIUM 9.4 06/25/2022    ANIONGAP 10 06/25/2022    ESTGFRAFRICA 49 (A) 06/25/2022    EGFRNONAA 42 (A) 06/25/2022       Lab Results   Component Value Date    CALCIUM 9.4 06/25/2022    PHOS 3.6 06/25/2022       Recent Labs   Lab 06/25/22  0400   WBC 4.55   RBC 4.04   HGB 11.5*   HCT 34.4*      MCV 85   MCH 28.5   MCHC 33.4       I have personally reviewed pertinent radiological imaging and reports.    I have spent > 70 minutes providing care for this patient for the above diagnoses. These services have included chart/data/imaging review, evaluation, exam, formulation of plan, , note preparation, and discussions with staff involved in this patient's care.    Elisa Grijalva MD MPH  [facility] [address] [address]  [phone] (p)  [phone] (f)

## 2022-06-25 NOTE — ASSESSMENT & PLAN NOTE
Creatine stable for now. BMP reviewed- noted Estimated Creatinine Clearance: 30 mL/min (based on SCr of 1.3 mg/dL). according to latest data. Monitor UOP and serial BMP and adjust therapy as needed. Renally dose meds.

## 2022-06-25 NOTE — H&P
[facility] Medicine  History & Physical    Patient Name: Darlin Tipton  MRN: [id number]  Patient Class: OP- Observation  Admission Date: 6/25/2022  Attending Physician: Tamera Lindsey MD   Primary Care Provider: Oumar Almonte Jr, MD         Patient information was obtained from patient, relative(s), past medical records and ER records.     Subjective:     Principal Problem:Hyponatremia    Chief Complaint:   Chief Complaint   Patient presents with    Headache     With nausea and other symptoms for a few days        HPI: Darlin Tipton is an 81-year-old female with a past medical history of AFib, hypothyroidism, CAD, DM type 2, GERD, CKD 3, and hypertension presenting with chronic nausea and intermittent vomiting over the past several weeks and soft stools for the past few days.  Patient was admitted to [facility] in May for CHF and required a thoracentesis during her hospital stay.  Patient has followed up with Cardiology and has had recent visits with her PCP for ongoing nausea.  She had been prescribed p.o. Phenergan and omeprazole with no relief.  Patient reports gas type feeling in the mid epigastrum and denies abdominal pain and tenderness.  She states she has been drinking a lot of water to help her belch and this relieves her gas type pain temporarily.  ED workup revealed hyponatremia with a sodium of 127 and an LINDSAY with creatinine 1.5.  AST and ALT were mildly elevated, lipase negative.  While in the ED, patient received 500 cc bolus and IV Phenergan and Zofran with minimal relief.  Patient was referred to Hospital Medicine and seen in the ED.  She reports ongoing nausea and states she does not feel well.  She reports having a headache and denies abdominal pain other than gas type feeling with no abdominal tenderness.  Patient also denies chest pain, shortness a breath, vomiting, diarrhea, fever, and chills.  Patient will be admitted to Hospital Medicine for further evaluation and  management.          Past Medical History:   Diagnosis Date    Allergy     Dust mites, Grasses, Trees    Arthritis     Asthma     Blood transfusion     CAD (coronary artery disease)     Cataract     CHRONIC BRONCHITIS     Diabetes mellitus     Diabetes mellitus type II     GERD (gastroesophageal reflux disease)     Hyperlipidemia     Hypertension     Irregular heart beat     Kidney disease     ckd stage 3  as stated by patient - to see MD    Post-menopausal bleeding 2018    Spinal stenosis     Thyroid disease     Hypothyroidism       Past Surgical History:   Procedure Laterality Date    APPENDECTOMY  1968    BLADDER SUSPENSION  1989    CARDIAC SURGERY  2016    CABG    CATARACT EXTRACTION  9/2007 (L) and 10/2207 (R)    COLONOSCOPY N/A 10/18/2017    Procedure: COLONOSCOPY;  Surgeon: Esme Acuna MD;  Location: [facility] ENDO;  Service: Endoscopy;  Laterality: N/A;    CORONARY ANGIOGRAPHY INCLUDING BYPASS GRAFTS WITH CATHETERIZATION OF LEFT HEART Left 5/13/2022    Procedure: Left heart cath;  Surgeon: Davon Patton MD;  Location: [facility] CATH/EP LAB;  Service: Cardiology;  Laterality: Left;    CORONARY ARTERY BYPASS GRAFT  4/26/2004    x5    ESOPHAGEAL DILATION      HYSTEROSCOPY WITH DILATION AND CURETTAGE OF UTERUS N/A 3/12/2020    Procedure: HYSTEROSCOPY, WITH DILATION AND CURETTAGE OF UTERUS;  Surgeon: Ramona Llanos MD;  Location: [facility] OR;  Service: OB/GYN;  Laterality: N/A;    SPINE SURGERY  3/2000    Tumor    TRANSFORAMINAL EPIDURAL INJECTION OF STEROID Right 11/21/2019    Procedure: Injection,steroid,epidural,transforaminal approach;  Surgeon: Bj Jones MD;  Location: [facility] OR;  Service: Pain Management;  Laterality: Right;  L4-5, L5-S1    TRANSFORAMINAL EPIDURAL INJECTION OF STEROID Right 12/31/2019    Procedure: Injection,steroid,epidural,transforaminal approach;  Surgeon: Bj Jones MD;  Location: [facility] OR;  Service: Pain Management;  Laterality: Right;  L4-5, L5-S1     TRANSFORAMINAL EPIDURAL INJECTION OF STEROID Right 2/5/2020    Procedure: Injection,steroid,epidural,transforaminal approach;  Surgeon: Bj Jones MD;  [facility];  Service: Pain Management;  Laterality: Right;  L4-5, L5-S1    TRANSFORAMINAL EPIDURAL INJECTION OF STEROID Left 1/27/2021    Procedure: Injection,steroid,epidural,transforaminal approach;  Surgeon: Bj Jones MD;  [facility];  Service: Pain Management;  Laterality: Left;  L4-5, L5-S1    TRANSFORAMINAL EPIDURAL INJECTION OF STEROID Right 3/4/2021    Procedure: Injection,steroid,epidural,transforaminal approach;  Surgeon: Bj Jones MD;  [facility];  Service: Pain Management;  Laterality: Right;  L4-L5, L5-S1    WRIST SURGERY  1993    carpal tunnel         Review of patient's allergies indicates:   Allergen Reactions    Sulfa (sulfonamide antibiotics) Rash    Floxacillin Itching    Januvia [sitagliptin] Other (See Comments)     Hot flashes    Tetracyclines Itching    Fenofibrate micronized Rash    Nitrofurantoin macrocrystalline Other (See Comments)     unknown    Phenylfenesin la [phenylpropanolamine-gg] Rash       Current Facility-Administered Medications on File Prior to Encounter   Medication    [DISCONTINUED] 0.9%  NaCl infusion     Current Outpatient Medications on File Prior to Encounter   Medication Sig    amitriptyline (ELAVIL) 50 MG tablet Take 1 tablet (50 mg total) by mouth every evening.    apixaban (ELIQUIS) 5 mg Tab Take 1 tablet (5 mg total) by mouth 2 (two) times daily.    azelastine (ASTELIN) 137 mcg (0.1 %) nasal spray 1 spray (137 mcg total) by Nasal route 2 (two) times daily.    blood sugar diagnostic (FREESTYLE LITE STRIPS) Strp USE TO TEST BLOOD SUGAR TWICE A DAY    canagliflozin (INVOKANA) 100 mg Tab tablet Take 1 tablet (100 mg total) by mouth once daily.    dronedarone (MULTAQ) 400 mg Tab Take 1 tablet (400 mg total) by mouth 2 (two) times daily with meals.    fluticasone propionate  (FLONASE) 50 mcg/actuation nasal spray 1 spray (50 mcg total) by Each Nostril route once daily.    lancets Misc 1 Units by Misc.(Non-Drug; Combo Route) route 2 (two) times daily.    levothyroxine (LEVOTHROID) 25 MCG tablet Take 1 tablet (25 mcg total) by mouth before breakfast. Every day    metoclopramide HCl (REGLAN) 5 MG tablet Take 1 tablet (5 mg total) by mouth 2 (two) times daily as needed (nausea).    metoprolol succinate (TOPROL XL) 25 MG 24 hr tablet Take 1 tablet (25 mg total) by mouth once daily.    omeprazole (PRILOSEC) 40 MG capsule Take 1 capsule (40 mg total) by mouth once daily.    pramoxine-hydrocortisone (PROCTOCREAM-HC) 1-1 % rectal cream Place rectally 2 (two) times daily. (Patient taking differently: Place 1 application rectally 2 (two) times daily.)    rosuvastatin (CRESTOR) 10 MG tablet Take 1 tablet (10 mg total) by mouth once daily.    spironolactone (ALDACTONE) 25 MG tablet Take 1 tablet (25 mg total) by mouth once daily.    triazolam (HALCION) 0.25 MG Tab Take 1 tablet (0.25 mg total) by mouth nightly as needed (sleep).    acetaminophen (TYLENOL) 650 MG TbSR Take 1,300 mg by mouth once daily. 2 Tablet(s) Oral  Every day.    carboxymethylcellulose 1 % ophthalmic solution Apply 1 drop to eye As instructed. as directed    cetirizine (ZYRTEC) 10 MG tablet Take 10 mg by mouth once daily.    cholecalciferol, vitamin D3, (VITAMIN D3) 50 mcg (2,000 unit) Cap Take 1 capsule by mouth once daily.    coenzyme Q10 100 mg capsule Take 100 mg by mouth once daily.     cranberry extract 650 mg Cap Take 1 tablet by mouth 2 (two) times daily.    Lactobac no.41/Bifidobact no.7 (PROBIOTIC-10 ORAL) Take by mouth.    magnesium oxide (MAG-OXIDE ORAL) Take 400 mg by mouth once daily.    nitroGLYCERIN (NITROSTAT) 0.4 MG SL tablet Place 0.4 mg under the tongue every 5 (five) minutes as needed. 0.4mg Sublingual PRN .      RESTASIS 0.05 % ophthalmic emulsion Place 1 drop into both eyes 2 (two) times  daily.    UNABLE TO FIND Take 1 capsule by mouth once daily. Vital red    vitamins  A,C,E-zinc-copper [id number] unit-mg-unit Cap Take 1 capsule by mouth 2 (two) times daily.      Family History       Problem Relation (Age of Onset)    Breast cancer Mother, Maternal Aunt          Tobacco Use    Smoking status: Never Smoker    Smokeless tobacco: Never Used   Substance and Sexual Activity    Alcohol use: Yes     Comment: Rare    Drug use: No    Sexual activity: Not Currently     Review of Systems   Constitutional:  Negative for activity change, appetite change, chills and fever.   HENT:  Negative for congestion, sore throat and trouble swallowing.    Eyes:  Negative for photophobia and visual disturbance.   Respiratory:  Negative for cough, chest tightness and shortness of breath.    Cardiovascular:  Negative for chest pain, palpitations and leg swelling.   Gastrointestinal:  Positive for abdominal pain (feels like gas) and nausea. Negative for diarrhea.   Genitourinary:  Negative for dysuria, flank pain and hematuria.   Musculoskeletal:  Negative for back pain.   Neurological:  Positive for weakness and headaches. Negative for dizziness.   Psychiatric/Behavioral:  Negative for confusion.    Objective:     Vital Signs (Most Recent):  Temp: 96.7 °F (35.9 °C) (06/25/22 0450)  Pulse: 61 (06/25/22 0450)  Resp: 18 (06/25/22 0450)  BP: 138/63 (06/25/22 0450)  SpO2: (!) 91 % (06/25/22 0450)   Vital Signs (24h Range):  Temp:  [96.7 °F (35.9 °C)-97.8 °F (36.6 °C)] 96.7 °F (35.9 °C)  Pulse:  [56-65] 61  Resp:  [16-20] 18  SpO2:  [91 %-100 %] 91 %  BP: (107-156)/(59-78) 138/63     Weight: 64.8 kg (142 lb 13.7 oz)  Body mass index is 26.13 kg/m².    Physical Exam  Vitals reviewed.   Constitutional:       Appearance: Normal appearance. She is normal weight.   HENT:      Head: Normocephalic.      Mouth/Throat:      Mouth: Mucous membranes are moist.      Pharynx: Oropharynx is clear.   Eyes:      Pupils: Pupils are  equal, round, and reactive to light.   Cardiovascular:      Rate and Rhythm: Normal rate and regular rhythm.      Pulses: Normal pulses.           Carotid pulses are 2+ on the right side and 2+ on the left side.  Pulmonary:      Effort: Pulmonary effort is normal.      Breath sounds: Normal breath sounds.   Abdominal:      General: Bowel sounds are normal.      Palpations: Abdomen is soft.   Musculoskeletal:         General: Normal range of motion.      Cervical back: Normal range of motion.   Skin:     General: Skin is warm and dry.   Neurological:      Mental Status: She is alert and oriented to person, place, and time. Mental status is at baseline.   Psychiatric:         Mood and Affect: Mood normal.         CRANIAL NERVES     CN III, IV, VI   Pupils are equal, round, and reactive to light.     Significant Labs: All pertinent labs within the past 24 hours have been reviewed.  CBC:   Recent Labs   Lab 06/24/22 2355 06/25/22  0400   WBC 4.58 4.55   HGB 12.3 11.5*   HCT 36.5* 34.4*    217     CMP:   Recent Labs   Lab 06/24/22 2355 06/25/22  0400   * 128*   K 5.3* 5.4*   CL 93* 96   CO2 22* 23    94   BUN 29* 26*   CREATININE 1.5* 1.3   CALCIUM 9.9 9.4   PROT 7.8 7.1   ALBUMIN 4.5 4.1   BILITOT 1.1* 1.0   ALKPHOS 81 73   AST 52* 52*   ALT 99* 94*   ANIONGAP 12 9   EGFRNONAA 32* 39*       Significant Imaging: I have reviewed all pertinent imaging results/findings within the past 24 hours.      Assessment/Plan:     * Hyponatremia  Na+ 127    BMP q4  IV NS   Monitor for changes    LINDSAY (acute kidney injury)  Patient with acute kidney injury. LINDSAY is currently worsening. Labs reviewed- Renal function/electrolytes with Estimated Creatinine Clearance: 30 mL/min (based on SCr of 1.3 mg/dL). according to latest data. Monitor urine output and serial BMP and adjust therapy as needed. Avoid nephrotoxins and renally dose meds for GFR listed above.     Nephrology consult    Nausea  Chronic nausea     PRN  antiemetics  Consult GI      Stage 3b chronic kidney disease  Creatine stable for now. BMP reviewed- noted Estimated Creatinine Clearance: 30 mL/min (based on SCr of 1.3 mg/dL). according to latest data. Monitor UOP and serial BMP and adjust therapy as needed. Renally dose meds.            Thyroid dysfunction  Patient has chronic hypothyroidism. TFTs reviewed-   Lab Results   Component Value Date    TSH 1.750 05/15/2022   . Will continue chronic levothyroxine and adjust for and clinical changes.        Atherosclerosis of native coronary artery of native heart with angina pectoris  Patient with known CAD s/p CABG, which is controlled Will continue ASA and Statin and monitor for S/Sx of angina/ACS. Continue to monitor on telemetry.           VTE Risk Mitigation (From admission, onward)         Ordered     apixaban tablet 5 mg  2 times daily         06/25/22 0221     Reason for No Pharmacological VTE Prophylaxis  Once        Question:  Reasons:  Answer:  Already adequately anticoagulated on oral Anticoagulants    06/25/22 0221     IP VTE HIGH RISK PATIENT  Once         06/25/22 0221     Place sequential compression device  Until discontinued         06/25/22 0221                   Babs Lott NP  Department of Hospital Medicine   [facility]

## 2022-06-25 NOTE — ASSESSMENT & PLAN NOTE
Patient has chronic hypothyroidism. TFTs reviewed-   Lab Results   Component Value Date    TSH 1.750 05/15/2022   . Will continue chronic levothyroxine and adjust for and clinical changes.

## 2022-06-25 NOTE — FIRST PROVIDER EVALUATION
Emergency Department TeleTriage Encounter Note      CHIEF COMPLAINT    Chief Complaint   Patient presents with    Headache     With nausea and other symptoms for a few days       VITAL SIGNS   Initial Vitals [06/24/22 2308]   BP Pulse Resp Temp SpO2   (!) 145/67 (!) 56 20 97.8 °F (36.6 °C) 99 %      MAP       --            ALLERGIES    Review of patient's allergies indicates:   Allergen Reactions    Sulfa (sulfonamide antibiotics) Rash    Floxacillin Itching    Januvia [sitagliptin] Other (See Comments)     Hot flashes    Tetracyclines Itching    Fenofibrate micronized Rash    Nitrofurantoin macrocrystalline Other (See Comments)     unknown    Phenylfenesin la [phenylpropanolamine-gg] Rash       PROVIDER TRIAGE NOTE      81-year-old female presents ER for evaluation of multiple complaints.  She reports chest discomfort, nausea.  He also have a headache and feels as though she is upset stomach.  No fever.      PE:  Patient alert and oriented. No acute distress    Initial orders will be placed and care will be transferred to an alternate provider when patient is roomed for a full evaluation. Any additional orders and the final disposition will be determined by that provider.    Disclaimer: This note has been generated using voice-recognition software. There may be typographical errors that have been missed during proof-reading.          ORDERS  Labs Reviewed   CBC W/ AUTO DIFFERENTIAL   COMPREHENSIVE METABOLIC PANEL   LIPASE   TROPONIN I   SARS-COV-2 RNA AMPLIFICATION, QUAL   URINALYSIS, REFLEX TO URINE CULTURE       ED Orders (720h ago, onward)    Start Ordered     Status Ordering Provider    06/24/22 2342 06/24/22 2341  EKG 12-lead  Once         Ordered DORIS LOPEZ    06/24/22 2342 06/24/22 2341  Urinalysis, Reflex to Urine Culture Urine, Clean Catch  STAT         Ordered DORIS LOPEZ    06/24/22 2342 06/24/22 2341  X-Ray Chest AP Portable  1 time imaging         Ordered DORIS LOPEZ     06/24/22 2341 06/24/22 2341  Complete Blood Count (CBC)  STAT         Pending Collection JOHNYKUTTY, DORIS    06/24/22 2341 06/24/22 2341  Comprehensive Metabolic Panel (CMP)  STAT         Pending Collection JOHNYKUTTY, DORIS    06/24/22 2341 06/24/22 2341  Lipase  STAT         Pending Collection JOHNYKUTTY, DORIS    06/24/22 2341 06/24/22 2341  Troponin I  STAT         Pending Collection JOHNYKUTTY, DORIS    06/24/22 2341 06/24/22 2341  Insert Saline lock IV  Once         Ordered JOHNYKUTTY, DORIS    06/24/22 2341 06/24/22 2341  COVID-19 Rapid Screening  STAT         Ordered JOHNYKUTTY, DORIS    06/24/22 2341 06/24/22 2341  Cardiac Monitoring - Adult  Continuous        Comments: Notify Physician If:    Ordered SHARITATY, DORIS            Virtual Visit Note: The provider triage portion of this emergency department evaluation and documentation was performed via [employer], a HIPAA-compliant telemedicine application, in concert with a tele-presenter in the room. A face to face patient evaluation with one of my colleagues will occur once the patient is placed in an emergency department room.      DISCLAIMER: This note was prepared with [employer] voice recognition transcription software. Garbled syntax, mangled pronouns, and other bizarre constructions may be attributed to that software system.

## 2022-06-25 NOTE — ASSESSMENT & PLAN NOTE
Patient with acute kidney injury. LINDSAY is currently worsening. Labs reviewed- Renal function/electrolytes with Estimated Creatinine Clearance: 30 mL/min (based on SCr of 1.3 mg/dL). according to latest data. Monitor urine output and serial BMP and adjust therapy as needed. Avoid nephrotoxins and renally dose meds for GFR listed above.     Nephrology consult

## 2022-06-25 NOTE — SUBJECTIVE & OBJECTIVE
Past Medical History:   Diagnosis Date    Allergy     Dust mites, Grasses, Trees    Arthritis     Asthma     Blood transfusion     CAD (coronary artery disease)     Cataract     CHRONIC BRONCHITIS     Diabetes mellitus     Diabetes mellitus type II     GERD (gastroesophageal reflux disease)     Hyperlipidemia     Hypertension     Irregular heart beat     Kidney disease     ckd stage 3  as stated by patient - to see MD    Post-menopausal bleeding 2018    Spinal stenosis     Thyroid disease     Hypothyroidism       Past Surgical History:   Procedure Laterality Date    APPENDECTOMY  1968    BLADDER SUSPENSION  1989    CARDIAC SURGERY  2016    CABG    CATARACT EXTRACTION  9/2007 (L) and 10/2207 (R)    COLONOSCOPY N/A 10/18/2017    Procedure: COLONOSCOPY;  Surgeon: Esme Acuna MD;  Location: [facility] ENDO;  Service: Endoscopy;  Laterality: N/A;    CORONARY ANGIOGRAPHY INCLUDING BYPASS GRAFTS WITH CATHETERIZATION OF LEFT HEART Left 5/13/2022    Procedure: Left heart cath;  Surgeon: Davon Patton MD;  Location: [facility] CATH/EP LAB;  Service: Cardiology;  Laterality: Left;    CORONARY ARTERY BYPASS GRAFT  4/26/2004    x5    ESOPHAGEAL DILATION      HYSTEROSCOPY WITH DILATION AND CURETTAGE OF UTERUS N/A 3/12/2020    Procedure: HYSTEROSCOPY, WITH DILATION AND CURETTAGE OF UTERUS;  Surgeon: Ramona Llanos MD;  Location: [facility] OR;  Service: OB/GYN;  Laterality: N/A;    SPINE SURGERY  3/2000    Tumor    TRANSFORAMINAL EPIDURAL INJECTION OF STEROID Right 11/21/2019    Procedure: Injection,steroid,epidural,transforaminal approach;  Surgeon: Bj Jones MD;  Location: [facility] OR;  Service: Pain Management;  Laterality: Right;  L4-5, L5-S1    TRANSFORAMINAL EPIDURAL INJECTION OF STEROID Right 12/31/2019    Procedure: Injection,steroid,epidural,transforaminal approach;  Surgeon: Bj Jones MD;  Location: [facility] OR;  Service: Pain Management;  Laterality: Right;  L4-5, L5-S1    TRANSFORAMINAL EPIDURAL INJECTION OF STEROID Right  SW received an inbasket message that pt had questions about financing after transplant.    SW contacted pt for continuity of care. Pt reports she was mostly wondering how much she is responsible paying for the actual surgery or stay in the hospital. SW informed pt that she would actually need to speak with a , but explained that the exact number might not be something that can be given because the surgery hasn't happened yet. SW explained that a certain cost might not be estimated because the length of stay can change, anything can happen that could affect the cost and an exact number is difficult to obtain. Pt denied any concerns with affording medication or housing after transplant.     Pt confirmed understanding. SW forwarded information to Gabriela Obando.       2/5/2020    Procedure: Injection,steroid,epidural,transforaminal approach;  Surgeon: Bj Jones MD;  Location: [facility] OR;  Service: Pain Management;  Laterality: Right;  L4-5, L5-S1    TRANSFORAMINAL EPIDURAL INJECTION OF STEROID Left 1/27/2021    Procedure: Injection,steroid,epidural,transforaminal approach;  Surgeon: Bj Jones MD;  Location: [facility] OR;  Service: Pain Management;  Laterality: Left;  L4-5, L5-S1    TRANSFORAMINAL EPIDURAL INJECTION OF STEROID Right 3/4/2021    Procedure: Injection,steroid,epidural,transforaminal approach;  Surgeon: Bj Jones MD;  Location: [facility] OR;  Service: Pain Management;  Laterality: Right;  L4-L5, L5-S1    WRIST SURGERY  1993    carpal tunnel         Review of patient's allergies indicates:   Allergen Reactions    Sulfa (sulfonamide antibiotics) Rash    Floxacillin Itching    Januvia [sitagliptin] Other (See Comments)     Hot flashes    Tetracyclines Itching    Fenofibrate micronized Rash    Nitrofurantoin macrocrystalline Other (See Comments)     unknown    Phenylfenesin la [phenylpropanolamine-gg] Rash       Current Facility-Administered Medications on File Prior to Encounter   Medication    [DISCONTINUED] 0.9%  NaCl infusion     Current Outpatient Medications on File Prior to Encounter   Medication Sig    amitriptyline (ELAVIL) 50 MG tablet Take 1 tablet (50 mg total) by mouth every evening.    apixaban (ELIQUIS) 5 mg Tab Take 1 tablet (5 mg total) by mouth 2 (two) times daily.    azelastine (ASTELIN) 137 mcg (0.1 %) nasal spray 1 spray (137 mcg total) by Nasal route 2 (two) times daily.    blood sugar diagnostic (FREESTYLE LITE STRIPS) Strp USE TO TEST BLOOD SUGAR TWICE A DAY    canagliflozin (INVOKANA) 100 mg Tab tablet Take 1 tablet (100 mg total) by mouth once daily.    dronedarone (MULTAQ) 400 mg Tab Take 1 tablet (400 mg total) by mouth 2 (two) times daily with meals.    fluticasone propionate (FLONASE) 50 mcg/actuation nasal spray 1 spray (50 mcg total) by Each  Nostril route once daily.    lancets Misc 1 Units by Misc.(Non-Drug; Combo Route) route 2 (two) times daily.    levothyroxine (LEVOTHROID) 25 MCG tablet Take 1 tablet (25 mcg total) by mouth before breakfast. Every day    metoclopramide HCl (REGLAN) 5 MG tablet Take 1 tablet (5 mg total) by mouth 2 (two) times daily as needed (nausea).    metoprolol succinate (TOPROL XL) 25 MG 24 hr tablet Take 1 tablet (25 mg total) by mouth once daily.    omeprazole (PRILOSEC) 40 MG capsule Take 1 capsule (40 mg total) by mouth once daily.    pramoxine-hydrocortisone (PROCTOCREAM-HC) 1-1 % rectal cream Place rectally 2 (two) times daily. (Patient taking differently: Place 1 application rectally 2 (two) times daily.)    rosuvastatin (CRESTOR) 10 MG tablet Take 1 tablet (10 mg total) by mouth once daily.    spironolactone (ALDACTONE) 25 MG tablet Take 1 tablet (25 mg total) by mouth once daily.    triazolam (HALCION) 0.25 MG Tab Take 1 tablet (0.25 mg total) by mouth nightly as needed (sleep).    acetaminophen (TYLENOL) 650 MG TbSR Take 1,300 mg by mouth once daily. 2 Tablet(s) Oral  Every day.    carboxymethylcellulose 1 % ophthalmic solution Apply 1 drop to eye As instructed. as directed    cetirizine (ZYRTEC) 10 MG tablet Take 10 mg by mouth once daily.    cholecalciferol, vitamin D3, (VITAMIN D3) 50 mcg (2,000 unit) Cap Take 1 capsule by mouth once daily.    coenzyme Q10 100 mg capsule Take 100 mg by mouth once daily.     cranberry extract 650 mg Cap Take 1 tablet by mouth 2 (two) times daily.    Lactobac no.41/Bifidobact no.7 (PROBIOTIC-10 ORAL) Take by mouth.    magnesium oxide (MAG-OXIDE ORAL) Take 400 mg by mouth once daily.    nitroGLYCERIN (NITROSTAT) 0.4 MG SL tablet Place 0.4 mg under the tongue every 5 (five) minutes as needed. 0.4mg Sublingual PRN .      RESTASIS 0.05 % ophthalmic emulsion Place 1 drop into both eyes 2 (two) times daily.    UNABLE TO FIND Take 1 capsule by mouth once daily. Vital red    vitamins   A,C,E-zinc-copper 14,320-226-200 unit-mg-unit Cap Take 1 capsule by mouth 2 (two) times daily.      Family History       Problem Relation (Age of Onset)    Breast cancer Mother, Maternal Aunt          Tobacco Use    Smoking status: Never Smoker    Smokeless tobacco: Never Used   Substance and Sexual Activity    Alcohol use: Yes     Comment: Rare    Drug use: No    Sexual activity: Not Currently     Review of Systems   Constitutional:  Negative for activity change, appetite change, chills and fever.   HENT:  Negative for congestion, sore throat and trouble swallowing.    Eyes:  Negative for photophobia and visual disturbance.   Respiratory:  Negative for cough, chest tightness and shortness of breath.    Cardiovascular:  Negative for chest pain, palpitations and leg swelling.   Gastrointestinal:  Positive for abdominal pain (feels like gas) and nausea. Negative for diarrhea.   Genitourinary:  Negative for dysuria, flank pain and hematuria.   Musculoskeletal:  Negative for back pain.   Neurological:  Positive for weakness and headaches. Negative for dizziness.   Psychiatric/Behavioral:  Negative for confusion.    Objective:     Vital Signs (Most Recent):  Temp: 96.7 °F (35.9 °C) (06/25/22 0450)  Pulse: 61 (06/25/22 0450)  Resp: 18 (06/25/22 0450)  BP: 138/63 (06/25/22 0450)  SpO2: (!) 91 % (06/25/22 0450)   Vital Signs (24h Range):  Temp:  [96.7 °F (35.9 °C)-97.8 °F (36.6 °C)] 96.7 °F (35.9 °C)  Pulse:  [56-65] 61  Resp:  [16-20] 18  SpO2:  [91 %-100 %] 91 %  BP: (107-156)/(59-78) 138/63     Weight: 64.8 kg (142 lb 13.7 oz)  Body mass index is 26.13 kg/m².    Physical Exam  Vitals reviewed.   Constitutional:       Appearance: Normal appearance. She is normal weight.   HENT:      Head: Normocephalic.      Mouth/Throat:      Mouth: Mucous membranes are moist.      Pharynx: Oropharynx is clear.   Eyes:      Pupils: Pupils are equal, round, and reactive to light.   Cardiovascular:      Rate and Rhythm: Normal rate and  regular rhythm.      Pulses: Normal pulses.           Carotid pulses are 2+ on the right side and 2+ on the left side.  Pulmonary:      Effort: Pulmonary effort is normal.      Breath sounds: Normal breath sounds.   Abdominal:      General: Bowel sounds are normal.      Palpations: Abdomen is soft.   Musculoskeletal:         General: Normal range of motion.      Cervical back: Normal range of motion.   Skin:     General: Skin is warm and dry.   Neurological:      Mental Status: She is alert and oriented to person, place, and time. Mental status is at baseline.   Psychiatric:         Mood and Affect: Mood normal.         CRANIAL NERVES     CN III, IV, VI   Pupils are equal, round, and reactive to light.     Significant Labs: All pertinent labs within the past 24 hours have been reviewed.  CBC:   Recent Labs   Lab 06/24/22  2355 06/25/22  0400   WBC 4.58 4.55   HGB 12.3 11.5*   HCT 36.5* 34.4*    217     CMP:   Recent Labs   Lab 06/24/22  2355 06/25/22  0400   * 128*   K 5.3* 5.4*   CL 93* 96   CO2 22* 23    94   BUN 29* 26*   CREATININE 1.5* 1.3   CALCIUM 9.9 9.4   PROT 7.8 7.1   ALBUMIN 4.5 4.1   BILITOT 1.1* 1.0   ALKPHOS 81 73   AST 52* 52*   ALT 99* 94*   ANIONGAP 12 9   EGFRNONAA 32* 39*       Significant Imaging: I have reviewed all pertinent imaging results/findings within the past 24 hours.

## 2022-06-25 NOTE — PLAN OF CARE
[facility]  Initial Discharge Assessment       Primary Care Provider: Oumar Almonte Jr, MD    Admission Diagnosis: Hyponatremia [E87.1]  Chest discomfort [R07.89]  Chest pain [R07.9]    Admission Date: 6/25/2022     Pt confirmed her home address and lives with  spouse- Dwayne Tipton [phone]. Pt with recent admits at [facility] 5/10/22 and 5/18/22. Pt denied H Hand has home rollator and home O2 at 3L form [employer]. Pt verified PCP as Dr. Almonte and insurance as [employer] Medicare. Pt is on eliquise and uses [email or web address] . Pts spouse will provide transport home. CM following for additional needs.   Expected Discharge Date:     Discharge Barriers Identified: None    Payor: [employer] MANAGED MEDICARE / Plan: [employer] MEDICARE PLAN PPO / Product Type: Medicare Advantage /     Extended Emergency Contact Information  Primary Emergency Contact: Dwayne Tipton  Address: [address] [place] [address] [facility]  Home Phone: [phone]  Mobile Phone: [phone]  Relation: Spouse  Preferred language: English   needed? No    Discharge Plan A: Home with family  Discharge Plan B: Home with family, Home Health      [id number] - [place], [place] - [address] [facility]  [address] [facility] [address]  Phone: [phone] Fax: [phone]    [facility] - [place], [place] - [address]  [address]  [facility] [address]  Phone: [phone] Fax: [phone]      Initial Assessment (most recent)     Adult Discharge Assessment - 06/25/22 1044        Discharge Assessment    Assessment Type Discharge Planning Assessment     Confirmed/corrected address, phone number and insurance Yes     Confirmed Demographics Correct on Facesheet     Source of Information patient;family;health care advocate     Communicated SHIRA with patient/caregiver Yes     Reason For Admission Hyponatremia     Lives With spouse     Facility Arrived From: home      Do you expect to return to your current living situation? Yes     Do you have help at home or someone to help you manage your care at home? Yes     Who are your caregiver(s) and their phone number(s)? spouse- Dwayne Tipton [phone]     Prior to hospitilization cognitive status: Alert/Oriented     Current cognitive status: Alert/Oriented     Walking or Climbing Stairs Difficulty ambulation difficulty, requires equipment     Mobility Management rollator     Dressing/Bathing Difficulty none     Home Accessibility wheelchair accessible     Home Layout Able to live on 1st floor     Equipment Currently Used at Home rollator;oxygen     Readmission within 30 days? Yes     Patient currently being followed by outpatient case management? No     Do you currently have service(s) that help you manage your care at home? No     Do you take prescription medications? Yes     Do you have prescription coverage? Yes     Do you have any problems affording any of your prescribed medications? No     Is the patient taking medications as prescribed? yes     Who is going to help you get home at discharge? spouse- Dwayne iTpton [phone]     How do you get to doctors appointments? family or friend will provide     Are you on dialysis? No     Do you take coumadin? No     Discharge Plan A Home with family     Discharge Plan B Home with family;Home Health     DME Needed Upon Discharge  none     Discharge Plan discussed with: Patient;Spouse/sig other     Name(s) and Number(s) spouse- Dwayne Tipton [phone]     Discharge Barriers Identified None        Relationship/Environment    Name(s) of Who Lives With Patient paola Beaversn [phone]

## 2022-06-25 NOTE — TELEPHONE ENCOUNTER
Daughter reports pt having problems with nausea, trapped gas, chest tightness, headache. Pt reports chest tightness has been going on for a while. Advised pt & daughter they need to call 911 now per protocol for pt to be evaluated & rule out anything emergent, verbalized understanding.    Reason for Disposition   [1] Chest pain lasts > 5 minutes AND [2] age > 44    Additional Information   Negative: SEVERE difficulty breathing (e.g., struggling for each breath, speaks in single words)   Negative: Difficult to awaken or acting confused (e.g., disoriented, slurred speech)   Negative: Shock suspected (e.g., cold/pale/clammy skin, too weak to stand, low BP, rapid pulse)   Negative: Passed out (i.e., fainted, collapsed and was not responding)    Protocols used: CHEST PAIN-A-AH

## 2022-06-25 NOTE — NURSING
Monitor room called and said patient is going between afib with PVCs and sinus mao with first degress av block. pt states her chest feels heavy 5/10. Babs Lott NP notified. EKG, troponin and cardiology consult ordered.

## 2022-06-26 LAB
ANION GAP SERPL CALC-SCNC: 5 MMOL/L (ref 8–16)
BASOPHILS # BLD AUTO: 0.02 K/UL (ref 0–0.2)
BASOPHILS NFR BLD: 0.5 % (ref 0–1.9)
BUN SERPL-MCNC: 19 MG/DL (ref 8–23)
CALCIUM SERPL-MCNC: 9 MG/DL (ref 8.7–10.5)
CHLORIDE SERPL-SCNC: 98 MMOL/L (ref 95–110)
CO2 SERPL-SCNC: 28 MMOL/L (ref 23–29)
CREAT SERPL-MCNC: 1.4 MG/DL (ref 0.5–1.4)
DIFFERENTIAL METHOD: ABNORMAL
EOSINOPHIL # BLD AUTO: 0 K/UL (ref 0–0.5)
EOSINOPHIL NFR BLD: 1 % (ref 0–8)
ERYTHROCYTE [DISTWIDTH] IN BLOOD BY AUTOMATED COUNT: 14.3 % (ref 11.5–14.5)
EST. GFR  (AFRICAN AMERICAN): 41 ML/MIN/1.73 M^2
EST. GFR  (NON AFRICAN AMERICAN): 35 ML/MIN/1.73 M^2
GLUCOSE SERPL-MCNC: 118 MG/DL (ref 70–110)
HCT VFR BLD AUTO: 30.6 % (ref 37–48.5)
HGB BLD-MCNC: 9.7 G/DL (ref 12–16)
IMM GRANULOCYTES # BLD AUTO: 0.01 K/UL (ref 0–0.04)
IMM GRANULOCYTES NFR BLD AUTO: 0.3 % (ref 0–0.5)
LYMPHOCYTES # BLD AUTO: 1.2 K/UL (ref 1–4.8)
LYMPHOCYTES NFR BLD: 31.6 % (ref 18–48)
MCH RBC QN AUTO: 27.6 PG (ref 27–31)
MCHC RBC AUTO-ENTMCNC: 31.7 G/DL (ref 32–36)
MCV RBC AUTO: 87 FL (ref 82–98)
MONOCYTES # BLD AUTO: 0.8 K/UL (ref 0.3–1)
MONOCYTES NFR BLD: 19.1 % (ref 4–15)
NEUTROPHILS # BLD AUTO: 1.9 K/UL (ref 1.8–7.7)
NEUTROPHILS NFR BLD: 47.5 % (ref 38–73)
NRBC BLD-RTO: 0 /100 WBC
OSMOLALITY UR: 230 MOSM/KG (ref 50–1200)
PLATELET # BLD AUTO: 179 K/UL (ref 150–450)
PMV BLD AUTO: 10.9 FL (ref 9.2–12.9)
POCT GLUCOSE: 116 MG/DL (ref 70–110)
POCT GLUCOSE: 141 MG/DL (ref 70–110)
POCT GLUCOSE: 80 MG/DL (ref 70–110)
POCT GLUCOSE: 94 MG/DL (ref 70–110)
POTASSIUM SERPL-SCNC: 5.3 MMOL/L (ref 3.5–5.1)
RBC # BLD AUTO: 3.52 M/UL (ref 4–5.4)
SODIUM SERPL-SCNC: 131 MMOL/L (ref 136–145)
WBC # BLD AUTO: 3.92 K/UL (ref 3.9–12.7)

## 2022-06-26 PROCEDURE — 25000003 PHARM REV CODE 250: Performed by: STUDENT IN AN ORGANIZED HEALTH CARE EDUCATION/TRAINING PROGRAM

## 2022-06-26 PROCEDURE — 36415 COLL VENOUS BLD VENIPUNCTURE: CPT | Performed by: INTERNAL MEDICINE

## 2022-06-26 PROCEDURE — 25000003 PHARM REV CODE 250: Performed by: INTERNAL MEDICINE

## 2022-06-26 PROCEDURE — 85025 COMPLETE CBC W/AUTO DIFF WBC: CPT | Performed by: NURSE PRACTITIONER

## 2022-06-26 PROCEDURE — 27000221 HC OXYGEN, UP TO 24 HOURS

## 2022-06-26 PROCEDURE — 94761 N-INVAS EAR/PLS OXIMETRY MLT: CPT

## 2022-06-26 PROCEDURE — 36415 COLL VENOUS BLD VENIPUNCTURE: CPT | Performed by: NURSE PRACTITIONER

## 2022-06-26 PROCEDURE — 99232 SBSQ HOSP IP/OBS MODERATE 35: CPT | Mod: ,,, | Performed by: INTERNAL MEDICINE

## 2022-06-26 PROCEDURE — 12000002 HC ACUTE/MED SURGE SEMI-PRIVATE ROOM

## 2022-06-26 PROCEDURE — 80048 BASIC METABOLIC PNL TOTAL CA: CPT | Performed by: INTERNAL MEDICINE

## 2022-06-26 PROCEDURE — 25000003 PHARM REV CODE 250: Performed by: NURSE PRACTITIONER

## 2022-06-26 PROCEDURE — 99232 PR SUBSEQUENT HOSPITAL CARE,LEVL II: ICD-10-PCS | Mod: ,,, | Performed by: INTERNAL MEDICINE

## 2022-06-26 PROCEDURE — G0378 HOSPITAL OBSERVATION PER HR: HCPCS

## 2022-06-26 RX ADMIN — SODIUM ZIRCONIUM CYCLOSILICATE 10 G: 5 POWDER, FOR SUSPENSION ORAL at 03:06

## 2022-06-26 RX ADMIN — LEVOTHYROXINE SODIUM 25 MCG: 25 TABLET ORAL at 05:06

## 2022-06-26 RX ADMIN — Medication 6 MG: at 02:06

## 2022-06-26 RX ADMIN — APIXABAN 5 MG: 2.5 TABLET, FILM COATED ORAL at 10:06

## 2022-06-26 RX ADMIN — ISOSORBIDE MONONITRATE 60 MG: 60 TABLET, EXTENDED RELEASE ORAL at 05:06

## 2022-06-26 RX ADMIN — PANTOPRAZOLE SODIUM 40 MG: 40 TABLET, DELAYED RELEASE ORAL at 10:06

## 2022-06-26 RX ADMIN — DRONEDARONE 400 MG: 400 TABLET, FILM COATED ORAL at 10:06

## 2022-06-26 RX ADMIN — AMITRIPTYLINE HYDROCHLORIDE 50 MG: 25 TABLET, FILM COATED ORAL at 08:06

## 2022-06-26 RX ADMIN — DRONEDARONE 400 MG: 400 TABLET, FILM COATED ORAL at 05:06

## 2022-06-26 RX ADMIN — APIXABAN 5 MG: 2.5 TABLET, FILM COATED ORAL at 08:06

## 2022-06-26 NOTE — PROGRESS NOTES
[facility] Hospital Medicine  Progress Note    Patient Name: Darlin Tipton  MRN: [id number]  Patient Class: IP- Inpatient   Admission Date: 6/25/2022  Length of Stay: 1 days  Attending Physician: Marie Lindo MD  Primary Care Provider: Oumar Almonte Jr, MD        Subjective:     Principal Problem:Hyponatremia        HPI:  Darlin Tipton is an 81-year-old female with a past medical history of AFib, hypothyroidism, CAD, DM type 2, GERD, CKD 3, and hypertension presenting with chronic nausea and intermittent vomiting over the past several weeks and soft stools for the past few days.  Patient was admitted to [facility] in May for CHF and required a thoracentesis during her hospital stay.  Patient has followed up with Cardiology and has had recent visits with her PCP for ongoing nausea.  She had been prescribed p.o. Phenergan and omeprazole with no relief.  Patient reports gas type feeling in the mid epigastrum and denies abdominal pain and tenderness.  She states she has been drinking a lot of water to help her belch and this relieves her gas type pain temporarily.  ED workup revealed hyponatremia with a sodium of 127 and an LINDSAY with creatinine 1.5.  AST and ALT were mildly elevated, lipase negative.  While in the ED, patient received 500 cc bolus and IV Phenergan and Zofran with minimal relief.  Patient was referred to Hospital Medicine and seen in the ED.  She reports ongoing nausea and states she does not feel well.  She reports having a headache and denies abdominal pain other than gas type feeling with no abdominal tenderness.  Patient also denies chest pain, shortness a breath, vomiting, diarrhea, fever, and chills.  Patient will be admitted to Hospital Medicine for further evaluation and management.          Overview/Hospital Course:  No notes on file    Interval History: multiple loose bowel movements overnight. Nausea controlled. Scheduled for EGD tomorrow    Review of Systems    Constitutional:  Negative for activity change, appetite change, chills and fever.   HENT:  Negative for congestion, sore throat and trouble swallowing.    Eyes:  Negative for photophobia and visual disturbance.   Respiratory:  Negative for cough, chest tightness and shortness of breath.    Cardiovascular:  Negative for chest pain, palpitations and leg swelling.   Gastrointestinal:  Positive for abdominal pain (feels like gas) and nausea. Negative for diarrhea.   Genitourinary:  Negative for dysuria, flank pain and hematuria.   Musculoskeletal:  Negative for back pain.   Neurological:  Positive for weakness and headaches. Negative for dizziness.   Psychiatric/Behavioral:  Negative for confusion.    Objective:     Vital Signs (Most Recent):  Temp: 97.5 °F (36.4 °C) (06/26/22 0718)  Pulse: (!) 55 (06/26/22 0718)  Resp: 16 (06/26/22 0718)  BP: (!) 136/57 (06/26/22 0718)  SpO2: (!) 94 % (06/26/22 0953)   Vital Signs (24h Range):  Temp:  [97.1 °F (36.2 °C)-98.2 °F (36.8 °C)] 97.5 °F (36.4 °C)  Pulse:  [54-61] 55  Resp:  [16-20] 16  SpO2:  [94 %-98 %] 94 %  BP: (115-149)/(56-66) 136/57     Weight: 66.3 kg (146 lb 2.6 oz)  Body mass index is 26.73 kg/m².    Intake/Output Summary (Last 24 hours) at 6/26/2022 1055  Last data filed at 6/26/2022 0500  Gross per 24 hour   Intake 1044.21 ml   Output 300 ml   Net 744.21 ml      Physical Exam  Vitals reviewed.   Constitutional:       Appearance: Normal appearance. She is normal weight.   HENT:      Head: Normocephalic.      Mouth/Throat:      Mouth: Mucous membranes are moist.      Pharynx: Oropharynx is clear.   Eyes:      Pupils: Pupils are equal, round, and reactive to light.   Cardiovascular:      Rate and Rhythm: Normal rate and regular rhythm.      Pulses: Normal pulses.           Carotid pulses are 2+ on the right side and 2+ on the left side.  Pulmonary:      Effort: Pulmonary effort is normal.      Breath sounds: Normal breath sounds.   Abdominal:      General: Bowel  sounds are normal.      Palpations: Abdomen is soft.   Musculoskeletal:         General: Normal range of motion.      Cervical back: Normal range of motion.   Skin:     General: Skin is warm and dry.   Neurological:      Mental Status: She is alert and oriented to person, place, and time. Mental status is at baseline.   Psychiatric:         Mood and Affect: Mood normal.       Significant Labs: All pertinent labs within the past 24 hours have been reviewed.  CBC:   Recent Labs   Lab 06/24/22  2355 06/25/22  0400 06/26/22  0446   WBC 4.58 4.55 3.92   HGB 12.3 11.5* 9.7*   HCT 36.5* 34.4* 30.6*    217 179     CMP:   Recent Labs   Lab 06/25/22  0600 06/25/22  1139 06/25/22  2106   * 129*  128* 130*   K 5.4* 6.2*  6.1* 5.2*   CL 96 97  96 97   CO2 23 21*  22* 24   GLU 95 83  83 146*   BUN 27* 23  23 21   CREATININE 1.3 1.2  1.2 1.3   CALCIUM 9.4 9.3  9.4 8.9   PROT 7.3 7.3 6.3   ALBUMIN 4.2 4.2  4.3 3.8   BILITOT 0.9 0.9 0.7   ALKPHOS 76 75 71   AST 53* 55* 59*   ALT 94* 96* 93*   ANIONGAP 8 11  10 9   EGFRNONAA 39* 42*  42* 39*       Significant Imaging: I have reviewed all pertinent imaging results/findings within the past 24 hours.      Assessment/Plan:      * Hyponatremia  Na improving   BMP q4  Monitor for changes    Nausea  Chronic nausea   PRN antiemetics  Scheduled for EGD tomorrow      LINDSAY (acute kidney injury)  Patient with acute kidney injury. LINDSAY is currently improving. Labs reviewed- Renal function/electrolytes with Estimated Creatinine Clearance: 30.3 mL/min (based on SCr of 1.3 mg/dL). according to latest data. Monitor urine output and serial BMP and adjust therapy as needed. Avoid nephrotoxins and renally dose meds for GFR listed above.     Nephrology consult    Stage 3b chronic kidney disease  Creatine stable for now. BMP reviewed- noted Estimated Creatinine Clearance: 30 mL/min (based on SCr of 1.3 mg/dL). according to latest data. Monitor UOP and serial BMP and adjust therapy as  needed. Renally dose meds.            Thyroid dysfunction  Patient has chronic hypothyroidism. TFTs reviewed-   Lab Results   Component Value Date    TSH 1.750 05/15/2022   . Will continue chronic levothyroxine and adjust for and clinical changes.        Atherosclerosis of native coronary artery of native heart with angina pectoris  Patient with known CAD s/p CABG, which is controlled Will continue ASA and Statin and monitor for S/Sx of angina/ACS. Continue to monitor on telemetry.           VTE Risk Mitigation (From admission, onward)         Ordered     apixaban tablet 5 mg  2 times daily         06/25/22 0221     Reason for No Pharmacological VTE Prophylaxis  Once        Question:  Reasons:  Answer:  Already adequately anticoagulated on oral Anticoagulants    06/25/22 0221     IP VTE HIGH RISK PATIENT  Once         06/25/22 0221     Place sequential compression device  Until discontinued         06/25/22 0221                Discharge Planning   SHIRA:      Code Status: Full Code   Is the patient medically ready for discharge?:     Reason for patient still in hospital (select all that apply): Patient trending condition and Treatment  Discharge Plan A: Home with family                  Marie Lindo MD  Department of Hospital Medicine   [facility]

## 2022-06-26 NOTE — PLAN OF CARE
Patient progressing towards discharge.    Patient had no acute events noted. Patient remains intermittently on 2 L nasal cannula for patient comfort.  Patient had multiple loose bowel movements overnight and stool softeners held.  Patient voiding via bathroom with stand by assistance with ambulation.  Nausea controlled with phenergan overnight.  Bed alarm in place when family not at bedside.  Discussed POC with patient and family with verbalization of understanding.    Will continue to monitor.

## 2022-06-26 NOTE — PLAN OF CARE
POC reviewed with Patient son present at bedside ,both verbalize understanding VSS afebrile C/O chest tightness Dr Lindsey notified Stat EKG ordered Done and reviewed maalox given verbalizes felt better Started on full liquid diet t telemetry monitored Afib with PVCs noted olerated well

## 2022-06-26 NOTE — PROGRESS NOTES
INPATIENT NEPHROLOGY Progress Note   [facility]    Patient Name: Darlin Tipton  Date: 06/26/2022    Reason for consultation: Hyponatremia    Chief Complaint:   Chief Complaint   Patient presents with    Headache     With nausea and other symptoms for a few days       History of Present Illness:  Darlin Tipton is an 81-year-old female with a past medical history of AFib, hypothyroidism, CAD, DM type 2, GERD, CKD 3, and hypertension presenting with chronic nausea and intermittent vomiting over the past several weeks and soft stools for the past few days.  Patient was admitted to [facility] in May for CHF and required a thoracentesis during her hospital stay. Patient has followed up with Cardiology and has had recent visits with her PCP for ongoing nausea.  She had been prescribed p.o. Phenergan and omeprazole with no relief.  Patient reports gas type feeling in the mid epigastrum and denies abdominal pain and tenderness.  She states she has been drinking a lot of water to help her belch and this relieves her gas type pain temporarily.  ED workup revealed hyponatremia with a sodium of 127 and an LINDSAY with creatinine 1.5.  AST and ALT were mildly elevated, lipase negative.  While in the ED, patient received 500 cc bolus and IV Phenergan and Zofran with minimal relief.  Patient was referred to Hospital Medicine and seen in the ED.  She reports ongoing nausea and states she does not feel well.  She reports having a headache and denies abdominal pain other than gas type feeling with no abdominal tenderness.  Patient also denies chest pain, shortness a breath, vomiting, diarrhea, fever, and chills. Consulted for hyponatremia.    Interval History:  6/25 VSS, on 2L NC, UOP 300cc, c/o nausea- GI eval is pending  6/26 reviewed GI note- EGD Monday, VSS on 2L NC, voiding    Plan of Care:    Assessment:  LINDSAY  Hyponatremia  Hyperkalemia  Transaminitis  Anemia    Plan:    - SCr slightly worse- urinating however and  hemodynamics are stable.  - Hyponatremia is better with NS fluids --> bicarb gtt.  - Hyperkalemia is better with 500cc bicarb gtt and addition of a low K diet- going to give lokelma 10g today. Hold aldactone.  - LFTs are about the same- for this reason, avoiding samsca.  - H/H worse- ? Dilution- getting EGD in AM.     Thank you for allowing us to participate in this patient's care. We will continue to follow.    Vital Signs:  Temp Readings from Last 3 Encounters:   06/26/22 97.7 °F (36.5 °C)   06/15/22 97.6 °F (36.4 °C)   05/17/22 98.1 °F (36.7 °C) (Oral)       Pulse Readings from Last 3 Encounters:   06/26/22 (!) 56   06/15/22 83   05/17/22 69       BP Readings from Last 3 Encounters:   06/26/22 (!) 129/57   06/15/22 134/66   05/17/22 (!) 145/65       Weight:  Wt Readings from Last 3 Encounters:   06/26/22 66.3 kg (146 lb 2.6 oz)   06/15/22 64.9 kg (143 lb 1.3 oz)   05/17/22 65.7 kg (144 lb 13.5 oz)       Medications:  Scheduled Meds:   amitriptyline  50 mg Oral QHS    apixaban  5 mg Oral BID    dronedarone  400 mg Oral BID WM    isosorbide mononitrate  60 mg Oral Daily    levothyroxine  25 mcg Oral Before breakfast    metoprolol succinate  25 mg Oral Daily    pantoprazole  40 mg Oral Daily     Continuous Infusions:  PRN Meds:.acetaminophen, aluminum-magnesium hydroxide-simethicone, dextrose 10%, dextrose 10%, glucagon (human recombinant), glucose, glucose, insulin aspart U-100, magnesium oxide, magnesium oxide, melatonin, naloxone, nitroGLYCERIN, potassium bicarbonate, potassium bicarbonate, potassium bicarbonate, potassium, sodium phosphates, potassium, sodium phosphates, potassium, sodium phosphates, promethazine (PHENERGAN) IVPB, simethicone, sodium chloride 0.9%  No current facility-administered medications on file prior to encounter.     Current Outpatient Medications on File Prior to Encounter   Medication Sig Dispense Refill    amitriptyline (ELAVIL) 50 MG tablet Take 1 tablet (50 mg total) by mouth  every evening. 90 tablet 3    apixaban (ELIQUIS) 5 mg Tab Take 1 tablet (5 mg total) by mouth 2 (two) times daily. 180 tablet 3    azelastine (ASTELIN) 137 mcg (0.1 %) nasal spray 1 spray (137 mcg total) by Nasal route 2 (two) times daily. 90 mL 3    blood sugar diagnostic (FREESTYLE LITE STRIPS) Strp USE TO TEST BLOOD SUGAR TWICE A  strip 3    canagliflozin (INVOKANA) 100 mg Tab tablet Take 1 tablet (100 mg total) by mouth once daily. 90 tablet 3    dronedarone (MULTAQ) 400 mg Tab Take 1 tablet (400 mg total) by mouth 2 (two) times daily with meals. 180 tablet 3    fluticasone propionate (FLONASE) 50 mcg/actuation nasal spray 1 spray (50 mcg total) by Each Nostril route once daily. 48 g 3    lancets Misc 1 Units by Misc.(Non-Drug; Combo Route) route 2 (two) times daily. 200 each 3    levothyroxine (LEVOTHROID) 25 MCG tablet Take 1 tablet (25 mcg total) by mouth before breakfast. Every day 90 tablet 3    metoclopramide HCl (REGLAN) 5 MG tablet Take 1 tablet (5 mg total) by mouth 2 (two) times daily as needed (nausea). 30 tablet 0    metoprolol succinate (TOPROL XL) 25 MG 24 hr tablet Take 1 tablet (25 mg total) by mouth once daily. 90 tablet 3    omeprazole (PRILOSEC) 40 MG capsule Take 1 capsule (40 mg total) by mouth once daily. 30 capsule 2    pramoxine-hydrocortisone (PROCTOCREAM-HC) 1-1 % rectal cream Place rectally 2 (two) times daily. (Patient taking differently: Place 1 application rectally 2 (two) times daily.) 3 each 3    rosuvastatin (CRESTOR) 10 MG tablet Take 1 tablet (10 mg total) by mouth once daily. 90 tablet 3    spironolactone (ALDACTONE) 25 MG tablet Take 1 tablet (25 mg total) by mouth once daily. 30 tablet 1    triazolam (HALCION) 0.25 MG Tab Take 1 tablet (0.25 mg total) by mouth nightly as needed (sleep). 90 tablet 1    acetaminophen (TYLENOL) 650 MG TbSR Take 1,300 mg by mouth once daily. 2 Tablet(s) Oral  Every day.      carboxymethylcellulose 1 % ophthalmic solution  "Apply 1 drop to eye As instructed. as directed      cetirizine (ZYRTEC) 10 MG tablet Take 10 mg by mouth once daily.      cholecalciferol, vitamin D3, (VITAMIN D3) 50 mcg (2,000 unit) Cap Take 1 capsule by mouth once daily.      coenzyme Q10 100 mg capsule Take 100 mg by mouth once daily.       cranberry extract 650 mg Cap Take 1 tablet by mouth 2 (two) times daily.      Lactobac no.41/Bifidobact no.7 (PROBIOTIC-10 ORAL) Take by mouth.      magnesium oxide (MAG-OXIDE ORAL) Take 400 mg by mouth once daily.      nitroGLYCERIN (NITROSTAT) 0.4 MG SL tablet Place 0.4 mg under the tongue every 5 (five) minutes as needed. 0.4mg Sublingual PRN .        RESTASIS 0.05 % ophthalmic emulsion Place 1 drop into both eyes 2 (two) times daily.      UNABLE TO FIND Take 1 capsule by mouth once daily. Vital red      vitamins  A,C,E-zinc-copper [phone] unit-mg-unit Cap Take 1 capsule by mouth 2 (two) times daily.        Review of Systems:  Neg    Physical Exam:  BP (!) 129/57   Pulse (!) 56   Temp 97.7 °F (36.5 °C)   Resp 16   Ht 5' 2" (1.575 m)   Wt 66.3 kg (146 lb 2.6 oz)   SpO2 98%   BMI 26.73 kg/m²     Constitutional: nad, aao x 3  Heart: rrr, no m/r/g, wwp, no edema  Lungs: ctab, no w/r/r/c, no lb  Abdomen: s/nt/nd, +BS    Results:  Lab Results   Component Value Date     (L) 06/26/2022    K 5.3 (H) 06/26/2022    CL 98 06/26/2022    CO2 28 06/26/2022    BUN 19 06/26/2022    CREATININE 1.4 06/26/2022    CALCIUM 9.0 06/26/2022    ANIONGAP 5 (L) 06/26/2022    ESTGFRAFRICA 41 (A) 06/26/2022    EGFRNONAA 35 (A) 06/26/2022       Lab Results   Component Value Date    CALCIUM 9.0 06/26/2022    PHOS 4.1 06/25/2022       Recent Labs   Lab 06/26/22  0446   WBC 3.92   RBC 3.52*   HGB 9.7*   HCT 30.6*      MCV 87   MCH 27.6   MCHC 31.7*       I have personally reviewed pertinent radiological imaging and reports.    I have spent > 35 minutes providing care for this patient for the above diagnoses. These " services have included chart/data/imaging review, evaluation, exam, formulation of plan, , note preparation, and discussions with staff involved in this patient's care.    Elisa Grijalva MD MPH  [facility] [address] [address]  [phone] (p)  [phone] (f)

## 2022-06-26 NOTE — SUBJECTIVE & OBJECTIVE
Interval History: multiple loose bowel movements overnight. Nausea controlled. Scheduled for EGD tomorrow    Review of Systems   Constitutional:  Negative for activity change, appetite change, chills and fever.   HENT:  Negative for congestion, sore throat and trouble swallowing.    Eyes:  Negative for photophobia and visual disturbance.   Respiratory:  Negative for cough, chest tightness and shortness of breath.    Cardiovascular:  Negative for chest pain, palpitations and leg swelling.   Gastrointestinal:  Positive for abdominal pain (feels like gas) and nausea. Negative for diarrhea.   Genitourinary:  Negative for dysuria, flank pain and hematuria.   Musculoskeletal:  Negative for back pain.   Neurological:  Positive for weakness and headaches. Negative for dizziness.   Psychiatric/Behavioral:  Negative for confusion.    Objective:     Vital Signs (Most Recent):  Temp: 97.5 °F (36.4 °C) (06/26/22 0718)  Pulse: (!) 55 (06/26/22 0718)  Resp: 16 (06/26/22 0718)  BP: (!) 136/57 (06/26/22 0718)  SpO2: (!) 94 % (06/26/22 0953)   Vital Signs (24h Range):  Temp:  [97.1 °F (36.2 °C)-98.2 °F (36.8 °C)] 97.5 °F (36.4 °C)  Pulse:  [54-61] 55  Resp:  [16-20] 16  SpO2:  [94 %-98 %] 94 %  BP: (115-149)/(56-66) 136/57     Weight: 66.3 kg (146 lb 2.6 oz)  Body mass index is 26.73 kg/m².    Intake/Output Summary (Last 24 hours) at 6/26/2022 1055  Last data filed at 6/26/2022 0500  Gross per 24 hour   Intake 1044.21 ml   Output 300 ml   Net 744.21 ml      Physical Exam  Vitals reviewed.   Constitutional:       Appearance: Normal appearance. She is normal weight.   HENT:      Head: Normocephalic.      Mouth/Throat:      Mouth: Mucous membranes are moist.      Pharynx: Oropharynx is clear.   Eyes:      Pupils: Pupils are equal, round, and reactive to light.   Cardiovascular:      Rate and Rhythm: Normal rate and regular rhythm.      Pulses: Normal pulses.           Carotid pulses are 2+ on the right side and 2+ on the left  side.  Pulmonary:      Effort: Pulmonary effort is normal.      Breath sounds: Normal breath sounds.   Abdominal:      General: Bowel sounds are normal.      Palpations: Abdomen is soft.   Musculoskeletal:         General: Normal range of motion.      Cervical back: Normal range of motion.   Skin:     General: Skin is warm and dry.   Neurological:      Mental Status: She is alert and oriented to person, place, and time. Mental status is at baseline.   Psychiatric:         Mood and Affect: Mood normal.       Significant Labs: All pertinent labs within the past 24 hours have been reviewed.  CBC:   Recent Labs   Lab 06/24/22  2355 06/25/22  0400 06/26/22  0446   WBC 4.58 4.55 3.92   HGB 12.3 11.5* 9.7*   HCT 36.5* 34.4* 30.6*    217 179     CMP:   Recent Labs   Lab 06/25/22  0600 06/25/22  1139 06/25/22  2106   * 129*  128* 130*   K 5.4* 6.2*  6.1* 5.2*   CL 96 97  96 97   CO2 23 21*  22* 24   GLU 95 83  83 146*   BUN 27* 23  23 21   CREATININE 1.3 1.2  1.2 1.3   CALCIUM 9.4 9.3  9.4 8.9   PROT 7.3 7.3 6.3   ALBUMIN 4.2 4.2  4.3 3.8   BILITOT 0.9 0.9 0.7   ALKPHOS 76 75 71   AST 53* 55* 59*   ALT 94* 96* 93*   ANIONGAP 8 11  10 9   EGFRNONAA 39* 42*  42* 39*       Significant Imaging: I have reviewed all pertinent imaging results/findings within the past 24 hours.

## 2022-06-26 NOTE — PROGRESS NOTES
CC: nausea    HPI 81 y.o. female with a past medical history of AFib, hypothyroidism, CAD, DM type 2, GERD, CKD 3, and hypertension presenting with chronic, progressively worsening nausea associated with intermittent vomiting over the past three weeks as well as bloating and gas.    Patient was recently started on Multaq about 3 weeks ago and has been the only recent change in medication that she can attribute to nausea. She had been taking Phenergan and omeprazole oral at home without any improvement.  Patient reports gas pain and bloating in the mid epigastrium. No significant abdominal pain.    She was found to have hyponatremia with a sodium of 127, K 5.3 which increased to 6.1. She also had an LINDSAY with creatinine 1.5. Tbili 1.1, AST 52, ALT 99. H/H 12.3/36.5.     She has not had EGD or colonoscopy since 2017. EGD at that time with LA Grade B esophagitis, multiple gastric polyps, otherwise normal duodenum. Colonoscopy with 5 mm polyp at the hepatic flexure, external and internal hemorrhoids, diverticulosis in SC.      Interval hx:  Symptoms have improved since phenergan for nausea. No severe abdominal pain or epigastric burning sensation. Does report occasional chest tightness. Remains on imdur. Troponin neg.    Past Medical History:   Diagnosis Date    Allergy     Dust mites, Grasses, Trees    Arthritis     Asthma     Blood transfusion     CAD (coronary artery disease)     Cataract     CHRONIC BRONCHITIS     Diabetes mellitus     Diabetes mellitus type II     GERD (gastroesophageal reflux disease)     Hyperlipidemia     Hypertension     Irregular heart beat     Kidney disease     ckd stage 3  as stated by patient - to see MD    Post-menopausal bleeding 2018    Spinal stenosis     Thyroid disease     Hypothyroidism     Review of Systems  General ROS: negative for chills, fever or weight loss  Respiratory ROS: no cough, shortness of breath, or wheezing  Cardiovascular ROS: no chest pain or dyspnea on  "exertion  Gastrointestinal ROS: +nausea, +epigastric burning, +gas, +bloating, no abdominal pain, change in bowel habits, or black/ bloody stools      Physical Examination  BP (!) 129/57   Pulse (!) 56   Temp 97.7 °F (36.5 °C)   Resp 16   Ht 5' 2" (1.575 m)   Wt 66.3 kg (146 lb 2.6 oz)   SpO2 98%   BMI 26.73 kg/m²   General appearance: elderly female, alert, cooperative, no distress  HENT: NC in place, Normocephalic, atraumatic, neck symmetrical, no nasal discharge   Eyes: conjunctivae/corneas clear, PERRL, EOM's intact  Lungs: no labored breathing, symmetric chest wall expansion bilaterally  Heart: regular rate and rhythm without rub; no displacement of the PMI   Abdomen: soft, non-tender; bowel sounds normoactive; no organomegaly  Extremities: extremities symmetric; no clubbing, cyanosis, or edema  Integument: Skin color, texture, turgor normal; no rashes; hair distrubution normal  Neurologic: Alert and oriented X 3, did not test gait  Psychiatric: no pressured speech; normal affect; no evidence of impaired cognition     Labs:  Lab Results   Component Value Date    WBC 3.92 06/26/2022    HGB 9.7 (L) 06/26/2022    HCT 30.6 (L) 06/26/2022    MCV 87 06/26/2022     06/26/2022     CMP  Sodium   Date Value Ref Range Status   06/26/2022 131 (L) 136 - 145 mmol/L Final     Potassium   Date Value Ref Range Status   06/26/2022 5.3 (H) 3.5 - 5.1 mmol/L Final     Chloride   Date Value Ref Range Status   06/26/2022 98 95 - 110 mmol/L Final     CO2   Date Value Ref Range Status   06/26/2022 28 23 - 29 mmol/L Final     Glucose   Date Value Ref Range Status   06/26/2022 118 (H) 70 - 110 mg/dL Final     BUN   Date Value Ref Range Status   06/26/2022 19 8 - 23 mg/dL Final     Creatinine   Date Value Ref Range Status   06/26/2022 1.4 0.5 - 1.4 mg/dL Final     Calcium   Date Value Ref Range Status   06/26/2022 9.0 8.7 - 10.5 mg/dL Final     Total Protein   Date Value Ref Range Status   06/25/2022 6.3 6.0 - 8.4 g/dL Final "     Albumin   Date Value Ref Range Status   06/25/2022 3.8 3.5 - 5.2 g/dL Final     Total Bilirubin   Date Value Ref Range Status   06/25/2022 0.7 0.1 - 1.0 mg/dL Final     Comment:     For infants and newborns, interpretation of results should be based  on gestational age, weight and in agreement with clinical  observations.    Premature Infant recommended reference ranges:  Up to 24 hours.............<8.0 mg/dL  Up to 48 hours............<12.0 mg/dL  3-5 days..................<15.0 mg/dL  6-29 days.................<15.0 mg/dL       Alkaline Phosphatase   Date Value Ref Range Status   06/25/2022 71 55 - 135 U/L Final     AST   Date Value Ref Range Status   06/25/2022 59 (H) 10 - 40 U/L Final     ALT   Date Value Ref Range Status   06/25/2022 93 (H) 10 - 44 U/L Final     Anion Gap   Date Value Ref Range Status   06/26/2022 5 (L) 8 - 16 mmol/L Final     eGFR if    Date Value Ref Range Status   06/26/2022 41 (A) >60 mL/min/1.73 m^2 Final     eGFR if non    Date Value Ref Range Status   06/26/2022 35 (A) >60 mL/min/1.73 m^2 Final     Comment:     Calculation used to obtain the estimated glomerular filtration  rate (eGFR) is the CKD-EPI equation.        Imaging:  CXR:Borderline heart size.  Prior sternotomy.  Trace right pleural effusion     Independently reviewed    Assessment:   81 y.o. female with a past medical history of AFib, hypothyroidism, CAD, DM type 2, GERD, CKD 3, and hypertension presenting with worsening nausea, vomiting over the past three weeks as well as bloating and gas. Could potentially be medication related given she has been started on Multaq in the past several weeks which is the only change in medication. Will plan endoscopic evaluation to rule out other etiologies.    Plan:   -Zofran alternating with phenergan as needed for nausea  -Reglan did not help patient's symptoms in past  -Symptoms may be related to dronedarone given timeline of initiation  -PPI  daily  -Plan EGD on Monday for evaluation and to rule out other etiologies  -NPO after midnight    Skip Nj MD  [facility]  [address] [place] [address]  Office: [phone]  Fax: [phone]

## 2022-06-26 NOTE — ASSESSMENT & PLAN NOTE
Patient with acute kidney injury. LINDSAY is currently improving. Labs reviewed- Renal function/electrolytes with Estimated Creatinine Clearance: 30.3 mL/min (based on SCr of 1.3 mg/dL). according to latest data. Monitor urine output and serial BMP and adjust therapy as needed. Avoid nephrotoxins and renally dose meds for GFR listed above.     Nephrology consult

## 2022-06-26 NOTE — CARE UPDATE
06/25/22 2025   Patient Assessment/Suction   Level of Consciousness (AVPU) alert   PRE-TX-O2   O2 Device (Oxygen Therapy) nasal cannula   Flow (L/min) 2   Oxygen Concentration (%) 28   SpO2 97 %   Pulse Oximetry Type Intermittent   Ready to Wean/Extubation Screen   FIO2<=50 (chart decimal) 0.28

## 2022-06-27 ENCOUNTER — ANESTHESIA EVENT (OUTPATIENT)
Dept: ENDOSCOPY | Facility: HOSPITAL | Age: 82
DRG: 641 | End: 2022-06-27
Payer: MEDICARE

## 2022-06-27 ENCOUNTER — ANESTHESIA (OUTPATIENT)
Dept: ENDOSCOPY | Facility: HOSPITAL | Age: 82
DRG: 641 | End: 2022-06-27
Payer: MEDICARE

## 2022-06-27 ENCOUNTER — TELEPHONE (OUTPATIENT)
Dept: FAMILY MEDICINE | Facility: CLINIC | Age: 82
End: 2022-06-27
Payer: MEDICARE

## 2022-06-27 VITALS
WEIGHT: 132.25 LBS | OXYGEN SATURATION: 96 % | DIASTOLIC BLOOD PRESSURE: 60 MMHG | BODY MASS INDEX: 24.34 KG/M2 | HEART RATE: 68 BPM | SYSTOLIC BLOOD PRESSURE: 135 MMHG | TEMPERATURE: 97 F | HEIGHT: 62 IN | RESPIRATION RATE: 17 BRPM

## 2022-06-27 LAB
ANION GAP SERPL CALC-SCNC: 7 MMOL/L (ref 8–16)
BASOPHILS # BLD AUTO: 0.02 K/UL (ref 0–0.2)
BASOPHILS NFR BLD: 0.6 % (ref 0–1.9)
BUN SERPL-MCNC: 13 MG/DL (ref 8–23)
CALCIUM SERPL-MCNC: 8.9 MG/DL (ref 8.7–10.5)
CHLORIDE SERPL-SCNC: 102 MMOL/L (ref 95–110)
CO2 SERPL-SCNC: 25 MMOL/L (ref 23–29)
CREAT SERPL-MCNC: 1.2 MG/DL (ref 0.5–1.4)
DIFFERENTIAL METHOD: ABNORMAL
EOSINOPHIL # BLD AUTO: 0.1 K/UL (ref 0–0.5)
EOSINOPHIL NFR BLD: 2.3 % (ref 0–8)
ERYTHROCYTE [DISTWIDTH] IN BLOOD BY AUTOMATED COUNT: 14.5 % (ref 11.5–14.5)
EST. GFR  (AFRICAN AMERICAN): 49 ML/MIN/1.73 M^2
EST. GFR  (NON AFRICAN AMERICAN): 42 ML/MIN/1.73 M^2
GLUCOSE SERPL-MCNC: 81 MG/DL (ref 70–110)
HCT VFR BLD AUTO: 29.1 % (ref 37–48.5)
HGB BLD-MCNC: 9.3 G/DL (ref 12–16)
IMM GRANULOCYTES # BLD AUTO: 0.01 K/UL (ref 0–0.04)
IMM GRANULOCYTES NFR BLD AUTO: 0.3 % (ref 0–0.5)
LYMPHOCYTES # BLD AUTO: 1.1 K/UL (ref 1–4.8)
LYMPHOCYTES NFR BLD: 31 % (ref 18–48)
MCH RBC QN AUTO: 27.9 PG (ref 27–31)
MCHC RBC AUTO-ENTMCNC: 32 G/DL (ref 32–36)
MCV RBC AUTO: 87 FL (ref 82–98)
MONOCYTES # BLD AUTO: 0.6 K/UL (ref 0.3–1)
MONOCYTES NFR BLD: 17 % (ref 4–15)
NEUTROPHILS # BLD AUTO: 1.7 K/UL (ref 1.8–7.7)
NEUTROPHILS NFR BLD: 48.8 % (ref 38–73)
NRBC BLD-RTO: 0 /100 WBC
PLATELET # BLD AUTO: 158 K/UL (ref 150–450)
PMV BLD AUTO: 10.9 FL (ref 9.2–12.9)
POCT GLUCOSE: 109 MG/DL (ref 70–110)
POCT GLUCOSE: 139 MG/DL (ref 70–110)
POCT GLUCOSE: 99 MG/DL (ref 70–110)
POTASSIUM SERPL-SCNC: 5 MMOL/L (ref 3.5–5.1)
RBC # BLD AUTO: 3.33 M/UL (ref 4–5.4)
SODIUM SERPL-SCNC: 134 MMOL/L (ref 136–145)
WBC # BLD AUTO: 3.52 K/UL (ref 3.9–12.7)

## 2022-06-27 PROCEDURE — 25000003 PHARM REV CODE 250: Performed by: NURSE ANESTHETIST, CERTIFIED REGISTERED

## 2022-06-27 PROCEDURE — D9220A PRA ANESTHESIA: Mod: ANES,,, | Performed by: ANESTHESIOLOGY

## 2022-06-27 PROCEDURE — 36415 COLL VENOUS BLD VENIPUNCTURE: CPT | Performed by: INTERNAL MEDICINE

## 2022-06-27 PROCEDURE — D9220A PRA ANESTHESIA: ICD-10-PCS | Mod: ANES,,, | Performed by: ANESTHESIOLOGY

## 2022-06-27 PROCEDURE — 37000009 HC ANESTHESIA EA ADD 15 MINS: Performed by: INTERNAL MEDICINE

## 2022-06-27 PROCEDURE — 43239 EGD BIOPSY SINGLE/MULTIPLE: CPT | Mod: ,,, | Performed by: INTERNAL MEDICINE

## 2022-06-27 PROCEDURE — 27201012 HC FORCEPS, HOT/COLD, DISP: Performed by: INTERNAL MEDICINE

## 2022-06-27 PROCEDURE — 88305 TISSUE EXAM BY PATHOLOGIST: ICD-10-PCS | Mod: 26,,, | Performed by: PATHOLOGY

## 2022-06-27 PROCEDURE — D9220A PRA ANESTHESIA: Mod: CRNA,,, | Performed by: NURSE ANESTHETIST, CERTIFIED REGISTERED

## 2022-06-27 PROCEDURE — 63600175 PHARM REV CODE 636 W HCPCS: Performed by: NURSE ANESTHETIST, CERTIFIED REGISTERED

## 2022-06-27 PROCEDURE — 37000008 HC ANESTHESIA 1ST 15 MINUTES: Performed by: INTERNAL MEDICINE

## 2022-06-27 PROCEDURE — 43239 EGD BIOPSY SINGLE/MULTIPLE: CPT | Performed by: INTERNAL MEDICINE

## 2022-06-27 PROCEDURE — 25000003 PHARM REV CODE 250: Performed by: NURSE PRACTITIONER

## 2022-06-27 PROCEDURE — 88305 TISSUE EXAM BY PATHOLOGIST: CPT | Mod: 26,,, | Performed by: PATHOLOGY

## 2022-06-27 PROCEDURE — G0378 HOSPITAL OBSERVATION PER HR: HCPCS

## 2022-06-27 PROCEDURE — 85025 COMPLETE CBC W/AUTO DIFF WBC: CPT | Performed by: NURSE PRACTITIONER

## 2022-06-27 PROCEDURE — 25000003 PHARM REV CODE 250: Performed by: INTERNAL MEDICINE

## 2022-06-27 PROCEDURE — 88305 TISSUE EXAM BY PATHOLOGIST: CPT | Performed by: PATHOLOGY

## 2022-06-27 PROCEDURE — 12000002 HC ACUTE/MED SURGE SEMI-PRIVATE ROOM

## 2022-06-27 PROCEDURE — D9220A PRA ANESTHESIA: ICD-10-PCS | Mod: CRNA,,, | Performed by: NURSE ANESTHETIST, CERTIFIED REGISTERED

## 2022-06-27 PROCEDURE — 97161 PT EVAL LOW COMPLEX 20 MIN: CPT

## 2022-06-27 PROCEDURE — 80048 BASIC METABOLIC PNL TOTAL CA: CPT | Performed by: INTERNAL MEDICINE

## 2022-06-27 PROCEDURE — 43239 PR EGD, FLEX, W/BIOPSY, SGL/MULTI: ICD-10-PCS | Mod: ,,, | Performed by: INTERNAL MEDICINE

## 2022-06-27 PROCEDURE — 25000003 PHARM REV CODE 250: Performed by: STUDENT IN AN ORGANIZED HEALTH CARE EDUCATION/TRAINING PROGRAM

## 2022-06-27 RX ORDER — SODIUM CHLORIDE 9 MG/ML
INJECTION, SOLUTION INTRAVENOUS CONTINUOUS
Status: DISCONTINUED | OUTPATIENT
Start: 2022-06-27 | End: 2022-06-27 | Stop reason: HOSPADM

## 2022-06-27 RX ORDER — ONDANSETRON 4 MG/1
4 TABLET, FILM COATED ORAL EVERY 6 HOURS PRN
Qty: 20 TABLET | Refills: 1 | Status: SHIPPED | OUTPATIENT
Start: 2022-06-27 | End: 2022-06-29

## 2022-06-27 RX ORDER — LIDOCAINE HYDROCHLORIDE 10 MG/ML
INJECTION, SOLUTION EPIDURAL; INFILTRATION; INTRACAUDAL; PERINEURAL
Status: DISCONTINUED | OUTPATIENT
Start: 2022-06-27 | End: 2022-06-27

## 2022-06-27 RX ORDER — PROPOFOL 10 MG/ML
VIAL (ML) INTRAVENOUS
Status: DISCONTINUED | OUTPATIENT
Start: 2022-06-27 | End: 2022-06-27

## 2022-06-27 RX ADMIN — SODIUM CHLORIDE: 0.9 INJECTION, SOLUTION INTRAVENOUS at 09:06

## 2022-06-27 RX ADMIN — PROPOFOL 40 MG: 10 INJECTION, EMULSION INTRAVENOUS at 08:06

## 2022-06-27 RX ADMIN — DRONEDARONE 400 MG: 400 TABLET, FILM COATED ORAL at 09:06

## 2022-06-27 RX ADMIN — PROPOFOL 80 MG: 10 INJECTION, EMULSION INTRAVENOUS at 08:06

## 2022-06-27 RX ADMIN — LEVOTHYROXINE SODIUM 25 MCG: 25 TABLET ORAL at 09:06

## 2022-06-27 RX ADMIN — APIXABAN 5 MG: 2.5 TABLET, FILM COATED ORAL at 09:06

## 2022-06-27 RX ADMIN — PANTOPRAZOLE SODIUM 40 MG: 40 TABLET, DELAYED RELEASE ORAL at 09:06

## 2022-06-27 RX ADMIN — SODIUM CHLORIDE: 0.9 INJECTION, SOLUTION INTRAVENOUS at 08:06

## 2022-06-27 RX ADMIN — LIDOCAINE HYDROCHLORIDE 30 MG: 10 INJECTION, SOLUTION EPIDURAL; INFILTRATION; INTRACAUDAL; PERINEURAL at 08:06

## 2022-06-27 RX ADMIN — METOPROLOL SUCCINATE 25 MG: 25 TABLET, EXTENDED RELEASE ORAL at 09:06

## 2022-06-27 NOTE — ANESTHESIA PREPROCEDURE EVALUATION
06/27/2022  Darlin Tipton is a 81 y.o., female.      Pre-op Assessment    I have reviewed the Patient Summary Reports.     I have reviewed the Nursing Notes. I have reviewed the NPO Status.   I have reviewed the Medications.     Review of Systems  Cardiovascular:   Hypertension CAD  CABG/stent Dysrhythmias  Angina ALVAREZ    Pulmonary:   Asthma asymptomatic Shortness of breath    Renal/:   Chronic Renal Disease    Hepatic/GI:   GERD    Musculoskeletal:   Arthritis     Neurological:   Neuromuscular Disease,    Endocrine:   Diabetes        Physical Exam  General: Well nourished, Cooperative, Alert and Oriented    Airway:  Mallampati: II / II  Mouth Opening: Normal  TM Distance: 4 - 6 cm  Tongue: Normal    Dental:  Intact    Chest/Lungs:  Clear to auscultation, Normal Respiratory Rate    Heart:  Rate: Normal  Rhythm: Regular Rhythm  Sounds: Normal        Anesthesia Plan  Type of Anesthesia, risks & benefits discussed:    Anesthesia Type: Gen Natural Airway  Intra-op Monitoring Plan: Standard ASA Monitors  Induction:  IV  Informed Consent: Informed consent signed with the Patient and all parties understand the risks and agree with anesthesia plan.  All questions answered.   ASA Score: 4    Ready For Surgery From Anesthesia Perspective.     .

## 2022-06-27 NOTE — HOSPITAL COURSE
ED workup revealed hyponatremia with a sodium of 127 and an LINDSAY with creatinine 1.5.  AST and ALT were mildly elevated, lipase negative.  While in the ED, patient received 500 cc bolus and IV Phenergan and Zofran with minimal relief.  Patient was referred to Hospital Medicine. Symptoms have improved since phenergan for nausea. No severe abdominal pain or epigastric burning sensation. Does report occasional chest tightness. Remains on imdur. Troponin neg. Sodium improved with water bolus. Hyponatremia is better with NS fluids --> bicarb gtt. Hyperkalemia medication induced was better with 500cc bicarb gtt and addition of a low K diet. Patient underwent EGD diagnosed with gastritis. Cardiology was consulted due bradycardia and evaluation of pt c current meds. As per recommendation, Multaq was stopped as her nausea was attributed to it. Spironolactone was stopped due hyperkalemia. Pt was medically stable for discharge

## 2022-06-27 NOTE — PLAN OF CARE
Pt is cleared from  for discharge.    The doctors offices will contact pt with appointments.       06/27/22 9077   Final Note   Assessment Type Final Discharge Note   Anticipated Discharge Disposition Home   Hospital Resources/Appts/Education Provided Appointments scheduled by Navigator/Coordinator  (offfice will contact pt with appointment)

## 2022-06-27 NOTE — TRANSFER OF CARE
"Anesthesia Transfer of Care Note    Patient: Darlin Tipton    Procedure(s) Performed: Procedure(s) (LRB):  EGD (ESOPHAGOGASTRODUODENOSCOPY) (N/A)    Patient location: PACU    Anesthesia Type: general    Transport from OR: Transported from OR on 2-3 L/min O2 by NC with adequate spontaneous ventilation    Post pain: adequate analgesia    Post assessment: no apparent anesthetic complications and tolerated procedure well    Post vital signs: stable    Level of consciousness: sedated    Nausea/Vomiting: no nausea/vomiting    Complications: none    Transfer of care protocol was followed      Last vitals:   Visit Vitals  BP (!) 157/70   Pulse 64   Temp 36.8 °C (98.2 °F)   Resp 20   Ht 5' 2" (1.575 m)   Wt 60 kg (132 lb 4.4 oz)   SpO2 99%   Breastfeeding No   BMI 24.19 kg/m²     "

## 2022-06-27 NOTE — PLAN OF CARE
Patient progressing towards discharge.    Patient had no acute events noted.  Patient voiding and ambulatory.  No nausea noted overnight.  Patient currently NPO pending EGD today.  Discussed POC with patient and family with verbalization of understanding.    Will continue to monitor.

## 2022-06-27 NOTE — ANESTHESIA POSTPROCEDURE EVALUATION
Anesthesia Post Evaluation    Patient: Darlin Tipton    Procedure(s) Performed: Procedure(s) (LRB):  EGD (ESOPHAGOGASTRODUODENOSCOPY) (N/A)    Final Anesthesia Type: general      Patient location during evaluation: PACU  Patient participation: Yes- Able to Participate  Level of consciousness: awake and alert and oriented  Post-procedure vital signs: reviewed and stable  Pain management: adequate  Airway patency: patent    PONV status at discharge: No PONV  Anesthetic complications: no      Cardiovascular status: blood pressure returned to baseline  Respiratory status: unassisted, spontaneous ventilation and room air  Hydration status: euvolemic  Follow-up not needed.          Vitals Value Taken Time   /52 06/27/22 0901   Temp 37 °C (98.6 °F) 06/27/22 0850   Pulse 60 06/27/22 0904   Resp 20 06/27/22 0900   SpO2 94 % 06/27/22 0904   Vitals shown include unvalidated device data.      No case tracking events are documented in the log.      Pain/Reagan Score: Reagan Score: 10 (6/27/2022  8:50 AM)

## 2022-06-27 NOTE — NURSING
Leaving unit via wheelchair to Endoscopy For EGD VSS afebrile denies pain no acute distress noted

## 2022-06-27 NOTE — PROVATION PATIENT INSTRUCTIONS
Discharge Summary/Instructions after an Endoscopic Procedure  Patient Name: Darlin Tipton  Patient MRN: [id number]  Patient YOB: 1940 Monday, June 27, 2022  Skip Nj MD  Dear patient,  As a result of recent federal legislation (The Federal Cures Act), you may   receive lab or pathology results from your procedure in your [facility]   account before your physician is able to contact you. Your physician or   their representative will relay the results to you with their   recommendations at their soonest availability.  Thank you,  RESTRICTIONS:  During your procedure today, you received medications for sedation.  These   medications may affect your judgment, balance and coordination.  Therefore,   for 24 hours, you have the following restrictions:   - DO NOT drive a car, operate machinery, make legal/financial decisions,   sign important papers or drink alcohol.    ACTIVITY:  Today: no heavy lifting, straining or running due to procedural   sedation/anesthesia.  The following day: return to full activity including work.  DIET:  Eat and drink normally unless instructed otherwise.     TREATMENT FOR COMMON SIDE EFFECTS:  - Mild abdominal pain, nausea, belching, bloating or excessive gas:  rest,   eat lightly and use a heating pad.  - Sore Throat: treat with throat lozenges and/or gargle with warm salt   water.  - Because air was used during the procedure, expelling large amounts of air   from your rectum or belching is normal.  - If a bowel prep was taken, you may not have a bowel movement for 1-3 days.    This is normal.  SYMPTOMS TO WATCH FOR AND REPORT TO YOUR PHYSICIAN:  1. Abdominal pain or bloating, other than gas cramps.  2. Chest pain.  3. Back pain.  4. Signs of infection such as: chills or fever occurring within 24 hours   after the procedure.  5. Rectal bleeding, which would show as bright red, maroon, or black stools.   (A tablespoon of blood from the rectum is not serious, especially if    hemorrhoids are present.)  6. Vomiting.  7. Weakness or dizziness.  GO DIRECTLY TO THE NEAREST EMERGENCY ROOM IF YOU HAVE ANY OF THE FOLLOWING:      Difficulty breathing              Chills and/or fever over 101 F   Persistent vomiting and/or vomiting blood   Severe abdominal pain   Severe chest pain   Black, tarry stools   Bleeding- more than one tablespoon   Any other symptom or condition that you feel may need urgent attention  Your doctor recommends these additional instructions:  If any biopsies were taken, your doctors clinic will contact you in 1 to 2   weeks with any results.  - Return patient to [facility] for ongoing care.   - Advance diet as tolerated.   - Continue present medications, including PPI and anti-emetics.   - Await pathology results.  For questions, problems or results please call your physician - Skip Nj MD at Work:  [phone].  [facility], EMERGENCY ROOM PHONE NUMBER: [phone]  IF A COMPLICATION OR EMERGENCY SITUATION ARISES AND YOU ARE UNABLE TO REACH   YOUR PHYSICIAN - GO DIRECTLY TO THE EMERGENCY ROOM.  Skip Nj MD  6/27/2022 8:48:56 AM  This report has been verified and signed electronically.  Dear patient,  As a result of recent federal legislation (The Federal Cures Act), you may   receive lab or pathology results from your procedure in your [facility]   account before your physician is able to contact you. Your physician or   their representative will relay the results to you with their   recommendations at their soonest availability.  Thank you,  PROVATION

## 2022-06-27 NOTE — TELEPHONE ENCOUNTER
"----- Message from Regina De La Torre sent at 6/27/2022  4:19 PM CDT -----  Regarding: Appointment  "Type:  Patient Call Back    Who Called:Clare (Case work [facility])    What is the reqeust in detail:Requesting call back to schedule an Hospital follow up. Please advise    Can the clinic reply by [facility]?no    Best Call Back Number:[phone]      Additional Information:            "

## 2022-06-27 NOTE — DISCHARGE SUMMARY
[facility] Hospital Medicine  Discharge Summary      Patient Name: Darlin Tipton  MRN: [id number]  Patient Class: IP- Inpatient  Admission Date: 6/25/2022  Hospital Length of Stay: 2 days  Discharge Date and Time:  06/27/2022 3:55 PM  Attending Physician: Marie Lindo MD   Discharging Provider: Marie Lindo MD  Primary Care Provider: Oumar Almonte Jr, MD      HPI:   Darlin Tipton is an 81-year-old female with a past medical history of AFib, hypothyroidism, CAD, DM type 2, GERD, CKD 3, and hypertension presenting with chronic nausea and intermittent vomiting over the past several weeks and soft stools for the past few days.  Patient was admitted to [facility] in May for CHF and required a thoracentesis during her hospital stay.  Patient has followed up with Cardiology and has had recent visits with her PCP for ongoing nausea.  She had been prescribed p.o. Phenergan and omeprazole with no relief.  Patient reports gas type feeling in the mid epigastrum and denies abdominal pain and tenderness.  She states she has been drinking a lot of water to help her belch and this relieves her gas type pain temporarily.  ED workup revealed hyponatremia with a sodium of 127 and an LINDSAY with creatinine 1.5.  AST and ALT were mildly elevated, lipase negative.  While in the ED, patient received 500 cc bolus and IV Phenergan and Zofran with minimal relief.  Patient was referred to Hospital Medicine and seen in the ED.  She reports ongoing nausea and states she does not feel well.  She reports having a headache and denies abdominal pain other than gas type feeling with no abdominal tenderness.  Patient also denies chest pain, shortness a breath, vomiting, diarrhea, fever, and chills.  Patient will be admitted to Hospital Medicine for further evaluation and management.          Procedure(s) (LRB):  EGD (ESOPHAGOGASTRODUODENOSCOPY) (N/A)      Hospital Course:    ED workup revealed hyponatremia with a sodium of  127 and an LINDSAY with creatinine 1.5.  AST and ALT were mildly elevated, lipase negative.  While in the ED, patient received 500 cc bolus and IV Phenergan and Zofran with minimal relief.  Patient was referred to Hospital Medicine. Symptoms have improved since phenergan for nausea. No severe abdominal pain or epigastric burning sensation. Does report occasional chest tightness. Remains on imdur. Troponin neg. Sodium improved with water bolus. Hyponatremia is better with NS fluids --> bicarb gtt. Hyperkalemia medication induced was better with 500cc bicarb gtt and addition of a low K diet. Patient underwent EGD diagnosed with gastritis. Cardiology was consulted due bradycardia and evaluation of pt c current meds. As per recommendation, Multaq was stopped as her nausea was attributed to it. Spironolactone was stopped due hyperkalemia. Pt was medically stable for discharge       Goals of Care Treatment Preferences:  Code Status: Full Code      Consults:   Consults (From admission, onward)        Status Ordering Provider     Inpatient consult to Cardiology  Once        Provider:  Freeman Anderson MD    Acknowledged ALIS RAHMAN     Inpatient consult to Gastroenterology  Once        Provider:  Skip Nj MD    Completed MITZI WRIGHT     Inpatient consult to Nephrology  Once        Provider:  Elisa Grijalva MD    Completed MITZI WRIGHT          No new Assessment & Plan notes have been filed under this hospital service since the last note was generated.  Service: Hospital Medicine    Final Active Diagnoses:    Diagnosis Date Noted POA    PRINCIPAL PROBLEM:  Hyponatremia [E87.1] 06/25/2022 Yes    LINDSAY (acute kidney injury) [N17.9] 06/25/2022 Yes    Nausea [R11.0] 06/25/2022 Yes    Stage 3b chronic kidney disease [N18.32] 11/07/2021 Yes    Thyroid dysfunction [E07.9] 02/15/2021 Yes    Type 2 diabetes mellitus with diabetic nephropathy, without long-term current use of insulin [E11.21]  05/05/2018 Yes    Atherosclerosis of native coronary artery of native heart with angina pectoris [I25.119] 09/05/2016 Yes      Problems Resolved During this Admission:       Discharged Condition: stable    Disposition: Home or Self Care    Follow Up:   Follow-up Information     Oumar Almonte Jr, MD Follow up.    Specialty: Family Medicine  Contact information:  [address] [facility]  [address]  [place] [address]  [phone]                       Patient Instructions:      COMPREHENSIVE METABOLIC PANEL   Standing Status: Future Standing Exp. Date: 08/26/23     Activity as tolerated       Significant Diagnostic Studies: Labs:   CMP   Recent Labs   Lab 06/25/22  2106 06/26/22  1148 06/27/22  0504   * 131* 134*   K 5.2* 5.3* 5.0   CL 97 98 102   CO2 24 28 25   * 118* 81   BUN 21 19 13   CREATININE 1.3 1.4 1.2   CALCIUM 8.9 9.0 8.9   PROT 6.3  --   --    ALBUMIN 3.8  --   --    BILITOT 0.7  --   --    ALKPHOS 71  --   --    AST 59*  --   --    ALT 93*  --   --    ANIONGAP 9 5* 7*   ESTGFRAFRICA 44* 41* 49*   EGFRNONAA 39* 35* 42*    and CBC   Recent Labs   Lab 06/26/22  0446 06/27/22  0504   WBC 3.92 3.52*   HGB 9.7* 9.3*   HCT 30.6* 29.1*    158     Microbiology:   Blood Culture   Lab Results   Component Value Date    LABBLOO No growth after 5 days. 05/13/2022    and Urine Culture    Lab Results   Component Value Date    LABURIN No growth 05/13/2022       Pending Diagnostic Studies:     Procedure Component Value Units Date/Time    EKG 12-lead [[id number]] Collected: 06/25/22 0929    Order Status: Sent Lab Status: In process Updated: 06/27/22 0740    Narrative:      Test Reason : R07.9,    Vent. Rate : 063 BPM     Atrial Rate : 063 BPM     P-R Int : 192 ms          QRS Dur : 092 ms      QT Int : 466 ms       P-R-T Axes : 032 -42 070 degrees     QTc Int : 476 ms    Sinus rhythm with frequent Premature ventricular complexes in a pattern of  bigeminy  Left axis deviation  Low voltage QRS  Septal infarct  (cited on or before 20-APR-2022)  Abnormal ECG  When compared with ECG of 25-JUN-2022 05:33,  Significant changes have occurred    Referred By: BEKA   SELF           Confirmed By:     EKG 12-lead [[id number]] Collected: 06/25/22 0533    Order Status: Sent Lab Status: In process Updated: 06/27/22 0740    Narrative:      Test Reason : R06.02,    Vent. Rate : 061 BPM     Atrial Rate : 061 BPM     P-R Int : 288 ms          QRS Dur : 116 ms      QT Int : 468 ms       P-R-T Axes : -18 -49 098 degrees     QTc Int : 471 ms    Sinus rhythm with 1st degree A-V block  Left anterior fascicular block  Anterior infarct (cited on or before 20-APR-2022)  Abnormal ECG  When compared with ECG of 25-JUN-2022 00:18,  Previous ECG has undetermined rhythm, needs review  Left anterior fascicular block is now Present    Referred By: BEKA   SELF           Confirmed By:     Specimen to Pathology, Surgery Gastrointestinal tract [[id number]] Collected: 06/27/22 0844    Order Status: Sent Lab Status: In process Updated: 06/27/22 1107    Specimen: Tissue          Medications:  Reconciled Home Medications:      Medication List      START taking these medications    ondansetron 4 MG tablet  Commonly known as: ZOFRAN  Take 1 tablet (4 mg total) by mouth every 6 (six) hours as needed for Nausea.        CHANGE how you take these medications    pramoxine-hydrocortisone 1-1 % rectal cream  Commonly known as: PROCTOCREAM-HC  Place rectally 2 (two) times daily.  What changed: how much to take        CONTINUE taking these medications    acetaminophen 650 MG Tbsr  Commonly known as: TYLENOL  Take 1,300 mg by mouth once daily. 2 Tablet(s) Oral  Every day.     amitriptyline 50 MG tablet  Commonly known as: ELAVIL  Take 1 tablet (50 mg total) by mouth every evening.     apixaban 5 mg Tab  Commonly known as: ELIQUIS  Take 1 tablet (5 mg total) by mouth 2 (two) times daily.     azelastine 137 mcg (0.1 %) nasal spray  Commonly known as: ASTELIN  1 spray  (137 mcg total) by Nasal route 2 (two) times daily.     canagliflozin 100 mg Tab tablet  Commonly known as: INVOKANA  Take 1 tablet (100 mg total) by mouth once daily.     carboxymethylcellulose 1 % ophthalmic solution  Apply 1 drop to eye As instructed. as directed     cetirizine 10 MG tablet  Commonly known as: ZYRTEC  Take 10 mg by mouth once daily.     cholecalciferol (vitamin D3) 50 mcg (2,000 unit) Cap capsule  Commonly known as: VITAMIN D3  Take 1 capsule by mouth once daily.     coenzyme Q10 100 mg capsule  Take 100 mg by mouth once daily.     cranberry extract 650 mg Cap  Take 1 tablet by mouth 2 (two) times daily.     fluticasone propionate 50 mcg/actuation nasal spray  Commonly known as: FLONASE  1 spray (50 mcg total) by Each Nostril route once daily.     FREESTYLE LITE STRIPS Strp  Generic drug: blood sugar diagnostic  USE TO TEST BLOOD SUGAR TWICE A DAY     lancets Misc  1 Units by Misc.(Non-Drug; Combo Route) route 2 (two) times daily.     levothyroxine 25 MCG tablet  Commonly known as: LEVOTHROID  Take 1 tablet (25 mcg total) by mouth before breakfast. Every day     MAG-OXIDE ORAL  Take 400 mg by mouth once daily.     metoprolol succinate 25 MG 24 hr tablet  Commonly known as: TOPROL XL  Take 1 tablet (25 mg total) by mouth once daily.     nitroGLYCERIN 0.4 MG SL tablet  Commonly known as: NITROSTAT  Place 0.4 mg under the tongue every 5 (five) minutes as needed. 0.4mg Sublingual PRN .     omeprazole 40 MG capsule  Commonly known as: PRILOSEC  Take 1 capsule (40 mg total) by mouth once daily.     PROBIOTIC-10 ORAL  Take by mouth.     RESTASIS 0.05 % ophthalmic emulsion  Generic drug: cycloSPORINE  Place 1 drop into both eyes 2 (two) times daily.     rosuvastatin 10 MG tablet  Commonly known as: CRESTOR  Take 1 tablet (10 mg total) by mouth once daily.     triazolam 0.25 MG Tab  Commonly known as: HALCION  Take 1 tablet (0.25 mg total) by mouth nightly as needed (sleep).     UNABLE TO FIND  Take 1  capsule by mouth once daily. Vital red     vitamins  A,C,E-zinc-copper [phone] unit-mg-unit Cap  Take 1 capsule by mouth 2 (two) times daily.        STOP taking these medications    dronedarone 400 mg Tab  Commonly known as: MULTAQ     metoclopramide HCl 5 MG tablet  Commonly known as: REGLAN     promethazine 25 MG tablet  Commonly known as: PHENERGAN     spironolactone 25 MG tablet  Commonly known as: ALDACTONE            Indwelling Lines/Drains at time of discharge:   Lines/Drains/Airways     None                 Time spent on the discharge of patient: 60 minutes         Marie Lindo MD  Department of Hospital Medicine  [facility]

## 2022-06-27 NOTE — PROGRESS NOTES
INPATIENT NEPHROLOGY Progress Note   [facility]    Patient Name: Darlin Tipton  Date: 06/27/2022    Reason for consultation: Hyponatremia    Chief Complaint:   Chief Complaint   Patient presents with    Headache     With nausea and other symptoms for a few days       History of Present Illness:  Darlin Tipton is an 81-year-old female with a past medical history of AFib, hypothyroidism, CAD, DM type 2, GERD, CKD 3, and hypertension presenting with chronic nausea and intermittent vomiting over the past several weeks and soft stools for the past few days.  Patient was admitted to [facility] in May for CHF and required a thoracentesis during her hospital stay. Patient has followed up with Cardiology and has had recent visits with her PCP for ongoing nausea.  She had been prescribed p.o. Phenergan and omeprazole with no relief.  Patient reports gas type feeling in the mid epigastrum and denies abdominal pain and tenderness.  She states she has been drinking a lot of water to help her belch and this relieves her gas type pain temporarily.  ED workup revealed hyponatremia with a sodium of 127 and an LINDSAY with creatinine 1.5.  AST and ALT were mildly elevated, lipase negative.  While in the ED, patient received 500 cc bolus and IV Phenergan and Zofran with minimal relief.  Patient was referred to Hospital Medicine and seen in the ED.  She reports ongoing nausea and states she does not feel well.  She reports having a headache and denies abdominal pain other than gas type feeling with no abdominal tenderness.  Patient also denies chest pain, shortness a breath, vomiting, diarrhea, fever, and chills. Consulted for hyponatremia.    Interval History:  6/25 VSS, on 2L NC, UOP 300cc, c/o nausea- GI eval is pending  6/26 reviewed GI note- EGD Monday, VSS on 2L NC, voiding  6/27  AFVSS.  In Endo currently    Plan of Care:    Assessment:  LINDSAY  Hyponatremia  Hyperkalemia  Transaminitis  Anemia    Plan:    - SCr better  today- urinating however and hemodynamics are stable.  - Hyponatremia is better.  Urine osm not maximally dilute.   - Hyperkalemia is better.   Hold aldactone.  -  , avoiding samsca due to elevated liver enzymes  - H/H worse- ? Dilution- getting EGD    --trending    Thank you for allowing us to participate in this patient's care. We will continue to follow.    Vital Signs:  Temp Readings from Last 3 Encounters:   06/27/22 98.2 °F (36.8 °C)   06/15/22 97.6 °F (36.4 °C)   05/17/22 98.1 °F (36.7 °C) (Oral)       Pulse Readings from Last 3 Encounters:   06/27/22 64   06/15/22 83   05/17/22 69       BP Readings from Last 3 Encounters:   06/27/22 (!) 157/70   06/15/22 134/66   05/17/22 (!) 145/65       Weight:  Wt Readings from Last 3 Encounters:   06/27/22 60 kg (132 lb 4.4 oz)   06/15/22 64.9 kg (143 lb 1.3 oz)   05/17/22 65.7 kg (144 lb 13.5 oz)       Medications:  Scheduled Meds:   amitriptyline  50 mg Oral QHS    apixaban  5 mg Oral BID    dronedarone  400 mg Oral BID WM    isosorbide mononitrate  60 mg Oral Daily    levothyroxine  25 mcg Oral Before breakfast    metoprolol succinate  25 mg Oral Daily    pantoprazole  40 mg Oral Daily     Continuous Infusions:   sodium chloride 0.9%       PRN Meds:.acetaminophen, aluminum-magnesium hydroxide-simethicone, dextrose 10%, dextrose 10%, glucagon (human recombinant), glucose, glucose, insulin aspart U-100, magnesium oxide, magnesium oxide, melatonin, naloxone, nitroGLYCERIN, potassium bicarbonate, potassium bicarbonate, potassium bicarbonate, potassium, sodium phosphates, potassium, sodium phosphates, potassium, sodium phosphates, promethazine (PHENERGAN) IVPB, simethicone, sodium chloride 0.9%  No current facility-administered medications on file prior to encounter.     Current Outpatient Medications on File Prior to Encounter   Medication Sig Dispense Refill    amitriptyline (ELAVIL) 50 MG tablet Take 1 tablet (50 mg total) by mouth every evening. 90 tablet 3     apixaban (ELIQUIS) 5 mg Tab Take 1 tablet (5 mg total) by mouth 2 (two) times daily. 180 tablet 3    azelastine (ASTELIN) 137 mcg (0.1 %) nasal spray 1 spray (137 mcg total) by Nasal route 2 (two) times daily. 90 mL 3    blood sugar diagnostic (FREESTYLE LITE STRIPS) Strp USE TO TEST BLOOD SUGAR TWICE A  strip 3    canagliflozin (INVOKANA) 100 mg Tab tablet Take 1 tablet (100 mg total) by mouth once daily. 90 tablet 3    dronedarone (MULTAQ) 400 mg Tab Take 1 tablet (400 mg total) by mouth 2 (two) times daily with meals. 180 tablet 3    fluticasone propionate (FLONASE) 50 mcg/actuation nasal spray 1 spray (50 mcg total) by Each Nostril route once daily. 48 g 3    lancets Misc 1 Units by Misc.(Non-Drug; Combo Route) route 2 (two) times daily. 200 each 3    levothyroxine (LEVOTHROID) 25 MCG tablet Take 1 tablet (25 mcg total) by mouth before breakfast. Every day 90 tablet 3    metoclopramide HCl (REGLAN) 5 MG tablet Take 1 tablet (5 mg total) by mouth 2 (two) times daily as needed (nausea). 30 tablet 0    metoprolol succinate (TOPROL XL) 25 MG 24 hr tablet Take 1 tablet (25 mg total) by mouth once daily. 90 tablet 3    omeprazole (PRILOSEC) 40 MG capsule Take 1 capsule (40 mg total) by mouth once daily. 30 capsule 2    pramoxine-hydrocortisone (PROCTOCREAM-HC) 1-1 % rectal cream Place rectally 2 (two) times daily. (Patient taking differently: Place 1 application rectally 2 (two) times daily.) 3 each 3    rosuvastatin (CRESTOR) 10 MG tablet Take 1 tablet (10 mg total) by mouth once daily. 90 tablet 3    spironolactone (ALDACTONE) 25 MG tablet Take 1 tablet (25 mg total) by mouth once daily. 30 tablet 1    triazolam (HALCION) 0.25 MG Tab Take 1 tablet (0.25 mg total) by mouth nightly as needed (sleep). 90 tablet 1    acetaminophen (TYLENOL) 650 MG TbSR Take 1,300 mg by mouth once daily. 2 Tablet(s) Oral  Every day.      carboxymethylcellulose 1 % ophthalmic solution Apply 1 drop to eye As  "instructed. as directed      cetirizine (ZYRTEC) 10 MG tablet Take 10 mg by mouth once daily.      cholecalciferol, vitamin D3, (VITAMIN D3) 50 mcg (2,000 unit) Cap Take 1 capsule by mouth once daily.      coenzyme Q10 100 mg capsule Take 100 mg by mouth once daily.       cranberry extract 650 mg Cap Take 1 tablet by mouth 2 (two) times daily.      Lactobac no.41/Bifidobact no.7 (PROBIOTIC-10 ORAL) Take by mouth.      magnesium oxide (MAG-OXIDE ORAL) Take 400 mg by mouth once daily.      nitroGLYCERIN (NITROSTAT) 0.4 MG SL tablet Place 0.4 mg under the tongue every 5 (five) minutes as needed. 0.4mg Sublingual PRN .        RESTASIS 0.05 % ophthalmic emulsion Place 1 drop into both eyes 2 (two) times daily.      UNABLE TO FIND Take 1 capsule by mouth once daily. Vital red      vitamins  A,C,E-zinc-copper 14,320-226-200 unit-mg-unit Cap Take 1 capsule by mouth 2 (two) times daily.        Review of Systems:  Neg    Physical Exam:  BP (!) 157/70   Pulse 64   Temp 98.2 °F (36.8 °C)   Resp 20   Ht 5' 2" (1.575 m)   Wt 60 kg (132 lb 4.4 oz)   SpO2 99%   Breastfeeding No   BMI 24.19 kg/m²     Constitutional: nad, aao x 3  Heart: rrr, no m/r/g, wwp, no edema  Lungs: ctab, no w/r/r/c, no lb  Abdomen: s/nt/nd, +BS    Results:  Lab Results   Component Value Date     (L) 06/27/2022    K 5.0 06/27/2022     06/27/2022    CO2 25 06/27/2022    BUN 13 06/27/2022    CREATININE 1.2 06/27/2022    CALCIUM 8.9 06/27/2022    ANIONGAP 7 (L) 06/27/2022    ESTGFRAFRICA 49 (A) 06/27/2022    EGFRNONAA 42 (A) 06/27/2022       Lab Results   Component Value Date    CALCIUM 8.9 06/27/2022    PHOS 4.1 06/25/2022       Recent Labs   Lab 06/27/22  0504   WBC 3.52*   RBC 3.33*   HGB 9.3*   HCT 29.1*      MCV 87   MCH 27.9   MCHC 32.0       I have personally reviewed pertinent radiological imaging and reports.    Patient care was time spent personally by me on the following activities:   · Obtaining a " history  · Examination of patient.  · Providing medical care at the patients bedside.  · Developing a treatment plan with patient or surrogate and bedside caregivers  · Ordering and reviewing laboratory studies, radiographic studies, pulse oximetry.  · Ordering and performing treatments and interventions.  · Evaluation of patient's response to treatment.  · Discussions with consultants while on the unit and immediately available to the patient.  · Re-evaluation of the patient's condition.  · Documentation in the medical record.       Zia Brooks MD  Nephrology  [facility] [place]  [phone]

## 2022-06-27 NOTE — PLAN OF CARE
POC reviewed with Patient verbalizes understanding at this time.  Patient voiding and ambulatory.  No nausea noted  EGD today.No significant findings noted at this time VSS Metropolol held Multaq Held per MD orders dc to home with family.

## 2022-06-27 NOTE — PT/OT/SLP EVAL
Physical Therapy Evaluation    Patient Name:  Darlin Tipton   MRN:  [id number]    Recommendations:     Discharge Recommendations:  home   Discharge Equipment Recommendations: none   Barriers to discharge: None    Assessment:     Darlin Tipton is a 81 y.o. female admitted with a medical diagnosis of Hyponatremia.  She presents with the following impairments/functional limitations:  weakness, impaired endurance, impaired functional mobilty, gait instability, impaired balance, pain. Patient is agreeable to participation with PT evaluation. She reports unrated mid-abdominal pain at rest. She lives with spouse and does not use an AD at baseline. She has a rollator that she uses for distance. She requires SBA for supine to sit and CGA for sit to stand with RW. She ambulated 250' with RW and CGA. Upon return to room patient requests to use restroom and performs toilet transfer with SBA. She returned to bed with RN notified and family present.     Rehab Prognosis: Good; patient would benefit from acute skilled PT services to address these deficits and reach maximum level of function.    Recent Surgery: Procedure(s) (LRB):  EGD (ESOPHAGOGASTRODUODENOSCOPY) (N/A) Day of Surgery    Plan:     During this hospitalization, patient to be seen 6 x/week to address the identified rehab impairments via gait training, therapeutic activities, therapeutic exercises and progress toward the following goals:    · Plan of Care Expires:  07/27/22    Subjective     Chief Complaint: needs to use bathroom   Patient/Family Comments/goals: states she may be going home today   Pain/Comfort:  · Pain Rating 1:  (not rated)  · Location 1: abdomen  · Pain Addressed 1: Reposition, Distraction    Patients cultural, spiritual, Adventism conflicts given the current situation:      Living Environment:  Patient lives with spouse in a [facility] with no CHERYL   Prior to admission, patients level of function was independent. She uses rollator for distance. She  endorses 1 fall in the past year (thinks she tripped in O2 tubing). She had PT previously for her hand. She reports right knee pain due to meniscus. She does not drive.  Equipment used at home: rollator, oxygen, shower chair.  DME owned (not currently used): none.  Upon discharge, patient will have assistance from family.    Objective:     Communicated with charge RN Beth prior to session.  Patient found HOB elevated with peripheral IV, telemetry  upon PT entry to room.    General Precautions: Standard, fall   Orthopedic Precautions:N/A   Braces: N/A  Respiratory Status: Room air    Exams:  · RLE Strength: 3+/5  · LLE Strength: 3+/5    Functional Mobility:  · Bed Mobility:     · Supine to Sit: stand by assistance  · Transfers:     · Sit to Stand:  contact guard assistance with rolling walker  · Gait: 250' with RW and CGA    Therapeutic Activities and Exercises:   Patient was educated on the importance of OOB activity and functional mobility to negate negative effects of prolonged bed rest during hospitalization, safe transfers and ambulation, and D/C planning     AM-PAC 6 CLICK MOBILITY  Total Score:23     Patient left HOB elevated with all lines intact, call button in reach, RN notified and family present.    GOALS:   Multidisciplinary Problems     Physical Therapy Goals        Problem: Physical Therapy    Goal Priority Disciplines Outcome Goal Variances Interventions   Physical Therapy Goal     PT, PT/OT      Description: Goals to be met by: 22    Patient will increase functional independence with mobility by performin. Supine to sit with Supervision  2. Sit to stand transfer with Supervision  3. Bed to chair transfer with Supervision using Rolling Walker  4. Gait  x 250 feet with Supervision using Rolling Walker.   5. Lower extremity exercise program x20 reps per handout, with supervision                   History:     Past Medical History:   Diagnosis Date    Allergy     Dust mites, Grasses, Trees     Arthritis     Asthma     Blood transfusion     CAD (coronary artery disease)     Cataract     CHRONIC BRONCHITIS     Diabetes mellitus     Diabetes mellitus type II     GERD (gastroesophageal reflux disease)     Hyperlipidemia     Hypertension     Irregular heart beat     Kidney disease     ckd stage 3  as stated by patient - to see MD    Post-menopausal bleeding 2018    Spinal stenosis     Thyroid disease     Hypothyroidism       Past Surgical History:   Procedure Laterality Date    APPENDECTOMY  1968    BLADDER SUSPENSION  1989    CARDIAC SURGERY  2016    CABG    CATARACT EXTRACTION  9/2007 (L) and 10/2207 (R)    COLONOSCOPY N/A 10/18/2017    Procedure: COLONOSCOPY;  Surgeon: Esme Acuna MD;  Location: [facility] ENDO;  Service: Endoscopy;  Laterality: N/A;    CORONARY ANGIOGRAPHY INCLUDING BYPASS GRAFTS WITH CATHETERIZATION OF LEFT HEART Left 5/13/2022    Procedure: Left heart cath;  Surgeon: Davon Patton MD;  Location: [facility] CATH/EP LAB;  Service: Cardiology;  Laterality: Left;    CORONARY ARTERY BYPASS GRAFT  4/26/2004    x5    ESOPHAGEAL DILATION      HYSTEROSCOPY WITH DILATION AND CURETTAGE OF UTERUS N/A 3/12/2020    Procedure: HYSTEROSCOPY, WITH DILATION AND CURETTAGE OF UTERUS;  Surgeon: Ramona Llanos MD;  Location: [facility] OR;  Service: OB/GYN;  Laterality: N/A;    SPINE SURGERY  3/2000    Tumor    TRANSFORAMINAL EPIDURAL INJECTION OF STEROID Right 11/21/2019    Procedure: Injection,steroid,epidural,transforaminal approach;  Surgeon: Bj Jones MD;  Location: [facility];  Service: Pain Management;  Laterality: Right;  L4-5, L5-S1    TRANSFORAMINAL EPIDURAL INJECTION OF STEROID Right 12/31/2019    Procedure: Injection,steroid,epidural,transforaminal approach;  Surgeon: Bj Jones MD;  Location: [facility];  Service: Pain Management;  Laterality: Right;  L4-5, L5-S1    TRANSFORAMINAL EPIDURAL INJECTION OF STEROID Right 2/5/2020    Procedure:  Injection,steroid,epidural,transforaminal approach;  Surgeon: Bj Jones MD;  Location: [facility] OR;  Service: Pain Management;  Laterality: Right;  L4-5, L5-S1    TRANSFORAMINAL EPIDURAL INJECTION OF STEROID Left 1/27/2021    Procedure: Injection,steroid,epidural,transforaminal approach;  Surgeon: Bj Jones MD;  Location: [facility] OR;  Service: Pain Management;  Laterality: Left;  L4-5, L5-S1    TRANSFORAMINAL EPIDURAL INJECTION OF STEROID Right 3/4/2021    Procedure: Injection,steroid,epidural,transforaminal approach;  Surgeon: Bj Jones MD;  Location: [facility] OR;  Service: Pain Management;  Laterality: Right;  L4-L5, L5-S1    WRIST SURGERY  1993    carpal tunnel         Time Tracking:     PT Received On: 06/27/22  PT Start Time: 1343     PT Stop Time: 1359  PT Total Time (min): 16 min     Billable Minutes: Evaluation 16      06/27/2022

## 2022-06-27 NOTE — DISCHARGE INSTRUCTIONS
Eat low fat foods limit fried greasy foods Keep hydrated Monitor heart rate If Less Than 60 do not take and recheck heart rate in one hour and if greater than 60 take medication Multaq Being held this pm Contact Dr Diaz and let him know that GI said Multaq could possibly be Causing Nausea Once Again Dr Perez said this could possibly be causing nausea Not Definitely sure on this if you have any problems go to nearest emergency room

## 2022-06-27 NOTE — CARE UPDATE
06/26/22 2023   Patient Assessment/Suction   Level of Consciousness (AVPU) alert   PRE-TX-O2   O2 Device (Oxygen Therapy) room air   SpO2 95 %   Pulse Oximetry Type Intermittent

## 2022-06-28 ENCOUNTER — PATIENT OUTREACH (OUTPATIENT)
Dept: ADMINISTRATIVE | Facility: CLINIC | Age: 82
End: 2022-06-28
Payer: MEDICARE

## 2022-06-28 ENCOUNTER — TELEPHONE (OUTPATIENT)
Dept: GASTROENTEROLOGY | Facility: CLINIC | Age: 82
End: 2022-06-28
Payer: MEDICARE

## 2022-06-28 NOTE — PROGRESS NOTES
C3 nurse attempted to contact Darlin Tipton for a TCC post hospital discharge follow up call. No answer. Left voicemail with callback information. Message sent via [facility] portal for Post Discharge Attempt with callback information. The patient has a scheduled appointment with ASHU Rosen on 6/29/2022 @ 1100.

## 2022-06-28 NOTE — TELEPHONE ENCOUNTER
----- Message from Jelena Corrigan LPN sent at 6/27/2022  4:41 PM CDT -----  Regarding: FW: Appointment    ----- Message -----  From: Regina De La Torre  Sent: 6/27/2022   4:35 PM CDT  To: Clem SLOAN Staff  Subject: Appointment                                      Type:  Patient Call Back    Who Called:Clare ([facility])    What is the reqeust in detail:Requesting call back to schedule an Hospital follow up. Please advise    Can the clinic reply by [facility]?no    Best Call Back Number:[phone]      Additional Information:

## 2022-06-29 ENCOUNTER — OFFICE VISIT (OUTPATIENT)
Dept: FAMILY MEDICINE | Facility: CLINIC | Age: 82
End: 2022-06-29
Payer: MEDICARE

## 2022-06-29 VITALS
HEIGHT: 62 IN | WEIGHT: 134.25 LBS | OXYGEN SATURATION: 96 % | SYSTOLIC BLOOD PRESSURE: 110 MMHG | TEMPERATURE: 98 F | HEART RATE: 67 BPM | BODY MASS INDEX: 24.7 KG/M2 | DIASTOLIC BLOOD PRESSURE: 60 MMHG

## 2022-06-29 DIAGNOSIS — K21.9 GASTROESOPHAGEAL REFLUX DISEASE WITHOUT ESOPHAGITIS: ICD-10-CM

## 2022-06-29 DIAGNOSIS — N17.9 AKI (ACUTE KIDNEY INJURY): ICD-10-CM

## 2022-06-29 DIAGNOSIS — I50.33 ACUTE ON CHRONIC DIASTOLIC HEART FAILURE: ICD-10-CM

## 2022-06-29 DIAGNOSIS — R11.0 NAUSEA: ICD-10-CM

## 2022-06-29 DIAGNOSIS — Z09 HOSPITAL DISCHARGE FOLLOW-UP: Primary | ICD-10-CM

## 2022-06-29 PROBLEM — R31.0 GROSS HEMATURIA: Status: RESOLVED | Noted: 2020-01-27 | Resolved: 2022-06-29

## 2022-06-29 PROBLEM — J40 BRONCHITIS: Status: RESOLVED | Noted: 2022-05-10 | Resolved: 2022-06-29

## 2022-06-29 PROBLEM — R05.9 COUGH: Status: RESOLVED | Noted: 2017-01-12 | Resolved: 2022-06-29

## 2022-06-29 PROBLEM — R00.2 PALPITATIONS: Status: RESOLVED | Noted: 2021-05-18 | Resolved: 2022-06-29

## 2022-06-29 PROBLEM — R07.89 ATYPICAL CHEST PAIN: Status: RESOLVED | Noted: 2022-05-10 | Resolved: 2022-06-29

## 2022-06-29 PROBLEM — R94.39 POSITIVE CARDIAC STRESS TEST: Status: RESOLVED | Noted: 2022-05-06 | Resolved: 2022-06-29

## 2022-06-29 PROCEDURE — 99495 TRANSJ CARE MGMT MOD F2F 14D: CPT | Mod: S$GLB,,, | Performed by: PHYSICIAN ASSISTANT

## 2022-06-29 PROCEDURE — 1126F PR PAIN SEVERITY QUANTIFIED, NO PAIN PRESENT: ICD-10-PCS | Mod: CPTII,S$GLB,, | Performed by: PHYSICIAN ASSISTANT

## 2022-06-29 PROCEDURE — 3288F PR FALLS RISK ASSESSMENT DOCUMENTED: ICD-10-PCS | Mod: CPTII,S$GLB,, | Performed by: PHYSICIAN ASSISTANT

## 2022-06-29 PROCEDURE — 99999 PR PBB SHADOW E&M-EST. PATIENT-LVL III: CPT | Mod: PBBFAC,,, | Performed by: PHYSICIAN ASSISTANT

## 2022-06-29 PROCEDURE — 3078F DIAST BP <80 MM HG: CPT | Mod: CPTII,S$GLB,, | Performed by: PHYSICIAN ASSISTANT

## 2022-06-29 PROCEDURE — 3078F PR MOST RECENT DIASTOLIC BLOOD PRESSURE < 80 MM HG: ICD-10-PCS | Mod: CPTII,S$GLB,, | Performed by: PHYSICIAN ASSISTANT

## 2022-06-29 PROCEDURE — 1126F AMNT PAIN NOTED NONE PRSNT: CPT | Mod: CPTII,S$GLB,, | Performed by: PHYSICIAN ASSISTANT

## 2022-06-29 PROCEDURE — 1101F PR PT FALLS ASSESS DOC 0-1 FALLS W/OUT INJ PAST YR: ICD-10-PCS | Mod: CPTII,S$GLB,, | Performed by: PHYSICIAN ASSISTANT

## 2022-06-29 PROCEDURE — 3074F SYST BP LT 130 MM HG: CPT | Mod: CPTII,S$GLB,, | Performed by: PHYSICIAN ASSISTANT

## 2022-06-29 PROCEDURE — 99999 PR PBB SHADOW E&M-EST. PATIENT-LVL III: ICD-10-PCS | Mod: PBBFAC,,, | Performed by: PHYSICIAN ASSISTANT

## 2022-06-29 PROCEDURE — 1101F PT FALLS ASSESS-DOCD LE1/YR: CPT | Mod: CPTII,S$GLB,, | Performed by: PHYSICIAN ASSISTANT

## 2022-06-29 PROCEDURE — 99495 TCM SERVICES (MODERATE COMPLEXITY): ICD-10-PCS | Mod: S$GLB,,, | Performed by: PHYSICIAN ASSISTANT

## 2022-06-29 PROCEDURE — 3288F FALL RISK ASSESSMENT DOCD: CPT | Mod: CPTII,S$GLB,, | Performed by: PHYSICIAN ASSISTANT

## 2022-06-29 PROCEDURE — 3074F PR MOST RECENT SYSTOLIC BLOOD PRESSURE < 130 MM HG: ICD-10-PCS | Mod: CPTII,S$GLB,, | Performed by: PHYSICIAN ASSISTANT

## 2022-06-29 RX ORDER — AMIODARONE HYDROCHLORIDE 200 MG/1
200 TABLET ORAL 2 TIMES DAILY
COMMUNITY
Start: 2022-06-22 | End: 2022-12-12

## 2022-06-29 RX ORDER — ONDANSETRON 4 MG/1
4 TABLET, ORALLY DISINTEGRATING ORAL EVERY 8 HOURS PRN
Qty: 30 TABLET | Refills: 0 | Status: SHIPPED | OUTPATIENT
Start: 2022-06-29 | End: 2022-08-22

## 2022-06-29 NOTE — PROGRESS NOTES
Subjective:       Patient ID: Darlin Tipton is a 81 y.o. female.    Chief Complaint: Transitional Care    Transitional Care Note    Family and/or Caretaker present at visit?  Yes.  Diagnostic tests reviewed/disposition: No diagnosic tests pending after this hospitalization.  Disease/illness education: yes  Home health/community services discussion/referrals: Patient does not have home health established from hospital visit.  They do not need home health.  If needed, we will set up home health for the patient.   Establishment or re-establishment of referral orders for community resources: No other necessary community resources.   Discussion with other health care providers: No discussion with other health care providers necessary.     Patient presents for hospital discharge follow-up.  She is still having some nausea and gastritis but states she feels somewhat improved on the omeprazole.  We are still awaiting biopsy results for her upper GI.  She is still having some occasional nausea and would like a refill on her Zofran.  She is due to follow-up with her cardiologist next week as some of her medications were discontinued thinking they were causing the nausea and gastritis.  Patient does not have follow-up scheduled with GI.  She states overall she is feeling better.  Hospital HPI:   Darlin Tipton is an 81-year-old female with a past medical history of AFib, hypothyroidism, CAD, DM type 2, GERD, CKD 3, and hypertension presenting with chronic nausea and intermittent vomiting over the past several weeks and soft stools for the past few days.  Patient was admitted to [facility] in May for CHF and required a thoracentesis during her hospital stay.  Patient has followed up with Cardiology and has had recent visits with her PCP for ongoing nausea.  She had been prescribed p.o. Phenergan and omeprazole with no relief.  Patient reports gas type feeling in the mid epigastrum and denies abdominal pain and tenderness.  She states  she has been drinking a lot of water to help her belch and this relieves her gas type pain temporarily.  ED workup revealed hyponatremia with a sodium of 127 and an LINDSAY with creatinine 1.5.  AST and ALT were mildly elevated, lipase negative.  While in the ED, patient received 500 cc bolus and IV Phenergan and Zofran with minimal relief.  Patient was referred to Hospital Medicine and seen in the ED.  She reports ongoing nausea and states she does not feel well.  She reports having a headache and denies abdominal pain other than gas type feeling with no abdominal tenderness.  Patient also denies chest pain, shortness a breath, vomiting, diarrhea, fever, and chills.  Patient will be admitted to Hospital Medicine for further evaluation and management.     Procedure(s) (LRB):  EGD (ESOPHAGOGASTRODUODENOSCOPY) (N/A)       Hospital Course:    ED workup revealed hyponatremia with a sodium of 127 and an LINDSAY with creatinine 1.5.  AST and ALT were mildly elevated, lipase negative.  While in the ED, patient received 500 cc bolus and IV Phenergan and Zofran with minimal relief.  Patient was referred to Hospital Medicine. Symptoms have improved since phenergan for nausea. No severe abdominal pain or epigastric burning sensation. Does report occasional chest tightness. Remains on imdur. Troponin neg. Sodium improved with water bolus. Hyponatremia is better with NS fluids --> bicarb gtt. Hyperkalemia medication induced was better with 500cc bicarb gtt and addition of a low K diet. Patient underwent EGD diagnosed with gastritis. Cardiology was consulted due bradycardia and evaluation of pt c current meds. As per recommendation, Multaq was stopped as her nausea was attributed to it. Spironolactone was stopped due hyperkalemia. Pt was medically stable for discharge      Review of Systems   Constitutional: Negative for activity change, appetite change, fatigue, fever and unexpected weight change.   HENT: Negative for nasal congestion,  dental problem, hearing loss, postnasal drip, rhinorrhea, sinus pressure/congestion and trouble swallowing.    Eyes: Negative for photophobia, discharge and visual disturbance.   Respiratory: Negative for cough, chest tightness, shortness of breath and wheezing.    Cardiovascular: Negative for chest pain, palpitations and leg swelling.   Gastrointestinal: Positive for reflux. Negative for abdominal pain, blood in stool, constipation, diarrhea, nausea, vomiting and fecal incontinence.   Genitourinary: Negative for difficulty urinating, dyspareunia, dysuria, hematuria, menstrual problem, pelvic pain, vaginal bleeding, vaginal discharge and vaginal pain.   Musculoskeletal: Negative for arthralgias, back pain, joint swelling and neck pain.   Integumentary:  Negative for color change and rash.   Neurological: Negative for dizziness, seizures, weakness, light-headedness, numbness and headaches.   Hematological: Does not bruise/bleed easily.   Psychiatric/Behavioral: Negative for sleep disturbance and suicidal ideas. The patient is not nervous/anxious.          Objective:      Physical Exam  Constitutional:       Appearance: She is well-developed.   Pulmonary:      Effort: Pulmonary effort is normal.   Musculoskeletal:      Right foot: No deformity.   Neurological:      Mental Status: She is alert and oriented to person, place, and time.   Psychiatric:         Behavior: Behavior normal.         Thought Content: Thought content normal.         Judgment: Judgment normal.         Assessment:       Problem List Items Addressed This Visit     Acute on chronic heart failure    LINDSAY (acute kidney injury)    GERD (gastroesophageal reflux disease)    Nausea    Relevant Medications    ondansetron (ZOFRAN-ODT) 4 MG TbDL      Other Visit Diagnoses     Hospital discharge follow-up    -  Primary          Plan:       Darlin was seen today for transitional care.    Diagnoses and all orders for this visit:    Hospital discharge  follow-up    Nausea  Comments:  Improving, Zofran refilled  Orders:  -     ondansetron (ZOFRAN-ODT) 4 MG TbDL; Take 1 tablet (4 mg total) by mouth every 8 (eight) hours as needed (nausea).    Gastroesophageal reflux disease without esophagitis  Comments:  Slight improvement, patient wishes to continue on omeprazole for another week and will call us if she needs a different medication.    LINDSAY (acute kidney injury)  Comments:  Improving, patient has blood work scheduled for tomorrow    Acute on chronic diastolic heart failure  Comments:  Stable, patient to see Cardiology next week

## 2022-06-30 ENCOUNTER — OFFICE VISIT (OUTPATIENT)
Dept: PULMONOLOGY | Facility: CLINIC | Age: 82
End: 2022-06-30
Payer: MEDICARE

## 2022-06-30 VITALS
BODY MASS INDEX: 24.29 KG/M2 | SYSTOLIC BLOOD PRESSURE: 120 MMHG | OXYGEN SATURATION: 97 % | HEIGHT: 62 IN | WEIGHT: 132 LBS | DIASTOLIC BLOOD PRESSURE: 70 MMHG | HEART RATE: 60 BPM

## 2022-06-30 DIAGNOSIS — I50.9 CONGESTIVE HEART FAILURE, UNSPECIFIED HF CHRONICITY, UNSPECIFIED HEART FAILURE TYPE: ICD-10-CM

## 2022-06-30 DIAGNOSIS — J90 PLEURAL EFFUSION: ICD-10-CM

## 2022-06-30 DIAGNOSIS — J45.909 ASTHMA, UNSPECIFIED ASTHMA SEVERITY, UNSPECIFIED WHETHER COMPLICATED, UNSPECIFIED WHETHER PERSISTENT: Primary | ICD-10-CM

## 2022-06-30 DIAGNOSIS — R09.02 HYPOXEMIA: ICD-10-CM

## 2022-06-30 PROCEDURE — 1126F AMNT PAIN NOTED NONE PRSNT: CPT | Mod: CPTII,S$GLB,, | Performed by: NURSE PRACTITIONER

## 2022-06-30 PROCEDURE — 99214 PR OFFICE/OUTPT VISIT, EST, LEVL IV, 30-39 MIN: ICD-10-PCS | Mod: S$GLB,,, | Performed by: NURSE PRACTITIONER

## 2022-06-30 PROCEDURE — 1159F PR MEDICATION LIST DOCUMENTED IN MEDICAL RECORD: ICD-10-PCS | Mod: CPTII,S$GLB,, | Performed by: NURSE PRACTITIONER

## 2022-06-30 PROCEDURE — 1159F MED LIST DOCD IN RCRD: CPT | Mod: CPTII,S$GLB,, | Performed by: NURSE PRACTITIONER

## 2022-06-30 PROCEDURE — 1126F PR PAIN SEVERITY QUANTIFIED, NO PAIN PRESENT: ICD-10-PCS | Mod: CPTII,S$GLB,, | Performed by: NURSE PRACTITIONER

## 2022-06-30 PROCEDURE — 99214 OFFICE O/P EST MOD 30 MIN: CPT | Mod: S$GLB,,, | Performed by: NURSE PRACTITIONER

## 2022-06-30 RX ORDER — LEVALBUTEROL 1.25 MG/.5ML
1 SOLUTION, CONCENTRATE RESPIRATORY (INHALATION) EVERY 6 HOURS PRN
Qty: 120 EACH | Refills: 3 | Status: SHIPPED | OUTPATIENT
Start: 2022-06-30 | End: 2022-07-28 | Stop reason: SDUPTHER

## 2022-06-30 RX ORDER — FLUTICASONE FUROATE AND VILANTEROL 100; 25 UG/1; UG/1
1 POWDER RESPIRATORY (INHALATION) DAILY
Qty: 180 EACH | Refills: 6 | Status: SHIPPED | OUTPATIENT
Start: 2022-06-30 | End: 2023-08-16 | Stop reason: SDUPTHER

## 2022-06-30 NOTE — PROGRESS NOTES
SUBJECTIVE:    Patient ID: Darlin Tipton is a 81 y.o. female.    Chief Complaint: Establish Care and Shortness of Breath    HPI   Patient here today to go over previous hospitalization results and to establish care.  The patient has a known history of asthma, used to follow with Dr. Uriostegui. She has not followed with him since 2019.  She was on Breo but stopped using it a long time ago, she does not recall why.  She does get short of breath and wheezes at times. She also has history of CHF and atrial fib. In may she was admitted for acut on chronic HF with atrial fib. She developed a pleural effusion to the Right, had a thoracentesis which cytology was negative for malignancy and cultures are negative to date, it does not look like other studies were done on the fluid.  She was also treated for pneumonia because she ran a fever.   A chest xray done last week showed trace right effusion.  She does complain of chest and abdominal discomfort on and off. She was in hospital last week with abdominal pain. She had an EGD and was put on PPI and antiemetics.  She does have heartburn at times. She eats a lot of peppermints and has been eating blue berries at bedtime.  Her cardiologist is Dr. Diaz.     Past Medical History:   Diagnosis Date    Allergy     Dust mites, Grasses, Trees    Arthritis     Asthma     Blood transfusion     CAD (coronary artery disease)     Cataract     CHRONIC BRONCHITIS     Diabetes mellitus     Diabetes mellitus type II     GERD (gastroesophageal reflux disease)     Hyperlipidemia     Hypertension     Irregular heart beat     Kidney disease     ckd stage 3  as stated by patient - to see MD    Post-menopausal bleeding 2018    Spinal stenosis     Thyroid disease     Hypothyroidism     Past Surgical History:   Procedure Laterality Date    APPENDECTOMY  1968    BLADDER SUSPENSION  1989    CARDIAC SURGERY  2016    CABG    CATARACT EXTRACTION  9/2007 (L) and 10/2207 (R)     COLONOSCOPY N/A 10/18/2017    Procedure: COLONOSCOPY;  Surgeon: Esme Acuna MD;  Location: [facility] ENDO;  Service: Endoscopy;  Laterality: N/A;    CORONARY ANGIOGRAPHY INCLUDING BYPASS GRAFTS WITH CATHETERIZATION OF LEFT HEART Left 5/13/2022    Procedure: Left heart cath;  Surgeon: Davon Patton MD;  Location: [facility] CATH/EP LAB;  Service: Cardiology;  Laterality: Left;    CORONARY ARTERY BYPASS GRAFT  4/26/2004    x5    ESOPHAGEAL DILATION      ESOPHAGOGASTRODUODENOSCOPY N/A 6/27/2022    Procedure: EGD (ESOPHAGOGASTRODUODENOSCOPY);  Surgeon: Skip Nj MD;  Location: [facility] ENDO;  Service: Endoscopy;  Laterality: N/A;    HYSTEROSCOPY WITH DILATION AND CURETTAGE OF UTERUS N/A 3/12/2020    Procedure: HYSTEROSCOPY, WITH DILATION AND CURETTAGE OF UTERUS;  Surgeon: Ramona Llanos MD;  Location: [facility] OR;  Service: OB/GYN;  Laterality: N/A;    SPINE SURGERY  3/2000    Tumor    TRANSFORAMINAL EPIDURAL INJECTION OF STEROID Right 11/21/2019    Procedure: Injection,steroid,epidural,transforaminal approach;  Surgeon: Bj Jones MD;  Location: [facility] OR;  Service: Pain Management;  Laterality: Right;  L4-5, L5-S1    TRANSFORAMINAL EPIDURAL INJECTION OF STEROID Right 12/31/2019    Procedure: Injection,steroid,epidural,transforaminal approach;  Surgeon: Bj Jones MD;  Location: [facility] OR;  Service: Pain Management;  Laterality: Right;  L4-5, L5-S1    TRANSFORAMINAL EPIDURAL INJECTION OF STEROID Right 2/5/2020    Procedure: Injection,steroid,epidural,transforaminal approach;  Surgeon: Bj Jones MD;  Location: [facility] OR;  Service: Pain Management;  Laterality: Right;  L4-5, L5-S1    TRANSFORAMINAL EPIDURAL INJECTION OF STEROID Left 1/27/2021    Procedure: Injection,steroid,epidural,transforaminal approach;  Surgeon: Bj Jones MD;  Location: [facility] OR;  Service: Pain Management;  Laterality: Left;  L4-5, L5-S1    TRANSFORAMINAL EPIDURAL INJECTION OF STEROID Right 3/4/2021    Procedure:  Injection,steroid,epidural,transforaminal approach;  Surgeon: Bj Jones MD;  Location: [facility] OR;  Service: Pain Management;  Laterality: Right;  L4-L5, L5-S1    WRIST SURGERY  1993    carpal tunnel       Family History   Problem Relation Age of Onset    Breast cancer Mother     Breast cancer Maternal Aunt     Allergic rhinitis Neg Hx     Allergies Neg Hx     Angioedema Neg Hx     Asthma Neg Hx     Eczema Neg Hx     Immunodeficiency Neg Hx     Urticaria Neg Hx     Rhinitis Neg Hx     Atopy Neg Hx         Social History:   Marital Status:   Occupation: Data Unavailable  Alcohol History:  reports current alcohol use.  Tobacco History:  reports that she is a non-smoker but has been exposed to tobacco smoke. She has never used smokeless tobacco.  Drug History:  reports no history of drug use.    Review of patient's allergies indicates:   Allergen Reactions    Sulfa (sulfonamide antibiotics) Rash    Floxacillin Itching    Januvia [sitagliptin] Other (See Comments)     Hot flashes    Tetracyclines Itching    Fenofibrate micronized Rash    Nitrofurantoin macrocrystalline Other (See Comments)     unknown    Phenylfenesin la [phenylpropanolamine-gg] Rash       Current Outpatient Medications   Medication Sig Dispense Refill    acetaminophen (TYLENOL) 650 MG TbSR Take 1,300 mg by mouth once daily. 2 Tablet(s) Oral  Every day.      amiodarone (PACERONE) 200 MG Tab Take 200 mg by mouth 2 (two) times daily.      amitriptyline (ELAVIL) 50 MG tablet Take 1 tablet (50 mg total) by mouth every evening. 90 tablet 3    apixaban (ELIQUIS) 5 mg Tab Take 1 tablet (5 mg total) by mouth 2 (two) times daily. 180 tablet 3    azelastine (ASTELIN) 137 mcg (0.1 %) nasal spray 1 spray (137 mcg total) by Nasal route 2 (two) times daily. 90 mL 3    blood sugar diagnostic (FREESTYLE LITE STRIPS) Strp USE TO TEST BLOOD SUGAR TWICE A  strip 3    canagliflozin (INVOKANA) 100 mg Tab tablet Take 1 tablet (100 mg  total) by mouth once daily. 90 tablet 3    carboxymethylcellulose 1 % ophthalmic solution Apply 1 drop to eye As instructed. as directed      cetirizine (ZYRTEC) 10 MG tablet Take 10 mg by mouth once daily.      cholecalciferol, vitamin D3, (VITAMIN D3) 50 mcg (2,000 unit) Cap Take 1 capsule by mouth once daily.      coenzyme Q10 100 mg capsule Take 100 mg by mouth once daily.       cranberry extract 650 mg Cap Take 1 tablet by mouth 2 (two) times daily.      Lactobac no.41/Bifidobact no.7 (PROBIOTIC-10 ORAL) Take by mouth.      lancets Misc 1 Units by Misc.(Non-Drug; Combo Route) route 2 (two) times daily. 200 each 3    levothyroxine (LEVOTHROID) 25 MCG tablet Take 1 tablet (25 mcg total) by mouth before breakfast. Every day 90 tablet 3    magnesium oxide (MAG-OXIDE ORAL) Take 400 mg by mouth once daily.      metoprolol succinate (TOPROL XL) 25 MG 24 hr tablet Take 1 tablet (25 mg total) by mouth once daily. 90 tablet 3    nitroGLYCERIN (NITROSTAT) 0.4 MG SL tablet Place 0.4 mg under the tongue every 5 (five) minutes as needed. 0.4mg Sublingual PRN .        omeprazole (PRILOSEC) 40 MG capsule Take 1 capsule (40 mg total) by mouth once daily. 30 capsule 2    ondansetron (ZOFRAN-ODT) 4 MG TbDL Take 1 tablet (4 mg total) by mouth every 8 (eight) hours as needed (nausea). 30 tablet 0    pramoxine-hydrocortisone (PROCTOCREAM-HC) 1-1 % rectal cream Place rectally 2 (two) times daily. (Patient taking differently: Place 1 application rectally 2 (two) times daily.) 3 each 3    RESTASIS 0.05 % ophthalmic emulsion Place 1 drop into both eyes 2 (two) times daily.      rosuvastatin (CRESTOR) 10 MG tablet Take 1 tablet (10 mg total) by mouth once daily. 90 tablet 3    triazolam (HALCION) 0.25 MG Tab Take 1 tablet (0.25 mg total) by mouth nightly as needed (sleep). 90 tablet 1    UNABLE TO FIND Take 1 capsule by mouth once daily. Vital red      vitamins  A,C,E-zinc-copper [phone] unit-mg-unit Cap Take 1  "capsule by mouth 2 (two) times daily.       fluticasone furoate-vilanteroL (BREO ELLIPTA) 100-25 mcg/dose diskus inhaler Inhale 1 puff into the lungs once daily. Controller Rinse after you use it 180 each 6    fluticasone propionate (FLONASE) 50 mcg/actuation nasal spray 1 spray (50 mcg total) by Each Nostril route once daily. (Patient not taking: Reported on 6/30/2022) 48 g 3    levalbuterol (XOPENEX) 1.25 mg/0.5 mL nebulizer solution Take 0.5 mLs (1.25 mg total) by nebulization every 6 (six) hours as needed for Wheezing. Rescue 120 each 3     No current facility-administered medications for this visit.     Last echo  Summary    · The left ventricle is normal in size with mild concentric hypertrophy and mildly decreased systolic function.  · The estimated ejection fraction is 40%.  · Grade III left ventricular diastolic dysfunction.  · Mild left atrial enlargement.  · Mild aortic regurgitation.  · There is moderate aortic valve stenosis.  · Aortic valve area is 1.06 cm2; peak velocity is m/s; mean gradient is 10 mmHg.  · Mild-to-moderate mitral regurgitation.  · Moderate tricuspid regurgitation.  · Mild pulmonic regurgitation.  · Intermediate central venous pressure (8 mmHg).  · The estimated PA systolic pressure is 32 mmHg.        Last Chest xray 06/24/22:  Impression:     Borderline heart size.  Prior sternotomy.  Trace right pleural effusion     Review of Systems  General: Feeling Well.  Eyes: Vision is good.  ENT:  No sinusitis or pharyngitis.   Heart:: chest pain at times  Lungs: dyspnea with exertion  GI: abdominal discomfort and reflux   : No dysuria, hesitancy, or nocturia.  Musculoskeletal: No joint pain or myalgias.  Skin: No lesions or rashes.  Neuro: No headaches or neuropathy.  Lymph: No edema or adenopathy.  Psych: No anxiety or depression.  Endo: No weight change.    OBJECTIVE:      /70 (BP Location: Left arm, Patient Position: Sitting, BP Method: Medium (Manual))   Pulse 60   Ht 5' 2" " (1.575 m)   Wt 59.9 kg (132 lb)   SpO2 97%   BMI 24.14 kg/m²     Physical Exam  GENERAL: Older patient in no distress.  HEENT: Pupils equal and reactive. Extraocular movements intact. Nose intact.      Pharynx moist.  NECK: Supple.   HEART: Regular rate and rhythm. Loud murmur .  LUNGS: Clear to auscultation and percussion. Lung excursion symmetrical. No change in fremitus. No adventitial noises.  ABDOMEN: Bowel sounds present. Non-tender, no masses palpated.  EXTREMITIES: Normal muscle tone and joint movement, no cyanosis or clubbing.   LYMPHATICS:trace edema to legs .  SKIN: Dry, intact, no lesions.   NEURO: Cranial nerves II-XII intact. Motor strength 5/5 bilaterally, upper and lower extremities.  PSYCH: Appropriate affect.    Assessment:       1. Asthma, unspecified asthma severity, unspecified whether complicated, unspecified whether persistent    2. Congestive heart failure, unspecified HF chronicity, unspecified heart failure type    3. Pleural effusion    4. Hypoxemia          Plan:       Asthma, unspecified asthma severity, unspecified whether complicated, unspecified whether persistent  -     Cancel: NEBULIZER FOR HOME USE  -     NEBULIZER FOR HOME USE    Congestive heart failure, unspecified HF chronicity, unspecified heart failure type  -     Complete PFT with bronchodilator; Future  -     Stress test, pulmonary; Future; Expected date: 06/30/2022    Pleural effusion    Hypoxemia  -     Stress test, pulmonary; Future; Expected date: 06/30/2022    Other orders  -     fluticasone furoate-vilanteroL (BREO ELLIPTA) 100-25 mcg/dose diskus inhaler; Inhale 1 puff into the lungs once daily. Controller Rinse after you use it  Dispense: 180 each; Refill: 6  -     levalbuterol (XOPENEX) 1.25 mg/0.5 mL nebulizer solution; Take 0.5 mLs (1.25 mg total) by nebulization every 6 (six) hours as needed for Wheezing. Rescue  Dispense: 120 each; Refill: 3       breo 1 puff daily, rinse after you use it  Levalbuterol for  nebulizer as needed every 6 hours for shortness of breath  levalbuterol HFA as needed, never use the puffer or nebulizer at the same time  PFT and 6 minute walk   Keep sleeping on oxygen   If she starts to feel more short of breath will order chest xray, if fluid back will tap for studies    GERD precautions stay away from peppermints, elevate head of bed,  No eating or drinking an hour before bed  Follow up in about 2 months (around 8/30/2022).

## 2022-06-30 NOTE — PATIENT INSTRUCTIONS
breo 1 puff daily, rinse after you use it  Levalbuterol for nebulizer as needed every 6 hours for shortness of breath  levalbuterol HFA as needed, never use the puffer or nebulizer at the same time  PFT and 6 minute walk   Keep sleeping on oxygen   If she starts to feel more short of breath will order chest xray, if fluid back will tap for studies  Follow up in about 2 months (around 8/30/2022).

## 2022-07-01 LAB
FINAL PATHOLOGIC DIAGNOSIS: NORMAL
Lab: NORMAL

## 2022-07-03 LAB
ACID FAST MOD KINY STN SPEC: NORMAL
MYCOBACTERIUM SPEC QL CULT: NORMAL

## 2022-07-06 ENCOUNTER — PATIENT MESSAGE (OUTPATIENT)
Dept: FAMILY MEDICINE | Facility: CLINIC | Age: 82
End: 2022-07-06
Payer: MEDICARE

## 2022-07-13 ENCOUNTER — PATIENT MESSAGE (OUTPATIENT)
Dept: FAMILY MEDICINE | Facility: CLINIC | Age: 82
End: 2022-07-13
Payer: MEDICARE

## 2022-07-13 DIAGNOSIS — K21.9 GASTROESOPHAGEAL REFLUX DISEASE WITHOUT ESOPHAGITIS: Primary | ICD-10-CM

## 2022-07-13 RX ORDER — PANTOPRAZOLE SODIUM 40 MG/1
40 TABLET, DELAYED RELEASE ORAL DAILY
Qty: 30 TABLET | Refills: 11 | Status: SHIPPED | OUTPATIENT
Start: 2022-07-13 | End: 2022-07-19

## 2022-07-13 NOTE — TELEPHONE ENCOUNTER
Please let patient know I have sent in Protonix to the pharmacy.  If this does not improve her reflux I want her to see a gastroenterologist.

## 2022-07-13 NOTE — TELEPHONE ENCOUNTER
Please see attached portal message from patient. Patient seen on 6-29-22. Was instructed to contact office if Omeprazole needed to be changed to a different medication. Please advise. Thank you.

## 2022-07-19 ENCOUNTER — PATIENT MESSAGE (OUTPATIENT)
Dept: FAMILY MEDICINE | Facility: CLINIC | Age: 82
End: 2022-07-19
Payer: MEDICARE

## 2022-07-19 DIAGNOSIS — R13.10 DYSPHAGIA, UNSPECIFIED TYPE: Primary | ICD-10-CM

## 2022-07-19 DIAGNOSIS — K21.00 GASTROESOPHAGEAL REFLUX DISEASE WITH ESOPHAGITIS WITHOUT HEMORRHAGE: ICD-10-CM

## 2022-07-19 RX ORDER — SUCRALFATE 1 G/1
1 TABLET ORAL 4 TIMES DAILY
Qty: 90 TABLET | Refills: 0 | Status: SHIPPED | OUTPATIENT
Start: 2022-07-19 | End: 2022-08-08

## 2022-07-19 RX ORDER — ESOMEPRAZOLE MAGNESIUM 40 MG/1
40 CAPSULE, DELAYED RELEASE ORAL
Qty: 30 CAPSULE | Refills: 11 | Status: SHIPPED | OUTPATIENT
Start: 2022-07-19 | End: 2022-12-12 | Stop reason: ALTCHOICE

## 2022-07-19 NOTE — TELEPHONE ENCOUNTER
I have sent in Nexium and Carafate I want the patient to stop pantoprazole.  I also want to get her in as soon as possible with GI.  Referral placed

## 2022-07-20 ENCOUNTER — TELEPHONE (OUTPATIENT)
Dept: GASTROENTEROLOGY | Facility: CLINIC | Age: 82
End: 2022-07-20
Payer: MEDICARE

## 2022-07-20 NOTE — TELEPHONE ENCOUNTER
----- Message from Skip Nj MD sent at 7/20/2022  1:36 PM CDT -----  Biopsies from EGD are negative for significant abnormality.

## 2022-07-22 ENCOUNTER — HOSPITAL ENCOUNTER (OUTPATIENT)
Dept: PULMONOLOGY | Facility: HOSPITAL | Age: 82
Discharge: HOME | End: 2022-07-22
Attending: NURSE PRACTITIONER
Payer: MEDICARE

## 2022-07-22 ENCOUNTER — TELEPHONE (OUTPATIENT)
Dept: PULMONOLOGY | Facility: HOSPITAL | Age: 82
End: 2022-07-22

## 2022-07-22 VITALS — BODY MASS INDEX: 24.29 KG/M2 | WEIGHT: 132 LBS | HEIGHT: 62 IN

## 2022-07-22 DIAGNOSIS — R09.02 HYPOXEMIA: ICD-10-CM

## 2022-07-22 DIAGNOSIS — I50.9 CONGESTIVE HEART FAILURE, UNSPECIFIED HF CHRONICITY, UNSPECIFIED HEART FAILURE TYPE: ICD-10-CM

## 2022-07-22 PROCEDURE — 94729 DIFFUSING CAPACITY: CPT

## 2022-07-22 PROCEDURE — 94010 BREATHING CAPACITY TEST: CPT

## 2022-07-22 PROCEDURE — 94618 PULMONARY STRESS TESTING: CPT

## 2022-07-22 PROCEDURE — 94727 GAS DIL/WSHOT DETER LNG VOL: CPT

## 2022-07-22 NOTE — TELEPHONE ENCOUNTER
The patient's PFT show no obstruction, normal lung volumes and a mild diffusion defect.  Her 6 min walk was non hypoxemic.  Will not change her Breo or order a methacholine at this time given her advanced age and tolerance to the Breo.

## 2022-07-22 NOTE — CARE UPDATE
"   07/22/22 1017   6MW Test   Ordering Provider PIERCE Skelton   Diagnosis Shortness of Breath   Height 5' 2" (1.575 m)   Weight 59.9 kg (132 lb)   BMI (Calculated) 24.1   Predicted Distance 255.39   Patient Race    6MWT Status completed without stopping   Patient Reported Leg pain;Other (Comment)  (back pain)   Was O2 used? No   6MW Distance walked (feet) 1200 feet   Distance walked (meters) 365.76 meters   Did patient stop? No   Type of assistive device(s) used? no assistive devices   Is extra documentation required for this patient? Yes   Pre-Exercise   Oxygen Saturation 95 %   Supplemental Oxygen Room Air   Heart Rate 83 bpm   Neha Dyspnea Rating  light   Post Exercise   Oxygen Saturation 93 %   Supplemental Oxygen Room Air   Heart Rate 97 bpm   Neha Dyspnea Rating  light   Recovery   Oxygen Saturation 95 %   Supplemental Oxygen Room Air   Heart Rate 79 bpm   Neha Dyspnea Rating  light   Interpretation   Is procedure ready for interpretation? Yes   Did the patient stop or pause? No   Total Laps Walked 6   Final Partial Lap Distance (feet) 0 feet   Total Distance Feet (Calculated) 1200 feet   Total Distance Meters (Calculated) 365.76 meters   Predicted Distance Meters (Calculated) 394.03 meters   Percentage of Predicted (Calculated) 92.83   Peak VO2 (Calculated) 14.95   Mets 4.27   Comments This is a Non-Hypoxemic 6 min. walk.   Oxygen Qualification   Oxygen Qualification? No     "

## 2022-07-26 ENCOUNTER — HOSPITAL ENCOUNTER (OUTPATIENT)
Dept: RADIOLOGY | Facility: HOSPITAL | Age: 82
Discharge: HOME | End: 2022-07-26
Attending: PHYSICIAN ASSISTANT
Payer: MEDICARE

## 2022-07-26 DIAGNOSIS — E11.21 TYPE 2 DIABETES MELLITUS WITH DIABETIC NEPHROPATHY, WITHOUT LONG-TERM CURRENT USE OF INSULIN: ICD-10-CM

## 2022-07-26 DIAGNOSIS — R19.4 BOWEL HABIT CHANGES: ICD-10-CM

## 2022-07-26 DIAGNOSIS — R11.0 NAUSEA: ICD-10-CM

## 2022-07-26 PROCEDURE — A9541 TC99M SULFUR COLLOID: HCPCS

## 2022-07-29 ENCOUNTER — PATIENT MESSAGE (OUTPATIENT)
Dept: FAMILY MEDICINE | Facility: CLINIC | Age: 82
End: 2022-07-29
Payer: MEDICARE

## 2022-08-01 ENCOUNTER — OFFICE VISIT (OUTPATIENT)
Dept: ORTHOPEDICS | Facility: CLINIC | Age: 82
End: 2022-08-01
Payer: MEDICARE

## 2022-08-01 VITALS — HEIGHT: 62 IN | WEIGHT: 132 LBS | BODY MASS INDEX: 24.29 KG/M2

## 2022-08-01 DIAGNOSIS — M17.11 PRIMARY OSTEOARTHRITIS OF RIGHT KNEE: Primary | ICD-10-CM

## 2022-08-01 DIAGNOSIS — M70.61 GREATER TROCHANTERIC BURSITIS OF RIGHT HIP: ICD-10-CM

## 2022-08-01 PROCEDURE — 1159F MED LIST DOCD IN RCRD: CPT | Mod: CPTII,S$GLB,, | Performed by: ORTHOPAEDIC SURGERY

## 2022-08-01 PROCEDURE — 1101F PR PT FALLS ASSESS DOC 0-1 FALLS W/OUT INJ PAST YR: ICD-10-PCS | Mod: CPTII,S$GLB,, | Performed by: ORTHOPAEDIC SURGERY

## 2022-08-01 PROCEDURE — 20610 LARGE JOINT ASPIRATION/INJECTION: R KNEE: ICD-10-PCS | Mod: RT,S$GLB,, | Performed by: ORTHOPAEDIC SURGERY

## 2022-08-01 PROCEDURE — 1159F PR MEDICATION LIST DOCUMENTED IN MEDICAL RECORD: ICD-10-PCS | Mod: CPTII,S$GLB,, | Performed by: ORTHOPAEDIC SURGERY

## 2022-08-01 PROCEDURE — 20610 DRAIN/INJ JOINT/BURSA W/O US: CPT | Mod: RT,S$GLB,, | Performed by: ORTHOPAEDIC SURGERY

## 2022-08-01 PROCEDURE — 1160F PR REVIEW ALL MEDS BY PRESCRIBER/CLIN PHARMACIST DOCUMENTED: ICD-10-PCS | Mod: CPTII,S$GLB,, | Performed by: ORTHOPAEDIC SURGERY

## 2022-08-01 PROCEDURE — 1125F PR PAIN SEVERITY QUANTIFIED, PAIN PRESENT: ICD-10-PCS | Mod: CPTII,S$GLB,, | Performed by: ORTHOPAEDIC SURGERY

## 2022-08-01 PROCEDURE — 3288F FALL RISK ASSESSMENT DOCD: CPT | Mod: CPTII,S$GLB,, | Performed by: ORTHOPAEDIC SURGERY

## 2022-08-01 PROCEDURE — 99213 OFFICE O/P EST LOW 20 MIN: CPT | Mod: 25,S$GLB,, | Performed by: ORTHOPAEDIC SURGERY

## 2022-08-01 PROCEDURE — 1125F AMNT PAIN NOTED PAIN PRSNT: CPT | Mod: CPTII,S$GLB,, | Performed by: ORTHOPAEDIC SURGERY

## 2022-08-01 PROCEDURE — 3288F PR FALLS RISK ASSESSMENT DOCUMENTED: ICD-10-PCS | Mod: CPTII,S$GLB,, | Performed by: ORTHOPAEDIC SURGERY

## 2022-08-01 PROCEDURE — 1101F PT FALLS ASSESS-DOCD LE1/YR: CPT | Mod: CPTII,S$GLB,, | Performed by: ORTHOPAEDIC SURGERY

## 2022-08-01 PROCEDURE — 99213 PR OFFICE/OUTPT VISIT, EST, LEVL III, 20-29 MIN: ICD-10-PCS | Mod: 25,S$GLB,, | Performed by: ORTHOPAEDIC SURGERY

## 2022-08-01 PROCEDURE — 1160F RVW MEDS BY RX/DR IN RCRD: CPT | Mod: CPTII,S$GLB,, | Performed by: ORTHOPAEDIC SURGERY

## 2022-08-01 RX ORDER — TRIAMCINOLONE ACETONIDE 40 MG/ML
40 INJECTION, SUSPENSION INTRA-ARTICULAR; INTRAMUSCULAR
Status: DISCONTINUED | OUTPATIENT
Start: 2022-08-01 | End: 2022-08-01 | Stop reason: HOSPADM

## 2022-08-01 RX ADMIN — TRIAMCINOLONE ACETONIDE 40 MG: 40 INJECTION, SUSPENSION INTRA-ARTICULAR; INTRAMUSCULAR at 12:08

## 2022-08-01 NOTE — PROGRESS NOTES
Subjective:       Patient ID: Darlin Tipton is a 81 y.o. female.    Chief Complaint: Pain of the Right Knee (Pt is here with complaints of Right knee pain & Hip Pan, Hip is worse than knee at the moment. Would like injections today. )      History of Present Illness    Prior to meeting with the patient I reviewed the medical chart in [facility]. This included reviewing the previous progress notes from our office, review of the patient's last appointment with their primary care provider, review of any visits to the emergency room, and review of any pain management appointments or procedures.   Patient is here to follow-up for multiple orthopedic issues but specifically today her chief complaint is lateral right hip pain over the greater trochanter and continued right knee pain secondary to a diagnosis of a meniscal tear.  Patient was scheduled for right knee arthroscopy but has had multiple medical issues since she was last here in April 2022.    Current Medications  Current Outpatient Medications   Medication Sig Dispense Refill    acetaminophen (TYLENOL) 650 MG TbSR Take 1,300 mg by mouth once daily. 2 Tablet(s) Oral  Every day.      amiodarone (PACERONE) 200 MG Tab Take 200 mg by mouth 2 (two) times daily.      amitriptyline (ELAVIL) 50 MG tablet Take 1 tablet (50 mg total) by mouth every evening. 90 tablet 3    apixaban (ELIQUIS) 5 mg Tab Take 1 tablet (5 mg total) by mouth 2 (two) times daily. 180 tablet 3    azelastine (ASTELIN) 137 mcg (0.1 %) nasal spray 1 spray (137 mcg total) by Nasal route 2 (two) times daily. 90 mL 3    blood sugar diagnostic (FREESTYLE LITE STRIPS) Strp USE TO TEST BLOOD SUGAR TWICE A  strip 3    canagliflozin (INVOKANA) 100 mg Tab tablet Take 1 tablet (100 mg total) by mouth once daily. 90 tablet 3    carboxymethylcellulose 1 % ophthalmic solution Apply 1 drop to eye As instructed. as directed      cetirizine (ZYRTEC) 10 MG tablet Take 10 mg by mouth once daily.       cholecalciferol, vitamin D3, (VITAMIN D3) 50 mcg (2,000 unit) Cap Take 1 capsule by mouth once daily.      coenzyme Q10 100 mg capsule Take 100 mg by mouth once daily.       cranberry extract 650 mg Cap Take 1 tablet by mouth 2 (two) times daily.      esomeprazole (NEXIUM) 40 MG capsule Take 1 capsule (40 mg total) by mouth before breakfast. 30 capsule 11    fluticasone furoate-vilanteroL (BREO ELLIPTA) 100-25 mcg/dose diskus inhaler Inhale 1 puff into the lungs once daily. Controller Rinse after you use it 180 each 6    fluticasone propionate (FLONASE) 50 mcg/actuation nasal spray 1 spray (50 mcg total) by Each Nostril route once daily. 48 g 3    Lactobac no.41/Bifidobact no.7 (PROBIOTIC-10 ORAL) Take by mouth.      lancets Misc 1 Units by Misc.(Non-Drug; Combo Route) route 2 (two) times daily. 200 each 3    levalbuterol (XOPENEX) 1.25 mg/0.5 mL nebulizer solution Take 0.5 mLs (1.25 mg total) by nebulization every 6 (six) hours as needed for Wheezing. Rescue 120 each 3    levothyroxine (LEVOTHROID) 25 MCG tablet Take 1 tablet (25 mcg total) by mouth before breakfast. Every day 90 tablet 3    magnesium oxide (MAG-OXIDE ORAL) Take 400 mg by mouth once daily.      metoprolol succinate (TOPROL XL) 25 MG 24 hr tablet Take 1 tablet (25 mg total) by mouth once daily. 90 tablet 3    nitroGLYCERIN (NITROSTAT) 0.4 MG SL tablet Place 0.4 mg under the tongue every 5 (five) minutes as needed. 0.4mg Sublingual PRN .        ondansetron (ZOFRAN) 4 MG tablet TAKE 1 TABLET BY MOUTH EVERY 6 HOURS AS NEEDED FOR NAUSEA 20 tablet 1    ondansetron (ZOFRAN-ODT) 4 MG TbDL Take 1 tablet (4 mg total) by mouth every 8 (eight) hours as needed (nausea). 30 tablet 0    pramoxine-hydrocortisone (PROCTOCREAM-HC) 1-1 % rectal cream Place rectally 2 (two) times daily. (Patient taking differently: Place 1 application rectally 2 (two) times daily.) 3 each 3    RESTASIS 0.05 % ophthalmic emulsion Place 1 drop into both eyes 2 (two)  times daily.      rosuvastatin (CRESTOR) 10 MG tablet Take 1 tablet (10 mg total) by mouth once daily. 90 tablet 3    sucralfate (CARAFATE) 1 gram tablet Take 1 tablet (1 g total) by mouth 4 (four) times daily. 90 tablet 0    triazolam (HALCION) 0.25 MG Tab Take 1 tablet (0.25 mg total) by mouth nightly as needed (sleep). 90 tablet 1    UNABLE TO FIND Take 1 capsule by mouth once daily. Vital red      vitamins  A,C,E-zinc-copper [id number] unit-mg-unit Cap Take 1 capsule by mouth 2 (two) times daily.        No current facility-administered medications for this visit.       Allergies  Review of patient's allergies indicates:   Allergen Reactions    Sulfa (sulfonamide antibiotics) Rash    Floxacillin Itching    Januvia [sitagliptin] Other (See Comments)     Hot flashes    Tetracyclines Itching    Fenofibrate micronized Rash    Nitrofurantoin macrocrystalline Other (See Comments)     unknown    Phenylfenesin la [phenylpropanolamine-gg] Rash       Past Medical History  Past Medical History:   Diagnosis Date    Allergy     Dust mites, Grasses, Trees    Arthritis     Asthma     Blood transfusion     CAD (coronary artery disease)     Cataract     CHRONIC BRONCHITIS     Diabetes mellitus     Diabetes mellitus type II     GERD (gastroesophageal reflux disease)     Hyperlipidemia     Hypertension     Irregular heart beat     Kidney disease     ckd stage 3  as stated by patient - to see MD    Post-menopausal bleeding 2018    Spinal stenosis     Thyroid disease     Hypothyroidism       Surgical History  Past Surgical History:   Procedure Laterality Date    APPENDECTOMY  1968    BLADDER SUSPENSION  1989    CARDIAC SURGERY  2016    CABG    CATARACT EXTRACTION  9/2007 (L) and 10/2207 (R)    COLONOSCOPY N/A 10/18/2017    Procedure: COLONOSCOPY;  Surgeon: Esme Acuna MD;  Location: [facility];  Service: Endoscopy;  Laterality: N/A;    CORONARY ANGIOGRAPHY INCLUDING BYPASS GRAFTS WITH  CATHETERIZATION OF LEFT HEART Left 5/13/2022    Procedure: Left heart cath;  Surgeon: Davon Patton MD;  Location: [facility] CATH/EP LAB;  Service: Cardiology;  Laterality: Left;    CORONARY ARTERY BYPASS GRAFT  4/26/2004    x5    ESOPHAGEAL DILATION      ESOPHAGOGASTRODUODENOSCOPY N/A 6/27/2022    Procedure: EGD (ESOPHAGOGASTRODUODENOSCOPY);  Surgeon: Skip Nj MD;  Location: [facility] ENDO;  Service: Endoscopy;  Laterality: N/A;    HYSTEROSCOPY WITH DILATION AND CURETTAGE OF UTERUS N/A 3/12/2020    Procedure: HYSTEROSCOPY, WITH DILATION AND CURETTAGE OF UTERUS;  Surgeon: Ramona Llanos MD;  Location: [facility] OR;  Service: OB/GYN;  Laterality: N/A;    SPINE SURGERY  3/2000    Tumor    TRANSFORAMINAL EPIDURAL INJECTION OF STEROID Right 11/21/2019    Procedure: Injection,steroid,epidural,transforaminal approach;  Surgeon: Bj Jones MD;  Location: [facility];  Service: Pain Management;  Laterality: Right;  L4-5, L5-S1    TRANSFORAMINAL EPIDURAL INJECTION OF STEROID Right 12/31/2019    Procedure: Injection,steroid,epidural,transforaminal approach;  Surgeon: Bj Jones MD;  Location: [facility];  Service: Pain Management;  Laterality: Right;  L4-5, L5-S1    TRANSFORAMINAL EPIDURAL INJECTION OF STEROID Right 2/5/2020    Procedure: Injection,steroid,epidural,transforaminal approach;  Surgeon: Bj Jones MD;  Location: [facility] OR;  Service: Pain Management;  Laterality: Right;  L4-5, L5-S1    TRANSFORAMINAL EPIDURAL INJECTION OF STEROID Left 1/27/2021    Procedure: Injection,steroid,epidural,transforaminal approach;  Surgeon: Bj Jones MD;  Location: [facility] OR;  Service: Pain Management;  Laterality: Left;  L4-5, L5-S1    TRANSFORAMINAL EPIDURAL INJECTION OF STEROID Right 3/4/2021    Procedure: Injection,steroid,epidural,transforaminal approach;  Surgeon: Bj Jones MD;  Location: [facility] OR;  Service: Pain Management;  Laterality: Right;  L4-L5, L5-S1    WRIST SURGERY  1993    carpal tunnel         Family History:    Family History   Problem Relation Age of Onset    Breast cancer Mother     Breast cancer Maternal Aunt     Allergic rhinitis Neg Hx     Allergies Neg Hx     Angioedema Neg Hx     Asthma Neg Hx     Eczema Neg Hx     Immunodeficiency Neg Hx     Urticaria Neg Hx     Rhinitis Neg Hx     Atopy Neg Hx        Social History:   Social History     Socioeconomic History    Marital status:    Tobacco Use    Smoking status: Passive Smoke Exposure - Never Smoker    Smokeless tobacco: Never Used    Tobacco comment: PARENTS    Substance and Sexual Activity    Alcohol use: Yes     Comment: Rare    Drug use: No    Sexual activity: Not Currently       Hospitalization/Major Diagnostic Procedure:     Review of Systems     General/Constitutional:  Chills denies. Fatigue denies. Fever denies. Weight gain denies. Weight loss denies.    Respiratory:  Shortness of breath denies.    Cardiovascular:  Chest pain denies.    Gastrointestinal:  Constipation denies. Diarrhea denies. Nausea denies. Vomiting denies.     Hematology:  Easy bruising denies. Prolonged bleeding denies.     Genitourinary:  Frequent urination denies. Pain in lower back denies. Painful urination denies.     Musculoskeletal:  See HPI for details    Skin:  Rash denies.    Neurologic:  Dizziness denies. Gait abnormalities denies. Seizures denies. Tingling/Numbess denies.    Psychiatric:  Anxiety denies. Depressed mood denies.     Objective:   Vital Signs: There were no vitals filed for this visit.     Physical Exam      General Examination:     Constitutional: The patient is alert and oriented to lace person and time. Mood is pleasant.     Head/Face: Normal facial features normal eyebrows    Eyes: Normal extraocular motion bilaterally    Lungs: Respirations are equal and unlabored    Gait is coordinated.    Cardiovascular: There are no swelling or varicosities present.    Lymphatic: Negative for adenopathy    Skin: Normal    Neurological: Level of  consciousness normal. Oriented to place person and time and situation    Psychiatric: Oriented to time place person and situation    Patient presents in a wheelchair.  Right knee is mildly hypertrophied and there is both medial and lateral joint line tenderness palpation with a mild-to-moderate effusion.  Otherwise is active range of motion is normal with no ligamentous instability.  Patient also has exquisite tenderness palpation over the right hip greater trochanter and pain with resisted abduction of the right hip.    XRAY Report/ Interpretation:  No new studies today      Assessment:       1. Primary osteoarthritis of right knee    2. Greater trochanteric bursitis of right hip        Plan:       Darlin was seen today for pain.    Diagnoses and all orders for this visit:    Primary osteoarthritis of right knee  -     X-Ray Knee 1 or 2 View Right    Greater trochanteric bursitis of right hip  -     X-Ray Pelvis Routine AP         No follow-ups on file.  This is to attest that the physician's assistant Freeman Kelley served in the capacity as a scribe for this patient's encounter.  This is also to verify that I have reviewed the patient's history and helped formulate the treatment plan and discussed it with the physician's assistant.  I have actively participated  in the evaluation and treatment plan for this patient visit.  The treatment plan and medical decision-making is as outlined below:  Patient was given a right knee intra-articular injection 40 mg of Kenalog and 3 cc lidocaine and a right hip greater trochanteric bursa injection with the same.  Please see procedure report for details.  Patient is instructed to call when she is ready to schedule the right knee arthroscopy.    Treatment options were discussed with regards to the nature of the medical condition. Conservative pain intervention and surgical options were discussed in detail. The probability of success of each separate treatment option was  discussed. The patient expressed a clear understanding of the treatment options. With regards to surgery, the procedure risk, benefits, complications, and outcomes were discussed. No guarantees were given with regards to surgical outcome.   The risk of complications, morbidity, and mortality of patient management decisions have been made at the time of this visit. These are associated with the patient's problems, diagnostic procedures and treatment options. This includes the possible management options selected and those considered but not selected by the patient after shared medical decision making we discussed with the patient.     This note was created using Dragon voice recognition software that occasionally misinterpreted phrases or words.

## 2022-08-01 NOTE — PROCEDURES
Large Joint Aspiration/Injection: R knee    Date/Time: 8/1/2022 12:45 PM  Performed by: Oumar Mcelroy MD  Authorized by: Oumar Mcelroy MD     Consent Done?:  Yes (Verbal)  Indications:  Pain  Site marked: the procedure site was marked    Timeout: prior to procedure the correct patient, procedure, and site was verified    Prep: patient was prepped and draped in usual sterile fashion      Local anesthesia used?: Yes    Local anesthetic:  Lidocaine 1% without epinephrine  Ultrasonic Guidance for needle placement?: No    Location:  Knee  Site:  R knee  Medications:  40 mg triamcinolone acetonide 40 mg/mL  Patient tolerance:  Patient tolerated the procedure well with no immediate complications  Large Joint Aspiration/Injection: R greater trochanteric bursa    Date/Time: 8/1/2022 12:45 PM  Performed by: Oumar Mcelroy MD  Authorized by: Oumar Mcelroy MD     Consent Done?:  Yes (Verbal)  Indications:  Pain  Site marked: the procedure site was marked    Timeout: prior to procedure the correct patient, procedure, and site was verified    Prep: patient was prepped and draped in usual sterile fashion      Local anesthesia used?: Yes    Local anesthetic:  Lidocaine 1% without epinephrine  Ultrasonic Guidance for needle placement?: No    Location:  Hip  Site:  R greater trochanteric bursa  Medications:  40 mg triamcinolone acetonide 40 mg/mL  Patient tolerance:  Patient tolerated the procedure well with no immediate complications

## 2022-08-02 ENCOUNTER — PATIENT MESSAGE (OUTPATIENT)
Dept: FAMILY MEDICINE | Facility: CLINIC | Age: 82
End: 2022-08-02
Payer: MEDICARE

## 2022-08-02 RX ORDER — BUSPIRONE HYDROCHLORIDE 10 MG/1
10 TABLET ORAL 2 TIMES DAILY
Qty: 60 TABLET | Refills: 3 | Status: SHIPPED | OUTPATIENT
Start: 2022-08-02 | End: 2022-09-12

## 2022-08-02 NOTE — TELEPHONE ENCOUNTER
Chief Complaint    Urologic complaint    Subjective          Queenie Pike presents to [facility]  History of Present Illness     68-year-old  female with history of TCC and also nephrolithiasis.     NO GH/UTI.  Patient still with bilateral lower extremity edema.  No flank pain.  No acute stone episodes.      6/19 creatinine 0.7,GFR >60    Lithotripsy x2       Previous    Patient has decided on no further cystoscopy    Patient has passed around 5 stones in her past.    8/13/2019 left ureteroscopy with basket -all stones removed in the left collecting system.    6/5/2019 CT abdomen/pelvis urogram5 mm stone in the proximal left ureter.  No hydronephrosis.  No other stones.  4.7 cm fat-containing mass in the right adrenal.  Consistent with myelipoma.  Good delayed phase.    No urologic family history    No cardiopulmonary history.  Does smoke daily.  Baby aspirin daily      TCC history    Patient has decided against further cystoscopy, we will follow her with cytology and UA with micro yearly    5/19 cystoscopy/cytologynegative  12/14 CT urogram - 4.3 x 3.8 cm myelolipoma in the right adrenal gland, nonobstructing calculus in the lower pole right kidney measuring 8 mm.  Nonobstructing 2-3 mm calculus in the midpole of the left kidney.  9/14  negative office cystoscopy  5/13   negative office cystoscopy  2008 -  BCG treatments  12/07    TURBT -  showed several tumors at the bladder neck at the 12:00 position and also 3 and 4:00 o'clock positions and also on the right floor.   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      Patient has also had a bladder tumor removal 7 years before that approximately.         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 Thank you.      Hyperlipemia, Jaundice (1987), Macrocytosis (01/12/2016), Pulmonary embolus (CMS/HCC) (12/15/2015), Pulmonary nodule (12/22/2014), and Tobacco abuse (12/22/2014).   Surgical History: has a past surgical history that includes Bladder surgery (2009); Cystoscopy (08/13/2019); Hysterectomy (1989); Salpingoophorectomy (Bilateral, 01/2015); Tumor removal; and Ureteral stent placement (08/13/2019).   Family History: family history includes Diabetes in her sister; Lung cancer in her mother.   Social History: reports that she has been smoking. She has a 40.00 pack-year smoking history. She has never used smokeless tobacco. She reports current alcohol use.  Allergies: Penicillins and Sulfa antibiotics       Current Outpatient Medications:   •  albuterol sulfate HFA (Proventil HFA) 108 (90 Base) MCG/ACT inhaler, , Disp: , Rfl:   •  aspirin (aspirin) 81 MG EC tablet, aspirin 81 mg oral tablet,delayed release (DR/EC) take 1 tablet (81 mg) by oral route once daily   Active, Disp: , Rfl:   •  Cyanocobalamin (B-12) 1000 MCG tablet, , Disp: , Rfl:   •  furosemide (Lasix) 20 MG tablet, Take 0.5 tablets by mouth Daily., Disp: 15 tablet, Rfl: 0  •  losartan (COZAAR) 50 MG tablet, Take 1 tablet by mouth Daily., Disp: 90 tablet, Rfl: 1  •  Omega-3 Fatty Acids (fish oil) 1000 MG capsule capsule, Fish Oil 1,000 mg (120 mg-180 mg) oral capsule take 1 capsule by oral route daily   Active, Disp: , Rfl:      Physical exam       Alert and orient x3  Well appearing, well developed, in no acute distress   Unlabored respiration  Grossly oriented to person, place and time, judgment is intact, normal mood and affect         Objective     Vital Signs:   There were no vitals taken for this visit.             Assessment and Plan    Diagnoses and all orders for this visit:    1. History of bladder cancer (Primary)    2. Nephrolithiasis        Patient has decided against further cystoscopy, we will follow her with cytology and UA with micro  yearly    Cytology/UA today    Follow-up in 1 year with cytology and urinalysis with micro and KUB

## 2022-08-02 NOTE — TELEPHONE ENCOUNTER
Triazolam is not for anxiety. It should only be used for sleep. I will send in Buspirone which is specific for anxiety. Have her take 10 mg BID. It can be increased after 1 week if necessary if the symptoms of anxiety are not controlled. Let me know next week if not any better. I will send it to the local pharmacy until we know what dose controls the symptoms, then we can send a 90 day rx.

## 2022-08-05 ENCOUNTER — TELEPHONE (OUTPATIENT)
Dept: FAMILY MEDICINE | Facility: CLINIC | Age: 82
End: 2022-08-05
Payer: MEDICARE

## 2022-08-05 NOTE — TELEPHONE ENCOUNTER
----- Message from Oleg Priest sent at 8/5/2022  2:21 PM CDT -----  Type:  Sooner Appointment Request    Caller is requesting a sooner appointment.  Caller declined first available appointment listed below.  Caller will not accept being placed on the waitlist and is requesting a message be sent to doctor.    Name of Caller:  pt  When is the first available appointment?  8/15  Symptoms:  UTI--need to be seen sooner  Best Call Back Number:  [phone] (home)     Additional Information:  please advise--thank you

## 2022-08-05 NOTE — TELEPHONE ENCOUNTER
Spoke to pt,   Was seen today at  for UTI symptoms.   Picking up abx now.  Pt requested a follow up visit with ASHU Mcwilliams in a week to discuss symptoms/concerns.

## 2022-08-07 DIAGNOSIS — R13.10 DYSPHAGIA, UNSPECIFIED TYPE: ICD-10-CM

## 2022-08-07 DIAGNOSIS — K21.00 GASTROESOPHAGEAL REFLUX DISEASE WITH ESOPHAGITIS WITHOUT HEMORRHAGE: ICD-10-CM

## 2022-08-08 RX ORDER — SUCRALFATE 1 G/1
TABLET ORAL
Qty: 90 TABLET | Refills: 0 | Status: SHIPPED | OUTPATIENT
Start: 2022-08-08 | End: 2022-12-12 | Stop reason: ALTCHOICE

## 2022-08-15 ENCOUNTER — OFFICE VISIT (OUTPATIENT)
Dept: FAMILY MEDICINE | Facility: CLINIC | Age: 82
End: 2022-08-15
Payer: MEDICARE

## 2022-08-15 VITALS
DIASTOLIC BLOOD PRESSURE: 58 MMHG | BODY MASS INDEX: 24.59 KG/M2 | OXYGEN SATURATION: 95 % | WEIGHT: 133.63 LBS | HEIGHT: 62 IN | TEMPERATURE: 98 F | SYSTOLIC BLOOD PRESSURE: 118 MMHG | HEART RATE: 72 BPM

## 2022-08-15 DIAGNOSIS — E11.21 TYPE 2 DIABETES MELLITUS WITH DIABETIC NEPHROPATHY, WITHOUT LONG-TERM CURRENT USE OF INSULIN: ICD-10-CM

## 2022-08-15 DIAGNOSIS — K21.9 GASTROESOPHAGEAL REFLUX DISEASE WITHOUT ESOPHAGITIS: ICD-10-CM

## 2022-08-15 DIAGNOSIS — J45.30 MILD PERSISTENT ASTHMA WITHOUT COMPLICATION: ICD-10-CM

## 2022-08-15 DIAGNOSIS — N18.30 STAGE 3 CHRONIC KIDNEY DISEASE, UNSPECIFIED WHETHER STAGE 3A OR 3B CKD: Primary | ICD-10-CM

## 2022-08-15 DIAGNOSIS — Z87.440 HISTORY OF UTI: ICD-10-CM

## 2022-08-15 PROCEDURE — 1126F AMNT PAIN NOTED NONE PRSNT: CPT | Mod: CPTII,S$GLB,, | Performed by: PHYSICIAN ASSISTANT

## 2022-08-15 PROCEDURE — 1126F PR PAIN SEVERITY QUANTIFIED, NO PAIN PRESENT: ICD-10-PCS | Mod: CPTII,S$GLB,, | Performed by: PHYSICIAN ASSISTANT

## 2022-08-15 PROCEDURE — 1101F PR PT FALLS ASSESS DOC 0-1 FALLS W/OUT INJ PAST YR: ICD-10-PCS | Mod: CPTII,S$GLB,, | Performed by: PHYSICIAN ASSISTANT

## 2022-08-15 PROCEDURE — 99213 OFFICE O/P EST LOW 20 MIN: CPT | Mod: S$GLB,,, | Performed by: PHYSICIAN ASSISTANT

## 2022-08-15 PROCEDURE — 99213 PR OFFICE/OUTPT VISIT, EST, LEVL III, 20-29 MIN: ICD-10-PCS | Mod: S$GLB,,, | Performed by: PHYSICIAN ASSISTANT

## 2022-08-15 PROCEDURE — 1101F PT FALLS ASSESS-DOCD LE1/YR: CPT | Mod: CPTII,S$GLB,, | Performed by: PHYSICIAN ASSISTANT

## 2022-08-15 PROCEDURE — 3288F PR FALLS RISK ASSESSMENT DOCUMENTED: ICD-10-PCS | Mod: CPTII,S$GLB,, | Performed by: PHYSICIAN ASSISTANT

## 2022-08-15 PROCEDURE — 3078F PR MOST RECENT DIASTOLIC BLOOD PRESSURE < 80 MM HG: ICD-10-PCS | Mod: CPTII,S$GLB,, | Performed by: PHYSICIAN ASSISTANT

## 2022-08-15 PROCEDURE — 99999 PR PBB SHADOW E&M-EST. PATIENT-LVL III: CPT | Mod: PBBFAC,,, | Performed by: PHYSICIAN ASSISTANT

## 2022-08-15 PROCEDURE — 3074F SYST BP LT 130 MM HG: CPT | Mod: CPTII,S$GLB,, | Performed by: PHYSICIAN ASSISTANT

## 2022-08-15 PROCEDURE — 99999 PR PBB SHADOW E&M-EST. PATIENT-LVL III: ICD-10-PCS | Mod: PBBFAC,,, | Performed by: PHYSICIAN ASSISTANT

## 2022-08-15 PROCEDURE — 3074F PR MOST RECENT SYSTOLIC BLOOD PRESSURE < 130 MM HG: ICD-10-PCS | Mod: CPTII,S$GLB,, | Performed by: PHYSICIAN ASSISTANT

## 2022-08-15 PROCEDURE — 3288F FALL RISK ASSESSMENT DOCD: CPT | Mod: CPTII,S$GLB,, | Performed by: PHYSICIAN ASSISTANT

## 2022-08-15 PROCEDURE — 3078F DIAST BP <80 MM HG: CPT | Mod: CPTII,S$GLB,, | Performed by: PHYSICIAN ASSISTANT

## 2022-08-16 PROBLEM — N17.9 AKI (ACUTE KIDNEY INJURY): Status: RESOLVED | Noted: 2022-06-25 | Resolved: 2022-08-16

## 2022-08-16 PROBLEM — I50.9 ACUTE ON CHRONIC HEART FAILURE: Status: RESOLVED | Noted: 2022-05-10 | Resolved: 2022-08-16

## 2022-08-16 NOTE — PROGRESS NOTES
Subjective:       Patient ID: Darlin Tipton is a 82 y.o. female.    Chief Complaint: Follow-up    Patient presents for follow up.    She recently had steroid injection to the right knee and hip.  States glucose was elevated 199.  Then developed UTI treated with oral abx and unfortunately then yeast infection.  She used otc with resolution.  She is on carafate and PPI per GI and has follow up in a few days.  She has CKD and anemia which has been stable. Patients patient medical/surgical, social and family histories have been reviewed       Review of Systems   Constitutional: Negative for activity change, appetite change and unexpected weight change.   Gastrointestinal: Negative for diarrhea and vomiting.   Genitourinary: Negative for difficulty urinating, dysuria and pelvic pain.       Objective:      Physical Exam  Constitutional:       General: She is not in acute distress.     Appearance: Normal appearance. She is well-developed. She is not ill-appearing.   HENT:      Head: Normocephalic and atraumatic.   Eyes:      Conjunctiva/sclera: Conjunctivae normal.   Cardiovascular:      Rate and Rhythm: Normal rate and regular rhythm.      Heart sounds: Normal heart sounds.   Pulmonary:      Effort: Pulmonary effort is normal.      Breath sounds: Normal breath sounds.   Musculoskeletal:      Right lower leg: No edema.      Left lower leg: No edema.   Skin:     General: Skin is warm and dry.   Neurological:      Mental Status: She is alert.   Psychiatric:         Mood and Affect: Mood normal.         Behavior: Behavior is cooperative.         Assessment:       1. Stage 3 chronic kidney disease, unspecified whether stage 3a or 3b CKD    2. History of UTI    3. Gastroesophageal reflux disease without esophagitis    4. Type 2 diabetes mellitus with diabetic nephropathy, without long-term current use of insulin    5. Mild persistent asthma without complication        Plan:       Darlin was seen today for  "follow-up.    Diagnoses and all orders for this visit:    Stage 3 chronic kidney disease, unspecified whether stage 3a or 3b CKD  -     COMPREHENSIVE METABOLIC PANEL; Future  -     COMPREHENSIVE METABOLIC PANEL    History of UTI  -     Urinalysis, Reflex to Urine Culture Urine, Clean Catch; Future    Gastroesophageal reflux disease without esophagitis  Continue PPI and carafate and follow up as scheduled   Type 2 diabetes mellitus with diabetic nephropathy, without long-term current use of insulin  Continue current medications   Mild persistent asthma without complication  continue current medications and follow up pulmonary as scheduled                    Documentation entered by me for this encounter may have been done in part using speech-recognition technology. Although I have made an effort to ensure accuracy, "sound like" errors may exist and should be interpreted in context.    "

## 2022-08-18 ENCOUNTER — LAB VISIT (OUTPATIENT)
Dept: LAB | Facility: HOSPITAL | Age: 82
End: 2022-08-18
Attending: FAMILY MEDICINE
Payer: MEDICARE

## 2022-08-18 DIAGNOSIS — Z87.440 HISTORY OF UTI: ICD-10-CM

## 2022-08-18 DIAGNOSIS — R80.9 PROTEINURIA, UNSPECIFIED TYPE: ICD-10-CM

## 2022-08-18 PROCEDURE — 81003 URINALYSIS AUTO W/O SCOPE: CPT | Performed by: PHYSICIAN ASSISTANT

## 2022-08-19 DIAGNOSIS — R80.9 PROTEINURIA, UNSPECIFIED TYPE: Primary | ICD-10-CM

## 2022-08-19 LAB
BILIRUB UR QL STRIP: NEGATIVE
CLARITY UR REFRACT.AUTO: CLEAR
COLOR UR AUTO: YELLOW
GLUCOSE UR QL STRIP: ABNORMAL
HGB UR QL STRIP: NEGATIVE
KETONES UR QL STRIP: NEGATIVE
LEUKOCYTE ESTERASE UR QL STRIP: NEGATIVE
NITRITE UR QL STRIP: NEGATIVE
PH UR STRIP: 6 [PH] (ref 5–8)
PROT UR QL STRIP: ABNORMAL
SP GR UR STRIP: 1.02 (ref 1–1.03)
URN SPEC COLLECT METH UR: ABNORMAL

## 2022-08-22 ENCOUNTER — OFFICE VISIT (OUTPATIENT)
Dept: GASTROENTEROLOGY | Facility: CLINIC | Age: 82
End: 2022-08-22
Payer: MEDICARE

## 2022-08-22 VITALS — BODY MASS INDEX: 24.7 KG/M2 | HEIGHT: 62 IN | WEIGHT: 134.25 LBS

## 2022-08-22 DIAGNOSIS — R13.10 DYSPHAGIA, UNSPECIFIED TYPE: ICD-10-CM

## 2022-08-22 DIAGNOSIS — K21.00 GASTROESOPHAGEAL REFLUX DISEASE WITH ESOPHAGITIS WITHOUT HEMORRHAGE: ICD-10-CM

## 2022-08-22 PROCEDURE — 99999 PR PBB SHADOW E&M-EST. PATIENT-LVL IV: ICD-10-PCS | Mod: PBBFAC,,, | Performed by: INTERNAL MEDICINE

## 2022-08-22 PROCEDURE — 99999 PR PBB SHADOW E&M-EST. PATIENT-LVL IV: CPT | Mod: PBBFAC,,, | Performed by: INTERNAL MEDICINE

## 2022-08-22 PROCEDURE — 99214 OFFICE O/P EST MOD 30 MIN: CPT | Mod: S$GLB,,, | Performed by: INTERNAL MEDICINE

## 2022-08-22 PROCEDURE — 3288F PR FALLS RISK ASSESSMENT DOCUMENTED: ICD-10-PCS | Mod: CPTII,S$GLB,, | Performed by: INTERNAL MEDICINE

## 2022-08-22 PROCEDURE — 3288F FALL RISK ASSESSMENT DOCD: CPT | Mod: CPTII,S$GLB,, | Performed by: INTERNAL MEDICINE

## 2022-08-22 PROCEDURE — 1159F PR MEDICATION LIST DOCUMENTED IN MEDICAL RECORD: ICD-10-PCS | Mod: CPTII,S$GLB,, | Performed by: INTERNAL MEDICINE

## 2022-08-22 PROCEDURE — 99214 PR OFFICE/OUTPT VISIT, EST, LEVL IV, 30-39 MIN: ICD-10-PCS | Mod: S$GLB,,, | Performed by: INTERNAL MEDICINE

## 2022-08-22 PROCEDURE — 1101F PT FALLS ASSESS-DOCD LE1/YR: CPT | Mod: CPTII,S$GLB,, | Performed by: INTERNAL MEDICINE

## 2022-08-22 PROCEDURE — 1101F PR PT FALLS ASSESS DOC 0-1 FALLS W/OUT INJ PAST YR: ICD-10-PCS | Mod: CPTII,S$GLB,, | Performed by: INTERNAL MEDICINE

## 2022-08-22 PROCEDURE — 1126F AMNT PAIN NOTED NONE PRSNT: CPT | Mod: CPTII,S$GLB,, | Performed by: INTERNAL MEDICINE

## 2022-08-22 PROCEDURE — 1126F PR PAIN SEVERITY QUANTIFIED, NO PAIN PRESENT: ICD-10-PCS | Mod: CPTII,S$GLB,, | Performed by: INTERNAL MEDICINE

## 2022-08-22 PROCEDURE — 1159F MED LIST DOCD IN RCRD: CPT | Mod: CPTII,S$GLB,, | Performed by: INTERNAL MEDICINE

## 2022-08-22 RX ORDER — FLUCONAZOLE 150 MG/1
150 TABLET ORAL DAILY
Qty: 1 TABLET | Refills: 0 | Status: SHIPPED | OUTPATIENT
Start: 2022-08-22 | End: 2022-08-23

## 2022-08-22 RX ORDER — DEXLANSOPRAZOLE 60 MG/1
60 CAPSULE, DELAYED RELEASE ORAL DAILY
Qty: 30 CAPSULE | Refills: 11 | Status: SHIPPED | OUTPATIENT
Start: 2022-08-22 | End: 2022-12-12 | Stop reason: ALTCHOICE

## 2022-08-22 NOTE — PROGRESS NOTES
"CC: nausea    HPI 82 y.o. female with a past medical history of AFib, hypothyroidism, CAD, DM type 2, GERD, CKD 3, and hypertension presenting with chronic, progressively worsening nausea associated with intermittent vomiting as well as bloating and gas. Patient was started on Multaq and thought medication was cause of nausea. However, after cessation she did not have improvement in symptoms. She has tried Phenergan at home without any improvement. Patient reports gas pain and bloating in the mid epigastrium. No significant abdominal pain. She has tried multiple PPIs including omeprazole, pantoprazole, esomeprazole and H2 blockers like famotidine.EGD unrevealing of abnormality during hospitalization. Neg for h.pylori.    Past Medical History:   Diagnosis Date    Allergy     Dust mites, Grasses, Trees    Arthritis     Asthma     Blood transfusion     CAD (coronary artery disease)     Cataract     CHRONIC BRONCHITIS     Diabetes mellitus     Diabetes mellitus type II     GERD (gastroesophageal reflux disease)     Hyperlipidemia     Hypertension     Irregular heart beat     Kidney disease     ckd stage 3  as stated by patient - to see MD    Post-menopausal bleeding 2018    Spinal stenosis     Thyroid disease     Hypothyroidism     Review of Systems  General ROS: negative for chills, fever or weight loss  Cardiovascular ROS: no chest pain or dyspnea on exertion  Gastrointestinal ROS: +nausea, +epigastric burning, +gas, +bloating, no abdominal pain, change in bowel habits, or black/ bloody stools      Physical Examination    Ht 5' 2" (1.575 m)   Wt 60.9 kg (134 lb 4.2 oz)   BMI 24.56 kg/m²   General appearance: alert, cooperative, no distress  HENT: Normocephalic, atraumatic, neck symmetrical  Eyes: conjunctivae clear  Lungs: No labored breathing  Skin: No jaundice  Neurologic: Alert and oriented X 3      Labs:  Lab Results   Component Value Date    WBC 3.52 (L) 06/27/2022    HGB 9.3 (L) 06/27/2022    HCT 29.1 (L) " 06/27/2022    MCV 87 06/27/2022     06/27/2022     CMP  Sodium   Date Value Ref Range Status   06/27/2022 134 (L) 136 - 145 mmol/L Final     Potassium   Date Value Ref Range Status   06/27/2022 5.0 3.5 - 5.1 mmol/L Final     Chloride   Date Value Ref Range Status   06/27/2022 102 95 - 110 mmol/L Final     CO2   Date Value Ref Range Status   06/27/2022 25 23 - 29 mmol/L Final     Glucose   Date Value Ref Range Status   06/27/2022 81 70 - 110 mg/dL Final     BUN   Date Value Ref Range Status   06/27/2022 13 8 - 23 mg/dL Final     Creatinine   Date Value Ref Range Status   06/27/2022 1.2 0.5 - 1.4 mg/dL Final     Calcium   Date Value Ref Range Status   06/27/2022 8.9 8.7 - 10.5 mg/dL Final     Total Protein   Date Value Ref Range Status   06/25/2022 6.3 6.0 - 8.4 g/dL Final     Albumin   Date Value Ref Range Status   06/25/2022 3.8 3.5 - 5.2 g/dL Final     Total Bilirubin   Date Value Ref Range Status   06/25/2022 0.7 0.1 - 1.0 mg/dL Final     Comment:     For infants and newborns, interpretation of results should be based  on gestational age, weight and in agreement with clinical  observations.    Premature Infant recommended reference ranges:  Up to 24 hours.............<8.0 mg/dL  Up to 48 hours............<12.0 mg/dL  3-5 days..................<15.0 mg/dL  6-29 days.................<15.0 mg/dL       Alkaline Phosphatase   Date Value Ref Range Status   06/25/2022 71 55 - 135 U/L Final     AST   Date Value Ref Range Status   06/25/2022 59 (H) 10 - 40 U/L Final     ALT   Date Value Ref Range Status   06/25/2022 93 (H) 10 - 44 U/L Final     Anion Gap   Date Value Ref Range Status   06/27/2022 7 (L) 8 - 16 mmol/L Final     eGFR if    Date Value Ref Range Status   06/27/2022 49 (A) >60 mL/min/1.73 m^2 Final     eGFR if non    Date Value Ref Range Status   06/27/2022 42 (A) >60 mL/min/1.73 m^2 Final     Comment:     Calculation used to obtain the estimated glomerular filtration  rate  (eGFR) is the CKD-EPI equation.        Imaging:  CXR:Borderline heart size.  Prior sternotomy.  Trace right pleural effusion     Independently reviewed    Assessment:   81 y.o. female with a past medical history of AFib, hypothyroidism, CAD, DM type 2, GERD, CKD 3, and hypertension presenting with progression of nausea as well as bloating and gas.       Plan:   -Cessation of dronedarone did not help symptoms  -She has tried nexium, prilosec, pantoprazole in past- will plan trial of Dexilant 60 mg daily  -She has had EGD which is unrevealing of cause of nausea  -Plan for levsin or bentyl if symptoms fail to improve  -Maybe course of rifaximin will help symptoms if this is potentially related to SIBO    30 minutes of total time spent on the encounter, which includes face to face time and non-face to face time preparing to see the patient (eg, review of tests), obtaining and/or reviewing separately obtained history, documenting clinical information in the electronic or other health record, Independently interpreting results (not separately reported) and communicating results to the patient/family/caregiver, or care coordination (not separately reported).          Skip Nj MD  [facility]  [address] [address]  Office: [phone]  Fax: [phone]

## 2022-08-30 ENCOUNTER — PATIENT MESSAGE (OUTPATIENT)
Dept: GASTROENTEROLOGY | Facility: CLINIC | Age: 82
End: 2022-08-30
Payer: MEDICARE

## 2022-08-31 ENCOUNTER — OFFICE VISIT (OUTPATIENT)
Dept: PULMONOLOGY | Facility: CLINIC | Age: 82
End: 2022-08-31
Payer: MEDICARE

## 2022-08-31 VITALS
OXYGEN SATURATION: 96 % | BODY MASS INDEX: 24.11 KG/M2 | DIASTOLIC BLOOD PRESSURE: 80 MMHG | HEIGHT: 62 IN | HEART RATE: 67 BPM | SYSTOLIC BLOOD PRESSURE: 140 MMHG | WEIGHT: 131 LBS

## 2022-08-31 DIAGNOSIS — G47.34 NOCTURNAL HYPOXEMIA: ICD-10-CM

## 2022-08-31 DIAGNOSIS — J42 CHRONIC BRONCHITIS, UNSPECIFIED CHRONIC BRONCHITIS TYPE: ICD-10-CM

## 2022-08-31 DIAGNOSIS — I48.0 PAROXYSMAL ATRIAL FIBRILLATION: ICD-10-CM

## 2022-08-31 DIAGNOSIS — I50.9 CONGESTIVE HEART FAILURE, UNSPECIFIED HF CHRONICITY, UNSPECIFIED HEART FAILURE TYPE: ICD-10-CM

## 2022-08-31 DIAGNOSIS — J45.909 ASTHMA, UNSPECIFIED ASTHMA SEVERITY, UNSPECIFIED WHETHER COMPLICATED, UNSPECIFIED WHETHER PERSISTENT: Primary | ICD-10-CM

## 2022-08-31 PROCEDURE — 1159F PR MEDICATION LIST DOCUMENTED IN MEDICAL RECORD: ICD-10-PCS | Mod: CPTII,S$GLB,, | Performed by: NURSE PRACTITIONER

## 2022-08-31 PROCEDURE — 1126F AMNT PAIN NOTED NONE PRSNT: CPT | Mod: CPTII,S$GLB,, | Performed by: NURSE PRACTITIONER

## 2022-08-31 PROCEDURE — 3079F DIAST BP 80-89 MM HG: CPT | Mod: CPTII,S$GLB,, | Performed by: NURSE PRACTITIONER

## 2022-08-31 PROCEDURE — 1126F PR PAIN SEVERITY QUANTIFIED, NO PAIN PRESENT: ICD-10-PCS | Mod: CPTII,S$GLB,, | Performed by: NURSE PRACTITIONER

## 2022-08-31 PROCEDURE — 99213 OFFICE O/P EST LOW 20 MIN: CPT | Mod: S$GLB,,, | Performed by: NURSE PRACTITIONER

## 2022-08-31 PROCEDURE — 1159F MED LIST DOCD IN RCRD: CPT | Mod: CPTII,S$GLB,, | Performed by: NURSE PRACTITIONER

## 2022-08-31 PROCEDURE — 3079F PR MOST RECENT DIASTOLIC BLOOD PRESSURE 80-89 MM HG: ICD-10-PCS | Mod: CPTII,S$GLB,, | Performed by: NURSE PRACTITIONER

## 2022-08-31 PROCEDURE — 99213 PR OFFICE/OUTPT VISIT, EST, LEVL III, 20-29 MIN: ICD-10-PCS | Mod: S$GLB,,, | Performed by: NURSE PRACTITIONER

## 2022-08-31 PROCEDURE — 3077F PR MOST RECENT SYSTOLIC BLOOD PRESSURE >= 140 MM HG: ICD-10-PCS | Mod: CPTII,S$GLB,, | Performed by: NURSE PRACTITIONER

## 2022-08-31 PROCEDURE — 3077F SYST BP >= 140 MM HG: CPT | Mod: CPTII,S$GLB,, | Performed by: NURSE PRACTITIONER

## 2022-08-31 NOTE — PATIENT INSTRUCTIONS
Fine to continue the Breo daily  Overnight pulse ox off oxygen to requalify  for oxygen  Make sure Dr. Diaz send me copy of echo

## 2022-08-31 NOTE — PROGRESS NOTES
SUBJECTIVE:    Patient ID: Darlin Tipton is a 82 y.o. female.    Chief Complaint: Follow-up (Asthma)    HPI   Patient here today feeling alright as far as her breathing goes. She is using Breo daily. Her PFT showed no obstruction, no restriciton, mild diffusion defect. Her 6 minute walk was non hypoxemic. She is sleeping on her oxygen. She would like a portable concentrator in case of hurricane or power outages.  She is not coughing like she was. She is still having GERD, states GI has changed a medication recently.  She     Past Medical History:   Diagnosis Date    Allergy     Dust mites, Grasses, Trees    Arthritis     Asthma     Blood transfusion     CAD (coronary artery disease)     Cataract     CHRONIC BRONCHITIS     Diabetes mellitus     Diabetes mellitus type II     GERD (gastroesophageal reflux disease)     Hyperlipidemia     Hypertension     Irregular heart beat     Kidney disease     ckd stage 3  as stated by patient - to see MD    Post-menopausal bleeding 2018    Spinal stenosis     Thyroid disease     Hypothyroidism     Past Surgical History:   Procedure Laterality Date    APPENDECTOMY  1968    BLADDER SUSPENSION  1989    CARDIAC SURGERY  2016    CABG    CATARACT EXTRACTION  9/2007 (L) and 10/2207 (R)    COLONOSCOPY N/A 10/18/2017    Procedure: COLONOSCOPY;  Surgeon: Esme Acuna MD;  Location: [facility] ENDO;  Service: Endoscopy;  Laterality: N/A;    CORONARY ANGIOGRAPHY INCLUDING BYPASS GRAFTS WITH CATHETERIZATION OF LEFT HEART Left 5/13/2022    Procedure: Left heart cath;  Surgeon: Davon Patton MD;  Location: [facility] CATH/EP LAB;  Service: Cardiology;  Laterality: Left;    CORONARY ARTERY BYPASS GRAFT  4/26/2004    x5    ESOPHAGEAL DILATION      ESOPHAGOGASTRODUODENOSCOPY N/A 6/27/2022    Procedure: EGD (ESOPHAGOGASTRODUODENOSCOPY);  Surgeon: Skip Nj MD;  Location: [facility] ENDO;  Service: Endoscopy;  Laterality: N/A;    HYSTEROSCOPY WITH DILATION AND CURETTAGE OF UTERUS N/A 3/12/2020     Procedure: HYSTEROSCOPY, WITH DILATION AND CURETTAGE OF UTERUS;  Surgeon: Ramona Llanos MD;  Location: [facility] OR;  Service: OB/GYN;  Laterality: N/A;    SPINE SURGERY  3/2000    Tumor    TRANSFORAMINAL EPIDURAL INJECTION OF STEROID Right 11/21/2019    Procedure: Injection,steroid,epidural,transforaminal approach;  Surgeon: Bj Jones MD;  Location: [facility];  Service: Pain Management;  Laterality: Right;  L4-5, L5-S1    TRANSFORAMINAL EPIDURAL INJECTION OF STEROID Right 12/31/2019    Procedure: Injection,steroid,epidural,transforaminal approach;  Surgeon: Bj Jones MD;  Location: [facility];  Service: Pain Management;  Laterality: Right;  L4-5, L5-S1    TRANSFORAMINAL EPIDURAL INJECTION OF STEROID Right 2/5/2020    Procedure: Injection,steroid,epidural,transforaminal approach;  Surgeon: Bj Jones MD;  Location: [facility];  Service: Pain Management;  Laterality: Right;  L4-5, L5-S1    TRANSFORAMINAL EPIDURAL INJECTION OF STEROID Left 1/27/2021    Procedure: Injection,steroid,epidural,transforaminal approach;  Surgeon: Bj Jones MD;  Location: [facility];  Service: Pain Management;  Laterality: Left;  L4-5, L5-S1    TRANSFORAMINAL EPIDURAL INJECTION OF STEROID Right 3/4/2021    Procedure: Injection,steroid,epidural,transforaminal approach;  Surgeon: Bj Jones MD;  Location: [facility];  Service: Pain Management;  Laterality: Right;  L4-L5, L5-S1    WRIST SURGERY  1993    carpal tunnel       Family History   Problem Relation Age of Onset    Breast cancer Mother     Breast cancer Maternal Aunt     Allergic rhinitis Neg Hx     Allergies Neg Hx     Angioedema Neg Hx     Asthma Neg Hx     Eczema Neg Hx     Immunodeficiency Neg Hx     Urticaria Neg Hx     Rhinitis Neg Hx     Atopy Neg Hx         Social History:   Marital Status:   Occupation: Data Unavailable  Alcohol History:  reports current alcohol use.  Tobacco History:  reports that she is a non-smoker but has been exposed to tobacco smoke. She has never used  smokeless tobacco.  Drug History:  reports no history of drug use.    Review of patient's allergies indicates:   Allergen Reactions    Dexlansoprazole Itching, Nausea Only and Rash    Sulfa (sulfonamide antibiotics) Rash    Floxacillin Itching    Januvia [sitagliptin] Other (See Comments)     Hot flashes    Tetracyclines Itching    Fenofibrate micronized Rash    Nitrofurantoin macrocrystalline Other (See Comments)     unknown    Phenylfenesin la [phenylpropanolamine-gg] Rash       Current Outpatient Medications   Medication Sig Dispense Refill    amitriptyline (ELAVIL) 50 MG tablet Take 1 tablet (50 mg total) by mouth every evening. 90 tablet 3    busPIRone (BUSPAR) 10 MG tablet Take 1 tablet (10 mg total) by mouth 2 (two) times daily. 60 tablet 3    canagliflozin (INVOKANA) 100 mg Tab tablet Take 1 tablet (100 mg total) by mouth once daily. 90 tablet 3    carboxymethylcellulose 1 % ophthalmic solution Apply 1 drop to eye As instructed. as directed      cetirizine (ZYRTEC) 10 MG tablet Take 10 mg by mouth once daily.      coenzyme Q10 100 mg capsule Take 100 mg by mouth once daily.       cranberry extract 650 mg Cap Take 1 tablet by mouth 2 (two) times daily.      fluticasone furoate-vilanteroL (BREO ELLIPTA) 100-25 mcg/dose diskus inhaler Inhale 1 puff into the lungs once daily. Controller Rinse after you use it 180 each 6    fluticasone propionate (FLONASE) 50 mcg/actuation nasal spray 1 spray (50 mcg total) by Each Nostril route once daily. 48 g 3    Lactobac no.41/Bifidobact no.7 (PROBIOTIC-10 ORAL) Take by mouth.      lancets Misc 1 Units by Misc.(Non-Drug; Combo Route) route 2 (two) times daily. 200 each 3    levalbuterol (XOPENEX) 1.25 mg/0.5 mL nebulizer solution Take 0.5 mLs (1.25 mg total) by nebulization every 6 (six) hours as needed for Wheezing. Rescue 120 each 3    levothyroxine (SYNTHROID) 25 MCG tablet TAKE 1 TABLET BY MOUTH BEFORE BREAKFAST EVERY DAY 90 tablet 3    magnesium oxide (MAG-OXIDE ORAL)  Take 400 mg by mouth once daily.      metoprolol succinate (TOPROL XL) 25 MG 24 hr tablet Take 1 tablet (25 mg total) by mouth once daily. 90 tablet 3    nitroGLYCERIN (NITROSTAT) 0.4 MG SL tablet Place 0.4 mg under the tongue every 5 (five) minutes as needed. 0.4mg Sublingual PRN .        ondansetron (ZOFRAN) 4 MG tablet TAKE 1 TABLET BY MOUTH EVERY 6 HOURS AS NEEDED FOR NAUSEA 20 tablet 1    pramoxine-hydrocortisone (PROCTOCREAM-HC) 1-1 % rectal cream Place rectally 2 (two) times daily. (Patient taking differently: Place 1 application rectally 2 (two) times daily.) 3 each 3    RESTASIS 0.05 % ophthalmic emulsion Place 1 drop into both eyes 2 (two) times daily.      rosuvastatin (CRESTOR) 10 MG tablet Take 1 tablet (10 mg total) by mouth once daily. 90 tablet 3    triazolam (HALCION) 0.25 MG Tab Take 1 tablet (0.25 mg total) by mouth nightly as needed (sleep). 90 tablet 1    UNABLE TO FIND Take 1 capsule by mouth once daily. Vital red      vitamins  A,C,E-zinc-copper 14,320-226-200 unit-mg-unit Cap Take 1 capsule by mouth 2 (two) times daily.       acetaminophen (TYLENOL) 650 MG TbSR Take 1,300 mg by mouth once daily. 2 Tablet(s) Oral  Every day.      amiodarone (PACERONE) 200 MG Tab Take 200 mg by mouth 2 (two) times daily.      apixaban (ELIQUIS) 5 mg Tab Take 1 tablet (5 mg total) by mouth 2 (two) times daily. 180 tablet 3    azelastine (ASTELIN) 137 mcg (0.1 %) nasal spray 1 spray (137 mcg total) by Nasal route 2 (two) times daily. 90 mL 3    blood sugar diagnostic (FREESTYLE LITE STRIPS) Strp USE TO TEST BLOOD SUGAR TWICE A  strip 3    cholecalciferol, vitamin D3, (VITAMIN D3) 50 mcg (2,000 unit) Cap Take 1 capsule by mouth once daily.      dexlansoprazole (DEXILANT) 60 mg capsule Take 1 capsule (60 mg total) by mouth once daily. (Patient not taking: Reported on 8/31/2022) 30 capsule 11    esomeprazole (NEXIUM) 40 MG capsule Take 1 capsule (40 mg total) by mouth before breakfast. (Patient not taking:  "Reported on 8/31/2022) 30 capsule 11    sucralfate (CARAFATE) 1 gram tablet TAKE 1 TABLET BY MOUTH 4 TIMES DAILY. (Patient not taking: Reported on 8/31/2022) 90 tablet 0     No current facility-administered medications for this visit.     Last echo  Summary    The left ventricle is normal in size with mild concentric hypertrophy and mildly decreased systolic function.  The estimated ejection fraction is 40%.  Grade III left ventricular diastolic dysfunction.  Mild left atrial enlargement.  Mild aortic regurgitation.  There is moderate aortic valve stenosis.  Aortic valve area is 1.06 cm2; peak velocity is m/s; mean gradient is 10 mmHg.  Mild-to-moderate mitral regurgitation.  Moderate tricuspid regurgitation.  Mild pulmonic regurgitation.  Intermediate central venous pressure (8 mmHg).  The estimated PA systolic pressure is 32 mmHg.    The patient's PFT show no obstruction, normal lung volumes and a mild diffusion defect.  Her 6 min walk was non hypoxemic    Last Chest xray 06/24/22:  Impression:     Borderline heart size.  Prior sternotomy.  Trace right pleural effusion     Review of Systems  General: Feeling Well.  Eyes: Vision is good.  ENT:  No sinusitis or pharyngitis.   Heart:: chest pain at times  Lungs: dyspnea with exertion stable cough is better   GI: abdominal discomfort and reflux   : No dysuria, hesitancy, or nocturia.  Musculoskeletal: No joint pain or myalgias.  Skin: No lesions or rashes.  Neuro: No headaches or neuropathy.  Lymph: No edema or adenopathy.  Psych: No anxiety or depression.  Endo: No weight change.    OBJECTIVE:      BP (!) 140/80 (BP Location: Right arm, Patient Position: Sitting, BP Method: Medium (Manual))   Pulse 67   Ht 5' 2" (1.575 m)   Wt 59.4 kg (131 lb)   SpO2 96%   BMI 23.96 kg/m²     Physical Exam  GENERAL: Older patient in no distress.  HEENT: Pupils equal and reactive. Extraocular movements intact. Nose intact.      Pharynx moist.  NECK: Supple.   HEART: Regular " rate and rhythm. Loud murmur .  LUNGS: Clear to auscultation and percussion. Lung excursion symmetrical. No change in fremitus. No adventitial noises.  ABDOMEN: Bowel sounds present. Non-tender, no masses palpated.  EXTREMITIES: Normal muscle tone and joint movement, no cyanosis or clubbing.   LYMPHATICS:no edema  SKIN: Dry, intact, no lesions.   NEURO: Cranial nerves II-XII intact. Motor strength 5/5 bilaterally, upper and lower extremities.  PSYCH: Appropriate affect.    Assessment:       1. Asthma, unspecified asthma severity, unspecified whether complicated, unspecified whether persistent    2. Nocturnal hypoxemia    3. Chronic bronchitis, unspecified chronic bronchitis type    4. Paroxysmal atrial fibrillation    5. Congestive heart failure, unspecified HF chronicity, unspecified heart failure type            Plan:       Asthma, unspecified asthma severity, unspecified whether complicated, unspecified whether persistent    Nocturnal hypoxemia  -     PULSE OXIMETRY OVERNIGHT; Future; Expected date: 08/31/2022    Chronic bronchitis, unspecified chronic bronchitis type    Paroxysmal atrial fibrillation    Congestive heart failure, unspecified HF chronicity, unspecified heart failure type      Fine to continue the Breo daily  Overnight pulse ox off oxygen to requalify  for oxygen  Make sure Dr. Diaz send me copy of echo      Follow up in about 6 months (around 2/28/2023).

## 2022-09-01 ENCOUNTER — PATIENT MESSAGE (OUTPATIENT)
Dept: FAMILY MEDICINE | Facility: CLINIC | Age: 82
End: 2022-09-01
Payer: MEDICARE

## 2022-09-01 ENCOUNTER — TELEPHONE (OUTPATIENT)
Dept: FAMILY MEDICINE | Facility: CLINIC | Age: 82
End: 2022-09-01
Payer: MEDICARE

## 2022-09-01 NOTE — TELEPHONE ENCOUNTER
Patient describes blotchy rash around knee and ankle with burning down leg- says feels like her skin is on fire. Patient notified no appt available-recommended urgent care facility

## 2022-09-01 NOTE — TELEPHONE ENCOUNTER
----- Message from Tracy Doyle sent at 9/1/2022 11:16 AM CDT -----  Contact: Patient  Type:  Needs Medical Advice    Who Called: Patient     Symptoms (please be specific): rash      How long has patient had these symptoms:  3 days       Would the patient rather a call back or a response via [facility]? Call     Best Call Back Number: [phone] (home)      Additional Information: Patient would like to speak with nurse about the rash that she has had for 2 days that is causing joint pain and burning. Patient stated that she took Benadryl but not seem to be helping.     Please call to advise

## 2022-09-02 LAB
ALT SERPL-CCNC: NORMAL U/L
AST SERPL-CCNC: NORMAL U/L
BUN BLD-MCNC: NORMAL MG/DL
CHOLEST SERPL-MSCNC: 166 MG/DL (ref 0–200)
CREAT SERPL-MCNC: NORMAL MG/DL
GLUCOSE 1H P 100 G GLC PO SERPL-MCNC: NORMAL MG/DL
GLUCOSE 2H P 100 G GLC PO SERPL-MCNC: NORMAL MG/DL
HBA1C MFR BLD: 6.1 %
HDLC SERPL-MCNC: 54 MG/DL
LDLC SERPL CALC-MCNC: 89 MG/DL (ref 0–160)
MICROALBUMIN URINE: NORMAL
MICROALBUMIN/CREATININE RATIO: 252 UG/MG
MICROALBUMIN/CREATININE RATIO: NORMAL
PROTEIN/CREATININE RATIO: NORMAL
TRIGL SERPL-MCNC: 125 MG/DL

## 2022-09-05 ENCOUNTER — HOSPITAL ENCOUNTER (EMERGENCY)
Facility: HOSPITAL | Age: 82
Discharge: HOME | End: 2022-09-05
Attending: EMERGENCY MEDICINE
Payer: MEDICARE

## 2022-09-05 ENCOUNTER — PATIENT MESSAGE (OUTPATIENT)
Dept: GASTROENTEROLOGY | Facility: CLINIC | Age: 82
End: 2022-09-05
Payer: MEDICARE

## 2022-09-05 VITALS
SYSTOLIC BLOOD PRESSURE: 211 MMHG | BODY MASS INDEX: 23.96 KG/M2 | WEIGHT: 131 LBS | TEMPERATURE: 98 F | DIASTOLIC BLOOD PRESSURE: 95 MMHG | HEART RATE: 88 BPM | OXYGEN SATURATION: 99 % | RESPIRATION RATE: 20 BRPM

## 2022-09-05 DIAGNOSIS — R23.8 SKIN IRRITATION: Primary | ICD-10-CM

## 2022-09-05 PROCEDURE — 99282 EMERGENCY DEPT VISIT SF MDM: CPT

## 2022-09-05 NOTE — DISCHARGE INSTRUCTIONS
Skin irritation, unknown cause.  Dermatology follow-up is recommended.  You may return to the emergency department for new worsening symptoms such as bruising, skin blistering or peeling, fever.    Elevated blood pressure noted in triage today you should recheck with your PCP.

## 2022-09-05 NOTE — ED PROVIDER NOTES
Chief complaint:  Rash (Per pt her rectal have rash and sore. Pt c/o skin burning all over the body.)      HPI:  Darlin Tipton is a 82 y.o. female wit hx CKD, DM, GERD (recent initial dexlansoprazole by GI 8/22/22 for UGI sx--and single dose fluconazole as well), atrial fibrillation, anemia presenting with skin irritation, mostly while wearing clothes.  She does not feel any irritation when unclothed.  This has been present for more than a week.  There is no discrete rash.  Distribution is primarily in the lower extremities and in her perianal area.  She denies rectal discharge, fever, pain.  She denies intraoral lesions.  There is no swelling or dyspnea.    ROS: As per HPI and below:  No swelling, dyspnea, emesis, abdominal pain.    Review of patient's allergies indicates:   Allergen Reactions    Dexlansoprazole Itching, Nausea Only and Rash    Sulfa (sulfonamide antibiotics) Rash    Floxacillin Itching    Januvia [sitagliptin] Other (See Comments)     Hot flashes    Tetracyclines Itching    Fenofibrate micronized Rash    Nitrofurantoin macrocrystalline Other (See Comments)     unknown    Phenylfenesin la [phenylpropanolamine-gg] Rash       Patient's Medications   New Prescriptions    No medications on file   Previous Medications    ACETAMINOPHEN (TYLENOL) 650 MG TBSR    Take 1,300 mg by mouth once daily. 2 Tablet(s) Oral  Every day.    AMIODARONE (PACERONE) 200 MG TAB    Take 200 mg by mouth 2 (two) times daily.    AMITRIPTYLINE (ELAVIL) 50 MG TABLET    Take 1 tablet (50 mg total) by mouth every evening.    APIXABAN (ELIQUIS) 5 MG TAB    Take 1 tablet (5 mg total) by mouth 2 (two) times daily.    AZELASTINE (ASTELIN) 137 MCG (0.1 %) NASAL SPRAY    1 spray (137 mcg total) by Nasal route 2 (two) times daily.    BLOOD SUGAR DIAGNOSTIC (FREESTYLE LITE STRIPS) STRP    USE TO TEST BLOOD SUGAR TWICE A DAY    BUSPIRONE (BUSPAR) 10 MG TABLET    Take 1 tablet (10 mg total) by mouth 2 (two) times daily.    CANAGLIFLOZIN  (INVOKANA) 100 MG TAB TABLET    Take 1 tablet (100 mg total) by mouth once daily.    CARBOXYMETHYLCELLULOSE 1 % OPHTHALMIC SOLUTION    Apply 1 drop to eye As instructed. as directed    CETIRIZINE (ZYRTEC) 10 MG TABLET    Take 10 mg by mouth once daily.    CHOLECALCIFEROL, VITAMIN D3, (VITAMIN D3) 50 MCG (2,000 UNIT) CAP    Take 1 capsule by mouth once daily.    COENZYME Q10 100 MG CAPSULE    Take 100 mg by mouth once daily.     CRANBERRY EXTRACT 650 MG CAP    Take 1 tablet by mouth 2 (two) times daily.    DEXLANSOPRAZOLE (DEXILANT) 60 MG CAPSULE    Take 1 capsule (60 mg total) by mouth once daily.    ESOMEPRAZOLE (NEXIUM) 40 MG CAPSULE    Take 1 capsule (40 mg total) by mouth before breakfast.    FLUTICASONE FUROATE-VILANTEROL (BREO ELLIPTA) 100-25 MCG/DOSE DISKUS INHALER    Inhale 1 puff into the lungs once daily. Controller Rinse after you use it    FLUTICASONE PROPIONATE (FLONASE) 50 MCG/ACTUATION NASAL SPRAY    1 spray (50 mcg total) by Each Nostril route once daily.    LACTOBAC NO.41/BIFIDOBACT NO.7 (PROBIOTIC-10 ORAL)    Take by mouth.    LANCETS MISC    1 Units by Misc.(Non-Drug; Combo Route) route 2 (two) times daily.    LEVALBUTEROL (XOPENEX) 1.25 MG/0.5 ML NEBULIZER SOLUTION    Take 0.5 mLs (1.25 mg total) by nebulization every 6 (six) hours as needed for Wheezing. Rescue    LEVOTHYROXINE (SYNTHROID) 25 MCG TABLET    TAKE 1 TABLET BY MOUTH BEFORE BREAKFAST EVERY DAY    MAGNESIUM OXIDE (MAG-OXIDE ORAL)    Take 400 mg by mouth once daily.    METOPROLOL SUCCINATE (TOPROL XL) 25 MG 24 HR TABLET    Take 1 tablet (25 mg total) by mouth once daily.    NITROGLYCERIN (NITROSTAT) 0.4 MG SL TABLET    Place 0.4 mg under the tongue every 5 (five) minutes as needed. 0.4mg Sublingual PRN .      ONDANSETRON (ZOFRAN) 4 MG TABLET    TAKE 1 TABLET BY MOUTH EVERY 6 HOURS AS NEEDED FOR NAUSEA    PRAMOXINE-HYDROCORTISONE (PROCTOCREAM-HC) 1-1 % RECTAL CREAM    Place rectally 2 (two) times daily.    RESTASIS 0.05 % OPHTHALMIC  EMULSION    Place 1 drop into both eyes 2 (two) times daily.    ROSUVASTATIN (CRESTOR) 10 MG TABLET    Take 1 tablet (10 mg total) by mouth once daily.    SUCRALFATE (CARAFATE) 1 GRAM TABLET    TAKE 1 TABLET BY MOUTH 4 TIMES DAILY.    TRIAZOLAM (HALCION) 0.25 MG TAB    Take 1 tablet (0.25 mg total) by mouth nightly as needed (sleep).    UNABLE TO FIND    Take 1 capsule by mouth once daily. Vital red    VITAMINS  A,C,E-ZINC-COPPER 14,320-226-200 UNIT-MG-UNIT CAP    Take 1 capsule by mouth 2 (two) times daily.    Modified Medications    No medications on file   Discontinued Medications    No medications on file       PMH:  As per HPI and below:  Past Medical History:   Diagnosis Date    Allergy     Dust mites, Grasses, Trees    Arthritis     Asthma     Blood transfusion     CAD (coronary artery disease)     Cataract     CHRONIC BRONCHITIS     Diabetes mellitus     Diabetes mellitus type II     GERD (gastroesophageal reflux disease)     Hyperlipidemia     Hypertension     Irregular heart beat     Kidney disease     ckd stage 3  as stated by patient - to see MD    Post-menopausal bleeding 2018    Spinal stenosis     Thyroid disease     Hypothyroidism     Past Surgical History:   Procedure Laterality Date    APPENDECTOMY  1968    BLADDER SUSPENSION  1989    CARDIAC SURGERY  2016    CABG    CATARACT EXTRACTION  9/2007 (L) and 10/2207 (R)    COLONOSCOPY N/A 10/18/2017    Procedure: COLONOSCOPY;  Surgeon: Esme Acuna MD;  Location: [facility];  Service: Endoscopy;  Laterality: N/A;    CORONARY ANGIOGRAPHY INCLUDING BYPASS GRAFTS WITH CATHETERIZATION OF LEFT HEART Left 5/13/2022    Procedure: Left heart cath;  Surgeon: Davon Patton MD;  Location: [facility] CATH/EP LAB;  Service: Cardiology;  Laterality: Left;    CORONARY ARTERY BYPASS GRAFT  4/26/2004    x5    ESOPHAGEAL DILATION      ESOPHAGOGASTRODUODENOSCOPY N/A 6/27/2022    Procedure: EGD (ESOPHAGOGASTRODUODENOSCOPY);  Surgeon: Skip Nj MD;  Location: [facility]  ENDO;  Service: Endoscopy;  Laterality: N/A;    HYSTEROSCOPY WITH DILATION AND CURETTAGE OF UTERUS N/A 3/12/2020    Procedure: HYSTEROSCOPY, WITH DILATION AND CURETTAGE OF UTERUS;  Surgeon: Ramona Llanos MD;  Location: [facility] OR;  Service: OB/GYN;  Laterality: N/A;    SPINE SURGERY  3/2000    Tumor    TRANSFORAMINAL EPIDURAL INJECTION OF STEROID Right 11/21/2019    Procedure: Injection,steroid,epidural,transforaminal approach;  Surgeon: Bj Jones MD;  Location: [facility];  Service: Pain Management;  Laterality: Right;  L4-5, L5-S1    TRANSFORAMINAL EPIDURAL INJECTION OF STEROID Right 12/31/2019    Procedure: Injection,steroid,epidural,transforaminal approach;  Surgeon: Bj Jones MD;  Location: [facility];  Service: Pain Management;  Laterality: Right;  L4-5, L5-S1    TRANSFORAMINAL EPIDURAL INJECTION OF STEROID Right 2/5/2020    Procedure: Injection,steroid,epidural,transforaminal approach;  Surgeon: Bj Jones MD;  Location: [facility];  Service: Pain Management;  Laterality: Right;  L4-5, L5-S1    TRANSFORAMINAL EPIDURAL INJECTION OF STEROID Left 1/27/2021    Procedure: Injection,steroid,epidural,transforaminal approach;  Surgeon: Bj Jones MD;  Location: [facility];  Service: Pain Management;  Laterality: Left;  L4-5, L5-S1    TRANSFORAMINAL EPIDURAL INJECTION OF STEROID Right 3/4/2021    Procedure: Injection,steroid,epidural,transforaminal approach;  Surgeon: Bj Jones MD;  Location: [facility];  Service: Pain Management;  Laterality: Right;  L4-L5, L5-S1    WRIST SURGERY  1993    carpal tunnel         Social History     Socioeconomic History    Marital status:    Tobacco Use    Smoking status: Passive Smoke Exposure - Never Smoker    Smokeless tobacco: Never    Tobacco comments:     PARENTS    Substance and Sexual Activity    Alcohol use: Yes     Comment: Rare    Drug use: No    Sexual activity: Not Currently       Family History   Problem Relation Age of Onset    Breast cancer Mother     Breast cancer  Maternal Aunt     Allergic rhinitis Neg Hx     Allergies Neg Hx     Angioedema Neg Hx     Asthma Neg Hx     Eczema Neg Hx     Immunodeficiency Neg Hx     Urticaria Neg Hx     Rhinitis Neg Hx     Atopy Neg Hx        Physical Exam:    Vitals:    09/05/22 1451   BP: (!) 211/95   Pulse: 88   Resp: 20   Temp: 97.5 °F (36.4 °C)     GENERAL:  No apparent distress.  Alert.    HEENT:  Moist mucous membranes.  Normocephalic and atraumatic.    NECK:  No swelling.  Midline trachea.   CARDIOVASCULAR:  Regular rate and rhythm.  2+ radial pulses.    PULMONARY:  Lungs clear to auscultation bilaterally.  No wheezes, rales, or rhonci.    EXTREMITIES:  Warm and well perfused.  Brisk capillary refill.    NEUROLOGICAL:  Normal mental status.  Appropriate and conversant.    SKIN:  No rashes or ecchymoses.  No petechiae or purpura.  RECTAL:  Examined with female staff chaperone present.  No perirectal lesions, tenderness, or erythema.  No masses.    Labs Reviewed - No data to display    Current Discharge Medication List        CONTINUE these medications which have NOT CHANGED    Details   levothyroxine (SYNTHROID) 25 MCG tablet TAKE 1 TABLET BY MOUTH BEFORE BREAKFAST EVERY DAY  Qty: 90 tablet, Refills: 3      acetaminophen (TYLENOL) 650 MG TbSR Take 1,300 mg by mouth once daily. 2 Tablet(s) Oral  Every day.      amiodarone (PACERONE) 200 MG Tab Take 200 mg by mouth 2 (two) times daily.      amitriptyline (ELAVIL) 50 MG tablet Take 1 tablet (50 mg total) by mouth every evening.  Qty: 90 tablet, Refills: 3      apixaban (ELIQUIS) 5 mg Tab Take 1 tablet (5 mg total) by mouth 2 (two) times daily.  Qty: 180 tablet, Refills: 3      azelastine (ASTELIN) 137 mcg (0.1 %) nasal spray 1 spray (137 mcg total) by Nasal route 2 (two) times daily.  Qty: 90 mL, Refills: 3    Associated Diagnoses: Chronic sinus complaints; Chronic cough      blood sugar diagnostic (FREESTYLE LITE STRIPS) Strp USE TO TEST BLOOD SUGAR TWICE A DAY  Qty: 200 strip, Refills: 3     Comments: DX Code Needed  PT REQUESTED REFILLS.  Associated Diagnoses: Type 2 diabetes mellitus without complication      busPIRone (BUSPAR) 10 MG tablet Take 1 tablet (10 mg total) by mouth 2 (two) times daily.  Qty: 60 tablet, Refills: 3      canagliflozin (INVOKANA) 100 mg Tab tablet Take 1 tablet (100 mg total) by mouth once daily.  Qty: 90 tablet, Refills: 3    Associated Diagnoses: Type 2 diabetes mellitus with diabetic nephropathy, without long-term current use of insulin      carboxymethylcellulose 1 % ophthalmic solution Apply 1 drop to eye As instructed. as directed      cetirizine (ZYRTEC) 10 MG tablet Take 10 mg by mouth once daily.      cholecalciferol, vitamin D3, (VITAMIN D3) 50 mcg (2,000 unit) Cap Take 1 capsule by mouth once daily.      coenzyme Q10 100 mg capsule Take 100 mg by mouth once daily.       cranberry extract 650 mg Cap Take 1 tablet by mouth 2 (two) times daily.      dexlansoprazole (DEXILANT) 60 mg capsule Take 1 capsule (60 mg total) by mouth once daily.  Qty: 30 capsule, Refills: 11      esomeprazole (NEXIUM) 40 MG capsule Take 1 capsule (40 mg total) by mouth before breakfast.  Qty: 30 capsule, Refills: 11    Associated Diagnoses: Dysphagia, unspecified type; Gastroesophageal reflux disease with esophagitis without hemorrhage      fluticasone furoate-vilanteroL (BREO ELLIPTA) 100-25 mcg/dose diskus inhaler Inhale 1 puff into the lungs once daily. Controller Rinse after you use it  Qty: 180 each, Refills: 6      fluticasone propionate (FLONASE) 50 mcg/actuation nasal spray 1 spray (50 mcg total) by Each Nostril route once daily.  Qty: 48 g, Refills: 3      Lactobac no.41/Bifidobact no.7 (PROBIOTIC-10 ORAL) Take by mouth.      lancets Misc 1 Units by Misc.(Non-Drug; Combo Route) route 2 (two) times daily.  Qty: 200 each, Refills: 3    Associated Diagnoses: Type II or unspecified type diabetes mellitus without mention of complication, not stated as uncontrolled      levalbuterol  (XOPENEX) 1.25 mg/0.5 mL nebulizer solution Take 0.5 mLs (1.25 mg total) by nebulization every 6 (six) hours as needed for Wheezing. Rescue  Qty: 120 each, Refills: 3      magnesium oxide (MAG-OXIDE ORAL) Take 400 mg by mouth once daily.      metoprolol succinate (TOPROL XL) 25 MG 24 hr tablet Take 1 tablet (25 mg total) by mouth once daily.  Qty: 90 tablet, Refills: 3    Comments: .      nitroGLYCERIN (NITROSTAT) 0.4 MG SL tablet Place 0.4 mg under the tongue every 5 (five) minutes as needed. 0.4mg Sublingual PRN .        ondansetron (ZOFRAN) 4 MG tablet TAKE 1 TABLET BY MOUTH EVERY 6 HOURS AS NEEDED FOR NAUSEA  Qty: 20 tablet, Refills: 1      pramoxine-hydrocortisone (PROCTOCREAM-HC) 1-1 % rectal cream Place rectally 2 (two) times daily.  Qty: 3 each, Refills: 3      RESTASIS 0.05 % ophthalmic emulsion Place 1 drop into both eyes 2 (two) times daily.      rosuvastatin (CRESTOR) 10 MG tablet Take 1 tablet (10 mg total) by mouth once daily.  Qty: 90 tablet, Refills: 3      sucralfate (CARAFATE) 1 gram tablet TAKE 1 TABLET BY MOUTH 4 TIMES DAILY.  Qty: 90 tablet, Refills: 0    Associated Diagnoses: Dysphagia, unspecified type; Gastroesophageal reflux disease with esophagitis without hemorrhage      triazolam (HALCION) 0.25 MG Tab Take 1 tablet (0.25 mg total) by mouth nightly as needed (sleep).  Qty: 90 tablet, Refills: 1    Associated Diagnoses: Insomnia, unspecified type      UNABLE TO FIND Take 1 capsule by mouth once daily. Vital red      vitamins  A,C,E-zinc-copper 14,320-226-200 unit-mg-unit Cap Take 1 capsule by mouth 2 (two) times daily.            STOP taking these medications       dronedarone (MULTAQ) 400 mg Tab Comments:   Reason for Stopping:         spironolactone (ALDACTONE) 25 MG tablet Comments:   Reason for Stopping:               No orders of the defined types were placed in this encounter.      Imaging Results    None              MDM:    82 y.o. female with skin irritation for 1 week.  Etiology  is unclear.  I doubt acute, life-threatening process.  It is unclear if this is related to previously initiated medications that have been terminated.  I have very low suspicion for acute, life-threatening process such as SJS or TEN.  I doubt DRESS or anaphylaxis.  I do not think she would benefit from steroid therapy.  I have recommended follow-up with dermatology for further consideration.  Return precautions reviewed.    Diagnoses:    1. Skin irritation       Francisco Fong MD  09/05/22 6442

## 2022-09-06 ENCOUNTER — OFFICE VISIT (OUTPATIENT)
Dept: GASTROENTEROLOGY | Facility: CLINIC | Age: 82
End: 2022-09-06
Payer: MEDICARE

## 2022-09-06 VITALS
DIASTOLIC BLOOD PRESSURE: 81 MMHG | WEIGHT: 130.5 LBS | HEART RATE: 76 BPM | RESPIRATION RATE: 16 BRPM | BODY MASS INDEX: 24.01 KG/M2 | HEIGHT: 62 IN | SYSTOLIC BLOOD PRESSURE: 157 MMHG

## 2022-09-06 DIAGNOSIS — L29.0 RECTAL ITCHING: ICD-10-CM

## 2022-09-06 DIAGNOSIS — R11.0 NAUSEA: ICD-10-CM

## 2022-09-06 DIAGNOSIS — R10.10 UPPER ABDOMINAL PAIN, UNSPECIFIED: Primary | ICD-10-CM

## 2022-09-06 DIAGNOSIS — R14.0 ABDOMINAL BLOATING: ICD-10-CM

## 2022-09-06 PROCEDURE — 3079F DIAST BP 80-89 MM HG: CPT | Mod: CPTII,S$GLB,, | Performed by: INTERNAL MEDICINE

## 2022-09-06 PROCEDURE — 99213 PR OFFICE/OUTPT VISIT, EST, LEVL III, 20-29 MIN: ICD-10-PCS | Mod: S$GLB,,, | Performed by: INTERNAL MEDICINE

## 2022-09-06 PROCEDURE — 3288F PR FALLS RISK ASSESSMENT DOCUMENTED: ICD-10-PCS | Mod: CPTII,S$GLB,, | Performed by: INTERNAL MEDICINE

## 2022-09-06 PROCEDURE — 99999 PR PBB SHADOW E&M-EST. PATIENT-LVL V: ICD-10-PCS | Mod: PBBFAC,,, | Performed by: INTERNAL MEDICINE

## 2022-09-06 PROCEDURE — 1126F PR PAIN SEVERITY QUANTIFIED, NO PAIN PRESENT: ICD-10-PCS | Mod: CPTII,S$GLB,, | Performed by: INTERNAL MEDICINE

## 2022-09-06 PROCEDURE — 99213 OFFICE O/P EST LOW 20 MIN: CPT | Mod: S$GLB,,, | Performed by: INTERNAL MEDICINE

## 2022-09-06 PROCEDURE — 1101F PT FALLS ASSESS-DOCD LE1/YR: CPT | Mod: CPTII,S$GLB,, | Performed by: INTERNAL MEDICINE

## 2022-09-06 PROCEDURE — 3288F FALL RISK ASSESSMENT DOCD: CPT | Mod: CPTII,S$GLB,, | Performed by: INTERNAL MEDICINE

## 2022-09-06 PROCEDURE — 99999 PR PBB SHADOW E&M-EST. PATIENT-LVL V: CPT | Mod: PBBFAC,,, | Performed by: INTERNAL MEDICINE

## 2022-09-06 PROCEDURE — 3077F PR MOST RECENT SYSTOLIC BLOOD PRESSURE >= 140 MM HG: ICD-10-PCS | Mod: CPTII,S$GLB,, | Performed by: INTERNAL MEDICINE

## 2022-09-06 PROCEDURE — 3077F SYST BP >= 140 MM HG: CPT | Mod: CPTII,S$GLB,, | Performed by: INTERNAL MEDICINE

## 2022-09-06 PROCEDURE — 3079F PR MOST RECENT DIASTOLIC BLOOD PRESSURE 80-89 MM HG: ICD-10-PCS | Mod: CPTII,S$GLB,, | Performed by: INTERNAL MEDICINE

## 2022-09-06 PROCEDURE — 1126F AMNT PAIN NOTED NONE PRSNT: CPT | Mod: CPTII,S$GLB,, | Performed by: INTERNAL MEDICINE

## 2022-09-06 PROCEDURE — 1101F PR PT FALLS ASSESS DOC 0-1 FALLS W/OUT INJ PAST YR: ICD-10-PCS | Mod: CPTII,S$GLB,, | Performed by: INTERNAL MEDICINE

## 2022-09-06 RX ORDER — HYDROCORTISONE ACETATE 25 MG/1
25 SUPPOSITORY RECTAL 2 TIMES DAILY
Qty: 14 SUPPOSITORY | Refills: 0 | Status: SHIPPED | OUTPATIENT
Start: 2022-09-06 | End: 2022-09-13

## 2022-09-06 NOTE — PROGRESS NOTES
[facility] Note    CC: Nausea, Rectal Itching     HPI 82 y.o. female with a past medical history of AFib, hypothyroidism, CAD, DM type 2, GERD, CKD 3, and hypertension presenting with chronic, progressively worsening nausea associated with intermittent vomiting as well as bloating and gas. She was recently evaluated by Dr. Nj who notes she has tried multiple agents without improvement, including Nexium, Prilosec, and Pantoprazole, thus she was given Dexilant which has not significantly changed her symptoms. She had an EGD in June 2022 which was notable for H.pylori negative gastritis. GES in July 2022 with normal 4 hour emptying time (slightly delayed gastric emptying at 2 hours).  Colonoscopy in 2017 was notable for a 5mm adenoma at the hepatic flexure.     She was seen in the ED yesterday for several weeks of whole body itching without obvious rash and also notes rectal itching. She was given Hydroxyzine in the ED which has not greatly improved her symptoms. She was also given Carafate without noticeable improvement. Rectal exam in the ED was normal. She was also given a topical steroid for her perirectal area which she has only used once but believes helped.     Past Medical History:   Diagnosis Date    Allergy     Dust mites, Grasses, Trees    Arthritis     Asthma     Blood transfusion     CAD (coronary artery disease)     Cataract     CHRONIC BRONCHITIS     Diabetes mellitus     Diabetes mellitus type II     GERD (gastroesophageal reflux disease)     Hyperlipidemia     Hypertension     Irregular heart beat     Kidney disease     ckd stage 3  as stated by patient - to see MD    Post-menopausal bleeding 2018    Spinal stenosis     Thyroid disease     Hypothyroidism       Allergies and Medications reviewed     Review of Systems  General ROS: negative for - chills, fever or weight loss  Cardiovascular ROS: no chest pain or dyspnea on exertion  Gastrointestinal ROS: + nausea, + bloating, +  "rectal tiching    Physical Examination  BP (!) 157/81 (BP Location: Left arm, Patient Position: Sitting)   Pulse 76   Resp 16   Ht 5' 2" (1.575 m)   Wt 59.2 kg (130 lb 8.2 oz)   BMI 23.87 kg/m²   General appearance: alert, cooperative, no distress  HENT: Normocephalic, atraumatic, neck symmetrical, no nasal discharge, sclera anicteric   Lungs: clear to auscultation bilaterally, symmetric chest wall expansion bilaterally  Heart: regular rate and rhythm without rub; no displacement of the PMI   Abdomen: thin, soft, nontender  Extremities: extremities symmetric; no clubbing, cyanosis, or edema        Labs:  Lab Results   Component Value Date    WBC 3.52 (L) 06/27/2022    HGB 9.3 (L) 06/27/2022    HCT 29.1 (L) 06/27/2022    MCV 87 06/27/2022     06/27/2022           Imaging:  GES July 2022 was independently visualized and reviewed by me and showed . At 2 hours, there is slightly delayed gastric emptying, however images at 1 and 4 hours demonstrate no abnormalities, with 97% emptying at the 4 hour kishan.       Assessment:   82 y.o. female presents to follow up chronic nausea and bloating with unrevealing work-up and not success with multiple PPI agents. She also notes rectal itching.   Plan:  1.Nausea, bloating, abdominal discomfort, unremarkable recent work-up  -HIDA with EF  -Check CMP  -Trial of OTC Prevacid BID x 7 days  -continue Carafate x 3-4 more days- if symptoms do not improve would stop this medication    2.Rectal itching  -Rectal suppositories x 5 days  -Soak bottom 2-3 times a day in warm water  -Apply OTC barrier cream  -Avoid stringent wipes     Leticia Shaw MD  [facility]  [address], [address]  [place] [address]  Office: [phone]  Fax: [phone]      "

## 2022-09-07 ENCOUNTER — PATIENT MESSAGE (OUTPATIENT)
Dept: GASTROENTEROLOGY | Facility: CLINIC | Age: 82
End: 2022-09-07
Payer: MEDICARE

## 2022-09-08 ENCOUNTER — PATIENT MESSAGE (OUTPATIENT)
Dept: FAMILY MEDICINE | Facility: CLINIC | Age: 82
End: 2022-09-08
Payer: MEDICARE

## 2022-09-09 ENCOUNTER — PATIENT MESSAGE (OUTPATIENT)
Dept: GASTROENTEROLOGY | Facility: CLINIC | Age: 82
End: 2022-09-09
Payer: MEDICARE

## 2022-09-09 ENCOUNTER — TELEPHONE (OUTPATIENT)
Dept: FAMILY MEDICINE | Facility: CLINIC | Age: 82
End: 2022-09-09
Payer: MEDICARE

## 2022-09-09 ENCOUNTER — HOSPITAL ENCOUNTER (OUTPATIENT)
Dept: RADIOLOGY | Facility: HOSPITAL | Age: 82
Discharge: HOME | End: 2022-09-09
Attending: INTERNAL MEDICINE
Payer: MEDICARE

## 2022-09-09 DIAGNOSIS — D53.9 NUTRITIONAL ANEMIA, UNSPECIFIED: ICD-10-CM

## 2022-09-09 DIAGNOSIS — D64.9 NORMOCYTIC ANEMIA: Primary | ICD-10-CM

## 2022-09-09 DIAGNOSIS — R10.10 UPPER ABDOMINAL PAIN, UNSPECIFIED: ICD-10-CM

## 2022-09-09 PROCEDURE — 76705 ECHO EXAM OF ABDOMEN: CPT | Mod: TC

## 2022-09-09 PROCEDURE — 76705 ECHO EXAM OF ABDOMEN: CPT | Mod: 26,,, | Performed by: RADIOLOGY

## 2022-09-09 PROCEDURE — 76705 US ABDOMEN LIMITED: ICD-10-PCS | Mod: 26,,, | Performed by: RADIOLOGY

## 2022-09-12 ENCOUNTER — PATIENT MESSAGE (OUTPATIENT)
Dept: GASTROENTEROLOGY | Facility: CLINIC | Age: 82
End: 2022-09-12
Payer: MEDICARE

## 2022-09-12 RX ORDER — BUSPIRONE HYDROCHLORIDE 10 MG/1
10 TABLET ORAL 3 TIMES DAILY
Qty: 90 TABLET | Refills: 5 | Status: SHIPPED | OUTPATIENT
Start: 2022-09-12 | End: 2022-10-27

## 2022-09-13 ENCOUNTER — PATIENT MESSAGE (OUTPATIENT)
Dept: GASTROENTEROLOGY | Facility: CLINIC | Age: 82
End: 2022-09-13
Payer: MEDICARE

## 2022-09-15 ENCOUNTER — TELEPHONE (OUTPATIENT)
Dept: GASTROENTEROLOGY | Facility: CLINIC | Age: 82
End: 2022-09-15
Payer: MEDICARE

## 2022-09-16 ENCOUNTER — PATIENT MESSAGE (OUTPATIENT)
Dept: PULMONOLOGY | Facility: CLINIC | Age: 82
End: 2022-09-16

## 2022-09-20 ENCOUNTER — PATIENT MESSAGE (OUTPATIENT)
Dept: FAMILY MEDICINE | Facility: CLINIC | Age: 82
End: 2022-09-20
Payer: MEDICARE

## 2022-09-20 DIAGNOSIS — F41.9 ANXIETY: Primary | ICD-10-CM

## 2022-09-22 DIAGNOSIS — J30.9 ALLERGIC RHINITIS, UNSPECIFIED SEASONALITY, UNSPECIFIED TRIGGER: Primary | ICD-10-CM

## 2022-09-22 NOTE — TELEPHONE ENCOUNTER
Care Due:                  Date            Visit Type   Department     Provider  --------------------------------------------------------------------------------                                EP -                              PRIMARY      [facility]  Last Visit: 05-      CARE ([facility])   PRACTICE       Oumar Almonte                              EP -                              PRIMARY      [facility]  Next Visit: 12-      CARE ([facility])   PRACTICE       Oumar Almonte                                                            Last  Test          Frequency    Reason                     Performed    Due Date  --------------------------------------------------------------------------------    HBA1C.......  6 months...  canagliflozin............  05- 11-    [facility] Embedded Care Gaps. Reference number: [id number]. 9/22/2022   10:20:20 AM CDT

## 2022-09-22 NOTE — TELEPHONE ENCOUNTER
----- Message from Amy Owusu sent at 9/22/2022  9:58 AM CDT -----  Contact: Caller:  Pharmacy  Caller:  Pharmacy     Reason: requesting refill request   fluticasone propionate (FLONASE) 50 mcg/actuation nasal spray 48 g     [facility] - [facility] [address]  [address] [address]  Phone: [phone] Fax: [phone]

## 2022-09-23 RX ORDER — FLUTICASONE PROPIONATE 50 MCG
1 SPRAY, SUSPENSION (ML) NASAL DAILY
Qty: 48 G | Refills: 3 | Status: SHIPPED | OUTPATIENT
Start: 2022-09-23 | End: 2023-10-18

## 2022-09-27 ENCOUNTER — TELEPHONE (OUTPATIENT)
Dept: PSYCHIATRY | Facility: CLINIC | Age: 82
End: 2022-09-27
Payer: MEDICARE

## 2022-09-28 ENCOUNTER — PATIENT MESSAGE (OUTPATIENT)
Dept: PULMONOLOGY | Facility: CLINIC | Age: 82
End: 2022-09-28

## 2022-10-03 ENCOUNTER — TELEPHONE (OUTPATIENT)
Dept: PULMONOLOGY | Facility: CLINIC | Age: 82
End: 2022-10-03

## 2022-10-03 DIAGNOSIS — G47.34 NOCTURNAL HYPOXEMIA: Primary | ICD-10-CM

## 2022-10-03 DIAGNOSIS — I50.9 CONGESTIVE HEART FAILURE, UNSPECIFIED HF CHRONICITY, UNSPECIFIED HEART FAILURE TYPE: ICD-10-CM

## 2022-10-12 ENCOUNTER — HOSPITAL ENCOUNTER (OUTPATIENT)
Dept: RADIOLOGY | Facility: HOSPITAL | Age: 82
Discharge: HOME | End: 2022-10-12
Attending: INTERNAL MEDICINE
Payer: MEDICARE

## 2022-10-12 VITALS — BODY MASS INDEX: 20.97 KG/M2 | WEIGHT: 114.63 LBS

## 2022-10-12 DIAGNOSIS — R10.10 UPPER ABDOMINAL PAIN, UNSPECIFIED: ICD-10-CM

## 2022-10-12 PROCEDURE — 63600175 PHARM REV CODE 636 W HCPCS: Performed by: INTERNAL MEDICINE

## 2022-10-12 PROCEDURE — A9537 TC99M MEBROFENIN: HCPCS

## 2022-10-12 RX ORDER — SINCALIDE 5 UG/5ML
0.02 INJECTION, POWDER, LYOPHILIZED, FOR SOLUTION INTRAVENOUS ONCE
Status: COMPLETED | OUTPATIENT
Start: 2022-10-12 | End: 2022-10-12

## 2022-10-12 RX ADMIN — SINCALIDE 1 MCG: 5 INJECTION, POWDER, LYOPHILIZED, FOR SOLUTION INTRAVENOUS at 11:10

## 2022-10-16 ENCOUNTER — PATIENT MESSAGE (OUTPATIENT)
Dept: FAMILY MEDICINE | Facility: CLINIC | Age: 82
End: 2022-10-16
Payer: MEDICARE

## 2022-10-16 DIAGNOSIS — G47.00 INSOMNIA, UNSPECIFIED TYPE: ICD-10-CM

## 2022-10-17 RX ORDER — TRIAZOLAM 0.25 MG/1
0.25 TABLET ORAL NIGHTLY PRN
Qty: 90 TABLET | Refills: 1 | Status: SHIPPED | OUTPATIENT
Start: 2022-10-17 | End: 2023-01-27 | Stop reason: SDUPTHER

## 2022-10-17 NOTE — TELEPHONE ENCOUNTER
Call placed to patient. Confirmed patient is requesting printed prescription for Triazolam. Patient would like for staff to contact her when prescription has been printed and ready for . Please advise. Thank you.

## 2022-10-17 NOTE — TELEPHONE ENCOUNTER
No new care gaps identified.  [facility] Embedded Care Gaps. Reference number: [id number]. 10/17/2022   9:28:17 AM SERAT

## 2022-10-18 ENCOUNTER — TELEPHONE (OUTPATIENT)
Dept: FAMILY MEDICINE | Facility: CLINIC | Age: 82
End: 2022-10-18
Payer: MEDICARE

## 2022-10-18 NOTE — TELEPHONE ENCOUNTER
Patient came into clinic looking for the printed prescription.  Advised that Dr. Almonte has not signed the prescription and we will call her once the prescription is signed.  Patient verbalized understanding.

## 2022-10-18 NOTE — TELEPHONE ENCOUNTER
----- Message from Lu Cerna sent at 10/18/2022  8:14 AM CDT -----  Contact: Pt  Type: Needs Medical Advice    Who Called:  Pt    Best Call Back Number: [phone]    Additional Information: States she needs to  prescription for triazolam (HALCION) 0.25 MG Tab.    Please call back. Thanks.

## 2022-10-26 ENCOUNTER — OFFICE VISIT (OUTPATIENT)
Dept: ORTHOPEDICS | Facility: CLINIC | Age: 82
End: 2022-10-26
Payer: MEDICARE

## 2022-10-26 VITALS
BODY MASS INDEX: 25.03 KG/M2 | SYSTOLIC BLOOD PRESSURE: 118 MMHG | WEIGHT: 136 LBS | DIASTOLIC BLOOD PRESSURE: 78 MMHG | HEIGHT: 62 IN

## 2022-10-26 DIAGNOSIS — M46.02 SPINAL ENTHESOPATHY OF CERVICAL REGION: ICD-10-CM

## 2022-10-26 DIAGNOSIS — M47.812 ARTHROPATHY OF CERVICAL FACET JOINT: ICD-10-CM

## 2022-10-26 DIAGNOSIS — M62.838 CERVICAL PARASPINAL MUSCLE SPASM: ICD-10-CM

## 2022-10-26 DIAGNOSIS — M50.30 DISC DISEASE, DEGENERATIVE, CERVICAL: Primary | ICD-10-CM

## 2022-10-26 PROCEDURE — 1101F PT FALLS ASSESS-DOCD LE1/YR: CPT | Mod: CPTII,S$GLB,, | Performed by: ORTHOPAEDIC SURGERY

## 2022-10-26 PROCEDURE — 1159F PR MEDICATION LIST DOCUMENTED IN MEDICAL RECORD: ICD-10-PCS | Mod: CPTII,S$GLB,, | Performed by: ORTHOPAEDIC SURGERY

## 2022-10-26 PROCEDURE — 3288F PR FALLS RISK ASSESSMENT DOCUMENTED: ICD-10-PCS | Mod: CPTII,S$GLB,, | Performed by: ORTHOPAEDIC SURGERY

## 2022-10-26 PROCEDURE — 1125F PR PAIN SEVERITY QUANTIFIED, PAIN PRESENT: ICD-10-PCS | Mod: CPTII,S$GLB,, | Performed by: ORTHOPAEDIC SURGERY

## 2022-10-26 PROCEDURE — 3078F PR MOST RECENT DIASTOLIC BLOOD PRESSURE < 80 MM HG: ICD-10-PCS | Mod: CPTII,S$GLB,, | Performed by: ORTHOPAEDIC SURGERY

## 2022-10-26 PROCEDURE — 1101F PR PT FALLS ASSESS DOC 0-1 FALLS W/OUT INJ PAST YR: ICD-10-PCS | Mod: CPTII,S$GLB,, | Performed by: ORTHOPAEDIC SURGERY

## 2022-10-26 PROCEDURE — 99213 OFFICE O/P EST LOW 20 MIN: CPT | Mod: S$GLB,,, | Performed by: ORTHOPAEDIC SURGERY

## 2022-10-26 PROCEDURE — 3074F SYST BP LT 130 MM HG: CPT | Mod: CPTII,S$GLB,, | Performed by: ORTHOPAEDIC SURGERY

## 2022-10-26 PROCEDURE — 1125F AMNT PAIN NOTED PAIN PRSNT: CPT | Mod: CPTII,S$GLB,, | Performed by: ORTHOPAEDIC SURGERY

## 2022-10-26 PROCEDURE — 1159F MED LIST DOCD IN RCRD: CPT | Mod: CPTII,S$GLB,, | Performed by: ORTHOPAEDIC SURGERY

## 2022-10-26 PROCEDURE — 3074F PR MOST RECENT SYSTOLIC BLOOD PRESSURE < 130 MM HG: ICD-10-PCS | Mod: CPTII,S$GLB,, | Performed by: ORTHOPAEDIC SURGERY

## 2022-10-26 PROCEDURE — 1160F PR REVIEW ALL MEDS BY PRESCRIBER/CLIN PHARMACIST DOCUMENTED: ICD-10-PCS | Mod: CPTII,S$GLB,, | Performed by: ORTHOPAEDIC SURGERY

## 2022-10-26 PROCEDURE — 3078F DIAST BP <80 MM HG: CPT | Mod: CPTII,S$GLB,, | Performed by: ORTHOPAEDIC SURGERY

## 2022-10-26 PROCEDURE — 99213 PR OFFICE/OUTPT VISIT, EST, LEVL III, 20-29 MIN: ICD-10-PCS | Mod: S$GLB,,, | Performed by: ORTHOPAEDIC SURGERY

## 2022-10-26 PROCEDURE — 1160F RVW MEDS BY RX/DR IN RCRD: CPT | Mod: CPTII,S$GLB,, | Performed by: ORTHOPAEDIC SURGERY

## 2022-10-26 PROCEDURE — 3288F FALL RISK ASSESSMENT DOCD: CPT | Mod: CPTII,S$GLB,, | Performed by: ORTHOPAEDIC SURGERY

## 2022-10-26 NOTE — PROGRESS NOTES
Subjective:       Patient ID: Darlin Tipton is a 82 y.o. female.    Chief Complaint: Pain of the Neck (Patient is here with complaints of Cervical pain on left side from base of skull that radiates into her left shoulder blade x 5 days with no known injury. Unable to lift anything, painful to hold her head up or bend her neck)      History of Present Illness    Prior to meeting with the patient I reviewed the medical chart in [facility]. This included reviewing the previous progress notes from our office, review of the patient's last appointment with their primary care provider, review of any visits to the emergency room, and review of any pain management appointments or procedures.   Patient is here with a new chief complaint of left-sided neck pain with left parascapular pain as well as pain at the occiput.  Denies any true upper extremity radicular symptoms.  Started 5 days ago without any injury.  Has a history of previous neck problems but most of the time these are self resolved.    Current Medications  Current Outpatient Medications   Medication Sig Dispense Refill    acetaminophen (TYLENOL) 650 MG TbSR Take 1,300 mg by mouth once daily. 2 Tablet(s) Oral  Every day.      amiodarone (PACERONE) 200 MG Tab Take 200 mg by mouth 2 (two) times daily.      amitriptyline (ELAVIL) 25 MG tablet Take half a tablet in addition to 50 mg tablet for a total of 62.5 mg daily 30 tablet 1    amitriptyline (ELAVIL) 50 MG tablet Take 1 tablet (50 mg total) by mouth every evening. 90 tablet 3    apixaban (ELIQUIS) 5 mg Tab Take 1 tablet (5 mg total) by mouth 2 (two) times daily. 180 tablet 3    azelastine (ASTELIN) 137 mcg (0.1 %) nasal spray 1 spray (137 mcg total) by Nasal route 2 (two) times daily. 90 mL 3    blood sugar diagnostic (FREESTYLE LITE STRIPS) Strp USE TO TEST BLOOD SUGAR TWICE A  strip 3    busPIRone (BUSPAR) 10 MG tablet Take 1 tablet (10 mg total) by mouth 3 (three) times daily. 90 tablet 5    canagliflozin  (INVOKANA) 100 mg Tab tablet Take 1 tablet (100 mg total) by mouth once daily. 90 tablet 3    carboxymethylcellulose 1 % ophthalmic solution Apply 1 drop to eye As instructed. as directed      cetirizine (ZYRTEC) 10 MG tablet Take 10 mg by mouth once daily.      cholecalciferol, vitamin D3, (VITAMIN D3) 50 mcg (2,000 unit) Cap Take 1 capsule by mouth once daily.      clotrimazole-betamethasone 1-0.05% (LOTRISONE) cream Apply topically 2 (two) times daily as needed.      coenzyme Q10 100 mg capsule Take 100 mg by mouth once daily.       cranberry extract 650 mg Cap Take 1 tablet by mouth 2 (two) times daily.      dexlansoprazole (DEXILANT) 60 mg capsule Take 1 capsule (60 mg total) by mouth once daily. 30 capsule 11    esomeprazole (NEXIUM) 40 MG capsule Take 1 capsule (40 mg total) by mouth before breakfast. 30 capsule 11    famotidine (PEPCID) 20 MG tablet SMARTSI Tablet(s) By Mouth Every 12 Hours      FARXIGA 5 mg Tab tablet Take 5 mg by mouth once daily.      fluticasone furoate-vilanteroL (BREO ELLIPTA) 100-25 mcg/dose diskus inhaler Inhale 1 puff into the lungs once daily. Controller Rinse after you use it 180 each 6    fluticasone propionate (FLONASE) 50 mcg/actuation nasal spray 1 spray (50 mcg total) by Each Nostril route once daily. 48 g 3    Lactobac no.41/Bifidobact no.7 (PROBIOTIC-10 ORAL) Take by mouth.      lancets Misc 1 Units by Misc.(Non-Drug; Combo Route) route 2 (two) times daily. 200 each 3    levalbuterol (XOPENEX) 1.25 mg/0.5 mL nebulizer solution Take 0.5 mLs (1.25 mg total) by nebulization every 6 (six) hours as needed for Wheezing. Rescue 120 each 3    levothyroxine (SYNTHROID) 25 MCG tablet TAKE 1 TABLET BY MOUTH BEFORE BREAKFAST EVERY DAY 90 tablet 3    magnesium oxide (MAG-OXIDE ORAL) Take 400 mg by mouth once daily.      metoprolol succinate (TOPROL XL) 25 MG 24 hr tablet Take 1 tablet (25 mg total) by mouth once daily. 90 tablet 3    nitroGLYCERIN (NITROSTAT) 0.4 MG SL tablet Place  0.4 mg under the tongue every 5 (five) minutes as needed. 0.4mg Sublingual PRN .        ondansetron (ZOFRAN) 4 MG tablet TAKE 1 TABLET BY MOUTH EVERY 6 HOURS AS NEEDED FOR NAUSEA 20 tablet 1    phenazopyridine (PYRIDIUM) 200 MG tablet Take 200 mg by mouth 3 (three) times daily.      pramoxine-hydrocortisone (PROCTOCREAM-HC) 1-1 % rectal cream Place rectally 2 (two) times daily. (Patient taking differently: Place 1 application rectally 2 (two) times daily.) 3 each 3    RESTASIS 0.05 % ophthalmic emulsion Place 1 drop into both eyes 2 (two) times daily.      rosuvastatin (CRESTOR) 10 MG tablet Take 1 tablet (10 mg total) by mouth once daily. 90 tablet 3    sucralfate (CARAFATE) 1 gram tablet TAKE 1 TABLET BY MOUTH 4 TIMES DAILY. 90 tablet 0    triazolam (HALCION) 0.25 MG Tab Take 1 tablet (0.25 mg total) by mouth nightly as needed (sleep). 90 tablet 1    UNABLE TO FIND Take 1 capsule by mouth once daily. Vital red      vitamins  A,C,E-zinc-copper 14,[id number] unit-mg-unit Cap Take 1 capsule by mouth 2 (two) times daily.       hydrOXYzine HCL (ATARAX) 25 MG tablet Take 25 mg by mouth nightly as needed.      nitrofurantoin, macrocrystal-monohydrate, (MACROBID) 100 MG capsule Take 100 mg by mouth every 12 (twelve) hours.       No current facility-administered medications for this visit.       Allergies  Review of patient's allergies indicates:   Allergen Reactions    Dexlansoprazole Itching, Nausea Only and Rash    Sulfa (sulfonamide antibiotics) Rash    Floxacillin Itching    Januvia [sitagliptin] Other (See Comments)     Hot flashes    Tetracyclines Itching    Fenofibrate micronized Rash    Nitrofurantoin macrocrystalline Other (See Comments)     unknown    Phenylfenesin la [phenylpropanolamine-gg] Rash       Past Medical History  Past Medical History:   Diagnosis Date    Allergy     Dust mites, Grasses, Trees    Arthritis     Asthma     Blood transfusion     CAD (coronary artery disease)     Cataract     CHRONIC  BRONCHITIS     Diabetes mellitus     Diabetes mellitus type II     GERD (gastroesophageal reflux disease)     Hyperlipidemia     Hypertension     Irregular heart beat     Kidney disease     ckd stage 3  as stated by patient - to see MD    Post-menopausal bleeding 2018    Spinal stenosis     Thyroid disease     Hypothyroidism       Surgical History  Past Surgical History:   Procedure Laterality Date    APPENDECTOMY  1968    BLADDER SUSPENSION  1989    CARDIAC SURGERY  2016    CABG    CATARACT EXTRACTION  9/2007 (L) and 10/2207 (R)    COLONOSCOPY N/A 10/18/2017    Procedure: COLONOSCOPY;  Surgeon: Esme Acuna MD;  Location: [facility] ENDO;  Service: Endoscopy;  Laterality: N/A;    CORONARY ANGIOGRAPHY INCLUDING BYPASS GRAFTS WITH CATHETERIZATION OF LEFT HEART Left 5/13/2022    Procedure: Left heart cath;  Surgeon: Davon Patton MD;  Location: [facility] [place] CATH/EP LAB;  Service: Cardiology;  Laterality: Left;    CORONARY ARTERY BYPASS GRAFT  4/26/2004    x5    ESOPHAGEAL DILATION      ESOPHAGOGASTRODUODENOSCOPY N/A 6/27/2022    Procedure: EGD (ESOPHAGOGASTRODUODENOSCOPY);  Surgeon: Skip Nj MD;  Location: [facility] ENDO;  Service: Endoscopy;  Laterality: N/A;    HYSTEROSCOPY WITH DILATION AND CURETTAGE OF UTERUS N/A 3/12/2020    Procedure: HYSTEROSCOPY, WITH DILATION AND CURETTAGE OF UTERUS;  Surgeon: Ramona Llanos MD;  Location: [facility] [place] OR;  Service: OB/GYN;  Laterality: N/A;    SPINE SURGERY  3/2000    Tumor    TRANSFORAMINAL EPIDURAL INJECTION OF STEROID Right 11/21/2019    Procedure: Injection,steroid,epidural,transforaminal approach;  Surgeon: Bj Jones MD;  Location: [facility] OR;  Service: Pain Management;  Laterality: Right;  L4-5, L5-S1    TRANSFORAMINAL EPIDURAL INJECTION OF STEROID Right 12/31/2019    Procedure: Injection,steroid,epidural,transforaminal approach;  Surgeon: Bj Jones MD;  Location: [facility] OR;  Service: Pain Management;  Laterality: Right;  L4-5, L5-S1    TRANSFORAMINAL EPIDURAL INJECTION OF  STEROID Right 2/5/2020    Procedure: Injection,steroid,epidural,transforaminal approach;  Surgeon: Bj Jones MD;  Location: [facility] OR;  Service: Pain Management;  Laterality: Right;  L4-5, L5-S1    TRANSFORAMINAL EPIDURAL INJECTION OF STEROID Left 1/27/2021    Procedure: Injection,steroid,epidural,transforaminal approach;  Surgeon: Bj Jones MD;  Location: [facility] OR;  Service: Pain Management;  Laterality: Left;  L4-5, L5-S1    TRANSFORAMINAL EPIDURAL INJECTION OF STEROID Right 3/4/2021    Procedure: Injection,steroid,epidural,transforaminal approach;  Surgeon: Bj Jones MD;  Location: [facility] OR;  Service: Pain Management;  Laterality: Right;  L4-L5, L5-S1    WRIST SURGERY  1993    carpal tunnel         Family History:   Family History   Problem Relation Age of Onset    Breast cancer Mother     Breast cancer Maternal Aunt     Allergic rhinitis Neg Hx     Allergies Neg Hx     Angioedema Neg Hx     Asthma Neg Hx     Eczema Neg Hx     Immunodeficiency Neg Hx     Urticaria Neg Hx     Rhinitis Neg Hx     Atopy Neg Hx        Social History:   Social History     Socioeconomic History    Marital status:    Tobacco Use    Smoking status: Passive Smoke Exposure - Never Smoker    Smokeless tobacco: Never    Tobacco comments:     PARENTS    Substance and Sexual Activity    Alcohol use: Yes     Comment: Rare    Drug use: No    Sexual activity: Not Currently       Hospitalization/Major Diagnostic Procedure:     Review of Systems     General/Constitutional:  Chills denies. Fatigue denies. Fever denies. Weight gain denies. Weight loss denies.    Respiratory:  Shortness of breath denies.    Cardiovascular:  Chest pain denies.    Gastrointestinal:  Constipation denies. Diarrhea denies. Nausea denies. Vomiting denies.     Hematology:  Easy bruising denies. Prolonged bleeding denies.     Genitourinary:  Frequent urination denies. Pain in lower back denies. Painful urination denies.     Musculoskeletal:  See HPI for  details    Skin:  Rash denies.    Neurologic:  Dizziness denies. Gait abnormalities denies. Seizures denies. Tingling/Numbess denies.    Psychiatric:  Anxiety denies. Depressed mood denies.     Objective:   Vital Signs:   Vitals:    10/26/22 1427   BP: 118/78        Physical Exam      General Examination:     Constitutional: The patient is alert and oriented to lace person and time. Mood is pleasant.     Head/Face: Normal facial features normal eyebrows    Eyes: Normal extraocular motion bilaterally    Lungs: Respirations are equal and unlabored    Gait is coordinated.    Cardiovascular: There are no swelling or varicosities present.    Lymphatic: Negative for adenopathy    Skin: Normal    Neurological: Level of consciousness normal. Oriented to place person and time and situation    Psychiatric: Oriented to time place person and situation    Cervical exam demonstrates bilateral occipital, sternocleidomastoid, and upper trapezius tenderness palpation with muscle spasm and trigger points left greater than right.  Also demonstrates some left parascapular muscle spasm with trigger points.  Cervical range of motion diminished by 20% except for left rotation which is diminished by 50%.  Bilateral upper extremities demonstrate full active range of motion, equal symmetric DTRs, normal strength and negative Callahan's.    XRAY Report/ Interpretation:  Cervical AP and lateral x-rays taken in the office today reviewed the patient demonstrate lack of a normal lordotic curve with severe multilevel degenerative disc disease from C4 through C7.  Lateral facet arthropathy is also noted.      Assessment:       1. Disc disease, degenerative, cervical    2. Arthropathy of cervical facet joint    3. Spinal enthesopathy of cervical region    4. Cervical paraspinal muscle spasm          Plan:       Darlin was seen today for pain.    Diagnoses and all orders for this visit:    Disc disease, degenerative, cervical  -     X-Ray Cervical  Spine AP And Lateral  -     Ambulatory referral/consult to Physical/Occupational Therapy; Future    Arthropathy of cervical facet joint  -     Ambulatory referral/consult to Physical/Occupational Therapy; Future    Spinal enthesopathy of cervical region    Cervical paraspinal muscle spasm  -     Ambulatory referral/consult to Physical/Occupational Therapy; Future       Follow up in about 6 weeks (around 12/7/2022) for cervical PT f/u.  This is to attest that the physician's assistant Freeman Kelley served in the capacity as a scribe for this patient's encounter.  This is also to verify that I have reviewed the patient's history and helped formulate the treatment plan and discussed it with the physician's assistant.  I have actively participated  in the evaluation and treatment plan for this patient visit.  The treatment plan and medical decision-making is as outlined below:  At this time recommend physical therapy with modalities to help with the muscle spasm.  In this fails to provide any relief then I would recommend an updated cervical MRI with referral to Pain Management.  She is already established with Dr. Jones for her lumbar spine.  Follow-up with us in 6-8 weeks or sooner if needed.    Treatment options were discussed with regards to the nature of the medical condition. Conservative pain intervention and surgical options were discussed in detail. The probability of success of each separate treatment option was discussed. The patient expressed a clear understanding of the treatment options. With regards to surgery, the procedure risk, benefits, complications, and outcomes were discussed. No guarantees were given with regards to surgical outcome.   The risk of complications, morbidity, and mortality of patient management decisions have been made at the time of this visit. These are associated with the patient's problems, diagnostic procedures and treatment options. This includes the possible management options  selected and those considered but not selected by the patient after shared medical decision making we discussed with the patient.     This note was created using Dragon voice recognition software that occasionally misinterpreted phrases or words.

## 2022-10-27 RX ORDER — BUSPIRONE HYDROCHLORIDE 10 MG/1
TABLET ORAL
Qty: 180 TABLET | Refills: 3 | Status: SHIPPED | OUTPATIENT
Start: 2022-10-27

## 2022-11-02 ENCOUNTER — OFFICE VISIT (OUTPATIENT)
Dept: PSYCHIATRY | Facility: CLINIC | Age: 82
End: 2022-11-02
Payer: MEDICARE

## 2022-11-02 VITALS
HEIGHT: 62 IN | SYSTOLIC BLOOD PRESSURE: 135 MMHG | HEART RATE: 70 BPM | BODY MASS INDEX: 25.37 KG/M2 | DIASTOLIC BLOOD PRESSURE: 71 MMHG | WEIGHT: 137.88 LBS

## 2022-11-02 DIAGNOSIS — F41.1 GAD (GENERALIZED ANXIETY DISORDER): ICD-10-CM

## 2022-11-02 DIAGNOSIS — F33.42 RECURRENT MAJOR DEPRESSIVE DISORDER, IN FULL REMISSION: Primary | ICD-10-CM

## 2022-11-02 DIAGNOSIS — F41.9 ANXIETY: ICD-10-CM

## 2022-11-02 PROCEDURE — 3078F DIAST BP <80 MM HG: CPT | Mod: CPTII,S$GLB,, | Performed by: PHYSICIAN ASSISTANT

## 2022-11-02 PROCEDURE — 90792 PR PSYCHIATRIC DIAGNOSTIC EVALUATION W/MEDICAL SERVICES: ICD-10-PCS | Mod: S$GLB,,, | Performed by: PHYSICIAN ASSISTANT

## 2022-11-02 PROCEDURE — 3288F PR FALLS RISK ASSESSMENT DOCUMENTED: ICD-10-PCS | Mod: CPTII,S$GLB,, | Performed by: PHYSICIAN ASSISTANT

## 2022-11-02 PROCEDURE — 1160F RVW MEDS BY RX/DR IN RCRD: CPT | Mod: CPTII,S$GLB,, | Performed by: PHYSICIAN ASSISTANT

## 2022-11-02 PROCEDURE — 1101F PR PT FALLS ASSESS DOC 0-1 FALLS W/OUT INJ PAST YR: ICD-10-PCS | Mod: CPTII,S$GLB,, | Performed by: PHYSICIAN ASSISTANT

## 2022-11-02 PROCEDURE — 1159F PR MEDICATION LIST DOCUMENTED IN MEDICAL RECORD: ICD-10-PCS | Mod: CPTII,S$GLB,, | Performed by: PHYSICIAN ASSISTANT

## 2022-11-02 PROCEDURE — 1126F AMNT PAIN NOTED NONE PRSNT: CPT | Mod: CPTII,S$GLB,, | Performed by: PHYSICIAN ASSISTANT

## 2022-11-02 PROCEDURE — 3288F FALL RISK ASSESSMENT DOCD: CPT | Mod: CPTII,S$GLB,, | Performed by: PHYSICIAN ASSISTANT

## 2022-11-02 PROCEDURE — 90792 PSYCH DIAG EVAL W/MED SRVCS: CPT | Mod: S$GLB,,, | Performed by: PHYSICIAN ASSISTANT

## 2022-11-02 PROCEDURE — 3078F PR MOST RECENT DIASTOLIC BLOOD PRESSURE < 80 MM HG: ICD-10-PCS | Mod: CPTII,S$GLB,, | Performed by: PHYSICIAN ASSISTANT

## 2022-11-02 PROCEDURE — 3075F PR MOST RECENT SYSTOLIC BLOOD PRESS GE 130-139MM HG: ICD-10-PCS | Mod: CPTII,S$GLB,, | Performed by: PHYSICIAN ASSISTANT

## 2022-11-02 PROCEDURE — 1160F PR REVIEW ALL MEDS BY PRESCRIBER/CLIN PHARMACIST DOCUMENTED: ICD-10-PCS | Mod: CPTII,S$GLB,, | Performed by: PHYSICIAN ASSISTANT

## 2022-11-02 PROCEDURE — 3075F SYST BP GE 130 - 139MM HG: CPT | Mod: CPTII,S$GLB,, | Performed by: PHYSICIAN ASSISTANT

## 2022-11-02 PROCEDURE — 1159F MED LIST DOCD IN RCRD: CPT | Mod: CPTII,S$GLB,, | Performed by: PHYSICIAN ASSISTANT

## 2022-11-02 PROCEDURE — 99999 PR PBB SHADOW E&M-EST. PATIENT-LVL V: ICD-10-PCS | Mod: PBBFAC,,, | Performed by: PHYSICIAN ASSISTANT

## 2022-11-02 PROCEDURE — 1126F PR PAIN SEVERITY QUANTIFIED, NO PAIN PRESENT: ICD-10-PCS | Mod: CPTII,S$GLB,, | Performed by: PHYSICIAN ASSISTANT

## 2022-11-02 PROCEDURE — 1101F PT FALLS ASSESS-DOCD LE1/YR: CPT | Mod: CPTII,S$GLB,, | Performed by: PHYSICIAN ASSISTANT

## 2022-11-02 PROCEDURE — 99999 PR PBB SHADOW E&M-EST. PATIENT-LVL V: CPT | Mod: PBBFAC,,, | Performed by: PHYSICIAN ASSISTANT
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
## 2022-11-02 NOTE — PATIENT INSTRUCTIONS
Continue medications as prescribed.    Follow up as needed for medication adjustments.     Please go to emergency department if feeling as though you are a harm to yourself or others or if you are in crisis. Please call the clinic to report any worsening of symptoms or problems associated with medication.

## 2022-11-03 ENCOUNTER — PATIENT OUTREACH (OUTPATIENT)
Dept: ADMINISTRATIVE | Facility: HOSPITAL | Age: 82
End: 2022-11-03
Payer: MEDICARE

## 2022-11-15 ENCOUNTER — IMMUNIZATION (OUTPATIENT)
Dept: PRIMARY CARE CLINIC | Facility: CLINIC | Age: 82
End: 2022-11-15
Payer: MEDICARE

## 2022-11-15 DIAGNOSIS — Z23 NEED FOR VACCINATION: Primary | ICD-10-CM

## 2022-11-15 PROCEDURE — 0124A COVID-19, MRNA, LNP-S, BIVALENT BOOSTER, PF, 30 MCG/0.3 ML DOSE: CPT | Mod: S$GLB,,, | Performed by: FAMILY MEDICINE

## 2022-11-15 PROCEDURE — 91312 COVID-19, MRNA, LNP-S, BIVALENT BOOSTER, PF, 30 MCG/0.3 ML DOSE: ICD-10-PCS | Mod: S$GLB,,, | Performed by: FAMILY MEDICINE

## 2022-11-15 PROCEDURE — 0124A COVID-19, MRNA, LNP-S, BIVALENT BOOSTER, PF, 30 MCG/0.3 ML DOSE: ICD-10-PCS | Mod: S$GLB,,, | Performed by: FAMILY MEDICINE

## 2022-11-15 PROCEDURE — 91312 COVID-19, MRNA, LNP-S, BIVALENT BOOSTER, PF, 30 MCG/0.3 ML DOSE: CPT | Mod: S$GLB,,, | Performed by: FAMILY MEDICINE

## 2022-12-12 ENCOUNTER — OFFICE VISIT (OUTPATIENT)
Dept: FAMILY MEDICINE | Facility: CLINIC | Age: 82
End: 2022-12-12
Payer: MEDICARE

## 2022-12-12 VITALS
HEART RATE: 89 BPM | SYSTOLIC BLOOD PRESSURE: 132 MMHG | OXYGEN SATURATION: 95 % | WEIGHT: 142.06 LBS | HEIGHT: 62 IN | BODY MASS INDEX: 26.14 KG/M2 | DIASTOLIC BLOOD PRESSURE: 78 MMHG | TEMPERATURE: 99 F

## 2022-12-12 DIAGNOSIS — K21.9 GASTROESOPHAGEAL REFLUX DISEASE WITHOUT ESOPHAGITIS: ICD-10-CM

## 2022-12-12 DIAGNOSIS — J45.30 MILD PERSISTENT ASTHMA WITHOUT COMPLICATION: ICD-10-CM

## 2022-12-12 DIAGNOSIS — I25.5 ISCHEMIC CARDIOMYOPATHY: ICD-10-CM

## 2022-12-12 DIAGNOSIS — Z12.31 ENCOUNTER FOR SCREENING MAMMOGRAM FOR BREAST CANCER: ICD-10-CM

## 2022-12-12 DIAGNOSIS — E78.2 MIXED HYPERLIPIDEMIA: ICD-10-CM

## 2022-12-12 DIAGNOSIS — E11.21 TYPE 2 DIABETES MELLITUS WITH DIABETIC NEPHROPATHY, WITHOUT LONG-TERM CURRENT USE OF INSULIN: Primary | ICD-10-CM

## 2022-12-12 DIAGNOSIS — N18.32 STAGE 3B CHRONIC KIDNEY DISEASE: ICD-10-CM

## 2022-12-12 DIAGNOSIS — I25.119 ATHEROSCLEROSIS OF NATIVE CORONARY ARTERY OF NATIVE HEART WITH ANGINA PECTORIS: ICD-10-CM

## 2022-12-12 DIAGNOSIS — I48.0 PAROXYSMAL ATRIAL FIBRILLATION: ICD-10-CM

## 2022-12-12 PROCEDURE — 1125F AMNT PAIN NOTED PAIN PRSNT: CPT | Mod: CPTII,S$GLB,, | Performed by: FAMILY MEDICINE

## 2022-12-12 PROCEDURE — 1101F PR PT FALLS ASSESS DOC 0-1 FALLS W/OUT INJ PAST YR: ICD-10-PCS | Mod: CPTII,S$GLB,, | Performed by: FAMILY MEDICINE

## 2022-12-12 PROCEDURE — 1101F PT FALLS ASSESS-DOCD LE1/YR: CPT | Mod: CPTII,S$GLB,, | Performed by: FAMILY MEDICINE

## 2022-12-12 PROCEDURE — 1159F MED LIST DOCD IN RCRD: CPT | Mod: CPTII,S$GLB,, | Performed by: FAMILY MEDICINE

## 2022-12-12 PROCEDURE — 99214 PR OFFICE/OUTPT VISIT, EST, LEVL IV, 30-39 MIN: ICD-10-PCS | Mod: S$GLB,,, | Performed by: FAMILY MEDICINE

## 2022-12-12 PROCEDURE — 3075F PR MOST RECENT SYSTOLIC BLOOD PRESS GE 130-139MM HG: ICD-10-PCS | Mod: CPTII,S$GLB,, | Performed by: FAMILY MEDICINE

## 2022-12-12 PROCEDURE — 1125F PR PAIN SEVERITY QUANTIFIED, PAIN PRESENT: ICD-10-PCS | Mod: CPTII,S$GLB,, | Performed by: FAMILY MEDICINE

## 2022-12-12 PROCEDURE — 3075F SYST BP GE 130 - 139MM HG: CPT | Mod: CPTII,S$GLB,, | Performed by: FAMILY MEDICINE

## 2022-12-12 PROCEDURE — 3288F FALL RISK ASSESSMENT DOCD: CPT | Mod: CPTII,S$GLB,, | Performed by: FAMILY MEDICINE

## 2022-12-12 PROCEDURE — 99214 OFFICE O/P EST MOD 30 MIN: CPT | Mod: S$GLB,,, | Performed by: FAMILY MEDICINE

## 2022-12-12 PROCEDURE — 3078F DIAST BP <80 MM HG: CPT | Mod: CPTII,S$GLB,, | Performed by: FAMILY MEDICINE

## 2022-12-12 PROCEDURE — 1159F PR MEDICATION LIST DOCUMENTED IN MEDICAL RECORD: ICD-10-PCS | Mod: CPTII,S$GLB,, | Performed by: FAMILY MEDICINE

## 2022-12-12 PROCEDURE — 3078F PR MOST RECENT DIASTOLIC BLOOD PRESSURE < 80 MM HG: ICD-10-PCS | Mod: CPTII,S$GLB,, | Performed by: FAMILY MEDICINE

## 2022-12-12 PROCEDURE — 99999 PR PBB SHADOW E&M-EST. PATIENT-LVL III: CPT | Mod: PBBFAC,,, | Performed by: FAMILY MEDICINE

## 2022-12-12 PROCEDURE — 99999 PR PBB SHADOW E&M-EST. PATIENT-LVL III: ICD-10-PCS | Mod: PBBFAC,,, | Performed by: FAMILY MEDICINE

## 2022-12-12 PROCEDURE — 3288F PR FALLS RISK ASSESSMENT DOCUMENTED: ICD-10-PCS | Mod: CPTII,S$GLB,, | Performed by: FAMILY MEDICINE

## 2022-12-12 RX ORDER — AMITRIPTYLINE HYDROCHLORIDE 75 MG/1
50 TABLET ORAL NIGHTLY
COMMUNITY
Start: 2022-11-18 | End: 2023-09-14 | Stop reason: DRUGHIGH

## 2022-12-12 RX ORDER — CALC/MAG/B COMPLEX/D3/HERB 61
15 TABLET ORAL DAILY
COMMUNITY
End: 2022-12-12 | Stop reason: DRUGHIGH

## 2022-12-12 RX ORDER — LANSOPRAZOLE 30 MG/1
30 CAPSULE, DELAYED RELEASE ORAL DAILY
Qty: 90 CAPSULE | Refills: 3 | Status: SHIPPED | OUTPATIENT
Start: 2022-12-12 | End: 2023-03-19 | Stop reason: SDUPTHER

## 2022-12-13 ENCOUNTER — PATIENT OUTREACH (OUTPATIENT)
Dept: ADMINISTRATIVE | Facility: OTHER | Age: 82
End: 2022-12-13
Payer: MEDICARE

## 2022-12-13 NOTE — PROGRESS NOTES
[facility] - Initial Contact    Completed  the Social Determinant of Health questionnaire with patient via telephone today.    Pt identified barriers of most importance are: no real needs  Referrals to community agencies completed with patient/caregiver consent outside of [facility] include: [facility]  Referrals were put through [facility] - No  Other information discussed the patient needs / wants help with: nothing more at this time, but agreed to a follow up after the holidays.   Follow-up Outreach - Due: 1/5/2023

## 2022-12-16 ENCOUNTER — OFFICE VISIT (OUTPATIENT)
Dept: ORTHOPEDICS | Facility: CLINIC | Age: 82
End: 2022-12-16
Payer: MEDICARE

## 2022-12-16 VITALS
BODY MASS INDEX: 26.13 KG/M2 | SYSTOLIC BLOOD PRESSURE: 122 MMHG | HEIGHT: 62 IN | DIASTOLIC BLOOD PRESSURE: 78 MMHG | WEIGHT: 142 LBS

## 2022-12-16 DIAGNOSIS — M65.342 TRIGGER FINGER, LEFT RING FINGER: Primary | ICD-10-CM

## 2022-12-16 PROCEDURE — 1101F PR PT FALLS ASSESS DOC 0-1 FALLS W/OUT INJ PAST YR: ICD-10-PCS | Mod: CPTII,S$GLB,, | Performed by: PHYSICIAN ASSISTANT

## 2022-12-16 PROCEDURE — 1159F MED LIST DOCD IN RCRD: CPT | Mod: CPTII,S$GLB,, | Performed by: PHYSICIAN ASSISTANT

## 2022-12-16 PROCEDURE — 1101F PT FALLS ASSESS-DOCD LE1/YR: CPT | Mod: CPTII,S$GLB,, | Performed by: PHYSICIAN ASSISTANT

## 2022-12-16 PROCEDURE — 3288F FALL RISK ASSESSMENT DOCD: CPT | Mod: CPTII,S$GLB,, | Performed by: PHYSICIAN ASSISTANT

## 2022-12-16 PROCEDURE — 3074F PR MOST RECENT SYSTOLIC BLOOD PRESSURE < 130 MM HG: ICD-10-PCS | Mod: CPTII,S$GLB,, | Performed by: PHYSICIAN ASSISTANT

## 2022-12-16 PROCEDURE — 1125F PR PAIN SEVERITY QUANTIFIED, PAIN PRESENT: ICD-10-PCS | Mod: CPTII,S$GLB,, | Performed by: PHYSICIAN ASSISTANT

## 2022-12-16 PROCEDURE — 1160F RVW MEDS BY RX/DR IN RCRD: CPT | Mod: CPTII,S$GLB,, | Performed by: PHYSICIAN ASSISTANT

## 2022-12-16 PROCEDURE — 1159F PR MEDICATION LIST DOCUMENTED IN MEDICAL RECORD: ICD-10-PCS | Mod: CPTII,S$GLB,, | Performed by: PHYSICIAN ASSISTANT

## 2022-12-16 PROCEDURE — 3078F DIAST BP <80 MM HG: CPT | Mod: CPTII,S$GLB,, | Performed by: PHYSICIAN ASSISTANT

## 2022-12-16 PROCEDURE — 1125F AMNT PAIN NOTED PAIN PRSNT: CPT | Mod: CPTII,S$GLB,, | Performed by: PHYSICIAN ASSISTANT

## 2022-12-16 PROCEDURE — 99213 OFFICE O/P EST LOW 20 MIN: CPT | Mod: 25,S$GLB,, | Performed by: PHYSICIAN ASSISTANT

## 2022-12-16 PROCEDURE — 3078F PR MOST RECENT DIASTOLIC BLOOD PRESSURE < 80 MM HG: ICD-10-PCS | Mod: CPTII,S$GLB,, | Performed by: PHYSICIAN ASSISTANT

## 2022-12-16 PROCEDURE — 20550 NJX 1 TENDON SHEATH/LIGAMENT: CPT | Mod: LT,S$GLB,, | Performed by: PHYSICIAN ASSISTANT

## 2022-12-16 PROCEDURE — 3074F SYST BP LT 130 MM HG: CPT | Mod: CPTII,S$GLB,, | Performed by: PHYSICIAN ASSISTANT

## 2022-12-16 PROCEDURE — 3288F PR FALLS RISK ASSESSMENT DOCUMENTED: ICD-10-PCS | Mod: CPTII,S$GLB,, | Performed by: PHYSICIAN ASSISTANT

## 2022-12-16 PROCEDURE — 99213 PR OFFICE/OUTPT VISIT, EST, LEVL III, 20-29 MIN: ICD-10-PCS | Mod: 25,S$GLB,, | Performed by: PHYSICIAN ASSISTANT

## 2022-12-16 PROCEDURE — 20550 TENDON SHEATH: ICD-10-PCS | Mod: LT,S$GLB,, | Performed by: PHYSICIAN ASSISTANT

## 2022-12-16 PROCEDURE — 1160F PR REVIEW ALL MEDS BY PRESCRIBER/CLIN PHARMACIST DOCUMENTED: ICD-10-PCS | Mod: CPTII,S$GLB,, | Performed by: PHYSICIAN ASSISTANT

## 2022-12-16 RX ORDER — TRIAMCINOLONE ACETONIDE 40 MG/ML
40 INJECTION, SUSPENSION INTRA-ARTICULAR; INTRAMUSCULAR
Status: DISCONTINUED | OUTPATIENT
Start: 2022-12-16 | End: 2022-12-16 | Stop reason: HOSPADM

## 2022-12-16 RX ADMIN — TRIAMCINOLONE ACETONIDE 40 MG: 40 INJECTION, SUSPENSION INTRA-ARTICULAR; INTRAMUSCULAR at 08:12

## 2022-12-16 NOTE — PROGRESS NOTES
[facility] ORTHOPEDICS  [address]  [place] [address]  Phone: [phone]   [phone]    Patient's PCP: Oumar Almonte Jr, MD  Referring Provider: No ref. provider found    Subjective:      Chief Complaint:   Chief Complaint   Patient presents with    Left Hand - Pain     Left ring finger triggering. Requesting inj today       Past Medical History:   Diagnosis Date    Allergy     Dust mites, Grasses, Trees    Arthritis     Asthma     Blood transfusion     CAD (coronary artery disease)     Cataract     CHRONIC BRONCHITIS     Diabetes mellitus     Diabetes mellitus type II     GERD (gastroesophageal reflux disease)     Hyperlipidemia     Hypertension     Irregular heart beat     Kidney disease     ckd stage 3  as stated by patient - to see MD    Post-menopausal bleeding 2018    Spinal stenosis     Thyroid disease     Hypothyroidism       Past Surgical History:   Procedure Laterality Date    APPENDECTOMY  1968    BLADDER SUSPENSION  1989    CARDIAC SURGERY  2016    CABG    CATARACT EXTRACTION  9/2007 (L) and 10/2207 (R)    COLONOSCOPY N/A 10/18/2017    Procedure: COLONOSCOPY;  Surgeon: Esme Acuna MD;  Location: [facility];  Service: Endoscopy;  Laterality: N/A;    CORONARY ANGIOGRAPHY INCLUDING BYPASS GRAFTS WITH CATHETERIZATION OF LEFT HEART Left 5/13/2022    Procedure: Left heart cath;  Surgeon: Davon Patton MD;  Location: [facility] CATH/EP LAB;  Service: Cardiology;  Laterality: Left;    CORONARY ARTERY BYPASS GRAFT  4/26/2004    x5    ESOPHAGEAL DILATION      ESOPHAGOGASTRODUODENOSCOPY N/A 6/27/2022    Procedure: EGD (ESOPHAGOGASTRODUODENOSCOPY);  Surgeon: Skip Nj MD;  Location: [facility] ENDO;  Service: Endoscopy;  Laterality: N/A;    HYSTEROSCOPY WITH DILATION AND CURETTAGE OF UTERUS N/A 3/12/2020    Procedure: HYSTEROSCOPY, WITH DILATION AND CURETTAGE OF UTERUS;  Surgeon: Ramona Llanos MD;  Location: [facility] OR;  Service: OB/GYN;  Laterality: N/A;    SPINE SURGERY  3/2000     Tumor    TRANSFORAMINAL EPIDURAL INJECTION OF STEROID Right 11/21/2019    Procedure: Injection,steroid,epidural,transforaminal approach;  Surgeon: Bj Jones MD;  Location: [facility] OR;  Service: Pain Management;  Laterality: Right;  L4-5, L5-S1    TRANSFORAMINAL EPIDURAL INJECTION OF STEROID Right 12/31/2019    Procedure: Injection,steroid,epidural,transforaminal approach;  Surgeon: Bj Jones MD;  Location: [facility] OR;  Service: Pain Management;  Laterality: Right;  L4-5, L5-S1    TRANSFORAMINAL EPIDURAL INJECTION OF STEROID Right 2/5/2020    Procedure: Injection,steroid,epidural,transforaminal approach;  Surgeon: jB Jones MD;  Location: [facility] OR;  Service: Pain Management;  Laterality: Right;  L4-5, L5-S1    TRANSFORAMINAL EPIDURAL INJECTION OF STEROID Left 1/27/2021    Procedure: Injection,steroid,epidural,transforaminal approach;  Surgeon: Bj Jones MD;  Location: [facility] OR;  Service: Pain Management;  Laterality: Left;  L4-5, L5-S1    TRANSFORAMINAL EPIDURAL INJECTION OF STEROID Right 3/4/2021    Procedure: Injection,steroid,epidural,transforaminal approach;  Surgeon: Bj Jones MD;  Location: [facility] OR;  Service: Pain Management;  Laterality: Right;  L4-L5, L5-S1    WRIST SURGERY  1993    carpal tunnel         Current Outpatient Medications   Medication Sig    acetaminophen (TYLENOL) 650 MG TbSR Take 1,300 mg by mouth once daily. 2 Tablet(s) Oral  Every day.    amitriptyline (ELAVIL) 75 MG tablet Take 75 mg by mouth every evening.    apixaban (ELIQUIS) 5 mg Tab Take 1 tablet (5 mg total) by mouth 2 (two) times daily.    blood sugar diagnostic (FREESTYLE LITE STRIPS) Strp USE TO TEST BLOOD SUGAR TWICE A DAY    busPIRone (BUSPAR) 10 MG tablet TAKE 1 TABLET BY MOUTH TWICE A DAY    carboxymethylcellulose 1 % ophthalmic solution Apply 1 drop to eye As instructed. as directed    cetirizine (ZYRTEC) 10 MG tablet Take 10 mg by mouth once daily.    cholecalciferol, vitamin D3, (VITAMIN D3) 50 mcg (2,000 unit) Cap Take 1  capsule by mouth once daily.    clotrimazole-betamethasone 1-0.05% (LOTRISONE) cream Apply topically 2 (two) times daily as needed.    coenzyme Q10 100 mg capsule Take 100 mg by mouth once daily.     cranberry extract 650 mg Cap Take 1 tablet by mouth 2 (two) times daily.    FARXIGA 5 mg Tab tablet Take 5 mg by mouth once daily.    fluticasone furoate-vilanteroL (BREO ELLIPTA) 100-25 mcg/dose diskus inhaler Inhale 1 puff into the lungs once daily. Controller Rinse after you use it    fluticasone propionate (FLONASE) 50 mcg/actuation nasal spray 1 spray (50 mcg total) by Each Nostril route once daily.    Lactobac no.41/Bifidobact no.7 (PROBIOTIC-10 ORAL) Take by mouth.    lancets Misc 1 Units by Misc.(Non-Drug; Combo Route) route 2 (two) times daily.    lansoprazole (PREVACID) 30 MG capsule Take 1 capsule (30 mg total) by mouth once daily.    levalbuterol (XOPENEX) 1.25 mg/0.5 mL nebulizer solution Take 0.5 mLs (1.25 mg total) by nebulization every 6 (six) hours as needed for Wheezing. Rescue    levothyroxine (SYNTHROID) 25 MCG tablet TAKE 1 TABLET BY MOUTH BEFORE BREAKFAST EVERY DAY    metoprolol succinate (TOPROL XL) 25 MG 24 hr tablet Take 1 tablet (25 mg total) by mouth once daily.    nitroGLYCERIN (NITROSTAT) 0.4 MG SL tablet Place 0.4 mg under the tongue every 5 (five) minutes as needed. 0.4mg Sublingual PRN .      pramoxine-hydrocortisone (PROCTOCREAM-HC) 1-1 % rectal cream Place rectally 2 (two) times daily. (Patient taking differently: Place 1 application rectally 2 (two) times daily.)    RESTASIS 0.05 % ophthalmic emulsion Place 1 drop into both eyes 2 (two) times daily.    rosuvastatin (CRESTOR) 10 MG tablet Take 1 tablet (10 mg total) by mouth once daily.    triazolam (HALCION) 0.25 MG Tab Take 1 tablet (0.25 mg total) by mouth nightly as needed (sleep).    UNABLE TO FIND Take 1 capsule by mouth once daily. Vital red    vitamins  A,C,E-zinc-copper 14,320-226-200 unit-mg-unit Cap Take 1 capsule by mouth 2  (two) times daily.      No current facility-administered medications for this visit.       Review of patient's allergies indicates:   Allergen Reactions    Dexlansoprazole Itching, Nausea Only and Rash    Sulfa (sulfonamide antibiotics) Rash    Floxacillin Itching    Januvia [sitagliptin] Other (See Comments)     Hot flashes    Tetracyclines Itching    Fenofibrate micronized Rash    Nitrofurantoin macrocrystalline Other (See Comments)     unknown    Phenylfenesin la [phenylpropanolamine-gg] Rash       Family History   Problem Relation Age of Onset    Breast cancer Mother     Breast cancer Maternal Aunt     Allergic rhinitis Neg Hx     Allergies Neg Hx     Angioedema Neg Hx     Asthma Neg Hx     Eczema Neg Hx     Immunodeficiency Neg Hx     Urticaria Neg Hx     Rhinitis Neg Hx     Atopy Neg Hx        Social History     Socioeconomic History    Marital status:    Tobacco Use    Smoking status: Passive Smoke Exposure - Never Smoker    Smokeless tobacco: Never    Tobacco comments:     PARENTS    Substance and Sexual Activity    Alcohol use: Yes     Comment: Rare    Drug use: No    Sexual activity: Not Currently     Social Determinants of Health     Financial Resource Strain: Low Risk     Difficulty of Paying Living Expenses: Not hard at all   Food Insecurity: No Food Insecurity    Worried About Running Out of Food in the Last Year: Never true    Ran Out of Food in the Last Year: Never true   Transportation Needs: No Transportation Needs    Lack of Transportation (Medical): No    Lack of Transportation (Non-Medical): No   Physical Activity: Inactive    Days of Exercise per Week: 0 days    Minutes of Exercise per Session: 0 min   Stress: No Stress Concern Present    Feeling of Stress : Only a little   Social Connections: Socially Integrated    Frequency of Communication with Friends and Family: Three times a week    Frequency of Social Gatherings with Friends and Family: Three times a week    Attends Worship  Services: More than 4 times per year    Active Member of Clubs or Organizations: Yes    Attends Club or Organization Meetings: Never    Marital Status:    Housing Stability: Low Risk     Unable to Pay for Housing in the Last Year: No    Number of Places Lived in the Last Year: 1    Unstable Housing in the Last Year: No       History of present illness:  Patient comes in today with chief complaint of left ring finger triggering.  This has been going on for a few weeks.  She does have another digits in her bilateral hands in the past.  She has had an A1 pulley release on the middle finger of the left hand and on the ring and index fingers of the right hand. These have done well for her postoperatively.    Review of Systems:    Constitutional: Negative for chills, fever and weight loss.   HENT: Negative for congestion.    Eyes: Negative for discharge and redness.   Respiratory: Negative for cough and shortness of breath.    Cardiovascular: Negative for chest pain.   Gastrointestinal: Negative for nausea and vomiting.   Musculoskeletal: See HPI.   Skin: Negative for rash.   Neurological: Negative for headaches.   Endo/Heme/Allergies: Does not bruise/bleed easily.   Psychiatric/Behavioral: The patient is not nervous/anxious.    All other systems reviewed and are negative.       Objective:      Physical Examination:    Vital Signs:    Vitals:    12/16/22 0843   BP: 122/78       Body mass index is 25.97 kg/m².    This a well-developed, well nourished patient in no acute distress.  They are alert and oriented and cooperative to examination.     Left hand exam: Skin to her left hand is clean dry and intact.  There is no erythema or ecchymosis.  There are no signs or symptoms of it.  Patient is neurovascularly intact throughout the left upper extremity.  She can open and close her left hand into a fist.  She can oppose her left thumb to all digits in her left hand.  Left ring finger is actively triggering on physical  exam today.  She does have a tender palpable nodule on the palmar aspect of her left hand just proximal IP joint along the flexor tendon.  On occasion, she has to manually reduce when she gets caught in flexion.  This is quite painful for her.    Pertinent New Results:        XRAY Report / Interpretation:   Three views were taken of the left hand today:  AP, lateral, oblique views.  They reveal no acute fractures or dislocations.  Visualized soft tissues are unremarkable.      Assessment:       1. Trigger finger, left ring finger      Plan:     Trigger finger, left ring finger  -     X-Ray Hand 3 view Left  -     Tendon Sheath        Follow up if symptoms worsen or fail to improve.    I injected the patient's left hand ring finger near the A1 pulley with 40 mg of Kenalog and lidocaine.  She tolerated this well advised her she had a recurrence of symptoms, she should simply follow-up with us for an A1 pulley release if this conservative treatment measure is not curative.  Patient and her spouse were in agreement with this treatment plan and verbalized understanding.          Roby Mahajan, HARDEEP, PA-C    This note was created using [employer] voice recognition software that occasionally misinterprets words or phrases.

## 2022-12-16 NOTE — PROCEDURES
Tendon Sheath    Date/Time: 12/16/2022 8:45 AM  Performed by: Roby Mahajan PA-C  Authorized by: Roby Mahajan PA-C     Consent Done?:  Yes (Verbal)  Indications:  Pain  Site marked: the procedure site was marked    Timeout: prior to procedure the correct patient, procedure, and site was verified    Prep: patient was prepped and draped in usual sterile fashion      Local anesthesia used?: Yes    Anesthesia:  Local infiltration  Local anesthetic:  Lidocaine 1% without epinephrine  Location:  Ring finger  Site:  L ring flexor tendon sheath  Ultrasonic guidance for needle placement?: No    Needle size:  25 G  Medications:  40 mg triamcinolone acetonide 40 mg/mL  Patient tolerance:  Patient tolerated the procedure well with no immediate complications

## 2022-12-16 NOTE — PROGRESS NOTES
Subjective:       Patient ID: Darlin Tipton is a 82 y.o. female.    Chief Complaint: Diabetes, Hypertension, Coronary Artery Disease, Asthma, Gastroesophageal Reflux, Chronic Kidney Disease, and Paroxysmal atrial fibrillation    Patient presents here for 6 month follow-up of diabetes, hypertension, CAD, asthma, GERD, chronic kidney disease, and paroxysmal atrial fibrillation.  Her diabetes is well controlled at this time.  She states her fasting blood sugar today was 94 and her most recent hemoglobin A1c on 09/02/2022 was 6.1%.  She denies any hypoglycemia.  She does have diabetic nephropathy but no diabetic neuropathy or retinopathy.  Her hypertension is well controlled at this time with her blood pressure today 132/78.  Her hyperlipidemia is also well controlled with her present use of Crestor 10 mg daily.  She does have a history of coronary artery disease with bypass surgery in the past and is followed by Dr. Diaz.  She was also recently diagnosed by him with cardiomyopathy with a 35-40% ejection fraction.  She also does have an issue with aortic valvular stenosis which is also being followed by Dr. Diaz.  Her asthma is stable and she is compliant with her inhalers and nebulizers.  She is not had a significant exacerbation this year.  Her GERD is stable with her present use of Prevacid.  She does have chronic kidney disease and her most recent GFR was 42.  She also does have paroxysmal atrial fibrillation and her heart rate is controlled with her present use of Toprol-XL 25 mg daily and she is on Eliquis for anticoagulation.  She denies any significant bleeding or bruising.  As far as health maintenance, she is up-to-date with all of her recommended screening exams and immunizations with the exception of shingles vaccine and mammogram.      Review of Systems   Constitutional:  Positive for fatigue. Negative for chills, fever and unexpected weight change.   HENT:  Negative for nasal congestion, ear pain,  postnasal drip and sore throat.    Eyes:  Negative for pain and visual disturbance.        Up-to-date with eye exam; no diabetic retinopathy   Respiratory:  Positive for shortness of breath (with minimal exertion) and wheezing (with asthma exacerbations). Negative for cough.    Cardiovascular:  Positive for palpitations and leg swelling (Occasional). Negative for chest pain.   Gastrointestinal:  Negative for abdominal pain, diarrhea, nausea and vomiting.   Endocrine: Negative for polydipsia and polyuria.   Genitourinary:  Negative for difficulty urinating, dysuria and flank pain.   Musculoskeletal:  Positive for arthralgias. Negative for back pain.   Neurological:  Negative for dizziness, light-headedness and headaches.   Hematological:  Negative for adenopathy. Bruises/bleeds easily.   Psychiatric/Behavioral:  Positive for sleep disturbance. The patient is nervous/anxious.        Objective:      Physical Exam  Vitals reviewed.   Constitutional:       General: She is not in acute distress.     Appearance: Normal appearance. She is well-developed.   HENT:      Right Ear: Tympanic membrane and external ear normal.      Left Ear: Tympanic membrane and external ear normal.      Mouth/Throat:      Pharynx: Oropharynx is clear. No posterior oropharyngeal erythema.   Neck:      Thyroid: No thyromegaly.   Cardiovascular:      Rate and Rhythm: Normal rate. Rhythm irregular.      Pulses: Normal pulses.      Heart sounds: Murmur heard.   Systolic murmur is present with a grade of 3/6.     No gallop. No S3 sounds.   Pulmonary:      Effort: Pulmonary effort is normal.      Breath sounds: Normal breath sounds. No wheezing or rales.   Musculoskeletal:         General: No tenderness. Normal range of motion.      Cervical back: Normal range of motion and neck supple.      Right lower leg: No edema.      Left lower leg: No edema.   Lymphadenopathy:      Cervical: No cervical adenopathy.   Neurological:      General: No focal deficit  present.      Mental Status: She is alert and oriented to person, place, and time.      Cranial Nerves: No cranial nerve deficit.      Deep Tendon Reflexes: Reflexes are normal and symmetric.   Psychiatric:         Mood and Affect: Mood normal.         Behavior: Behavior normal.      Comments: Anxiety is controlled with her present use of BuSpar       Assessment:       Problem List Items Addressed This Visit       GERD (gastroesophageal reflux disease)    Atherosclerosis of native coronary artery of native heart with angina pectoris    Ischemic cardiomyopathy    Type 2 diabetes mellitus with diabetic nephropathy, without long-term current use of insulin - Primary    Mild persistent asthma without complication    Mixed hyperlipidemia    Stage 3b chronic kidney disease    Paroxysmal atrial fibrillation     Other Visit Diagnoses       Encounter for screening mammogram for breast cancer        Relevant Orders    Mammo Digital Screening Bilat              Plan:       1. Continue present medication as her diabetes, hypertension, CAD, asthma, GERD, chronic kidney disease, paroxysmal atrial fibrillation, cardiomyopathy, and anxiety are stable and controlled   2. Continue diabetic, low sodium, low-fat low-cholesterol diet and exercise.  BMI today is 25.99   3. Continue follow-up with Cardiology as scheduled   4. Schedule screening mammogram  5.  Follow up with me in 6 months or p.r.n.

## 2023-01-04 ENCOUNTER — PATIENT OUTREACH (OUTPATIENT)
Dept: ADMINISTRATIVE | Facility: OTHER | Age: 83
End: 2023-01-04

## 2023-01-06 ENCOUNTER — HOSPITAL ENCOUNTER (OUTPATIENT)
Dept: RADIOLOGY | Facility: HOSPITAL | Age: 83
Discharge: HOME | End: 2023-01-06
Attending: FAMILY MEDICINE
Payer: MEDICARE

## 2023-01-06 DIAGNOSIS — Z12.31 ENCOUNTER FOR SCREENING MAMMOGRAM FOR BREAST CANCER: ICD-10-CM

## 2023-01-06 PROCEDURE — 77067 SCR MAMMO BI INCL CAD: CPT | Mod: TC

## 2023-01-06 PROCEDURE — 77067 SCR MAMMO BI INCL CAD: CPT | Mod: 26,,, | Performed by: RADIOLOGY

## 2023-01-06 PROCEDURE — 77063 MAMMO DIGITAL SCREENING BILAT WITH TOMO: ICD-10-PCS | Mod: 26,,, | Performed by: RADIOLOGY

## 2023-01-06 PROCEDURE — 77063 BREAST TOMOSYNTHESIS BI: CPT | Mod: 26,,, | Performed by: RADIOLOGY

## 2023-01-06 PROCEDURE — 77067 MAMMO DIGITAL SCREENING BILAT WITH TOMO: ICD-10-PCS | Mod: 26,,, | Performed by: RADIOLOGY

## 2023-01-09 ENCOUNTER — TELEPHONE (OUTPATIENT)
Dept: FAMILY MEDICINE | Facility: CLINIC | Age: 83
End: 2023-01-09
Payer: MEDICARE

## 2023-01-09 NOTE — TELEPHONE ENCOUNTER
----- Message from Radha Satnana sent at 1/9/2023  8:20 AM CST -----  Type:  Same Day Appointment Request    Caller is requesting a same day appointment.  Caller declined first available appointment listed below.      Name of Caller:  pt   When is the first available appointment?  3/2  Symptoms:  coughing really bad   Best Call Back Number:  [phone]  or [phone]    Additional Information:   pt sts she wants to be seen today she has a bad heart and cant stop coughing.. pt sts she has no energy  please advise thank you

## 2023-01-09 NOTE — TELEPHONE ENCOUNTER
Appointment scheduled for the date of 1-10-23. Patient agreed to appointment date, time, and location.  Location confirmed at the time of call.

## 2023-01-10 ENCOUNTER — HOSPITAL ENCOUNTER (OUTPATIENT)
Dept: RADIOLOGY | Facility: HOSPITAL | Age: 83
Discharge: HOME | End: 2023-01-10
Attending: FAMILY MEDICINE
Payer: MEDICARE

## 2023-01-10 ENCOUNTER — OFFICE VISIT (OUTPATIENT)
Dept: FAMILY MEDICINE | Facility: CLINIC | Age: 83
End: 2023-01-10
Payer: MEDICARE

## 2023-01-10 VITALS
WEIGHT: 142 LBS | TEMPERATURE: 98 F | SYSTOLIC BLOOD PRESSURE: 120 MMHG | HEART RATE: 94 BPM | HEIGHT: 62 IN | RESPIRATION RATE: 16 BRPM | BODY MASS INDEX: 26.13 KG/M2 | OXYGEN SATURATION: 99 % | DIASTOLIC BLOOD PRESSURE: 60 MMHG

## 2023-01-10 DIAGNOSIS — J20.9 ACUTE BRONCHITIS, UNSPECIFIED ORGANISM: Primary | ICD-10-CM

## 2023-01-10 DIAGNOSIS — J20.9 ACUTE BRONCHITIS, UNSPECIFIED ORGANISM: ICD-10-CM

## 2023-01-10 DIAGNOSIS — I50.33 ACUTE ON CHRONIC DIASTOLIC HEART FAILURE: ICD-10-CM

## 2023-01-10 DIAGNOSIS — J18.9 COMMUNITY ACQUIRED PNEUMONIA, UNSPECIFIED LATERALITY: ICD-10-CM

## 2023-01-10 PROCEDURE — 1126F PR PAIN SEVERITY QUANTIFIED, NO PAIN PRESENT: ICD-10-PCS | Mod: CPTII,S$GLB,, | Performed by: FAMILY MEDICINE

## 2023-01-10 PROCEDURE — 1101F PT FALLS ASSESS-DOCD LE1/YR: CPT | Mod: CPTII,S$GLB,, | Performed by: FAMILY MEDICINE

## 2023-01-10 PROCEDURE — 3288F PR FALLS RISK ASSESSMENT DOCUMENTED: ICD-10-PCS | Mod: CPTII,S$GLB,, | Performed by: FAMILY MEDICINE

## 2023-01-10 PROCEDURE — 1160F PR REVIEW ALL MEDS BY PRESCRIBER/CLIN PHARMACIST DOCUMENTED: ICD-10-PCS | Mod: CPTII,S$GLB,, | Performed by: FAMILY MEDICINE

## 2023-01-10 PROCEDURE — 3288F FALL RISK ASSESSMENT DOCD: CPT | Mod: CPTII,S$GLB,, | Performed by: FAMILY MEDICINE

## 2023-01-10 PROCEDURE — 99999 PR PBB SHADOW E&M-EST. PATIENT-LVL IV: CPT | Mod: PBBFAC,,, | Performed by: FAMILY MEDICINE

## 2023-01-10 PROCEDURE — 3074F PR MOST RECENT SYSTOLIC BLOOD PRESSURE < 130 MM HG: ICD-10-PCS | Mod: CPTII,S$GLB,, | Performed by: FAMILY MEDICINE

## 2023-01-10 PROCEDURE — 71046 X-RAY EXAM CHEST 2 VIEWS: CPT | Mod: TC,FY

## 2023-01-10 PROCEDURE — 71046 XR CHEST PA AND LATERAL: ICD-10-PCS | Mod: 26,,, | Performed by: RADIOLOGY

## 2023-01-10 PROCEDURE — 3078F PR MOST RECENT DIASTOLIC BLOOD PRESSURE < 80 MM HG: ICD-10-PCS | Mod: CPTII,S$GLB,, | Performed by: FAMILY MEDICINE

## 2023-01-10 PROCEDURE — 1101F PR PT FALLS ASSESS DOC 0-1 FALLS W/OUT INJ PAST YR: ICD-10-PCS | Mod: CPTII,S$GLB,, | Performed by: FAMILY MEDICINE

## 2023-01-10 PROCEDURE — 1159F PR MEDICATION LIST DOCUMENTED IN MEDICAL RECORD: ICD-10-PCS | Mod: CPTII,S$GLB,, | Performed by: FAMILY MEDICINE

## 2023-01-10 PROCEDURE — 99999 PR PBB SHADOW E&M-EST. PATIENT-LVL IV: ICD-10-PCS | Mod: PBBFAC,,, | Performed by: FAMILY MEDICINE

## 2023-01-10 PROCEDURE — 1126F AMNT PAIN NOTED NONE PRSNT: CPT | Mod: CPTII,S$GLB,, | Performed by: FAMILY MEDICINE

## 2023-01-10 PROCEDURE — 71046 X-RAY EXAM CHEST 2 VIEWS: CPT | Mod: 26,,, | Performed by: RADIOLOGY

## 2023-01-10 PROCEDURE — 3078F DIAST BP <80 MM HG: CPT | Mod: CPTII,S$GLB,, | Performed by: FAMILY MEDICINE

## 2023-01-10 PROCEDURE — 1160F RVW MEDS BY RX/DR IN RCRD: CPT | Mod: CPTII,S$GLB,, | Performed by: FAMILY MEDICINE

## 2023-01-10 PROCEDURE — 3074F SYST BP LT 130 MM HG: CPT | Mod: CPTII,S$GLB,, | Performed by: FAMILY MEDICINE

## 2023-01-10 PROCEDURE — 99214 OFFICE O/P EST MOD 30 MIN: CPT | Mod: S$GLB,,, | Performed by: FAMILY MEDICINE

## 2023-01-10 PROCEDURE — 99214 PR OFFICE/OUTPT VISIT, EST, LEVL IV, 30-39 MIN: ICD-10-PCS | Mod: S$GLB,,, | Performed by: FAMILY MEDICINE

## 2023-01-10 PROCEDURE — 1159F MED LIST DOCD IN RCRD: CPT | Mod: CPTII,S$GLB,, | Performed by: FAMILY MEDICINE

## 2023-01-10 NOTE — PROGRESS NOTES
Subjective:       Patient ID: Darlin Tipton is a 82 y.o. female.    Chief Complaint: Cough (With decreased energy x 2 weeks - at home covid tests negative - breathing treatments, otc cough meds give some relief )    New to me patient here for UC visit.  CC - cough and SOB.  2 weeks onset of illness type cough - no fever, mild ST w cough, occ HA.  Sputum was yellow and now clear or white.  No pl pain.  SOB is ALVAREZ and w coughing.  Some relief with Xopenex Nebs.    Hx CAD, CHF and Aortic Valve problem.  Denies leg swelling or weight gain.    Cough  Pertinent negatives include no chest pain, fever, rash or shortness of breath.   Review of Systems   Constitutional:  Negative for fever.   Respiratory:  Positive for cough. Negative for shortness of breath.    Cardiovascular:  Negative for chest pain.   Gastrointestinal:  Negative for abdominal pain and nausea.   Skin:  Negative for rash.   All other systems reviewed and are negative.    Objective:      Physical Exam  Vitals reviewed.   Constitutional:       Appearance: She is well-developed. She is not ill-appearing.      Comments: Weight - 130's thru October and approx 141-142 in December and today   HENT:      Right Ear: Tympanic membrane normal.      Left Ear: Tympanic membrane normal.      Mouth/Throat:      Pharynx: No posterior oropharyngeal erythema.   Eyes:      General: No scleral icterus.     Pupils: Pupils are equal, round, and reactive to light.   Cardiovascular:      Rate and Rhythm: Normal rate and regular rhythm.      Heart sounds: Murmur heard.     No gallop.   Pulmonary:      Effort: Pulmonary effort is normal.      Breath sounds: Normal breath sounds. No wheezing or rales.   Musculoskeletal:         General: No tenderness.      Cervical back: Neck supple.      Right lower leg: No edema.      Left lower leg: No edema.   Lymphadenopathy:      Cervical: No cervical adenopathy.   Skin:     General: Skin is warm and dry.   Neurological:      Mental Status:  She is alert.       Assessment:       1. Acute bronchitis, unspecified organism    2. Acute on chronic diastolic heart failure    3. Community acquired pneumonia, unspecified laterality          Plan:       Acute bronchitis, unspecified organism  -     CBC W/ AUTO DIFFERENTIAL; Future; Expected date: 01/10/2023  -     X-Ray Chest PA And Lateral; Future; Expected date: 01/10/2023    Acute on chronic diastolic heart failure  -     B-TYPE NATRIURETIC PEPTIDE; Future; Expected date: 01/10/2023  -     BASIC METABOLIC PANEL; Future; Expected date: 01/10/2023    Community acquired pneumonia, unspecified laterality      Patient Instructions   Further treatment advice pending results of Xray and lab.      CBC:   Lab Results   Component Value Date    WBC 5.88 01/10/2023    HGB 11.8 (L) 01/10/2023    HCT 38.8 01/10/2023     01/10/2023    MCV 91 01/10/2023    RDW 14.5 01/10/2023     BMP:  Lab Results   Component Value Date     01/10/2023    K 4.6 01/10/2023     01/10/2023    CO2 26 01/10/2023    BUN 26 (H) 01/10/2023    CREATININE 1.0 01/10/2023     (H) 01/10/2023    CALCIUM 9.6 01/10/2023    MG 2.2 06/25/2022    PHOS 4.1 06/25/2022    ALKPHOS 71 06/25/2022    PROT 6.3 06/25/2022    ALBUMIN 3.8 06/25/2022    BILITOT 0.7 06/25/2022    AST 59 (H) 06/25/2022    ALT 93 (H) 06/25/2022     BNP - 1349    Chest Xray:  FINDINGS:  There is bibasilar airspace disease.  Unchanged heart size with prior CABG.  Obscured right hemidiaphragm.  Blunted left costophrenic angle.  Sternal wires.     Impression:     Moderate size right and small left pleural effusions.  Bibasilar airspace disease could reflect atelectasis or pneumonia or edema.        Electronically signed by: Francisco Guy    Pt has CHF; mod left effusion and may have CAP as well - contacted per staff to proceed to [facility] ER for probable admit.

## 2023-01-18 ENCOUNTER — HOSPITAL ENCOUNTER (INPATIENT)
Facility: HOSPITAL | Age: 83
LOS: 4 days | Discharge: HOME | DRG: 291 | End: 2023-01-23
Attending: EMERGENCY MEDICINE | Admitting: INTERNAL MEDICINE
Payer: MEDICARE

## 2023-01-18 DIAGNOSIS — I50.23 ACUTE ON CHRONIC SYSTOLIC CONGESTIVE HEART FAILURE: ICD-10-CM

## 2023-01-18 DIAGNOSIS — I48.91 ATRIAL FIBRILLATION WITH RAPID VENTRICULAR RESPONSE: Primary | ICD-10-CM

## 2023-01-18 DIAGNOSIS — R07.9 CHEST PAIN, UNSPECIFIED TYPE: ICD-10-CM

## 2023-01-18 DIAGNOSIS — I48.91 ATRIAL FIBRILLATION AND FLUTTER: ICD-10-CM

## 2023-01-18 DIAGNOSIS — I48.92 ATRIAL FIBRILLATION AND FLUTTER: ICD-10-CM

## 2023-01-18 DIAGNOSIS — R00.2 PALPITATIONS: ICD-10-CM

## 2023-01-18 DIAGNOSIS — J96.10 CHRONIC RESPIRATORY FAILURE, UNSPECIFIED WHETHER WITH HYPOXIA OR HYPERCAPNIA: Chronic | ICD-10-CM

## 2023-01-18 DIAGNOSIS — I48.91 ATRIAL FIBRILLATION WITH RVR: ICD-10-CM

## 2023-01-18 LAB
ALBUMIN SERPL BCP-MCNC: 3.9 G/DL (ref 3.5–5.2)
ALP SERPL-CCNC: 72 U/L (ref 55–135)
ALT SERPL W/O P-5'-P-CCNC: 40 U/L (ref 10–44)
ANION GAP SERPL CALC-SCNC: 11 MMOL/L (ref 8–16)
APTT BLDCRRT: 28.7 SEC (ref 21–32)
AST SERPL-CCNC: 32 U/L (ref 10–40)
BASOPHILS # BLD AUTO: 0.02 K/UL (ref 0–0.2)
BASOPHILS NFR BLD: 0.3 % (ref 0–1.9)
BILIRUB SERPL-MCNC: 0.4 MG/DL (ref 0.1–1)
BNP SERPL-MCNC: 1309 PG/ML (ref 0–99)
BUN SERPL-MCNC: 25 MG/DL (ref 8–23)
CALCIUM SERPL-MCNC: 9.6 MG/DL (ref 8.7–10.5)
CHLORIDE SERPL-SCNC: 99 MMOL/L (ref 95–110)
CO2 SERPL-SCNC: 26 MMOL/L (ref 23–29)
CREAT SERPL-MCNC: 1 MG/DL (ref 0.5–1.4)
DIFFERENTIAL METHOD: ABNORMAL
EOSINOPHIL # BLD AUTO: 0.1 K/UL (ref 0–0.5)
EOSINOPHIL NFR BLD: 0.9 % (ref 0–8)
ERYTHROCYTE [DISTWIDTH] IN BLOOD BY AUTOMATED COUNT: 14.2 % (ref 11.5–14.5)
EST. GFR  (NO RACE VARIABLE): 56.2 ML/MIN/1.73 M^2
GLUCOSE SERPL-MCNC: 124 MG/DL (ref 70–110)
HCT VFR BLD AUTO: 39.1 % (ref 37–48.5)
HGB BLD-MCNC: 12.2 G/DL (ref 12–16)
IMM GRANULOCYTES # BLD AUTO: 0.02 K/UL (ref 0–0.04)
IMM GRANULOCYTES NFR BLD AUTO: 0.3 % (ref 0–0.5)
INR PPP: 1 (ref 0.8–1.2)
LYMPHOCYTES # BLD AUTO: 1.1 K/UL (ref 1–4.8)
LYMPHOCYTES NFR BLD: 19.6 % (ref 18–48)
MAGNESIUM SERPL-MCNC: 2 MG/DL (ref 1.6–2.6)
MCH RBC QN AUTO: 27.3 PG (ref 27–31)
MCHC RBC AUTO-ENTMCNC: 31.2 G/DL (ref 32–36)
MCV RBC AUTO: 88 FL (ref 82–98)
MONOCYTES # BLD AUTO: 0.7 K/UL (ref 0.3–1)
MONOCYTES NFR BLD: 12.2 % (ref 4–15)
NEUTROPHILS # BLD AUTO: 3.9 K/UL (ref 1.8–7.7)
NEUTROPHILS NFR BLD: 66.7 % (ref 38–73)
NRBC BLD-RTO: 0 /100 WBC
PLATELET # BLD AUTO: 236 K/UL (ref 150–450)
PMV BLD AUTO: 10.6 FL (ref 9.2–12.9)
POTASSIUM SERPL-SCNC: 4.3 MMOL/L (ref 3.5–5.1)
PROT SERPL-MCNC: 7.2 G/DL (ref 6–8.4)
PROTHROMBIN TIME: 10.9 SEC (ref 9–12.5)
RBC # BLD AUTO: 4.47 M/UL (ref 4–5.4)
SODIUM SERPL-SCNC: 136 MMOL/L (ref 136–145)
TROPONIN I SERPL HS-MCNC: 22.2 PG/ML (ref 0–14.9)
TSH SERPL DL<=0.005 MIU/L-ACNC: 3.58 UIU/ML (ref 0.34–5.6)
WBC # BLD AUTO: 5.81 K/UL (ref 3.9–12.7)

## 2023-01-18 PROCEDURE — 85610 PROTHROMBIN TIME: CPT | Performed by: NURSE PRACTITIONER

## 2023-01-18 PROCEDURE — 99285 EMERGENCY DEPT VISIT HI MDM: CPT | Mod: 25

## 2023-01-18 PROCEDURE — 80053 COMPREHEN METABOLIC PANEL: CPT | Performed by: NURSE PRACTITIONER

## 2023-01-18 PROCEDURE — 85730 THROMBOPLASTIN TIME PARTIAL: CPT | Performed by: NURSE PRACTITIONER

## 2023-01-18 PROCEDURE — 81001 URINALYSIS AUTO W/SCOPE: CPT | Performed by: NURSE PRACTITIONER

## 2023-01-18 PROCEDURE — 96374 THER/PROPH/DIAG INJ IV PUSH: CPT

## 2023-01-18 PROCEDURE — 93010 EKG 12-LEAD: ICD-10-PCS | Mod: ,,, | Performed by: SPECIALIST

## 2023-01-18 PROCEDURE — 83880 ASSAY OF NATRIURETIC PEPTIDE: CPT | Performed by: NURSE PRACTITIONER

## 2023-01-18 PROCEDURE — 84443 ASSAY THYROID STIM HORMONE: CPT | Performed by: NURSE PRACTITIONER

## 2023-01-18 PROCEDURE — 63600175 PHARM REV CODE 636 W HCPCS: Mod: TB,JG | Performed by: EMERGENCY MEDICINE

## 2023-01-18 PROCEDURE — 84484 ASSAY OF TROPONIN QUANT: CPT | Performed by: NURSE PRACTITIONER

## 2023-01-18 PROCEDURE — 93010 ELECTROCARDIOGRAM REPORT: CPT | Mod: 76,,, | Performed by: SPECIALIST

## 2023-01-18 PROCEDURE — 85025 COMPLETE CBC W/AUTO DIFF WBC: CPT | Performed by: NURSE PRACTITIONER

## 2023-01-18 PROCEDURE — 83735 ASSAY OF MAGNESIUM: CPT | Performed by: NURSE PRACTITIONER

## 2023-01-18 PROCEDURE — 93005 ELECTROCARDIOGRAM TRACING: CPT | Performed by: SPECIALIST

## 2023-01-18 PROCEDURE — 93010 ELECTROCARDIOGRAM REPORT: CPT | Mod: ,,, | Performed by: SPECIALIST

## 2023-01-18 RX ADMIN — AMIODARONE HYDROCHLORIDE 1 MG/MIN: 1.8 INJECTION, SOLUTION INTRAVENOUS at 09:01

## 2023-01-18 RX ADMIN — AMIODARONE HYDROCHLORIDE 150 MG: 1.5 INJECTION, SOLUTION INTRAVENOUS at 08:01

## 2023-01-19 PROBLEM — E11.9 DM (DIABETES MELLITUS), TYPE 2: Status: ACTIVE | Noted: 2018-05-05

## 2023-01-19 PROBLEM — I48.91 ATRIAL FIBRILLATION WITH RVR: Status: ACTIVE | Noted: 2023-01-19

## 2023-01-19 LAB
ANION GAP SERPL CALC-SCNC: 8 MMOL/L (ref 8–16)
BACTERIA #/AREA URNS HPF: NEGATIVE /HPF
BASOPHILS # BLD AUTO: 0.03 K/UL (ref 0–0.2)
BASOPHILS NFR BLD: 0.5 % (ref 0–1.9)
BILIRUB UR QL STRIP: NEGATIVE
BUN SERPL-MCNC: 25 MG/DL (ref 8–23)
CALCIUM SERPL-MCNC: 9 MG/DL (ref 8.7–10.5)
CHLORIDE SERPL-SCNC: 97 MMOL/L (ref 95–110)
CLARITY UR: CLEAR
CO2 SERPL-SCNC: 26 MMOL/L (ref 23–29)
COLOR UR: YELLOW
CREAT SERPL-MCNC: 0.9 MG/DL (ref 0.5–1.4)
DIFFERENTIAL METHOD: ABNORMAL
EOSINOPHIL # BLD AUTO: 0.1 K/UL (ref 0–0.5)
EOSINOPHIL NFR BLD: 1 % (ref 0–8)
ERYTHROCYTE [DISTWIDTH] IN BLOOD BY AUTOMATED COUNT: 14.1 % (ref 11.5–14.5)
EST. GFR  (NO RACE VARIABLE): >60 ML/MIN/1.73 M^2
GLUCOSE SERPL-MCNC: 113 MG/DL (ref 70–110)
GLUCOSE SERPL-MCNC: 115 MG/DL (ref 70–110)
GLUCOSE SERPL-MCNC: 144 MG/DL (ref 70–110)
GLUCOSE UR QL STRIP: ABNORMAL
HCT VFR BLD AUTO: 38.7 % (ref 37–48.5)
HGB BLD-MCNC: 12 G/DL (ref 12–16)
HGB UR QL STRIP: NEGATIVE
HYALINE CASTS #/AREA URNS LPF: 0 /LPF
IMM GRANULOCYTES # BLD AUTO: 0.02 K/UL (ref 0–0.04)
IMM GRANULOCYTES NFR BLD AUTO: 0.3 % (ref 0–0.5)
KETONES UR QL STRIP: NEGATIVE
LEUKOCYTE ESTERASE UR QL STRIP: NEGATIVE
LYMPHOCYTES # BLD AUTO: 1.4 K/UL (ref 1–4.8)
LYMPHOCYTES NFR BLD: 23.7 % (ref 18–48)
MCH RBC QN AUTO: 27.7 PG (ref 27–31)
MCHC RBC AUTO-ENTMCNC: 31 G/DL (ref 32–36)
MCV RBC AUTO: 89 FL (ref 82–98)
MICROSCOPIC COMMENT: NORMAL
MONOCYTES # BLD AUTO: 0.7 K/UL (ref 0.3–1)
MONOCYTES NFR BLD: 12.3 % (ref 4–15)
NEUTROPHILS # BLD AUTO: 3.8 K/UL (ref 1.8–7.7)
NEUTROPHILS NFR BLD: 62.2 % (ref 38–73)
NITRITE UR QL STRIP: NEGATIVE
NRBC BLD-RTO: 0 /100 WBC
PH UR STRIP: 6 [PH] (ref 5–8)
PLATELET # BLD AUTO: 218 K/UL (ref 150–450)
PMV BLD AUTO: 10.3 FL (ref 9.2–12.9)
POTASSIUM SERPL-SCNC: 3.9 MMOL/L (ref 3.5–5.1)
PROT UR QL STRIP: ABNORMAL
RBC # BLD AUTO: 4.33 M/UL (ref 4–5.4)
RBC #/AREA URNS HPF: 0 /HPF (ref 0–4)
SODIUM SERPL-SCNC: 131 MMOL/L (ref 136–145)
SP GR UR STRIP: 1.01 (ref 1–1.03)
SQUAMOUS #/AREA URNS HPF: 1 /HPF
TROPONIN I SERPL HS-MCNC: 18.8 PG/ML (ref 0–14.9)
TROPONIN I SERPL HS-MCNC: 24.8 PG/ML (ref 0–14.9)
URN SPEC COLLECT METH UR: ABNORMAL
UROBILINOGEN UR STRIP-ACNC: NEGATIVE EU/DL
WBC # BLD AUTO: 6.03 K/UL (ref 3.9–12.7)
WBC #/AREA URNS HPF: 1 /HPF (ref 0–5)
YEAST URNS QL MICRO: NORMAL

## 2023-01-19 PROCEDURE — 25000003 PHARM REV CODE 250: Performed by: NURSE PRACTITIONER

## 2023-01-19 PROCEDURE — 99900031 HC PATIENT EDUCATION (STAT)

## 2023-01-19 PROCEDURE — 25000003 PHARM REV CODE 250: Performed by: GENERAL PRACTICE

## 2023-01-19 PROCEDURE — 25000003 PHARM REV CODE 250

## 2023-01-19 PROCEDURE — 25000003 PHARM REV CODE 250: Performed by: STUDENT IN AN ORGANIZED HEALTH CARE EDUCATION/TRAINING PROGRAM

## 2023-01-19 PROCEDURE — 94761 N-INVAS EAR/PLS OXIMETRY MLT: CPT

## 2023-01-19 PROCEDURE — 36415 COLL VENOUS BLD VENIPUNCTURE: CPT | Performed by: NURSE PRACTITIONER

## 2023-01-19 PROCEDURE — 99900035 HC TECH TIME PER 15 MIN (STAT)

## 2023-01-19 PROCEDURE — 83036 HEMOGLOBIN GLYCOSYLATED A1C: CPT | Performed by: INTERNAL MEDICINE

## 2023-01-19 PROCEDURE — 36415 COLL VENOUS BLD VENIPUNCTURE: CPT | Performed by: INTERNAL MEDICINE

## 2023-01-19 PROCEDURE — 63600175 PHARM REV CODE 636 W HCPCS: Performed by: NURSE PRACTITIONER

## 2023-01-19 PROCEDURE — 25000003 PHARM REV CODE 250: Performed by: INTERNAL MEDICINE

## 2023-01-19 PROCEDURE — 63600175 PHARM REV CODE 636 W HCPCS: Performed by: INTERNAL MEDICINE

## 2023-01-19 PROCEDURE — 85025 COMPLETE CBC W/AUTO DIFF WBC: CPT | Performed by: INTERNAL MEDICINE

## 2023-01-19 PROCEDURE — 80048 BASIC METABOLIC PNL TOTAL CA: CPT | Performed by: INTERNAL MEDICINE

## 2023-01-19 PROCEDURE — 84484 ASSAY OF TROPONIN QUANT: CPT | Performed by: INTERNAL MEDICINE

## 2023-01-19 PROCEDURE — 25000242 PHARM REV CODE 250 ALT 637 W/ HCPCS: Performed by: INTERNAL MEDICINE

## 2023-01-19 PROCEDURE — 27000221 HC OXYGEN, UP TO 24 HOURS

## 2023-01-19 PROCEDURE — 99223 PR INITIAL HOSPITAL CARE,LEVL III: ICD-10-PCS | Mod: ,,, | Performed by: GENERAL PRACTICE

## 2023-01-19 PROCEDURE — 94640 AIRWAY INHALATION TREATMENT: CPT

## 2023-01-19 PROCEDURE — 87040 BLOOD CULTURE FOR BACTERIA: CPT | Performed by: INTERNAL MEDICINE

## 2023-01-19 PROCEDURE — 84484 ASSAY OF TROPONIN QUANT: CPT | Mod: 91 | Performed by: NURSE PRACTITIONER

## 2023-01-19 PROCEDURE — 99223 1ST HOSP IP/OBS HIGH 75: CPT | Mod: ,,, | Performed by: GENERAL PRACTICE

## 2023-01-19 PROCEDURE — 36415 COLL VENOUS BLD VENIPUNCTURE: CPT | Performed by: STUDENT IN AN ORGANIZED HEALTH CARE EDUCATION/TRAINING PROGRAM

## 2023-01-19 PROCEDURE — 84145 PROCALCITONIN (PCT): CPT | Performed by: STUDENT IN AN ORGANIZED HEALTH CARE EDUCATION/TRAINING PROGRAM

## 2023-01-19 PROCEDURE — 21000000 HC CCU ICU ROOM CHARGE

## 2023-01-19 PROCEDURE — 63600175 PHARM REV CODE 636 W HCPCS: Mod: TB,JG | Performed by: EMERGENCY MEDICINE

## 2023-01-19 RX ORDER — ATORVASTATIN CALCIUM 40 MG/1
40 TABLET, FILM COATED ORAL DAILY
Status: DISCONTINUED | OUTPATIENT
Start: 2023-01-19 | End: 2023-01-23 | Stop reason: HOSPADM

## 2023-01-19 RX ORDER — BUSPIRONE HYDROCHLORIDE 5 MG/1
10 TABLET ORAL 2 TIMES DAILY
Status: DISCONTINUED | OUTPATIENT
Start: 2023-01-19 | End: 2023-01-23 | Stop reason: HOSPADM

## 2023-01-19 RX ORDER — ONDANSETRON 2 MG/ML
4 INJECTION INTRAMUSCULAR; INTRAVENOUS EVERY 8 HOURS PRN
Status: DISCONTINUED | OUTPATIENT
Start: 2023-01-19 | End: 2023-01-23 | Stop reason: HOSPADM

## 2023-01-19 RX ORDER — SODIUM,POTASSIUM PHOSPHATES 280-250MG
2 POWDER IN PACKET (EA) ORAL
Status: DISCONTINUED | OUTPATIENT
Start: 2023-01-19 | End: 2023-01-23 | Stop reason: HOSPADM

## 2023-01-19 RX ORDER — LANOLIN ALCOHOL/MO/W.PET/CERES
800 CREAM (GRAM) TOPICAL
Status: DISCONTINUED | OUTPATIENT
Start: 2023-01-19 | End: 2023-01-23 | Stop reason: HOSPADM

## 2023-01-19 RX ORDER — ACETAMINOPHEN 500 MG
2000 TABLET ORAL DAILY
Status: DISCONTINUED | OUTPATIENT
Start: 2023-01-19 | End: 2023-01-23 | Stop reason: HOSPADM

## 2023-01-19 RX ORDER — LEVOTHYROXINE SODIUM 25 UG/1
25 TABLET ORAL
Status: DISCONTINUED | OUTPATIENT
Start: 2023-01-19 | End: 2023-01-23 | Stop reason: HOSPADM

## 2023-01-19 RX ORDER — CARBOXYMETHYLCELLULOSE SODIUM 5 MG/ML
2 SOLUTION/ DROPS OPHTHALMIC
Status: DISCONTINUED | OUTPATIENT
Start: 2023-01-19 | End: 2023-01-23 | Stop reason: HOSPADM

## 2023-01-19 RX ORDER — IBUPROFEN 200 MG
16 TABLET ORAL
Status: DISCONTINUED | OUTPATIENT
Start: 2023-01-19 | End: 2023-01-23 | Stop reason: HOSPADM

## 2023-01-19 RX ORDER — TALC
6 POWDER (GRAM) TOPICAL NIGHTLY PRN
Status: DISCONTINUED | OUTPATIENT
Start: 2023-01-19 | End: 2023-01-23 | Stop reason: HOSPADM

## 2023-01-19 RX ORDER — GLUCAGON 1 MG
1 KIT INJECTION
Status: DISCONTINUED | OUTPATIENT
Start: 2023-01-19 | End: 2023-01-23 | Stop reason: HOSPADM

## 2023-01-19 RX ORDER — INSULIN ASPART 100 [IU]/ML
0-5 INJECTION, SOLUTION INTRAVENOUS; SUBCUTANEOUS
Status: DISCONTINUED | OUTPATIENT
Start: 2023-01-19 | End: 2023-01-23 | Stop reason: HOSPADM

## 2023-01-19 RX ORDER — MAG HYDROX/ALUMINUM HYD/SIMETH 200-200-20
30 SUSPENSION, ORAL (FINAL DOSE FORM) ORAL ONCE
Status: COMPLETED | OUTPATIENT
Start: 2023-01-19 | End: 2023-01-19

## 2023-01-19 RX ORDER — ONDANSETRON 2 MG/ML
4 INJECTION INTRAMUSCULAR; INTRAVENOUS
Status: COMPLETED | OUTPATIENT
Start: 2023-01-19 | End: 2023-01-19

## 2023-01-19 RX ORDER — AMITRIPTYLINE HYDROCHLORIDE 25 MG/1
75 TABLET, FILM COATED ORAL NIGHTLY
Status: DISCONTINUED | OUTPATIENT
Start: 2023-01-19 | End: 2023-01-23 | Stop reason: HOSPADM

## 2023-01-19 RX ORDER — CARBOXYMETHYLCELLULOSE SODIUM 10 MG/ML
1 GEL OPHTHALMIC EVERY 6 HOURS PRN
Status: DISCONTINUED | OUTPATIENT
Start: 2023-01-19 | End: 2023-01-23 | Stop reason: HOSPADM

## 2023-01-19 RX ORDER — LIDOCAINE HYDROCHLORIDE 20 MG/ML
15 SOLUTION OROPHARYNGEAL ONCE
Status: DISCONTINUED | OUTPATIENT
Start: 2023-01-19 | End: 2023-01-19

## 2023-01-19 RX ORDER — FLUTICASONE PROPIONATE 50 MCG
1 SPRAY, SUSPENSION (ML) NASAL DAILY
Status: DISCONTINUED | OUTPATIENT
Start: 2023-01-19 | End: 2023-01-21

## 2023-01-19 RX ORDER — FLUTICASONE FUROATE AND VILANTEROL 100; 25 UG/1; UG/1
1 POWDER RESPIRATORY (INHALATION) DAILY
Status: DISCONTINUED | OUTPATIENT
Start: 2023-01-19 | End: 2023-01-19

## 2023-01-19 RX ORDER — SIMETHICONE 80 MG
1 TABLET,CHEWABLE ORAL 3 TIMES DAILY PRN
Status: DISCONTINUED | OUTPATIENT
Start: 2023-01-19 | End: 2023-01-23 | Stop reason: HOSPADM

## 2023-01-19 RX ORDER — MAG HYDROX/ALUMINUM HYD/SIMETH 200-200-20
30 SUSPENSION, ORAL (FINAL DOSE FORM) ORAL ONCE
Status: DISCONTINUED | OUTPATIENT
Start: 2023-01-19 | End: 2023-01-19

## 2023-01-19 RX ORDER — LIDOCAINE 50 MG/G
1 PATCH TOPICAL
Status: DISCONTINUED | OUTPATIENT
Start: 2023-01-19 | End: 2023-01-23 | Stop reason: HOSPADM

## 2023-01-19 RX ORDER — CHLORHEXIDINE GLUCONATE ORAL RINSE 1.2 MG/ML
15 SOLUTION DENTAL 2 TIMES DAILY
Status: DISCONTINUED | OUTPATIENT
Start: 2023-01-19 | End: 2023-01-23 | Stop reason: HOSPADM

## 2023-01-19 RX ORDER — NITROGLYCERIN 0.4 MG/1
0.4 TABLET SUBLINGUAL EVERY 5 MIN PRN
Status: DISCONTINUED | OUTPATIENT
Start: 2023-01-19 | End: 2023-01-23 | Stop reason: HOSPADM

## 2023-01-19 RX ORDER — ARFORMOTEROL TARTRATE 15 UG/2ML
15 SOLUTION RESPIRATORY (INHALATION) 2 TIMES DAILY
Status: DISCONTINUED | OUTPATIENT
Start: 2023-01-19 | End: 2023-01-23 | Stop reason: HOSPADM

## 2023-01-19 RX ORDER — LIDOCAINE HYDROCHLORIDE 20 MG/ML
15 SOLUTION OROPHARYNGEAL ONCE
Status: COMPLETED | OUTPATIENT
Start: 2023-01-19 | End: 2023-01-19

## 2023-01-19 RX ORDER — ACETAMINOPHEN 325 MG/1
650 TABLET ORAL EVERY 8 HOURS PRN
Status: DISCONTINUED | OUTPATIENT
Start: 2023-01-19 | End: 2023-01-23 | Stop reason: HOSPADM

## 2023-01-19 RX ORDER — MUPIROCIN 20 MG/G
OINTMENT TOPICAL 2 TIMES DAILY
Status: DISCONTINUED | OUTPATIENT
Start: 2023-01-19 | End: 2023-01-23 | Stop reason: HOSPADM

## 2023-01-19 RX ORDER — LEVALBUTEROL INHALATION SOLUTION 1.25 MG/3ML
1 SOLUTION RESPIRATORY (INHALATION) EVERY 6 HOURS PRN
Status: DISCONTINUED | OUTPATIENT
Start: 2023-01-19 | End: 2023-01-23 | Stop reason: HOSPADM

## 2023-01-19 RX ORDER — BUDESONIDE 0.5 MG/2ML
0.5 INHALANT ORAL EVERY 12 HOURS
Status: DISCONTINUED | OUTPATIENT
Start: 2023-01-19 | End: 2023-01-23 | Stop reason: HOSPADM

## 2023-01-19 RX ORDER — METOPROLOL SUCCINATE 25 MG/1
25 TABLET, EXTENDED RELEASE ORAL DAILY
Status: DISCONTINUED | OUTPATIENT
Start: 2023-01-19 | End: 2023-01-21

## 2023-01-19 RX ORDER — AMIODARONE HYDROCHLORIDE 200 MG/1
200 TABLET ORAL 3 TIMES DAILY
Status: DISCONTINUED | OUTPATIENT
Start: 2023-01-19 | End: 2023-01-23 | Stop reason: HOSPADM

## 2023-01-19 RX ORDER — IBUPROFEN 200 MG
24 TABLET ORAL
Status: DISCONTINUED | OUTPATIENT
Start: 2023-01-19 | End: 2023-01-23 | Stop reason: HOSPADM

## 2023-01-19 RX ORDER — HYDROCODONE BITARTRATE AND ACETAMINOPHEN 5; 325 MG/1; MG/1
1 TABLET ORAL EVERY 4 HOURS PRN
Status: DISCONTINUED | OUTPATIENT
Start: 2023-01-19 | End: 2023-01-23 | Stop reason: HOSPADM

## 2023-01-19 RX ORDER — FUROSEMIDE 10 MG/ML
40 INJECTION INTRAMUSCULAR; INTRAVENOUS DAILY
Status: DISCONTINUED | OUTPATIENT
Start: 2023-01-19 | End: 2023-01-20

## 2023-01-19 RX ADMIN — LEVOTHYROXINE SODIUM 25 MCG: 0.03 TABLET ORAL at 05:01

## 2023-01-19 RX ADMIN — LIDOCAINE HYDROCHLORIDE 15 ML: 20 SOLUTION ORAL; TOPICAL at 10:01

## 2023-01-19 RX ADMIN — MUPIROCIN 1 G: 20 OINTMENT TOPICAL at 08:01

## 2023-01-19 RX ADMIN — AMITRIPTYLINE HYDROCHLORIDE 75 MG: 25 TABLET, FILM COATED ORAL at 04:01

## 2023-01-19 RX ADMIN — CHLORHEXIDINE GLUCONATE 15 ML: 1.2 RINSE ORAL at 08:01

## 2023-01-19 RX ADMIN — FUROSEMIDE 40 MG: 10 INJECTION, SOLUTION INTRAMUSCULAR; INTRAVENOUS at 10:01

## 2023-01-19 RX ADMIN — ONDANSETRON 4 MG: 2 INJECTION INTRAMUSCULAR; INTRAVENOUS at 06:01

## 2023-01-19 RX ADMIN — ONDANSETRON 4 MG: 2 INJECTION INTRAMUSCULAR; INTRAVENOUS at 05:01

## 2023-01-19 RX ADMIN — ARFORMOTEROL TARTRATE 15 MCG: 15 SOLUTION RESPIRATORY (INHALATION) at 07:01

## 2023-01-19 RX ADMIN — APIXABAN 5 MG: 5 TABLET, FILM COATED ORAL at 08:01

## 2023-01-19 RX ADMIN — BUDESONIDE 0.5 MG: 0.5 INHALANT RESPIRATORY (INHALATION) at 07:01

## 2023-01-19 RX ADMIN — ATORVASTATIN CALCIUM 40 MG: 40 TABLET, FILM COATED ORAL at 08:01

## 2023-01-19 RX ADMIN — BUSPIRONE HYDROCHLORIDE 10 MG: 5 TABLET ORAL at 08:01

## 2023-01-19 RX ADMIN — ONDANSETRON 4 MG: 2 INJECTION INTRAMUSCULAR; INTRAVENOUS at 12:01

## 2023-01-19 RX ADMIN — AMIODARONE HYDROCHLORIDE 0.5 MG/MIN: 1.8 INJECTION, SOLUTION INTRAVENOUS at 03:01

## 2023-01-19 RX ADMIN — AMIODARONE HYDROCHLORIDE 200 MG: 200 TABLET ORAL at 05:01

## 2023-01-19 RX ADMIN — CARBOXYMETHYLCELLULOSE SODIUM 2 DROP: 0.5 SOLUTION, GEL FORMING / DROPS OPHTHALMIC at 11:01

## 2023-01-19 RX ADMIN — CARBOXYMETHYLCELLULOSE SODIUM 2 DROP: 0.5 SOLUTION, GEL FORMING / DROPS OPHTHALMIC at 05:01

## 2023-01-19 RX ADMIN — CEFTRIAXONE 1 G: 1 INJECTION, SOLUTION INTRAVENOUS at 04:01

## 2023-01-19 RX ADMIN — ALUMINUM HYDROXIDE, MAGNESIUM HYDROXIDE, AND SIMETHICONE 30 ML: 200; 200; 20 SUSPENSION ORAL at 10:01

## 2023-01-19 RX ADMIN — AMIODARONE HYDROCHLORIDE 200 MG: 200 TABLET ORAL at 09:01

## 2023-01-19 RX ADMIN — SIMETHICONE 80 MG: 80 TABLET, CHEWABLE ORAL at 06:01

## 2023-01-19 RX ADMIN — FLUTICASONE PROPIONATE 50 MCG: 50 SPRAY, METERED NASAL at 08:01

## 2023-01-19 RX ADMIN — MELATONIN 6 MG: at 08:01

## 2023-01-19 RX ADMIN — Medication 2000 UNITS: at 08:01

## 2023-01-19 RX ADMIN — LIDOCAINE 5% 1 PATCH: 700 PATCH TOPICAL at 11:01

## 2023-01-19 RX ADMIN — AMITRIPTYLINE HYDROCHLORIDE 75 MG: 25 TABLET, FILM COATED ORAL at 08:01

## 2023-01-19 RX ADMIN — METOPROLOL SUCCINATE 25 MG: 25 TABLET, FILM COATED, EXTENDED RELEASE ORAL at 05:01

## 2023-01-19 NOTE — PLAN OF CARE
Problem: Adult Inpatient Plan of Care  Goal: Plan of Care Review  1/19/2023 0441 by Kimmie Fragoso RN  Outcome: Ongoing, Progressing  1/19/2023 0440 by Kimmie Fragoso RN  Outcome: Ongoing, Progressing  Goal: Patient-Specific Goal (Individualized)  Outcome: Ongoing, Progressing  Goal: Optimal Comfort and Wellbeing  Outcome: Ongoing, Progressing

## 2023-01-19 NOTE — SUBJECTIVE & OBJECTIVE
Past Medical History:   Diagnosis Date    Allergy     Dust mites, Grasses, Trees    Arthritis     Asthma     Blood transfusion     CAD (coronary artery disease)     Cataract     CHRONIC BRONCHITIS     Diabetes mellitus     Diabetes mellitus type II     GERD (gastroesophageal reflux disease)     Hyperlipidemia     Hypertension     Irregular heart beat     Kidney disease     ckd stage 3  as stated by patient - to see MD    Post-menopausal bleeding 2018    Spinal stenosis     Thyroid disease     Hypothyroidism       Past Surgical History:   Procedure Laterality Date    APPENDECTOMY  1968    BLADDER SUSPENSION  1989    CARDIAC SURGERY  2016    CABG    CATARACT EXTRACTION  9/2007 (L) and 10/2207 (R)    COLONOSCOPY N/A 10/18/2017    Procedure: COLONOSCOPY;  Surgeon: Esme Acuna MD;  Location: [facility] ENDO;  Service: Endoscopy;  Laterality: N/A;    CORONARY ANGIOGRAPHY INCLUDING BYPASS GRAFTS WITH CATHETERIZATION OF LEFT HEART Left 5/13/2022    Procedure: Left heart cath;  Surgeon: Davon Patton MD;  Location: [facility] CATH/EP LAB;  Service: Cardiology;  Laterality: Left;    CORONARY ARTERY BYPASS GRAFT  4/26/2004    x5    ESOPHAGEAL DILATION      ESOPHAGOGASTRODUODENOSCOPY N/A 6/27/2022    Procedure: EGD (ESOPHAGOGASTRODUODENOSCOPY);  Surgeon: Skip Nj MD;  Location: [facility] ENDO;  Service: Endoscopy;  Laterality: N/A;    HYSTEROSCOPY WITH DILATION AND CURETTAGE OF UTERUS N/A 3/12/2020    Procedure: HYSTEROSCOPY, WITH DILATION AND CURETTAGE OF UTERUS;  Surgeon: Ramona Llanos MD;  Location: [facility] OR;  Service: OB/GYN;  Laterality: N/A;    SPINE SURGERY  3/2000    Tumor    TRANSFORAMINAL EPIDURAL INJECTION OF STEROID Right 11/21/2019    Procedure: Injection,steroid,epidural,transforaminal approach;  Surgeon: Bj Jones MD;  Location: [facility] OR;  Service: Pain Management;  Laterality: Right;  L4-5, L5-S1    TRANSFORAMINAL EPIDURAL INJECTION OF STEROID Right 12/31/2019    Procedure:  Injection,steroid,epidural,transforaminal approach;  Surgeon: Bj Jones MD;  Location: [facility] OR;  Service: Pain Management;  Laterality: Right;  L4-5, L5-S1    TRANSFORAMINAL EPIDURAL INJECTION OF STEROID Right 2/5/2020    Procedure: Injection,steroid,epidural,transforaminal approach;  Surgeon: Bj Jones MD;  Location: [facility] OR;  Service: Pain Management;  Laterality: Right;  L4-5, L5-S1    TRANSFORAMINAL EPIDURAL INJECTION OF STEROID Left 1/27/2021    Procedure: Injection,steroid,epidural,transforaminal approach;  Surgeon: Bj Jones MD;  Location: [facility] OR;  Service: Pain Management;  Laterality: Left;  L4-5, L5-S1    TRANSFORAMINAL EPIDURAL INJECTION OF STEROID Right 3/4/2021    Procedure: Injection,steroid,epidural,transforaminal approach;  Surgeon: Bj Jones MD;  Location: [facility] OR;  Service: Pain Management;  Laterality: Right;  L4-L5, L5-S1    WRIST SURGERY  1993    carpal tunnel         Review of patient's allergies indicates:   Allergen Reactions    Dexlansoprazole Itching, Nausea Only and Rash    Sulfa (sulfonamide antibiotics) Rash    Floxacillin Itching    Januvia [sitagliptin] Other (See Comments)     Hot flashes    Tetracyclines Itching    Fenofibrate micronized Rash    Nitrofurantoin macrocrystalline Other (See Comments)     unknown    Phenylfenesin la [phenylpropanolamine-gg] Rash       No current facility-administered medications on file prior to encounter.     Current Outpatient Medications on File Prior to Encounter   Medication Sig    acetaminophen (TYLENOL) 650 MG TbSR Take 1,300 mg by mouth once daily. 2 Tablet(s) Oral  Every day.    amitriptyline (ELAVIL) 75 MG tablet Take 75 mg by mouth every evening.    apixaban (ELIQUIS) 5 mg Tab Take 1 tablet (5 mg total) by mouth 2 (two) times daily.    blood sugar diagnostic (FREESTYLE LITE STRIPS) Strp USE TO TEST BLOOD SUGAR TWICE A DAY    busPIRone (BUSPAR) 10 MG tablet TAKE 1 TABLET BY MOUTH TWICE A DAY    carboxymethylcellulose 1 % ophthalmic  solution Apply 1 drop to eye As instructed. as directed    cetirizine (ZYRTEC) 10 MG tablet Take 10 mg by mouth once daily.    cholecalciferol, vitamin D3, (VITAMIN D3) 50 mcg (2,000 unit) Cap Take 1 capsule by mouth once daily.    clotrimazole-betamethasone 1-0.05% (LOTRISONE) cream Apply topically 2 (two) times daily as needed.    coenzyme Q10 100 mg capsule Take 100 mg by mouth once daily.     cranberry extract 650 mg Cap Take 1 tablet by mouth 2 (two) times daily.    FARXIGA 5 mg Tab tablet Take 5 mg by mouth once daily.    fluticasone furoate-vilanteroL (BREO ELLIPTA) 100-25 mcg/dose diskus inhaler Inhale 1 puff into the lungs once daily. Controller Rinse after you use it    fluticasone propionate (FLONASE) 50 mcg/actuation nasal spray 1 spray (50 mcg total) by Each Nostril route once daily.    Lactobac no.41/Bifidobact no.7 (PROBIOTIC-10 ORAL) Take by mouth.    lancets Misc 1 Units by Misc.(Non-Drug; Combo Route) route 2 (two) times daily.    lansoprazole (PREVACID) 30 MG capsule Take 1 capsule (30 mg total) by mouth once daily.    levalbuterol (XOPENEX) 1.25 mg/0.5 mL nebulizer solution Take 0.5 mLs (1.25 mg total) by nebulization every 6 (six) hours as needed for Wheezing. Rescue    levothyroxine (SYNTHROID) 25 MCG tablet TAKE 1 TABLET BY MOUTH BEFORE BREAKFAST EVERY DAY    metoprolol succinate (TOPROL XL) 25 MG 24 hr tablet Take 1 tablet (25 mg total) by mouth once daily.    nitroGLYCERIN (NITROSTAT) 0.4 MG SL tablet Place 0.4 mg under the tongue every 5 (five) minutes as needed. 0.4mg Sublingual PRN .      pramoxine-hydrocortisone (PROCTOCREAM-HC) 1-1 % rectal cream Place rectally 2 (two) times daily. (Patient taking differently: Place 1 application rectally 2 (two) times daily.)    RESTASIS 0.05 % ophthalmic emulsion Place 1 drop into both eyes 2 (two) times daily.    rosuvastatin (CRESTOR) 10 MG tablet Take 1 tablet (10 mg total) by mouth once daily.    triazolam (HALCION) 0.25 MG Tab Take 1 tablet (0.25  mg total) by mouth nightly as needed (sleep).    UNABLE TO FIND Take 1 capsule by mouth once daily. Vital red    vitamins  A,C,E-zinc-copper 14,320-226-200 unit-mg-unit Cap Take 1 capsule by mouth 2 (two) times daily.     [DISCONTINUED] dronedarone (MULTAQ) 400 mg Tab Take 1 tablet (400 mg total) by mouth 2 (two) times daily with meals.    [DISCONTINUED] spironolactone (ALDACTONE) 25 MG tablet Take 1 tablet (25 mg total) by mouth once daily.     Family History       Problem Relation (Age of Onset)    Breast cancer Mother, Maternal Aunt          Tobacco Use    Smoking status: Passive Smoke Exposure - Never Smoker    Smokeless tobacco: Never    Tobacco comments:     PARENTS    Substance and Sexual Activity    Alcohol use: Yes     Comment: Rare    Drug use: No    Sexual activity: Not Currently     Review of Systems   Constitutional: Negative.    HENT: Negative.     Eyes: Negative.    Respiratory:  Positive for shortness of breath.    Cardiovascular:  Positive for palpitations.   Gastrointestinal: Negative.    Endocrine: Negative.    Genitourinary: Negative.    Musculoskeletal: Negative.    Skin: Negative.    Allergic/Immunologic: Negative.    Neurological: Negative.    Hematological: Negative.    All other systems reviewed and are negative.  Objective:     Vital Signs (Most Recent):  Temp: 98.3 °F (36.8 °C) (01/18/23 2013)  Pulse: 77 (01/19/23 0200)  Resp: 20 (01/19/23 0030)  BP: 139/64 (01/19/23 0200)  SpO2: 95 % (01/19/23 0130) Vital Signs (24h Range):  Temp:  [98.3 °F (36.8 °C)] 98.3 °F (36.8 °C)  Pulse:  [] 77  Resp:  [18-20] 20  SpO2:  [95 %-98 %] 95 %  BP: (112-158)/(58-94) 139/64     Weight: 64 kg (141 lb)  Body mass index is 25.79 kg/m².    Physical Exam  Vitals and nursing note reviewed.   Constitutional:       Appearance: She is well-developed.   HENT:      Head: Normocephalic and atraumatic.      Right Ear: External ear normal.      Left Ear: External ear normal.      Nose: Nose normal.   Eyes:       Conjunctiva/sclera: Conjunctivae normal.      Pupils: Pupils are equal, round, and reactive to light.   Cardiovascular:      Rate and Rhythm: Normal rate. Rhythm irregular.      Heart sounds: Normal heart sounds.   Pulmonary:      Effort: Pulmonary effort is normal.      Comments: Decreaed entry bases with coarse large airway sounds clearing with cough; no opening crepitations nor expiratory wheezes currently appreciated  Abdominal:      General: Bowel sounds are normal.      Palpations: Abdomen is soft.   Musculoskeletal:         General: Normal range of motion.      Cervical back: Normal range of motion and neck supple.   Skin:     General: Skin is warm and dry.      Capillary Refill: Capillary refill takes less than 2 seconds.   Neurological:      Mental Status: She is alert and oriented to person, place, and time.   Psychiatric:         Behavior: Behavior normal.         Thought Content: Thought content normal.         Judgment: Judgment normal.         CRANIAL NERVES     CN III, IV, VI   Pupils are equal, round, and reactive to light.     Significant Labs: All pertinent labs within the past 24 hours have been reviewed.  CBC:   Recent Labs   Lab 01/18/23 2032   WBC 5.81   HGB 12.2   HCT 39.1        CMP:   Recent Labs   Lab 01/18/23 2032      K 4.3   CL 99   CO2 26   *   BUN 25*   CREATININE 1.0   CALCIUM 9.6   PROT 7.2   ALBUMIN 3.9   BILITOT 0.4   ALKPHOS 72   AST 32   ALT 40   ANIONGAP 11     Cardiac Markers:   Recent Labs   Lab 01/18/23 2032   BNP 1,309*     Troponin:   Recent Labs   Lab 01/18/23 2032   TROPONINIHS 22.2*     TSH:   Recent Labs   Lab 01/18/23 2032   TSH 3.580       Significant Imaging: I have reviewed all pertinent imaging results/findings within the past 24 hours.

## 2023-01-19 NOTE — PROGRESS NOTES
Automatic Inhaler to Nebulizer Interchange    fluticasone/vilanterol (Breo Ellipta) 100 mcg/25 mcg changed to budesonide 0.5 mg twice daily AND arformoterol 15 mcg twice daily per [facility] Automatic Therapeutic Substitutions Protocol.    Please contact pharmacy at extension [phone] with any questions.     Thank you,   Linette Benson

## 2023-01-19 NOTE — ASSESSMENT & PLAN NOTE
Inpatient admission for a fib RVR, and possible pneumonia; Cardiology opinion; ceftriaxone 1 gm q day; cultured; continue preadmission regimen for chronic maladies; continue amiodarone infusion; low dose insulin sliding scale; electrolyte sliding scale

## 2023-01-19 NOTE — ED NOTES
Pt C/O midsternal CP when she breathes in. ER MD Tatum made aware, repeat EKG ordered. Pt remains A&O, VSS, AF between 90s-110s on Amiodarone.

## 2023-01-19 NOTE — H&P
"[facility] - Emergency Dept  Hospital Medicine  History & Physical    Patient Name: Darlin Tipton  MRN: [id number]  Patient Class: IP- Inpatient  Admission Date: 1/18/2023  Attending Physician: Oumar Hurd MD  Primary Care Provider: Ray Chen MD         Patient information was obtained from patient, relative(s), past medical records, ER records and ED MD.     Subjective:     Principal Problem:Atrial fibrillation with RVR    Chief Complaint:   Chief Complaint   Patient presents with    Palpitations     Reports "atrial fib" / + sob pta / on home O2 at 3 l/min        HPI: 82 year old female with history of CAD, DM 2, HTN, PAF, GERD, COPD (Chronic Bronchitis 3 lpm oxygen at home), Hypothyroidism presentred to ED complaining of "Palpitations" and "Irregular heart beat" for "A couple of hours". "I felt like I was going back into a fib again". Her Cardiologist no longer attends this hospital. She has had "Occasional" episodes of orthopnea and PND. Her daughter at bedside stated that the patient's Cardiologist "Was planning on starting a diruetic this weekend" for her "Occasional" edema. No cough or sputum. The patient became rate controlled in ED with amiodarone bolus/infusion. No chest discomfort.    In ED Labs reviewed and noted below: normal CBC, normal electrolytes with stage 3a renal dysfunction; BNP is markedly elevated with minimally elevated HS trop (repeat pending); TSH is normal. CXR reviewed: cardiomegaly with moderate right sided effusiion and small left sided effusion-possible underlying infiltrate on right. EKG reviewed: a fib RVR; no acute segments.    Discussed with ED MD; Inpatient admission for a fib RVR, and possible pneumonia; Cardiology opinion; ceftriaxone 1 gm q day; cultured; continue preadmission regimen for chronic maladies; continue amiodarone infusion; low dose insulin sliding scale; electrolyte sliding scale      Past Medical History:   Diagnosis Date    Allergy     " Dust mites, Grasses, Trees    Arthritis     Asthma     Blood transfusion     CAD (coronary artery disease)     Cataract     CHRONIC BRONCHITIS     Diabetes mellitus     Diabetes mellitus type II     GERD (gastroesophageal reflux disease)     Hyperlipidemia     Hypertension     Irregular heart beat     Kidney disease     ckd stage 3  as stated by patient - to see MD    Post-menopausal bleeding 2018    Spinal stenosis     Thyroid disease     Hypothyroidism       Past Surgical History:   Procedure Laterality Date    APPENDECTOMY  1968    BLADDER SUSPENSION  1989    CARDIAC SURGERY  2016    CABG    CATARACT EXTRACTION  9/2007 (L) and 10/2207 (R)    COLONOSCOPY N/A 10/18/2017    Procedure: COLONOSCOPY;  Surgeon: Esme Acuna MD;  Location: [facility] ENDO;  Service: Endoscopy;  Laterality: N/A;    CORONARY ANGIOGRAPHY INCLUDING BYPASS GRAFTS WITH CATHETERIZATION OF LEFT HEART Left 5/13/2022    Procedure: Left heart cath;  Surgeon: Davon Patton MD;  Location: [facility] CATH/EP LAB;  Service: Cardiology;  Laterality: Left;    CORONARY ARTERY BYPASS GRAFT  4/26/2004    x5    ESOPHAGEAL DILATION      ESOPHAGOGASTRODUODENOSCOPY N/A 6/27/2022    Procedure: EGD (ESOPHAGOGASTRODUODENOSCOPY);  Surgeon: Skip Nj MD;  Location: [facility] ENDO;  Service: Endoscopy;  Laterality: N/A;    HYSTEROSCOPY WITH DILATION AND CURETTAGE OF UTERUS N/A 3/12/2020    Procedure: HYSTEROSCOPY, WITH DILATION AND CURETTAGE OF UTERUS;  Surgeon: Ramona Llanos MD;  Location: [facility] OR;  Service: OB/GYN;  Laterality: N/A;    SPINE SURGERY  3/2000    Tumor    TRANSFORAMINAL EPIDURAL INJECTION OF STEROID Right 11/21/2019    Procedure: Injection,steroid,epidural,transforaminal approach;  Surgeon: Bj Jones MD;  Location: [facility] OR;  Service: Pain Management;  Laterality: Right;  L4-5, L5-S1    TRANSFORAMINAL EPIDURAL INJECTION OF STEROID Right 12/31/2019    Procedure: Injection,steroid,epidural,transforaminal approach;   Surgeon: Bj Jones MD;  Location: [facility] OR;  Service: Pain Management;  Laterality: Right;  L4-5, L5-S1    TRANSFORAMINAL EPIDURAL INJECTION OF STEROID Right 2/5/2020    Procedure: Injection,steroid,epidural,transforaminal approach;  Surgeon: Bj Jones MD;  Location: [facility] OR;  Service: Pain Management;  Laterality: Right;  L4-5, L5-S1    TRANSFORAMINAL EPIDURAL INJECTION OF STEROID Left 1/27/2021    Procedure: Injection,steroid,epidural,transforaminal approach;  Surgeon: Bj Jones MD;  Location: [facility] OR;  Service: Pain Management;  Laterality: Left;  L4-5, L5-S1    TRANSFORAMINAL EPIDURAL INJECTION OF STEROID Right 3/4/2021    Procedure: Injection,steroid,epidural,transforaminal approach;  Surgeon: Bj Jones MD;  Location: [facility] OR;  Service: Pain Management;  Laterality: Right;  L4-L5, L5-S1    WRIST SURGERY  1993    carpal tunnel         Review of patient's allergies indicates:   Allergen Reactions    Dexlansoprazole Itching, Nausea Only and Rash    Sulfa (sulfonamide antibiotics) Rash    Floxacillin Itching    Januvia [sitagliptin] Other (See Comments)     Hot flashes    Tetracyclines Itching    Fenofibrate micronized Rash    Nitrofurantoin macrocrystalline Other (See Comments)     unknown    Phenylfenesin la [phenylpropanolamine-gg] Rash       No current facility-administered medications on file prior to encounter.     Current Outpatient Medications on File Prior to Encounter   Medication Sig    acetaminophen (TYLENOL) 650 MG TbSR Take 1,300 mg by mouth once daily. 2 Tablet(s) Oral  Every day.    amitriptyline (ELAVIL) 75 MG tablet Take 75 mg by mouth every evening.    apixaban (ELIQUIS) 5 mg Tab Take 1 tablet (5 mg total) by mouth 2 (two) times daily.    blood sugar diagnostic (FREESTYLE LITE STRIPS) Strp USE TO TEST BLOOD SUGAR TWICE A DAY    busPIRone (BUSPAR) 10 MG tablet TAKE 1 TABLET BY MOUTH TWICE A DAY    carboxymethylcellulose 1 % ophthalmic solution Apply 1 drop to eye As  instructed. as directed    cetirizine (ZYRTEC) 10 MG tablet Take 10 mg by mouth once daily.    cholecalciferol, vitamin D3, (VITAMIN D3) 50 mcg (2,000 unit) Cap Take 1 capsule by mouth once daily.    clotrimazole-betamethasone 1-0.05% (LOTRISONE) cream Apply topically 2 (two) times daily as needed.    coenzyme Q10 100 mg capsule Take 100 mg by mouth once daily.     cranberry extract 650 mg Cap Take 1 tablet by mouth 2 (two) times daily.    FARXIGA 5 mg Tab tablet Take 5 mg by mouth once daily.    fluticasone furoate-vilanteroL (BREO ELLIPTA) 100-25 mcg/dose diskus inhaler Inhale 1 puff into the lungs once daily. Controller Rinse after you use it    fluticasone propionate (FLONASE) 50 mcg/actuation nasal spray 1 spray (50 mcg total) by Each Nostril route once daily.    Lactobac no.41/Bifidobact no.7 (PROBIOTIC-10 ORAL) Take by mouth.    lancets Misc 1 Units by Misc.(Non-Drug; Combo Route) route 2 (two) times daily.    lansoprazole (PREVACID) 30 MG capsule Take 1 capsule (30 mg total) by mouth once daily.    levalbuterol (XOPENEX) 1.25 mg/0.5 mL nebulizer solution Take 0.5 mLs (1.25 mg total) by nebulization every 6 (six) hours as needed for Wheezing. Rescue    levothyroxine (SYNTHROID) 25 MCG tablet TAKE 1 TABLET BY MOUTH BEFORE BREAKFAST EVERY DAY    metoprolol succinate (TOPROL XL) 25 MG 24 hr tablet Take 1 tablet (25 mg total) by mouth once daily.    nitroGLYCERIN (NITROSTAT) 0.4 MG SL tablet Place 0.4 mg under the tongue every 5 (five) minutes as needed. 0.4mg Sublingual PRN .      pramoxine-hydrocortisone (PROCTOCREAM-HC) 1-1 % rectal cream Place rectally 2 (two) times daily. (Patient taking differently: Place 1 application rectally 2 (two) times daily.)    RESTASIS 0.05 % ophthalmic emulsion Place 1 drop into both eyes 2 (two) times daily.    rosuvastatin (CRESTOR) 10 MG tablet Take 1 tablet (10 mg total) by mouth once daily.    triazolam (HALCION) 0.25 MG Tab Take 1 tablet (0.25 mg total) by  mouth nightly as needed (sleep).    UNABLE TO FIND Take 1 capsule by mouth once daily. Vital red    vitamins  A,C,E-zinc-copper 14,320-226-200 unit-mg-unit Cap Take 1 capsule by mouth 2 (two) times daily.     [DISCONTINUED] dronedarone (MULTAQ) 400 mg Tab Take 1 tablet (400 mg total) by mouth 2 (two) times daily with meals.    [DISCONTINUED] spironolactone (ALDACTONE) 25 MG tablet Take 1 tablet (25 mg total) by mouth once daily.     Family History       Problem Relation (Age of Onset)    Breast cancer Mother, Maternal Aunt          Tobacco Use    Smoking status: Passive Smoke Exposure - Never Smoker    Smokeless tobacco: Never    Tobacco comments:     PARENTS    Substance and Sexual Activity    Alcohol use: Yes     Comment: Rare    Drug use: No    Sexual activity: Not Currently     Review of Systems   Constitutional: Negative.    HENT: Negative.     Eyes: Negative.    Respiratory:  Positive for shortness of breath.    Cardiovascular:  Positive for palpitations.   Gastrointestinal: Negative.    Endocrine: Negative.    Genitourinary: Negative.    Musculoskeletal: Negative.    Skin: Negative.    Allergic/Immunologic: Negative.    Neurological: Negative.    Hematological: Negative.    All other systems reviewed and are negative.  Objective:     Vital Signs (Most Recent):  Temp: 98.3 °F (36.8 °C) (01/18/23 2013)  Pulse: 77 (01/19/23 0200)  Resp: 20 (01/19/23 0030)  BP: 139/64 (01/19/23 0200)  SpO2: 95 % (01/19/23 0130) Vital Signs (24h Range):  Temp:  [98.3 °F (36.8 °C)] 98.3 °F (36.8 °C)  Pulse:  [] 77  Resp:  [18-20] 20  SpO2:  [95 %-98 %] 95 %  BP: (112-158)/(58-94) 139/64     Weight: 64 kg (141 lb)  Body mass index is 25.79 kg/m².    Physical Exam  Vitals and nursing note reviewed.   Constitutional:       Appearance: She is well-developed.   HENT:      Head: Normocephalic and atraumatic.      Right Ear: External ear normal.      Left Ear: External ear normal.      Nose: Nose normal.   Eyes:       Conjunctiva/sclera: Conjunctivae normal.      Pupils: Pupils are equal, round, and reactive to light.   Cardiovascular:      Rate and Rhythm: Normal rate. Rhythm irregular.      Heart sounds: Normal heart sounds.   Pulmonary:      Effort: Pulmonary effort is normal.      Comments: Decreaed entry bases with coarse large airway sounds clearing with cough; no opening crepitations nor expiratory wheezes currently appreciated  Abdominal:      General: Bowel sounds are normal.      Palpations: Abdomen is soft.   Musculoskeletal:         General: Normal range of motion.      Cervical back: Normal range of motion and neck supple.   Skin:     General: Skin is warm and dry.      Capillary Refill: Capillary refill takes less than 2 seconds.   Neurological:      Mental Status: She is alert and oriented to person, place, and time.   Psychiatric:         Behavior: Behavior normal.         Thought Content: Thought content normal.         Judgment: Judgment normal.         CRANIAL NERVES     CN III, IV, VI   Pupils are equal, round, and reactive to light.     Significant Labs: All pertinent labs within the past 24 hours have been reviewed.  CBC:   Recent Labs   Lab 01/18/23 2032   WBC 5.81   HGB 12.2   HCT 39.1        CMP:   Recent Labs   Lab 01/18/23 2032      K 4.3   CL 99   CO2 26   *   BUN 25*   CREATININE 1.0   CALCIUM 9.6   PROT 7.2   ALBUMIN 3.9   BILITOT 0.4   ALKPHOS 72   AST 32   ALT 40   ANIONGAP 11     Cardiac Markers:   Recent Labs   Lab 01/18/23 2032   BNP 1,309*     Troponin:   Recent Labs   Lab 01/18/23 2032   TROPONINIHS 22.2*     TSH:   Recent Labs   Lab 01/18/23 2032   TSH 3.580       Significant Imaging: I have reviewed all pertinent imaging results/findings within the past 24 hours.    Assessment/Plan:     * Atrial fibrillation with RVR  Patient with Paroxysmal (<7 days) atrial fibrillation which is controlled currently with Amiodarone. Patient is currently in atrial  fibrillation.[id number] Score: 5. HASBLED Score: . Anticoagulation indicated. Anticoagulation done with apixaban    Inpatient admission for a fib RVR, and possible pneumonia; Cardiology opinion; ceftriaxone 1 gm q day; cultured; continue preadmission regimen for chronic maladies; continue amiodarone infusion; low dose insulin sliding scale; electrolyte sliding scale.        DM (diabetes mellitus), type 2  Inpatient admission for a fib RVR, and possible pneumonia; Cardiology opinion; ceftriaxone 1 gm q day; cultured; continue preadmission regimen for chronic maladies; continue amiodarone infusion; low dose insulin sliding scale; electrolyte sliding scale    Atherosclerosis of native coronary artery of native heart with angina pectoris  Inpatient admission for a fib RVR, and possible pneumonia; Cardiology opinion; ceftriaxone 1 gm q day; cultured; continue preadmission regimen for chronic maladies; continue amiodarone infusion; low dose insulin sliding scale; electrolyte sliding scale        VTE Risk Mitigation (From admission, onward)    None             Oumar Hurd MD  Department of Hospital Medicine   [facility] - Emergency Dept

## 2023-01-19 NOTE — PLAN OF CARE
Continue plan of care at this time.     Patient requested to walk around room to help relieve gas. Pt placed on portable monitor and pt steady on feet. Daughter at bedside. 2L/NC on. PRN Simethicone ordered.

## 2023-01-19 NOTE — ED NOTES
Pt to ED with family after a couple days of SOB and increased HR. Pt is A&O, appears slightly SOB, on 3L O2 from home. Per daughter pt uses 3L O2 at night but has been using it 24/7 the past couple days 2/2 SOB. Pt is in AF RVR on monitor, denies CP but relates her chest feels tight. Pt denies being on any antidysrhythmics, is on Eliquis. Pt attached to monitoring devices, call light within reach.

## 2023-01-19 NOTE — ED PROVIDER NOTES
"Encounter Date: 1/18/2023       History     Chief Complaint   Patient presents with    Palpitations     Reports "atrial fib" / + sob pta / on home O2 at 3 l/min     This is an 82-year-old female with history of paroxysmal AFib, coronary artery disease, diabetes, hypertension, hyperlipidemia, CKD, spinal stenosis, asthma, hypothyroidism comes in complaining of palpitations.  Patient reports that around 6:00 p.m. she felt like she was going into AFib.  She began to have palpitations.  She reports chest tightness with this.  Symptoms usually quickly resolved but have continued.  She denies any nausea or vomiting.  Pain is moderate and constant.  She denies any lower extremity edema.  She denies any exacerbating or alleviating factors otherwise.    Review of patient's allergies indicates:   Allergen Reactions    Dexlansoprazole Itching, Nausea Only and Rash    Sulfa (sulfonamide antibiotics) Rash    Floxacillin Itching    Januvia [sitagliptin] Other (See Comments)     Hot flashes    Tetracyclines Itching    Fenofibrate micronized Rash    Nitrofurantoin macrocrystalline Other (See Comments)     unknown    Phenylfenesin la [phenylpropanolamine-gg] Rash     Past Medical History:   Diagnosis Date    Allergy     Dust mites, Grasses, Trees    Arthritis     Asthma     Blood transfusion     CAD (coronary artery disease)     Cataract     CHRONIC BRONCHITIS     Diabetes mellitus     Diabetes mellitus type II     GERD (gastroesophageal reflux disease)     Hyperlipidemia     Hypertension     Irregular heart beat     Kidney disease     ckd stage 3  as stated by patient - to see MD    Post-menopausal bleeding 2018    Spinal stenosis     Thyroid disease     Hypothyroidism     Past Surgical History:   Procedure Laterality Date    APPENDECTOMY  1968    BLADDER SUSPENSION  1989    CARDIAC SURGERY  2016    CABG    CATARACT EXTRACTION  9/2007 (L) and 10/2207 (R)    COLONOSCOPY N/A 10/18/2017    Procedure: COLONOSCOPY;  Surgeon: Esme" MD Armand;  Location: [facility] ENDO;  Service: Endoscopy;  Laterality: N/A;    CORONARY ANGIOGRAPHY INCLUDING BYPASS GRAFTS WITH CATHETERIZATION OF LEFT HEART Left 5/13/2022    Procedure: Left heart cath;  Surgeon: Davon Patton MD;  Location: [facility] CATH/EP LAB;  Service: Cardiology;  Laterality: Left;    CORONARY ARTERY BYPASS GRAFT  4/26/2004    x5    ESOPHAGEAL DILATION      ESOPHAGOGASTRODUODENOSCOPY N/A 6/27/2022    Procedure: EGD (ESOPHAGOGASTRODUODENOSCOPY);  Surgeon: Skip Nj MD;  Location: [facility] ENDO;  Service: Endoscopy;  Laterality: N/A;    HYSTEROSCOPY WITH DILATION AND CURETTAGE OF UTERUS N/A 3/12/2020    Procedure: HYSTEROSCOPY, WITH DILATION AND CURETTAGE OF UTERUS;  Surgeon: Ramona Llanos MD;  Location: [facility] OR;  Service: OB/GYN;  Laterality: N/A;    SPINE SURGERY  3/2000    Tumor    TRANSFORAMINAL EPIDURAL INJECTION OF STEROID Right 11/21/2019    Procedure: Injection,steroid,epidural,transforaminal approach;  Surgeon: Bj Jones MD;  Location: [facility];  Service: Pain Management;  Laterality: Right;  L4-5, L5-S1    TRANSFORAMINAL EPIDURAL INJECTION OF STEROID Right 12/31/2019    Procedure: Injection,steroid,epidural,transforaminal approach;  Surgeon: Bj Jones MD;  Location: [facility] OR;  Service: Pain Management;  Laterality: Right;  L4-5, L5-S1    TRANSFORAMINAL EPIDURAL INJECTION OF STEROID Right 2/5/2020    Procedure: Injection,steroid,epidural,transforaminal approach;  Surgeon: Bj Jones MD;  Location: [facility] OR;  Service: Pain Management;  Laterality: Right;  L4-5, L5-S1    TRANSFORAMINAL EPIDURAL INJECTION OF STEROID Left 1/27/2021    Procedure: Injection,steroid,epidural,transforaminal approach;  Surgeon: Bj Jones MD;  Location: [facility] OR;  Service: Pain Management;  Laterality: Left;  L4-5, L5-S1    TRANSFORAMINAL EPIDURAL INJECTION OF STEROID Right 3/4/2021    Procedure: Injection,steroid,epidural,transforaminal approach;  Surgeon: Bj Jones MD;  Location: [facility] OR;  Service:  Pain Management;  Laterality: Right;  L4-L5, L5-S1    WRIST SURGERY  1993    carpal tunnel       Family History   Problem Relation Age of Onset    Breast cancer Mother     Breast cancer Maternal Aunt     Allergic rhinitis Neg Hx     Allergies Neg Hx     Angioedema Neg Hx     Asthma Neg Hx     Eczema Neg Hx     Immunodeficiency Neg Hx     Urticaria Neg Hx     Rhinitis Neg Hx     Atopy Neg Hx      Social History     Tobacco Use    Smoking status: Passive Smoke Exposure - Never Smoker    Smokeless tobacco: Never    Tobacco comments:     PARENTS    Substance Use Topics    Alcohol use: Yes     Comment: Rare    Drug use: No     Review of Systems   Constitutional:  Negative for chills and fever.   HENT:  Negative for congestion, sore throat and trouble swallowing.    Respiratory:  Positive for shortness of breath. Negative for cough.    Cardiovascular:  Positive for chest pain and palpitations.   Gastrointestinal:  Negative for abdominal pain, diarrhea, nausea and vomiting.   Genitourinary:  Negative for dysuria and flank pain.   Musculoskeletal:  Negative for back pain and neck pain.   Neurological:  Negative for weakness, numbness and headaches.   Psychiatric/Behavioral:  Negative for agitation and confusion.    All other systems reviewed and are negative.    Physical Exam     Initial Vitals [01/18/23 2013]   BP Pulse Resp Temp SpO2   (!) 158/90 (!) 118 18 98.3 °F (36.8 °C) 98 %      MAP       --         Physical Exam    Nursing note and vitals reviewed.  Constitutional: Vital signs are normal. She appears well-developed and well-nourished.  Non-toxic appearance. No distress.   HENT:   Head: Normocephalic and atraumatic.   Mouth/Throat: Oropharynx is clear and moist.   Eyes: Conjunctivae and EOM are normal. Pupils are equal, round, and reactive to light.   Neck: Neck supple.   Normal range of motion.  Cardiovascular:  Intact distal pulses.           Tachycardic, irregularly irregular   Pulmonary/Chest: Breath sounds  normal. She has no wheezes.   Abdominal: Abdomen is soft. Bowel sounds are normal. There is no abdominal tenderness.   Musculoskeletal:         General: Normal range of motion.      Cervical back: Normal range of motion and neck supple. No rigidity. No muscular tenderness.     Lymphadenopathy:     She has no cervical adenopathy.     She has no axillary adenopathy.   Neurological: She is alert and oriented to person, place, and time. She has normal strength. No cranial nerve deficit or sensory deficit. Gait normal.   Skin: Skin is warm, dry and intact.   Psychiatric: She has a normal mood and affect. Her behavior is normal.       ED Course   Critical Care    Date/Time: 1/18/2023 10:03 PM  Performed by: Emely Tatum MD  Authorized by: Emely Tatum MD   Direct patient critical care time: 10 minutes  Additional history critical care time: 5 minutes  Ordering / reviewing critical care time: 5 minutes  Documentation critical care time: 5 minutes  Consulting other physicians critical care time: 5 minutes  Total critical care time (exclusive of procedural time) : 30 minutes  Critical care time was exclusive of separately billable procedures and treating other patients and teaching time.  Critical care was necessary to treat or prevent imminent or life-threatening deterioration of the following conditions: cardiac failure.  Critical care was time spent personally by me on the following activities: development of treatment plan with patient or surrogate, discussions with consultants, interpretation of cardiac output measurements, evaluation of patient's response to treatment, examination of patient, obtaining history from patient or surrogate, ordering and performing treatments and interventions, ordering and review of laboratory studies, ordering and review of radiographic studies, pulse oximetry, re-evaluation of patient's condition and review of old charts.      Labs Reviewed   CBC W/ AUTO DIFFERENTIAL - Abnormal;  Notable for the following components:       Result Value    MCHC 31.2 (*)     All other components within normal limits   COMPREHENSIVE METABOLIC PANEL - Abnormal; Notable for the following components:    Glucose 124 (*)     BUN 25 (*)     eGFR 56.2 (*)     All other components within normal limits   TROPONIN I HIGH SENSITIVITY - Abnormal; Notable for the following components:    Troponin I High Sensitivity 22.2 (*)     All other components within normal limits   B-TYPE NATRIURETIC PEPTIDE - Abnormal; Notable for the following components:    BNP 1,309 (*)     All other components within normal limits   URINALYSIS, REFLEX TO URINE CULTURE - Abnormal; Notable for the following components:    Protein, UA Trace (*)     Glucose, UA 3+ (*)     All other components within normal limits    Narrative:     Specimen Source->Urine   APTT   PROTIME-INR   TSH   MAGNESIUM   URINALYSIS MICROSCOPIC    Narrative:     Specimen Source->Urine     EKG Readings: (Independently Interpreted)   Time: 2008  Rate: 111 bpm  Afib with RVR. Non-specific T wave abnormality.  Changed when compared to prior.    Other EKG Interpretations: Time: 2135  Rate: 98 bpm  Afib. LVH. Nonspecific T wave abnormality.  Unchanged from prior.     Imaging Results              X-Ray Chest AP Portable (In process)                      Medications   amiodarone 360 mg/200 mL (1.8 mg/mL) infusion (1 mg/min Intravenous New Bag 1/18/23 2115)   amiodarone 360 mg/200 mL (1.8 mg/mL) infusion (has no administration in time range)   amiodarone in dextrose 150 mg/100 mL (1.5 mg/mL) loading dose 150 mg (0 mg Intravenous Stopped 1/18/23 2050)   ondansetron injection 4 mg (4 mg Intravenous Given 1/19/23 0055)     Medical Decision Making:   Initial Assessment:   This is an 82-year-old female with history of paroxysmal AFib, coronary artery disease, diabetes, hypertension, hyperlipidemia, CKD, spinal stenosis, asthma, hypothyroidism comes in complaining of palpitations.  On  evaluation patient is tachycardic.  On physical exam she has an irregularly irregular rhythm.  Exam is normal otherwise.    Orders included EKG, CBC, CMP, troponin, BNP, chest x-ray.  Differential Diagnosis:   AFib, a flutter, ACS, PE, electrolyte abnormality.  Independently Interpreted Test(s):   I have ordered and independently interpreted X-rays - see summary below.       <> Summary of X-Ray Reading(s): Chest x-ray was independently reviewed by me and was concerning for a right lower lobe effusion with possible overlying infiltrate.  I have ordered and independently interpreted EKG Reading(s) - see prior notes  Clinical Tests:   Lab Tests: Ordered and Reviewed       <> Summary of Lab: Labs were reviewed were significant for high sensitivity troponin of 22.  ED Management:  I reviewed external records on the patient.  She had an echo in May of 2022.  She had an angiogram at that time.  She had a depressed EF of 40%.    EKG shows AFib with RVR.  In light of her decreased EF she was started on amiodarone.  Patient's workup is consistent with AFib with RVR.  She was given amiodarone.  Her heart rate improved.  She was still complaining of intermittent chest pain.  Her high sensitivity troponin was 22.  Patient will be admitted for continued treatment.           ED Course as of 01/19/23 0113   Thu Jan 19, 2023 0113 0113  Case was discussed with hospitalist on-call.  [TZ]      ED Course User Index  [TZ] Emely Tatum MD                 Clinical Impression:   Final diagnoses:  [R00.2] Palpitations  [I48.91] Atrial fibrillation with rapid ventricular response (Primary)  [R07.9] Chest pain, unspecified type        ED Disposition Condition    Admit Stable                Emely Tatum MD  01/19/23 0113

## 2023-01-19 NOTE — PLAN OF CARE
Met with patient at bedside to complete initial assessment. Patient states she DOES NOT have a living will and that her son Tim Tipton is medical POA.    [facility]  Initial Discharge Assessment       Primary Care Provider: Ray Chen MD    Admission Diagnosis: Atrial fibrillation with rapid ventricular response [I48.91]    Admission Date: 1/18/2023  Expected Discharge Date: 1/21/2023    Discharge Barriers Identified: (P) None    Payor: [employer] MANAGED MEDICARE / Plan: [employer] MEDICARE PLAN PPO / Product Type: Medicare Advantage /     Extended Emergency Contact Information  Primary Emergency Contact: Dwayne Tipton  Address: [address] [place] [address] [facility]  Home Phone: [phone]  Mobile Phone: [phone]  Relation: Spouse  Preferred language: English   needed? No    Discharge Plan A: (P) Home with family  Discharge Plan B: (P) Home with family      [id number] - [place], [place] - [address]  [address]  [facility] [address]  Phone: [phone] Fax: [phone]    [facility] - [place], [place] - [address]  [address]  [facility] [address]  Phone: [phone] Fax: [phone]    [facility]  [address]  [facility] [address]  Phone: [phone] Fax: [phone]      Initial Assessment (most recent)       Adult Discharge Assessment - 01/19/23 1540          Discharge Assessment    Assessment Type Discharge Planning Assessment (P)      Confirmed/corrected address, phone number and insurance Yes (P)      Confirmed Demographics Correct on Facesheet (P)      Source of Information patient (P)      Communicated SHIRA with patient/caregiver No (P)      Reason For Admission A-fib with RVR (P)      People in Home spouse (P)      Facility Arrived From: home (P)      Do you expect to return to your current living situation? Yes (P)      Do you have help at  home or someone to help you manage your care at home? Yes (P)      Who are your caregiver(s) and their phone number(s)? Dwayne Tipton (Spouse)   [phone] (Mobile) (P)      Current cognitive status: Alert/Oriented (P)      Equipment Currently Used at Home oxygen;rollator (P)    Patient has a portable oxygen concentrator only.    Readmission within 30 days? No (P)      Patient currently being followed by outpatient case management? No (P)      Do you currently have service(s) that help you manage your care at home? No (P)      Do you take prescription medications? Yes (P)      Do you have prescription coverage? Yes (P)      Coverage [employer] Managed Medicare (P)      Do you have any problems affording any of your prescribed medications? No (P)      Who is going to help you get home at discharge? Dwayne Tipton (Spouse)   [phone] (Mobile) (P)      How do you get to doctors appointments? car, drives self;family or friend will provide (P)      Are you on dialysis? No (P)      Do you take coumadin? No (P)      Discharge Plan A Home with family (P)      Discharge Plan B Home with family (P)      DME Needed Upon Discharge  none (P)      Discharge Plan discussed with: Patient (P)      Discharge Barriers Identified None (P)         OTHER    Name(s) of People in Home Dwayne Tipton (Spouse)   [phone] (Mobile) (P)

## 2023-01-19 NOTE — CARE UPDATE
Patient is currently chest pain-free, although she states that she feels gas in her chest.  Will give GI cocktail, cardiology consulted  H&P per previous physician, labs and images reviewed.  Continue current care management.

## 2023-01-19 NOTE — ASSESSMENT & PLAN NOTE
Patient with Paroxysmal (<7 days) atrial fibrillation which is controlled currently with Amiodarone. Patient is currently in atrial fibrillation.[id number] Score: 5. HASBLED Score: . Anticoagulation indicated. Anticoagulation done with apixaban    Inpatient admission for a fib RVR, and possible pneumonia; Cardiology opinion; ceftriaxone 1 gm q day; cultured; continue preadmission regimen for chronic maladies; continue amiodarone infusion; low dose insulin sliding scale; electrolyte sliding scale.

## 2023-01-19 NOTE — CARE UPDATE
01/19/23 0727   Patient Assessment/Suction   Level of Consciousness (AVPU) alert   All Lung Fields Breath Sounds clear   PRE-TX-O2   Device (Oxygen Therapy) nasal cannula   $ Is the patient on Low Flow Oxygen? Yes   Flow (L/min) 3   SpO2 (!) 93 %   Pulse Oximetry Type Continuous   $ Pulse Oximetry - Multiple Charge Pulse Oximetry - Multiple   Pulse 80   Resp 15   Aerosol Therapy   $ Aerosol Therapy Charges Aerosol Treatment  (pulmicort)   Daily Review of Necessity (SVN) completed   Respiratory Treatment Status (SVN) given   Treatment Route (SVN) mask   Patient Position (SVN) sitting in chair   Post Treatment Assessment (SVN) breath sounds unchanged   Signs of Intolerance (SVN) none   Breath Sounds Post-Respiratory Treatment   Post-treatment Heart Rate (beats/min) 78   Post-treatment Resp Rate (breaths/min) 15   Education   $ Education Bronchodilator;15 min   Respiratory Evaluation   $ Care Plan Tech Time 15 min

## 2023-01-19 NOTE — ED NOTES
Bedside commode placed at bedside, pt aware of the need for urine. Family at bedside. Pt given blanket, resting comfortably on bed.

## 2023-01-19 NOTE — FIRST PROVIDER EVALUATION
Medical screening examination initiated.  I have conducted a focused provider triage encounter, findings are as follows:    Brief history of present illness:  Patient is an 82-year-old female presenting to ED for palpitations.  Patient states she has a history of AFib.  Patient does wear home O2 at 3 L.  Patient did not bring oxygen with her today.  Patient endorses increasing shortness of breath today.    Vitals:    01/18/23 2013   BP: (!) 158/90   BP Location: Left arm   Patient Position: Sitting   Pulse: (!) 118   Resp: 18   Temp: 98.3 °F (36.8 °C)   TempSrc: Oral   SpO2: 98%   Weight: 64 kg (141 lb)       Pertinent physical exam:  Tachycardic on initial exam.  All vital signs stable.    Brief workup plan:  Labs, imaging, EKG    Preliminary workup initiated; this workup will be continued and followed by the physician or advanced practice provider that is assigned to the patient when roomed.

## 2023-01-19 NOTE — CONSULTS
"[facility]  Department of Cardiology  Consult Note      PATIENT NAME: Darlin Tipton    MRN: [id number]  TODAY'S DATE: 01/19/2023  ADMIT DATE: 1/18/2023                          CONSULT REQUESTED BY: Collin Hoang MD    SUBJECTIVE     PRINCIPAL PROBLEM: Atrial fibrillation with RVR      REASON FOR CONSULT:  From H&P: 82 year old female with history of CAD, DM 2, HTN, PAF, GERD, COPD (Chronic Bronchitis 3 lpm oxygen at home), Hypothyroidism presentred to ED complaining of "Palpitations" and "Irregular heart beat" for "A couple of hours". "I felt like I was going back into a fib again". Her Cardiologist no longer attends this hospital. She has had "Occasional" episodes of orthopnea and PND. Her daughter at bedside stated that the patient's Cardiologist "Was planning on starting a diruetic this weekend" for her "Occasional" edema. No cough or sputum. The patient became rate controlled in ED with amiodarone bolus/infusion. No chest discomfort.     In ED Labs reviewed and noted below: normal CBC, normal electrolytes with stage 3a renal dysfunction; BNP is markedly elevated with minimally elevated HS trop (repeat pending); TSH is normal. CXR reviewed: cardiomegaly with moderate right sided effusiion and small left sided effusion-possible underlying infiltrate on right. EKG reviewed: a fib RVR; no acute segments.     Discussed with ED MD; Inpatient admission for a fib RVR, and possible pneumonia; Cardiology opinion; ceftriaxone 1 gm q day; cultured; continue preadmission regimen for chronic maladies; continue amiodarone infusion; low dose insulin sliding scale; electrolyte sliding scale         HPI:    Patient is an 82 year old female who presented to the ER with complaints of palpitations for a few hours. She reports having occasional shortness of breath, swelling, and orthopnea recently as well. On arrival, patient was noted to have Afib with RVR as well as pleural effusions.  Patient has since converted " back into sinus rhythm.  She does have some nausea and headache but her breathing appears to be stable.  High sensitivity troponin was mildly elevated just above 20.        Review of patient's allergies indicates:   Allergen Reactions    Dexlansoprazole Itching, Nausea Only and Rash    Sulfa (sulfonamide antibiotics) Rash    Floxacillin Itching    Januvia [sitagliptin] Other (See Comments)     Hot flashes    Tetracyclines Itching    Fenofibrate micronized Rash    Nitrofurantoin macrocrystalline Other (See Comments)     unknown    Phenylfenesin la [phenylpropanolamine-gg] Rash       Past Medical History:   Diagnosis Date    Allergy     Dust mites, Grasses, Trees    Arthritis     Asthma     Blood transfusion     CAD (coronary artery disease)     Cataract     CHRONIC BRONCHITIS     Diabetes mellitus     Diabetes mellitus type II     GERD (gastroesophageal reflux disease)     Hyperlipidemia     Hypertension     Irregular heart beat     Kidney disease     ckd stage 3  as stated by patient - to see MD    Post-menopausal bleeding 2018    Spinal stenosis     Thyroid disease     Hypothyroidism     Past Surgical History:   Procedure Laterality Date    APPENDECTOMY  1968    BLADDER SUSPENSION  1989    CARDIAC SURGERY  2016    CABG    CATARACT EXTRACTION  9/2007 (L) and 10/2207 (R)    COLONOSCOPY N/A 10/18/2017    Procedure: COLONOSCOPY;  Surgeon: Esme Acuna MD;  Location: [facility];  Service: Endoscopy;  Laterality: N/A;    CORONARY ANGIOGRAPHY INCLUDING BYPASS GRAFTS WITH CATHETERIZATION OF LEFT HEART Left 5/13/2022    Procedure: Left heart cath;  Surgeon: Davon Patton MD;  Location: [facility] CATH/EP LAB;  Service: Cardiology;  Laterality: Left;    CORONARY ARTERY BYPASS GRAFT  4/26/2004    x5    ESOPHAGEAL DILATION      ESOPHAGOGASTRODUODENOSCOPY N/A 6/27/2022    Procedure: EGD (ESOPHAGOGASTRODUODENOSCOPY);  Surgeon: Skip Nj MD;  Location: [facility];  Service: Endoscopy;  Laterality: N/A;    HYSTEROSCOPY  WITH DILATION AND CURETTAGE OF UTERUS N/A 3/12/2020    Procedure: HYSTEROSCOPY, WITH DILATION AND CURETTAGE OF UTERUS;  Surgeon: Ramona Llanos MD;  Location[facility] OR;  Service: OB/GYN;  Laterality: N/A;    SPINE SURGERY  3/2000    Tumor    TRANSFORAMINAL EPIDURAL INJECTION OF STEROID Right 11/21/2019    Procedure: Injection,steroid,epidural,transforaminal approach;  Surgeon: Bj Jones MD;  [facility];  Service: Pain Management;  Laterality: Right;  L4-5, L5-S1    TRANSFORAMINAL EPIDURAL INJECTION OF STEROID Right 12/31/2019    Procedure: Injection,steroid,epidural,transforaminal approach;  Surgeon: Bj Jones MD;  [facility];  Service: Pain Management;  Laterality: Right;  L4-5, L5-S1    TRANSFORAMINAL EPIDURAL INJECTION OF STEROID Right 2/5/2020    Procedure: Injection,steroid,epidural,transforaminal approach;  Surgeon: Bj Jones MD;  [facility];  Service: Pain Management;  Laterality: Right;  L4-5, L5-S1    TRANSFORAMINAL EPIDURAL INJECTION OF STEROID Left 1/27/2021    Procedure: Injection,steroid,epidural,transforaminal approach;  Surgeon: Bj Jones MD;  [facility];  Service: Pain Management;  Laterality: Left;  L4-5, L5-S1    TRANSFORAMINAL EPIDURAL INJECTION OF STEROID Right 3/4/2021    Procedure: Injection,steroid,epidural,transforaminal approach;  Surgeon: Bj Jones MD;  [facility];  Service: Pain Management;  Laterality: Right;  L4-L5, L5-S1    WRIST SURGERY  1993    carpal tunnel       Social History     Tobacco Use    Smoking status: Passive Smoke Exposure - Never Smoker    Smokeless tobacco: Never    Tobacco comments:     PARENTS    Substance Use Topics    Alcohol use: Yes     Comment: Rare    Drug use: No        REVIEW OF SYSTEMS  CONSTITUTIONAL: Negative for chills, fatigue and fever.   EYES: No double vision, No blurred vision  NEURO: No headaches, No dizziness  RESPIRATORY: + shortness of breath and orthopnea   CARDIOVASCULAR: + for chest pain. + for  palpitations and leg swelling.   GI: Negative for abdominal pain, +nausea   : Negative for dysuria and frequency, Negative for hematuria  SKIN: Negative for bruising, Negative for edema or discoloration noted.   PSYCHIATRIC: Negative for depression, Negative for anxiety, Negative for memory loss  MUSCULOSKELETAL: Negative for neck pain, Negative for muscle weakness, Negative for back pain     OBJECTIVE     VITAL SIGNS (Most Recent)  Temp: 97.8 °F (36.6 °C) (01/19/23 0731)  Pulse: 80 (01/19/23 0731)  Resp: (!) 28 (01/19/23 0731)  BP: 135/63 (01/19/23 0731)  SpO2: 98 % (01/19/23 0731)    VENTILATION STATUS  Resp: (!) 28 (01/19/23 0731)  SpO2: 98 % (01/19/23 0731)       I & O (Last 24H):  Intake/Output Summary (Last 24 hours) at 1/19/2023 0928  Last data filed at 1/19/2023 0517  Gross per 24 hour   Intake 250 ml   Output 100 ml   Net 150 ml       WEIGHTS  Wt Readings from Last 1 Encounters:   01/19/23 0330 65 kg (143 lb 4.8 oz)   01/18/23 2013 64 kg (141 lb)       PHYSICAL EXAM  CONSTITUTIONAL: No fever, no chills  HEENT: Normocephalic, atraumatic,pupils reactive to light                 NECK:  No JVD no carotid bruit  CVS: S1S2+, iRRR  LUNGS: Clear  ABDOMEN: Soft, NT, BS+  EXTREMITIES: No cyanosis, edema  : No euceda catheter  NEURO: AAO X 3  PSY: Normal affect      HOME MEDICATIONS:  No current facility-administered medications on file prior to encounter.     Current Outpatient Medications on File Prior to Encounter   Medication Sig Dispense Refill    acetaminophen (TYLENOL) 650 MG TbSR Take 1,300 mg by mouth once daily. 2 Tablet(s) Oral  Every day.      amitriptyline (ELAVIL) 75 MG tablet Take 75 mg by mouth every evening.      apixaban (ELIQUIS) 5 mg Tab Take 1 tablet (5 mg total) by mouth 2 (two) times daily. 180 tablet 3    blood sugar diagnostic (FREESTYLE LITE STRIPS) Strp USE TO TEST BLOOD SUGAR TWICE A  strip 3    busPIRone (BUSPAR) 10 MG tablet TAKE 1 TABLET BY MOUTH TWICE A  tablet 3     carboxymethylcellulose 1 % ophthalmic solution Apply 1 drop to eye As instructed. as directed      cetirizine (ZYRTEC) 10 MG tablet Take 10 mg by mouth once daily.      cholecalciferol, vitamin D3, (VITAMIN D3) 50 mcg (2,000 unit) Cap Take 1 capsule by mouth once daily.      clotrimazole-betamethasone 1-0.05% (LOTRISONE) cream Apply topically 2 (two) times daily as needed.      coenzyme Q10 100 mg capsule Take 100 mg by mouth once daily.       cranberry extract 650 mg Cap Take 1 tablet by mouth 2 (two) times daily.      FARXIGA 5 mg Tab tablet Take 5 mg by mouth once daily.      fluticasone furoate-vilanteroL (BREO ELLIPTA) 100-25 mcg/dose diskus inhaler Inhale 1 puff into the lungs once daily. Controller Rinse after you use it 180 each 6    fluticasone propionate (FLONASE) 50 mcg/actuation nasal spray 1 spray (50 mcg total) by Each Nostril route once daily. 48 g 3    Lactobac no.41/Bifidobact no.7 (PROBIOTIC-10 ORAL) Take by mouth.      lancets Misc 1 Units by Misc.(Non-Drug; Combo Route) route 2 (two) times daily. 200 each 3    lansoprazole (PREVACID) 30 MG capsule Take 1 capsule (30 mg total) by mouth once daily. 90 capsule 3    levalbuterol (XOPENEX) 1.25 mg/0.5 mL nebulizer solution Take 0.5 mLs (1.25 mg total) by nebulization every 6 (six) hours as needed for Wheezing. Rescue 120 each 3    levothyroxine (SYNTHROID) 25 MCG tablet TAKE 1 TABLET BY MOUTH BEFORE BREAKFAST EVERY DAY 90 tablet 3    metoprolol succinate (TOPROL XL) 25 MG 24 hr tablet Take 1 tablet (25 mg total) by mouth once daily. 90 tablet 3    nitroGLYCERIN (NITROSTAT) 0.4 MG SL tablet Place 0.4 mg under the tongue every 5 (five) minutes as needed. 0.4mg Sublingual PRN .        pramoxine-hydrocortisone (PROCTOCREAM-HC) 1-1 % rectal cream Place rectally 2 (two) times daily. (Patient taking differently: Place 1 application rectally 2 (two) times daily.) 3 each 3    RESTASIS 0.05 % ophthalmic emulsion Place 1 drop into both eyes 2 (two) times daily.       rosuvastatin (CRESTOR) 10 MG tablet Take 1 tablet (10 mg total) by mouth once daily. 90 tablet 3    triazolam (HALCION) 0.25 MG Tab Take 1 tablet (0.25 mg total) by mouth nightly as needed (sleep). 90 tablet 1    UNABLE TO FIND Take 1 capsule by mouth once daily. Vital red      vitamins  A,C,E-zinc-copper [id number] unit-mg-unit Cap Take 1 capsule by mouth 2 (two) times daily.       [DISCONTINUED] dronedarone (MULTAQ) 400 mg Tab Take 1 tablet (400 mg total) by mouth 2 (two) times daily with meals. 180 tablet 3    [DISCONTINUED] spironolactone (ALDACTONE) 25 MG tablet Take 1 tablet (25 mg total) by mouth once daily. 30 tablet 1       SCHEDULED MEDS:   amitriptyline  75 mg Oral QHS    apixaban  5 mg Oral BID    budesonide  0.5 mg Nebulization Q12H    And    arformoteroL  15 mcg Nebulization BID    atorvastatin  40 mg Oral Daily    busPIRone  10 mg Oral BID    carboxymethylcellulose  2 drop Both Eyes Q4H While awake    cefTRIAXone (ROCEPHIN) IVPB  1 g Intravenous Q24H    chlorhexidine  15 mL Mouth/Throat BID    cholecalciferol (vitamin D3)  2,000 Units Oral Daily    fluticasone propionate  1 spray Each Nostril Daily    levothyroxine  25 mcg Oral Before breakfast    metoprolol succinate  25 mg Oral Daily    mupirocin   Nasal BID       CONTINUOUS INFUSIONS:   amiodarone in dextrose 5% 0.5 mg/min (01/19/23 0304)       PRN MEDS:acetaminophen, carboxymethylcellulose sodium, dextrose 10%, dextrose 10%, glucagon (human recombinant), glucose, glucose, HYDROcodone-acetaminophen, insulin aspart U-100, levalbuterol, magnesium oxide, magnesium oxide, melatonin, nitroGLYCERIN, ondansetron, potassium bicarbonate, potassium bicarbonate, potassium bicarbonate, potassium, sodium phosphates, potassium, sodium phosphates, potassium, sodium phosphates    LABS AND DIAGNOSTICS     CBC LAST 3 DAYS  Recent Labs   Lab 01/18/23  2032 01/19/23  0502   WBC 5.81 6.03   RBC 4.47 4.33   HGB 12.2 12.0   HCT 39.1 38.7   MCV 88 89   MCH 27.3  27.7   MCHC 31.2* 31.0*   RDW 14.2 14.1    218   MPV 10.6 10.3   GRAN 66.7  3.9 62.2  3.8   LYMPH 19.6  1.1 23.7  1.4   MONO 12.2  0.7 12.3  0.7   BASO 0.02 0.03   NRBC 0 0       COAGULATION LAST 3 DAYS  Recent Labs   Lab 01/18/23 2032   INR 1.0   APTT 28.7       CHEMISTRY LAST 3 DAYS  Recent Labs   Lab 01/18/23 2032 01/19/23  0502    131*   K 4.3 3.9   CL 99 97   CO2 26 26   ANIONGAP 11 8   BUN 25* 25*   CREATININE 1.0 0.9   * 115*   CALCIUM 9.6 9.0   MG 2.0  --    ALBUMIN 3.9  --    PROT 7.2  --    ALKPHOS 72  --    ALT 40  --    AST 32  --    BILITOT 0.4  --        CARDIAC PROFILE LAST 3 DAYS  Recent Labs   Lab 01/18/23 2032 01/19/23  0216   BNP 1,309*  --    TROPONINIHS 22.2* 24.8*       ENDOCRINE LAST 3 DAYS  Recent Labs   Lab 01/18/23 2032   TSH 3.580       LAST ARTERIAL BLOOD GAS  ABG  No results for input(s): PH, PO2, PCO2, HCO3, BE in the last 168 hours.    LAST 7 DAYS MICROBIOLOGY   Microbiology Results (last 7 days)       Procedure Component Value Units Date/Time    Blood culture [[id number]] Collected: 01/19/23 0503    Order Status: Sent Specimen: Blood Updated: 01/19/23 0515            MOST RECENT IMAGING  X-Ray Chest AP Portable  HISTORY: Dyspnea.    FINDINGS: Portable chest radiograph at 2024 hours compared to 05/17/2022 shows median sternotomy wires and mediastinal surgical clips from prior CABG, with enlarged cardiac silhouette and normal pulmonary vascularity. There are calcified mediastinal lymph nodes and scattered aortic vascular calcifications.    There are right infrahilar and lower lung airspace opacities with hazy density, obscuring the right hemidiaphragm. There is mild blunting of left costophrenic angle, with the upper lungs clear. No pneumothorax or evidence of interstitial pulmonary edema. No acute fractures.    IMPRESSION:  1. Right infrahilar and lower lung airspace opacities, suggesting atelectasis and or pneumonia, with right pleural effusion.  Radiographic follow-up is recommended to document complete resolution, and help exclude possibility of any underlying pulmonary mass.  2. Small left pleural effusion.    Electronically signed by:  Sebastian Juarez MD  1/19/2023 7:09 AM [facility] Workstation: [id number]      ECHOCARDIOGRAM RESULTS (last 5)  Results for orders placed during the hospital encounter of 05/10/22    Echo    Interpretation Summary  · The left ventricle is normal in size with mild concentric hypertrophy and mildly decreased systolic function.  · The estimated ejection fraction is 40%.  · Grade III left ventricular diastolic dysfunction.  · Mild left atrial enlargement.  · Mild aortic regurgitation.  · There is moderate aortic valve stenosis.  · Aortic valve area is 1.06 cm2; peak velocity is m/s; mean gradient is 10 mmHg.  · Mild-to-moderate mitral regurgitation.  · Moderate tricuspid regurgitation.  · Mild pulmonic regurgitation.  · Intermediate central venous pressure (8 mmHg).  · The estimated PA systolic pressure is 32 mmHg.      Results for orders placed in visit on 10/11/21    Echo    Interpretation Summary  · The left ventricle is normal in size with mild concentric hypertrophy and low normal systolic function.  · The estimated ejection fraction is 52%.  · Grade I left ventricular diastolic dysfunction.  · Normal right ventricular size.  · Mild left atrial enlargement.  · There is mild aortic valve stenosis.  · Aortic valve area is 2.57 cm2; peak velocity is 2.17 m/s; mean gradient is 13 mmHg.  · Mild mitral regurgitation.  · Mild tricuspid regurgitation.  · Normal central venous pressure (3 mmHg).  · The estimated PA systolic pressure is 23 mmHg.      CURRENT/PREVIOUS VISIT EKG  Results for orders placed or performed during the hospital encounter of 01/18/23   EKG 12-lead    Collection Time: 01/18/23  9:35 PM    Narrative    Test Reason : R00.2,    Vent. Rate : 098 BPM     Atrial Rate : 000 BPM     P-R Int : 000 ms          QRS Dur : 092 ms       QT Int : 328 ms       P-R-T Axes : 000 -09 126 degrees     QTc Int : 418 ms    Atrial fibrillation  Minimal voltage criteria for LVH, may be normal variant ( Gibson product )  Nonspecific ST and T wave abnormality  Abnormal ECG  When compared with ECG of 25-JUN-2022 09:29,  Atrial fibrillation has replaced Sinus rhythm  Vent. rate has increased BY  35 BPM  Criteria for Septal infarct are no longer Present    Referred By: [email or web address]   SELF           Confirmed By:            ASSESSMENT/PLAN:     Active Hospital Problems    Diagnosis    *Atrial fibrillation with RVR    DM (diabetes mellitus), type 2    Atherosclerosis of native coronary artery of native heart with angina pectoris       ASSESSMENT & PLAN:     Afib with RVR  Acute on chronic systolic HF with EF 40% on last echo  CAD with hx of CABG      RECOMMENDATIONS:    Change to amiodarone 200 mg p.o. t.i.d x 1 week.   Start furosemide 40 mg iV daily. Strict I&O. Switch to furosemide 20 mg po daily at discharge.   Continue to check and replace potassium and magnesium. Goal for potassium is 4.0, and goal for magnesium is 2.0.   Continue Eliquis 5 mg po BID. Obtain fall risk assessment.   Family states the patient had an echo at [facility] last week when she was hospitalized and has a follow up with Dr. Diaz next week. They would like to have her optimizied and dc'd home to follow up with him.  Repeat HS troponin.   Add Protonix 40 mg po daily.   Hopeful for dc home later today if she feels better and is stable.         Naheed Blount NP  [facility]  Department of Cardiology  Date of Service: 01/19/2023      I have personally interviewed and examined the patient, I have reviewed the Nurse Practitioner's history and physical, assessment, and plan. I have personally evaluated the patient at bedside and agree with the findings and made appropriate changes as necessary in recommendations.    Patient is back in sinus rhythm on oral  amiodarone.  She does have some coughing and absent breath sounds in the right base.  Chest x-ray reveals a large right pleural effusion.  She is been coughing since before admission.  I agree with continuing the IV Lasix.  Will hold Eliquis for probable thoracentesis in Interventional Radiology      Roby Tavarez MD  Department of Cardiology  [facility]  01/19/2023 9:37 AM

## 2023-01-19 NOTE — HPI
"82 year old female with history of CAD, DM 2, HTN, PAF, GERD, COPD (Chronic Bronchitis 3 lpm oxygen at home), Hypothyroidism presentred to ED complaining of "Palpitations" and "Irregular heart beat" for "A couple of hours". "I felt like I was going back into a fib again". Her Cardiologist no longer attends this hospital. She has had "Occasional" episodes of orthopnea and PND. Her daughter at bedside stated that the patient's Cardiologist "Was planning on starting a diruetic this weekend" for her "Occasional" edema. No cough or sputum. The patient became rate controlled in ED with amiodarone bolus/infusion. No chest discomfort.    In ED Labs reviewed and noted below: normal CBC, normal electrolytes with stage 3a renal dysfunction; BNP is markedly elevated with minimally elevated HS trop (repeat pending); TSH is normal. CXR reviewed: cardiomegaly with moderate right sided effusiion and small left sided effusion-possible underlying infiltrate on right. EKG reviewed: a fib RVR; no acute segments.    Discussed with ED MD; Inpatient admission for a fib RVR, and possible pneumonia; Cardiology opinion; ceftriaxone 1 gm q day; cultured; continue preadmission regimen for chronic maladies; continue amiodarone infusion; low dose insulin sliding scale; electrolyte sliding scale  "

## 2023-01-20 PROBLEM — J90 PLEURAL EFFUSION: Status: ACTIVE | Noted: 2023-01-20

## 2023-01-20 LAB
ANION GAP SERPL CALC-SCNC: 6 MMOL/L (ref 8–16)
BASOPHILS # BLD AUTO: 0.01 K/UL (ref 0–0.2)
BASOPHILS NFR BLD: 0.2 % (ref 0–1.9)
BUN SERPL-MCNC: 26 MG/DL (ref 8–23)
CALCIUM SERPL-MCNC: 8.7 MG/DL (ref 8.7–10.5)
CHLORIDE SERPL-SCNC: 93 MMOL/L (ref 95–110)
CO2 SERPL-SCNC: 29 MMOL/L (ref 23–29)
CREAT SERPL-MCNC: 1 MG/DL (ref 0.5–1.4)
DIFFERENTIAL METHOD: ABNORMAL
EOSINOPHIL # BLD AUTO: 0.1 K/UL (ref 0–0.5)
EOSINOPHIL NFR BLD: 1.4 % (ref 0–8)
ERYTHROCYTE [DISTWIDTH] IN BLOOD BY AUTOMATED COUNT: 13.7 % (ref 11.5–14.5)
EST. GFR  (NO RACE VARIABLE): 56.2 ML/MIN/1.73 M^2
GLUCOSE SERPL-MCNC: 103 MG/DL (ref 70–110)
HCT VFR BLD AUTO: 36.2 % (ref 37–48.5)
HGB BLD-MCNC: 11.4 G/DL (ref 12–16)
IMM GRANULOCYTES # BLD AUTO: 0.02 K/UL (ref 0–0.04)
IMM GRANULOCYTES NFR BLD AUTO: 0.5 % (ref 0–0.5)
LYMPHOCYTES # BLD AUTO: 0.9 K/UL (ref 1–4.8)
LYMPHOCYTES NFR BLD: 22.1 % (ref 18–48)
MCH RBC QN AUTO: 27.3 PG (ref 27–31)
MCHC RBC AUTO-ENTMCNC: 31.5 G/DL (ref 32–36)
MCV RBC AUTO: 87 FL (ref 82–98)
MONOCYTES # BLD AUTO: 0.6 K/UL (ref 0.3–1)
MONOCYTES NFR BLD: 15.2 % (ref 4–15)
NEUTROPHILS # BLD AUTO: 2.5 K/UL (ref 1.8–7.7)
NEUTROPHILS NFR BLD: 60.6 % (ref 38–73)
NRBC BLD-RTO: 0 /100 WBC
PLATELET # BLD AUTO: 207 K/UL (ref 150–450)
PMV BLD AUTO: 10.3 FL (ref 9.2–12.9)
POTASSIUM SERPL-SCNC: 3.8 MMOL/L (ref 3.5–5.1)
PROCALCITONIN SERPL IA-MCNC: <0.05 NG/ML (ref 0–0.5)
RBC # BLD AUTO: 4.17 M/UL (ref 4–5.4)
SODIUM SERPL-SCNC: 127 MMOL/L (ref 136–145)
SODIUM SERPL-SCNC: 128 MMOL/L (ref 136–145)
WBC # BLD AUTO: 4.2 K/UL (ref 3.9–12.7)

## 2023-01-20 PROCEDURE — 63600175 PHARM REV CODE 636 W HCPCS: Performed by: INTERNAL MEDICINE

## 2023-01-20 PROCEDURE — 99223 PR INITIAL HOSPITAL CARE,LEVL III: ICD-10-PCS | Mod: ,,, | Performed by: INTERNAL MEDICINE

## 2023-01-20 PROCEDURE — 99233 PR SUBSEQUENT HOSPITAL CARE,LEVL III: ICD-10-PCS | Mod: ,,, | Performed by: GENERAL PRACTICE

## 2023-01-20 PROCEDURE — 25000003 PHARM REV CODE 250: Performed by: GENERAL PRACTICE

## 2023-01-20 PROCEDURE — 84295 ASSAY OF SERUM SODIUM: CPT | Performed by: STUDENT IN AN ORGANIZED HEALTH CARE EDUCATION/TRAINING PROGRAM

## 2023-01-20 PROCEDURE — 99233 SBSQ HOSP IP/OBS HIGH 50: CPT | Mod: ,,, | Performed by: GENERAL PRACTICE

## 2023-01-20 PROCEDURE — 27000221 HC OXYGEN, UP TO 24 HOURS

## 2023-01-20 PROCEDURE — 99223 1ST HOSP IP/OBS HIGH 75: CPT | Mod: ,,, | Performed by: INTERNAL MEDICINE

## 2023-01-20 PROCEDURE — 36415 COLL VENOUS BLD VENIPUNCTURE: CPT | Performed by: INTERNAL MEDICINE

## 2023-01-20 PROCEDURE — 94761 N-INVAS EAR/PLS OXIMETRY MLT: CPT

## 2023-01-20 PROCEDURE — 25000242 PHARM REV CODE 250 ALT 637 W/ HCPCS: Performed by: INTERNAL MEDICINE

## 2023-01-20 PROCEDURE — 36415 COLL VENOUS BLD VENIPUNCTURE: CPT | Performed by: STUDENT IN AN ORGANIZED HEALTH CARE EDUCATION/TRAINING PROGRAM

## 2023-01-20 PROCEDURE — 21000000 HC CCU ICU ROOM CHARGE

## 2023-01-20 PROCEDURE — 99900035 HC TECH TIME PER 15 MIN (STAT)

## 2023-01-20 PROCEDURE — 80048 BASIC METABOLIC PNL TOTAL CA: CPT | Performed by: INTERNAL MEDICINE

## 2023-01-20 PROCEDURE — 25000003 PHARM REV CODE 250: Performed by: STUDENT IN AN ORGANIZED HEALTH CARE EDUCATION/TRAINING PROGRAM

## 2023-01-20 PROCEDURE — 85025 COMPLETE CBC W/AUTO DIFF WBC: CPT | Performed by: INTERNAL MEDICINE

## 2023-01-20 PROCEDURE — 99900031 HC PATIENT EDUCATION (STAT)

## 2023-01-20 PROCEDURE — 63600175 PHARM REV CODE 636 W HCPCS: Performed by: NURSE PRACTITIONER

## 2023-01-20 PROCEDURE — 25000003 PHARM REV CODE 250

## 2023-01-20 PROCEDURE — 25000003 PHARM REV CODE 250: Performed by: INTERNAL MEDICINE

## 2023-01-20 PROCEDURE — 25000003 PHARM REV CODE 250: Performed by: NURSE PRACTITIONER

## 2023-01-20 PROCEDURE — 94640 AIRWAY INHALATION TREATMENT: CPT

## 2023-01-20 RX ORDER — LACTULOSE 10 G/15ML
20 SOLUTION ORAL ONCE
Status: COMPLETED | OUTPATIENT
Start: 2023-01-20 | End: 2023-01-20

## 2023-01-20 RX ORDER — FUROSEMIDE 10 MG/ML
40 INJECTION INTRAMUSCULAR; INTRAVENOUS
Status: DISCONTINUED | OUTPATIENT
Start: 2023-01-20 | End: 2023-01-22

## 2023-01-20 RX ADMIN — FUROSEMIDE 40 MG: 10 INJECTION, SOLUTION INTRAMUSCULAR; INTRAVENOUS at 08:01

## 2023-01-20 RX ADMIN — CARBOXYMETHYLCELLULOSE SODIUM 2 DROP: 0.5 SOLUTION, GEL FORMING / DROPS OPHTHALMIC at 09:01

## 2023-01-20 RX ADMIN — CEFTRIAXONE 1 G: 1 INJECTION, SOLUTION INTRAVENOUS at 03:01

## 2023-01-20 RX ADMIN — CARBOXYMETHYLCELLULOSE SODIUM 2 DROP: 0.5 SOLUTION, GEL FORMING / DROPS OPHTHALMIC at 06:01

## 2023-01-20 RX ADMIN — METOPROLOL SUCCINATE 25 MG: 25 TABLET, FILM COATED, EXTENDED RELEASE ORAL at 06:01

## 2023-01-20 RX ADMIN — CARBOXYMETHYLCELLULOSE SODIUM 2 DROP: 0.5 SOLUTION, GEL FORMING / DROPS OPHTHALMIC at 05:01

## 2023-01-20 RX ADMIN — AMITRIPTYLINE HYDROCHLORIDE 75 MG: 25 TABLET, FILM COATED ORAL at 08:01

## 2023-01-20 RX ADMIN — Medication 2000 UNITS: at 08:01

## 2023-01-20 RX ADMIN — BUSPIRONE HYDROCHLORIDE 10 MG: 5 TABLET ORAL at 08:01

## 2023-01-20 RX ADMIN — BUDESONIDE 0.5 MG: 0.5 INHALANT RESPIRATORY (INHALATION) at 07:01

## 2023-01-20 RX ADMIN — CARBOXYMETHYLCELLULOSE SODIUM 2 DROP: 0.5 SOLUTION, GEL FORMING / DROPS OPHTHALMIC at 02:01

## 2023-01-20 RX ADMIN — POTASSIUM BICARBONATE 50 MEQ: 977.5 TABLET, EFFERVESCENT ORAL at 08:01

## 2023-01-20 RX ADMIN — CHLORHEXIDINE GLUCONATE 15 ML: 1.2 RINSE ORAL at 08:01

## 2023-01-20 RX ADMIN — FLUTICASONE PROPIONATE 50 MCG: 50 SPRAY, METERED NASAL at 08:01

## 2023-01-20 RX ADMIN — AMIODARONE HYDROCHLORIDE 200 MG: 200 TABLET ORAL at 08:01

## 2023-01-20 RX ADMIN — APIXABAN 5 MG: 5 TABLET, FILM COATED ORAL at 08:01

## 2023-01-20 RX ADMIN — MUPIROCIN 1 G: 20 OINTMENT TOPICAL at 08:01

## 2023-01-20 RX ADMIN — LEVOTHYROXINE SODIUM 25 MCG: 0.03 TABLET ORAL at 05:01

## 2023-01-20 RX ADMIN — ARFORMOTEROL TARTRATE 15 MCG: 15 SOLUTION RESPIRATORY (INHALATION) at 07:01

## 2023-01-20 RX ADMIN — CARBOXYMETHYLCELLULOSE SODIUM 2 DROP: 0.5 SOLUTION, GEL FORMING / DROPS OPHTHALMIC at 08:01

## 2023-01-20 RX ADMIN — ONDANSETRON 4 MG: 2 INJECTION INTRAMUSCULAR; INTRAVENOUS at 08:01

## 2023-01-20 RX ADMIN — LACTULOSE 20 G: 20 SOLUTION ORAL at 02:01

## 2023-01-20 RX ADMIN — LIDOCAINE 5% 1 PATCH: 700 PATCH TOPICAL at 12:01

## 2023-01-20 RX ADMIN — BUDESONIDE 0.5 MG: 0.5 INHALANT RESPIRATORY (INHALATION) at 08:01

## 2023-01-20 RX ADMIN — ATORVASTATIN CALCIUM 40 MG: 40 TABLET, FILM COATED ORAL at 08:01

## 2023-01-20 RX ADMIN — ARFORMOTEROL TARTRATE 15 MCG: 15 SOLUTION RESPIRATORY (INHALATION) at 08:01

## 2023-01-20 RX ADMIN — AMIODARONE HYDROCHLORIDE 200 MG: 200 TABLET ORAL at 02:01

## 2023-01-20 NOTE — ASSESSMENT & PLAN NOTE
Patient with Paroxysmal (<7 days) atrial fibrillation which is controlled currently with Amiodarone. Patient is currently in atrial fibrillation.[id number] Score: 5. HASBLED Score: . Anticoagulation indicated. Anticoagulation done with apixaban    Cardiology on board  Continue amiodarone

## 2023-01-20 NOTE — SUBJECTIVE & OBJECTIVE
Detail Level: Detailed Review of Systems   Constitutional:  Negative for fever.   Respiratory:  Positive for cough and shortness of breath.    Cardiovascular:  Negative for palpitations.   Gastrointestinal:  Positive for abdominal pain and constipation.   Neurological:  Negative for weakness.   All other systems reviewed and are negative.  Objective:     Vital Signs (Most Recent):  Temp: 98.2 °F (36.8 °C) (01/20/23 1633)  Pulse: 87 (01/20/23 1608)  Resp: 20 (01/20/23 1608)  BP: 126/65 (01/20/23 1608)  SpO2: 100 % (01/20/23 1608) Vital Signs (24h Range):  Temp:  [97.2 °F (36.2 °C)-98.2 °F (36.8 °C)] 98.2 °F (36.8 °C)  Pulse:  [68-89] 87  Resp:  [16-50] 20  SpO2:  [88 %-100 %] 100 %  BP: (107-172)/(54-91) 126/65     Weight: 65 kg (143 lb 4.8 oz)  Body mass index is 26.21 kg/m².    Intake/Output Summary (Last 24 hours) at 1/20/2023 1747  Last data filed at 1/20/2023 1230  Gross per 24 hour   Intake 530 ml   Output 1075 ml   Net -545 ml      Physical Exam  Vitals reviewed.   Eyes:      Pupils: Pupils are equal, round, and reactive to light.   Cardiovascular:      Rate and Rhythm: Normal rate.      Pulses: Normal pulses.   Pulmonary:      Effort: Respiratory distress present.   Abdominal:      Palpations: Abdomen is soft.   Musculoskeletal:      Cervical back: Normal range of motion.   Neurological:      General: No focal deficit present.      Mental Status: She is alert and oriented to person, place, and time. Mental status is at baseline.   Psychiatric:         Mood and Affect: Mood normal.       Significant Labs: All pertinent labs within the past 24 hours have been reviewed.  BMP:   Recent Labs   Lab 01/18/23  2032 01/19/23  0502 01/20/23  0514 01/20/23  1014   *   < > 103  --       < > 128* 127*   K 4.3   < > 3.8  --    CL 99   < > 93*  --    CO2 26   < > 29  --    BUN 25*   < > 26*  --    CREATININE 1.0   < > 1.0  --    CALCIUM 9.6   < > 8.7  --    MG 2.0  --   --   --     < > = values in this interval not displayed.      CBC:   Recent Labs   Lab 01/18/23 2032 01/19/23  0502 01/20/23  0514   WBC 5.81 6.03 4.20   HGB 12.2 12.0 11.4*   HCT 39.1 38.7 36.2*    218 207     Magnesium:   Recent Labs   Lab 01/18/23 2032   MG 2.0       Significant Imaging: I have reviewed all pertinent imaging results/findings within the past 24 hours.  Imaging Results              X-Ray Chest AP Portable (Final result)  Result time 01/19/23 07:09:10      Final result by Sebastian Juarez MD (01/19/23 07:09:10)                   Narrative:    HISTORY: Dyspnea.    FINDINGS: Portable chest radiograph at 2024 hours compared to 05/17/2022 shows median sternotomy wires and mediastinal surgical clips from prior CABG, with enlarged cardiac silhouette and normal pulmonary vascularity. There are calcified mediastinal lymph nodes and scattered aortic vascular calcifications.    There are right infrahilar and lower lung airspace opacities with hazy density, obscuring the right hemidiaphragm. There is mild blunting of left costophrenic angle, with the upper lungs clear. No pneumothorax or evidence of interstitial pulmonary edema. No acute fractures.    IMPRESSION:  1. Right infrahilar and lower lung airspace opacities, suggesting atelectasis and or pneumonia, with right pleural effusion. Radiographic follow-up is recommended to document complete resolution, and help exclude possibility of any underlying pulmonary mass.  2. Small left pleural effusion.    Electronically signed by:  Sebastian Juarez MD  1/19/2023 7:09 AM [facility] Workstation: [id number]                                     Biopsy Photograph Reviewed: Yes Number Of Curettages: 2 Size Of Lesion In Cm: 0.9 Size Of Lesion After Curettage: 1.1 Add Intralesional Injection: No Concentration (Mg/Ml Or Millions Of Plaque Forming Units/Cc): 0.01 Total Volume (Ccs): 1 Anesthesia Type: 1% lidocaine with epinephrine Cautery Type: electrodesiccation What Was Performed First?: Curettage Final Size Statement: The size of the lesion after curettage was Additional Information: (Optional): The wound was cleaned, and a pressure dressing was applied.  The patient received detailed post-op instructions. Consent was obtained from the patient. The risks, benefits and alternatives to therapy were discussed in detail. Specifically, the risks of infection, scarring, bleeding, prolonged wound healing, nerve injury, incomplete removal, allergy to anesthesia and recurrence were addressed. Alternatives to ED&C, such as: surgical removal and XRT were also discussed.  Prior to the procedure, the treatment site was clearly identified and confirmed by the patient. All components of Universal Protocol/PAUSE Rule completed. Post-Care Instructions: I reviewed with the patient in detail post-care instructions. Patient is to keep the area dry for 48 hours, and not to engage in any swimming until the area is healed. Should the patient develop any fevers, chills, bleeding, severe pain patient will contact the office immediately. Bill As A Line Item Or As Units: Line Item

## 2023-01-20 NOTE — CONSULTS
Pulmonary/Critical Care Consult      PATIENT NAME: Darlin Tipton  MRN: [id number]  TODAY'S DATE: 2023  3:08 PM  ADMIT DATE: 2023  AGE: 82 y.o. : 1940    CONSULT REQUESTED BY: Collin Hoang MD    REASON FOR CONSULT:   Pleural effusions     HPI:  The patient is an 82-year-old female who we follow for asthma and nocturnal hypoxemic respiratory failure.  She has systolic heart failure and has bilateral pleural effusions at this time.  She was in atrial fib with RVR but now is back in sinus.  Her last thoracentesis was in May.  She came to the hospital because she was coughing so much.  She recognized it was not her asthma.  She was gaining weight daily.    REVIEW OF SYSTEMS  GENERAL: Feeling okay  EYES: Vision is good.  ENT: No sinusitis or pharyngitis.   HEART: No chest pain or palpitations.  LUNGS:  She is coughing a great deal.  GI:  Her abdomen has been increasing in size  : No dysuria, hesitancy, or nocturia.  SKIN: No lesions or rashes.  MUSCULOSKELETAL: No joint pain or myalgias.  NEURO: No headaches or neuropathy.  LYMPH:  The swelling in her legs is better.  PSYCH: No anxiety or depression.  ENDO:  She has been gaining weight.    ALLERGIES  Review of patient's allergies indicates:   Allergen Reactions    Dexlansoprazole Itching, Nausea Only and Rash    Sulfa (sulfonamide antibiotics) Rash    Floxacillin Itching    Januvia [sitagliptin] Other (See Comments)     Hot flashes    Tetracyclines Itching    Fenofibrate micronized Rash    Nitrofurantoin macrocrystalline Other (See Comments)     unknown    Phenylfenesin la [phenylpropanolamine-gg] Rash       INPATIENT SCHEDULED MEDICATIONS   amiodarone  200 mg Oral TID    amitriptyline  75 mg Oral QHS    budesonide  0.5 mg Nebulization Q12H    And    arformoteroL  15 mcg Nebulization BID    atorvastatin  40 mg Oral Daily    busPIRone  10 mg Oral BID    carboxymethylcellulose  2 drop Both Eyes Q4H While awake    cefTRIAXone (ROCEPHIN) IVPB  1 g  Intravenous Q24H    chlorhexidine  15 mL Mouth/Throat BID    cholecalciferol (vitamin D3)  2,000 Units Oral Daily    fluticasone propionate  1 spray Each Nostril Daily    furosemide (LASIX) injection  40 mg Intravenous Q12H    levothyroxine  25 mcg Oral Before breakfast    LIDOcaine  1 patch Transdermal Q24H    metoprolol succinate  25 mg Oral Daily    mupirocin   Nasal BID         MEDICAL AND SURGICAL HISTORY  Past Medical History:   Diagnosis Date    Allergy     Dust mites, Grasses, Trees    Arthritis     Asthma     Blood transfusion     CAD (coronary artery disease)     Cataract     CHRONIC BRONCHITIS     Diabetes mellitus     Diabetes mellitus type II     GERD (gastroesophageal reflux disease)     Hyperlipidemia     Hypertension     Irregular heart beat     Kidney disease     ckd stage 3  as stated by patient - to see MD    Post-menopausal bleeding 2018    Spinal stenosis     Thyroid disease     Hypothyroidism     Past Surgical History:   Procedure Laterality Date    APPENDECTOMY  1968    BLADDER SUSPENSION  1989    CARDIAC SURGERY  2016    CABG    CATARACT EXTRACTION  9/2007 (L) and 10/2207 (R)    COLONOSCOPY N/A 10/18/2017    Procedure: COLONOSCOPY;  Surgeon: Esme Acuna MD;  Location: [facility];  Service: Endoscopy;  Laterality: N/A;    CORONARY ANGIOGRAPHY INCLUDING BYPASS GRAFTS WITH CATHETERIZATION OF LEFT HEART Left 5/13/2022    Procedure: Left heart cath;  Surgeon: Davon Patton MD;  Location: [facility] CATH/EP LAB;  Service: Cardiology;  Laterality: Left;    CORONARY ARTERY BYPASS GRAFT  4/26/2004    x5    ESOPHAGEAL DILATION      ESOPHAGOGASTRODUODENOSCOPY N/A 6/27/2022    Procedure: EGD (ESOPHAGOGASTRODUODENOSCOPY);  Surgeon: Skip Nj MD;  Location: [facility] ENDO;  Service: Endoscopy;  Laterality: N/A;    HYSTEROSCOPY WITH DILATION AND CURETTAGE OF UTERUS N/A 3/12/2020    Procedure: HYSTEROSCOPY, WITH DILATION AND CURETTAGE OF UTERUS;  Surgeon: Ramona Llanos MD;  Location: [facility] OR;   Service: OB/GYN;  Laterality: N/A;    SPINE SURGERY  3/2000    Tumor    TRANSFORAMINAL EPIDURAL INJECTION OF STEROID Right 11/21/2019    Procedure: Injection,steroid,epidural,transforaminal approach;  Surgeon: Bj Jones MD;  Location: [facility] OR;  Service: Pain Management;  Laterality: Right;  L4-5, L5-S1    TRANSFORAMINAL EPIDURAL INJECTION OF STEROID Right 12/31/2019    Procedure: Injection,steroid,epidural,transforaminal approach;  Surgeon: Bj Jones MD;  Location: [facility] OR;  Service: Pain Management;  Laterality: Right;  L4-5, L5-S1    TRANSFORAMINAL EPIDURAL INJECTION OF STEROID Right 2/5/2020    Procedure: Injection,steroid,epidural,transforaminal approach;  Surgeon: Bj Jones MD;  Location: [facility] OR;  Service: Pain Management;  Laterality: Right;  L4-5, L5-S1    TRANSFORAMINAL EPIDURAL INJECTION OF STEROID Left 1/27/2021    Procedure: Injection,steroid,epidural,transforaminal approach;  Surgeon: Bj Jones MD;  Location: [facility] OR;  Service: Pain Management;  Laterality: Left;  L4-5, L5-S1    TRANSFORAMINAL EPIDURAL INJECTION OF STEROID Right 3/4/2021    Procedure: Injection,steroid,epidural,transforaminal approach;  Surgeon: Bj Jones MD;  Location: [facility] OR;  Service: Pain Management;  Laterality: Right;  L4-L5, L5-S1    WRIST SURGERY  1993    carpal tunnel         ALCOHOL, TOBACCO AND DRUG USE  Social History     Tobacco Use   Smoking Status Passive Smoke Exposure - Never Smoker   Smokeless Tobacco Never   Tobacco Comments    PARENTS      Social History     Substance and Sexual Activity   Alcohol Use Yes    Comment: Rare     Social History     Substance and Sexual Activity   Drug Use No       FAMILY HISTORY  Family History   Problem Relation Age of Onset    Breast cancer Mother     Breast cancer Maternal Aunt     Allergic rhinitis Neg Hx     Allergies Neg Hx     Angioedema Neg Hx     Asthma Neg Hx     Eczema Neg Hx     Immunodeficiency Neg Hx     Urticaria Neg Hx     Rhinitis Neg Hx     Atopy Neg Hx         VITAL SIGNS (MOST RECENT)  Temp: 97.8 °F (36.6 °C) (01/20/23 1128)  Pulse: 82 (01/20/23 1128)  Resp: 18 (01/20/23 1128)  BP: (!) 129/59 (01/20/23 1128)  SpO2: 99 % (01/20/23 1128)    INTAKE AND OUTPUT (LAST 24 HOURS):  Intake/Output Summary (Last 24 hours) at 1/20/2023 1508  Last data filed at 1/20/2023 1004  Gross per 24 hour   Intake 722.89 ml   Output 1475 ml   Net -752.11 ml       WEIGHT  Wt Readings from Last 1 Encounters:   01/19/23 65 kg (143 lb 4.8 oz)       PHYSICAL EXAM  GENERAL: Older patient in no distress.  HEENT: Pupils equal and reactive. Extraocular movements intact. Nose intact. Pharynx moist.  NECK: Supple.   HEART: Regular rate and rhythm. No murmur or gallop auscultated.  LUNGS:  There are decreased breath sounds in the left base.  There are decreased breath sounds MCFP up the right posterior thorax.  There crackles on the left mid thorax. Lung excursion symmetrical. No change in fremitus.   ABDOMEN: Bowel sounds present. Non-tender, no masses palpated.  : Normal anatomy.  EXTREMITIES: Normal muscle tone and joint movement, no cyanosis or clubbing.   LYMPHATICS: No adenopathy palpated, trace edema.  SKIN: Dry, intact, no lesions.   NEURO: Cranial nerves II-XII intact. Motor strength 5/5 bilaterally, upper and lower extremities.  PSYCH: Appropriate affect      CBC LAST (LAST 24 HOURS)  Recent Labs   Lab 01/20/23 0514   WBC 4.20   RBC 4.17   HGB 11.4*   HCT 36.2*   MCV 87   MCH 27.3   MCHC 31.5*   RDW 13.7      MPV 10.3   GRAN 60.6  2.5   LYMPH 22.1  0.9*   MONO 15.2*  0.6   BASO 0.01   NRBC 0       CHEMISTRY LAST (LAST 24 HOURS)  Recent Labs   Lab 01/20/23 0514 01/20/23  1014   * 127*   K 3.8  --    CL 93*  --    CO2 29  --    ANIONGAP 6*  --    BUN 26*  --    CREATININE 1.0  --      --    CALCIUM 8.7  --          CARDIAC PROFILE (LAST 24 HOURS)  Recent Labs   Lab 01/18/23 2032 01/19/23 0216 01/19/23  2157   BNP 1,309*  --   --    TROPONINIHS 22.2*   < > 18.8*     < > = values in this interval not displayed.       LAST 7 DAYS MICROBIOLOGY   Microbiology Results (last 7 days)       Procedure Component Value Units Date/Time    Blood culture [[id number]] Collected: 01/19/23 0503    Order Status: Completed Specimen: Blood Updated: 01/20/23 0632     Blood Culture, Routine No Growth to date      No Growth to date            MOST RECENT IMAGING  X-Ray Abdomen AP 1 View  HISTORY: Nausea, abdominal pain.    FINDINGS: Single abdominal radiograph at 1052 hours compared to prior exams shows a nonobstructive bowel gas pattern. There is air in the stomach, with scattered air and moderate volume of stool throughout the colon. No evidence of intraperitoneal free air.    There are no suspicious calcifications overlying the kidneys or the ureters, with scattered aortoiliac vascular calcifications. There is rightward convex lumbar spinal curvature, with intervertebral disc space narrowing and facet arthropathy in the lumbar spine. No acute fractures.    IMPRESSION: Nonobstructive bowel gas pattern.    Electronically signed by:  Sebastian Juarez MD  1/20/2023 11:38 AM [facility] Workstation: [id number]  X-Ray Chest PA And Lateral  HISTORY: pleural effusion    FINDINGS: PA and lateral chest radiograph at 1050 hours compared to 01/18/2023 show median sternotomy wires and mediastinal surgical clips from prior CABG, with stable enlarged cardiac silhouette and normal pulmonary vascularity.    There are persistent right perihilar and bilateral lower lung airspace opacities, with interval increase in bilateral pleural effusions. The upper lungs are clear, with no evidence of interstitial pulmonary edema or pneumothorax.    IMPRESSION: Interval increased size of moderate right and small left pleural effusions.    Electronically signed by:  Sebastian Juarez MD  1/20/2023 11:37 AM [facility] Workstation: [id number]      CURRENT VISIT EKG  Results for orders placed or performed during the hospital encounter of 01/18/23   EKG  12-lead    Narrative    Test Reason : I48.91,I48.92,    Vent. Rate : 111 BPM     Atrial Rate : 129 BPM     P-R Int : 000 ms          QRS Dur : 092 ms      QT Int : 296 ms       P-R-T Axes : 000 -13 103 degrees     QTc Int : 402 ms    Atrial fibrillation with rapid ventricular response  Nonspecific ST and T wave abnormality  Abnormal ECG  When compared with ECG of 18-JAN-2023 20:07,  No significant change was found    Referred By: [email or web address]   SELF           Confirmed By:        ECHOCARDIOGRAM RESULTS  Results for orders placed during the hospital encounter of 05/10/22    Echo    Interpretation Summary  · The left ventricle is normal in size with mild concentric hypertrophy and mildly decreased systolic function.  · The estimated ejection fraction is 40%.  · Grade III left ventricular diastolic dysfunction.  · Mild left atrial enlargement.  · Mild aortic regurgitation.  · There is moderate aortic valve stenosis.  · Aortic valve area is 1.06 cm2; peak velocity is m/s; mean gradient is 10 mmHg.  · Mild-to-moderate mitral regurgitation.  · Moderate tricuspid regurgitation.  · Mild pulmonic regurgitation.  · Intermediate central venous pressure (8 mmHg).  · The estimated PA systolic pressure is 32 mmHg.        Oxygen INFORMATION   94% on 3 L       IMPRESSION AND PLAN  Bilateral pleural effusions, right greater than left  Systolic heart failure, acute on chronic   AFib, now in sinus rhythm  Asthma   Chronic nocturnal hypoxemia    Will see how the patient responds to the 40 of Lasix b.i.d.  Continue to hold Eliquis   Thoracentesis on the right if she does not respond to the diuretics tomorrow    Rula eWiss MD  [facility]  Department of Pulmonology  Date of Service: 01/20/2023  3:08 PM

## 2023-01-20 NOTE — PROGRESS NOTES
"[facility]  Department of Cardiology  Progress Note      PATIENT NAME: Darlin Tipton    MRN: [id number]  TODAY'S DATE: 01/20/2023  ADMIT DATE: 1/18/2023                          CONSULT REQUESTED BY: Collin Hoang MD    SUBJECTIVE     PRINCIPAL PROBLEM: Atrial fibrillation with RVR    1/20/23:  Patient seen sitting up in chair with no distress noted. She continues to have a cough and is on o2. She remains in Afib. Chest xray shows worsened right and left pleural effusions.       REASON FOR CONSULT:  From H&P: 82 year old female with history of CAD, DM 2, HTN, PAF, GERD, COPD (Chronic Bronchitis 3 lpm oxygen at home), Hypothyroidism presentred to ED complaining of "Palpitations" and "Irregular heart beat" for "A couple of hours". "I felt like I was going back into a fib again". Her Cardiologist no longer attends this hospital. She has had "Occasional" episodes of orthopnea and PND. Her daughter at bedside stated that the patient's Cardiologist "Was planning on starting a diruetic this weekend" for her "Occasional" edema. No cough or sputum. The patient became rate controlled in ED with amiodarone bolus/infusion. No chest discomfort.     In ED Labs reviewed and noted below: normal CBC, normal electrolytes with stage 3a renal dysfunction; BNP is markedly elevated with minimally elevated HS trop (repeat pending); TSH is normal. CXR reviewed: cardiomegaly with moderate right sided effusiion and small left sided effusion-possible underlying infiltrate on right. EKG reviewed: a fib RVR; no acute segments.     Discussed with ED MD; Inpatient admission for a fib RVR, and possible pneumonia; Cardiology opinion; ceftriaxone 1 gm q day; cultured; continue preadmission regimen for chronic maladies; continue amiodarone infusion; low dose insulin sliding scale; electrolyte sliding scale         HPI:    Patient is an 82 year old female who presented to the ER with complaints of palpitations for a few hours. She " reports having occasional shortness of breath, swelling, and orthopnea recently as well. On arrival, patient was noted to have Afib with RVR as well as pleural effusions.  Patient has since converted back into sinus rhythm.  She does have some nausea and headache but her breathing appears to be stable.  High sensitivity troponin was mildly elevated just above 20.        Review of patient's allergies indicates:   Allergen Reactions    Dexlansoprazole Itching, Nausea Only and Rash    Sulfa (sulfonamide antibiotics) Rash    Floxacillin Itching    Januvia [sitagliptin] Other (See Comments)     Hot flashes    Tetracyclines Itching    Fenofibrate micronized Rash    Nitrofurantoin macrocrystalline Other (See Comments)     unknown    Phenylfenesin la [phenylpropanolamine-gg] Rash       Past Medical History:   Diagnosis Date    Allergy     Dust mites, Grasses, Trees    Arthritis     Asthma     Blood transfusion     CAD (coronary artery disease)     Cataract     CHRONIC BRONCHITIS     Diabetes mellitus     Diabetes mellitus type II     GERD (gastroesophageal reflux disease)     Hyperlipidemia     Hypertension     Irregular heart beat     Kidney disease     ckd stage 3  as stated by patient - to see MD    Post-menopausal bleeding 2018    Spinal stenosis     Thyroid disease     Hypothyroidism     Past Surgical History:   Procedure Laterality Date    APPENDECTOMY  1968    BLADDER SUSPENSION  1989    CARDIAC SURGERY  2016    CABG    CATARACT EXTRACTION  9/2007 (L) and 10/2207 (R)    COLONOSCOPY N/A 10/18/2017    Procedure: COLONOSCOPY;  Surgeon: Esme Acuna MD;  Location: [facility] ENDO;  Service: Endoscopy;  Laterality: N/A;    CORONARY ANGIOGRAPHY INCLUDING BYPASS GRAFTS WITH CATHETERIZATION OF LEFT HEART Left 5/13/2022    Procedure: Left heart cath;  Surgeon: Davon Patton MD;  Location: [facility] CATH/EP LAB;  Service: Cardiology;  Laterality: Left;    CORONARY ARTERY BYPASS GRAFT  4/26/2004    x5    ESOPHAGEAL DILATION       ESOPHAGOGASTRODUODENOSCOPY N/A 6/27/2022    Procedure: EGD (ESOPHAGOGASTRODUODENOSCOPY);  Surgeon: Skip Nj MD;  Location: [facility];  Service: Endoscopy;  Laterality: N/A;    HYSTEROSCOPY WITH DILATION AND CURETTAGE OF UTERUS N/A 3/12/2020    Procedure: HYSTEROSCOPY, WITH DILATION AND CURETTAGE OF UTERUS;  Surgeon: Ramona Llanos MD;  Location: [facility] OR;  Service: OB/GYN;  Laterality: N/A;    SPINE SURGERY  3/2000    Tumor    TRANSFORAMINAL EPIDURAL INJECTION OF STEROID Right 11/21/2019    Procedure: Injection,steroid,epidural,transforaminal approach;  Surgeon: Bj Jones MD;  Location: [facility];  Service: Pain Management;  Laterality: Right;  L4-5, L5-S1    TRANSFORAMINAL EPIDURAL INJECTION OF STEROID Right 12/31/2019    Procedure: Injection,steroid,epidural,transforaminal approach;  Surgeon: Bj Jones MD;  Location: [facility];  Service: Pain Management;  Laterality: Right;  L4-5, L5-S1    TRANSFORAMINAL EPIDURAL INJECTION OF STEROID Right 2/5/2020    Procedure: Injection,steroid,epidural,transforaminal approach;  Surgeon: Bj Jones MD;  Location: [facility];  Service: Pain Management;  Laterality: Right;  L4-5, L5-S1    TRANSFORAMINAL EPIDURAL INJECTION OF STEROID Left 1/27/2021    Procedure: Injection,steroid,epidural,transforaminal approach;  Surgeon: Bj Jones MD;  Location: [facility];  Service: Pain Management;  Laterality: Left;  L4-5, L5-S1    TRANSFORAMINAL EPIDURAL INJECTION OF STEROID Right 3/4/2021    Procedure: Injection,steroid,epidural,transforaminal approach;  Surgeon: Bj Jones MD;  Location: [facility];  Service: Pain Management;  Laterality: Right;  L4-L5, L5-S1    WRIST SURGERY  1993    carpal tunnel       Social History     Tobacco Use    Smoking status: Passive Smoke Exposure - Never Smoker    Smokeless tobacco: Never    Tobacco comments:     PARENTS    Substance Use Topics    Alcohol use: Yes     Comment: Rare    Drug use: No        REVIEW OF SYSTEMS  CONSTITUTIONAL: Negative for  chills, fatigue and fever.   EYES: No double vision, No blurred vision  NEURO: No headaches, No dizziness  RESPIRATORY: + shortness of breath and orthopnea   CARDIOVASCULAR: + for chest pain. + for palpitations and leg swelling.   GI: Negative for abdominal pain, +nausea   : Negative for dysuria and frequency, Negative for hematuria  SKIN: Negative for bruising, Negative for edema or discoloration noted.   PSYCHIATRIC: Negative for depression, Negative for anxiety, Negative for memory loss  MUSCULOSKELETAL: Negative for neck pain, Negative for muscle weakness, Negative for back pain     OBJECTIVE     VITAL SIGNS (Most Recent)  Temp: 97.8 °F (36.6 °C) (01/20/23 1128)  Pulse: 82 (01/20/23 1128)  Resp: 18 (01/20/23 1128)  BP: (!) 129/59 (01/20/23 1128)  SpO2: 99 % (01/20/23 1128)    VENTILATION STATUS  Resp: 18 (01/20/23 1128)  SpO2: 99 % (01/20/23 1128)       I & O (Last 24H):  Intake/Output Summary (Last 24 hours) at 1/20/2023 1300  Last data filed at 1/20/2023 1004  Gross per 24 hour   Intake 722.89 ml   Output 1975 ml   Net -1252.11 ml         WEIGHTS  Wt Readings from Last 1 Encounters:   01/19/23 0330 65 kg (143 lb 4.8 oz)   01/18/23 2013 64 kg (141 lb)       PHYSICAL EXAM  CONSTITUTIONAL: No fever, no chills  HEENT: Normocephalic, atraumatic,pupils reactive to light                 NECK:  No JVD no carotid bruit  CVS: S1S2+, iRRR  LUNGS: Clear  ABDOMEN: Soft, NT, BS+  EXTREMITIES: No cyanosis, edema  : No euceda catheter  NEURO: AAO X 3  PSY: Normal affect      HOME MEDICATIONS:  No current facility-administered medications on file prior to encounter.     Current Outpatient Medications on File Prior to Encounter   Medication Sig Dispense Refill    acetaminophen (TYLENOL) 650 MG TbSR Take 1,300 mg by mouth once daily. 2 Tablet(s) Oral  Every day.      amitriptyline (ELAVIL) 75 MG tablet Take 75 mg by mouth every evening.      apixaban (ELIQUIS) 5 mg Tab Take 1 tablet (5 mg total) by mouth 2 (two) times daily.  180 tablet 3    blood sugar diagnostic (FREESTYLE LITE STRIPS) Strp USE TO TEST BLOOD SUGAR TWICE A  strip 3    busPIRone (BUSPAR) 10 MG tablet TAKE 1 TABLET BY MOUTH TWICE A  tablet 3    carboxymethylcellulose 1 % ophthalmic solution Apply 1 drop to eye As instructed. as directed      cetirizine (ZYRTEC) 10 MG tablet Take 10 mg by mouth once daily.      cholecalciferol, vitamin D3, (VITAMIN D3) 50 mcg (2,000 unit) Cap Take 1 capsule by mouth once daily.      clotrimazole-betamethasone 1-0.05% (LOTRISONE) cream Apply topically 2 (two) times daily as needed.      coenzyme Q10 100 mg capsule Take 100 mg by mouth once daily.       cranberry extract 650 mg Cap Take 1 tablet by mouth 2 (two) times daily.      FARXIGA 5 mg Tab tablet Take 5 mg by mouth once daily.      fluticasone furoate-vilanteroL (BREO ELLIPTA) 100-25 mcg/dose diskus inhaler Inhale 1 puff into the lungs once daily. Controller Rinse after you use it 180 each 6    fluticasone propionate (FLONASE) 50 mcg/actuation nasal spray 1 spray (50 mcg total) by Each Nostril route once daily. 48 g 3    Lactobac no.41/Bifidobact no.7 (PROBIOTIC-10 ORAL) Take by mouth.      lancets Misc 1 Units by Misc.(Non-Drug; Combo Route) route 2 (two) times daily. 200 each 3    lansoprazole (PREVACID) 30 MG capsule Take 1 capsule (30 mg total) by mouth once daily. 90 capsule 3    levalbuterol (XOPENEX) 1.25 mg/0.5 mL nebulizer solution Take 0.5 mLs (1.25 mg total) by nebulization every 6 (six) hours as needed for Wheezing. Rescue 120 each 3    levothyroxine (SYNTHROID) 25 MCG tablet TAKE 1 TABLET BY MOUTH BEFORE BREAKFAST EVERY DAY 90 tablet 3    metoprolol succinate (TOPROL XL) 25 MG 24 hr tablet Take 1 tablet (25 mg total) by mouth once daily. 90 tablet 3    nitroGLYCERIN (NITROSTAT) 0.4 MG SL tablet Place 0.4 mg under the tongue every 5 (five) minutes as needed. 0.4mg Sublingual PRN .        pramoxine-hydrocortisone (PROCTOCREAM-HC) 1-1 % rectal cream Place  rectally 2 (two) times daily. (Patient taking differently: Place 1 application rectally 2 (two) times daily.) 3 each 3    RESTASIS 0.05 % ophthalmic emulsion Place 1 drop into both eyes 2 (two) times daily.      rosuvastatin (CRESTOR) 10 MG tablet Take 1 tablet (10 mg total) by mouth once daily. 90 tablet 3    triazolam (HALCION) 0.25 MG Tab Take 1 tablet (0.25 mg total) by mouth nightly as needed (sleep). 90 tablet 1    UNABLE TO FIND Take 1 capsule by mouth once daily. Vital red      vitamins  A,C,E-zinc-copper [id number] unit-mg-unit Cap Take 1 capsule by mouth 2 (two) times daily.       [DISCONTINUED] dronedarone (MULTAQ) 400 mg Tab Take 1 tablet (400 mg total) by mouth 2 (two) times daily with meals. 180 tablet 3    [DISCONTINUED] spironolactone (ALDACTONE) 25 MG tablet Take 1 tablet (25 mg total) by mouth once daily. 30 tablet 1       SCHEDULED MEDS:   amiodarone  200 mg Oral TID    amitriptyline  75 mg Oral QHS    apixaban  5 mg Oral BID    budesonide  0.5 mg Nebulization Q12H    And    arformoteroL  15 mcg Nebulization BID    atorvastatin  40 mg Oral Daily    busPIRone  10 mg Oral BID    carboxymethylcellulose  2 drop Both Eyes Q4H While awake    cefTRIAXone (ROCEPHIN) IVPB  1 g Intravenous Q24H    chlorhexidine  15 mL Mouth/Throat BID    cholecalciferol (vitamin D3)  2,000 Units Oral Daily    fluticasone propionate  1 spray Each Nostril Daily    furosemide (LASIX) injection  40 mg Intravenous Daily    lactulose  20 g Oral Once    levothyroxine  25 mcg Oral Before breakfast    LIDOcaine  1 patch Transdermal Q24H    metoprolol succinate  25 mg Oral Daily    mupirocin   Nasal BID       CONTINUOUS INFUSIONS:   amiodarone in dextrose 5% Stopped (01/19/23 4068)       PRN MEDS:acetaminophen, carboxymethylcellulose sodium, dextrose 10%, dextrose 10%, glucagon (human recombinant), glucose, glucose, HYDROcodone-acetaminophen, insulin aspart U-100, levalbuterol, magnesium oxide, magnesium oxide, melatonin,  nitroGLYCERIN, ondansetron, potassium bicarbonate, potassium bicarbonate, potassium bicarbonate, potassium, sodium phosphates, potassium, sodium phosphates, potassium, sodium phosphates, simethicone    LABS AND DIAGNOSTICS     CBC LAST 3 DAYS  Recent Labs   Lab 01/18/23 2032 01/19/23 0502 01/20/23  0514   WBC 5.81 6.03 4.20   RBC 4.47 4.33 4.17   HGB 12.2 12.0 11.4*   HCT 39.1 38.7 36.2*   MCV 88 89 87   MCH 27.3 27.7 27.3   MCHC 31.2* 31.0* 31.5*   RDW 14.2 14.1 13.7    218 207   MPV 10.6 10.3 10.3   GRAN 66.7  3.9 62.2  3.8 60.6  2.5   LYMPH 19.6  1.1 23.7  1.4 22.1  0.9*   MONO 12.2  0.7 12.3  0.7 15.2*  0.6   BASO 0.02 0.03 0.01   NRBC 0 0 0         COAGULATION LAST 3 DAYS  Recent Labs   Lab 01/18/23 2032   INR 1.0   APTT 28.7         CHEMISTRY LAST 3 DAYS  Recent Labs   Lab 01/18/23 2032 01/19/23  0502 01/20/23  0514 01/20/23  1014    131* 128* 127*   K 4.3 3.9 3.8  --    CL 99 97 93*  --    CO2 26 26 29  --    ANIONGAP 11 8 6*  --    BUN 25* 25* 26*  --    CREATININE 1.0 0.9 1.0  --    * 115* 103  --    CALCIUM 9.6 9.0 8.7  --    MG 2.0  --   --   --    ALBUMIN 3.9  --   --   --    PROT 7.2  --   --   --    ALKPHOS 72  --   --   --    ALT 40  --   --   --    AST 32  --   --   --    BILITOT 0.4  --   --   --          CARDIAC PROFILE LAST 3 DAYS  Recent Labs   Lab 01/18/23 2032 01/19/23 0216 01/19/23  2157   BNP 1,309*  --   --    TROPONINIHS 22.2* 24.8* 18.8*         ENDOCRINE LAST 3 DAYS  Recent Labs   Lab 01/18/23 2032 01/19/23  2157   TSH 3.580  --    PROCAL  --  <0.05         LAST ARTERIAL BLOOD GAS  ABG  No results for input(s): PH, PO2, PCO2, HCO3, BE in the last 168 hours.    LAST 7 DAYS MICROBIOLOGY   Microbiology Results (last 7 days)       Procedure Component Value Units Date/Time    Blood culture [[id number]] Collected: 01/19/23 0503    Order Status: Completed Specimen: Blood Updated: 01/20/23 0632     Blood Culture, Routine No Growth to date      No Growth to date             MOST RECENT IMAGING  X-Ray Abdomen AP 1 View  HISTORY: Nausea, abdominal pain.    FINDINGS: Single abdominal radiograph at 1052 hours compared to prior exams shows a nonobstructive bowel gas pattern. There is air in the stomach, with scattered air and moderate volume of stool throughout the colon. No evidence of intraperitoneal free air.    There are no suspicious calcifications overlying the kidneys or the ureters, with scattered aortoiliac vascular calcifications. There is rightward convex lumbar spinal curvature, with intervertebral disc space narrowing and facet arthropathy in the lumbar spine. No acute fractures.    IMPRESSION: Nonobstructive bowel gas pattern.    Electronically signed by:  Sebastian Juarez MD  1/20/2023 11:38 AM CST Workstation: [id number]  X-Ray Chest PA And Lateral  HISTORY: pleural effusion    FINDINGS: PA and lateral chest radiograph at 1050 hours compared to 01/18/2023 show median sternotomy wires and mediastinal surgical clips from prior CABG, with stable enlarged cardiac silhouette and normal pulmonary vascularity.    There are persistent right perihilar and bilateral lower lung airspace opacities, with interval increase in bilateral pleural effusions. The upper lungs are clear, with no evidence of interstitial pulmonary edema or pneumothorax.    IMPRESSION: Interval increased size of moderate right and small left pleural effusions.    Electronically signed by:  Sebastian Juarez MD  1/20/2023 11:37 AM CST Workstation: [id number]      ECHOCARDIOGRAM RESULTS (last 5)  Results for orders placed during the hospital encounter of 05/10/22    Echo    Interpretation Summary  · The left ventricle is normal in size with mild concentric hypertrophy and mildly decreased systolic function.  · The estimated ejection fraction is 40%.  · Grade III left ventricular diastolic dysfunction.  · Mild left atrial enlargement.  · Mild aortic regurgitation.  · There is moderate aortic valve stenosis.  ·  Aortic valve area is 1.06 cm2; peak velocity is m/s; mean gradient is 10 mmHg.  · Mild-to-moderate mitral regurgitation.  · Moderate tricuspid regurgitation.  · Mild pulmonic regurgitation.  · Intermediate central venous pressure (8 mmHg).  · The estimated PA systolic pressure is 32 mmHg.      Results for orders placed in visit on 10/11/21    Echo    Interpretation Summary  · The left ventricle is normal in size with mild concentric hypertrophy and low normal systolic function.  · The estimated ejection fraction is 52%.  · Grade I left ventricular diastolic dysfunction.  · Normal right ventricular size.  · Mild left atrial enlargement.  · There is mild aortic valve stenosis.  · Aortic valve area is 2.57 cm2; peak velocity is 2.17 m/s; mean gradient is 13 mmHg.  · Mild mitral regurgitation.  · Mild tricuspid regurgitation.  · Normal central venous pressure (3 mmHg).  · The estimated PA systolic pressure is 23 mmHg.      CURRENT/PREVIOUS VISIT EKG  Results for orders placed or performed during the hospital encounter of 01/18/23   EKG 12-lead    Collection Time: 01/18/23  9:35 PM    Narrative    Test Reason : R00.2,    Vent. Rate : 098 BPM     Atrial Rate : 000 BPM     P-R Int : 000 ms          QRS Dur : 092 ms      QT Int : 328 ms       P-R-T Axes : 000 -09 126 degrees     QTc Int : 418 ms    Atrial fibrillation  Minimal voltage criteria for LVH, may be normal variant ( [place] product )  Nonspecific ST and T wave abnormality  Abnormal ECG  When compared with ECG of 25-JUN-2022 09:29,  Atrial fibrillation has replaced Sinus rhythm  Vent. rate has increased BY  35 BPM  Criteria for Septal infarct are no longer Present    Referred By: [email or web address]   SELF           Confirmed By:            ASSESSMENT/PLAN:     Active Hospital Problems    Diagnosis    *Atrial fibrillation with RVR    DM (diabetes mellitus), type 2    Atherosclerosis of native coronary artery of native heart with angina pectoris       ASSESSMENT & PLAN:      Afib with RVR- now in SR  Acute on chronic systolic HF with EF 40% on last echo  CAD with hx of CABG      RECOMMENDATIONS:    Increase furosemide to 40 mg IV BID.   Strict I&O.   Consult pulmonary for possible thoracentesis.   Continue to hold Eliquis for now.   Continue to check and replace potassium and magnesium. Goal for potassium is 4.0, and goal for magnesium is 2.0.   Will follow.         Naheed Blount NP  [facility]  Department of Cardiology  Date of Service: 01/20/2023      I have personally interviewed and examined the patient, I have reviewed the Nurse Practitioner's history and physical, assessment, and plan. I have personally evaluated the patient at bedside and agree with the findings and made appropriate changes as necessary in recommendations.    Patient still complains of nausea.  Has some shortness of breath and cough.  Holding Eliquis for the thoracentesis      Roby Tavarez MD  Department of Cardiology  [facility]  01/20/2023 9:37 AM

## 2023-01-20 NOTE — HOSPITAL COURSE
1/20:  Drop in sodium noted, repeat levels.  Patient still has persistent coughing, chest x-ray showed bilateral pleural effusion, pulmonology consulted.  Patient states she has not moved her bowels since Wednesday, would give a 1 time dose of lactulose.  Continue IV diuresis    Assumed care of this patient on 1/21/23.  Patient with known history of CAD status post CABG, bilateral effusion, diastolic heart failure, paroxysmal atrial fibrillation, hypothyroidism.  She is followed by cardiologist Dr. Diaz.  History of asthma with nocturnal hypoxic respiratory failure on nighttime oxygen, followed by Dr. Weiss.  She presented to the ED with palpitations, fast associated with shortness of breath and coughing.  She was found to be in AFib with RVR.  Bilateral pleural effusion, right greater than left.  She was admitted to telemetry floor, started on amiodarone 200 mg t.i.d., IV diuresis and monitoring.  Pulmonary was consulted given ongoing pleural effusion, on 01/21 underwent right-sided thoracocentesis with 1300 cc drained.  Constipation and stool softener/laxative started.  On 01/23, home oxygen test performed and patient will require 24 hours oxygen, 3 L, acute kidney injury with creatinine up to 1.6, holding diuresis, remains in sinus rhythm. On 01/23 continues to feel improved, creatinine down to 1.4. Eager for discharge and appears medically stable for discharge, cleared by consultants for discharge.  She will follow-up with regular Cardiology on Thursday and Pulmonary as outpatient.  She was instructed to wear home oxygen all the time as per assessment.  Discharge plan including medication, follow-up as well as return precautions were reviewed with the patient and  present at bedside, expressed understanding, no questions or concerns.      Discharge examination   Seen ambulating from bathroom, on nasal cannula, alert and oriented, regular rhythm, abdomen nontender

## 2023-01-20 NOTE — PROGRESS NOTES
"[facility]  Progress Note    Patient Name: Darlin Tipton  MRN: [id number]  Patient Class: IP- Inpatient   Admission Date: 1/18/2023  Length of Stay: 1 days  Attending Physician: Collin Hoang MD  Primary Care Provider: Ray Chen MD        Subjective:     Principal Problem:Atrial fibrillation with RVR        HPI:  82 year old female with history of CAD, DM 2, HTN, PAF, GERD, COPD (Chronic Bronchitis 3 lpm oxygen at home), Hypothyroidism presentred to ED complaining of "Palpitations" and "Irregular heart beat" for "A couple of hours". "I felt like I was going back into a fib again". Her Cardiologist no longer attends this hospital. She has had "Occasional" episodes of orthopnea and PND. Her daughter at bedside stated that the patient's Cardiologist "Was planning on starting a diruetic this weekend" for her "Occasional" edema. No cough or sputum. The patient became rate controlled in ED with amiodarone bolus/infusion. No chest discomfort.    In ED Labs reviewed and noted below: normal CBC, normal electrolytes with stage 3a renal dysfunction; BNP is markedly elevated with minimally elevated HS trop (repeat pending); TSH is normal. CXR reviewed: cardiomegaly with moderate right sided effusiion and small left sided effusion-possible underlying infiltrate on right. EKG reviewed: a fib RVR; no acute segments.    Discussed with ED MD; Inpatient admission for a fib RVR, and possible pneumonia; Cardiology opinion; ceftriaxone 1 gm q day; cultured; continue preadmission regimen for chronic maladies; continue amiodarone infusion; low dose insulin sliding scale; electrolyte sliding scale      Overview/Hospital Course:  1/20:  Drop in sodium noted, repeat levels.  Patient still has persistent coughing, chest x-ray showed bilateral pleural effusion, pulmonology consulted.  Patient states she has not moved her bowels since Wednesday, would give a 1 time dose of lactulose.  Continue IV " diuresis      Review of Systems   Constitutional:  Negative for fever.   Respiratory:  Positive for cough and shortness of breath.    Cardiovascular:  Negative for palpitations.   Gastrointestinal:  Positive for abdominal pain and constipation.   Neurological:  Negative for weakness.   All other systems reviewed and are negative.  Objective:     Vital Signs (Most Recent):  Temp: 98.2 °F (36.8 °C) (01/20/23 1633)  Pulse: 87 (01/20/23 1608)  Resp: 20 (01/20/23 1608)  BP: 126/65 (01/20/23 1608)  SpO2: 100 % (01/20/23 1608) Vital Signs (24h Range):  Temp:  [97.2 °F (36.2 °C)-98.2 °F (36.8 °C)] 98.2 °F (36.8 °C)  Pulse:  [68-89] 87  Resp:  [16-50] 20  SpO2:  [88 %-100 %] 100 %  BP: (107-172)/(54-91) 126/65     Weight: 65 kg (143 lb 4.8 oz)  Body mass index is 26.21 kg/m².    Intake/Output Summary (Last 24 hours) at 1/20/2023 1747  Last data filed at 1/20/2023 1230  Gross per 24 hour   Intake 530 ml   Output 1075 ml   Net -545 ml      Physical Exam  Vitals reviewed.   Eyes:      Pupils: Pupils are equal, round, and reactive to light.   Cardiovascular:      Rate and Rhythm: Normal rate.      Pulses: Normal pulses.   Pulmonary:      Effort: Respiratory distress present.   Abdominal:      Palpations: Abdomen is soft.   Musculoskeletal:      Cervical back: Normal range of motion.   Neurological:      General: No focal deficit present.      Mental Status: She is alert and oriented to person, place, and time. Mental status is at baseline.   Psychiatric:         Mood and Affect: Mood normal.       Significant Labs: All pertinent labs within the past 24 hours have been reviewed.  BMP:   Recent Labs   Lab 01/18/23  2032 01/19/23  0502 01/20/23  0514 01/20/23  1014   *   < > 103  --       < > 128* 127*   K 4.3   < > 3.8  --    CL 99   < > 93*  --    CO2 26   < > 29  --    BUN 25*   < > 26*  --    CREATININE 1.0   < > 1.0  --    CALCIUM 9.6   < > 8.7  --    MG 2.0  --   --   --     < > = values in this interval not  displayed.     CBC:   Recent Labs   Lab 01/18/23 2032 01/19/23  0502 01/20/23  0514   WBC 5.81 6.03 4.20   HGB 12.2 12.0 11.4*   HCT 39.1 38.7 36.2*    218 207     Magnesium:   Recent Labs   Lab 01/18/23 2032   MG 2.0       Significant Imaging: I have reviewed all pertinent imaging results/findings within the past 24 hours.  Imaging Results              X-Ray Chest AP Portable (Final result)  Result time 01/19/23 07:09:10      Final result by Sebastian Juarez MD (01/19/23 07:09:10)                   Narrative:    HISTORY: Dyspnea.    FINDINGS: Portable chest radiograph at 2024 hours compared to 05/17/2022 shows median sternotomy wires and mediastinal surgical clips from prior CABG, with enlarged cardiac silhouette and normal pulmonary vascularity. There are calcified mediastinal lymph nodes and scattered aortic vascular calcifications.    There are right infrahilar and lower lung airspace opacities with hazy density, obscuring the right hemidiaphragm. There is mild blunting of left costophrenic angle, with the upper lungs clear. No pneumothorax or evidence of interstitial pulmonary edema. No acute fractures.    IMPRESSION:  1. Right infrahilar and lower lung airspace opacities, suggesting atelectasis and or pneumonia, with right pleural effusion. Radiographic follow-up is recommended to document complete resolution, and help exclude possibility of any underlying pulmonary mass.  2. Small left pleural effusion.    Electronically signed by:  Sebastian Juarez MD  1/19/2023 7:09 AM CST Workstation: [id number]                                        Assessment/Plan:      * Atrial fibrillation with RVR  Patient with Paroxysmal (<7 days) atrial fibrillation which is controlled currently with Amiodarone. Patient is currently in atrial fibrillation.[id number] Score: 5. HASBLED Score: . Anticoagulation indicated. Anticoagulation done with apixaban    Cardiology on board  Continue amiodarone    CHF exacerbation  Patient is  identified as having Combined Systolic and Diastolic heart failure that is Acute on chronic. CHF is currently uncontrolled due to Continued edema of extremities and Pulmonary edema/pleural effusion on CXR. Latest ECHO performed and demonstrates- Results for orders placed during the hospital encounter of 05/10/22    Echo    Interpretation Summary  · The left ventricle is normal in size with mild concentric hypertrophy and mildly decreased systolic function.  · The estimated ejection fraction is 40%.  · Grade III left ventricular diastolic dysfunction.  · Mild left atrial enlargement.  · Mild aortic regurgitation.  · There is moderate aortic valve stenosis.  · Aortic valve area is 1.06 cm2; peak velocity is m/s; mean gradient is 10 mmHg.  · Mild-to-moderate mitral regurgitation.  · Moderate tricuspid regurgitation.  · Mild pulmonic regurgitation.  · Intermediate central venous pressure (8 mmHg).  · The estimated PA systolic pressure is 32 mmHg.  . Continue Furosemide and monitor clinical status closely. Monitor on telemetry.Monitor strict Is&Os and daily weights.  Place on fluid restriction of 1.5 L. Continue to stress to patient importance of self efficacy and  on diet for CHF. Last BNP reviewed- and noted below   Recent Labs   Lab 01/18/23 2032   BNP 1,309*   .  Cardiology on board  Continue IV diuresis      Pleural effusion  Pulmonology consulted  Continue IV diuresis      DM (diabetes mellitus), type 2  Insulin sliding scale  Regular Accu-Cheks    Atherosclerosis of native coronary artery of native heart with angina pectoris        VTE Risk Mitigation (From admission, onward)         Ordered     IP VTE HIGH RISK PATIENT  Once         01/19/23 0339     Place sequential compression device  Until discontinued         01/19/23 0339     Reason for No Pharmacological VTE Prophylaxis  Once        Question:  Reasons:  Answer:  Already adequately anticoagulated on oral Anticoagulants    01/19/23 0339                 Discharge Planning   SHIRA: 1/21/2023     Code Status: Full Code   Is the patient medically ready for discharge?:     Reason for patient still in hospital (select all that apply): Patient trending condition  Discharge Plan A: Home with family                  Collin Hoang MD  Department of Hospital Medicine   [facility]

## 2023-01-20 NOTE — ASSESSMENT & PLAN NOTE
Patient is identified as having Combined Systolic and Diastolic heart failure that is Acute on chronic. CHF is currently uncontrolled due to Continued edema of extremities and Pulmonary edema/pleural effusion on CXR. Latest ECHO performed and demonstrates- Results for orders placed during the hospital encounter of 05/10/22    Echo    Interpretation Summary  · The left ventricle is normal in size with mild concentric hypertrophy and mildly decreased systolic function.  · The estimated ejection fraction is 40%.  · Grade III left ventricular diastolic dysfunction.  · Mild left atrial enlargement.  · Mild aortic regurgitation.  · There is moderate aortic valve stenosis.  · Aortic valve area is 1.06 cm2; peak velocity is m/s; mean gradient is 10 mmHg.  · Mild-to-moderate mitral regurgitation.  · Moderate tricuspid regurgitation.  · Mild pulmonic regurgitation.  · Intermediate central venous pressure (8 mmHg).  · The estimated PA systolic pressure is 32 mmHg.  . Continue Furosemide and monitor clinical status closely. Monitor on telemetry.Monitor strict Is&Os and daily weights.  Place on fluid restriction of 1.5 L. Continue to stress to patient importance of self efficacy and  on diet for CHF. Last BNP reviewed- and noted below   Recent Labs   Lab 01/18/23 2032   BNP 1,309*   .  Cardiology on board  Continue IV diuresis

## 2023-01-20 NOTE — PLAN OF CARE
01/20/23 0751   Patient Assessment/Suction   Level of Consciousness (AVPU) alert   Respiratory Effort Normal;Unlabored   All Lung Fields Breath Sounds diminished;clear   Rhythm/Pattern, Respiratory pattern regular   PRE-TX-O2   Device (Oxygen Therapy) nasal cannula   $ Is the patient on Low Flow Oxygen? Yes   Flow (L/min) 3   SpO2 (!) 94 %   Pulse Oximetry Type Continuous   $ Pulse Oximetry - Multiple Charge Pulse Oximetry - Multiple   Pulse 84   Resp 18   Aerosol Therapy   $ Aerosol Therapy Charges Aerosol Treatment   Daily Review of Necessity (SVN) completed   Respiratory Treatment Status (SVN) given   Treatment Route (SVN) mask;oxygen   Patient Position (SVN) HOB elevated;Marc's   Post Treatment Assessment (SVN) breath sounds unchanged   Signs of Intolerance (SVN) none   Education   $ Education Bronchodilator;15 min   Respiratory Evaluation   $ Care Plan Tech Time 15 min

## 2023-01-21 PROBLEM — E03.9 HYPOTHYROIDISM: Chronic | Status: ACTIVE | Noted: 2023-01-21

## 2023-01-21 PROBLEM — J45.909 ASTHMA: Chronic | Status: ACTIVE | Noted: 2023-01-21

## 2023-01-21 PROBLEM — E11.9 DM (DIABETES MELLITUS), TYPE 2: Chronic | Status: ACTIVE | Noted: 2018-05-05

## 2023-01-21 PROBLEM — J90 PLEURAL EFFUSION: Chronic | Status: ACTIVE | Noted: 2023-01-20

## 2023-01-21 PROBLEM — J96.10 CHRONIC RESPIRATORY FAILURE: Chronic | Status: ACTIVE | Noted: 2023-01-21

## 2023-01-21 PROBLEM — I38 VALVULAR HEART DISEASE: Chronic | Status: ACTIVE | Noted: 2023-01-21

## 2023-01-21 LAB
ANION GAP SERPL CALC-SCNC: 7 MMOL/L (ref 8–16)
BASOPHILS # BLD AUTO: 0.02 K/UL (ref 0–0.2)
BASOPHILS NFR BLD: 0.4 % (ref 0–1.9)
BUN SERPL-MCNC: 29 MG/DL (ref 8–23)
CALCIUM SERPL-MCNC: 8.7 MG/DL (ref 8.7–10.5)
CHLORIDE SERPL-SCNC: 89 MMOL/L (ref 95–110)
CO2 SERPL-SCNC: 33 MMOL/L (ref 23–29)
CREAT SERPL-MCNC: 1.1 MG/DL (ref 0.5–1.4)
DIFFERENTIAL METHOD: ABNORMAL
EOSINOPHIL # BLD AUTO: 0.1 K/UL (ref 0–0.5)
EOSINOPHIL NFR BLD: 1.4 % (ref 0–8)
ERYTHROCYTE [DISTWIDTH] IN BLOOD BY AUTOMATED COUNT: 13.7 % (ref 11.5–14.5)
EST. GFR  (NO RACE VARIABLE): 50.2 ML/MIN/1.73 M^2
ESTIMATED AVG GLUCOSE: 120 MG/DL (ref 68–131)
GLUCOSE SERPL-MCNC: 106 MG/DL (ref 70–110)
GLUCOSE SERPL-MCNC: 107 MG/DL (ref 70–110)
GLUCOSE SERPL-MCNC: 123 MG/DL (ref 70–110)
GLUCOSE SERPL-MCNC: 143 MG/DL (ref 70–110)
GLUCOSE SERPL-MCNC: 91 MG/DL (ref 70–110)
GLUCOSE SERPL-MCNC: 95 MG/DL (ref 70–110)
GLUCOSE SERPL-MCNC: 96 MG/DL (ref 70–110)
GLUCOSE SERPL-MCNC: 99 MG/DL (ref 70–110)
HBA1C MFR BLD: 5.8 % (ref 4.5–6.2)
HCT VFR BLD AUTO: 35.5 % (ref 37–48.5)
HGB BLD-MCNC: 11.3 G/DL (ref 12–16)
IMM GRANULOCYTES # BLD AUTO: 0.02 K/UL (ref 0–0.04)
IMM GRANULOCYTES NFR BLD AUTO: 0.4 % (ref 0–0.5)
LYMPHOCYTES # BLD AUTO: 0.8 K/UL (ref 1–4.8)
LYMPHOCYTES NFR BLD: 15.5 % (ref 18–48)
MAGNESIUM SERPL-MCNC: 1.9 MG/DL (ref 1.6–2.6)
MCH RBC QN AUTO: 27.6 PG (ref 27–31)
MCHC RBC AUTO-ENTMCNC: 31.8 G/DL (ref 32–36)
MCV RBC AUTO: 87 FL (ref 82–98)
MONOCYTES # BLD AUTO: 0.7 K/UL (ref 0.3–1)
MONOCYTES NFR BLD: 15.3 % (ref 4–15)
NEUTROPHILS # BLD AUTO: 3.3 K/UL (ref 1.8–7.7)
NEUTROPHILS NFR BLD: 67 % (ref 38–73)
NRBC BLD-RTO: 0 /100 WBC
PLATELET # BLD AUTO: 209 K/UL (ref 150–450)
PMV BLD AUTO: 10.6 FL (ref 9.2–12.9)
POTASSIUM SERPL-SCNC: 3.8 MMOL/L (ref 3.5–5.1)
RBC # BLD AUTO: 4.1 M/UL (ref 4–5.4)
SODIUM SERPL-SCNC: 129 MMOL/L (ref 136–145)
WBC # BLD AUTO: 4.85 K/UL (ref 3.9–12.7)

## 2023-01-21 PROCEDURE — 25000003 PHARM REV CODE 250: Performed by: INTERNAL MEDICINE

## 2023-01-21 PROCEDURE — 99232 SBSQ HOSP IP/OBS MODERATE 35: CPT | Mod: 25,,, | Performed by: INTERNAL MEDICINE

## 2023-01-21 PROCEDURE — 25000242 PHARM REV CODE 250 ALT 637 W/ HCPCS: Performed by: INTERNAL MEDICINE

## 2023-01-21 PROCEDURE — 99900031 HC PATIENT EDUCATION (STAT)

## 2023-01-21 PROCEDURE — 27000221 HC OXYGEN, UP TO 24 HOURS

## 2023-01-21 PROCEDURE — 83735 ASSAY OF MAGNESIUM: CPT | Performed by: STUDENT IN AN ORGANIZED HEALTH CARE EDUCATION/TRAINING PROGRAM

## 2023-01-21 PROCEDURE — 85025 COMPLETE CBC W/AUTO DIFF WBC: CPT | Performed by: INTERNAL MEDICINE

## 2023-01-21 PROCEDURE — 94640 AIRWAY INHALATION TREATMENT: CPT

## 2023-01-21 PROCEDURE — 32555 ASPIRATE PLEURA W/ IMAGING: CPT | Mod: RT,,, | Performed by: INTERNAL MEDICINE

## 2023-01-21 PROCEDURE — 25000003 PHARM REV CODE 250: Performed by: NURSE PRACTITIONER

## 2023-01-21 PROCEDURE — 21000000 HC CCU ICU ROOM CHARGE

## 2023-01-21 PROCEDURE — 94761 N-INVAS EAR/PLS OXIMETRY MLT: CPT

## 2023-01-21 PROCEDURE — 99232 PR SUBSEQUENT HOSPITAL CARE,LEVL II: ICD-10-PCS | Mod: 25,,, | Performed by: INTERNAL MEDICINE

## 2023-01-21 PROCEDURE — 25000003 PHARM REV CODE 250: Performed by: STUDENT IN AN ORGANIZED HEALTH CARE EDUCATION/TRAINING PROGRAM

## 2023-01-21 PROCEDURE — 94799 UNLISTED PULMONARY SVC/PX: CPT

## 2023-01-21 PROCEDURE — 63600175 PHARM REV CODE 636 W HCPCS: Performed by: INTERNAL MEDICINE

## 2023-01-21 PROCEDURE — 80048 BASIC METABOLIC PNL TOTAL CA: CPT | Performed by: INTERNAL MEDICINE

## 2023-01-21 PROCEDURE — 63600175 PHARM REV CODE 636 W HCPCS: Performed by: NURSE PRACTITIONER

## 2023-01-21 PROCEDURE — 32555 PR THORACEN W/IMAG GUIDANCE: ICD-10-PCS | Mod: RT,,, | Performed by: INTERNAL MEDICINE

## 2023-01-21 PROCEDURE — 25000003 PHARM REV CODE 250

## 2023-01-21 PROCEDURE — 99900035 HC TECH TIME PER 15 MIN (STAT)

## 2023-01-21 PROCEDURE — 36415 COLL VENOUS BLD VENIPUNCTURE: CPT | Performed by: INTERNAL MEDICINE

## 2023-01-21 RX ORDER — POLYETHYLENE GLYCOL 3350 17 G/17G
17 POWDER, FOR SOLUTION ORAL DAILY
Status: DISCONTINUED | OUTPATIENT
Start: 2023-01-22 | End: 2023-01-23 | Stop reason: HOSPADM

## 2023-01-21 RX ORDER — AMOXICILLIN 250 MG
2 CAPSULE ORAL 2 TIMES DAILY
Status: DISCONTINUED | OUTPATIENT
Start: 2023-01-21 | End: 2023-01-23 | Stop reason: HOSPADM

## 2023-01-21 RX ORDER — FLUTICASONE PROPIONATE 50 MCG
1 SPRAY, SUSPENSION (ML) NASAL NIGHTLY
Status: DISCONTINUED | OUTPATIENT
Start: 2023-01-22 | End: 2023-01-23 | Stop reason: HOSPADM

## 2023-01-21 RX ORDER — METOPROLOL SUCCINATE 25 MG/1
25 TABLET, EXTENDED RELEASE ORAL NIGHTLY
Status: DISCONTINUED | OUTPATIENT
Start: 2023-01-21 | End: 2023-01-23 | Stop reason: HOSPADM

## 2023-01-21 RX ORDER — SODIUM CHLORIDE 0.9 % (FLUSH) 0.9 %
10 SYRINGE (ML) INJECTION
Status: DISCONTINUED | OUTPATIENT
Start: 2023-01-21 | End: 2023-01-23 | Stop reason: HOSPADM

## 2023-01-21 RX ADMIN — CARBOXYMETHYLCELLULOSE SODIUM 2 DROP: 0.5 SOLUTION, GEL FORMING / DROPS OPHTHALMIC at 11:01

## 2023-01-21 RX ADMIN — BUDESONIDE 0.5 MG: 0.5 INHALANT RESPIRATORY (INHALATION) at 08:01

## 2023-01-21 RX ADMIN — BUSPIRONE HYDROCHLORIDE 10 MG: 5 TABLET ORAL at 09:01

## 2023-01-21 RX ADMIN — CEFTRIAXONE 1 G: 1 INJECTION, SOLUTION INTRAVENOUS at 03:01

## 2023-01-21 RX ADMIN — FUROSEMIDE 40 MG: 10 INJECTION, SOLUTION INTRAMUSCULAR; INTRAVENOUS at 09:01

## 2023-01-21 RX ADMIN — MELATONIN 6 MG: at 09:01

## 2023-01-21 RX ADMIN — ONDANSETRON 4 MG: 2 INJECTION INTRAMUSCULAR; INTRAVENOUS at 08:01

## 2023-01-21 RX ADMIN — ARFORMOTEROL TARTRATE 15 MCG: 15 SOLUTION RESPIRATORY (INHALATION) at 08:01

## 2023-01-21 RX ADMIN — CARBOXYMETHYLCELLULOSE SODIUM 2 DROP: 0.5 SOLUTION, GEL FORMING / DROPS OPHTHALMIC at 09:01

## 2023-01-21 RX ADMIN — CHLORHEXIDINE GLUCONATE 15 ML: 1.2 RINSE ORAL at 09:01

## 2023-01-21 RX ADMIN — METOPROLOL SUCCINATE 25 MG: 25 TABLET, FILM COATED, EXTENDED RELEASE ORAL at 09:01

## 2023-01-21 RX ADMIN — MUPIROCIN 1 G: 20 OINTMENT TOPICAL at 09:01

## 2023-01-21 RX ADMIN — AMIODARONE HYDROCHLORIDE 200 MG: 200 TABLET ORAL at 09:01

## 2023-01-21 RX ADMIN — Medication 2000 UNITS: at 09:01

## 2023-01-21 RX ADMIN — APIXABAN 5 MG: 5 TABLET, FILM COATED ORAL at 09:01

## 2023-01-21 RX ADMIN — POTASSIUM BICARBONATE 50 MEQ: 977.5 TABLET, EFFERVESCENT ORAL at 12:01

## 2023-01-21 RX ADMIN — CARBOXYMETHYLCELLULOSE SODIUM 2 DROP: 0.5 SOLUTION, GEL FORMING / DROPS OPHTHALMIC at 06:01

## 2023-01-21 RX ADMIN — ATORVASTATIN CALCIUM 40 MG: 40 TABLET, FILM COATED ORAL at 09:01

## 2023-01-21 RX ADMIN — LEVOTHYROXINE SODIUM 25 MCG: 0.03 TABLET ORAL at 06:01

## 2023-01-21 RX ADMIN — LIDOCAINE 5% 1 PATCH: 700 PATCH TOPICAL at 12:01

## 2023-01-21 RX ADMIN — AMITRIPTYLINE HYDROCHLORIDE 75 MG: 25 TABLET, FILM COATED ORAL at 09:01

## 2023-01-21 RX ADMIN — CARBOXYMETHYLCELLULOSE SODIUM 2 DROP: 0.5 SOLUTION, GEL FORMING / DROPS OPHTHALMIC at 03:01

## 2023-01-21 RX ADMIN — AMIODARONE HYDROCHLORIDE 200 MG: 200 TABLET ORAL at 03:01

## 2023-01-21 RX ADMIN — FLUTICASONE PROPIONATE 50 MCG: 50 SPRAY, METERED NASAL at 09:01

## 2023-01-21 RX ADMIN — FUROSEMIDE 40 MG: 10 INJECTION, SOLUTION INTRAMUSCULAR; INTRAVENOUS at 08:01

## 2023-01-21 RX ADMIN — SENNOSIDES AND DOCUSATE SODIUM 2 TABLET: 8.6; 5 TABLET ORAL at 03:01

## 2023-01-21 NOTE — PROCEDURES
Thoracentesis    Indication:  Bilateral pleural effusions causing a great deal of cough   Medications:  1% xylocaine  Specimens:  None    The patient was consented. An appropriate timeout was taken. Ultrasound was utilized to locate the more inferior extent of the pleural effusion. The 10th interspace in the posterior axillary line was prepped and draped in the usual sterile fashion. The area was infused with 5 cc of 1% Xylocaine. A #11 blade was used to incise the skin. The catheter was introduced without difficulty. 1300 cc of serous fluid was removed. The fluid was sent for studies. The patient tolerated the procedure well. The procedure was stopped when no further fluid could be withdrawn.  A post procedure chest x-ray has been ordered.

## 2023-01-21 NOTE — PROGRESS NOTES
Pulmonary/Critical Care Consult      PATIENT NAME: Darlin Tipton  MRN: [id number]  TODAY'S DATE: 2023  3:08 PM  ADMIT DATE: 2023  AGE: 82 y.o. : 1940    CONSULT REQUESTED BY: Nohelia Chandra MD    REASON FOR CONSULT:   Pleural effusions     HPI:  The patient is an 82-year-old female who we follow for asthma and nocturnal hypoxemic respiratory failure.  She has systolic heart failure and has bilateral pleural effusions at this time.  She was in atrial fib with RVR but now is back in sinus.  Her last thoracentesis was in May.  She came to the hospital because she was coughing so much.  She recognized it was not her asthma.  She was gaining weight daily.     the patient had a good diuresis yesterday but continues to cough.  She perceives that a thoracentesis will improve this.    REVIEW OF SYSTEMS  GENERAL: Feeling okay  EYES: Vision is good.  ENT: No sinusitis or pharyngitis.   HEART: No chest pain or palpitations.  LUNGS:  She is coughing a great deal.  GI:  Her abdomen has been increasing in size  : No dysuria, hesitancy, or nocturia.  SKIN: No lesions or rashes.  MUSCULOSKELETAL: No joint pain or myalgias.  NEURO: No headaches or neuropathy.  LYMPH:  The swelling in her legs is better.  PSYCH: No anxiety or depression.  ENDO:  She has been gaining weight.    No change in the patient's Past Medical History, Past Surgical History, Social History or Family History since admission.     VITAL SIGNS (MOST RECENT)  Temp: 97.9 °F (36.6 °C) (23 0303)  Pulse: 74 (23 0822)  Resp: 18 (23 08)  BP: 123/65 (23 0600)  SpO2: 95 % (23 08)    INTAKE AND OUTPUT (LAST 24 HOURS):  Intake/Output Summary (Last 24 hours) at 2023 1142  Last data filed at 2023 0600  Gross per 24 hour   Intake 411.67 ml   Output 1850 ml   Net -1438.33 ml       WEIGHT  Wt Readings from Last 1 Encounters:   23 65 kg (143 lb 4.8 oz)       PHYSICAL EXAM  GENERAL: Older patient in no  distress.  HEENT: Pupils equal and reactive. Extraocular movements intact. Nose intact. Pharynx moist.  NECK: Supple.   HEART: Regular rate and rhythm. No murmur or gallop auscultated.  LUNGS:  There are decreased breath sounds in the left base.  There are decreased breath sounds CHCF up the right posterior thorax.  There crackles on the left mid thorax. Lung excursion symmetrical. No change in fremitus.   ABDOMEN: Bowel sounds present. Non-tender, no masses palpated.  : Normal anatomy.  EXTREMITIES: Normal muscle tone and joint movement, no cyanosis or clubbing.   LYMPHATICS: No adenopathy palpated, trace edema.  SKIN: Dry, intact, no lesions.   NEURO: Cranial nerves II-XII intact. Motor strength 5/5 bilaterally, upper and lower extremities.  PSYCH: Appropriate affect      CBC LAST (LAST 24 HOURS)  Recent Labs   Lab 01/21/23  0448   WBC 4.85   RBC 4.10   HGB 11.3*   HCT 35.5*   MCV 87   MCH 27.6   MCHC 31.8*   RDW 13.7      MPV 10.6   GRAN 67.0  3.3   LYMPH 15.5*  0.8*   MONO 15.3*  0.7   BASO 0.02   NRBC 0       CHEMISTRY LAST (LAST 24 HOURS)  Recent Labs   Lab 01/21/23  0447   *   K 3.8   CL 89*   CO2 33*   ANIONGAP 7*   BUN 29*   CREATININE 1.1   GLU 99   CALCIUM 8.7   MG 1.9         CARDIAC PROFILE (LAST 24 HOURS)  Recent Labs   Lab 01/18/23  2032 01/19/23  0216 01/19/23  2157   BNP 1,309*  --   --    TROPONINIHS 22.2*   < > 18.8*    < > = values in this interval not displayed.       LAST 7 DAYS MICROBIOLOGY   Microbiology Results (last 7 days)       Procedure Component Value Units Date/Time    Blood culture [[id number]] Collected: 01/19/23 0507    Order Status: Completed Specimen: Blood Updated: 01/21/23 0632     Blood Culture, Routine No Growth to date      No Growth to date      No Growth to date            MOST RECENT IMAGING  X-Ray Chest AP Portable  Narrative: EXAMINATION:  XR CHEST AP PORTABLE    CLINICAL HISTORY:  pleural effusion;    FINDINGS:  Portable chest at 815 compared with  01/20/2023 shows unchanged enlarged cardiac silhouette size with normal mediastinal contours containing postsurgical changes of CABG.    Small right pleural effusion has slightly improved in tiny left pleural effusion is unchanged.  Bibasilar alveolar opacities unchanged.  Nodular opacity in superior hilar region of left superior lung zone is unchanged, lying just inferior to anterior left 1st rib.  Pulmonary vasculature is normal. No pneumothorax or acute osseous abnormality.  Impression: Slight improvement of right pleural effusion.    Electronically signed by: Dada Chin MD  Date:    01/21/2023  Time:    08:30      CURRENT VISIT EKG  Results for orders placed or performed during the hospital encounter of 01/18/23   EKG 12-lead    Narrative    Test Reason : I48.91,I48.92,    Vent. Rate : 111 BPM     Atrial Rate : 129 BPM     P-R Int : 000 ms          QRS Dur : 092 ms      QT Int : 296 ms       P-R-T Axes : 000 -13 103 degrees     QTc Int : 402 ms    Atrial fibrillation with rapid ventricular response  Nonspecific ST and T wave abnormality  Abnormal ECG  When compared with ECG of 18-JAN-2023 20:07,  No significant change was found    Referred By: [email or web address]   SELF           Confirmed By:        ECHOCARDIOGRAM RESULTS  Results for orders placed during the hospital encounter of 05/10/22    Echo    Interpretation Summary  · The left ventricle is normal in size with mild concentric hypertrophy and mildly decreased systolic function.  · The estimated ejection fraction is 40%.  · Grade III left ventricular diastolic dysfunction.  · Mild left atrial enlargement.  · Mild aortic regurgitation.  · There is moderate aortic valve stenosis.  · Aortic valve area is 1.06 cm2; peak velocity is m/s; mean gradient is 10 mmHg.  · Mild-to-moderate mitral regurgitation.  · Moderate tricuspid regurgitation.  · Mild pulmonic regurgitation.  · Intermediate central venous pressure (8 mmHg).  · The estimated PA systolic pressure is 32  mmHg.        Oxygen INFORMATION   95% on 3 L       IMPRESSION AND PLAN  Bilateral pleural effusions, right greater than left  Systolic heart failure, acute on chronic   AFib, now in sinus rhythm  Asthma   Chronic nocturnal hypoxemia    Continue Lasix b.i.d.  Continue to hold Eliquis   Thoracentesis on the right    Rula Weiss MD  [facility] hospital  Department of Pulmonology  Date of Service: 01/21/2023  3:08 PM

## 2023-01-21 NOTE — PLAN OF CARE
01/21/23 0822   Patient Assessment/Suction   Level of Consciousness (AVPU) alert   Respiratory Effort Normal;Unlabored   DWAYNE Breath Sounds clear   Rhythm/Pattern, Respiratory no shortness of breath reported   PRE-TX-O2   Device (Oxygen Therapy) nasal cannula   $ Is the patient on Low Flow Oxygen? Yes   Flow (L/min) 3   SpO2 95 %   Pulse Oximetry Type Continuous   $ Pulse Oximetry - Multiple Charge Pulse Oximetry - Multiple   Pulse 74   Resp 18   Aerosol Therapy   $ Aerosol Therapy Charges Aerosol Treatment   Daily Review of Necessity (SVN) completed   Respiratory Treatment Status (SVN) given   Treatment Route (SVN) mask;oxygen   Patient Position (SVN) semi-Marc's   Post Treatment Assessment (SVN) increased aeration   Signs of Intolerance (SVN) none   Education   $ Education Bronchodilator;15 min   Respiratory Evaluation   $ Care Plan Tech Time 15 min   Home Oxygen   Has Home Oxygen? Yes

## 2023-01-21 NOTE — PROGRESS NOTES
"[facility]  Department of Cardiology  Progress Note      PATIENT NAME: Darlin Tipton    MRN: [id number]  TODAY'S DATE: 01/21/2023  ADMIT DATE: 1/18/2023                          CONSULT REQUESTED BY: Nohelia Chandra MD    SUBJECTIVE     PRINCIPAL PROBLEM: Atrial fibrillation with RVR    1/21/23  Patient is lying in bed during examination, in no acute distress, she remains in atrial fibrillation on telemetry.  She complains of a persistent cough overnight.  Is on supplemental oxygen.  She is been NPO this morning and is to to have thoracentesis performed today.  Eliquis continues to be held.    1/20/23:  Patient seen sitting up in chair with no distress noted. She continues to have a cough and is on o2. She remains in Afib. Chest xray shows worsened right and left pleural effusions.       REASON FOR CONSULT:  From H&P: 82 year old female with history of CAD, DM 2, HTN, PAF, GERD, COPD (Chronic Bronchitis 3 lpm oxygen at home), Hypothyroidism presentred to ED complaining of "Palpitations" and "Irregular heart beat" for "A couple of hours". "I felt like I was going back into a fib again". Her Cardiologist no longer attends this hospital. She has had "Occasional" episodes of orthopnea and PND. Her daughter at bedside stated that the patient's Cardiologist "Was planning on starting a diruetic this weekend" for her "Occasional" edema. No cough or sputum. The patient became rate controlled in ED with amiodarone bolus/infusion. No chest discomfort.     In ED Labs reviewed and noted below: normal CBC, normal electrolytes with stage 3a renal dysfunction; BNP is markedly elevated with minimally elevated HS trop (repeat pending); TSH is normal. CXR reviewed: cardiomegaly with moderate right sided effusiion and small left sided effusion-possible underlying infiltrate on right. EKG reviewed: a fib RVR; no acute segments.     Discussed with ED MD; Inpatient admission for a fib RVR, and possible pneumonia; " Cardiology opinion; ceftriaxone 1 gm q day; cultured; continue preadmission regimen for chronic maladies; continue amiodarone infusion; low dose insulin sliding scale; electrolyte sliding scale         HPI:    Patient is an 82 year old female who presented to the ER with complaints of palpitations for a few hours. She reports having occasional shortness of breath, swelling, and orthopnea recently as well. On arrival, patient was noted to have Afib with RVR as well as pleural effusions.  Patient has since converted back into sinus rhythm.  She does have some nausea and headache but her breathing appears to be stable.  High sensitivity troponin was mildly elevated just above 20.        Review of patient's allergies indicates:   Allergen Reactions    Dexlansoprazole Itching, Nausea Only and Rash    Sulfa (sulfonamide antibiotics) Rash    Floxacillin Itching    Januvia [sitagliptin] Other (See Comments)     Hot flashes    Tetracyclines Itching    Fenofibrate micronized Rash    Nitrofurantoin macrocrystalline Other (See Comments)     unknown    Phenylfenesin la [phenylpropanolamine-gg] Rash       Past Medical History:   Diagnosis Date    Allergy     Dust mites, Grasses, Trees    Arthritis     Asthma     Blood transfusion     CAD (coronary artery disease)     Cataract     CHRONIC BRONCHITIS     Diabetes mellitus     Diabetes mellitus type II     GERD (gastroesophageal reflux disease)     Hyperlipidemia     Hypertension     Irregular heart beat     Kidney disease     ckd stage 3  as stated by patient - to see MD    Post-menopausal bleeding 2018    Spinal stenosis     Thyroid disease     Hypothyroidism     Past Surgical History:   Procedure Laterality Date    APPENDECTOMY  1968    BLADDER SUSPENSION  1989    CARDIAC SURGERY  2016    CABG    CATARACT EXTRACTION  9/2007 (L) and 10/2207 (R)    COLONOSCOPY N/A 10/18/2017    Procedure: COLONOSCOPY;  Surgeon: Esme Acuna MD;  Location: [facility];  Service: Endoscopy;   Laterality: N/A;    CORONARY ANGIOGRAPHY INCLUDING BYPASS GRAFTS WITH CATHETERIZATION OF LEFT HEART Left 5/13/2022    Procedure: Left heart cath;  Surgeon: Davon Patton MD;  Location: [facility] CATH/EP LAB;  Service: Cardiology;  Laterality: Left;    CORONARY ARTERY BYPASS GRAFT  4/26/2004    x5    ESOPHAGEAL DILATION      ESOPHAGOGASTRODUODENOSCOPY N/A 6/27/2022    Procedure: EGD (ESOPHAGOGASTRODUODENOSCOPY);  Surgeon: Skip Nj MD;  Location: [facility] ENDO;  Service: Endoscopy;  Laterality: N/A;    HYSTEROSCOPY WITH DILATION AND CURETTAGE OF UTERUS N/A 3/12/2020    Procedure: HYSTEROSCOPY, WITH DILATION AND CURETTAGE OF UTERUS;  Surgeon: Ramona Llanos MD;  Location: [facility] OR;  Service: OB/GYN;  Laterality: N/A;    SPINE SURGERY  3/2000    Tumor    TRANSFORAMINAL EPIDURAL INJECTION OF STEROID Right 11/21/2019    Procedure: Injection,steroid,epidural,transforaminal approach;  Surgeon: Bj Jones MD;  [facility];  Service: Pain Management;  Laterality: Right;  L4-5, L5-S1    TRANSFORAMINAL EPIDURAL INJECTION OF STEROID Right 12/31/2019    Procedure: Injection,steroid,epidural,transforaminal approach;  Surgeon: Bj Jones MD;  [facility];  Service: Pain Management;  Laterality: Right;  L4-5, L5-S1    TRANSFORAMINAL EPIDURAL INJECTION OF STEROID Right 2/5/2020    Procedure: Injection,steroid,epidural,transforaminal approach;  Surgeon: Bj Jones MD;  Location: [facility] OR;  Service: Pain Management;  Laterality: Right;  L4-5, L5-S1    TRANSFORAMINAL EPIDURAL INJECTION OF STEROID Left 1/27/2021    Procedure: Injection,steroid,epidural,transforaminal approach;  Surgeon: Bj Jones MD;  Location: [facility] OR;  Service: Pain Management;  Laterality: Left;  L4-5, L5-S1    TRANSFORAMINAL EPIDURAL INJECTION OF STEROID Right 3/4/2021    Procedure: Injection,steroid,epidural,transforaminal approach;  Surgeon: Bj Jones MD;  Location: [facility] OR;  Service: Pain Management;  Laterality: Right;  L4-L5, L5-S1    WRIST  SURGERY  1993    carpal tunnel       Social History     Tobacco Use    Smoking status: Passive Smoke Exposure - Never Smoker    Smokeless tobacco: Never    Tobacco comments:     PARENTS    Substance Use Topics    Alcohol use: Yes     Comment: Rare    Drug use: No        REVIEW OF SYSTEMS  CONSTITUTIONAL: Negative for chills, fatigue and fever.   EYES: No double vision, No blurred vision  NEURO: No headaches, No dizziness  RESPIRATORY: + shortness of breath and orthopnea; positive cough  CARDIOVASCULAR: + for chest pain- improved. + for palpitations and leg swelling-improving.   GI: Negative for abdominal pain, +nausea- improved  : Negative for dysuria and frequency, Negative for hematuria  SKIN: Negative for bruising, Negative for edema or discoloration noted.   PSYCHIATRIC: Negative for depression, Negative for anxiety, Negative for memory loss  MUSCULOSKELETAL: Negative for neck pain, Negative for muscle weakness, Negative for back pain     OBJECTIVE     VITAL SIGNS (Most Recent)  Temp: 97.9 °F (36.6 °C) (01/21/23 0303)  Pulse: 74 (01/21/23 0822)  Resp: 18 (01/21/23 0822)  BP: 123/65 (01/21/23 0600)  SpO2: 95 % (01/21/23 0822)    VENTILATION STATUS  Resp: 18 (01/21/23 0822)  SpO2: 95 % (01/21/23 0822)       I & O (Last 24H):  Intake/Output Summary (Last 24 hours) at 1/21/2023 1106  Last data filed at 1/21/2023 0600  Gross per 24 hour   Intake 411.67 ml   Output 1850 ml   Net -1438.33 ml         WEIGHTS  Wt Readings from Last 1 Encounters:   01/19/23 0330 65 kg (143 lb 4.8 oz)   01/18/23 2013 64 kg (141 lb)       PHYSICAL EXAM  CONSTITUTIONAL: No fever, no chills  HEENT: Normocephalic, atraumatic,pupils reactive to light                 NECK:  No JVD no carotid bruit  CVS: S1S2+, iRRR, rate controlled atrial fibrillation on telemetry  LUNGS:  Crackles in bilateral lower lobes; nonproductive cough  ABDOMEN: Soft, NT, BS+  EXTREMITIES: No cyanosis, edema  : No euceda catheter  NEURO: AAO X 3  PSY: Normal  affect      HOME MEDICATIONS:  No current facility-administered medications on file prior to encounter.     Current Outpatient Medications on File Prior to Encounter   Medication Sig Dispense Refill    acetaminophen (TYLENOL) 650 MG TbSR Take 1,300 mg by mouth once daily. 2 Tablet(s) Oral  Every day.      amitriptyline (ELAVIL) 75 MG tablet Take 75 mg by mouth every evening.      apixaban (ELIQUIS) 5 mg Tab Take 1 tablet (5 mg total) by mouth 2 (two) times daily. 180 tablet 3    blood sugar diagnostic (FREESTYLE LITE STRIPS) Strp USE TO TEST BLOOD SUGAR TWICE A  strip 3    busPIRone (BUSPAR) 10 MG tablet TAKE 1 TABLET BY MOUTH TWICE A  tablet 3    carboxymethylcellulose 1 % ophthalmic solution Apply 1 drop to eye As instructed. as directed      cetirizine (ZYRTEC) 10 MG tablet Take 10 mg by mouth once daily.      cholecalciferol, vitamin D3, (VITAMIN D3) 50 mcg (2,000 unit) Cap Take 1 capsule by mouth once daily.      clotrimazole-betamethasone 1-0.05% (LOTRISONE) cream Apply topically 2 (two) times daily as needed.      coenzyme Q10 100 mg capsule Take 100 mg by mouth once daily.       cranberry extract 650 mg Cap Take 1 tablet by mouth 2 (two) times daily.      FARXIGA 5 mg Tab tablet Take 5 mg by mouth once daily.      fluticasone furoate-vilanteroL (BREO ELLIPTA) 100-25 mcg/dose diskus inhaler Inhale 1 puff into the lungs once daily. Controller Rinse after you use it 180 each 6    fluticasone propionate (FLONASE) 50 mcg/actuation nasal spray 1 spray (50 mcg total) by Each Nostril route once daily. 48 g 3    Lactobac no.41/Bifidobact no.7 (PROBIOTIC-10 ORAL) Take by mouth.      lancets Misc 1 Units by Misc.(Non-Drug; Combo Route) route 2 (two) times daily. 200 each 3    lansoprazole (PREVACID) 30 MG capsule Take 1 capsule (30 mg total) by mouth once daily. 90 capsule 3    levalbuterol (XOPENEX) 1.25 mg/0.5 mL nebulizer solution Take 0.5 mLs (1.25 mg total) by nebulization every 6 (six) hours as  needed for Wheezing. Rescue 120 each 3    levothyroxine (SYNTHROID) 25 MCG tablet TAKE 1 TABLET BY MOUTH BEFORE BREAKFAST EVERY DAY 90 tablet 3    metoprolol succinate (TOPROL XL) 25 MG 24 hr tablet Take 1 tablet (25 mg total) by mouth once daily. 90 tablet 3    nitroGLYCERIN (NITROSTAT) 0.4 MG SL tablet Place 0.4 mg under the tongue every 5 (five) minutes as needed. 0.4mg Sublingual PRN .        pramoxine-hydrocortisone (PROCTOCREAM-HC) 1-1 % rectal cream Place rectally 2 (two) times daily. (Patient taking differently: Place 1 application rectally 2 (two) times daily.) 3 each 3    RESTASIS 0.05 % ophthalmic emulsion Place 1 drop into both eyes 2 (two) times daily.      rosuvastatin (CRESTOR) 10 MG tablet Take 1 tablet (10 mg total) by mouth once daily. 90 tablet 3    triazolam (HALCION) 0.25 MG Tab Take 1 tablet (0.25 mg total) by mouth nightly as needed (sleep). 90 tablet 1    UNABLE TO FIND Take 1 capsule by mouth once daily. Vital red      vitamins  A,C,E-zinc-copper [id number] unit-mg-unit Cap Take 1 capsule by mouth 2 (two) times daily.       [DISCONTINUED] dronedarone (MULTAQ) 400 mg Tab Take 1 tablet (400 mg total) by mouth 2 (two) times daily with meals. 180 tablet 3    [DISCONTINUED] spironolactone (ALDACTONE) 25 MG tablet Take 1 tablet (25 mg total) by mouth once daily. 30 tablet 1       SCHEDULED MEDS:   amiodarone  200 mg Oral TID    amitriptyline  75 mg Oral QHS    budesonide  0.5 mg Nebulization Q12H    And    arformoteroL  15 mcg Nebulization BID    atorvastatin  40 mg Oral Daily    busPIRone  10 mg Oral BID    carboxymethylcellulose  2 drop Both Eyes Q4H While awake    cefTRIAXone (ROCEPHIN) IVPB  1 g Intravenous Q24H    chlorhexidine  15 mL Mouth/Throat BID    cholecalciferol (vitamin D3)  2,000 Units Oral Daily    [START ON 1/22/2023] fluticasone propionate  1 spray Each Nostril QHS    furosemide (LASIX) injection  40 mg Intravenous Q12H    levothyroxine  25 mcg Oral Before breakfast     LIDOcaine  1 patch Transdermal Q24H    metoprolol succinate  25 mg Oral QHS    mupirocin   Nasal BID       CONTINUOUS INFUSIONS:        PRN MEDS:acetaminophen, carboxymethylcellulose sodium, dextrose 10%, dextrose 10%, glucagon (human recombinant), glucose, glucose, HYDROcodone-acetaminophen, insulin aspart U-100, levalbuterol, magnesium oxide, magnesium oxide, melatonin, nitroGLYCERIN, ondansetron, potassium bicarbonate, potassium bicarbonate, potassium bicarbonate, potassium, sodium phosphates, potassium, sodium phosphates, potassium, sodium phosphates, simethicone    LABS AND DIAGNOSTICS     CBC LAST 3 DAYS  Recent Labs   Lab 01/19/23  0502 01/20/23  0514 01/21/23  0448   WBC 6.03 4.20 4.85   RBC 4.33 4.17 4.10   HGB 12.0 11.4* 11.3*   HCT 38.7 36.2* 35.5*   MCV 89 87 87   MCH 27.7 27.3 27.6   MCHC 31.0* 31.5* 31.8*   RDW 14.1 13.7 13.7    207 209   MPV 10.3 10.3 10.6   GRAN 62.2  3.8 60.6  2.5 67.0  3.3   LYMPH 23.7  1.4 22.1  0.9* 15.5*  0.8*   MONO 12.3  0.7 15.2*  0.6 15.3*  0.7   BASO 0.03 0.01 0.02   NRBC 0 0 0         COAGULATION LAST 3 DAYS  Recent Labs   Lab 01/18/23 2032   INR 1.0   APTT 28.7         CHEMISTRY LAST 3 DAYS  Recent Labs   Lab 01/18/23 2032 01/19/23  0502 01/20/23  0514 01/20/23  1014 01/21/23  0447    131* 128* 127* 129*   K 4.3 3.9 3.8  --  3.8   CL 99 97 93*  --  89*   CO2 26 26 29  --  33*   ANIONGAP 11 8 6*  --  7*   BUN 25* 25* 26*  --  29*   CREATININE 1.0 0.9 1.0  --  1.1   * 115* 103  --  99   CALCIUM 9.6 9.0 8.7  --  8.7   MG 2.0  --   --   --  1.9   ALBUMIN 3.9  --   --   --   --    PROT 7.2  --   --   --   --    ALKPHOS 72  --   --   --   --    ALT 40  --   --   --   --    AST 32  --   --   --   --    BILITOT 0.4  --   --   --   --          CARDIAC PROFILE LAST 3 DAYS  Recent Labs   Lab 01/18/23 2032 01/19/23 0216 01/19/23  2157   BNP 1,309*  --   --    TROPONINIHS 22.2* 24.8* 18.8*         ENDOCRINE LAST 3 DAYS  Recent Labs   Lab 01/18/23 2032  01/19/23  2157   TSH 3.580  --    PROCAL  --  <0.05         LAST ARTERIAL BLOOD GAS  ABG  No results for input(s): PH, PO2, PCO2, HCO3, BE in the last 168 hours.    LAST 7 DAYS MICROBIOLOGY   Microbiology Results (last 7 days)       Procedure Component Value Units Date/Time    Blood culture [[id number]] Collected: 01/19/23 0503    Order Status: Completed Specimen: Blood Updated: 01/21/23 0632     Blood Culture, Routine No Growth to date      No Growth to date      No Growth to date            MOST RECENT IMAGING  X-Ray Chest AP Portable  Narrative: EXAMINATION:  XR CHEST AP PORTABLE    CLINICAL HISTORY:  pleural effusion;    FINDINGS:  Portable chest at 815 compared with 01/20/2023 shows unchanged enlarged cardiac silhouette size with normal mediastinal contours containing postsurgical changes of CABG.    Small right pleural effusion has slightly improved in tiny left pleural effusion is unchanged.  Bibasilar alveolar opacities unchanged.  Nodular opacity in superior hilar region of left superior lung zone is unchanged, lying just inferior to anterior left 1st rib.  Pulmonary vasculature is normal. No pneumothorax or acute osseous abnormality.  Impression: Slight improvement of right pleural effusion.    Electronically signed by: Dada Chin MD  Date:    01/21/2023  Time:    08:30      ECHOCARDIOGRAM RESULTS (last 5)  Results for orders placed during the hospital encounter of 05/10/22    Echo    Interpretation Summary  · The left ventricle is normal in size with mild concentric hypertrophy and mildly decreased systolic function.  · The estimated ejection fraction is 40%.  · Grade III left ventricular diastolic dysfunction.  · Mild left atrial enlargement.  · Mild aortic regurgitation.  · There is moderate aortic valve stenosis.  · Aortic valve area is 1.06 cm2; peak velocity is m/s; mean gradient is 10 mmHg.  · Mild-to-moderate mitral regurgitation.  · Moderate tricuspid regurgitation.  · Mild pulmonic  regurgitation.  · Intermediate central venous pressure (8 mmHg).  · The estimated PA systolic pressure is 32 mmHg.      Results for orders placed in visit on 10/11/21    Echo    Interpretation Summary  · The left ventricle is normal in size with mild concentric hypertrophy and low normal systolic function.  · The estimated ejection fraction is 52%.  · Grade I left ventricular diastolic dysfunction.  · Normal right ventricular size.  · Mild left atrial enlargement.  · There is mild aortic valve stenosis.  · Aortic valve area is 2.57 cm2; peak velocity is 2.17 m/s; mean gradient is 13 mmHg.  · Mild mitral regurgitation.  · Mild tricuspid regurgitation.  · Normal central venous pressure (3 mmHg).  · The estimated PA systolic pressure is 23 mmHg.      CURRENT/PREVIOUS VISIT EKG  Results for orders placed or performed during the hospital encounter of 01/18/23   EKG 12-lead    Collection Time: 01/18/23  9:35 PM    Narrative    Test Reason : R00.2,    Vent. Rate : 098 BPM     Atrial Rate : 000 BPM     P-R Int : 000 ms          QRS Dur : 092 ms      QT Int : 328 ms       P-R-T Axes : 000 -09 126 degrees     QTc Int : 418 ms    Atrial fibrillation  Minimal voltage criteria for LVH, may be normal variant ( Renny product )  Nonspecific ST and T wave abnormality  Abnormal ECG  When compared with ECG of 25-JUN-2022 09:29,  Atrial fibrillation has replaced Sinus rhythm  Vent. rate has increased BY  35 BPM  Criteria for Septal infarct are no longer Present    Referred By: [email or web address]   SELF           Confirmed By:            ASSESSMENT/PLAN:     Active Hospital Problems    Diagnosis    *Atrial fibrillation with RVR    Pleural effusion    CHF exacerbation    DM (diabetes mellitus), type 2       ASSESSMENT & PLAN:     Afib with RVR- rate controlled  Acute on chronic systolic HF with EF 40% on last echo  CAD with hx of CABG  Bilateral pleural effusions      RECOMMENDATIONS:    Continue furosemide to 40 mg IV BID.   Strict I&O.  1.5 L  fluid restriction.  Low-sodium diet.  Continue to hold Eliquis for now.  Plans for thoracentesis today per patient.  Patient has been NPO.  Patient remains in atrial fibrillation at a controlled rate.  Continue amiodarone 200 mg t.i.d..  Continue metoprolol succinate 25 mg daily.  Hold antihypertensives for systolic less than 100.  Potassium 3.8, magnesium 1.9.  Please replace per protocol.  Continue to check and replace potassium and magnesium. Goal for potassium is 4.0, and goal for magnesium is 2.0.   Will follow.       Kaur Tracy NP  [facility]  Department of Cardiology  Date of Service: 01/21/2023

## 2023-01-21 NOTE — SUBJECTIVE & OBJECTIVE
Interval History:  See hospital course.  Still with shortness of breath, cough productive of yellowish/greenish phlegm.  States with coughing episodes oxygen saturation does decrease.  No bowel movement yet but passing flatus, no nausea, vomiting or abdominal pain.  Remains in atrial fibrillation heart rates in the 90s.  Underwent bedside thoracocentesis, right, reported 1300 cc drained, awaiting postprocedure x-ray.  Echo with EF of 40% with grade 3 diastolic dysfunction.  Afebrile with T-max 98.2°.  Labs with hemoglobin 11.3, sodium 129, BUN/creatinine 29/1.1.  Documented urine output last 24 hours 2225 cc.  Communicated with Pulmonary, okay to resume Eliquis.  Discussed with patient and visitor at bedside.    Review of Systems   Constitutional:  Negative for fever.   Respiratory:  Positive for cough and shortness of breath.    Gastrointestinal:  Positive for constipation. Negative for abdominal pain, nausea and vomiting.   Psychiatric/Behavioral:  Negative for confusion.    Objective:     Vital Signs (Most Recent):  Temp: 97.9 °F (36.6 °C) (01/21/23 0303)  Pulse: 80 (01/21/23 1300)  Resp: 18 (01/21/23 0822)  BP: 135/60 (01/21/23 1200)  SpO2: 95 % (01/21/23 1300) Vital Signs (24h Range):  Temp:  [97.7 °F (36.5 °C)-98.2 °F (36.8 °C)] 97.9 °F (36.6 °C)  Pulse:  [63-93] 80  Resp:  [18-20] 18  SpO2:  [90 %-100 %] 95 %  BP: ()/(49-98) 135/60     Weight: 65 kg (143 lb 4.8 oz)  Body mass index is 26.21 kg/m².    Intake/Output Summary (Last 24 hours) at 1/21/2023 1457  Last data filed at 1/21/2023 0600  Gross per 24 hour   Intake 171.67 ml   Output 1850 ml   Net -1678.33 ml      Physical Exam  Vitals and nursing note reviewed.   Constitutional:       Comments: Elderly female patient, lying in bed, cooperative   HENT:      Head: Normocephalic and atraumatic.      Mouth/Throat:      Mouth: Mucous membranes are moist.   Cardiovascular:      Rate and Rhythm: Normal rate. Rhythm irregular.      Heart sounds: Murmur  heard.      Comments: Trace lower extremity edema  Pulmonary:      Comments: On nasal cannula, right posterior Band-Aid in place.  Decreased air entry at bases.  No wheeze or accessory muscle use  Abdominal:      Palpations: Abdomen is soft.      Tenderness: There is no abdominal tenderness.   Skin:     General: Skin is warm.   Neurological:      Mental Status: She is alert and oriented to person, place, and time.   Psychiatric:         Mood and Affect: Mood normal.       Significant Labs: BMP:   Recent Labs   Lab 01/21/23  0447   GLU 99   *   K 3.8   CL 89*   CO2 33*   BUN 29*   CREATININE 1.1   CALCIUM 8.7   MG 1.9     CBC:   Recent Labs   Lab 01/20/23  0514 01/21/23  0448   WBC 4.20 4.85   HGB 11.4* 11.3*   HCT 36.2* 35.5*    209     CMP:   Recent Labs   Lab 01/20/23  0514 01/20/23  1014 01/21/23  0447   * 127* 129*   K 3.8  --  3.8   CL 93*  --  89*   CO2 29  --  33*     --  99   BUN 26*  --  29*   CREATININE 1.0  --  1.1   CALCIUM 8.7  --  8.7   ANIONGAP 6*  --  7*     Cardiac Markers: No results for input(s): CKMB, MYOGLOBIN, BNP, TROPISTAT in the last 48 hours.  Lactic Acid: No results for input(s): LACTATE in the last 48 hours.  Magnesium:   Recent Labs   Lab 01/21/23  0447   MG 1.9     POCT Glucose: No results for input(s): POCTGLUCOSE in the last 48 hours.  Troponin:   Recent Labs   Lab 01/19/23  2157   TROPONINIHS 18.8*     TSH:   Recent Labs   Lab 01/18/23  2032   TSH 3.580     Urine Culture: No results for input(s): LABURIN in the last 48 hours.  Urine Studies: No results for input(s): COLORU, APPEARANCEUA, PHUR, SPECGRAV, PROTEINUA, GLUCUA, KETONESU, BILIRUBINUA, OCCULTUA, NITRITE, UROBILINOGEN, LEUKOCYTESUR, RBCUA, WBCUA, BACTERIA, SQUAMEPITHEL, HYALINECASTS in the last 48 hours.    Invalid input(s): WRIGHTPARRISHR    Significant Imaging: I have reviewed all pertinent imaging results/findings within the past 24 hours.    US Chest Mediastinum    Result Date: 1/21/2023  Reason:  pleural effusion, preop thoracentesis COMPARISON: Chest radiograph 1/21/2023 FINDINGS: Targeted ultrasound of right hemithorax for purposes of preoperative localization for thoracentesis was performed. Moderate right pleural effusion is evident. IMPRESSION: Right pleural effusion. Electronically signed by:  Dada Chin MD  1/21/2023 12:42 PM CST Workstation: [id number]    X-Ray Chest AP Portable    Result Date: 1/21/2023  EXAMINATION: XR CHEST AP PORTABLE CLINICAL HISTORY: pleural effusion; FINDINGS: Portable chest at 815 compared with 01/20/2023 shows unchanged enlarged cardiac silhouette size with normal mediastinal contours containing postsurgical changes of CABG. Small right pleural effusion has slightly improved in tiny left pleural effusion is unchanged.  Bibasilar alveolar opacities unchanged.  Nodular opacity in superior hilar region of left superior lung zone is unchanged, lying just inferior to anterior left 1st rib.  Pulmonary vasculature is normal. No pneumothorax or acute osseous abnormality.     Slight improvement of right pleural effusion. Electronically signed by: Dada Chin MD Date:    01/21/2023 Time:    08:30    Echocardiogram  Summary    The left ventricle is normal in size with mild concentric hypertrophy and mildly decreased systolic function.  The estimated ejection fraction is 40%.  Grade III left ventricular diastolic dysfunction.  Mild left atrial enlargement.  Mild aortic regurgitation.  There is moderate aortic valve stenosis.  Aortic valve area is 1.06 cm2; peak velocity is m/s; mean gradient is 10 mmHg.  Mild-to-moderate mitral regurgitation.  Moderate tricuspid regurgitation.  Mild pulmonic regurgitation.  Intermediate central venous pressure (8 mmHg).  The estimated PA systolic pressure is 32 mmHg.

## 2023-01-21 NOTE — PLAN OF CARE
Problem: Adult Inpatient Plan of Care  Goal: Plan of Care Review  Outcome: Ongoing, Progressing  Goal: Absence of Hospital-Acquired Illness or Injury  Outcome: Ongoing, Progressing  Goal: Optimal Comfort and Wellbeing  Outcome: Ongoing, Progressing     Problem: Diabetes Comorbidity  Goal: Blood Glucose Level Within Targeted Range  Outcome: Ongoing, Progressing

## 2023-01-21 NOTE — PLAN OF CARE
Problem: Adult Inpatient Plan of Care  Goal: Plan of Care Review  Outcome: Ongoing, Progressing  Goal: Patient-Specific Goal (Individualized)  Outcome: Ongoing, Progressing  Goal: Absence of Hospital-Acquired Illness or Injury  Outcome: Ongoing, Progressing  Goal: Optimal Comfort and Wellbeing  Outcome: Ongoing, Progressing  Goal: Readiness for Transition of Care  Outcome: Ongoing, Progressing     Problem: Diabetes Comorbidity  Goal: Blood Glucose Level Within Targeted Range  Outcome: Ongoing, Progressing     Problem: Dysrhythmia  Goal: Normalized Cardiac Rhythm  Outcome: Ongoing, Progressing

## 2023-01-21 NOTE — PROGRESS NOTES
"[facility] Medicine  Progress Note    Patient Name: Darlin Tipton  MRN: [id number]  Patient Class: IP- Inpatient   Admission Date: 1/18/2023  Length of Stay: 2 days  Attending Physician: Nohelia Chandra MD  Primary Care Provider: Ray Chen MD        Subjective:     Principal Problem:Atrial fibrillation with RVR        HPI:  82 year old female with history of CAD, DM 2, HTN, PAF, GERD, COPD (Chronic Bronchitis 3 lpm oxygen at home), Hypothyroidism presentred to ED complaining of "Palpitations" and "Irregular heart beat" for "A couple of hours". "I felt like I was going back into a fib again". Her Cardiologist no longer attends this hospital. She has had "Occasional" episodes of orthopnea and PND. Her daughter at bedside stated that the patient's Cardiologist "Was planning on starting a diruetic this weekend" for her "Occasional" edema. No cough or sputum. The patient became rate controlled in ED with amiodarone bolus/infusion. No chest discomfort.    In ED Labs reviewed and noted below: normal CBC, normal electrolytes with stage 3a renal dysfunction; BNP is markedly elevated with minimally elevated HS trop (repeat pending); TSH is normal. CXR reviewed: cardiomegaly with moderate right sided effusiion and small left sided effusion-possible underlying infiltrate on right. EKG reviewed: a fib RVR; no acute segments.    Discussed with ED MD; Inpatient admission for a fib RVR, and possible pneumonia; Cardiology opinion; ceftriaxone 1 gm q day; cultured; continue preadmission regimen for chronic maladies; continue amiodarone infusion; low dose insulin sliding scale; electrolyte sliding scale      Overview/Hospital Course:  1/20:  Drop in sodium noted, repeat levels.  Patient still has persistent coughing, chest x-ray showed bilateral pleural effusion, pulmonology consulted.  Patient states she has not moved her bowels since Wednesday, would give a 1 time dose of lactulose.  Continue IV " diuresis    Assumed care of this patient on 1/21/23.  Patient with known history of CAD status post CABG, bilateral effusion, diastolic heart failure, paroxysmal atrial fibrillation, hypothyroidism.  She is followed by cardiologist Dr. Diaz.  History of asthma with nocturnal hypoxic respiratory failure on nighttime oxygen, followed by Dr. Weiss.  She presented to the ED with palpitation, fast associated with shortness of breath and coughing.  She was found to be in AFib with RVR.  Bilateral pleural effusion, right greater than left.  She was admitted to telemetry floor, started on amiodarone 200 mg t.i.d., IV diuresis and monitoring.  Pulmonary was consulted given ongoing pleural effusion, on 01/21 underwent right-sided thoracocentesis with 1300 cc drained.  Constipation and stool softener/laxative started.      Interval History:  See hospital course.  Still with shortness of breath, cough productive of yellowish/greenish phlegm.  States with coughing episodes oxygen saturation does decrease.  No bowel movement yet but passing flatus, no nausea, vomiting or abdominal pain.  Remains in atrial fibrillation heart rates in the 90s.  Underwent bedside thoracocentesis, right, reported 1300 cc drained, awaiting postprocedure x-ray.  Echo with EF of 40% with grade 3 diastolic dysfunction.  Afebrile with T-max 98.2°.  Labs with hemoglobin 11.3, sodium 129, BUN/creatinine 29/1.1.  Documented urine output last 24 hours 2225 cc.  Communicated with Pulmonary, okay to resume Eliquis.  Discussed with patient and visitor at bedside.    Review of Systems   Constitutional:  Negative for fever.   Respiratory:  Positive for cough and shortness of breath.    Gastrointestinal:  Positive for constipation. Negative for abdominal pain, nausea and vomiting.   Psychiatric/Behavioral:  Negative for confusion.    Objective:     Vital Signs (Most Recent):  Temp: 97.9 °F (36.6 °C) (01/21/23 0303)  Pulse: 80 (01/21/23 1300)  Resp: 18 (01/21/23  0822)  BP: 135/60 (01/21/23 1200)  SpO2: 95 % (01/21/23 1300) Vital Signs (24h Range):  Temp:  [97.7 °F (36.5 °C)-98.2 °F (36.8 °C)] 97.9 °F (36.6 °C)  Pulse:  [63-93] 80  Resp:  [18-20] 18  SpO2:  [90 %-100 %] 95 %  BP: ()/(49-98) 135/60     Weight: 65 kg (143 lb 4.8 oz)  Body mass index is 26.21 kg/m².    Intake/Output Summary (Last 24 hours) at 1/21/2023 1457  Last data filed at 1/21/2023 0600  Gross per 24 hour   Intake 171.67 ml   Output 1850 ml   Net -1678.33 ml      Physical Exam  Vitals and nursing note reviewed.   Constitutional:       Comments: Elderly female patient, lying in bed, cooperative   HENT:      Head: Normocephalic and atraumatic.      Mouth/Throat:      Mouth: Mucous membranes are moist.   Cardiovascular:      Rate and Rhythm: Normal rate. Rhythm irregular.      Heart sounds: Murmur heard.      Comments: Trace lower extremity edema  Pulmonary:      Comments: On nasal cannula, right posterior Band-Aid in place.  Decreased air entry at bases.  No wheeze or accessory muscle use  Abdominal:      Palpations: Abdomen is soft.      Tenderness: There is no abdominal tenderness.   Skin:     General: Skin is warm.   Neurological:      Mental Status: She is alert and oriented to person, place, and time.   Psychiatric:         Mood and Affect: Mood normal.       Significant Labs: BMP:   Recent Labs   Lab 01/21/23  0447   GLU 99   *   K 3.8   CL 89*   CO2 33*   BUN 29*   CREATININE 1.1   CALCIUM 8.7   MG 1.9     CBC:   Recent Labs   Lab 01/20/23  0514 01/21/23  0448   WBC 4.20 4.85   HGB 11.4* 11.3*   HCT 36.2* 35.5*    209     CMP:   Recent Labs   Lab 01/20/23  0514 01/20/23  1014 01/21/23 0447   * 127* 129*   K 3.8  --  3.8   CL 93*  --  89*   CO2 29  --  33*     --  99   BUN 26*  --  29*   CREATININE 1.0  --  1.1   CALCIUM 8.7  --  8.7   ANIONGAP 6*  --  7*     Cardiac Markers: No results for input(s): CKMB, MYOGLOBIN, BNP, TROPISTAT in the last 48 hours.  Lactic Acid: No  results for input(s): LACTATE in the last 48 hours.  Magnesium:   Recent Labs   Lab 01/21/23  0447   MG 1.9     POCT Glucose: No results for input(s): POCTGLUCOSE in the last 48 hours.  Troponin:   Recent Labs   Lab 01/19/23  2157   TROPONINIHS 18.8*     TSH:   Recent Labs   Lab 01/18/23 2032   TSH 3.580     Urine Culture: No results for input(s): LABURIN in the last 48 hours.  Urine Studies: No results for input(s): COLORU, APPEARANCEUA, PHUR, SPECGRAV, PROTEINUA, GLUCUA, KETONESU, BILIRUBINUA, OCCULTUA, NITRITE, UROBILINOGEN, LEUKOCYTESUR, RBCUA, WBCUA, BACTERIA, SQUAMEPITHEL, HYALINECASTS in the last 48 hours.    Invalid input(s): WRIGHTSUR    Significant Imaging: I have reviewed all pertinent imaging results/findings within the past 24 hours.    US Chest Mediastinum    Result Date: 1/21/2023  Reason: pleural effusion, preop thoracentesis COMPARISON: Chest radiograph 1/21/2023 FINDINGS: Targeted ultrasound of right hemithorax for purposes of preoperative localization for thoracentesis was performed. Moderate right pleural effusion is evident. IMPRESSION: Right pleural effusion. Electronically signed by:  Dada Chin MD  1/21/2023 12:42 PM CST Workstation: [id number]    X-Ray Chest AP Portable    Result Date: 1/21/2023  EXAMINATION: XR CHEST AP PORTABLE CLINICAL HISTORY: pleural effusion; FINDINGS: Portable chest at 815 compared with 01/20/2023 shows unchanged enlarged cardiac silhouette size with normal mediastinal contours containing postsurgical changes of CABG. Small right pleural effusion has slightly improved in tiny left pleural effusion is unchanged.  Bibasilar alveolar opacities unchanged.  Nodular opacity in superior hilar region of left superior lung zone is unchanged, lying just inferior to anterior left 1st rib.  Pulmonary vasculature is normal. No pneumothorax or acute osseous abnormality.     Slight improvement of right pleural effusion. Electronically signed by: Dada Chin MD  Date:    01/21/2023 Time:    08:30    Echocardiogram  Summary     The left ventricle is normal in size with mild concentric hypertrophy and mildly decreased systolic function.   The estimated ejection fraction is 40%.   Grade III left ventricular diastolic dysfunction.   Mild left atrial enlargement.   Mild aortic regurgitation.   There is moderate aortic valve stenosis.   Aortic valve area is 1.06 cm2; peak velocity is m/s; mean gradient is 10 mmHg.   Mild-to-moderate mitral regurgitation.   Moderate tricuspid regurgitation.   Mild pulmonic regurgitation.   Intermediate central venous pressure (8 mmHg).   The estimated PA systolic pressure is 32 mmHg.      Assessment/Plan:      Active Hospital Problems    Diagnosis    *Atrial fibrillation with RVR    Chronic respiratory failure with hypoxia, on 3L noctural oxygen    Asthma    Hypothyroidism    Valvular heart disease, moderate AS/TR    Bilateral pleural effusion, right greater than left, status post right thoracocentesis 1/21    Hyponatremia    Acute on chronic combined heart failure    Paroxysmal atrial fibrillation    Stage 3b chronic kidney disease    DM (diabetes mellitus), type 2, HbA1c 5.8%    Ischemic cardiomyopathy    CABG 2004    GERD (gastroesophageal reflux disease)     Plan:  Continue care on telemetry floor with continuous cardiac monitoring  On amiodarone 200 mg t.i.d., metoprolol XL 25 mg daily, resume Eliquis, communicated with pulmonary   Continue IV diuresis today with Lasix 40 mg q.12h and reassess volume status tomorrow   On 01/21 status post right thoracocentesis with 1300 cc drained, await postprocedure x-ray   Continue home oxygen requirement   Echocardiogram with EF of 40% with grade 3 diastolic dysfunction with moderate AS/TR   Renally dosing all medications and avoid nephrotoxin drugs   Fall and delirium precautions  Increase activity and monitor heart rate and dyspnea   A.m. labs ordered  Appreciate all  consultant's input   Further plan as per hospital course    VTE Risk Mitigation (From admission, onward)         Ordered     apixaban tablet 5 mg  2 times daily         01/21/23 1541     IP VTE HIGH RISK PATIENT  Once         01/19/23 0339     Place sequential compression device  Until discontinued         01/19/23 0339     Reason for No Pharmacological VTE Prophylaxis  Once        Question:  Reasons:  Answer:  Already adequately anticoagulated on oral Anticoagulants    01/19/23 0339                Discharge Planning   SHIRA: 1/21/2023     Code Status: Full Code   Is the patient medically ready for discharge?:     Reason for patient still in hospital (select all that apply): Patient trending condition  Discharge Plan A: Home with family                  Nohelia Chandra MD  Department of Hospital Medicine   formerly [facility]

## 2023-01-22 PROBLEM — K59.00 CONSTIPATION: Status: ACTIVE | Noted: 2023-01-22

## 2023-01-22 PROBLEM — N17.9 AKI (ACUTE KIDNEY INJURY): Status: ACTIVE | Noted: 2023-01-22

## 2023-01-22 LAB
ANION GAP SERPL CALC-SCNC: 7 MMOL/L (ref 8–16)
BUN SERPL-MCNC: 43 MG/DL (ref 8–23)
CALCIUM SERPL-MCNC: 8.7 MG/DL (ref 8.7–10.5)
CHLORIDE SERPL-SCNC: 91 MMOL/L (ref 95–110)
CO2 SERPL-SCNC: 33 MMOL/L (ref 23–29)
CREAT SERPL-MCNC: 1.6 MG/DL (ref 0.5–1.4)
ERYTHROCYTE [DISTWIDTH] IN BLOOD BY AUTOMATED COUNT: 14 % (ref 11.5–14.5)
EST. GFR  (NO RACE VARIABLE): 32 ML/MIN/1.73 M^2
GLUCOSE SERPL-MCNC: 116 MG/DL (ref 70–110)
GLUCOSE SERPL-MCNC: 125 MG/DL (ref 70–110)
GLUCOSE SERPL-MCNC: 97 MG/DL (ref 70–110)
GLUCOSE SERPL-MCNC: 98 MG/DL (ref 70–110)
HCT VFR BLD AUTO: 34.6 % (ref 37–48.5)
HGB BLD-MCNC: 10.9 G/DL (ref 12–16)
MAGNESIUM SERPL-MCNC: 2 MG/DL (ref 1.6–2.6)
MCH RBC QN AUTO: 27.3 PG (ref 27–31)
MCHC RBC AUTO-ENTMCNC: 31.5 G/DL (ref 32–36)
MCV RBC AUTO: 87 FL (ref 82–98)
PHOSPHATE SERPL-MCNC: 4.7 MG/DL (ref 2.7–4.5)
PLATELET # BLD AUTO: 212 K/UL (ref 150–450)
PMV BLD AUTO: 10.3 FL (ref 9.2–12.9)
POTASSIUM SERPL-SCNC: 4.3 MMOL/L (ref 3.5–5.1)
RBC # BLD AUTO: 3.99 M/UL (ref 4–5.4)
SODIUM SERPL-SCNC: 131 MMOL/L (ref 136–145)
WBC # BLD AUTO: 5.61 K/UL (ref 3.9–12.7)

## 2023-01-22 PROCEDURE — 99233 SBSQ HOSP IP/OBS HIGH 50: CPT | Mod: ,,, | Performed by: INTERNAL MEDICINE

## 2023-01-22 PROCEDURE — 99233 PR SUBSEQUENT HOSPITAL CARE,LEVL III: ICD-10-PCS | Mod: ,,, | Performed by: INTERNAL MEDICINE

## 2023-01-22 PROCEDURE — 94761 N-INVAS EAR/PLS OXIMETRY MLT: CPT

## 2023-01-22 PROCEDURE — 97116 GAIT TRAINING THERAPY: CPT

## 2023-01-22 PROCEDURE — 21000000 HC CCU ICU ROOM CHARGE

## 2023-01-22 PROCEDURE — 25000003 PHARM REV CODE 250: Performed by: INTERNAL MEDICINE

## 2023-01-22 PROCEDURE — 97162 PT EVAL MOD COMPLEX 30 MIN: CPT

## 2023-01-22 PROCEDURE — 94640 AIRWAY INHALATION TREATMENT: CPT

## 2023-01-22 PROCEDURE — 36415 COLL VENOUS BLD VENIPUNCTURE: CPT | Performed by: INTERNAL MEDICINE

## 2023-01-22 PROCEDURE — 85027 COMPLETE CBC AUTOMATED: CPT | Performed by: INTERNAL MEDICINE

## 2023-01-22 PROCEDURE — 27000221 HC OXYGEN, UP TO 24 HOURS

## 2023-01-22 PROCEDURE — 84100 ASSAY OF PHOSPHORUS: CPT | Performed by: INTERNAL MEDICINE

## 2023-01-22 PROCEDURE — 25000242 PHARM REV CODE 250 ALT 637 W/ HCPCS: Performed by: INTERNAL MEDICINE

## 2023-01-22 PROCEDURE — 99900035 HC TECH TIME PER 15 MIN (STAT)

## 2023-01-22 PROCEDURE — 25000003 PHARM REV CODE 250

## 2023-01-22 PROCEDURE — 80048 BASIC METABOLIC PNL TOTAL CA: CPT | Performed by: INTERNAL MEDICINE

## 2023-01-22 PROCEDURE — 25000003 PHARM REV CODE 250: Performed by: STUDENT IN AN ORGANIZED HEALTH CARE EDUCATION/TRAINING PROGRAM

## 2023-01-22 PROCEDURE — 83735 ASSAY OF MAGNESIUM: CPT | Performed by: INTERNAL MEDICINE

## 2023-01-22 PROCEDURE — 99232 SBSQ HOSP IP/OBS MODERATE 35: CPT | Mod: ,,, | Performed by: INTERNAL MEDICINE

## 2023-01-22 PROCEDURE — 99232 PR SUBSEQUENT HOSPITAL CARE,LEVL II: ICD-10-PCS | Mod: ,,, | Performed by: INTERNAL MEDICINE

## 2023-01-22 PROCEDURE — 99900031 HC PATIENT EDUCATION (STAT)

## 2023-01-22 PROCEDURE — 25000003 PHARM REV CODE 250: Performed by: NURSE PRACTITIONER

## 2023-01-22 PROCEDURE — 94618 PULMONARY STRESS TESTING: CPT

## 2023-01-22 RX ORDER — LACTULOSE 10 G/15ML
30 SOLUTION ORAL ONCE
Status: COMPLETED | OUTPATIENT
Start: 2023-01-22 | End: 2023-01-22

## 2023-01-22 RX ADMIN — METOPROLOL SUCCINATE 25 MG: 25 TABLET, FILM COATED, EXTENDED RELEASE ORAL at 09:01

## 2023-01-22 RX ADMIN — LEVOTHYROXINE SODIUM 25 MCG: 0.03 TABLET ORAL at 05:01

## 2023-01-22 RX ADMIN — LACTULOSE 30 G: 20 SOLUTION ORAL at 03:01

## 2023-01-22 RX ADMIN — MUPIROCIN 1 G: 20 OINTMENT TOPICAL at 09:01

## 2023-01-22 RX ADMIN — LIDOCAINE 5% 1 PATCH: 700 PATCH TOPICAL at 02:01

## 2023-01-22 RX ADMIN — CARBOXYMETHYLCELLULOSE SODIUM 2 DROP: 0.5 SOLUTION, GEL FORMING / DROPS OPHTHALMIC at 05:01

## 2023-01-22 RX ADMIN — AMIODARONE HYDROCHLORIDE 200 MG: 200 TABLET ORAL at 08:01

## 2023-01-22 RX ADMIN — MELATONIN 6 MG: at 09:01

## 2023-01-22 RX ADMIN — BUDESONIDE 0.5 MG: 0.5 INHALANT RESPIRATORY (INHALATION) at 08:01

## 2023-01-22 RX ADMIN — SENNOSIDES AND DOCUSATE SODIUM 2 TABLET: 8.6; 5 TABLET ORAL at 09:01

## 2023-01-22 RX ADMIN — ARFORMOTEROL TARTRATE 15 MCG: 15 SOLUTION RESPIRATORY (INHALATION) at 08:01

## 2023-01-22 RX ADMIN — BUSPIRONE HYDROCHLORIDE 10 MG: 5 TABLET ORAL at 08:01

## 2023-01-22 RX ADMIN — BUSPIRONE HYDROCHLORIDE 10 MG: 5 TABLET ORAL at 09:01

## 2023-01-22 RX ADMIN — ATORVASTATIN CALCIUM 40 MG: 40 TABLET, FILM COATED ORAL at 09:01

## 2023-01-22 RX ADMIN — CHLORHEXIDINE GLUCONATE 15 ML: 1.2 RINSE ORAL at 09:01

## 2023-01-22 RX ADMIN — AMITRIPTYLINE HYDROCHLORIDE 75 MG: 25 TABLET, FILM COATED ORAL at 09:01

## 2023-01-22 RX ADMIN — SENNOSIDES AND DOCUSATE SODIUM 2 TABLET: 8.6; 5 TABLET ORAL at 08:01

## 2023-01-22 RX ADMIN — APIXABAN 5 MG: 5 TABLET, FILM COATED ORAL at 08:01

## 2023-01-22 RX ADMIN — FLUTICASONE PROPIONATE 50 MCG: 50 SPRAY, METERED NASAL at 09:01

## 2023-01-22 RX ADMIN — APIXABAN 5 MG: 5 TABLET, FILM COATED ORAL at 09:01

## 2023-01-22 RX ADMIN — CHLORHEXIDINE GLUCONATE 15 ML: 1.2 RINSE ORAL at 08:01

## 2023-01-22 RX ADMIN — CARBOXYMETHYLCELLULOSE SODIUM 2 DROP: 0.5 SOLUTION, GEL FORMING / DROPS OPHTHALMIC at 09:01

## 2023-01-22 RX ADMIN — MUPIROCIN 1 G: 20 OINTMENT TOPICAL at 08:01

## 2023-01-22 RX ADMIN — AMIODARONE HYDROCHLORIDE 200 MG: 200 TABLET ORAL at 02:01

## 2023-01-22 RX ADMIN — AMIODARONE HYDROCHLORIDE 200 MG: 200 TABLET ORAL at 09:01

## 2023-01-22 RX ADMIN — POLYETHYLENE GLYCOL 3350 17 G: 17 POWDER, FOR SOLUTION ORAL at 08:01

## 2023-01-22 RX ADMIN — Medication 2000 UNITS: at 08:01

## 2023-01-22 NOTE — PROGRESS NOTES
"[facility] Medicine  Progress Note    Patient Name: Darlin Tipton  MRN: [id number]  Patient Class: IP- Inpatient   Admission Date: 1/18/2023  Length of Stay: 3 days  Attending Physician: Nohelia Chandra MD  Primary Care Provider: Ray Chen MD        Subjective:     Principal Problem:Atrial fibrillation with RVR        HPI:  82 year old female with history of CAD, DM 2, HTN, PAF, GERD, COPD (Chronic Bronchitis 3 lpm oxygen at home), Hypothyroidism presentred to ED complaining of "Palpitations" and "Irregular heart beat" for "A couple of hours". "I felt like I was going back into a fib again". Her Cardiologist no longer attends this hospital. She has had "Occasional" episodes of orthopnea and PND. Her daughter at bedside stated that the patient's Cardiologist "Was planning on starting a diruetic this weekend" for her "Occasional" edema. No cough or sputum. The patient became rate controlled in ED with amiodarone bolus/infusion. No chest discomfort.    In ED Labs reviewed and noted below: normal CBC, normal electrolytes with stage 3a renal dysfunction; BNP is markedly elevated with minimally elevated HS trop (repeat pending); TSH is normal. CXR reviewed: cardiomegaly with moderate right sided effusiion and small left sided effusion-possible underlying infiltrate on right. EKG reviewed: a fib RVR; no acute segments.    Discussed with ED MD; Inpatient admission for a fib RVR, and possible pneumonia; Cardiology opinion; ceftriaxone 1 gm q day; cultured; continue preadmission regimen for chronic maladies; continue amiodarone infusion; low dose insulin sliding scale; electrolyte sliding scale      Overview/Hospital Course:  1/20:  Drop in sodium noted, repeat levels.  Patient still has persistent coughing, chest x-ray showed bilateral pleural effusion, pulmonology consulted.  Patient states she has not moved her bowels since Wednesday, would give a 1 time dose of lactulose.  Continue IV " diuresis    Assumed care of this patient on 1/21/23.  Patient with known history of CAD status post CABG, bilateral effusion, diastolic heart failure, paroxysmal atrial fibrillation, hypothyroidism.  She is followed by cardiologist Dr. Diaz.  History of asthma with nocturnal hypoxic respiratory failure on nighttime oxygen, followed by Dr. Weiss.  She presented to the ED with palpitation, fast associated with shortness of breath and coughing.  She was found to be in AFib with RVR.  Bilateral pleural effusion, right greater than left.  She was admitted to telemetry floor, started on amiodarone 200 mg t.i.d., IV diuresis and monitoring.  Pulmonary was consulted given ongoing pleural effusion, on 01/21 underwent right-sided thoracocentesis with 1300 cc drained.  Constipation and stool softener/laxative started.  On 01/23, home oxygen test performed and patient will require 24 hours oxygen, 3 L, acute kidney injury with creatinine up to 1.6, holding diuresis, remains in sinus rhythm.      Interval History:  Patient states continues to feel improved.  Sitting out in chair.  Still no bowel movement but no nausea, vomiting or abdominal pain.  Telemetry with sinus rhythm.  Home O2 evaluation performed and likely will need continuous 3 L oxygen, previously only on nocturnal.  Afebrile with T-max 98.3°.  Labs with hemoglobin 10.9, sodium 131, creatinine up to 1.6.  Discussed with patient and family members present at bedside.  She has follow-up with her regular cardiologist, Dr. Diaz, on Thursday.    Review of Systems   Constitutional:  Negative for fever.   Respiratory:  Positive for cough.    Gastrointestinal:  Positive for constipation.   Psychiatric/Behavioral:  Negative for confusion.    Objective:     Vital Signs (Most Recent):  Temp: 97.6 °F (36.4 °C) (01/22/23 1200)  Pulse: 79 (01/22/23 1341)  Resp: 18 (01/22/23 0835)  BP: (!) 110/56 (01/22/23 1300)  SpO2: 98 % (01/22/23 1341)   Vital Signs (24h Range):  Temp:   [97.6 °F (36.4 °C)-98.3 °F (36.8 °C)] 97.6 °F (36.4 °C)  Pulse:  [72-98] 79  Resp:  [16-18] 18  SpO2:  [89 %-100 %] 98 %  BP: ()/(50-62) 110/56     Weight: 65 kg (143 lb 4.8 oz)  Body mass index is 26.21 kg/m².    Intake/Output Summary (Last 24 hours) at 1/22/2023 1452  Last data filed at 1/22/2023 0344  Gross per 24 hour   Intake 240 ml   Output 1650 ml   Net -1410 ml      Physical Exam  Vitals and nursing note reviewed.   Constitutional:       Comments: Elderly female patient, sitting out in chair, cooperative   HENT:      Head: Normocephalic and atraumatic.      Mouth/Throat:      Mouth: Mucous membranes are moist.   Cardiovascular:      Rate and Rhythm: Normal rate and regular rhythm.      Heart sounds: Murmur heard.   Pulmonary:      Comments: On nasal cannula.  Decreased air entry at bases.  No wheeze or accessory muscle use  Abdominal:      Palpations: Abdomen is soft.      Tenderness: There is no abdominal tenderness.   Skin:     General: Skin is warm.   Neurological:      Mental Status: She is alert and oriented to person, place, and time.   Psychiatric:         Mood and Affect: Mood normal.       Significant Labs: BMP:   Recent Labs   Lab 01/22/23  0507   GLU 98   *   K 4.3   CL 91*   CO2 33*   BUN 43*   CREATININE 1.6*   CALCIUM 8.7   MG 2.0     CBC:   Recent Labs   Lab 01/21/23  0448 01/22/23  0507   WBC 4.85 5.61   HGB 11.3* 10.9*   HCT 35.5* 34.6*    212     CMP:   Recent Labs   Lab 01/21/23  0447 01/22/23  0507   * 131*   K 3.8 4.3   CL 89* 91*   CO2 33* 33*   GLU 99 98   BUN 29* 43*   CREATININE 1.1 1.6*   CALCIUM 8.7 8.7   ANIONGAP 7* 7*     Lactic Acid: No results for input(s): LACTATE in the last 48 hours.  Magnesium:   Recent Labs   Lab 01/21/23  0447 01/22/23  0507   MG 1.9 2.0     POCT Glucose: No results for input(s): POCTGLUCOSE in the last 48 hours.    Significant Imaging: I have reviewed all pertinent imaging results/findings within the past 24  hours.      Assessment/Plan:      Active Hospital Problems    Diagnosis    *Atrial fibrillation with RVR, now in sinus rhythm    LINDSAY (acute kidney injury)    Constipation    Chronic respiratory failure with hypoxia, on 3L noctural oxygen    Asthma    Hypothyroidism    Valvular heart disease, moderate AS/TR    Bilateral pleural effusion, right greater than left, status post right thoracocentesis 1/21    Hyponatremia    Acute on chronic combined heart failure    Paroxysmal atrial fibrillation    Stage 3b chronic kidney disease    DM (diabetes mellitus), type 2, HbA1c 5.8%    Ischemic cardiomyopathy    CABG 2004    GERD (gastroesophageal reflux disease)     Plan:  Continue care on telemetry floor with continuous cardiac monitoring  On amiodarone 200 mg t.i.d., metoprolol XL 25 mg daily, and Eliquis for stroke prophylaxis, currently in sinus rhythm   New acute kidney injury Tuesday, likely in the setting of over diuresis, hold IV Lasix and monitor  On 01/21 status post right thoracocentesis with 1300 cc drained, await postprocedure x-ray   Repeat home O2 evaluation, needs 3 L chronically, previously only on nocturnal oxygen  Adjusted stool softeners for bowel movement and following  Echocardiogram with EF of 40% with grade 3 diastolic dysfunction with moderate AS/TR   Renally dosing all medications and avoid nephrotoxin drugs   Fall and delirium precautions  BMP in a.m.  Appreciate all consultant's input   Further plan as per hospital course    VTE Risk Mitigation (From admission, onward)         Ordered     apixaban tablet 5 mg  2 times daily         01/21/23 1541     IP VTE HIGH RISK PATIENT  Once         01/19/23 0339     Place sequential compression device  Until discontinued         01/19/23 0339     Reason for No Pharmacological VTE Prophylaxis  Once        Question:  Reasons:  Answer:  Already adequately anticoagulated on oral Anticoagulants    01/19/23 0339                Discharge Planning    SHIRA: 1/21/2023     Code Status: Full Code   Is the patient medically ready for discharge?:     Reason for patient still in hospital (select all that apply): Patient new problem  Discharge Plan A: Home with family                  Nohelia Chandra MD  Department of Hospital Medicine   [facility]

## 2023-01-22 NOTE — PLAN OF CARE
Problem: Physical Therapy  Goal: Physical Therapy Goal  Description: All physical therapy goals to be met by discharge:    1. Supine to sit with Stand-by Assistance  2. Sit to stand transfer with Stand-by Assistance  3.. Bed to chair transfer with Supervision using Rolling Walker  4. Gait  x 300 feet with supervision using Rolling Walker.     Outcome: Ongoing, Progressing

## 2023-01-22 NOTE — SUBJECTIVE & OBJECTIVE
Interval History:  Patient states continues to feel improved.  Sitting out in chair.  Still no bowel movement but no nausea, vomiting or abdominal pain.  Telemetry with sinus rhythm.  Home O2 evaluation performed and likely will need continuous 3 L oxygen, previously only on nocturnal.  Afebrile with T-max 98.3°.  Labs with hemoglobin 10.9, sodium 131, creatinine up to 1.6.  Discussed with patient and family members present at bedside.  She has follow-up with her regular cardiologist, Dr. Diaz, on Thursday.    Review of Systems   Constitutional:  Negative for fever.   Respiratory:  Positive for cough.    Gastrointestinal:  Positive for constipation.   Psychiatric/Behavioral:  Negative for confusion.    Objective:     Vital Signs (Most Recent):  Temp: 97.6 °F (36.4 °C) (01/22/23 1200)  Pulse: 79 (01/22/23 1341)  Resp: 18 (01/22/23 0835)  BP: (!) 110/56 (01/22/23 1300)  SpO2: 98 % (01/22/23 1341)   Vital Signs (24h Range):  Temp:  [97.6 °F (36.4 °C)-98.3 °F (36.8 °C)] 97.6 °F (36.4 °C)  Pulse:  [72-98] 79  Resp:  [16-18] 18  SpO2:  [89 %-100 %] 98 %  BP: ()/(50-62) 110/56     Weight: 65 kg (143 lb 4.8 oz)  Body mass index is 26.21 kg/m².    Intake/Output Summary (Last 24 hours) at 1/22/2023 1452  Last data filed at 1/22/2023 0344  Gross per 24 hour   Intake 240 ml   Output 1650 ml   Net -1410 ml      Physical Exam  Vitals and nursing note reviewed.   Constitutional:       Comments: Elderly female patient, sitting out in chair, cooperative   HENT:      Head: Normocephalic and atraumatic.      Mouth/Throat:      Mouth: Mucous membranes are moist.   Cardiovascular:      Rate and Rhythm: Normal rate and regular rhythm.      Heart sounds: Murmur heard.   Pulmonary:      Comments: On nasal cannula.  Decreased air entry at bases.  No wheeze or accessory muscle use  Abdominal:      Palpations: Abdomen is soft.      Tenderness: There is no abdominal tenderness.   Skin:     General: Skin is warm.   Neurological:       Mental Status: She is alert and oriented to person, place, and time.   Psychiatric:         Mood and Affect: Mood normal.       Significant Labs: BMP:   Recent Labs   Lab 01/22/23  0507   GLU 98   *   K 4.3   CL 91*   CO2 33*   BUN 43*   CREATININE 1.6*   CALCIUM 8.7   MG 2.0     CBC:   Recent Labs   Lab 01/21/23  0448 01/22/23  0507   WBC 4.85 5.61   HGB 11.3* 10.9*   HCT 35.5* 34.6*    212     CMP:   Recent Labs   Lab 01/21/23  0447 01/22/23  0507   * 131*   K 3.8 4.3   CL 89* 91*   CO2 33* 33*   GLU 99 98   BUN 29* 43*   CREATININE 1.1 1.6*   CALCIUM 8.7 8.7   ANIONGAP 7* 7*     Lactic Acid: No results for input(s): LACTATE in the last 48 hours.  Magnesium:   Recent Labs   Lab 01/21/23 0447 01/22/23  0507   MG 1.9 2.0     POCT Glucose: No results for input(s): POCTGLUCOSE in the last 48 hours.    Significant Imaging: I have reviewed all pertinent imaging results/findings within the past 24 hours.

## 2023-01-22 NOTE — CARE UPDATE
01/21/23 2013   Patient Assessment/Suction   Respiratory Effort Unlabored   All Lung Fields Breath Sounds crackles   Rhythm/Pattern, Respiratory pattern regular   PRE-TX-O2   Device (Oxygen Therapy) nasal cannula   Flow (L/min) 3   SpO2 96 %   Pulse Oximetry Type Continuous   Pulse 72   Resp 16   Aerosol Therapy   $ Aerosol Therapy Charges Aerosol Treatment   Respiratory Treatment Status (SVN) given   Treatment Route (SVN) mask   Patient Position (SVN) HOB elevated   Post Treatment Assessment (SVN) increased aeration   Signs of Intolerance (SVN) none   Breath Sounds Post-Respiratory Treatment   Throughout All Fields Post-Treatment All Fields   Throughout All Fields Post-Treatment aeration increased   Post-treatment Heart Rate (beats/min) 85   Post-treatment Resp Rate (breaths/min) 16   Education   $ Education 15 min;Bronchodilator   Respiratory Evaluation   $ Care Plan Tech Time 15 min   $ Eval/Re-eval Charges Re-evaluation   Evaluation For Re-Eval 3 day

## 2023-01-22 NOTE — CARE UPDATE
01/22/23 0841   Home Oxygen Qualification   $ Home O2 Qualification Pulmonary Stress Test/6 min walk;Tech time 15 minutes   Room Air SpO2 At Rest (!) 83 %   SpO2 During Ambulation on O2 94 %   Heart Rate on O2 75 bpm   Ambulation O2 LPM 3 LPM   SpO2 Post Ambulation 94 %   Post Ambulation Heart Rate 71 bpm   Post Ambulation O2 LPM 3 LPM   Home O2 Eval Comments pt qualifies for home o2 at 3lpm       Pt states she wears 3lpm at home currently

## 2023-01-22 NOTE — PT/OT/SLP EVAL
Physical Therapy Evaluation    Patient Name:  Darlin Tipton   MRN:  [id number]    Recommendations:     Discharge Recommendations:     Discharge Equipment Recommendations: none   Barriers to discharge: None    Assessment:     Darlin Tipton is a 82 y.o. female admitted with a medical diagnosis of Atrial fibrillation with RVR.  She presents with the following impairments/functional limitations: impaired endurance, impaired balance, decreased lower extremity function, pain, impaired cardiopulmonary response to activity.    Pt found up in room with  who has just finished assisting pt to bathroom.  She has RW in room and had removed oxygen to reach the bathroom.  Replaced O2 monitor and SAO2 86%, Vcs for pursed lip breathing, SaO2 to 96% then able to perform gait training with PT in hallway x200' w/RW and CGA.  Transfers with CGA for safety due to lines and tubes.  Pt uses home O2 at night but has not typically used during the day when ambulatory.    Rehab Prognosis: Good; patient would benefit from acute skilled PT services to address these deficits and reach maximum level of function.    Recent Surgery: * No surgery found *      Plan:     During this hospitalization, patient to be seen 5 x/week to address the identified rehab impairments via gait training, therapeutic activities, therapeutic exercises and progress toward the following goals:    Plan of Care Expires:  02/22/23    Subjective     Chief Complaint: SOB  Patient/Family Comments/goals: go home  Pain/Comfort:  Pain Rating 1: other (see comments) (did not rate R hip/knee pain- states in need of surgical intervention prior to admit.)  Location - Side 1: Right  Location 1: leg  Pain Addressed 1: Distraction  Pain Rating Post-Intervention 1: 0/10    Patients cultural, spiritual, Evangelical conflicts given the current situation:      Living Environment:  Pt lives with  in  home w/ 1 small step to enter.  Prior to admission, patients level of function  was modified independent with rollator PRN and uses oxygen at night.  Equipment used at home: oxygen, rollator.  DME owned (not currently used): none.  Upon discharge, patient will have assistance from .    Objective:     Communicated with RN prior to session.  Patient found ambulatory in room/pérez with    upon PT entry to room.    General Precautions: Standard, fall  Orthopedic Precautions:    Braces:    Respiratory Status:  3L via nasal cannula but had removed oxygen to ambulate to bathroom with her     Exams:  Cognitive Exam:  Patient is oriented to Person, Place, Time, and Situation  RLE ROM: WFL  RLE Strength: WFL  LLE ROM: WFL  LLE Strength: WFL    Functional Mobility:  Transfers:     Sit to Stand:  contact guard assistance with rolling walker  Bed to Chair: contact guard assistance with  rolling walker  using  Step Transfer  Gait: x200' wRW and 3L O2 CGA for safety and balance, tactile postural cues      AM-PAC 6 CLICK MOBILITY  Total Score:        Treatment & Education:  Pt was educated on the following: call light use, importance of OOB activity and functional mobility to negate the negative effects of prolonged bed rest during this hospitalization, safe transfers/ambulation and discharge planning recommendations/options.     Patient left up in chair with all lines intact, call button in reach, RN notified, and  present.    GOALS:   Multidisciplinary Problems       Physical Therapy Goals          Problem: Physical Therapy    Goal Priority Disciplines Outcome Goal Variances Interventions   Physical Therapy Goal     PT, PT/OT Ongoing, Progressing     Description: All physical therapy goals to be met by discharge:    1. Supine to sit with Stand-by Assistance  2. Sit to stand transfer with Stand-by Assistance  3.. Bed to chair transfer with Supervision using Rolling Walker  4. Gait  x 300 feet with supervision using Rolling Walker.                          History:     Past Medical History:    Diagnosis Date    Allergy     Dust mites, Grasses, Trees    Arthritis     Asthma     Blood transfusion     CAD (coronary artery disease)     Cataract     CHRONIC BRONCHITIS     Diabetes mellitus     Diabetes mellitus type II     GERD (gastroesophageal reflux disease)     Hyperlipidemia     Hypertension     Irregular heart beat     Kidney disease     ckd stage 3  as stated by patient - to see MD    Post-menopausal bleeding 2018    Spinal stenosis     Thyroid disease     Hypothyroidism       Past Surgical History:   Procedure Laterality Date    APPENDECTOMY  1968    BLADDER SUSPENSION  1989    CARDIAC SURGERY  2016    CABG    CATARACT EXTRACTION  9/2007 (L) and 10/2207 (R)    COLONOSCOPY N/A 10/18/2017    Procedure: COLONOSCOPY;  Surgeon: Esme Acuna MD;  Location: [facility];  Service: Endoscopy;  Laterality: N/A;    CORONARY ANGIOGRAPHY INCLUDING BYPASS GRAFTS WITH CATHETERIZATION OF LEFT HEART Left 5/13/2022    Procedure: Left heart cath;  Surgeon: Davon Patton MD;  Location: [facility] CATH/EP LAB;  Service: Cardiology;  Laterality: Left;    CORONARY ARTERY BYPASS GRAFT  4/26/2004    x5    ESOPHAGEAL DILATION      ESOPHAGOGASTRODUODENOSCOPY N/A 6/27/2022    Procedure: EGD (ESOPHAGOGASTRODUODENOSCOPY);  Surgeon: Skip Nj MD;  Location: [facility] ENDO;  Service: Endoscopy;  Laterality: N/A;    HYSTEROSCOPY WITH DILATION AND CURETTAGE OF UTERUS N/A 3/12/2020    Procedure: HYSTEROSCOPY, WITH DILATION AND CURETTAGE OF UTERUS;  Surgeon: Ramona Llanos MD;  Location: [facility] OR;  Service: OB/GYN;  Laterality: N/A;    SPINE SURGERY  3/2000    Tumor    TRANSFORAMINAL EPIDURAL INJECTION OF STEROID Right 11/21/2019    Procedure: Injection,steroid,epidural,transforaminal approach;  Surgeon: Bj Jones MD;  Location: [facility] OR;  Service: Pain Management;  Laterality: Right;  L4-5, L5-S1    TRANSFORAMINAL EPIDURAL INJECTION OF STEROID Right 12/31/2019    Procedure: Injection,steroid,epidural,transforaminal approach;   Surgeon: Bj Jones MD;  Location: [facility] OR;  Service: Pain Management;  Laterality: Right;  L4-5, L5-S1    TRANSFORAMINAL EPIDURAL INJECTION OF STEROID Right 2/5/2020    Procedure: Injection,steroid,epidural,transforaminal approach;  Surgeon: Bj Jones MD;  Location: [facility] OR;  Service: Pain Management;  Laterality: Right;  L4-5, L5-S1    TRANSFORAMINAL EPIDURAL INJECTION OF STEROID Left 1/27/2021    Procedure: Injection,steroid,epidural,transforaminal approach;  Surgeon: Bj Jones MD;  Location: [facility] OR;  Service: Pain Management;  Laterality: Left;  L4-5, L5-S1    TRANSFORAMINAL EPIDURAL INJECTION OF STEROID Right 3/4/2021    Procedure: Injection,steroid,epidural,transforaminal approach;  Surgeon: Bj Jones MD;  Location: [facility] OR;  Service: Pain Management;  Laterality: Right;  L4-L5, L5-S1    WRIST SURGERY  1993    carpal tunnel         Time Tracking:     PT Received On: 01/22/23  PT Start Time: 1005     PT Stop Time: 1021  PT Total Time (min): 16 min     Billable Minutes: Evaluation 8 and Gait Training 8      01/22/2023

## 2023-01-22 NOTE — PLAN OF CARE
01/22/23 0835   Patient Assessment/Suction   Level of Consciousness (AVPU) alert   Respiratory Effort Normal;Unlabored   All Lung Fields Breath Sounds crackles, fine;clear   PRE-TX-O2   Device (Oxygen Therapy) nasal cannula   $ Is the patient on Low Flow Oxygen? Yes   Flow (L/min) 3   SpO2 (!) 93 %   Pulse Oximetry Type Continuous   $ Pulse Oximetry - Multiple Charge Pulse Oximetry - Multiple   Pulse 75   Resp 18   Aerosol Therapy   $ Aerosol Therapy Charges Aerosol Treatment   Daily Review of Necessity (SVN) completed   Respiratory Treatment Status (SVN) given   Treatment Route (SVN) mask;oxygen   Patient Position (SVN) Marc's;HOB elevated   Post Treatment Assessment (SVN) increased aeration   Signs of Intolerance (SVN) none   Education   $ Education Bronchodilator;15 min   Respiratory Evaluation   $ Care Plan Tech Time 15 min   Home Oxygen   Has Home Oxygen? Yes

## 2023-01-22 NOTE — PLAN OF CARE
Problem: Adult Inpatient Plan of Care  Goal: Plan of Care Review  Outcome: Ongoing, Progressing  Goal: Patient-Specific Goal (Individualized)  Outcome: Ongoing, Progressing  Goal: Absence of Hospital-Acquired Illness or Injury  Outcome: Ongoing, Progressing  Goal: Optimal Comfort and Wellbeing  Outcome: Ongoing, Progressing  Goal: Readiness for Transition of Care  Outcome: Ongoing, Progressing     Problem: Diabetes Comorbidity  Goal: Blood Glucose Level Within Targeted Range  Outcome: Ongoing, Progressing     Problem: Dysrhythmia  Goal: Normalized Cardiac Rhythm  Outcome: Ongoing, Progressing     Problem: Fluid and Electrolyte Imbalance (Acute Kidney Injury/Impairment)  Goal: Fluid and Electrolyte Balance  Outcome: Ongoing, Progressing     Problem: Oral Intake Inadequate (Acute Kidney Injury/Impairment)  Goal: Optimal Nutrition Intake  Outcome: Ongoing, Progressing     Problem: Renal Function Impairment (Acute Kidney Injury/Impairment)  Goal: Effective Renal Function  Outcome: Ongoing, Progressing

## 2023-01-22 NOTE — PROGRESS NOTES
Pulmonary/Critical Care Progress Note      PATIENT NAME: Darlin Tipton  MRN: [id number]  TODAY'S DATE: 2023  3:08 PM  ADMIT DATE: 2023  AGE: 82 y.o. : 1940    HPI:  The patient is an 82-year-old female who we follow for asthma and nocturnal hypoxemic respiratory failure.  She has systolic heart failure and has bilateral pleural effusions at this time.  She was in atrial fib with RVR but now is back in sinus.  Her last thoracentesis was in May.  She came to the hospital because she was coughing so much.  She recognized it was not her asthma.  She was gaining weight daily.     the patient had a good diuresis yesterday but continues to cough.  She perceives that a thoracentesis will improve this.     the patient reports she expectorated a great deal of junk after her thoracentesis and is breathing better.  She is hoping to go home.  She is still requiring oxygen during the day.  The respiratory therapist reports sats in the low 80s on room air.  The patient does have home oxygen.    REVIEW OF SYSTEMS  GENERAL: Feeling okay  EYES: Vision is good.  ENT: No sinusitis or pharyngitis.   HEART: No chest pain or palpitations.  LUNGS:  She is coughing less  GI:  Her abdomen is smaller  : No dysuria, hesitancy, or nocturia.  SKIN: No lesions or rashes.  MUSCULOSKELETAL: No joint pain or myalgias.  NEURO: No headaches or neuropathy.  LYMPH:  The swelling in her legs is better.  PSYCH: No anxiety or depression.  ENDO:  She has been gaining weight.    No change in the patient's Past Medical History, Past Surgical History, Social History or Family History since admission.     VITAL SIGNS (MOST RECENT)  Temp: 97.6 °F (36.4 °C) (23 1200)  Pulse: 79 (23 1341)  Resp: 18 (23 0835)  BP: (!) 110/56 (23 1300)  SpO2: 98 % (23 1341)    INTAKE AND OUTPUT (LAST 24 HOURS):  Intake/Output Summary (Last 24 hours) at 2023 1536  Last data filed at 2023 0344  Gross per 24 hour    Intake 240 ml   Output 1650 ml   Net -1410 ml       WEIGHT  Wt Readings from Last 1 Encounters:   01/19/23 65 kg (143 lb 4.8 oz)       PHYSICAL EXAM  GENERAL: Older patient in no distress.  HEENT: Pupils equal and reactive. Extraocular movements intact. Nose intact. Pharynx moist.  NECK: Supple.   HEART: Regular rate and rhythm. No murmur or gallop auscultated.  LUNGS:  There are bibasilar crackles.  Lung excursion symmetrical. No change in fremitus.   ABDOMEN: Bowel sounds present. Non-tender, no masses palpated.  : Normal anatomy.  EXTREMITIES: Normal muscle tone and joint movement, no cyanosis or clubbing.   LYMPHATICS: No adenopathy palpated, trace edema.  SKIN: Dry, intact, no lesions.   NEURO: Cranial nerves II-XII intact. Motor strength 5/5 bilaterally, upper and lower extremities.  PSYCH: Appropriate affect      CBC LAST (LAST 24 HOURS)  Recent Labs   Lab 01/22/23  0507   WBC 5.61   RBC 3.99*   HGB 10.9*   HCT 34.6*   MCV 87   MCH 27.3   MCHC 31.5*   RDW 14.0      MPV 10.3       CHEMISTRY LAST (LAST 24 HOURS)  Recent Labs   Lab 01/22/23  0507   *   K 4.3   CL 91*   CO2 33*   ANIONGAP 7*   BUN 43*   CREATININE 1.6*   GLU 98   CALCIUM 8.7   MG 2.0         CARDIAC PROFILE (LAST 24 HOURS)  Recent Labs   Lab 01/18/23  2032 01/19/23  0216 01/19/23  2157   BNP 1,309*  --   --    TROPONINIHS 22.2*   < > 18.8*    < > = values in this interval not displayed.       LAST 7 DAYS MICROBIOLOGY   Microbiology Results (last 7 days)       Procedure Component Value Units Date/Time    Blood culture [[id number]] Collected: 01/19/23 0503    Order Status: Completed Specimen: Blood Updated: 01/22/23 0632     Blood Culture, Routine No Growth to date      No Growth to date      No Growth to date      No Growth to date            MOST RECENT IMAGING  X-Ray Chest AP Portable  Portable chest x-ray at 4:04 PM is compared to prior study 1/21/2023    Clinical history is status post thoracentesis    The heart is mildly  enlarged. There are median sternotomy wires. The patient is status post right thoracentesis. There is no pneumothorax. There is groundglass opacity within the right lower lobe.    There is a tiny left pleural effusion with left lower lobe atelectasis. The upper lobes are clear.    IMPRESSION: No pneumothorax status post right thoracentesis    Faint groundglass opacity in the right lower lobe and atelectasis left lung base    Tiny left pleural effusion    Mild cardiomegaly    Electronically signed by:  Maria Luisa Jacome MD  1/21/2023 4:35 PM CST Workstation: [id number]  US Chest Mediastinum  Reason: pleural effusion, preop thoracentesis    COMPARISON: Chest radiograph 1/21/2023    FINDINGS:  Targeted ultrasound of right hemithorax for purposes of preoperative localization for thoracentesis was performed. Moderate right pleural effusion is evident.    IMPRESSION:  Right pleural effusion.    Electronically signed by:  Dada Chin MD  1/21/2023 12:42 PM CST Workstation: [id number]  X-Ray Chest AP Portable  Narrative: EXAMINATION:  XR CHEST AP PORTABLE    CLINICAL HISTORY:  pleural effusion;    FINDINGS:  Portable chest at 815 compared with 01/20/2023 shows unchanged enlarged cardiac silhouette size with normal mediastinal contours containing postsurgical changes of CABG.    Small right pleural effusion has slightly improved in tiny left pleural effusion is unchanged.  Bibasilar alveolar opacities unchanged.  Nodular opacity in superior hilar region of left superior lung zone is unchanged, lying just inferior to anterior left 1st rib.  Pulmonary vasculature is normal. No pneumothorax or acute osseous abnormality.  Impression: Slight improvement of right pleural effusion.    Electronically signed by: Dada Chin MD  Date:    01/21/2023  Time:    08:30      CURRENT VISIT EKG  Results for orders placed or performed during the hospital encounter of 01/18/23   EKG 12-lead    Narrative    Test Reason :  I48.91,I48.92,    Vent. Rate : 111 BPM     Atrial Rate : 129 BPM     P-R Int : 000 ms          QRS Dur : 092 ms      QT Int : 296 ms       P-R-T Axes : 000 -13 103 degrees     QTc Int : 402 ms    Atrial fibrillation with rapid ventricular response  Nonspecific ST and T wave abnormality  Abnormal ECG  When compared with ECG of 18-JAN-2023 20:07,  No significant change was found    Referred By: [email or web address]   SELF           Confirmed By:        ECHOCARDIOGRAM RESULTS  Results for orders placed during the hospital encounter of 05/10/22    Echo    Interpretation Summary  · The left ventricle is normal in size with mild concentric hypertrophy and mildly decreased systolic function.  · The estimated ejection fraction is 40%.  · Grade III left ventricular diastolic dysfunction.  · Mild left atrial enlargement.  · Mild aortic regurgitation.  · There is moderate aortic valve stenosis.  · Aortic valve area is 1.06 cm2; peak velocity is m/s; mean gradient is 10 mmHg.  · Mild-to-moderate mitral regurgitation.  · Moderate tricuspid regurgitation.  · Mild pulmonic regurgitation.  · Intermediate central venous pressure (8 mmHg).  · The estimated PA systolic pressure is 32 mmHg.        Oxygen INFORMATION   95% on 3 L       IMPRESSION AND PLAN  Bilateral pleural effusions, right greater than left, status post thoracentesis on the right  Systolic heart failure, acute on chronic   AFib, now in sinus rhythm  Asthma   Chronic nocturnal hypoxemia    Patient has a follow-up appointment in the office in about a month.  The patient does have oxygen at home..  Please call if I can be of assistance    Rula Weiss MD  [facility], [facility]  Department of Pulmonology  Date of Service: 01/22/2023  3:08 PM

## 2023-01-22 NOTE — PROGRESS NOTES
"[facility]  Department of Cardiology  Progress Note      PATIENT NAME: Darlin Tipton    MRN: [id number]  TODAY'S DATE: 01/22/2023  ADMIT DATE: 1/18/2023                          CONSULT REQUESTED BY: Nohelia Chandra MD    SUBJECTIVE     PRINCIPAL PROBLEM: Atrial fibrillation with RVR    1/22/23  Patient is sitting upright in bedside chair, in no acute distress, alert and oriented, on 2 L nasal cannula.  She converted to sinus rhythm overnight.  Heart rate is in the 70s.  /52 during examination.  She states she is feeling much better since right-sided thoracentesis yesterday.  1300ml removed during thoracentesis. She denies shortness of breath at this time.    1/21/23  Patient is lying in bed during examination, in no acute distress, she remains in atrial fibrillation on telemetry.  She complains of a persistent cough overnight.  Is on supplemental oxygen.  She is been NPO this morning and is to to have thoracentesis performed today.  Eliquis continues to be held.    1/20/23:  Patient seen sitting up in chair with no distress noted. She continues to have a cough and is on o2. She remains in Afib. Chest xray shows worsened right and left pleural effusions.       REASON FOR CONSULT:  From H&P: 82 year old female with history of CAD, DM 2, HTN, PAF, GERD, COPD (Chronic Bronchitis 3 lpm oxygen at home), Hypothyroidism presentred to ED complaining of "Palpitations" and "Irregular heart beat" for "A couple of hours". "I felt like I was going back into a fib again". Her Cardiologist no longer attends this hospital. She has had "Occasional" episodes of orthopnea and PND. Her daughter at bedside stated that the patient's Cardiologist "Was planning on starting a diruetic this weekend" for her "Occasional" edema. No cough or sputum. The patient became rate controlled in ED with amiodarone bolus/infusion. No chest discomfort.     In ED Labs reviewed and noted below: normal CBC, normal electrolytes with " stage 3a renal dysfunction; BNP is markedly elevated with minimally elevated HS trop (repeat pending); TSH is normal. CXR reviewed: cardiomegaly with moderate right sided effusiion and small left sided effusion-possible underlying infiltrate on right. EKG reviewed: a fib RVR; no acute segments.     Discussed with ED MD; Inpatient admission for a fib RVR, and possible pneumonia; Cardiology opinion; ceftriaxone 1 gm q day; cultured; continue preadmission regimen for chronic maladies; continue amiodarone infusion; low dose insulin sliding scale; electrolyte sliding scale         HPI:    Patient is an 82 year old female who presented to the ER with complaints of palpitations for a few hours. She reports having occasional shortness of breath, swelling, and orthopnea recently as well. On arrival, patient was noted to have Afib with RVR as well as pleural effusions.  Patient has since converted back into sinus rhythm.  She does have some nausea and headache but her breathing appears to be stable.  High sensitivity troponin was mildly elevated just above 20.        Review of patient's allergies indicates:   Allergen Reactions    Dexlansoprazole Itching, Nausea Only and Rash    Sulfa (sulfonamide antibiotics) Rash    Floxacillin Itching    Januvia [sitagliptin] Other (See Comments)     Hot flashes    Tetracyclines Itching    Fenofibrate micronized Rash    Nitrofurantoin macrocrystalline Other (See Comments)     unknown    Phenylfenesin la [phenylpropanolamine-gg] Rash       Past Medical History:   Diagnosis Date    Allergy     Dust mites, Grasses, Trees    Arthritis     Asthma     Blood transfusion     CAD (coronary artery disease)     Cataract     CHRONIC BRONCHITIS     Diabetes mellitus     Diabetes mellitus type II     GERD (gastroesophageal reflux disease)     Hyperlipidemia     Hypertension     Irregular heart beat     Kidney disease     ckd stage 3  as stated by patient - to see MD    Post-menopausal bleeding 2018     Spinal stenosis     Thyroid disease     Hypothyroidism     Past Surgical History:   Procedure Laterality Date    APPENDECTOMY  1968    BLADDER SUSPENSION  1989    CARDIAC SURGERY  2016    CABG    CATARACT EXTRACTION  9/2007 (L) and 10/2207 (R)    COLONOSCOPY N/A 10/18/2017    Procedure: COLONOSCOPY;  Surgeon: Esme Acuna MD;  Location: [facility] ENDO;  Service: Endoscopy;  Laterality: N/A;    CORONARY ANGIOGRAPHY INCLUDING BYPASS GRAFTS WITH CATHETERIZATION OF LEFT HEART Left 5/13/2022    Procedure: Left heart cath;  Surgeon: Davon Patton MD;  Location: [facility] CATH/EP LAB;  Service: Cardiology;  Laterality: Left;    CORONARY ARTERY BYPASS GRAFT  4/26/2004    x5    ESOPHAGEAL DILATION      ESOPHAGOGASTRODUODENOSCOPY N/A 6/27/2022    Procedure: EGD (ESOPHAGOGASTRODUODENOSCOPY);  Surgeon: Skip Nj MD;  Location: [facility] ENDO;  Service: Endoscopy;  Laterality: N/A;    HYSTEROSCOPY WITH DILATION AND CURETTAGE OF UTERUS N/A 3/12/2020    Procedure: HYSTEROSCOPY, WITH DILATION AND CURETTAGE OF UTERUS;  Surgeon: Ramona Llanos MD;  Location: [facility] OR;  Service: OB/GYN;  Laterality: N/A;    SPINE SURGERY  3/2000    Tumor    TRANSFORAMINAL EPIDURAL INJECTION OF STEROID Right 11/21/2019    Procedure: Injection,steroid,epidural,transforaminal approach;  Surgeon: Bj Jones MD;  Location: [facility];  Service: Pain Management;  Laterality: Right;  L4-5, L5-S1    TRANSFORAMINAL EPIDURAL INJECTION OF STEROID Right 12/31/2019    Procedure: Injection,steroid,epidural,transforaminal approach;  Surgeon: Bj Jones MD;  Location: [facility] OR;  Service: Pain Management;  Laterality: Right;  L4-5, L5-S1    TRANSFORAMINAL EPIDURAL INJECTION OF STEROID Right 2/5/2020    Procedure: Injection,steroid,epidural,transforaminal approach;  Surgeon: Bj Jones MD;  Location: [facility] OR;  Service: Pain Management;  Laterality: Right;  L4-5, L5-S1    TRANSFORAMINAL EPIDURAL INJECTION OF STEROID Left 1/27/2021    Procedure:  Injection,steroid,epidural,transforaminal approach;  Surgeon: Bj Jones MD;  Location: [facility] OR;  Service: Pain Management;  Laterality: Left;  L4-5, L5-S1    TRANSFORAMINAL EPIDURAL INJECTION OF STEROID Right 3/4/2021    Procedure: Injection,steroid,epidural,transforaminal approach;  Surgeon: Bj Jones MD;  Location: [facility] OR;  Service: Pain Management;  Laterality: Right;  L4-L5, L5-S1    WRIST SURGERY  1993    carpal tunnel       Social History     Tobacco Use    Smoking status: Passive Smoke Exposure - Never Smoker    Smokeless tobacco: Never    Tobacco comments:     PARENTS    Substance Use Topics    Alcohol use: Yes     Comment: Rare    Drug use: No        REVIEW OF SYSTEMS  CONSTITUTIONAL: Negative for chills, fatigue and fever.   EYES: No double vision, No blurred vision  NEURO: No headaches, No dizziness  RESPIRATORY: + shortness of breath and orthopnea- improved; positive cough  CARDIOVASCULAR: + for chest pain- improved. + for palpitations- resolved; and + leg swelling-improving.   GI: Negative for abdominal pain, +nausea- improved  : Negative for dysuria and frequency, Negative for hematuria  SKIN: Negative for bruising, Negative for edema or discoloration noted.   PSYCHIATRIC: Negative for depression, Negative for anxiety, Negative for memory loss  MUSCULOSKELETAL: Negative for neck pain, Negative for muscle weakness, Negative for back pain     OBJECTIVE     VITAL SIGNS (Most Recent)  Temp: 97.6 °F (36.4 °C) (01/22/23 1200)  Pulse: 79 (01/22/23 1341)  Resp: 18 (01/22/23 0835)  BP: (!) 110/56 (01/22/23 1300)  SpO2: 98 % (01/22/23 1341)    VENTILATION STATUS  Resp: 18 (01/22/23 0835)  SpO2: 98 % (01/22/23 1341)       I & O (Last 24H):  Intake/Output Summary (Last 24 hours) at 1/22/2023 1451  Last data filed at 1/22/2023 0344  Gross per 24 hour   Intake 240 ml   Output 1650 ml   Net -1410 ml         WEIGHTS  Wt Readings from Last 1 Encounters:   01/19/23 0330 65 kg (143 lb 4.8 oz)   01/18/23 2013 64 kg  (141 lb)       PHYSICAL EXAM  CONSTITUTIONAL: No fever, no chills  HEENT: Normocephalic, atraumatic,pupils reactive to light                 NECK:  No JVD no carotid bruit  CVS: S1S2+,NSR on telemetry  LUNGS:  improved breath sounds s/p R thoracentesis yesterday. Minimal crackles noted in LLL  ABDOMEN: Soft, NT, BS+  EXTREMITIES: No cyanosis, edema  : No euceda catheter  NEURO: AAO X 3  PSY: Normal affect      HOME MEDICATIONS:  No current facility-administered medications on file prior to encounter.     Current Outpatient Medications on File Prior to Encounter   Medication Sig Dispense Refill    acetaminophen (TYLENOL) 650 MG TbSR Take 1,300 mg by mouth once daily. 2 Tablet(s) Oral  Every day.      amitriptyline (ELAVIL) 75 MG tablet Take 75 mg by mouth every evening.      apixaban (ELIQUIS) 5 mg Tab Take 1 tablet (5 mg total) by mouth 2 (two) times daily. 180 tablet 3    blood sugar diagnostic (FREESTYLE LITE STRIPS) Strp USE TO TEST BLOOD SUGAR TWICE A  strip 3    busPIRone (BUSPAR) 10 MG tablet TAKE 1 TABLET BY MOUTH TWICE A  tablet 3    carboxymethylcellulose 1 % ophthalmic solution Apply 1 drop to eye As instructed. as directed      cetirizine (ZYRTEC) 10 MG tablet Take 10 mg by mouth once daily.      cholecalciferol, vitamin D3, (VITAMIN D3) 50 mcg (2,000 unit) Cap Take 1 capsule by mouth once daily.      clotrimazole-betamethasone 1-0.05% (LOTRISONE) cream Apply topically 2 (two) times daily as needed.      coenzyme Q10 100 mg capsule Take 100 mg by mouth once daily.       cranberry extract 650 mg Cap Take 1 tablet by mouth 2 (two) times daily.      FARXIGA 5 mg Tab tablet Take 5 mg by mouth once daily.      fluticasone furoate-vilanteroL (BREO ELLIPTA) 100-25 mcg/dose diskus inhaler Inhale 1 puff into the lungs once daily. Controller Rinse after you use it 180 each 6    fluticasone propionate (FLONASE) 50 mcg/actuation nasal spray 1 spray (50 mcg total) by Each Nostril route once daily. 48 g  3    Lactobac no.41/Bifidobact no.7 (PROBIOTIC-10 ORAL) Take by mouth.      lancets Misc 1 Units by Misc.(Non-Drug; Combo Route) route 2 (two) times daily. 200 each 3    lansoprazole (PREVACID) 30 MG capsule Take 1 capsule (30 mg total) by mouth once daily. 90 capsule 3    levalbuterol (XOPENEX) 1.25 mg/0.5 mL nebulizer solution Take 0.5 mLs (1.25 mg total) by nebulization every 6 (six) hours as needed for Wheezing. Rescue 120 each 3    levothyroxine (SYNTHROID) 25 MCG tablet TAKE 1 TABLET BY MOUTH BEFORE BREAKFAST EVERY DAY 90 tablet 3    metoprolol succinate (TOPROL XL) 25 MG 24 hr tablet Take 1 tablet (25 mg total) by mouth once daily. 90 tablet 3    nitroGLYCERIN (NITROSTAT) 0.4 MG SL tablet Place 0.4 mg under the tongue every 5 (five) minutes as needed. 0.4mg Sublingual PRN .        pramoxine-hydrocortisone (PROCTOCREAM-HC) 1-1 % rectal cream Place rectally 2 (two) times daily. (Patient taking differently: Place 1 application rectally 2 (two) times daily.) 3 each 3    RESTASIS 0.05 % ophthalmic emulsion Place 1 drop into both eyes 2 (two) times daily.      rosuvastatin (CRESTOR) 10 MG tablet Take 1 tablet (10 mg total) by mouth once daily. 90 tablet 3    triazolam (HALCION) 0.25 MG Tab Take 1 tablet (0.25 mg total) by mouth nightly as needed (sleep). 90 tablet 1    UNABLE TO FIND Take 1 capsule by mouth once daily. Vital red      vitamins  A,C,E-zinc-copper [id number] unit-mg-unit Cap Take 1 capsule by mouth 2 (two) times daily.       [DISCONTINUED] dronedarone (MULTAQ) 400 mg Tab Take 1 tablet (400 mg total) by mouth 2 (two) times daily with meals. 180 tablet 3    [DISCONTINUED] spironolactone (ALDACTONE) 25 MG tablet Take 1 tablet (25 mg total) by mouth once daily. 30 tablet 1       SCHEDULED MEDS:   amiodarone  200 mg Oral TID    amitriptyline  75 mg Oral QHS    apixaban  5 mg Oral BID    budesonide  0.5 mg Nebulization Q12H    And    arformoteroL  15 mcg Nebulization BID    atorvastatin  40 mg Oral  Daily    busPIRone  10 mg Oral BID    carboxymethylcellulose  2 drop Both Eyes Q4H While awake    chlorhexidine  15 mL Mouth/Throat BID    cholecalciferol (vitamin D3)  2,000 Units Oral Daily    fluticasone propionate  1 spray Each Nostril QHS    lactulose  30 g Oral Once    levothyroxine  25 mcg Oral Before breakfast    LIDOcaine  1 patch Transdermal Q24H    metoprolol succinate  25 mg Oral QHS    mupirocin   Nasal BID    polyethylene glycol  17 g Oral Daily    senna-docusate 8.6-50 mg  2 tablet Oral BID       CONTINUOUS INFUSIONS:        PRN MEDS:acetaminophen, carboxymethylcellulose sodium, dextrose 10%, dextrose 10%, glucagon (human recombinant), glucose, glucose, HYDROcodone-acetaminophen, insulin aspart U-100, levalbuterol, magnesium oxide, magnesium oxide, melatonin, nitroGLYCERIN, ondansetron, potassium bicarbonate, potassium bicarbonate, potassium bicarbonate, potassium, sodium phosphates, potassium, sodium phosphates, potassium, sodium phosphates, simethicone, sodium chloride 0.9%    LABS AND DIAGNOSTICS     CBC LAST 3 DAYS  Recent Labs   Lab 01/19/23  0502 01/20/23  0514 01/21/23  0448 01/22/23  0507   WBC 6.03 4.20 4.85 5.61   RBC 4.33 4.17 4.10 3.99*   HGB 12.0 11.4* 11.3* 10.9*   HCT 38.7 36.2* 35.5* 34.6*   MCV 89 87 87 87   MCH 27.7 27.3 27.6 27.3   MCHC 31.0* 31.5* 31.8* 31.5*   RDW 14.1 13.7 13.7 14.0    207 209 212   MPV 10.3 10.3 10.6 10.3   GRAN 62.2  3.8 60.6  2.5 67.0  3.3  --    LYMPH 23.7  1.4 22.1  0.9* 15.5*  0.8*  --    MONO 12.3  0.7 15.2*  0.6 15.3*  0.7  --    BASO 0.03 0.01 0.02  --    NRBC 0 0 0  --          COAGULATION LAST 3 DAYS  Recent Labs   Lab 01/18/23 2032   INR 1.0   APTT 28.7         CHEMISTRY LAST 3 DAYS  Recent Labs   Lab 01/18/23 2032 01/19/23  0502 01/20/23  0514 01/20/23  1014 01/21/23  0447 01/22/23  0507      < > 128* 127* 129* 131*   K 4.3   < > 3.8  --  3.8 4.3   CL 99   < > 93*  --  89* 91*   CO2 26   < > 29  --  33* 33*   ANIONGAP 11   < >  6*  --  7* 7*   BUN 25*   < > 26*  --  29* 43*   CREATININE 1.0   < > 1.0  --  1.1 1.6*   *   < > 103  --  99 98   CALCIUM 9.6   < > 8.7  --  8.7 8.7   MG 2.0  --   --   --  1.9 2.0   ALBUMIN 3.9  --   --   --   --   --    PROT 7.2  --   --   --   --   --    ALKPHOS 72  --   --   --   --   --    ALT 40  --   --   --   --   --    AST 32  --   --   --   --   --    BILITOT 0.4  --   --   --   --   --     < > = values in this interval not displayed.         CARDIAC PROFILE LAST 3 DAYS  Recent Labs   Lab 01/18/23 2032 01/19/23 0216 01/19/23 2157   BNP 1,309*  --   --    TROPONINIHS 22.2* 24.8* 18.8*         ENDOCRINE LAST 3 DAYS  Recent Labs   Lab 01/18/23 2032 01/19/23 2157   TSH 3.580  --    PROCAL  --  <0.05         LAST ARTERIAL BLOOD GAS  ABG  No results for input(s): PH, PO2, PCO2, HCO3, BE in the last 168 hours.    LAST 7 DAYS MICROBIOLOGY   Microbiology Results (last 7 days)       Procedure Component Value Units Date/Time    Blood culture [[id number]] Collected: 01/19/23 0503    Order Status: Completed Specimen: Blood Updated: 01/22/23 0632     Blood Culture, Routine No Growth to date      No Growth to date      No Growth to date      No Growth to date            MOST RECENT IMAGING  X-Ray Chest AP Portable  Portable chest x-ray at 4:04 PM is compared to prior study 1/21/2023    Clinical history is status post thoracentesis    The heart is mildly enlarged. There are median sternotomy wires. The patient is status post right thoracentesis. There is no pneumothorax. There is groundglass opacity within the right lower lobe.    There is a tiny left pleural effusion with left lower lobe atelectasis. The upper lobes are clear.    IMPRESSION: No pneumothorax status post right thoracentesis    Faint groundglass opacity in the right lower lobe and atelectasis left lung base    Tiny left pleural effusion    Mild cardiomegaly    Electronically signed by:  Maria Luisa Jacome MD  1/21/2023 4:35 PM CST Workstation:  [id number]   Chest Mediastinum  Reason: pleural effusion, preop thoracentesis    COMPARISON: Chest radiograph 1/21/2023    FINDINGS:  Targeted ultrasound of right hemithorax for purposes of preoperative localization for thoracentesis was performed. Moderate right pleural effusion is evident.    IMPRESSION:  Right pleural effusion.    Electronically signed by:  Dada Chin MD  1/21/2023 12:42 PM CST Workstation: [id number]  X-Ray Chest AP Portable  Narrative: EXAMINATION:  XR CHEST AP PORTABLE    CLINICAL HISTORY:  pleural effusion;    FINDINGS:  Portable chest at 815 compared with 01/20/2023 shows unchanged enlarged cardiac silhouette size with normal mediastinal contours containing postsurgical changes of CABG.    Small right pleural effusion has slightly improved in tiny left pleural effusion is unchanged.  Bibasilar alveolar opacities unchanged.  Nodular opacity in superior hilar region of left superior lung zone is unchanged, lying just inferior to anterior left 1st rib.  Pulmonary vasculature is normal. No pneumothorax or acute osseous abnormality.  Impression: Slight improvement of right pleural effusion.    Electronically signed by: Dada Chin MD  Date:    01/21/2023  Time:    08:30      ECHOCARDIOGRAM RESULTS (last 5)  Results for orders placed during the hospital encounter of 05/10/22    Echo    Interpretation Summary  · The left ventricle is normal in size with mild concentric hypertrophy and mildly decreased systolic function.  · The estimated ejection fraction is 40%.  · Grade III left ventricular diastolic dysfunction.  · Mild left atrial enlargement.  · Mild aortic regurgitation.  · There is moderate aortic valve stenosis.  · Aortic valve area is 1.06 cm2; peak velocity is m/s; mean gradient is 10 mmHg.  · Mild-to-moderate mitral regurgitation.  · Moderate tricuspid regurgitation.  · Mild pulmonic regurgitation.  · Intermediate central venous pressure (8 mmHg).  · The estimated PA systolic  pressure is 32 mmHg.      Results for orders placed in visit on 10/11/21    Echo    Interpretation Summary  · The left ventricle is normal in size with mild concentric hypertrophy and low normal systolic function.  · The estimated ejection fraction is 52%.  · Grade I left ventricular diastolic dysfunction.  · Normal right ventricular size.  · Mild left atrial enlargement.  · There is mild aortic valve stenosis.  · Aortic valve area is 2.57 cm2; peak velocity is 2.17 m/s; mean gradient is 13 mmHg.  · Mild mitral regurgitation.  · Mild tricuspid regurgitation.  · Normal central venous pressure (3 mmHg).  · The estimated PA systolic pressure is 23 mmHg.      CURRENT/PREVIOUS VISIT EKG  Results for orders placed or performed during the hospital encounter of 01/18/23   EKG 12-lead    Collection Time: 01/18/23  9:35 PM    Narrative    Test Reason : R00.2,    Vent. Rate : 098 BPM     Atrial Rate : 000 BPM     P-R Int : 000 ms          QRS Dur : 092 ms      QT Int : 328 ms       P-R-T Axes : 000 -09 126 degrees     QTc Int : 418 ms    Atrial fibrillation  Minimal voltage criteria for LVH, may be normal variant ( Renny product )  Nonspecific ST and T wave abnormality  Abnormal ECG  When compared with ECG of 25-JUN-2022 09:29,  Atrial fibrillation has replaced Sinus rhythm  Vent. rate has increased BY  35 BPM  Criteria for Septal infarct are no longer Present    Referred By: [email or web address]   SELF           Confirmed By:            ASSESSMENT/PLAN:     Active Hospital Problems    Diagnosis    *Atrial fibrillation with RVR    Chronic respiratory failure with hypoxia, on 3L noctural oxygen    Asthma    Hypothyroidism    Valvular heart disease, moderate AS/TR    Bilateral pleural effusion, right greater than left, status post right thoracocentesis 1/21    Hyponatremia    Acute on chronic combined heart failure    Paroxysmal atrial fibrillation    Stage 3b chronic kidney disease    DM (diabetes mellitus), type 2, HbA1c 5.8%     Ischemic cardiomyopathy    CABG 2004    GERD (gastroesophageal reflux disease)       ASSESSMENT & PLAN:     Afib with RVR- rate controlled  Acute on chronic systolic HF with EF 40% on last echo  CAD with hx of CABG  Bilateral pleural effusions  S/P Right sided Thoracentesis      RECOMMENDATIONS:    Patient denies shortness of breath at this time. Improvement in fine crackles in bilateral lower lobes.  Lasix stopped.  Strict I&O.  1.5 L fluid restriction.  Low-sodium diet.  Cr. 1.6 this am. Was 1.1 yesterday.   Continue Eliquis 5 mg BID. 10.9/34.6 H&H this am.  Continue to monitor for bleeding.  Patient has converted to a normal sinus rhythm overnight.  Heart rate in 70s.  Continue amiodarone 200 mg t.i.d..  Continue metoprolol succinate 25 mg daily.  Hold antihypertensives for systolic less than 100.  ECHO from 5/12/22 EF 40% with Grade III diastolic dysfunction. Moderate TR and MR.  Patient follow with Dr. Diaz and has appt with him next Thursday.   Patient want to go home. Her breathing is much improved and she has converted to NSR. However, her Cr has increased overnight.  She may be okay for dc tomorrow if renal function improves.   Thank you.  Will follow.       Kaur Tracy NP  [facility]  Department of Cardiology  Date of Service: 01/22/2023    I have seen the patient examined and reviewed the chart and all the studies and discuss with the nurse practitioner the assessment plan and recommendations.  1. Patient in atrial fibrillation rate is much better controlled.  Her shortness of breath improved.  She is currently on Eliquis 5 mg p.o. b.i.d. continue the same and amiodarone 200 mg p.o. t.i.d..  And she is also on metoprolol succinate 25 mg p.o. daily.  Continue current management.  Patient is very anxious to go home and wants to be discharged home.  Patient can follow-up with Dr. Breen this coming week.        Julien Coleman MD  [facility]  Department of  Cardiology  Date of Service: 01/22/2023

## 2023-01-23 VITALS
WEIGHT: 143.31 LBS | RESPIRATION RATE: 18 BRPM | OXYGEN SATURATION: 96 % | DIASTOLIC BLOOD PRESSURE: 51 MMHG | BODY MASS INDEX: 26.37 KG/M2 | HEART RATE: 68 BPM | SYSTOLIC BLOOD PRESSURE: 105 MMHG | TEMPERATURE: 98 F | HEIGHT: 62 IN

## 2023-01-23 LAB
ANION GAP SERPL CALC-SCNC: 12 MMOL/L (ref 8–16)
BUN SERPL-MCNC: 49 MG/DL (ref 8–23)
CALCIUM SERPL-MCNC: 9.5 MG/DL (ref 8.7–10.5)
CHLORIDE SERPL-SCNC: 92 MMOL/L (ref 95–110)
CO2 SERPL-SCNC: 30 MMOL/L (ref 23–29)
CREAT SERPL-MCNC: 1.4 MG/DL (ref 0.5–1.4)
EST. GFR  (NO RACE VARIABLE): 37.6 ML/MIN/1.73 M^2
GLUCOSE SERPL-MCNC: 108 MG/DL (ref 70–110)
GLUCOSE SERPL-MCNC: 114 MG/DL (ref 70–110)
MAGNESIUM SERPL-MCNC: 2.4 MG/DL (ref 1.6–2.6)
POTASSIUM SERPL-SCNC: 3.8 MMOL/L (ref 3.5–5.1)
SODIUM SERPL-SCNC: 134 MMOL/L (ref 136–145)

## 2023-01-23 PROCEDURE — 25000003 PHARM REV CODE 250: Performed by: INTERNAL MEDICINE

## 2023-01-23 PROCEDURE — 25000003 PHARM REV CODE 250: Performed by: NURSE PRACTITIONER

## 2023-01-23 PROCEDURE — 80048 BASIC METABOLIC PNL TOTAL CA: CPT | Performed by: INTERNAL MEDICINE

## 2023-01-23 PROCEDURE — 25000242 PHARM REV CODE 250 ALT 637 W/ HCPCS: Performed by: INTERNAL MEDICINE

## 2023-01-23 PROCEDURE — 94640 AIRWAY INHALATION TREATMENT: CPT

## 2023-01-23 PROCEDURE — 99900035 HC TECH TIME PER 15 MIN (STAT)

## 2023-01-23 PROCEDURE — 83735 ASSAY OF MAGNESIUM: CPT | Performed by: INTERNAL MEDICINE

## 2023-01-23 PROCEDURE — 25000003 PHARM REV CODE 250

## 2023-01-23 PROCEDURE — 36415 COLL VENOUS BLD VENIPUNCTURE: CPT | Performed by: INTERNAL MEDICINE

## 2023-01-23 PROCEDURE — 82962 GLUCOSE BLOOD TEST: CPT

## 2023-01-23 PROCEDURE — 27000221 HC OXYGEN, UP TO 24 HOURS

## 2023-01-23 PROCEDURE — 94799 UNLISTED PULMONARY SVC/PX: CPT

## 2023-01-23 PROCEDURE — 99900031 HC PATIENT EDUCATION (STAT)

## 2023-01-23 RX ORDER — AMIODARONE HYDROCHLORIDE 200 MG/1
TABLET ORAL
Qty: 72 TABLET | Refills: 0 | Status: SHIPPED | OUTPATIENT
Start: 2023-01-23 | End: 2023-06-12

## 2023-01-23 RX ORDER — FUROSEMIDE 20 MG/1
20 TABLET ORAL DAILY
Qty: 30 TABLET | Refills: 0 | Status: SHIPPED | OUTPATIENT
Start: 2023-01-24 | End: 2023-10-18 | Stop reason: DRUGHIGH

## 2023-01-23 RX ORDER — POTASSIUM CHLORIDE 750 MG/1
10 TABLET, EXTENDED RELEASE ORAL DAILY
Qty: 1 TABLET | Refills: 0 | Status: SHIPPED | OUTPATIENT
Start: 2023-01-24 | End: 2023-04-07

## 2023-01-23 RX ADMIN — Medication 2000 UNITS: at 10:01

## 2023-01-23 RX ADMIN — BUDESONIDE 0.5 MG: 0.5 INHALANT RESPIRATORY (INHALATION) at 07:01

## 2023-01-23 RX ADMIN — POTASSIUM BICARBONATE 50 MEQ: 977.5 TABLET, EFFERVESCENT ORAL at 06:01

## 2023-01-23 RX ADMIN — LEVOTHYROXINE SODIUM 25 MCG: 0.03 TABLET ORAL at 06:01

## 2023-01-23 RX ADMIN — ARFORMOTEROL TARTRATE 15 MCG: 15 SOLUTION RESPIRATORY (INHALATION) at 07:01

## 2023-01-23 RX ADMIN — BUSPIRONE HYDROCHLORIDE 10 MG: 5 TABLET ORAL at 10:01

## 2023-01-23 RX ADMIN — SENNOSIDES AND DOCUSATE SODIUM 2 TABLET: 8.6; 5 TABLET ORAL at 10:01

## 2023-01-23 RX ADMIN — APIXABAN 5 MG: 5 TABLET, FILM COATED ORAL at 10:01

## 2023-01-23 RX ADMIN — MUPIROCIN 1 G: 20 OINTMENT TOPICAL at 10:01

## 2023-01-23 RX ADMIN — CHLORHEXIDINE GLUCONATE 15 ML: 1.2 RINSE ORAL at 10:01

## 2023-01-23 RX ADMIN — POLYETHYLENE GLYCOL 3350 17 G: 17 POWDER, FOR SOLUTION ORAL at 10:01

## 2023-01-23 RX ADMIN — AMIODARONE HYDROCHLORIDE 200 MG: 200 TABLET ORAL at 10:01

## 2023-01-23 NOTE — CARE UPDATE
Babs Ellis came to pt office and reported that the pt refused the tanks but was agreeable to the home set up and concentrators.

## 2023-01-23 NOTE — DISCHARGE SUMMARY
"[facility] Medicine  Discharge Summary      Patient Name: Darlin Tipton  MRN: [id number]  ROMERO: [id number]  Patient Class: IP- Inpatient  Admission Date: 1/18/2023  Hospital Length of Stay: 4 days  Discharge Date and Time: 1/23/2023 11:37 AM  Attending Physician: No att. providers found   Discharging Provider: Nohelia Chandra MD  Primary Care Provider: Ray Chen MD    Primary Care Team: Networked reference to record PCT     HPI:   82 year old female with history of CAD, DM 2, HTN, PAF, GERD, COPD (Chronic Bronchitis 3 lpm oxygen at home), Hypothyroidism presentred to ED complaining of "Palpitations" and "Irregular heart beat" for "A couple of hours". "I felt like I was going back into a fib again". Her Cardiologist no longer attends this hospital. She has had "Occasional" episodes of orthopnea and PND. Her daughter at bedside stated that the patient's Cardiologist "Was planning on starting a diruetic this weekend" for her "Occasional" edema. No cough or sputum. The patient became rate controlled in ED with amiodarone bolus/infusion. No chest discomfort.    In ED Labs reviewed and noted below: normal CBC, normal electrolytes with stage 3a renal dysfunction; BNP is markedly elevated with minimally elevated HS trop (repeat pending); TSH is normal. CXR reviewed: cardiomegaly with moderate right sided effusiion and small left sided effusion-possible underlying infiltrate on right. EKG reviewed: a fib RVR; no acute segments.    Discussed with ED MD; Inpatient admission for a fib RVR, and possible pneumonia; Cardiology opinion; ceftriaxone 1 gm q day; cultured; continue preadmission regimen for chronic maladies; continue amiodarone infusion; low dose insulin sliding scale; electrolyte sliding scale      * No surgery found *      Hospital Course:   1/20:  Drop in sodium noted, repeat levels.  Patient still has persistent coughing, chest x-ray showed bilateral pleural effusion, pulmonology " consulted.  Patient states she has not moved her bowels since Wednesday, would give a 1 time dose of lactulose.  Continue IV diuresis    Assumed care of this patient on 1/21/23.  Patient with known history of CAD status post CABG, bilateral effusion, diastolic heart failure, paroxysmal atrial fibrillation, hypothyroidism.  She is followed by cardiologist Dr. Diaz.  History of asthma with nocturnal hypoxic respiratory failure on nighttime oxygen, followed by Dr. Weiss.  She presented to the ED with palpitations, fast associated with shortness of breath and coughing.  She was found to be in AFib with RVR.  Bilateral pleural effusion, right greater than left.  She was admitted to telemetry floor, started on amiodarone 200 mg t.i.d., IV diuresis and monitoring.  Pulmonary was consulted given ongoing pleural effusion, on 01/21 underwent right-sided thoracocentesis with 1300 cc drained.  Constipation and stool softener/laxative started.  On 01/23, home oxygen test performed and patient will require 24 hours oxygen, 3 L, acute kidney injury with creatinine up to 1.6, holding diuresis, remains in sinus rhythm. On 01/23 continues to feel improved, creatinine down to 1.4. Eager for discharge and appears medically stable for discharge, cleared by consultants for discharge.  She will follow-up with regular Cardiology on Thursday and Pulmonary as outpatient.  She was instructed to wear home oxygen all the time as per assessment.  Discharge plan including medication, follow-up as well as return precautions were reviewed with the patient and  present at bedside, expressed understanding, no questions or concerns.      Discharge examination   Seen ambulating from bathroom, on nasal cannula, alert and oriented, regular rhythm, abdomen nontender       Goals of Care Treatment Preferences:  Code Status: Full Code      Consults:   Consults (From admission, onward)        Status Ordering Provider     Inpatient consult to  Pulmonology  Once        Provider:  Rula Weiss MD    Completed SANDY BRAGG     Inpatient consult to Cardiology  Once        Provider:  Lisa Torres MD    Completed ZUHAIR HURD     Inpatient consult to Hospitalist  Once        Provider:  Zuhair Hurd MD    Acknowledged ZUHAIR HURD          No new Assessment & Plan notes have been filed under this hospital service since the last note was generated.  Service: Hospital Medicine    Final Active Diagnoses:    Diagnosis Date Noted POA    PRINCIPAL PROBLEM:  Atrial fibrillation with RVR, now in sinus rhythm [I48.91] 01/19/2023 Yes    LINDSAY (acute kidney injury) [N17.9] 01/22/2023 No    Constipation [K59.00] 01/22/2023 Yes    Chronic respiratory failure with hypoxia, on 3L noctural oxygen [J96.10] 01/21/2023 Yes     Chronic    Asthma [J45.909] 01/21/2023 Yes     Chronic    Hypothyroidism [E03.9] 01/21/2023 Yes     Chronic    Valvular heart disease, moderate AS/TR [I38] 01/21/2023 Yes     Chronic    Bilateral pleural effusion, right greater than left, status post right thoracocentesis 1/21 [J90] 01/20/2023 Yes     Chronic    Hyponatremia [E87.1] 06/25/2022 Yes    Acute on chronic combined heart failure [I50.9] 05/10/2022 Yes    Paroxysmal atrial fibrillation [I48.0] 03/21/2022 Yes    Stage 3b chronic kidney disease [N18.32] 11/07/2021 Yes    DM (diabetes mellitus), type 2, HbA1c 5.8% [E11.9] 05/05/2018 Yes     Chronic    Ischemic cardiomyopathy [I25.5] 05/03/2018 Yes    CABG 2004 [Z95.1] 09/05/2016 Not Applicable    GERD (gastroesophageal reflux disease) [K21.9] 02/26/2012 Yes      Problems Resolved During this Admission:       Discharged Condition: good    Disposition: Home or Self Care    Follow Up:   Follow-up Information     Ray Chen MD. Schedule an appointment as soon as possible for a visit in 1 week(s).    Specialties: Urgent Care, Family Medicine  Why: follow up  Contact information:  [address]  "[facility] [address]  [phone]             Yuri Diaz MD. Go on 1/26/2023.    Specialties: Cardiology, Interventional Cardiology  Why: follow up  Contact information:  [address] [facility] [address]  [phone]             Rula Weiss MD. Schedule an appointment as soon as possible for a visit in 1 month(s).    Specialties: Pulmonary Disease, Sleep Medicine  Why: follow up  Contact information:  [address]  [address]  [facility] [id number] [phone]                       Patient Instructions:      OXYGEN FOR HOME USE     Order Specific Question Answer Comments   Liter Flow 3    Duration Continuous    Qualifying Test Performed at: Rest    Oxygen saturation: 83    Portable mode: continuous    Route nasal cannula    Device: home concentrator with portable tanks    Length of need (in months): 99 mos    Patient condition with qualifying saturation CHF    Height: 5' 2" (1.575 m)    Weight: 65 kg (143 lb 4.8 oz)    Alternative treatment measures have been tried or considered and deemed clinically ineffective. Yes      Diet Cardiac     Notify your health care provider if you experience any of the following:  temperature >100.4     Notify your health care provider if you experience any of the following:  severe uncontrolled pain     Notify your health care provider if you experience any of the following:  difficulty breathing or increased cough     Activity as tolerated       Significant Diagnostic Studies: Labs:   BMP:   Recent Labs   Lab 01/22/23  0507 01/23/23  0419   GLU 98 108   * 134*   K 4.3 3.8   CL 91* 92*   CO2 33* 30*   BUN 43* 49*   CREATININE 1.6* 1.4   CALCIUM 8.7 9.5   MG 2.0 2.4   , CMP   Recent Labs   Lab 01/22/23  0507 01/23/23  0419   * 134*   K 4.3 3.8   CL 91* 92*   CO2 33* 30*   GLU 98 108   BUN 43* 49*   CREATININE 1.6* 1.4   CALCIUM 8.7 9.5   ANIONGAP 7* 12   , CBC   Recent Labs   Lab 01/22/23  0507   WBC 5.61   HGB 10.9*   HCT 34.6*      , INR "   Lab Results   Component Value Date    INR 1.0 01/18/2023    INR 1.9 05/11/2022    INR 0.9 09/05/2016   , Lipid Panel   Lab Results   Component Value Date    CHOL 166 09/02/2022    HDL 54 09/02/2022    LDLCALC 89 09/02/2022    TRIG 125 09/02/2022    CHOLHDL 17.3 (L) 11/16/2021   , Troponin No results for input(s): TROPONINI in the last 168 hours., A1C:   Recent Labs   Lab 09/02/22  0000 01/19/23  0502   HGBA1C 6.1 5.8    and All labs within the past 24 hours have been reviewed    Pending Diagnostic Studies:     None       X-Ray Chest PA And Lateral    Result Date: 1/20/2023  HISTORY: pleural effusion FINDINGS: PA and lateral chest radiograph at 1050 hours compared to 01/18/2023 show median sternotomy wires and mediastinal surgical clips from prior CABG, with stable enlarged cardiac silhouette and normal pulmonary vascularity. There are persistent right perihilar and bilateral lower lung airspace opacities, with interval increase in bilateral pleural effusions. The upper lungs are clear, with no evidence of interstitial pulmonary edema or pneumothorax. IMPRESSION: Interval increased size of moderate right and small left pleural effusions. Electronically signed by:  Sebastian Juarez MD  1/20/2023 11:37 AM CST Workstation: [id number]    X-Ray Chest PA And Lateral    Result Date: 1/10/2023  EXAMINATION: XR CHEST PA AND LATERAL CLINICAL HISTORY: Acute bronchitis, unspecified TECHNIQUE: PA and lateral views of the chest were performed. COMPARISON: 06/25/2022 FINDINGS: There is bibasilar airspace disease.  Unchanged heart size with prior CABG.  Obscured right hemidiaphragm.  Blunted left costophrenic angle.  Sternal wires.     Moderate size right and small left pleural effusions.  Bibasilar airspace disease could reflect atelectasis or pneumonia or edema. Electronically signed by: Francisco Guy Date:    01/10/2023 Time:    11:01    X-Ray Abdomen AP 1 View    Result Date: 1/20/2023  HISTORY: Nausea, abdominal pain. FINDINGS:  Single abdominal radiograph at 1052 hours compared to prior exams shows a nonobstructive bowel gas pattern. There is air in the stomach, with scattered air and moderate volume of stool throughout the colon. No evidence of intraperitoneal free air. There are no suspicious calcifications overlying the kidneys or the ureters, with scattered aortoiliac vascular calcifications. There is rightward convex lumbar spinal curvature, with intervertebral disc space narrowing and facet arthropathy in the lumbar spine. No acute fractures. IMPRESSION: Nonobstructive bowel gas pattern. Electronically signed by:  Sebastian Juarez MD  1/20/2023 11:38 AM CST Workstation: [id number]    US Chest Mediastinum    Result Date: 1/21/2023  Reason: pleural effusion, preop thoracentesis COMPARISON: Chest radiograph 1/21/2023 FINDINGS: Targeted ultrasound of right hemithorax for purposes of preoperative localization for thoracentesis was performed. Moderate right pleural effusion is evident. IMPRESSION: Right pleural effusion. Electronically signed by:  Dada Chin MD  1/21/2023 12:42 PM CST Workstation: [id number]    X-Ray Chest AP Portable    Result Date: 1/21/2023  Portable chest x-ray at 4:04 PM is compared to prior study 1/21/2023 Clinical history is status post thoracentesis The heart is mildly enlarged. There are median sternotomy wires. The patient is status post right thoracentesis. There is no pneumothorax. There is groundglass opacity within the right lower lobe. There is a tiny left pleural effusion with left lower lobe atelectasis. The upper lobes are clear. IMPRESSION: No pneumothorax status post right thoracentesis Faint groundglass opacity in the right lower lobe and atelectasis left lung base Tiny left pleural effusion Mild cardiomegaly Electronically signed by:  Maria Luisa Jacome MD  1/21/2023 4:35 PM CST Workstation: [id number]    X-Ray Chest AP Portable    Result Date: 1/21/2023  EXAMINATION: XR CHEST AP PORTABLE CLINICAL  HISTORY: pleural effusion; FINDINGS: Portable chest at 815 compared with 01/20/2023 shows unchanged enlarged cardiac silhouette size with normal mediastinal contours containing postsurgical changes of CABG. Small right pleural effusion has slightly improved in tiny left pleural effusion is unchanged.  Bibasilar alveolar opacities unchanged.  Nodular opacity in superior hilar region of left superior lung zone is unchanged, lying just inferior to anterior left 1st rib.  Pulmonary vasculature is normal. No pneumothorax or acute osseous abnormality.     Slight improvement of right pleural effusion. Electronically signed by: Dada Chin MD Date:    01/21/2023 Time:    08:30    X-Ray Chest AP Portable    Result Date: 1/19/2023  HISTORY: Dyspnea. FINDINGS: Portable chest radiograph at 2024 hours compared to 05/17/2022 shows median sternotomy wires and mediastinal surgical clips from prior CABG, with enlarged cardiac silhouette and normal pulmonary vascularity. There are calcified mediastinal lymph nodes and scattered aortic vascular calcifications. There are right infrahilar and lower lung airspace opacities with hazy density, obscuring the right hemidiaphragm. There is mild blunting of left costophrenic angle, with the upper lungs clear. No pneumothorax or evidence of interstitial pulmonary edema. No acute fractures. IMPRESSION: 1. Right infrahilar and lower lung airspace opacities, suggesting atelectasis and or pneumonia, with right pleural effusion. Radiographic follow-up is recommended to document complete resolution, and help exclude possibility of any underlying pulmonary mass. 2. Small left pleural effusion. Electronically signed by:  Sebastian Juarez MD  1/19/2023 7:09 AM CST Workstation: [id number]    Mammo Digital Screening Bilat w/ Yair    Result Date: 1/6/2023  Result: Mammo Digital Screening Bilat w/ Yair History: Patient is 82 y.o. and is seen for a screening mammogram. Films Compared: Compared to: 09/12/2019  Mammo Digital Screening Bilat w/ Yair, 08/29/2017 Mammo Previous, and 08/13/2013 Mammo Previous Findings:  This procedure was performed using tomosynthesis. Computer-aided detection was utilized in the interpretation of this examination. The breasts have scattered areas of fibroglandular density. There is no evidence of suspicious masses, microcalcifications or architectural distortion.      No mammographic evidence of malignancy. BI-RADS Category 1: Negative Recommendation: Routine screening mammogram in 1 year, or as clinically indicated. Your estimated lifetime risk of breast cancer (to age 85) based on Tyrer-Cuzick risk assessment model is 0.77 %.  According to the American [employer] Society, patients with a lifetime breast cancer risk of 20% or higher might benefit from supplemental screening tests. ??     Medications:  Reconciled Home Medications:      Medication List      START taking these medications    amiodarone 200 MG Tab  Commonly known as: PACERONE  Take 1 tablet (200 mg total) by mouth 3 (three) times daily for 4 days, THEN 1 tablet (200 mg total) 2 (two) times daily.  Start taking on: January 23, 2023     furosemide 20 MG tablet  Commonly known as: LASIX  Take 1 tablet (20 mg total) by mouth once daily.  Start taking on: January 24, 2023     potassium chloride 10 MEQ Tbsr  Commonly known as: KLOR-CON  Take 1 tablet (10 mEq total) by mouth once daily. To take with lasix pill  Start taking on: January 24, 2023        CONTINUE taking these medications    acetaminophen 650 MG Tbsr  Commonly known as: TYLENOL  Take 1,300 mg by mouth once daily. 2 Tablet(s) Oral  Every day.     amitriptyline 75 MG tablet  Commonly known as: ELAVIL  Take 75 mg by mouth every evening.     apixaban 5 mg Tab  Commonly known as: ELIQUIS  Take 1 tablet (5 mg total) by mouth 2 (two) times daily.     busPIRone 10 MG tablet  Commonly known as: BUSPAR  TAKE 1 TABLET BY MOUTH TWICE A DAY     carboxymethylcellulose 1 % ophthalmic  solution  Apply 1 drop to eye As instructed. as directed     cetirizine 10 MG tablet  Commonly known as: ZYRTEC  Take 10 mg by mouth once daily.     cholecalciferol (vitamin D3) 50 mcg (2,000 unit) Cap capsule  Commonly known as: VITAMIN D3  Take 1 capsule by mouth once daily.     clotrimazole-betamethasone 1-0.05% cream  Commonly known as: LOTRISONE  Apply topically 2 (two) times daily as needed.     coenzyme Q10 100 mg capsule  Take 100 mg by mouth once daily.     cranberry extract 650 mg Cap  Take 1 tablet by mouth 2 (two) times daily.     FARXIGA 5 mg Tab tablet  Generic drug: dapagliflozin  Take 5 mg by mouth once daily.     fluticasone furoate-vilanteroL 100-25 mcg/dose diskus inhaler  Commonly known as: BREO ELLIPTA  Inhale 1 puff into the lungs once daily. Controller Rinse after you use it     fluticasone propionate 50 mcg/actuation nasal spray  Commonly known as: FLONASE  1 spray (50 mcg total) by Each Nostril route once daily.     FREESTYLE LITE STRIPS Strp  Generic drug: blood sugar diagnostic  USE TO TEST BLOOD SUGAR TWICE A DAY     lancets Misc  1 Units by Misc.(Non-Drug; Combo Route) route 2 (two) times daily.     lansoprazole 30 MG capsule  Commonly known as: PREVACID  Take 1 capsule (30 mg total) by mouth once daily.     levalbuterol 1.25 mg/0.5 mL nebulizer solution  Commonly known as: XOPENEX  Take 0.5 mLs (1.25 mg total) by nebulization every 6 (six) hours as needed for Wheezing. Rescue     levothyroxine 25 MCG tablet  Commonly known as: SYNTHROID  TAKE 1 TABLET BY MOUTH BEFORE BREAKFAST EVERY DAY     metoprolol succinate 25 MG 24 hr tablet  Commonly known as: TOPROL XL  Take 1 tablet (25 mg total) by mouth once daily.     nitroGLYCERIN 0.4 MG SL tablet  Commonly known as: NITROSTAT  Place 0.4 mg under the tongue every 5 (five) minutes as needed. 0.4mg Sublingual PRN .     PROBIOTIC-10 ORAL  Take by mouth.     RESTASIS 0.05 % ophthalmic emulsion  Generic drug: cycloSPORINE  Place 1 drop into both  eyes 2 (two) times daily.     rosuvastatin 10 MG tablet  Commonly known as: CRESTOR  Take 1 tablet (10 mg total) by mouth once daily.     triazolam 0.25 MG Tab  Commonly known as: HALCION  Take 1 tablet (0.25 mg total) by mouth nightly as needed (sleep).     UNABLE TO FIND  Take 1 capsule by mouth once daily. Vital red     vitamins A,C,E-zinc-copper 14,320-226-200 unit-mg-unit Cap  Take 1 capsule by mouth 2 (two) times daily.        ASK your doctor about these medications    pramoxine-hydrocortisone 1-1 % rectal cream  Commonly known as: PROCTOCREAM-HC  Place rectally 2 (two) times daily.            Indwelling Lines/Drains at time of discharge:   Lines/Drains/Airways     None                 Time spent on the discharge of patient: 33 minutes         Nohelia Chandra MD  Department of Hospital Medicine  [facility]

## 2023-01-23 NOTE — PLAN OF CARE
Patient cleared to discharge by case management.  Patient discharging home self care. Patient requiring oxygen, delivered at bedside by [facility].       01/23/23 1023   Final Note   Assessment Type Final Discharge Note   Anticipated Discharge Disposition Home   Hospital Resources/Appts/Education Provided Appointments scheduled by Navigator/Coordinator   Post-Acute Status   Post-Acute Authorization HME   [facility] Status Set-up Complete/Auth obtained   Discharge Delays None known at this time

## 2023-01-23 NOTE — CARE UPDATE
01/22/23 2054   Patient Assessment/Suction   Level of Consciousness (AVPU) alert   Respiratory Effort Unlabored   Expansion/Accessory Muscles/Retractions no use of accessory muscles   All Lung Fields Breath Sounds clear;diminished   Rhythm/Pattern, Respiratory no shortness of breath reported   Cough Frequency infrequent   Cough Type no productive sputum   PRE-TX-O2   Device (Oxygen Therapy) nasal cannula   $ Is the patient on Low Flow Oxygen? Yes   Flow (L/min) 3   SpO2 99 %   Pulse Oximetry Type Continuous   $ Pulse Oximetry - Multiple Charge Pulse Oximetry - Multiple   Pulse 80   Resp 15   Positioning   Head of Bed (HOB) Positioning HOB elevated   Aerosol Therapy   $ Aerosol Therapy Charges Aerosol Treatment   Daily Review of Necessity (SVN) completed   Respiratory Treatment Status (SVN) given   Treatment Route (SVN) mask;oxygen   Patient Position (SVN) sitting on edge of bed   Post Treatment Assessment (SVN) breath sounds improved   Signs of Intolerance (SVN) none   Breath Sounds Post-Respiratory Treatment   Throughout All Fields Post-Treatment All Fields   Throughout All Fields Post-Treatment aeration increased   Post-treatment Heart Rate (beats/min) 80   Post-treatment Resp Rate (breaths/min) 17   Education   $ Education Bronchodilator;15 min   Respiratory Evaluation   $ Care Plan Tech Time 15 min

## 2023-01-23 NOTE — CARE UPDATE
Email sent to Nola Rosas at [facility] requesting a discharge f/u for pt to be seen within the next 7 days.

## 2023-01-23 NOTE — CARE UPDATE
01/23/23 0743   Patient Assessment/Suction   Level of Consciousness (AVPU) alert   Respiratory Effort Normal;Unlabored   Expansion/Accessory Muscles/Retractions no use of accessory muscles   All Lung Fields Breath Sounds clear   Cough Frequency infrequent   PRE-TX-O2   Device (Oxygen Therapy) nasal cannula   $ Is the patient on Low Flow Oxygen? Yes   Flow (L/min) 4  (WEANED TO 3L. pt states she weas 3L at home)   SpO2 96 %   Pulse 68   Resp 18   Aerosol Therapy   $ Aerosol Therapy Charges Aerosol Treatment   Daily Review of Necessity (SVN) completed   Respiratory Treatment Status (SVN) given   Treatment Route (SVN) mask;oxygen   Patient Position (SVN) sitting on edge of bed   Post Treatment Assessment (SVN) breath sounds unchanged   Signs of Intolerance (SVN) none   Breath Sounds Post-Respiratory Treatment   Throughout All Fields Post-Treatment All Fields   Throughout All Fields Post-Treatment no change   Post-treatment Heart Rate (beats/min) 68   Education   $ Education Bronchodilator;15 min   Respiratory Evaluation   $ Care Plan Tech Time 15 min   $ Eval/Re-eval Charges Re-evaluation

## 2023-01-23 NOTE — PLAN OF CARE
Problem: Adult Inpatient Plan of Care  Goal: Plan of Care Review  Outcome: Met  Goal: Patient-Specific Goal (Individualized)  Outcome: Met  Goal: Absence of Hospital-Acquired Illness or Injury  Outcome: Met  Goal: Optimal Comfort and Wellbeing  Outcome: Met  Goal: Readiness for Transition of Care  Outcome: Met     Problem: Diabetes Comorbidity  Goal: Blood Glucose Level Within Targeted Range  Outcome: Met     Problem: Dysrhythmia  Goal: Normalized Cardiac Rhythm  Outcome: Met     Problem: Fluid and Electrolyte Imbalance (Acute Kidney Injury/Impairment)  Goal: Fluid and Electrolyte Balance  Outcome: Met     Problem: Oral Intake Inadequate (Acute Kidney Injury/Impairment)  Goal: Optimal Nutrition Intake  Outcome: Met     Problem: Renal Function Impairment (Acute Kidney Injury/Impairment)  Goal: Effective Renal Function  Outcome: Met     Problem: Fall Injury Risk  Goal: Absence of Fall and Fall-Related Injury  Outcome: Met

## 2023-01-23 NOTE — PT/OT/SLP PROGRESS
Physical Therapy      Patient Name:  Darlin Tipton   MRN:  [id number]    Patient not seen today secondary to Other (Comment) (discharged before therapy session).

## 2023-01-23 NOTE — CARE UPDATE
Oxygen sent to [facility] [place] via [facility].  CALRK spoke with Babs at [facility] and she reported that she will have the oxygen at pt bedside by 10:15am.

## 2023-01-24 LAB — BACTERIA BLD CULT: NORMAL

## 2023-01-26 DIAGNOSIS — I25.810 CORONARY ARTERY DISEASE INVOLVING CORONARY BYPASS GRAFT OF NATIVE HEART WITHOUT ANGINA PECTORIS: Primary | ICD-10-CM

## 2023-01-27 ENCOUNTER — HOSPITAL ENCOUNTER (OUTPATIENT)
Dept: RADIOLOGY | Facility: HOSPITAL | Age: 83
Discharge: HOME | End: 2023-01-27
Attending: SPECIALIST
Payer: MEDICARE

## 2023-01-27 ENCOUNTER — PATIENT MESSAGE (OUTPATIENT)
Dept: FAMILY MEDICINE | Facility: CLINIC | Age: 83
End: 2023-01-27
Payer: MEDICARE

## 2023-01-27 ENCOUNTER — LAB VISIT (OUTPATIENT)
Dept: LAB | Facility: HOSPITAL | Age: 83
End: 2023-01-27
Attending: INTERNAL MEDICINE
Payer: MEDICARE

## 2023-01-27 DIAGNOSIS — G47.00 INSOMNIA, UNSPECIFIED TYPE: ICD-10-CM

## 2023-01-27 DIAGNOSIS — D53.9 NUTRITIONAL ANEMIA, UNSPECIFIED: ICD-10-CM

## 2023-01-27 DIAGNOSIS — D64.9 NORMOCYTIC ANEMIA: ICD-10-CM

## 2023-01-27 DIAGNOSIS — I25.810 CORONARY ARTERY DISEASE INVOLVING CORONARY BYPASS GRAFT OF NATIVE HEART WITHOUT ANGINA PECTORIS: ICD-10-CM

## 2023-01-27 LAB
FERRITIN SERPL-MCNC: 38 NG/ML (ref 20–300)
FOLATE SERPL-MCNC: >24.8 NG/ML (ref 4–24)
IRON SERPL-MCNC: 51 UG/DL (ref 30–160)
SATURATED IRON: 13 % (ref 20–50)
TOTAL IRON BINDING CAPACITY: 382 UG/DL (ref 250–450)
TRANSFERRIN SERPL-MCNC: 273 MG/DL (ref 200–375)
VIT B12 SERPL-MCNC: 331 PG/ML (ref 210–950)

## 2023-01-27 PROCEDURE — 82746 ASSAY OF FOLIC ACID SERUM: CPT | Performed by: INTERNAL MEDICINE

## 2023-01-27 PROCEDURE — 36415 COLL VENOUS BLD VENIPUNCTURE: CPT | Performed by: INTERNAL MEDICINE

## 2023-01-27 PROCEDURE — 82607 VITAMIN B-12: CPT | Performed by: INTERNAL MEDICINE

## 2023-01-27 PROCEDURE — 84466 ASSAY OF TRANSFERRIN: CPT | Performed by: INTERNAL MEDICINE

## 2023-01-27 PROCEDURE — 82728 ASSAY OF FERRITIN: CPT | Performed by: INTERNAL MEDICINE

## 2023-01-27 PROCEDURE — 71046 X-RAY EXAM CHEST 2 VIEWS: CPT | Mod: TC

## 2023-01-27 RX ORDER — TRIAZOLAM 0.25 MG/1
0.25 TABLET ORAL NIGHTLY PRN
Qty: 90 TABLET | Refills: 1 | Status: SHIPPED | OUTPATIENT
Start: 2023-01-27 | End: 2023-05-01 | Stop reason: SDUPTHER

## 2023-01-27 NOTE — TELEPHONE ENCOUNTER
No new care gaps identified.  [facility] Embedded Care Gaps. Reference number: [id number]. 1/27/2023   7:49:31 AM CST

## 2023-01-30 ENCOUNTER — TELEPHONE (OUTPATIENT)
Dept: FAMILY MEDICINE | Facility: CLINIC | Age: 83
End: 2023-01-30
Payer: MEDICARE

## 2023-01-30 NOTE — TELEPHONE ENCOUNTER
Spoke with patient she no longer wants tp  the prescription for the Halcion.  Prescription faxed to [facility].

## 2023-02-02 ENCOUNTER — OFFICE VISIT (OUTPATIENT)
Dept: PULMONOLOGY | Facility: CLINIC | Age: 83
End: 2023-02-02
Payer: MEDICARE

## 2023-02-02 VITALS
WEIGHT: 142 LBS | DIASTOLIC BLOOD PRESSURE: 64 MMHG | TEMPERATURE: 98 F | BODY MASS INDEX: 25.97 KG/M2 | OXYGEN SATURATION: 96 % | HEART RATE: 68 BPM | SYSTOLIC BLOOD PRESSURE: 110 MMHG

## 2023-02-02 DIAGNOSIS — R09.02 HYPOXEMIA: ICD-10-CM

## 2023-02-02 DIAGNOSIS — I50.20 SYSTOLIC HEART FAILURE, UNSPECIFIED HF CHRONICITY: ICD-10-CM

## 2023-02-02 DIAGNOSIS — J45.30 MILD PERSISTENT ASTHMA, UNSPECIFIED WHETHER COMPLICATED: Chronic | ICD-10-CM

## 2023-02-02 DIAGNOSIS — J90 PLEURAL EFFUSION: Primary | ICD-10-CM

## 2023-02-02 PROCEDURE — 3074F SYST BP LT 130 MM HG: CPT | Mod: CPTII,S$GLB,, | Performed by: NURSE PRACTITIONER

## 2023-02-02 PROCEDURE — 1111F PR DISCHARGE MEDS RECONCILED W/ CURRENT OUTPATIENT MED LIST: ICD-10-PCS | Mod: CPTII,S$GLB,, | Performed by: NURSE PRACTITIONER

## 2023-02-02 PROCEDURE — 1126F AMNT PAIN NOTED NONE PRSNT: CPT | Mod: CPTII,S$GLB,, | Performed by: NURSE PRACTITIONER

## 2023-02-02 PROCEDURE — 1159F PR MEDICATION LIST DOCUMENTED IN MEDICAL RECORD: ICD-10-PCS | Mod: CPTII,S$GLB,, | Performed by: NURSE PRACTITIONER

## 2023-02-02 PROCEDURE — 1126F PR PAIN SEVERITY QUANTIFIED, NO PAIN PRESENT: ICD-10-PCS | Mod: CPTII,S$GLB,, | Performed by: NURSE PRACTITIONER

## 2023-02-02 PROCEDURE — 99213 PR OFFICE/OUTPT VISIT, EST, LEVL III, 20-29 MIN: ICD-10-PCS | Mod: S$GLB,,, | Performed by: NURSE PRACTITIONER

## 2023-02-02 PROCEDURE — 3078F PR MOST RECENT DIASTOLIC BLOOD PRESSURE < 80 MM HG: ICD-10-PCS | Mod: CPTII,S$GLB,, | Performed by: NURSE PRACTITIONER

## 2023-02-02 PROCEDURE — 1111F DSCHRG MED/CURRENT MED MERGE: CPT | Mod: CPTII,S$GLB,, | Performed by: NURSE PRACTITIONER

## 2023-02-02 PROCEDURE — 1159F MED LIST DOCD IN RCRD: CPT | Mod: CPTII,S$GLB,, | Performed by: NURSE PRACTITIONER

## 2023-02-02 PROCEDURE — 3074F PR MOST RECENT SYSTOLIC BLOOD PRESSURE < 130 MM HG: ICD-10-PCS | Mod: CPTII,S$GLB,, | Performed by: NURSE PRACTITIONER

## 2023-02-02 PROCEDURE — 3078F DIAST BP <80 MM HG: CPT | Mod: CPTII,S$GLB,, | Performed by: NURSE PRACTITIONER

## 2023-02-02 PROCEDURE — 99213 OFFICE O/P EST LOW 20 MIN: CPT | Mod: S$GLB,,, | Performed by: NURSE PRACTITIONER

## 2023-02-02 NOTE — PROGRESS NOTES
SUBJECTIVE:    Patient ID: Darlin Tipton is a 82 y.o. female.    Chief Complaint: Follow-up (6 month follow up/Follow up Hosp visit 01/21/23 )      HPI     Patient here today in need of a thoracentesis of a right pleural effusion. She had a thoracentesis on 1/21/23.  Chest xray done on 1/27/23 showed right pleural effusion is back. Her cardiologist would like to do a NARCISO and needs effusion drained.  She has asthma and has been using her Breo daily.  She was only sleeping on oxygen prior to this recent hospital admission but was discharged with oxygen all the time this visit.    Past Medical History:   Diagnosis Date    Allergy     Dust mites, Grasses, Trees    Arthritis     Asthma     Blood transfusion     CAD (coronary artery disease)     Cataract     CHRONIC BRONCHITIS     Diabetes mellitus     Diabetes mellitus type II     GERD (gastroesophageal reflux disease)     Hyperlipidemia     Hypertension     Irregular heart beat     Kidney disease     ckd stage 3  as stated by patient - to see MD    Post-menopausal bleeding 2018    Spinal stenosis     Thyroid disease     Hypothyroidism     Past Surgical History:   Procedure Laterality Date    APPENDECTOMY  1968    BLADDER SUSPENSION  1989    CARDIAC SURGERY  2016    CABG    CATARACT EXTRACTION  9/2007 (L) and 10/2207 (R)    COLONOSCOPY N/A 10/18/2017    Procedure: COLONOSCOPY;  Surgeon: Esme Acuna MD;  Location: [facility] ENDO;  Service: Endoscopy;  Laterality: N/A;    CORONARY ANGIOGRAPHY INCLUDING BYPASS GRAFTS WITH CATHETERIZATION OF LEFT HEART Left 5/13/2022    Procedure: Left heart cath;  Surgeon: Davon Patton MD;  Location: [facility] CATH/EP LAB;  Service: Cardiology;  Laterality: Left;    CORONARY ARTERY BYPASS GRAFT  4/26/2004    x5    ESOPHAGEAL DILATION      ESOPHAGOGASTRODUODENOSCOPY N/A 6/27/2022    Procedure: EGD (ESOPHAGOGASTRODUODENOSCOPY);  Surgeon: Skip Nj MD;  Location: [facility] ENDO;  Service: Endoscopy;  Laterality: N/A;    HYSTEROSCOPY  WITH DILATION AND CURETTAGE OF UTERUS N/A 3/12/2020    Procedure: HYSTEROSCOPY, WITH DILATION AND CURETTAGE OF UTERUS;  Surgeon: Ramona Llanos MD;  Location: [facility] OR;  Service: OB/GYN;  Laterality: N/A;    SPINE SURGERY  3/2000    Tumor    TRANSFORAMINAL EPIDURAL INJECTION OF STEROID Right 11/21/2019    Procedure: Injection,steroid,epidural,transforaminal approach;  Surgeon: Bj Jones MD;  Location: [facility];  Service: Pain Management;  Laterality: Right;  L4-5, L5-S1    TRANSFORAMINAL EPIDURAL INJECTION OF STEROID Right 12/31/2019    Procedure: Injection,steroid,epidural,transforaminal approach;  Surgeon: Bj Jones MD;  Location: [facility];  Service: Pain Management;  Laterality: Right;  L4-5, L5-S1    TRANSFORAMINAL EPIDURAL INJECTION OF STEROID Right 2/5/2020    Procedure: Injection,steroid,epidural,transforaminal approach;  Surgeon: Bj Jones MD;  Location: [facility];  Service: Pain Management;  Laterality: Right;  L4-5, L5-S1    TRANSFORAMINAL EPIDURAL INJECTION OF STEROID Left 1/27/2021    Procedure: Injection,steroid,epidural,transforaminal approach;  Surgeon: Bj Jones MD;  Location: [facility];  Service: Pain Management;  Laterality: Left;  L4-5, L5-S1    TRANSFORAMINAL EPIDURAL INJECTION OF STEROID Right 3/4/2021    Procedure: Injection,steroid,epidural,transforaminal approach;  Surgeon: Bj Jones MD;  Location: [facility];  Service: Pain Management;  Laterality: Right;  L4-L5, L5-S1    WRIST SURGERY  1993    carpal tunnel       Family History   Problem Relation Age of Onset    Breast cancer Mother     Breast cancer Maternal Aunt     Allergic rhinitis Neg Hx     Allergies Neg Hx     Angioedema Neg Hx     Asthma Neg Hx     Eczema Neg Hx     Immunodeficiency Neg Hx     Urticaria Neg Hx     Rhinitis Neg Hx     Atopy Neg Hx         Social History:   Marital Status:   Occupation: Data Unavailable  Alcohol History:  reports current alcohol use.  Tobacco History:  reports that she is a non-smoker but has  been exposed to tobacco smoke. She has never used smokeless tobacco.  Drug History:  reports no history of drug use.    Review of patient's allergies indicates:   Allergen Reactions    Dexlansoprazole Itching, Nausea Only and Rash    Sulfa (sulfonamide antibiotics) Rash    Floxacillin Itching    Januvia [sitagliptin] Other (See Comments)     Hot flashes    Tetracyclines Itching    Fenofibrate micronized Rash    Nitrofurantoin macrocrystalline Other (See Comments)     unknown    Phenylfenesin la [phenylpropanolamine-gg] Rash       Current Outpatient Medications   Medication Sig Dispense Refill    acetaminophen (TYLENOL) 650 MG TbSR Take 1,300 mg by mouth once daily. 2 Tablet(s) Oral  Every day.      amiodarone (PACERONE) 200 MG Tab Take 1 tablet (200 mg total) by mouth 3 (three) times daily for 4 days, THEN 1 tablet (200 mg total) 2 (two) times daily. 72 tablet 0    amitriptyline (ELAVIL) 75 MG tablet Take 75 mg by mouth every evening.      apixaban (ELIQUIS) 5 mg Tab Take 1 tablet (5 mg total) by mouth 2 (two) times daily. 180 tablet 3    carboxymethylcellulose 1 % ophthalmic solution Apply 1 drop to eye As instructed. as directed      cetirizine (ZYRTEC) 10 MG tablet Take 10 mg by mouth once daily.      cholecalciferol, vitamin D3, (VITAMIN D3) 50 mcg (2,000 unit) Cap Take 1 capsule by mouth once daily.      clotrimazole-betamethasone 1-0.05% (LOTRISONE) cream Apply topically 2 (two) times daily as needed.      coenzyme Q10 100 mg capsule Take 100 mg by mouth once daily.       cranberry extract 650 mg Cap Take 1 tablet by mouth 2 (two) times daily.      FARXIGA 5 mg Tab tablet Take 5 mg by mouth once daily.      fluticasone furoate-vilanteroL (BREO ELLIPTA) 100-25 mcg/dose diskus inhaler Inhale 1 puff into the lungs once daily. Controller Rinse after you use it 180 each 6    fluticasone propionate (FLONASE) 50 mcg/actuation nasal spray 1 spray (50 mcg total) by Each Nostril route once daily. 48 g 3    furosemide  (LASIX) 20 MG tablet Take 1 tablet (20 mg total) by mouth once daily. 30 tablet 0    Lactobac no.41/Bifidobact no.7 (PROBIOTIC-10 ORAL) Take by mouth.      lansoprazole (PREVACID) 30 MG capsule Take 1 capsule (30 mg total) by mouth once daily. 90 capsule 3    levalbuterol (XOPENEX) 1.25 mg/0.5 mL nebulizer solution Take 0.5 mLs (1.25 mg total) by nebulization every 6 (six) hours as needed for Wheezing. Rescue 120 each 3    levothyroxine (SYNTHROID) 25 MCG tablet TAKE 1 TABLET BY MOUTH BEFORE BREAKFAST EVERY DAY 90 tablet 3    metoprolol succinate (TOPROL XL) 25 MG 24 hr tablet Take 1 tablet (25 mg total) by mouth once daily. 90 tablet 3    nitroGLYCERIN (NITROSTAT) 0.4 MG SL tablet Place 0.4 mg under the tongue every 5 (five) minutes as needed. 0.4mg Sublingual PRN .        pramoxine-hydrocortisone (PROCTOCREAM-HC) 1-1 % rectal cream Place rectally 2 (two) times daily. (Patient taking differently: Place 1 application rectally 2 (two) times daily.) 3 each 3    rosuvastatin (CRESTOR) 10 MG tablet Take 1 tablet (10 mg total) by mouth once daily. 90 tablet 3    triazolam (HALCION) 0.25 MG Tab Take 1 tablet (0.25 mg total) by mouth nightly as needed (sleep). 90 tablet 1    vitamins  A,C,E-zinc-copper 14,320-226-200 unit-mg-unit Cap Take 1 capsule by mouth 2 (two) times daily.       blood sugar diagnostic (FREESTYLE LITE STRIPS) Strp USE TO TEST BLOOD SUGAR TWICE A  strip 3    busPIRone (BUSPAR) 10 MG tablet TAKE 1 TABLET BY MOUTH TWICE A  tablet 3    lancets Misc 1 Units by Misc.(Non-Drug; Combo Route) route 2 (two) times daily. 200 each 3    potassium chloride (KLOR-CON) 10 MEQ TbSR Take 1 tablet (10 mEq total) by mouth once daily. To take with lasix pill 1 tablet 0    RESTASIS 0.05 % ophthalmic emulsion Place 1 drop into both eyes 2 (two) times daily.      UNABLE TO FIND Take 1 capsule by mouth once daily. Vital red       No current facility-administered medications for this visit.     Last  echo  Summary    The left ventricle is normal in size with mild concentric hypertrophy and low normal systolic function.  The estimated ejection fraction is 52%.  Grade I left ventricular diastolic dysfunction.  Normal right ventricular size.  Mild left atrial enlargement.  There is mild aortic valve stenosis.  Aortic valve area is 2.57 cm2; peak velocity is 2.17 m/s; mean gradient is 13 mmHg.  Mild mitral regurgitation.  Mild tricuspid regurgitation.  Normal central venous pressure (3 mmHg).  The estimated PA systolic pressure is 23 mmHg.    The patient's PFT show no obstruction, normal lung volumes and a mild diffusion defect.  Her 6 min walk was non hypoxemic    Chest xray 01/2023   IMPRESSION:     Interval increase of right pleural effusion, and no significant change of left pleural effusion    Review of Systems  General: Feeling Well.  Eyes: Vision is good.  ENT:  No sinusitis or pharyngitis.   Heart:: chest pain at times  Lungs: dyspnea stable   GI: no abdominal pain  : No dysuria, hesitancy, or nocturia.  Musculoskeletal: No joint pain or myalgias.  Skin: No lesions or rashes.  Neuro: No headaches or neuropathy.  Lymph: No edema or adenopathy.  Psych: No anxiety or depression.  Endo: No weight change.    OBJECTIVE:      /64 (BP Location: Left arm, Patient Position: Sitting, BP Method: Medium (Manual))   Pulse 68   Temp 98.4 °F (36.9 °C)   Wt 64.4 kg (142 lb)   SpO2 96% Comment: Oxygen on 3 liters  BMI 25.97 kg/m²     Physical Exam  GENERAL: Older patient in no distress.  HEENT: Pupils equal and reactive. Extraocular movements intact. Nose intact.      Pharynx moist.  NECK: Supple.   HEART: Regular rate and rhythm. Loud murmur .  LUNGS: decreased breath sounds to right base up to shoulder blade, decreased breath sound to left base   ABDOMEN: Bowel sounds present. Non-tender, no masses palpated.  EXTREMITIES: Normal muscle tone and joint movement, no cyanosis or clubbing.   LYMPHATICS:no  edema  SKIN: Dry, intact, no lesions.   NEURO: Cranial nerves II-XII intact. Motor strength 5/5 bilaterally, upper and lower extremities.  PSYCH: Appropriate affect.    Assessment:       1. Pleural effusion    2. Mild persistent asthma, unspecified whether complicated    3. Hypoxemia              Plan:       Pleural effusion  -     Case Request Endoscopy: Thoracentesis    Mild persistent asthma, unspecified whether complicated    Hypoxemia  -     Stress test, pulmonary; Future; Expected date: 02/02/2023          Fine to continue the Breo daily  Thoracentesis on Tuesday at 0730   Hold eliquis Sunday and Monday  6 minute walk   Continue breo daily  Continue oxygen       No follow-ups on file.

## 2023-02-07 ENCOUNTER — HOSPITAL ENCOUNTER (OUTPATIENT)
Facility: HOSPITAL | Age: 83
Discharge: HOME | End: 2023-02-07
Attending: INTERNAL MEDICINE | Admitting: INTERNAL MEDICINE
Payer: MEDICARE

## 2023-02-07 VITALS
HEART RATE: 76 BPM | HEIGHT: 62 IN | RESPIRATION RATE: 14 BRPM | OXYGEN SATURATION: 95 % | TEMPERATURE: 98 F | WEIGHT: 139 LBS | BODY MASS INDEX: 25.58 KG/M2 | DIASTOLIC BLOOD PRESSURE: 60 MMHG | SYSTOLIC BLOOD PRESSURE: 131 MMHG

## 2023-02-07 DIAGNOSIS — J90 PLEURAL EFFUSION: ICD-10-CM

## 2023-02-07 PROCEDURE — 27201110 HC THORACENTESIS TRAY: Performed by: INTERNAL MEDICINE

## 2023-02-07 PROCEDURE — 32555 ASPIRATE PLEURA W/ IMAGING: CPT | Mod: RT,,, | Performed by: INTERNAL MEDICINE

## 2023-02-07 PROCEDURE — 32555 ASPIRATE PLEURA W/ IMAGING: CPT | Performed by: INTERNAL MEDICINE

## 2023-02-07 PROCEDURE — 32555 PR THORACEN W/IMAG GUIDANCE: ICD-10-PCS | Mod: RT,,, | Performed by: INTERNAL MEDICINE

## 2023-02-07 PROCEDURE — 27100244 HC CONTAINER, EVACUATED: Performed by: INTERNAL MEDICINE

## 2023-02-07 NOTE — H&P
SUBJECTIVE:    Patient ID: Darlin Tipton is a 82 y.o. female.    Chief Complaint: No chief complaint on file.      HPI     Patient here today in need of a thoracentesis of a right pleural effusion. She had a thoracentesis on 1/21/23.  Chest xray done on 1/27/23 showed right pleural effusion is back. Her cardiologist would like to do a NARCISO and needs effusion drained.  She has asthma and has been using her Breo daily.  She was only sleeping on oxygen prior to this recent hospital admission but was discharged with oxygen all the time this visit.    Past Medical History:   Diagnosis Date    Allergy     Dust mites, Grasses, Trees    Arthritis     Asthma     Blood transfusion     CAD (coronary artery disease)     Cataract     CHF (congestive heart failure)     CHRONIC BRONCHITIS     Diabetes mellitus     Diabetes mellitus type II     GERD (gastroesophageal reflux disease)     Hyperlipidemia     Hypertension     Irregular heart beat     Kidney disease     ckd stage 3  as stated by patient - to see MD    Paroxysmal atrial fibrillation     Post-menopausal bleeding 2018    Spinal stenosis     Thyroid disease     Hypothyroidism     Past Surgical History:   Procedure Laterality Date    APPENDECTOMY  1968    BLADDER SUSPENSION  1989    CARDIAC SURGERY  2016    CABG    CATARACT EXTRACTION  9/2007 (L) and 10/2207 (R)    COLONOSCOPY N/A 10/18/2017    Procedure: COLONOSCOPY;  Surgeon: Esme Acuna MD;  Location: [facility];  Service: Endoscopy;  Laterality: N/A;    CORONARY ANGIOGRAPHY INCLUDING BYPASS GRAFTS WITH CATHETERIZATION OF LEFT HEART Left 5/13/2022    Procedure: Left heart cath;  Surgeon: Davon Patton MD;  Location: [facility] CATH/EP LAB;  Service: Cardiology;  Laterality: Left;    CORONARY ARTERY BYPASS GRAFT  4/26/2004    x5    ESOPHAGEAL DILATION      ESOPHAGOGASTRODUODENOSCOPY N/A 6/27/2022    Procedure: EGD (ESOPHAGOGASTRODUODENOSCOPY);  Surgeon: Skip Nj MD;  Location: [facility];  Service:  Endoscopy;  Laterality: N/A;    HYSTEROSCOPY WITH DILATION AND CURETTAGE OF UTERUS N/A 3/12/2020    Procedure: HYSTEROSCOPY, WITH DILATION AND CURETTAGE OF UTERUS;  Surgeon: Ramona Llanos MD;  Location: [facility] OR;  Service: OB/GYN;  Laterality: N/A;    SPINE SURGERY  3/2000    Tumor    TRANSFORAMINAL EPIDURAL INJECTION OF STEROID Right 11/21/2019    Procedure: Injection,steroid,epidural,transforaminal approach;  Surgeon: Bj Jones MD;  Location: [facility];  Service: Pain Management;  Laterality: Right;  L4-5, L5-S1    TRANSFORAMINAL EPIDURAL INJECTION OF STEROID Right 12/31/2019    Procedure: Injection,steroid,epidural,transforaminal approach;  Surgeon: Bj Jones MD;  Location: [facility];  Service: Pain Management;  Laterality: Right;  L4-5, L5-S1    TRANSFORAMINAL EPIDURAL INJECTION OF STEROID Right 2/5/2020    Procedure: Injection,steroid,epidural,transforaminal approach;  Surgeon: Bj Jones MD;  Location: [facility];  Service: Pain Management;  Laterality: Right;  L4-5, L5-S1    TRANSFORAMINAL EPIDURAL INJECTION OF STEROID Left 1/27/2021    Procedure: Injection,steroid,epidural,transforaminal approach;  Surgeon: Bj Jones MD;  Location: [facility];  Service: Pain Management;  Laterality: Left;  L4-5, L5-S1    TRANSFORAMINAL EPIDURAL INJECTION OF STEROID Right 3/4/2021    Procedure: Injection,steroid,epidural,transforaminal approach;  Surgeon: Bj Jones MD;  Location: [facility];  Service: Pain Management;  Laterality: Right;  L4-L5, L5-S1    WRIST SURGERY  1993    carpal tunnel       Family History   Problem Relation Age of Onset    Breast cancer Mother     Breast cancer Maternal Aunt     Allergic rhinitis Neg Hx     Allergies Neg Hx     Angioedema Neg Hx     Asthma Neg Hx     Eczema Neg Hx     Immunodeficiency Neg Hx     Urticaria Neg Hx     Rhinitis Neg Hx     Atopy Neg Hx         Social History:   Marital Status:   Occupation: Data Unavailable  Alcohol History:  reports current alcohol use.  Tobacco  History:  reports that she is a non-smoker but has been exposed to tobacco smoke. She has never used smokeless tobacco.  Drug History:  reports no history of drug use.    Review of patient's allergies indicates:   Allergen Reactions    Dexlansoprazole Itching, Nausea Only and Rash    Sulfa (sulfonamide antibiotics) Rash    Floxacillin Itching    Januvia [sitagliptin] Other (See Comments)     Hot flashes    Tetracyclines Itching    Fenofibrate micronized Rash    Nitrofurantoin macrocrystalline Other (See Comments)     unknown    Phenylfenesin la [phenylpropanolamine-gg] Rash       No current facility-administered medications for this encounter.     Last echo  Summary    The left ventricle is normal in size with mild concentric hypertrophy and low normal systolic function.  The estimated ejection fraction is 52%.  Grade I left ventricular diastolic dysfunction.  Normal right ventricular size.  Mild left atrial enlargement.  There is mild aortic valve stenosis.  Aortic valve area is 2.57 cm2; peak velocity is 2.17 m/s; mean gradient is 13 mmHg.  Mild mitral regurgitation.  Mild tricuspid regurgitation.  Normal central venous pressure (3 mmHg).  The estimated PA systolic pressure is 23 mmHg.    The patient's PFT show no obstruction, normal lung volumes and a mild diffusion defect.  Her 6 min walk was non hypoxemic    Chest xray 01/2023   IMPRESSION:     Interval increase of right pleural effusion, and no significant change of left pleural effusion    Review of Systems  General: Feeling Well.  Eyes: Vision is good.  ENT:  No sinusitis or pharyngitis.   Heart:: chest pain at times  Lungs: dyspnea stable   GI: no abdominal pain  : No dysuria, hesitancy, or nocturia.  Musculoskeletal: No joint pain or myalgias.  Skin: No lesions or rashes.  Neuro: No headaches or neuropathy.  Lymph: No edema or adenopathy.  Psych: No anxiety or depression.  Endo: No weight change.    OBJECTIVE:      /60 (BP Location: Left arm,  "Patient Position: Lying)   Pulse 77   Temp 98.1 °F (36.7 °C) (Oral)   Resp 20   Ht 5' 2" (1.575 m)   Wt 63 kg (139 lb)   SpO2 96% Comment: room air  Breastfeeding No   BMI 25.42 kg/m²     Physical Exam  GENERAL: Older patient in no distress.  HEENT: Pupils equal and reactive. Extraocular movements intact. Nose intact.      Pharynx moist.  NECK: Supple.   HEART: Regular rate and rhythm. Loud murmur .  LUNGS: decreased breath sounds to right base up to shoulder blade, decreased breath sound to left base   ABDOMEN: Bowel sounds present. Non-tender, no masses palpated.  EXTREMITIES: Normal muscle tone and joint movement, no cyanosis or clubbing.   LYMPHATICS:no edema  SKIN: Dry, intact, no lesions.   NEURO: Cranial nerves II-XII intact. Motor strength 5/5 bilaterally, upper and lower extremities.  PSYCH: Appropriate affect.    Assessment:       1. Pleural effusion              Plan:       Pleural effusion  -     Case Request Endoscopy: Thoracentesis    Other orders  -     Full code; Standing  -     Place in Outpatient; Standing  -     DISCHARGE PATIENT; Standing  -     US Guided Needle Placement; Standing  -     Oxygen PRN; Standing  -     X-Ray Chest AP Portable; Standing  -     Pleural tap: order tray; sterile gloves; Standing  -     Nurse to prepare; Standing  -     Glucose, random; Standing  -     US Chest Mediastinum; Standing                          "

## 2023-02-07 NOTE — PROCEDURES
Thoracentesis    Indication:  Recurrent pleural effusions secondary to heart failure  Medications:  10 cc of 1% xylocaine  Specimens:  None  Complications:  Initial bloody tap with 2nd stick    The patient was consented. An appropriate timeout was taken. Ultrasound was utilized to locate the more inferior extent of the right pleural effusion. The 10th interspace a little lateral to the posterior axillary line was prepped and draped in the usual sterile fashion. The area was infused with 5 cc of 1% Xylocaine. A #11 blade was used to incise the skin.  The catheter was introduced but approximately 5 cm in after obtaining serous appearing fluid the patient experience pain in the tapped became bloody.  The catheter could not be introduced further.  And the fluid was significantly serosanguineous.  The catheter was removed and pressure was held at the skin.  The thorax was realtor sound and the 10th interspace laterally in the axillary line was then prepped and draped sterilely.  This time the catheter was introduced without difficulty. 1100 cc of serosanguinous fluid was removed. The fluid was sent for studies. The patient tolerated the procedure well. The procedure was stopped when no further fluid could be withdrawn.  A post procedure chest x-ray has been ordered.

## 2023-02-09 ENCOUNTER — TELEPHONE (OUTPATIENT)
Dept: PULMONOLOGY | Facility: CLINIC | Age: 83
End: 2023-02-09

## 2023-02-09 NOTE — TELEPHONE ENCOUNTER
You performed a thora on pt 2/7/23 Pt LVM on Quiannas voicemail on 2/8/23 which was forwarded to my voicemail right before 5pm instead of putting in a phone message so pt could be called back asap.    But pt stated she has RT side pain from ribs up and is very swollen.  Please call her back    [phone] Aura called pt and LVM for pt to call the office again we were calling to check on her.

## 2023-02-16 ENCOUNTER — HOSPITAL ENCOUNTER (OUTPATIENT)
Dept: PULMONOLOGY | Facility: HOSPITAL | Age: 83
Discharge: HOME | End: 2023-02-16
Attending: NURSE PRACTITIONER
Payer: MEDICARE

## 2023-02-16 VITALS — BODY MASS INDEX: 25.58 KG/M2 | WEIGHT: 139 LBS | HEIGHT: 62 IN

## 2023-02-16 DIAGNOSIS — R09.02 HYPOXEMIA: ICD-10-CM

## 2023-02-16 PROCEDURE — 94618 PULMONARY STRESS TESTING: CPT

## 2023-02-16 NOTE — CARE UPDATE
"   02/16/23 1044   6MW Test   Ordering Provider PIERCE Skelton   Diagnosis Shortness of Breath   Height 5' 2" (1.575 m)   Weight 63 kg (139 lb)   BMI (Calculated) 25.4   Predicted Distance Meters (Calculated) 380.98 meters   Patient Race    6MWT Status completed without stopping   Patient Reported Leg pain  (Hip PAIN)   Was O2 used? No   6MW Distance walked (feet) 1200 feet   Distance walked (meters) 365.76 meters   Did patient stop? No   Type of assistive device(s) used? no assistive devices   Is extra documentation required for this patient? Yes   Pre-Exercise   Oxygen Saturation 97 %   Supplemental Oxygen Room Air   Heart Rate 78 bpm   Neha Dyspnea Rating  light   Post Exercise   Oxygen Saturation 97 %   Supplemental Oxygen Room Air   Heart Rate 76 bpm   Neha Dyspnea Rating  moderate   Recovery   Oxygen Saturation 98 %   Supplemental Oxygen Room Air   Heart Rate 77 bpm   Neha Dyspnea Rating  moderate   Interpretation   Is procedure ready for interpretation? Yes   Did the patient stop or pause? No   Total Laps Walked 6   Final Partial Lap Distance (feet) 0 feet   Total Distance Feet (Calculated) 1200 feet   Total Distance Meters (Calculated) 365.76 meters   Percentage of Predicted (Calculated) 96.01   Peak VO2 (Calculated) 14.95   Mets 4.27   Oxygen Qualification   Oxygen Qualification? No       "

## 2023-02-17 ENCOUNTER — TELEPHONE (OUTPATIENT)
Dept: PULMONOLOGY | Facility: CLINIC | Age: 83
End: 2023-02-17

## 2023-03-07 DIAGNOSIS — J90 PLEURISY WITH EFFUSION: Primary | ICD-10-CM

## 2023-03-07 DIAGNOSIS — R06.02 SOB (SHORTNESS OF BREATH): ICD-10-CM

## 2023-03-07 DIAGNOSIS — R06.00 DYSPNEA: ICD-10-CM

## 2023-03-19 DIAGNOSIS — K21.9 GASTROESOPHAGEAL REFLUX DISEASE WITHOUT ESOPHAGITIS: Primary | ICD-10-CM

## 2023-03-19 NOTE — TELEPHONE ENCOUNTER
No new care gaps identified.  [facility] Embedded Care Gaps. Reference number: [id number]. 3/19/2023   5:07:40 AM CDT

## 2023-03-20 RX ORDER — LANSOPRAZOLE 30 MG/1
30 CAPSULE, DELAYED RELEASE ORAL DAILY
Qty: 90 CAPSULE | Refills: 3 | Status: SHIPPED | OUTPATIENT
Start: 2023-03-20 | End: 2024-03-19

## 2023-03-20 NOTE — TELEPHONE ENCOUNTER
Refill Routing Note   Medication(s) are not appropriate for processing by [facility] for the following reason(s):       ED/Hospital Visit since last OV with PCP    [facility] action(s):  Route         Medication reconciliation completed: No   Extended chart review required: No  Alert overridden per protocol: No            Appointments  past 12m or future 3m with PCP    Date Provider   Last Visit   12/12/2022 Oumar Almonte Jr., MD   Next Visit   6/12/2023 Oumar Almonte Jr., MD   ED visits in past 90 days: 0        Note composed:8:00 AM 03/20/2023

## 2023-03-24 ENCOUNTER — HOSPITAL ENCOUNTER (OUTPATIENT)
Dept: RADIOLOGY | Facility: HOSPITAL | Age: 83
Discharge: HOME | End: 2023-03-24
Attending: NURSE PRACTITIONER
Payer: MEDICARE

## 2023-03-24 DIAGNOSIS — J90 EXUDATIVE PLEURISY: Primary | ICD-10-CM

## 2023-03-24 DIAGNOSIS — J90 EXUDATIVE PLEURISY: ICD-10-CM

## 2023-03-24 PROCEDURE — 71046 X-RAY EXAM CHEST 2 VIEWS: CPT | Mod: TC

## 2023-03-29 ENCOUNTER — OFFICE VISIT (OUTPATIENT)
Dept: GASTROENTEROLOGY | Facility: CLINIC | Age: 83
End: 2023-03-29
Payer: MEDICARE

## 2023-03-29 VITALS — RESPIRATION RATE: 18 BRPM | BODY MASS INDEX: 26.53 KG/M2 | HEIGHT: 62 IN | WEIGHT: 144.19 LBS

## 2023-03-29 DIAGNOSIS — K59.09 CHRONIC CONSTIPATION: Primary | ICD-10-CM

## 2023-03-29 DIAGNOSIS — D64.9 NORMOCYTIC ANEMIA: ICD-10-CM

## 2023-03-29 DIAGNOSIS — R10.13 DYSPEPSIA: ICD-10-CM

## 2023-03-29 PROCEDURE — 3288F FALL RISK ASSESSMENT DOCD: CPT | Mod: CPTII,S$GLB,, | Performed by: INTERNAL MEDICINE

## 2023-03-29 PROCEDURE — 99999 PR PBB SHADOW E&M-EST. PATIENT-LVL III: ICD-10-PCS | Mod: PBBFAC,,, | Performed by: INTERNAL MEDICINE

## 2023-03-29 PROCEDURE — 99214 OFFICE O/P EST MOD 30 MIN: CPT | Mod: S$GLB,,, | Performed by: INTERNAL MEDICINE

## 2023-03-29 PROCEDURE — 1160F PR REVIEW ALL MEDS BY PRESCRIBER/CLIN PHARMACIST DOCUMENTED: ICD-10-PCS | Mod: CPTII,S$GLB,, | Performed by: INTERNAL MEDICINE

## 2023-03-29 PROCEDURE — 1126F PR PAIN SEVERITY QUANTIFIED, NO PAIN PRESENT: ICD-10-PCS | Mod: CPTII,S$GLB,, | Performed by: INTERNAL MEDICINE

## 2023-03-29 PROCEDURE — 99999 PR PBB SHADOW E&M-EST. PATIENT-LVL III: CPT | Mod: PBBFAC,,, | Performed by: INTERNAL MEDICINE

## 2023-03-29 PROCEDURE — 99214 PR OFFICE/OUTPT VISIT, EST, LEVL IV, 30-39 MIN: ICD-10-PCS | Mod: S$GLB,,, | Performed by: INTERNAL MEDICINE

## 2023-03-29 PROCEDURE — 1101F PT FALLS ASSESS-DOCD LE1/YR: CPT | Mod: CPTII,S$GLB,, | Performed by: INTERNAL MEDICINE

## 2023-03-29 PROCEDURE — 3288F PR FALLS RISK ASSESSMENT DOCUMENTED: ICD-10-PCS | Mod: CPTII,S$GLB,, | Performed by: INTERNAL MEDICINE

## 2023-03-29 PROCEDURE — 1101F PR PT FALLS ASSESS DOC 0-1 FALLS W/OUT INJ PAST YR: ICD-10-PCS | Mod: CPTII,S$GLB,, | Performed by: INTERNAL MEDICINE

## 2023-03-29 PROCEDURE — 1159F PR MEDICATION LIST DOCUMENTED IN MEDICAL RECORD: ICD-10-PCS | Mod: CPTII,S$GLB,, | Performed by: INTERNAL MEDICINE

## 2023-03-29 PROCEDURE — 1126F AMNT PAIN NOTED NONE PRSNT: CPT | Mod: CPTII,S$GLB,, | Performed by: INTERNAL MEDICINE

## 2023-03-29 PROCEDURE — 1160F RVW MEDS BY RX/DR IN RCRD: CPT | Mod: CPTII,S$GLB,, | Performed by: INTERNAL MEDICINE

## 2023-03-29 PROCEDURE — 1159F MED LIST DOCD IN RCRD: CPT | Mod: CPTII,S$GLB,, | Performed by: INTERNAL MEDICINE

## 2023-03-29 NOTE — PROGRESS NOTES
"[facility] Note    CC: Constipation, Chest burning    HPI 82 y.o. female with multiple medical problems including CHF with recurrent pleural effusions requiring thoracentesis, CAD, DM, CKD3, chronic normocytic anemia and afib, presents to follow up mild, chronic constipation not associated with rectal bleeding. Her rectal itching has resolved. She is not currently taking prescription or OTC medication for constipation. She complaints of continued mild chest burning that sometimes improves after eating. She was evaluated by Dr. Nj last year who tried numerous agents without improvement (Nexium, Omeprazole, Pantoprazole, Dexilant,). She currently takes daily Prevacid but is not sure if this has improved her symptoms.     She had an EGD in June 2022 which was notable for H.pylori negative gastritis. GES in July 2022 with normal 4 hour emptying time (slightly delayed gastric emptying at 2 hours).  Colonoscopy in 2017 was notable for a 5mm adenoma at the hepatic flexure.     Past Medical History:   Diagnosis Date    Allergy     Dust mites, Grasses, Trees    Arthritis     Asthma     Blood transfusion     CAD (coronary artery disease)     Cataract     CHF (congestive heart failure)     CHRONIC BRONCHITIS     Diabetes mellitus     Diabetes mellitus type II     GERD (gastroesophageal reflux disease)     Hyperlipidemia     Hypertension     Irregular heart beat     Kidney disease     ckd stage 3  as stated by patient - to see MD    Paroxysmal atrial fibrillation     Post-menopausal bleeding 2018    Spinal stenosis     Thyroid disease     Hypothyroidism       Allergies and Medications reviewed     Review of Systems  General ROS: negative for - chills, fever or weight loss  Cardiovascular ROS: no chest pain or dyspnea on exertion  Gastrointestinal ROS: + constipation, + chest burning, no rectal bleeding    Physical Examination  Resp 18   Ht 5' 2" (1.575 m)   Wt 65.4 kg (144 lb 2.9 oz)   BMI 26.37 kg/m² "   General appearance: alert, cooperative, no distress  HENT: Normocephalic, atraumatic, neck symmetrical, no nasal discharge, sclera anicteric   Lungs: clear to auscultation bilaterally, symmetric chest wall expansion bilaterally  Heart: regular rate and rhythm without rub; no displacement of the PMI   Abdomen: soft, nontender,nondistended, BS active  Extremities: extremities symmetric; no clubbing, cyanosis, or edema        Labs:  Lab Results   Component Value Date    WBC 5.61 01/22/2023    HGB 10.9 (L) 01/22/2023    HCT 34.6 (L) 01/22/2023    MCV 87 01/22/2023     01/22/2023       CMP  Sodium   Date Value Ref Range Status   01/23/2023 134 (L) 136 - 145 mmol/L Final     Potassium   Date Value Ref Range Status   01/23/2023 3.8 3.5 - 5.1 mmol/L Final     Chloride   Date Value Ref Range Status   01/23/2023 92 (L) 95 - 110 mmol/L Final     CO2   Date Value Ref Range Status   01/23/2023 30 (H) 23 - 29 mmol/L Final     Glucose   Date Value Ref Range Status   01/23/2023 108 70 - 110 mg/dL Final     BUN   Date Value Ref Range Status   01/23/2023 49 (H) 8 - 23 mg/dL Final     Creatinine   Date Value Ref Range Status   01/23/2023 1.4 0.5 - 1.4 mg/dL Final     Calcium   Date Value Ref Range Status   01/23/2023 9.5 8.7 - 10.5 mg/dL Final     Total Protein   Date Value Ref Range Status   01/18/2023 7.2 6.0 - 8.4 g/dL Final     Albumin   Date Value Ref Range Status   01/18/2023 3.9 3.5 - 5.2 g/dL Final     Total Bilirubin   Date Value Ref Range Status   01/18/2023 0.4 0.1 - 1.0 mg/dL Final     Comment:     For infants and newborns, interpretation of results should be based  on gestational age, weight and in agreement with clinical  observations.    Premature Infant recommended reference ranges:  Up to 24 hours.............<8.0 mg/dL  Up to 48 hours............<12.0 mg/dL  3-5 days..................<15.0 mg/dL  6-29 days.................<15.0 mg/dL       Alkaline Phosphatase   Date Value Ref Range Status   01/18/2023 72 55  - 135 U/L Final     AST   Date Value Ref Range Status   01/18/2023 32 10 - 40 U/L Final     ALT   Date Value Ref Range Status   01/18/2023 40 10 - 44 U/L Final     Anion Gap   Date Value Ref Range Status   01/23/2023 12 8 - 16 mmol/L Final     eGFR   Date Value Ref Range Status   01/23/2023 37.6 (A) >60 mL/min/1.73 m^2 Final     -Ferritin- 38  -Iron- 51, TIBC- 382, Fe sat- 13%    Imaging:  CXR was independently visualized and reviewed by me and showed 1. Bibasilar opacities with increase in volume of the pleural component on the right .  2. Status post median sternotomy..    Assessment:   82 y.o. female with multiple medical problems including recurrent pleural effusion requiring recent thoracentesis and CKD 3 with chronic normocytic anemia, presents to follow up mild chronic constipation and dyspepsia that has not responded to PPI. Her anemia is multifactorial.  Plan:  -Trial of Fdgard  -Begin Miralax every other day  -Repeat labs in April (CBC, iron studies)  -RTC in 8 weeks    Leticia Shaw MD  [facility]  [address], [address]  [place] [address]  Office: [phone]  Fax: [phone]

## 2023-03-31 LAB
LEFT EYE DM RETINOPATHY: NEGATIVE
LEFT EYE DM RETINOPATHY: NEGATIVE
RIGHT EYE DM RETINOPATHY: NEGATIVE
RIGHT EYE DM RETINOPATHY: NEGATIVE

## 2023-04-04 ENCOUNTER — PATIENT OUTREACH (OUTPATIENT)
Dept: ADMINISTRATIVE | Facility: HOSPITAL | Age: 83
End: 2023-04-04
Payer: MEDICARE

## 2023-04-04 NOTE — PROGRESS NOTES
External record ( Diabetic Eye Exam Report ) hyperlinked in [facility].    Immunizations reviewed.

## 2023-04-05 ENCOUNTER — PATIENT MESSAGE (OUTPATIENT)
Dept: PULMONOLOGY | Facility: CLINIC | Age: 83
End: 2023-04-05

## 2023-04-09 ENCOUNTER — HOSPITAL ENCOUNTER (INPATIENT)
Facility: HOSPITAL | Age: 83
LOS: 3 days | Discharge: HOME | DRG: 291 | End: 2023-04-13
Attending: EMERGENCY MEDICINE | Admitting: STUDENT IN AN ORGANIZED HEALTH CARE EDUCATION/TRAINING PROGRAM
Payer: MEDICARE

## 2023-04-09 DIAGNOSIS — I50.9 HEART FAILURE: ICD-10-CM

## 2023-04-09 DIAGNOSIS — R07.9 CHEST PAIN: ICD-10-CM

## 2023-04-09 DIAGNOSIS — J90 PLEURAL EFFUSION: ICD-10-CM

## 2023-04-09 DIAGNOSIS — J96.21 ACUTE ON CHRONIC RESPIRATORY FAILURE WITH HYPOXIA: ICD-10-CM

## 2023-04-09 DIAGNOSIS — J90 BILATERAL PLEURAL EFFUSION: ICD-10-CM

## 2023-04-09 DIAGNOSIS — R06.02 SOB (SHORTNESS OF BREATH): Primary | ICD-10-CM

## 2023-04-09 LAB
ALBUMIN SERPL BCP-MCNC: 4 G/DL (ref 3.5–5.2)
ALP SERPL-CCNC: 75 U/L (ref 55–135)
ALT SERPL W/O P-5'-P-CCNC: 35 U/L (ref 10–44)
ANION GAP SERPL CALC-SCNC: 11 MMOL/L (ref 8–16)
APTT PPP: 28.5 SEC (ref 21–32)
AST SERPL-CCNC: 31 U/L (ref 10–40)
BASOPHILS # BLD AUTO: 0.01 K/UL (ref 0–0.2)
BASOPHILS NFR BLD: 0.2 % (ref 0–1.9)
BILIRUB SERPL-MCNC: 0.4 MG/DL (ref 0.1–1)
BILIRUB UR QL STRIP: NEGATIVE
BNP SERPL-MCNC: 1320 PG/ML (ref 0–99)
BUN SERPL-MCNC: 39 MG/DL (ref 8–23)
CALCIUM SERPL-MCNC: 9.1 MG/DL (ref 8.7–10.5)
CHLORIDE SERPL-SCNC: 95 MMOL/L (ref 95–110)
CK SERPL-CCNC: 100 U/L (ref 20–180)
CLARITY UR: CLEAR
CO2 SERPL-SCNC: 26 MMOL/L (ref 23–29)
COLOR UR: YELLOW
CREAT SERPL-MCNC: 1.2 MG/DL (ref 0.5–1.4)
DIFFERENTIAL METHOD: ABNORMAL
EOSINOPHIL # BLD AUTO: 0.1 K/UL (ref 0–0.5)
EOSINOPHIL NFR BLD: 1 % (ref 0–8)
ERYTHROCYTE [DISTWIDTH] IN BLOOD BY AUTOMATED COUNT: 15.2 % (ref 11.5–14.5)
EST. GFR  (NO RACE VARIABLE): 45.2 ML/MIN/1.73 M^2
GLUCOSE SERPL-MCNC: 115 MG/DL (ref 70–110)
GLUCOSE UR QL STRIP: ABNORMAL
HCT VFR BLD AUTO: 35.7 % (ref 37–48.5)
HGB BLD-MCNC: 11.2 G/DL (ref 12–16)
HGB UR QL STRIP: NEGATIVE
IMM GRANULOCYTES # BLD AUTO: 0.01 K/UL (ref 0–0.04)
IMM GRANULOCYTES NFR BLD AUTO: 0.2 % (ref 0–0.5)
INFLUENZA A, MOLECULAR: NEGATIVE
INFLUENZA B, MOLECULAR: NEGATIVE
INR PPP: 1.1 (ref 0.8–1.2)
KETONES UR QL STRIP: NEGATIVE
LACTATE SERPL-SCNC: 1.2 MMOL/L (ref 0.5–1.9)
LEUKOCYTE ESTERASE UR QL STRIP: NEGATIVE
LIPASE SERPL-CCNC: 33 U/L (ref 4–60)
LYMPHOCYTES # BLD AUTO: 1 K/UL (ref 1–4.8)
LYMPHOCYTES NFR BLD: 18.2 % (ref 18–48)
MAGNESIUM SERPL-MCNC: 2.2 MG/DL (ref 1.6–2.6)
MCH RBC QN AUTO: 26.9 PG (ref 27–31)
MCHC RBC AUTO-ENTMCNC: 31.4 G/DL (ref 32–36)
MCV RBC AUTO: 86 FL (ref 82–98)
MONOCYTES # BLD AUTO: 0.7 K/UL (ref 0.3–1)
MONOCYTES NFR BLD: 13.8 % (ref 4–15)
NEUTROPHILS # BLD AUTO: 3.5 K/UL (ref 1.8–7.7)
NEUTROPHILS NFR BLD: 66.6 % (ref 38–73)
NITRITE UR QL STRIP: NEGATIVE
NRBC BLD-RTO: 0 /100 WBC
PH UR STRIP: 6 [PH] (ref 5–8)
PLATELET # BLD AUTO: 251 K/UL (ref 150–450)
PMV BLD AUTO: 10.4 FL (ref 9.2–12.9)
POTASSIUM SERPL-SCNC: 3.5 MMOL/L (ref 3.5–5.1)
PROT SERPL-MCNC: 6.9 G/DL (ref 6–8.4)
PROT UR QL STRIP: NEGATIVE
PROTHROMBIN TIME: 12.1 SEC (ref 9–12.5)
RBC # BLD AUTO: 4.17 M/UL (ref 4–5.4)
SARS-COV-2 RDRP RESP QL NAA+PROBE: NEGATIVE
SODIUM SERPL-SCNC: 132 MMOL/L (ref 136–145)
SP GR UR STRIP: 1.01 (ref 1–1.03)
SPECIMEN SOURCE: NORMAL
TROPONIN I SERPL HS-MCNC: 12.2 PG/ML (ref 0–14.9)
TROPONIN I SERPL HS-MCNC: 12.3 PG/ML (ref 0–14.9)
TSH SERPL DL<=0.005 MIU/L-ACNC: 5.59 UIU/ML (ref 0.34–5.6)
URN SPEC COLLECT METH UR: ABNORMAL
UROBILINOGEN UR STRIP-ACNC: NEGATIVE EU/DL
WBC # BLD AUTO: 5.22 K/UL (ref 3.9–12.7)

## 2023-04-09 PROCEDURE — 85610 PROTHROMBIN TIME: CPT | Performed by: EMERGENCY MEDICINE

## 2023-04-09 PROCEDURE — 25000242 PHARM REV CODE 250 ALT 637 W/ HCPCS: Performed by: EMERGENCY MEDICINE

## 2023-04-09 PROCEDURE — 84443 ASSAY THYROID STIM HORMONE: CPT | Performed by: EMERGENCY MEDICINE

## 2023-04-09 PROCEDURE — 87502 INFLUENZA DNA AMP PROBE: CPT | Performed by: EMERGENCY MEDICINE

## 2023-04-09 PROCEDURE — 81003 URINALYSIS AUTO W/O SCOPE: CPT | Performed by: EMERGENCY MEDICINE

## 2023-04-09 PROCEDURE — 84484 ASSAY OF TROPONIN QUANT: CPT | Performed by: EMERGENCY MEDICINE

## 2023-04-09 PROCEDURE — 83690 ASSAY OF LIPASE: CPT | Performed by: EMERGENCY MEDICINE

## 2023-04-09 PROCEDURE — 85025 COMPLETE CBC W/AUTO DIFF WBC: CPT | Performed by: EMERGENCY MEDICINE

## 2023-04-09 PROCEDURE — U0002 COVID-19 LAB TEST NON-CDC: HCPCS | Performed by: EMERGENCY MEDICINE

## 2023-04-09 PROCEDURE — 99900035 HC TECH TIME PER 15 MIN (STAT)

## 2023-04-09 PROCEDURE — G0378 HOSPITAL OBSERVATION PER HR: HCPCS

## 2023-04-09 PROCEDURE — 85730 THROMBOPLASTIN TIME PARTIAL: CPT | Performed by: EMERGENCY MEDICINE

## 2023-04-09 PROCEDURE — 93010 EKG 12-LEAD: ICD-10-PCS | Mod: ,,, | Performed by: INTERNAL MEDICINE

## 2023-04-09 PROCEDURE — 93005 ELECTROCARDIOGRAM TRACING: CPT | Performed by: INTERNAL MEDICINE

## 2023-04-09 PROCEDURE — 83880 ASSAY OF NATRIURETIC PEPTIDE: CPT | Performed by: EMERGENCY MEDICINE

## 2023-04-09 PROCEDURE — 63600175 PHARM REV CODE 636 W HCPCS: Performed by: EMERGENCY MEDICINE

## 2023-04-09 PROCEDURE — 84145 PROCALCITONIN (PCT): CPT | Performed by: EMERGENCY MEDICINE

## 2023-04-09 PROCEDURE — 83605 ASSAY OF LACTIC ACID: CPT | Performed by: EMERGENCY MEDICINE

## 2023-04-09 PROCEDURE — 94640 AIRWAY INHALATION TREATMENT: CPT

## 2023-04-09 PROCEDURE — 36415 COLL VENOUS BLD VENIPUNCTURE: CPT | Performed by: EMERGENCY MEDICINE

## 2023-04-09 PROCEDURE — 87040 BLOOD CULTURE FOR BACTERIA: CPT | Mod: 59 | Performed by: EMERGENCY MEDICINE

## 2023-04-09 PROCEDURE — 96374 THER/PROPH/DIAG INJ IV PUSH: CPT

## 2023-04-09 PROCEDURE — 99900031 HC PATIENT EDUCATION (STAT)

## 2023-04-09 PROCEDURE — 94761 N-INVAS EAR/PLS OXIMETRY MLT: CPT

## 2023-04-09 PROCEDURE — 99285 EMERGENCY DEPT VISIT HI MDM: CPT | Mod: 25

## 2023-04-09 PROCEDURE — 94799 UNLISTED PULMONARY SVC/PX: CPT

## 2023-04-09 PROCEDURE — 80053 COMPREHEN METABOLIC PANEL: CPT | Performed by: EMERGENCY MEDICINE

## 2023-04-09 PROCEDURE — 93010 ELECTROCARDIOGRAM REPORT: CPT | Mod: ,,, | Performed by: INTERNAL MEDICINE

## 2023-04-09 PROCEDURE — 83735 ASSAY OF MAGNESIUM: CPT | Performed by: EMERGENCY MEDICINE

## 2023-04-09 PROCEDURE — 82550 ASSAY OF CK (CPK): CPT | Performed by: EMERGENCY MEDICINE

## 2023-04-09 RX ORDER — IBUPROFEN 200 MG
16 TABLET ORAL
Status: DISCONTINUED | OUTPATIENT
Start: 2023-04-09 | End: 2023-04-13 | Stop reason: HOSPADM

## 2023-04-09 RX ORDER — BUDESONIDE 0.5 MG/2ML
0.5 INHALANT ORAL EVERY 12 HOURS
Status: DISCONTINUED | OUTPATIENT
Start: 2023-04-10 | End: 2023-04-13 | Stop reason: HOSPADM

## 2023-04-09 RX ORDER — LEVOTHYROXINE SODIUM 25 UG/1
25 TABLET ORAL
Status: DISCONTINUED | OUTPATIENT
Start: 2023-04-10 | End: 2023-04-13 | Stop reason: HOSPADM

## 2023-04-09 RX ORDER — PANTOPRAZOLE SODIUM 40 MG/1
40 TABLET, DELAYED RELEASE ORAL DAILY
Status: CANCELLED | OUTPATIENT
Start: 2023-04-10

## 2023-04-09 RX ORDER — FUROSEMIDE 10 MG/ML
40 INJECTION INTRAMUSCULAR; INTRAVENOUS
Status: DISCONTINUED | OUTPATIENT
Start: 2023-04-10 | End: 2023-04-13 | Stop reason: HOSPADM

## 2023-04-09 RX ORDER — OXYCODONE HYDROCHLORIDE 5 MG/1
5 TABLET ORAL EVERY 6 HOURS PRN
Status: DISCONTINUED | OUTPATIENT
Start: 2023-04-09 | End: 2023-04-13 | Stop reason: HOSPADM

## 2023-04-09 RX ORDER — FLUTICASONE PROPIONATE 50 MCG
1 SPRAY, SUSPENSION (ML) NASAL DAILY
Status: DISCONTINUED | OUTPATIENT
Start: 2023-04-10 | End: 2023-04-13 | Stop reason: HOSPADM

## 2023-04-09 RX ORDER — CETIRIZINE HYDROCHLORIDE 5 MG/1
10 TABLET ORAL DAILY
Status: DISCONTINUED | OUTPATIENT
Start: 2023-04-10 | End: 2023-04-12

## 2023-04-09 RX ORDER — ARFORMOTEROL TARTRATE 15 UG/2ML
15 SOLUTION RESPIRATORY (INHALATION) 2 TIMES DAILY
Status: DISCONTINUED | OUTPATIENT
Start: 2023-04-10 | End: 2023-04-13 | Stop reason: HOSPADM

## 2023-04-09 RX ORDER — CARBOXYMETHYLCELLULOSE SODIUM 5 MG/ML
1 SOLUTION/ DROPS OPHTHALMIC 2 TIMES DAILY
Status: DISCONTINUED | OUTPATIENT
Start: 2023-04-10 | End: 2023-04-13 | Stop reason: HOSPADM

## 2023-04-09 RX ORDER — BUDESONIDE 0.5 MG/2ML
0.5 INHALANT ORAL ONCE
Status: COMPLETED | OUTPATIENT
Start: 2023-04-09 | End: 2023-04-09

## 2023-04-09 RX ORDER — FLUTICASONE FUROATE AND VILANTEROL 100; 25 UG/1; UG/1
1 POWDER RESPIRATORY (INHALATION) DAILY
Status: DISCONTINUED | OUTPATIENT
Start: 2023-04-10 | End: 2023-04-09

## 2023-04-09 RX ORDER — TALC
6 POWDER (GRAM) TOPICAL NIGHTLY PRN
Status: DISCONTINUED | OUTPATIENT
Start: 2023-04-09 | End: 2023-04-13 | Stop reason: HOSPADM

## 2023-04-09 RX ORDER — ATORVASTATIN CALCIUM 40 MG/1
40 TABLET, FILM COATED ORAL NIGHTLY
Status: DISCONTINUED | OUTPATIENT
Start: 2023-04-10 | End: 2023-04-13 | Stop reason: HOSPADM

## 2023-04-09 RX ORDER — AMITRIPTYLINE HYDROCHLORIDE 25 MG/1
75 TABLET, FILM COATED ORAL NIGHTLY
Status: DISCONTINUED | OUTPATIENT
Start: 2023-04-10 | End: 2023-04-13 | Stop reason: HOSPADM

## 2023-04-09 RX ORDER — ACETAMINOPHEN 500 MG
2000 TABLET ORAL DAILY
Status: DISCONTINUED | OUTPATIENT
Start: 2023-04-10 | End: 2023-04-13 | Stop reason: HOSPADM

## 2023-04-09 RX ORDER — IPRATROPIUM BROMIDE 0.5 MG/2.5ML
0.5 SOLUTION RESPIRATORY (INHALATION) ONCE
Status: COMPLETED | OUTPATIENT
Start: 2023-04-09 | End: 2023-04-09

## 2023-04-09 RX ORDER — FUROSEMIDE 10 MG/ML
40 INJECTION INTRAMUSCULAR; INTRAVENOUS
Status: COMPLETED | OUTPATIENT
Start: 2023-04-09 | End: 2023-04-09

## 2023-04-09 RX ORDER — CLOTRIMAZOLE AND BETAMETHASONE DIPROPIONATE 10; .64 MG/G; MG/G
CREAM TOPICAL 2 TIMES DAILY PRN
Status: DISCONTINUED | OUTPATIENT
Start: 2023-04-09 | End: 2023-04-13 | Stop reason: HOSPADM

## 2023-04-09 RX ORDER — METOPROLOL SUCCINATE 25 MG/1
25 TABLET, EXTENDED RELEASE ORAL DAILY
Status: DISCONTINUED | OUTPATIENT
Start: 2023-04-10 | End: 2023-04-13 | Stop reason: HOSPADM

## 2023-04-09 RX ORDER — INSULIN ASPART 100 [IU]/ML
0-5 INJECTION, SOLUTION INTRAVENOUS; SUBCUTANEOUS
Status: DISCONTINUED | OUTPATIENT
Start: 2023-04-09 | End: 2023-04-13 | Stop reason: HOSPADM

## 2023-04-09 RX ORDER — SODIUM CHLORIDE 0.9 % (FLUSH) 0.9 %
3 SYRINGE (ML) INJECTION EVERY 6 HOURS PRN
Status: DISCONTINUED | OUTPATIENT
Start: 2023-04-09 | End: 2023-04-13 | Stop reason: HOSPADM

## 2023-04-09 RX ORDER — AMIODARONE HYDROCHLORIDE 200 MG/1
200 TABLET ORAL DAILY
Status: DISCONTINUED | OUTPATIENT
Start: 2023-04-10 | End: 2023-04-13 | Stop reason: HOSPADM

## 2023-04-09 RX ORDER — NITROGLYCERIN 0.4 MG/1
0.4 TABLET SUBLINGUAL EVERY 5 MIN PRN
Status: DISCONTINUED | OUTPATIENT
Start: 2023-04-09 | End: 2023-04-13 | Stop reason: HOSPADM

## 2023-04-09 RX ORDER — IBUPROFEN 200 MG
24 TABLET ORAL
Status: DISCONTINUED | OUTPATIENT
Start: 2023-04-09 | End: 2023-04-13 | Stop reason: HOSPADM

## 2023-04-09 RX ORDER — LEVALBUTEROL INHALATION SOLUTION 0.63 MG/3ML
0.63 SOLUTION RESPIRATORY (INHALATION)
Status: COMPLETED | OUTPATIENT
Start: 2023-04-09 | End: 2023-04-09

## 2023-04-09 RX ORDER — GLUCAGON 1 MG
1 KIT INJECTION
Status: DISCONTINUED | OUTPATIENT
Start: 2023-04-09 | End: 2023-04-13 | Stop reason: HOSPADM

## 2023-04-09 RX ORDER — IPRATROPIUM BROMIDE AND ALBUTEROL SULFATE 2.5; .5 MG/3ML; MG/3ML
3 SOLUTION RESPIRATORY (INHALATION) EVERY 4 HOURS PRN
Status: DISCONTINUED | OUTPATIENT
Start: 2023-04-09 | End: 2023-04-13 | Stop reason: HOSPADM

## 2023-04-09 RX ORDER — LEVALBUTEROL INHALATION SOLUTION 1.25 MG/3ML
1 SOLUTION RESPIRATORY (INHALATION)
Status: DISCONTINUED | OUTPATIENT
Start: 2023-04-10 | End: 2023-04-10

## 2023-04-09 RX ORDER — ACETAMINOPHEN 325 MG/1
650 TABLET ORAL EVERY 8 HOURS PRN
Status: DISCONTINUED | OUTPATIENT
Start: 2023-04-09 | End: 2023-04-13 | Stop reason: HOSPADM

## 2023-04-09 RX ORDER — BUSPIRONE HYDROCHLORIDE 5 MG/1
10 TABLET ORAL 2 TIMES DAILY
Status: DISCONTINUED | OUTPATIENT
Start: 2023-04-10 | End: 2023-04-13 | Stop reason: HOSPADM

## 2023-04-09 RX ADMIN — BUDESONIDE 0.5 MG: 0.5 INHALANT RESPIRATORY (INHALATION) at 09:04

## 2023-04-09 RX ADMIN — LEVALBUTEROL HYDROCHLORIDE 0.63 MG: 0.63 SOLUTION RESPIRATORY (INHALATION) at 10:04

## 2023-04-09 RX ADMIN — FUROSEMIDE 40 MG: 10 INJECTION, SOLUTION INTRAMUSCULAR; INTRAVENOUS at 10:04

## 2023-04-09 RX ADMIN — IPRATROPIUM BROMIDE 0.5 MG: 0.5 SOLUTION RESPIRATORY (INHALATION) at 10:04

## 2023-04-09 NOTE — Clinical Note
Diagnosis: Pleural effusion [464397]   Admitting Provider:: FARHAN ROLDAN [[id number]]   Future Attending Provider: FARHAN ROLDAN [[id number]]   Reason for IP Medical Treatment  (Clinical interventions that can only be accomplished in the IP setting? ) :: increasing o2 requirement needing oxygen, thoracentesis, on blood thinners, cardiac monitoring   I certify that Inpatient services for greater than or equal to 2 midnights are medically necessary:: Yes   Plans for Post-Acute care--if anticipated (pick the single best option):: C. Discharge home with home health services   Special Needs:: No Special Needs [1]

## 2023-04-10 ENCOUNTER — CLINICAL SUPPORT (OUTPATIENT)
Dept: CARDIOLOGY | Facility: HOSPITAL | Age: 83
DRG: 291 | End: 2023-04-10
Attending: STUDENT IN AN ORGANIZED HEALTH CARE EDUCATION/TRAINING PROGRAM
Payer: MEDICARE

## 2023-04-10 VITALS — WEIGHT: 144 LBS | BODY MASS INDEX: 26.5 KG/M2 | HEIGHT: 62 IN

## 2023-04-10 LAB
AORTIC ROOT ANNULUS: 2.6 CM
AORTIC VALVE CUSP SEPERATION: 0.6 CM
ASCENDING AORTA: 3.4 CM
AV INDEX (PROSTH): 0.24
AV MEAN GRADIENT: 18 MMHG
AV PEAK GRADIENT: 31 MMHG
AV VALVE AREA: 0.77 CM2
AV VELOCITY RATIO: 0.33
BSA FOR ECHO PROCEDURE: 1.69 M2
CV ECHO LV RWT: 0.32 CM
DOP CALC AO PEAK VEL: 2.79 M/S
DOP CALC AO VTI: 65.2 CM
DOP CALC LVOT AREA: 3.1 CM2
DOP CALC LVOT DIAMETER: 2 CM
DOP CALC LVOT PEAK VEL: 0.92 M/S
DOP CALC LVOT STROKE VOLUME: 49.93 CM3
DOP CALC MV VTI: 35.4 CM
DOP CALC QP:QS RATIO: 1.3
DOP CALC RVOT AREA: 4.91 CM2
DOP CALC RVOT DIAMETER: 2.5 CM
DOP CALC RVOT PEAK VEL: 0.48 M/S
DOP CALC RVOT STROKE VOLUME: 38.27 CM3
DOP CALC RVOT VTI: 7.8 CM
DOP CALCLVOT PEAK VEL VTI: 15.9 CM
E WAVE DECELERATION TIME: 148 MSEC
E/A RATIO: 1.42
E/E' RATIO: 24.67 M/S
ECHO LV POSTERIOR WALL: 0.81 CM (ref 0.6–1.1)
EJECTION FRACTION: 45 %
FRACTIONAL SHORTENING: 23 % (ref 28–44)
GLUCOSE SERPL-MCNC: 113 MG/DL (ref 70–110)
GLUCOSE SERPL-MCNC: 114 MG/DL (ref 70–110)
GLUCOSE SERPL-MCNC: 127 MG/DL (ref 70–110)
GLUCOSE SERPL-MCNC: 137 MG/DL (ref 70–110)
GLUCOSE SERPL-MCNC: 173 MG/DL (ref 70–110)
INTERVENTRICULAR SEPTUM: 0.89 CM (ref 0.6–1.1)
IVC DIAMETER: 1.51 CM
LEFT ATRIUM SIZE: 3.7 CM
LEFT INTERNAL DIMENSION IN SYSTOLE: 3.88 CM (ref 2.1–4)
LEFT VENTRICLE DIASTOLIC VOLUME INDEX: 72.29 ML/M2
LEFT VENTRICLE DIASTOLIC VOLUME: 120 ML
LEFT VENTRICLE MASS INDEX: 89 G/M2
LEFT VENTRICLE SYSTOLIC VOLUME INDEX: 39.2 ML/M2
LEFT VENTRICLE SYSTOLIC VOLUME: 65.1 ML
LEFT VENTRICULAR INTERNAL DIMENSION IN DIASTOLE: 5.03 CM (ref 3.5–6)
LEFT VENTRICULAR MASS: 148.33 G
LV LATERAL E/E' RATIO: 21.14 M/S
LV SEPTAL E/E' RATIO: 29.6 M/S
LVOT MG: 1 MMHG
LVOT MV: 0.55 CM/S
MR PISA EROA: 0.12 CM2
MV MEAN GRADIENT: 4 MMHG
MV PEAK A VEL: 1.04 M/S
MV PEAK E VEL: 1.48 M/S
MV PEAK GRADIENT: 10 MMHG
MV STENOSIS PRESSURE HALF TIME: 105 MS
MV VALVE AREA BY CONTINUITY EQUATION: 1.41 CM2
MV VALVE AREA P 1/2 METHOD: 2.1 CM2
PISA MRMAX VEL: 5.72 M/S
PISA RADIUS: 0.6 CM
PISA TR MAX VEL: 3.62 M/S
PISA VN NYQUIST MS: 0.31 M/S
PISA VN NYQUIST: 0.31 M/S
PROCALCITONIN SERPL IA-MCNC: 0.06 NG/ML
PV MEAN GRADIENT: 0 MMHG
PV MV: 0.6 M/S
PV PEAK VELOCITY: 0.9 CM/S
RV TISSUE DOPPLER FREE WALL SYSTOLIC VELOCITY 1 (APICAL 4 CHAMBER VIEW): 0.01 CM/S
STJ: 1.92 CM
TDI LATERAL: 0.07 M/S
TDI SEPTAL: 0.05 M/S
TDI: 0.06 M/S
TR MAX PG: 52 MMHG
TRICUSPID ANNULAR PLANE SYSTOLIC EXCURSION: 1.46 CM

## 2023-04-10 PROCEDURE — 27000221 HC OXYGEN, UP TO 24 HOURS

## 2023-04-10 PROCEDURE — 99223 PR INITIAL HOSPITAL CARE,LEVL III: ICD-10-PCS | Mod: ,,, | Performed by: INTERNAL MEDICINE

## 2023-04-10 PROCEDURE — 63600175 PHARM REV CODE 636 W HCPCS: Performed by: INTERNAL MEDICINE

## 2023-04-10 PROCEDURE — 99900031 HC PATIENT EDUCATION (STAT)

## 2023-04-10 PROCEDURE — 96376 TX/PRO/DX INJ SAME DRUG ADON: CPT

## 2023-04-10 PROCEDURE — 25000003 PHARM REV CODE 250: Performed by: STUDENT IN AN ORGANIZED HEALTH CARE EDUCATION/TRAINING PROGRAM

## 2023-04-10 PROCEDURE — 99223 1ST HOSP IP/OBS HIGH 75: CPT | Mod: ,,, | Performed by: INTERNAL MEDICINE

## 2023-04-10 PROCEDURE — 94640 AIRWAY INHALATION TREATMENT: CPT

## 2023-04-10 PROCEDURE — G0378 HOSPITAL OBSERVATION PER HR: HCPCS

## 2023-04-10 PROCEDURE — 94761 N-INVAS EAR/PLS OXIMETRY MLT: CPT

## 2023-04-10 PROCEDURE — 99900035 HC TECH TIME PER 15 MIN (STAT)

## 2023-04-10 PROCEDURE — 96375 TX/PRO/DX INJ NEW DRUG ADDON: CPT

## 2023-04-10 PROCEDURE — 25000242 PHARM REV CODE 250 ALT 637 W/ HCPCS: Performed by: INTERNAL MEDICINE

## 2023-04-10 PROCEDURE — 93306 TTE W/DOPPLER COMPLETE: CPT | Mod: 26,,, | Performed by: GENERAL PRACTICE

## 2023-04-10 PROCEDURE — 93306 TTE W/DOPPLER COMPLETE: CPT

## 2023-04-10 PROCEDURE — 93306 ECHO (CUPID ONLY): ICD-10-PCS | Mod: 26,,, | Performed by: GENERAL PRACTICE

## 2023-04-10 PROCEDURE — 63600175 PHARM REV CODE 636 W HCPCS: Performed by: STUDENT IN AN ORGANIZED HEALTH CARE EDUCATION/TRAINING PROGRAM

## 2023-04-10 PROCEDURE — 25000003 PHARM REV CODE 250: Performed by: INTERNAL MEDICINE

## 2023-04-10 PROCEDURE — 25000242 PHARM REV CODE 250 ALT 637 W/ HCPCS: Performed by: STUDENT IN AN ORGANIZED HEALTH CARE EDUCATION/TRAINING PROGRAM

## 2023-04-10 PROCEDURE — 94799 UNLISTED PULMONARY SVC/PX: CPT

## 2023-04-10 RX ORDER — ONDANSETRON 2 MG/ML
4 INJECTION INTRAMUSCULAR; INTRAVENOUS EVERY 6 HOURS PRN
Status: DISCONTINUED | OUTPATIENT
Start: 2023-04-10 | End: 2023-04-13 | Stop reason: HOSPADM

## 2023-04-10 RX ORDER — SIMETHICONE 80 MG
2 TABLET,CHEWABLE ORAL ONCE
Status: COMPLETED | OUTPATIENT
Start: 2023-04-10 | End: 2023-04-10

## 2023-04-10 RX ORDER — SIMETHICONE 80 MG
2 TABLET,CHEWABLE ORAL 3 TIMES DAILY PRN
Status: DISCONTINUED | OUTPATIENT
Start: 2023-04-10 | End: 2023-04-13 | Stop reason: HOSPADM

## 2023-04-10 RX ORDER — LEVALBUTEROL INHALATION SOLUTION 1.25 MG/3ML
1 SOLUTION RESPIRATORY (INHALATION)
Status: DISCONTINUED | OUTPATIENT
Start: 2023-04-10 | End: 2023-04-11

## 2023-04-10 RX ADMIN — LEVALBUTEROL HYDROCHLORIDE 1.25 MG: 1.25 SOLUTION RESPIRATORY (INHALATION) at 07:04

## 2023-04-10 RX ADMIN — ONDANSETRON HYDROCHLORIDE 4 MG: 2 INJECTION, SOLUTION INTRAMUSCULAR; INTRAVENOUS at 05:04

## 2023-04-10 RX ADMIN — Medication 2000 UNITS: at 09:04

## 2023-04-10 RX ADMIN — ARFORMOTEROL TARTRATE 15 MCG: 15 SOLUTION RESPIRATORY (INHALATION) at 07:04

## 2023-04-10 RX ADMIN — LEVALBUTEROL HYDROCHLORIDE 1.25 MG: 1.25 SOLUTION RESPIRATORY (INHALATION) at 12:04

## 2023-04-10 RX ADMIN — FUROSEMIDE 40 MG: 10 INJECTION, SOLUTION INTRAVENOUS at 05:04

## 2023-04-10 RX ADMIN — FLUTICASONE PROPIONATE 50 MCG: 50 SPRAY, METERED NASAL at 09:04

## 2023-04-10 RX ADMIN — LEVOTHYROXINE SODIUM 25 MCG: 0.03 TABLET ORAL at 05:04

## 2023-04-10 RX ADMIN — BUDESONIDE INHALATION 0.5 MG: 0.5 SUSPENSION RESPIRATORY (INHALATION) at 07:04

## 2023-04-10 RX ADMIN — SIMETHICONE 160 MG: 80 TABLET, CHEWABLE ORAL at 08:04

## 2023-04-10 RX ADMIN — CARBOXYMETHYLCELLULOSE SODIUM 1 DROP: 5 SOLUTION/ DROPS OPHTHALMIC at 09:04

## 2023-04-10 RX ADMIN — AMIODARONE HYDROCHLORIDE 200 MG: 200 TABLET ORAL at 09:04

## 2023-04-10 RX ADMIN — LEVALBUTEROL HYDROCHLORIDE 1.25 MG: 1.25 SOLUTION RESPIRATORY (INHALATION) at 04:04

## 2023-04-10 RX ADMIN — BUSPIRONE HYDROCHLORIDE 10 MG: 5 TABLET ORAL at 09:04

## 2023-04-10 RX ADMIN — ONDANSETRON HYDROCHLORIDE 4 MG: 2 INJECTION, SOLUTION INTRAMUSCULAR; INTRAVENOUS at 09:04

## 2023-04-10 RX ADMIN — SIMETHICONE 160 MG: 80 TABLET, CHEWABLE ORAL at 05:04

## 2023-04-10 RX ADMIN — CETIRIZINE HYDROCHLORIDE 10 MG: 5 TABLET ORAL at 09:04

## 2023-04-10 RX ADMIN — ATORVASTATIN CALCIUM 40 MG: 40 TABLET, FILM COATED ORAL at 08:04

## 2023-04-10 RX ADMIN — BUSPIRONE HYDROCHLORIDE 10 MG: 5 TABLET ORAL at 08:04

## 2023-04-10 RX ADMIN — AMITRIPTYLINE HYDROCHLORIDE 75 MG: 25 TABLET, FILM COATED ORAL at 08:04

## 2023-04-10 RX ADMIN — ONDANSETRON HYDROCHLORIDE 4 MG: 2 INJECTION, SOLUTION INTRAMUSCULAR; INTRAVENOUS at 11:04

## 2023-04-10 RX ADMIN — METOPROLOL SUCCINATE 25 MG: 25 TABLET, FILM COATED, EXTENDED RELEASE ORAL at 09:04

## 2023-04-10 NOTE — PLAN OF CARE
04/10/23 1317   COOK Message   Medicare Outpatient and Observation Notification regarding financial responsibility Explained to patient/caregiver;Signed/date by patient/caregiver   Date COOK was signed 04/10/23   Time COOK was signed 131

## 2023-04-10 NOTE — CARE UPDATE
04/09/23 7850   Patient Assessment/Suction   Level of Consciousness (AVPU) alert   Respiratory Effort Unlabored   Expansion/Accessory Muscles/Retractions no use of accessory muscles   All Lung Fields Breath Sounds clear;diminished   Rhythm/Pattern, Respiratory no shortness of breath reported   Cough Frequency no cough   PRE-TX-O2   Device (Oxygen Therapy) nasal cannula   Flow (L/min) 3   SpO2 96 %   Pulse Oximetry Type Continuous   $ Pulse Oximetry - Multiple Charge Pulse Oximetry - Multiple   Pulse 102   Resp 15   Positioning   Head of Bed (HOB) Positioning HOB elevated;HOB at 30 degrees   Aerosol Therapy   $ Aerosol Therapy Charges Aerosol Treatment   Daily Review of Necessity (SVN) completed   Respiratory Treatment Status (SVN) given   Treatment Route (SVN) mouthpiece   Patient Position (SVN) semi-Marc's   Post Treatment Assessment (SVN) breath sounds improved   Signs of Intolerance (SVN) none   Breath Sounds Post-Respiratory Treatment   Throughout All Fields Post-Treatment All Fields   Throughout All Fields Post-Treatment aeration increased   Post-treatment Heart Rate (beats/min) 101   Post-treatment Resp Rate (breaths/min) 19   Education   $ Education Bronchodilator;15 min   Respiratory Evaluation   $ Care Plan Tech Time 15 min   $ Eval/Re-eval Charges Evaluation

## 2023-04-10 NOTE — PROGRESS NOTES
[facility]  Progress Note    Patient Name: Darlin Tipton  MRN: [id number]  Patient Class: OP- Observation  Admission Date: 4/9/2023  Attending Physician: Sung Yeager MD   Primary Care Provider: Oumar Almonte Jr, MD  DOS: 04/10/2023    Subjective:     Principal Problem:Acute on chronic respiratory failure with hypoxia    Chief Complaint:   Chief Complaint   Patient presents with    Chest Pain     Xfew hours. Midsternal crushing pain.     Shortness of Breath        HPI: 83 yo F with PMH including CAD, DM 2, HTN, PAF, GERD, COPD, Hypothyroidism, HFrEF who presents for shortness of breath. Patient has had increasing shortness of breath for the past week, particularly on ambulation. She has been using her nebulizers without relief. She typically wears oxygen at night only and it has progressed to continuous all day with minimal relief. Worse when she sits back or lays down so is sitting up. Has been compliant with lasix, does not feel she is urinating less and feels her leg swelling is better than usual. She endorses cough, worse but nonproductive. She has some chest pressure, central radiating to back, worse today. She reports valvular disease as well and an upcoming cardiology appointment. Reports prior thoracentesis with last on in Feb.  Last dose of Eliquis this evening (4/9 PM). In the ED, vitals with occasional tachycardia, hypoxia with oxygen so increased to 3L, labs relatively stable, CXR with bilateral pleural effusion R > L, hospitalist contacted with patient meeting observation admission.    4/10: Patient seen and examined.  Family at bedside.  Patient reports persistent shortness of breath, moderate intensity, not much improved overnight with treatment., constant timing.  Patient denies fever or cough.  She reports some abdominal bloating and gas.  No vomiting.  She requests pudding.  No chest pain.  No headache or syncope.    ROS:  3 point review of systems reviewed  and negative except as per interval history above      Vitals:    04/10/23 1102 04/10/23 1253 04/10/23 1637 04/10/23 1652   BP: 119/67   138/65   BP Location:       Patient Position:       Pulse: 78 70 75 85   Resp: 19 18 16 16   Temp: 98.6 °F (37 °C)   98.6 °F (37 °C)   TempSrc: Oral   Oral   SpO2: (!) 92% 96% 95% 96%   Weight:       Height:         General: Comfortable appearing, frail, nontoxic nondiaphoretic   Head and eyes:  No scleral icterus, no conjunctival discharge   ENT: Moist mucous membranes   Pulmonary:  Comfortable work of breathing at rest with supplemental oxygen nasal cannula, diminished breath sounds bilateral bases, no wheezes   Cardiovascular:  2+ radial pulses, regular rate and rhythm   GI: Abdomen soft nontender, nondistended   Skin:  Dry and warm no jaundice   Psych:  Mood is calm, affect restricted, insight fair   Neuro: Nonfocal motor exam, fluent speech, alert and oriented    Laboratory data:  Glucose 173   No new labs today    Assessment/Plan:     Acute on chronic respiratory failure with hypoxia due to volume overload with pleural effusion   Bilateral pleural effusions, right greater than left with recurrent thoracentesis  Chest pain due to above, rule out ACS  HFrEF, probable exacerbation as etiology of volume overload  CAD  DM 2  HTN  PAF  GERD  COPD  Hypothyroidism     -IV lasix BID  -I/O  -Fluid restriction  -Hold Eliquis  -Thoracentesis needed - defer to IR, Pulmonary (consulted) regarding preferred protocol for thoracentesis with last dose of eliquis 4/9 PM  -Will hold eliquis  -Trend troponins  -Echo  -Telemetry  -Trend labs  -Resume home meds as appropriate      FULL CODE  DVT ppx Hold Eliquis while planning procedure     Plan update today:  Continue care on med surg  Appreciate consultants   Continue supplemental oxygen and wean as able  Continue IV Lasix diuresis.  Monitor urine output and fluid status.    Holding Eliquis-will need to be held for 2 days prior to invasive  procedure   IR consultation for thoracentesis  Mobilize as able   Continue home medications for chronic issues as able   Glucose monitoring, sliding scale insulin coverage.  Titrate regimen as needed.  Repeat a.m. labs   Trial of simethicone for gas and bloating  Ensure pudding per patient request  High-risk secondary to severe exacerbation of chronic illness        Sung Yeager MD  Department of Hospital Medicine  [facility]

## 2023-04-10 NOTE — SUBJECTIVE & OBJECTIVE
Past Medical History:   Diagnosis Date    Allergy     Dust mites, Grasses, Trees    Arthritis     Asthma     Blood transfusion     CAD (coronary artery disease)     Cataract     CHF (congestive heart failure)     CHRONIC BRONCHITIS     Diabetes mellitus     Diabetes mellitus type II     GERD (gastroesophageal reflux disease)     Hyperlipidemia     Hypertension     Irregular heart beat     Kidney disease     ckd stage 3  as stated by patient - to see MD    Paroxysmal atrial fibrillation     Post-menopausal bleeding 2018    Spinal stenosis     Thyroid disease     Hypothyroidism       Past Surgical History:   Procedure Laterality Date    APPENDECTOMY  1968    BLADDER SUSPENSION  1989    CARDIAC SURGERY  2016    CABG    CATARACT EXTRACTION  9/2007 (L) and 10/2207 (R)    COLONOSCOPY N/A 10/18/2017    Procedure: COLONOSCOPY;  Surgeon: Esme Acuna MD;  Location: [facility] ENDO;  Service: Endoscopy;  Laterality: N/A;    CORONARY ANGIOGRAPHY INCLUDING BYPASS GRAFTS WITH CATHETERIZATION OF LEFT HEART Left 5/13/2022    Procedure: Left heart cath;  Surgeon: Davon Patton MD;  Location: [facility] CATH/EP LAB;  Service: Cardiology;  Laterality: Left;    CORONARY ARTERY BYPASS GRAFT  4/26/2004    x5    ESOPHAGEAL DILATION      ESOPHAGOGASTRODUODENOSCOPY N/A 6/27/2022    Procedure: EGD (ESOPHAGOGASTRODUODENOSCOPY);  Surgeon: Skip Nj MD;  Location: [facility];  Service: Endoscopy;  Laterality: N/A;    HYSTEROSCOPY WITH DILATION AND CURETTAGE OF UTERUS N/A 3/12/2020    Procedure: HYSTEROSCOPY, WITH DILATION AND CURETTAGE OF UTERUS;  Surgeon: Ramona Llanso MD;  Location: [facility] OR;  Service: OB/GYN;  Laterality: N/A;    SPINE SURGERY  3/2000    Tumor    THORACENTESIS Right 2/7/2023    Procedure: Thoracentesis;  Surgeon: Rula Weiss MD;  Location: [facility];  Service: Pulmonary;  Laterality: Right;  ultrasound guided thoracentesis of right pleural effusion 2/07/23 0730    TRANSFORAMINAL EPIDURAL INJECTION OF STEROID  Right 11/21/2019    Procedure: Injection,steroid,epidural,transforaminal approach;  Surgeon: Bj Jones MD;  Location: [facility] OR;  Service: Pain Management;  Laterality: Right;  L4-5, L5-S1    TRANSFORAMINAL EPIDURAL INJECTION OF STEROID Right 12/31/2019    Procedure: Injection,steroid,epidural,transforaminal approach;  Surgeon: Bj Jones MD;  Location: [facility] OR;  Service: Pain Management;  Laterality: Right;  L4-5, L5-S1    TRANSFORAMINAL EPIDURAL INJECTION OF STEROID Right 2/5/2020    Procedure: Injection,steroid,epidural,transforaminal approach;  Surgeon: Bj Jones MD;  Location: [facility] OR;  Service: Pain Management;  Laterality: Right;  L4-5, L5-S1    TRANSFORAMINAL EPIDURAL INJECTION OF STEROID Left 1/27/2021    Procedure: Injection,steroid,epidural,transforaminal approach;  Surgeon: Bj Jones MD;  Location: [facility] OR;  Service: Pain Management;  Laterality: Left;  L4-5, L5-S1    TRANSFORAMINAL EPIDURAL INJECTION OF STEROID Right 3/4/2021    Procedure: Injection,steroid,epidural,transforaminal approach;  Surgeon: Bj Jones MD;  Location: [facility] OR;  Service: Pain Management;  Laterality: Right;  L4-L5, L5-S1    WRIST SURGERY  1993    carpal tunnel         Review of patient's allergies indicates:   Allergen Reactions    Dexlansoprazole Itching, Nausea Only and Rash    Sulfa (sulfonamide antibiotics) Rash    Floxacillin Itching    Januvia [sitagliptin] Other (See Comments)     Hot flashes    Tetracyclines Itching    Fenofibrate micronized Rash    Nitrofurantoin macrocrystalline Other (See Comments)     unknown    Phenylfenesin la [phenylpropanolamine-gg] Rash       No current facility-administered medications on file prior to encounter.     Current Outpatient Medications on File Prior to Encounter   Medication Sig    acetaminophen (TYLENOL) 650 MG TbSR Take 1,300 mg by mouth once daily. 2 Tablet(s) Oral  Every day.    amiodarone (PACERONE) 200 MG Tab Take 1 tablet (200 mg total) by mouth 3 (three) times daily for  4 days, THEN 1 tablet (200 mg total) 2 (two) times daily.    amitriptyline (ELAVIL) 75 MG tablet Take 75 mg by mouth every evening.    apixaban (ELIQUIS) 5 mg Tab Take 1 tablet (5 mg total) by mouth 2 (two) times daily.    blood sugar diagnostic (FREESTYLE LITE STRIPS) Strp USE TO TEST BLOOD SUGAR TWICE A DAY    busPIRone (BUSPAR) 10 MG tablet TAKE 1 TABLET BY MOUTH TWICE A DAY    carboxymethylcellulose 1 % ophthalmic solution Apply 1 drop to eye As instructed. as directed    cetirizine (ZYRTEC) 10 MG tablet Take 10 mg by mouth once daily.    cholecalciferol, vitamin D3, (VITAMIN D3) 50 mcg (2,000 unit) Cap Take 1 capsule by mouth once daily.    clotrimazole-betamethasone 1-0.05% (LOTRISONE) cream Apply topically 2 (two) times daily as needed.    coenzyme Q10 100 mg capsule Take 100 mg by mouth once daily.     cranberry extract 650 mg Cap Take 1 tablet by mouth 2 (two) times daily.    FARXIGA 5 mg Tab tablet Take 5 mg by mouth once daily.    fluticasone furoate-vilanteroL (BREO ELLIPTA) 100-25 mcg/dose diskus inhaler Inhale 1 puff into the lungs once daily. Controller Rinse after you use it    fluticasone propionate (FLONASE) 50 mcg/actuation nasal spray 1 spray (50 mcg total) by Each Nostril route once daily.    furosemide (LASIX) 20 MG tablet Take 1 tablet (20 mg total) by mouth once daily.    Lactobac no.41/Bifidobact no.7 (PROBIOTIC-10 ORAL) Take by mouth.    lancets Misc 1 Units by Misc.(Non-Drug; Combo Route) route 2 (two) times daily.    lansoprazole (PREVACID) 30 MG capsule Take 1 capsule (30 mg total) by mouth once daily.    levalbuterol (XOPENEX) 1.25 mg/0.5 mL nebulizer solution Take 0.5 mLs (1.25 mg total) by nebulization every 6 (six) hours as needed for Wheezing. Rescue    levothyroxine (SYNTHROID) 25 MCG tablet TAKE 1 TABLET BY MOUTH BEFORE BREAKFAST EVERY DAY    metoprolol succinate (TOPROL XL) 25 MG 24 hr tablet Take 1 tablet (25 mg total) by mouth once daily.    nitroGLYCERIN (NITROSTAT) 0.4 MG SL  tablet Place 0.4 mg under the tongue every 5 (five) minutes as needed. 0.4mg Sublingual PRN .      RESTASIS 0.05 % ophthalmic emulsion Place 1 drop into both eyes 2 (two) times daily.    rosuvastatin (CRESTOR) 10 MG tablet Take 1 tablet (10 mg total) by mouth once daily.    triazolam (HALCION) 0.25 MG Tab Take 1 tablet (0.25 mg total) by mouth nightly as needed (sleep).    vitamins  A,C,E-zinc-copper [id number] unit-mg-unit Cap Take 1 capsule by mouth 2 (two) times daily.     [DISCONTINUED] dronedarone (MULTAQ) 400 mg Tab Take 1 tablet (400 mg total) by mouth 2 (two) times daily with meals.    [DISCONTINUED] spironolactone (ALDACTONE) 25 MG tablet Take 1 tablet (25 mg total) by mouth once daily.     Family History       Problem Relation (Age of Onset)    Breast cancer Mother, Maternal Aunt          Tobacco Use    Smoking status: Passive Smoke Exposure - Never Smoker    Smokeless tobacco: Never    Tobacco comments:     PARENTS    Substance and Sexual Activity    Alcohol use: Yes     Comment: Rare    Drug use: No    Sexual activity: Not Currently     Review of Systems   Constitutional:  Negative for chills and fever.   HENT:  Negative for hearing loss and sore throat.    Eyes:  Negative for pain and redness.   Respiratory:  Positive for cough and shortness of breath.    Cardiovascular:  Positive for chest pain. Negative for palpitations.   Gastrointestinal:  Negative for diarrhea and nausea.   Genitourinary:  Negative for dysuria and flank pain.   Musculoskeletal:  Negative for back pain and gait problem.   Skin:  Negative for rash and wound.   Neurological:  Negative for dizziness and headaches.   Psychiatric/Behavioral:  Negative for confusion and hallucinations.    Objective:     Vital Signs (Most Recent):  Temp: 98 °F (36.7 °C) (04/09/23 2311)  Pulse: 81 (04/09/23 2311)  Resp: (!) 22 (04/09/23 2311)  BP: (!) 147/84 (04/09/23 2311)  SpO2: 98 % (04/09/23 2311)   Vital Signs (24h Range):  Temp:  [97.9 °F (36.6  °C)-98 °F (36.7 °C)] 98 °F (36.7 °C)  Pulse:  [] 81  Resp:  [11-24] 22  SpO2:  [91 %-99 %] 98 %  BP: (131-165)/() 147/84     Weight: 64.4 kg (142 lb)  Body mass index is 25.97 kg/m².    Physical Exam  Vitals reviewed.   Constitutional:       Appearance: She is ill-appearing. She is not toxic-appearing.   HENT:      Head: Normocephalic and atraumatic.   Eyes:      Extraocular Movements: Extraocular movements intact.      Conjunctiva/sclera: Conjunctivae normal.   Cardiovascular:      Rate and Rhythm: Normal rate. Rhythm irregular.   Pulmonary:      Comments: Tachypneic, diminished breath sounds, crackles  Abdominal:      General: There is no distension.      Palpations: Abdomen is soft.      Tenderness: There is no abdominal tenderness.   Musculoskeletal:         General: No swelling or tenderness.      Cervical back: Neck supple. No tenderness.   Skin:     General: Skin is warm and dry.   Neurological:      General: No focal deficit present.      Mental Status: She is alert and oriented to person, place, and time.   Psychiatric:         Mood and Affect: Mood normal.         Judgment: Judgment normal.           Significant Labs: All pertinent labs within the past 24 hours have been reviewed.    Significant Imaging: I have reviewed all pertinent imaging results/findings within the past 24 hours.

## 2023-04-10 NOTE — H&P
[facility] - Emergency Dept  Hospital Medicine  History & Physical    Patient Name: Darlin Tipton  MRN: [id number]  Patient Class: OP- Observation  Admission Date: 4/9/2023  Attending Physician: Niecy Patel MD   Primary Care Provider: Oumar Almonte Jr, MD         Patient information was obtained from patient, relative(s), past medical records and ER records.     Subjective:     Principal Problem:Acute on chronic respiratory failure with hypoxia    Chief Complaint:   Chief Complaint   Patient presents with    Chest Pain     Xfew hours. Midsternal crushing pain.     Shortness of Breath        HPI: 83 yo F with PMH including CAD, DM 2, HTN, PAF, GERD, COPD, Hypothyroidism, HFrEF who presents for shortness of breath. Patient has had increasing shortness of breath for the past week, particularly on ambulation. She has been using her nebulizers without relief. She typically wears oxygen at night only and it has progressed to continuous all day with minimal relief. Worse when she sits back or lays down so is sitting up. Has been compliant with lasix, does not feel she is urinating less and feels her leg swelling is better than usual. She endorses cough, worse but nonproductive. She has some chest pressure, central radiating to back, worse today. She reports valvular disease as well and an upcoming cardiology appointment. Reports prior thoracentesis with last on in Feb.  Last dose of Eliquis this evening (4/9 PM). In the ED, vitals with occasional tachycardia, hypoxia with oxygen so increased to 3L, labs relatively stable, CXR with bilateral pleural effusion R > L, hospitalist contacted with patient meeting observation admission.      Past Medical History:   Diagnosis Date    Allergy     Dust mites, Grasses, Trees    Arthritis     Asthma     Blood transfusion     CAD (coronary artery disease)     Cataract     CHF (congestive heart failure)     CHRONIC BRONCHITIS     Diabetes mellitus      Diabetes mellitus type II     GERD (gastroesophageal reflux disease)     Hyperlipidemia     Hypertension     Irregular heart beat     Kidney disease     ckd stage 3  as stated by patient - to see MD    Paroxysmal atrial fibrillation     Post-menopausal bleeding 2018    Spinal stenosis     Thyroid disease     Hypothyroidism       Past Surgical History:   Procedure Laterality Date    APPENDECTOMY  1968    BLADDER SUSPENSION  1989    CARDIAC SURGERY  2016    CABG    CATARACT EXTRACTION  9/2007 (L) and 10/2207 (R)    COLONOSCOPY N/A 10/18/2017    Procedure: COLONOSCOPY;  Surgeon: Esme Acuna MD;  Location: [facility] ENDO;  Service: Endoscopy;  Laterality: N/A;    CORONARY ANGIOGRAPHY INCLUDING BYPASS GRAFTS WITH CATHETERIZATION OF LEFT HEART Left 5/13/2022    Procedure: Left heart cath;  Surgeon: Davon Patton MD;  Location: [facility] CATH/EP LAB;  Service: Cardiology;  Laterality: Left;    CORONARY ARTERY BYPASS GRAFT  4/26/2004    x5    ESOPHAGEAL DILATION      ESOPHAGOGASTRODUODENOSCOPY N/A 6/27/2022    Procedure: EGD (ESOPHAGOGASTRODUODENOSCOPY);  Surgeon: Skip Nj MD;  Location: [facility] ENDO;  Service: Endoscopy;  Laterality: N/A;    HYSTEROSCOPY WITH DILATION AND CURETTAGE OF UTERUS N/A 3/12/2020    Procedure: HYSTEROSCOPY, WITH DILATION AND CURETTAGE OF UTERUS;  Surgeon: Ramona Llanos MD;  Location: [facility];  Service: OB/GYN;  Laterality: N/A;    SPINE SURGERY  3/2000    Tumor    THORACENTESIS Right 2/7/2023    Procedure: Thoracentesis;  Surgeon: Rula Weiss MD;  Location: [facility];  Service: Pulmonary;  Laterality: Right;  ultrasound guided thoracentesis of right pleural effusion 2/07/23 0730    TRANSFORAMINAL EPIDURAL INJECTION OF STEROID Right 11/21/2019    Procedure: Injection,steroid,epidural,transforaminal approach;  Surgeon: Bj Jones MD;  Location: [facility] OR;  Service: Pain Management;  Laterality: Right;  L4-5, L5-S1    TRANSFORAMINAL EPIDURAL INJECTION OF STEROID  Right 12/31/2019    Procedure: Injection,steroid,epidural,transforaminal approach;  Surgeon: Bj Jones MD;  Location: [facility] OR;  Service: Pain Management;  Laterality: Right;  L4-5, L5-S1    TRANSFORAMINAL EPIDURAL INJECTION OF STEROID Right 2/5/2020    Procedure: Injection,steroid,epidural,transforaminal approach;  Surgeon: Bj Jones MD;  Location: [facility] OR;  Service: Pain Management;  Laterality: Right;  L4-5, L5-S1    TRANSFORAMINAL EPIDURAL INJECTION OF STEROID Left 1/27/2021    Procedure: Injection,steroid,epidural,transforaminal approach;  Surgeon: Bj Jones MD;  Location: [facility] OR;  Service: Pain Management;  Laterality: Left;  L4-5, L5-S1    TRANSFORAMINAL EPIDURAL INJECTION OF STEROID Right 3/4/2021    Procedure: Injection,steroid,epidural,transforaminal approach;  Surgeon: Bj Jones MD;  Location: [facility] OR;  Service: Pain Management;  Laterality: Right;  L4-L5, L5-S1    WRIST SURGERY  1993    carpal tunnel         Review of patient's allergies indicates:   Allergen Reactions    Dexlansoprazole Itching, Nausea Only and Rash    Sulfa (sulfonamide antibiotics) Rash    Floxacillin Itching    Januvia [sitagliptin] Other (See Comments)     Hot flashes    Tetracyclines Itching    Fenofibrate micronized Rash    Nitrofurantoin macrocrystalline Other (See Comments)     unknown    Phenylfenesin la [phenylpropanolamine-gg] Rash       No current facility-administered medications on file prior to encounter.     Current Outpatient Medications on File Prior to Encounter   Medication Sig    acetaminophen (TYLENOL) 650 MG TbSR Take 1,300 mg by mouth once daily. 2 Tablet(s) Oral  Every day.    amiodarone (PACERONE) 200 MG Tab Take 1 tablet (200 mg total) by mouth 3 (three) times daily for 4 days, THEN 1 tablet (200 mg total) 2 (two) times daily.    amitriptyline (ELAVIL) 75 MG tablet Take 75 mg by mouth every evening.    apixaban (ELIQUIS) 5 mg Tab Take 1 tablet (5 mg total) by mouth 2 (two) times daily.     blood sugar diagnostic (FREESTYLE LITE STRIPS) Strp USE TO TEST BLOOD SUGAR TWICE A DAY    busPIRone (BUSPAR) 10 MG tablet TAKE 1 TABLET BY MOUTH TWICE A DAY    carboxymethylcellulose 1 % ophthalmic solution Apply 1 drop to eye As instructed. as directed    cetirizine (ZYRTEC) 10 MG tablet Take 10 mg by mouth once daily.    cholecalciferol, vitamin D3, (VITAMIN D3) 50 mcg (2,000 unit) Cap Take 1 capsule by mouth once daily.    clotrimazole-betamethasone 1-0.05% (LOTRISONE) cream Apply topically 2 (two) times daily as needed.    coenzyme Q10 100 mg capsule Take 100 mg by mouth once daily.     cranberry extract 650 mg Cap Take 1 tablet by mouth 2 (two) times daily.    FARXIGA 5 mg Tab tablet Take 5 mg by mouth once daily.    fluticasone furoate-vilanteroL (BREO ELLIPTA) 100-25 mcg/dose diskus inhaler Inhale 1 puff into the lungs once daily. Controller Rinse after you use it    fluticasone propionate (FLONASE) 50 mcg/actuation nasal spray 1 spray (50 mcg total) by Each Nostril route once daily.    furosemide (LASIX) 20 MG tablet Take 1 tablet (20 mg total) by mouth once daily.    Lactobac no.41/Bifidobact no.7 (PROBIOTIC-10 ORAL) Take by mouth.    lancets Misc 1 Units by Misc.(Non-Drug; Combo Route) route 2 (two) times daily.    lansoprazole (PREVACID) 30 MG capsule Take 1 capsule (30 mg total) by mouth once daily.    levalbuterol (XOPENEX) 1.25 mg/0.5 mL nebulizer solution Take 0.5 mLs (1.25 mg total) by nebulization every 6 (six) hours as needed for Wheezing. Rescue    levothyroxine (SYNTHROID) 25 MCG tablet TAKE 1 TABLET BY MOUTH BEFORE BREAKFAST EVERY DAY    metoprolol succinate (TOPROL XL) 25 MG 24 hr tablet Take 1 tablet (25 mg total) by mouth once daily.    nitroGLYCERIN (NITROSTAT) 0.4 MG SL tablet Place 0.4 mg under the tongue every 5 (five) minutes as needed. 0.4mg Sublingual PRN .      RESTASIS 0.05 % ophthalmic emulsion Place 1 drop into both eyes 2 (two) times daily.    rosuvastatin  (CRESTOR) 10 MG tablet Take 1 tablet (10 mg total) by mouth once daily.    triazolam (HALCION) 0.25 MG Tab Take 1 tablet (0.25 mg total) by mouth nightly as needed (sleep).    vitamins  A,C,E-zinc-copper [id number] unit-mg-unit Cap Take 1 capsule by mouth 2 (two) times daily.     [DISCONTINUED] dronedarone (MULTAQ) 400 mg Tab Take 1 tablet (400 mg total) by mouth 2 (two) times daily with meals.    [DISCONTINUED] spironolactone (ALDACTONE) 25 MG tablet Take 1 tablet (25 mg total) by mouth once daily.     Family History       Problem Relation (Age of Onset)    Breast cancer Mother, Maternal Aunt          Tobacco Use    Smoking status: Passive Smoke Exposure - Never Smoker    Smokeless tobacco: Never    Tobacco comments:     PARENTS    Substance and Sexual Activity    Alcohol use: Yes     Comment: Rare    Drug use: No    Sexual activity: Not Currently     Review of Systems   Constitutional:  Negative for chills and fever.   HENT:  Negative for hearing loss and sore throat.    Eyes:  Negative for pain and redness.   Respiratory:  Positive for cough and shortness of breath.    Cardiovascular:  Positive for chest pain. Negative for palpitations.   Gastrointestinal:  Negative for diarrhea and nausea.   Genitourinary:  Negative for dysuria and flank pain.   Musculoskeletal:  Negative for back pain and gait problem.   Skin:  Negative for rash and wound.   Neurological:  Negative for dizziness and headaches.   Psychiatric/Behavioral:  Negative for confusion and hallucinations.    Objective:     Vital Signs (Most Recent):  Temp: 98 °F (36.7 °C) (04/09/23 2311)  Pulse: 81 (04/09/23 2311)  Resp: (!) 22 (04/09/23 2311)  BP: (!) 147/84 (04/09/23 2311)  SpO2: 98 % (04/09/23 2311)   Vital Signs (24h Range):  Temp:  [97.9 °F (36.6 °C)-98 °F (36.7 °C)] 98 °F (36.7 °C)  Pulse:  [] 81  Resp:  [11-24] 22  SpO2:  [91 %-99 %] 98 %  BP: (131-165)/() 147/84     Weight: 64.4 kg (142 lb)  Body mass index is 25.97  kg/m².    Physical Exam  Vitals reviewed.   Constitutional:       Appearance: She is ill-appearing. She is not toxic-appearing.   HENT:      Head: Normocephalic and atraumatic.   Eyes:      Extraocular Movements: Extraocular movements intact.      Conjunctiva/sclera: Conjunctivae normal.   Cardiovascular:      Rate and Rhythm: Normal rate. Rhythm irregular.   Pulmonary:      Comments: Tachypneic, diminished breath sounds, crackles  Abdominal:      General: There is no distension.      Palpations: Abdomen is soft.      Tenderness: There is no abdominal tenderness.   Musculoskeletal:         General: No swelling or tenderness.      Cervical back: Neck supple. No tenderness.   Skin:     General: Skin is warm and dry.   Neurological:      General: No focal deficit present.      Mental Status: She is alert and oriented to person, place, and time.   Psychiatric:         Mood and Affect: Mood normal.         Judgment: Judgment normal.           Significant Labs: All pertinent labs within the past 24 hours have been reviewed.    Significant Imaging: I have reviewed all pertinent imaging results/findings within the past 24 hours.    Assessment/Plan:     Acute on chronic respiratory failure with hypoxia due to volume overload with pleural effusion   Bilateral pleural effusions, right greater than left with recurrent thoracentesis  Chest pain due to above, rule out ACS  HFrEF, probable exacerbation as etiology of volume overload  CAD  DM 2  HTN  PAF  GERD  COPD  Hypothyroidism     -IV lasix BID  -I/O  -Fluid restriction  -Hold Eliquis  -Thoracentesis needed - defer to IR, Pulmonary (consulted) regarding preferred protocol for thoracentesis with last dose of eliquis 4/9 PM  -Will hold eliquis  -Trend troponins  -Echo  -Telemetry  -Trend labs  -Resume home meds as appropriate      FULL CODE  DVT ppx Hold Eliquis while planning procedure     On 04/09/2023, patient should be placed in hospital observation services under my  care.        Niecy Patel MD  Department of Hospital Medicine  [facility] - Emergency Dept

## 2023-04-10 NOTE — PROGRESS NOTES
Automatic Inhaler to Nebulizer Interchange    fluticasone/vilanterol (Breo Ellipta) 100 mcg/25 mcg changed to budesonide 0.5 mg twice daily AND arformoterol 15 mcg twice daily per [facility] Automatic Therapeutic Substitutions Protocol.    Please contact pharmacy at extension [phone] with any questions.     Thank you,   Linette Benson

## 2023-04-10 NOTE — PLAN OF CARE
[facility]  Initial Discharge Assessment       Primary Care Provider: Oumar Almonte Jr, MD    Admission Diagnosis: Pleural effusion [J90]    Admission Date: 4/9/2023  Expected Discharge Date:     DC assessment completed with pt at bedside. Pt confirms demographics are current. Pt lives at listed address with spouse dwayne. PCP Suzy and pharmacy [facility]. DME rollator, sc, neb, oxygen (portable and concentrator). Glucometer. Denies recent admission. Daughter to provide ride home. No new needs anticipated at DC. CM following.      Discharge Barriers Identified: None    Payor: [employer] MANAGED MEDICARE / Plan: [employer] MEDICARE PLAN PPO / Product Type: Medicare Advantage /     Extended Emergency Contact Information  Primary Emergency Contact: Dwayne Tipton  Address: [address] [place] [address] [facility]  Home Phone: [phone]  Mobile Phone: [phone]  Relation: Spouse  Preferred language: English   needed? No    Discharge Plan A: Home with family  Discharge Plan B: Home Health      [id number] - [place], [place] - [address]  [address]  [facility] [address]  Phone: [phone] Fax: [phone]    [facility]  [address]  [facility] [address]  Phone: [phone] Fax: [phone]    [id number] - [place], [place] - [address]  [address]  [facility] [address]  Phone: [phone] Fax: [phone]    [facility] - [address] AT Portal to Registered [facility] Sites  [facility]  [place] [address]  Phone: [phone] Fax: [phone]      Initial Assessment (most recent)       Adult Discharge Assessment - 04/10/23 1318          Discharge Assessment    Assessment Type Discharge Planning Assessment     Confirmed/corrected address, phone number and insurance Yes     Confirmed Demographics Correct on Facesheet     Source of Information patient;family      Does patient/caregiver understand observation status Yes     People in Home spouse     Do you expect to return to your current living situation? Yes     Do you have help at home or someone to help you manage your care at home? Yes     Prior to hospitilization cognitive status: Alert/Oriented     Current cognitive status: Alert/Oriented     Walking or Climbing Stairs ambulation difficulty, requires equipment     Dressing/Bathing bathing difficulty, requires equipment     Equipment Currently Used at Home nebulizer;rollator;shower chair;oxygen;glucometer     Readmission within 30 days? No     Patient currently being followed by outpatient case management? No     Do you currently have service(s) that help you manage your care at home? No     Do you take prescription medications? Yes     Do you have prescription coverage? Yes     Coverage [employer]etna     Do you have any problems affording any of your prescribed medications? No     Is the patient taking medications as prescribed? yes     How do you get to doctors appointments? family or friend will provide     Are you on dialysis? No     Do you take coumadin? No     Discharge Plan A Home with family     Discharge Plan B Home Health     DME Needed Upon Discharge  none     Discharge Plan discussed with: Patient;Adult children     Discharge Barriers Identified None

## 2023-04-10 NOTE — ED PROVIDER NOTES
Encounter Date: 4/9/2023       History     Chief Complaint   Patient presents with    Chest Pain     Xfew hours. Midsternal crushing pain.     Shortness of Breath     82-year-old female whose past medical history includes asthma, chronic bronchitis, diabetes, GERD, hypertension, hyperlipidemia, congestive heart failure, coronary artery disease and atrial fibrillation who presents complaining of shortness of breath and chest pain.  The patient reports she has progressively worsening shortness of breath and dyspnea with exertion over the past week.  She normally only wears 2 L of oxygen at night to sleep.  She reports throughout the day today she has required supplemental oxygen up to 4 L.  She has felt short of breath despite this.  She reports diffuse discomfort to the anterior chest bilateral rib area and back.  This pain has been constant in nature over the past 2 days.  The pain is not worse with movement.  She reports that it feels like a smothering type feeling.  She had the same type of discomfort in the past when she had pleural effusions requiring thoracentesis.  She states she was scheduled to see the lung specialist tomorrow.  She denies any pain radiating in her to her neck or jaws.  She denies any headache or visual changes.  She denies syncope or near-syncope.  She denies any lower extremity pain or swelling.  She did attempted breathing treatment before she came to the emergency room as well.  She denies fever, upper or lower respiratory infectious type symptoms and denies any abdominal pain nausea vomiting or diarrhea.  She denies any other problems or complaints.      Review of patient's allergies indicates:   Allergen Reactions    Dexlansoprazole Itching, Nausea Only and Rash    Sulfa (sulfonamide antibiotics) Rash    Floxacillin Itching    Januvia [sitagliptin] Other (See Comments)     Hot flashes    Tetracyclines Itching    Fenofibrate micronized Rash    Nitrofurantoin macrocrystalline Other (See  Comments)     unknown    Phenylfenesin la [phenylpropanolamine-gg] Rash     Past Medical History:   Diagnosis Date    Allergy     Dust mites, Grasses, Trees    Arthritis     Asthma     Blood transfusion     CAD (coronary artery disease)     Cataract     CHF (congestive heart failure)     CHRONIC BRONCHITIS     Diabetes mellitus     Diabetes mellitus type II     GERD (gastroesophageal reflux disease)     Hyperlipidemia     Hypertension     Irregular heart beat     Kidney disease     ckd stage 3  as stated by patient - to see MD    Paroxysmal atrial fibrillation     Post-menopausal bleeding 2018    Spinal stenosis     Thyroid disease     Hypothyroidism     Past Surgical History:   Procedure Laterality Date    APPENDECTOMY  1968    BLADDER SUSPENSION  1989    CARDIAC SURGERY  2016    CABG    CATARACT EXTRACTION  9/2007 (L) and 10/2207 (R)    COLONOSCOPY N/A 10/18/2017    Procedure: COLONOSCOPY;  Surgeon: Esme Acuna MD;  Location: [facility] ENDO;  Service: Endoscopy;  Laterality: N/A;    CORONARY ANGIOGRAPHY INCLUDING BYPASS GRAFTS WITH CATHETERIZATION OF LEFT HEART Left 5/13/2022    Procedure: Left heart cath;  Surgeon: Davon Patton MD;  Location: [facility] CATH/EP LAB;  Service: Cardiology;  Laterality: Left;    CORONARY ARTERY BYPASS GRAFT  4/26/2004    x5    ESOPHAGEAL DILATION      ESOPHAGOGASTRODUODENOSCOPY N/A 6/27/2022    Procedure: EGD (ESOPHAGOGASTRODUODENOSCOPY);  Surgeon: Skip Nj MD;  Location: [facility] ENDO;  Service: Endoscopy;  Laterality: N/A;    HYSTEROSCOPY WITH DILATION AND CURETTAGE OF UTERUS N/A 3/12/2020    Procedure: HYSTEROSCOPY, WITH DILATION AND CURETTAGE OF UTERUS;  Surgeon: Ramona Llanos MD;  Location: [facility] OR;  Service: OB/GYN;  Laterality: N/A;    SPINE SURGERY  3/2000    Tumor    THORACENTESIS Right 2/7/2023    Procedure: Thoracentesis;  Surgeon: Rula Weiss MD;  Location: [facility] ENDO;  Service: Pulmonary;  Laterality: Right;  ultrasound guided thoracentesis of right pleural  effusion 2/07/23 0730    TRANSFORAMINAL EPIDURAL INJECTION OF STEROID Right 11/21/2019    Procedure: Injection,steroid,epidural,transforaminal approach;  Surgeon: Bj Jones MD;  Location: [facility] OR;  Service: Pain Management;  Laterality: Right;  L4-5, L5-S1    TRANSFORAMINAL EPIDURAL INJECTION OF STEROID Right 12/31/2019    Procedure: Injection,steroid,epidural,transforaminal approach;  Surgeon: Bj Jones MD;  Location: [facility] OR;  Service: Pain Management;  Laterality: Right;  L4-5, L5-S1    TRANSFORAMINAL EPIDURAL INJECTION OF STEROID Right 2/5/2020    Procedure: Injection,steroid,epidural,transforaminal approach;  Surgeon: Bj Jones MD;  Location: [facility] OR;  Service: Pain Management;  Laterality: Right;  L4-5, L5-S1    TRANSFORAMINAL EPIDURAL INJECTION OF STEROID Left 1/27/2021    Procedure: Injection,steroid,epidural,transforaminal approach;  Surgeon: Bj Jones MD;  Location: [facility] OR;  Service: Pain Management;  Laterality: Left;  L4-5, L5-S1    TRANSFORAMINAL EPIDURAL INJECTION OF STEROID Right 3/4/2021    Procedure: Injection,steroid,epidural,transforaminal approach;  Surgeon: Bj Jones MD;  Location: [facility] OR;  Service: Pain Management;  Laterality: Right;  L4-L5, L5-S1    WRIST SURGERY  1993    carpal tunnel       Family History   Problem Relation Age of Onset    Breast cancer Mother     Breast cancer Maternal Aunt     Allergic rhinitis Neg Hx     Allergies Neg Hx     Angioedema Neg Hx     Asthma Neg Hx     Eczema Neg Hx     Immunodeficiency Neg Hx     Urticaria Neg Hx     Rhinitis Neg Hx     Atopy Neg Hx      Social History     Tobacco Use    Smoking status: Passive Smoke Exposure - Never Smoker    Smokeless tobacco: Never    Tobacco comments:     PARENTS    Substance Use Topics    Alcohol use: Yes     Comment: Rare    Drug use: No     Review of Systems   Constitutional:  Positive for activity change and fatigue. Negative for appetite change, chills and fever.   HENT: Negative.  Negative for  congestion, ear pain, rhinorrhea, sore throat and trouble swallowing.    Eyes: Negative.  Negative for photophobia, pain, redness and visual disturbance.   Respiratory:  Positive for shortness of breath. Negative for cough, chest tightness and wheezing.    Cardiovascular:  Positive for chest pain. Negative for palpitations and leg swelling.   Gastrointestinal: Negative.  Negative for abdominal distention, abdominal pain, blood in stool, constipation, diarrhea, nausea and vomiting.   Endocrine: Negative.    Genitourinary: Negative.  Negative for decreased urine volume, difficulty urinating, dysuria, flank pain, frequency, pelvic pain and urgency.   Musculoskeletal: Negative.  Negative for arthralgias, back pain, gait problem, myalgias, neck pain and neck stiffness.   Skin: Negative.  Negative for rash.   Neurological: Negative.  Negative for dizziness, syncope, facial asymmetry, speech difficulty, light-headedness, numbness and headaches.   Hematological:  Does not bruise/bleed easily.   Psychiatric/Behavioral: Negative.  Negative for confusion.    All other systems reviewed and are negative.    Physical Exam     Initial Vitals [04/09/23 1957]   BP Pulse Resp Temp SpO2   134/88 102 (!) 24 97.9 °F (36.6 °C) (!) 91 %      MAP       --         Physical Exam    Nursing note and vitals reviewed.  Constitutional: She is active and cooperative. She has a sickly appearance. She does not appear ill. No distress.   HENT:   Head: Normocephalic and atraumatic.   Right Ear: Tympanic membrane normal.   Left Ear: Tympanic membrane normal.   Nose: Nose normal.   Mouth/Throat: Uvula is midline, oropharynx is clear and moist and mucous membranes are normal. No oral lesions. No uvula swelling. No oropharyngeal exudate, posterior oropharyngeal edema or posterior oropharyngeal erythema.   Eyes: Conjunctivae, EOM and lids are normal. Pupils are equal, round, and reactive to light. No scleral icterus.   Neck: Trachea normal and phonation  normal. Neck supple. No thyroid mass and no thyromegaly present. No stridor present. JVD present.   Normal range of motion.   Full passive range of motion without pain.     Cardiovascular:  Normal rate, regular rhythm, normal heart sounds, intact distal pulses and normal pulses.     Exam reveals no gallop, no distant heart sounds, no friction rub and no decreased pulses.       No murmur heard.  Pulmonary/Chest: Accessory muscle usage present. No stridor. Tachypnea noted. She has decreased breath sounds in the right middle field, the right lower field and the left lower field. She has no wheezes. She has no rhonchi. She has rales in the right middle field, the right lower field, the left middle field and the left lower field.   Abdominal: Abdomen is soft. Bowel sounds are normal. She exhibits no distension, no pulsatile midline mass and no mass. There is no abdominal tenderness. There is no rigidity and no guarding.   Musculoskeletal:         General: No tenderness or edema. Normal range of motion.      Right hand: Normal. Normal range of motion. Normal strength. Normal sensation. Normal capillary refill. Normal pulse.      Left hand: Normal. Normal range of motion. Normal strength. Normal sensation. Normal capillary refill. Normal pulse.      Cervical back: Normal, full passive range of motion without pain, normal range of motion and neck supple. No edema, erythema, rigidity or bony tenderness. No spinous process tenderness or muscular tenderness. Normal range of motion.      Thoracic back: Normal. No bony tenderness. Normal range of motion.      Lumbar back: Normal. No bony tenderness. Normal range of motion.      Right foot: Normal. Normal range of motion and normal capillary refill. No tenderness or bony tenderness. Normal pulse.      Left foot: Normal. Normal range of motion and normal capillary refill. No tenderness or bony tenderness. Normal pulse.      Comments: Pulses are 2+ throughout, cap refill is less  than 2 sec throughout, extremities are nontender throughout with full range of motion. There is no spinal tenderness to palpation.     Neurological: She is alert and oriented to person, place, and time. She has normal strength. She displays normal reflexes. No cranial nerve deficit or sensory deficit. Gait normal.   No focal deficits.   Skin: Skin is warm, dry and intact. Capillary refill takes less than 2 seconds. No ecchymosis, no petechiae and no rash noted. No erythema. No pallor.   Psychiatric: She has a normal mood and affect. Her speech is normal and behavior is normal. Judgment and thought content normal. Cognition and memory are normal.       ED Course   Procedures  Labs Reviewed   CBC W/ AUTO DIFFERENTIAL - Abnormal; Notable for the following components:       Result Value    Hemoglobin 11.2 (*)     Hematocrit 35.7 (*)     MCH 26.9 (*)     MCHC 31.4 (*)     RDW 15.2 (*)     All other components within normal limits   COMPREHENSIVE METABOLIC PANEL - Abnormal; Notable for the following components:    Sodium 132 (*)     Glucose 115 (*)     BUN 39 (*)     eGFR 45.2 (*)     All other components within normal limits   B-TYPE NATRIURETIC PEPTIDE - Abnormal; Notable for the following components:    BNP 1,320 (*)     All other components within normal limits   CULTURE, BLOOD   CULTURE, BLOOD   MAGNESIUM   TROPONIN I HIGH SENSITIVITY   LIPASE   CK   TSH   LIPASE   CK   SARS-COV-2 RNA AMPLIFICATION, QUAL   INFLUENZA A AND B ANTIGEN    Narrative:     Specimen Source->Nasopharyngeal Swab   TROPONIN I HIGH SENSITIVITY   URINALYSIS, REFLEX TO URINE CULTURE   TSH   LACTIC ACID, PLASMA   PROTIME-INR   APTT   PROCALCITONIN          Imaging Results              X-Ray Chest AP Portable (In process)                      Medications   furosemide injection 40 mg (has no administration in time range)   levalbuterol nebulizer solution 0.63 mg (has no administration in time range)   budesonide nebulizer solution 0.5 mg (has no  administration in time range)   ipratropium 0.02 % nebulizer solution 0.5 mg (has no administration in time range)     Medical Decision Making:   Clinical Tests:   Lab Tests: Ordered and Reviewed  Radiological Study: Ordered and Reviewed  Medical Tests: Ordered and Reviewed  ED Management:  Emergent evaluation of an 82-year-old female who presents with worsening tachypnea and shortness of breath.  Patient noted to have tachypnea and JVD in ED.  Markedly decreased breath sounds on right side as well as base of left, rales noted, BNP is elevated, x-ray with large bilateral pleural effusions.  The patient is not complaining of fever, coughing or any infectious type symptoms.  Blood culture has been obtained lactic acid and procalcitonin added although current constellation of symptoms is favoring volume overload and pleural effusions as the etiology of her shortness of breath and chest pain.  EKG is atrial fibrillation with no evidence of acute ischemia.  I have discussed the case in detail with the hospitalist provider who has assumed care and will admit.           Attending Attestation:         Attending Critical Care:   Critical Care Times:   Direct Patient Care (initial evaluation, reassessments, and time considering the case)................................................................10 minutes.   Additional History from reviewing old medical records or taking additional history from the family, EMS, PCP, etc.......................5 minutes.   Ordering, Reviewing, and Interpreting Diagnostic Studies...............................................................................................................5 minutes.   Documentation..................................................................................................................................................................................5 minutes.   Consultation with other Physicians.  .................................................................................................................................................5 minutes.   Consultation with the patient's family directly relating to the patient's condition, care, and DNR status (when patient unable)......5 minutes.   ==============================================================  Total Critical Care Time - exclusive of procedural time: 35 minutes.  ==============================================================                     Clinical Impression:   Final diagnoses:  [R07.9] Chest pain  [R06.02] SOB (shortness of breath) (Primary)  [J90] Bilateral pleural effusion        ED Disposition Condition    Admit Stable                Cari Christian MD  04/09/23 8214

## 2023-04-10 NOTE — CARE UPDATE
04/10/23 0718   Patient Assessment/Suction   Level of Consciousness (AVPU) alert   Respiratory Effort Normal;Unlabored   Expansion/Accessory Muscles/Retractions no use of accessory muscles;no retractions   All Lung Fields Breath Sounds clear;diminished   DWAYNE Breath Sounds clear   LLL Breath Sounds diminished   RUL Breath Sounds clear   RML Breath Sounds diminished   RLL Breath Sounds diminished   Rhythm/Pattern, Respiratory pattern regular;unlabored   Cough Frequency no cough   PRE-TX-O2   Device (Oxygen Therapy) nasal cannula   $ Is the patient on Low Flow Oxygen? Yes   Flow (L/min) 3   SpO2 99 %   Pulse Oximetry Type Intermittent   $ Pulse Oximetry - Multiple Charge Pulse Oximetry - Multiple   Pulse 75   Resp 18   Temp 97.5 °F (36.4 °C)   BP (!) 154/69   Aerosol Therapy   $ Aerosol Therapy Charges Aerosol Treatment   Daily Review of Necessity (SVN) completed   Respiratory Treatment Status (SVN) given   Treatment Route (SVN) mouthpiece;oxygen   Patient Position (SVN) sitting on edge of bed   Post Treatment Assessment (SVN) breath sounds improved   Signs of Intolerance (SVN) none   Breath Sounds Post-Respiratory Treatment   Throughout All Fields Post-Treatment All Fields   Throughout All Fields Post-Treatment aeration increased   Post-treatment Heart Rate (beats/min) 78   Post-treatment Resp Rate (breaths/min) 18   Education   $ Education 15 min;Bronchodilator   Respiratory Evaluation   $ Care Plan Tech Time 15 min   $ Eval/Re-eval Charges Evaluation   Evaluation For New Orders   Home Oxygen   Has Home Oxygen? Yes   Liter Flow 3   Duration with sleep   Route nasal cannula   Mode continuous   Device home concentrator with portable unit

## 2023-04-10 NOTE — HPI
83 yo F with PMH including CAD, DM 2, HTN, PAF, GERD, COPD, Hypothyroidism, HFrEF who presents for shortness of breath. Patient has had increasing shortness of breath for the past week, particularly on ambulation. She has been using her nebulizers without relief. She typically wears oxygen at night only and it has progressed to continuous all day with minimal relief. Worse when she sits back or lays down so is sitting up. Has been compliant with lasix, does not feel she is urinating less and feels her leg swelling is better than usual. She endorses cough, worse but nonproductive. She has some chest pressure, central radiating to back, worse today. She reports valvular disease as well and an upcoming cardiology appointment. Reports prior thoracentesis with last on in Feb.  Last dose of Eliquis this evening (4/9 PM). In the ED, vitals with occasional tachycardia, hypoxia with oxygen so increased to 3L, labs relatively stable, CXR with bilateral pleural effusion R > L, hospitalist contacted with patient meeting observation admission.

## 2023-04-10 NOTE — CONSULTS
Pulmonary/Critical Care Consult      Patient name: Darlin Tipton  MRN: [id number]  Date: 04/10/2023    Admit Date: 4/9/2023  Consult Requested By: Sung Yeager MD    Reason for Consult: pleural effusion    HPI:    4/10/2023 - Pt with h/o CHF (EF 40%), valvular heart disease with recurrent pleural effusion (tapped 5/2022 - chem not done, WBC low, cytology negative), tapped 2/2023 - no studies done) presented to ER with increased SOB, ALVAREZ and found to have bilateral effusions R > L, increased edema.  Her last dose of Eliquis was PM 4/9,  Now admitted for further treatment.    Review of Systems    Review of Systems   Constitutional:  Positive for malaise/fatigue. Negative for chills, diaphoresis, fever and weight loss.   HENT:  Negative for congestion.    Eyes:  Negative for pain.   Respiratory:  Positive for shortness of breath. Negative for cough, hemoptysis, sputum production, wheezing and stridor.    Cardiovascular:  Positive for orthopnea and leg swelling. Negative for chest pain, palpitations, claudication and PND.   Gastrointestinal:  Negative for abdominal pain, constipation, diarrhea, heartburn, nausea and vomiting.   Genitourinary:  Negative for dysuria, frequency and urgency.   Musculoskeletal:  Negative for falls and myalgias.   Neurological:  Negative for sensory change, focal weakness and weakness.   Psychiatric/Behavioral:  Negative for depression, substance abuse and suicidal ideas. The patient is not nervous/anxious.      Past Medical History    Past Medical History:   Diagnosis Date    Allergy     Dust mites, Grasses, Trees    Arthritis     Asthma     Blood transfusion     CAD (coronary artery disease)     Cataract     CHF (congestive heart failure)     CHRONIC BRONCHITIS     Diabetes mellitus     Diabetes mellitus type II     GERD (gastroesophageal reflux disease)     Hyperlipidemia     Hypertension     Irregular heart beat     Kidney disease     ckd stage 3  as stated by patient - to see MD     Paroxysmal atrial fibrillation     Post-menopausal bleeding 2018    Spinal stenosis     Thyroid disease     Hypothyroidism       Past Surgical History    Past Surgical History:   Procedure Laterality Date    APPENDECTOMY  1968    BLADDER SUSPENSION  1989    CARDIAC SURGERY  2016    CABG    CATARACT EXTRACTION  9/2007 (L) and 10/2207 (R)    COLONOSCOPY N/A 10/18/2017    Procedure: COLONOSCOPY;  Surgeon: Esme Acuna MD;  Location: [facility] ENDO;  Service: Endoscopy;  Laterality: N/A;    CORONARY ANGIOGRAPHY INCLUDING BYPASS GRAFTS WITH CATHETERIZATION OF LEFT HEART Left 5/13/2022    Procedure: Left heart cath;  Surgeon: Davon Patton MD;  Location: [facility] CATH/EP LAB;  Service: Cardiology;  Laterality: Left;    CORONARY ARTERY BYPASS GRAFT  4/26/2004    x5    ESOPHAGEAL DILATION      ESOPHAGOGASTRODUODENOSCOPY N/A 6/27/2022    Procedure: EGD (ESOPHAGOGASTRODUODENOSCOPY);  Surgeon: Skip Nj MD;  Location: [facility] ENDO;  Service: Endoscopy;  Laterality: N/A;    HYSTEROSCOPY WITH DILATION AND CURETTAGE OF UTERUS N/A 3/12/2020    Procedure: HYSTEROSCOPY, WITH DILATION AND CURETTAGE OF UTERUS;  Surgeon: Ramona Llanos MD;  Location: [facility] OR;  Service: OB/GYN;  Laterality: N/A;    SPINE SURGERY  3/2000    Tumor    THORACENTESIS Right 2/7/2023    Procedure: Thoracentesis;  Surgeon: Rula Weiss MD;  Location: [facility];  Service: Pulmonary;  Laterality: Right;  ultrasound guided thoracentesis of right pleural effusion 2/07/23 0730    TRANSFORAMINAL EPIDURAL INJECTION OF STEROID Right 11/21/2019    Procedure: Injection,steroid,epidural,transforaminal approach;  Surgeon: Bj Jones MD;  Location: [facility] OR;  Service: Pain Management;  Laterality: Right;  L4-5, L5-S1    TRANSFORAMINAL EPIDURAL INJECTION OF STEROID Right 12/31/2019    Procedure: Injection,steroid,epidural,transforaminal approach;  Surgeon: Bj Jones MD;  Location: [facility] OR;  Service: Pain Management;  Laterality: Right;  L4-5, L5-S1     TRANSFORAMINAL EPIDURAL INJECTION OF STEROID Right 2/5/2020    Procedure: Injection,steroid,epidural,transforaminal approach;  Surgeon: Bj Jones MD;  Location: [facility] OR;  Service: Pain Management;  Laterality: Right;  L4-5, L5-S1    TRANSFORAMINAL EPIDURAL INJECTION OF STEROID Left 1/27/2021    Procedure: Injection,steroid,epidural,transforaminal approach;  Surgeon: Bj Jones MD;  Location: [facility] OR;  Service: Pain Management;  Laterality: Left;  L4-5, L5-S1    TRANSFORAMINAL EPIDURAL INJECTION OF STEROID Right 3/4/2021    Procedure: Injection,steroid,epidural,transforaminal approach;  Surgeon: Bj Jones MD;  Location: [facility] OR;  Service: Pain Management;  Laterality: Right;  L4-L5, L5-S1    WRIST SURGERY  1993    carpal tunnel         Medications (scheduled):      amiodarone  200 mg Oral Daily    amitriptyline  75 mg Oral QHS    budesonide  0.5 mg Nebulization Q12H    And    arformoteroL  15 mcg Nebulization BID    atorvastatin  40 mg Oral QHS    busPIRone  10 mg Oral BID    carboxymethylcellulose  1 drop Both Eyes BID    cetirizine  10 mg Oral Daily    cholecalciferol (vitamin D3)  2,000 Units Oral Daily    fluticasone propionate  1 spray Each Nostril Daily    furosemide (LASIX) injection  40 mg Intravenous Q12H    levalbuterol  1 ampule Nebulization QID WAKE    levothyroxine  25 mcg Oral Before breakfast    metoprolol succinate  25 mg Oral Daily       Medications (infusions):         Medications (prn):     acetaminophen, albuterol-ipratropium, clotrimazole-betamethasone 1-0.05%, dextrose 50%, dextrose 50%, glucagon (human recombinant), glucose, glucose, insulin aspart U-100, melatonin, nitroGLYCERIN, ondansetron, oxyCODONE, sodium chloride 0.9%    Family History:   Family History   Problem Relation Age of Onset    Breast cancer Mother     Breast cancer Maternal Aunt     Allergic rhinitis Neg Hx     Allergies Neg Hx     Angioedema Neg Hx     Asthma Neg Hx     Eczema Neg Hx     Immunodeficiency Neg Hx      "Urticaria Neg Hx     Rhinitis Neg Hx     Atopy Neg Hx        Social History: Tobacco:   Social History     Tobacco Use   Smoking Status Passive Smoke Exposure - Never Smoker   Smokeless Tobacco Never   Tobacco Comments    PARENTS                                 EtOH:   Social History     Substance and Sexual Activity   Alcohol Use Yes    Comment: Rare                                Drugs:   Social History     Substance and Sexual Activity   Drug Use No     Physical Exam    Vital signs:  Temp:  [97.5 °F (36.4 °C)-98.6 °F (37 °C)]   Pulse:  []   Resp:  [11-24]   BP: (119-165)/()   SpO2:  [91 %-99 %]     Intake/Output:   Intake/Output Summary (Last 24 hours) at 4/10/2023 1350  Last data filed at 4/10/2023 0500  Gross per 24 hour   Intake 20 ml   Output 400 ml   Net -380 ml        BMI: Estimated body mass index is 26.34 kg/m² as calculated from the following:    Height as of this encounter: 5' 2" (1.575 m).    Weight as of this encounter: 65.3 kg (144 lb).    Physical Exam  Vitals and nursing note reviewed.   Constitutional:       General: She is not in acute distress.     Appearance: Normal appearance. She is not ill-appearing, toxic-appearing or diaphoretic.   HENT:      Head: Normocephalic and atraumatic.      Right Ear: External ear normal.      Left Ear: External ear normal.      Nose: Nose normal. No congestion or rhinorrhea.      Mouth/Throat:      Mouth: Mucous membranes are moist.      Pharynx: Oropharynx is clear. No oropharyngeal exudate or posterior oropharyngeal erythema.   Eyes:      General: No scleral icterus.        Right eye: No discharge.         Left eye: No discharge.      Extraocular Movements: Extraocular movements intact.      Conjunctiva/sclera: Conjunctivae normal.      Pupils: Pupils are equal, round, and reactive to light.   Neck:      Vascular: No carotid bruit.   Cardiovascular:      Rate and Rhythm: Normal rate and regular rhythm.      Pulses: Normal pulses.      Heart sounds: " Murmur heard.     No friction rub. No gallop.   Pulmonary:      Effort: Pulmonary effort is normal. No respiratory distress.      Breath sounds: No stridor. No wheezing, rhonchi or rales.      Comments: Decreased BS right base  Chest:      Chest wall: No tenderness.   Abdominal:      General: Bowel sounds are normal. There is no distension.      Tenderness: There is no abdominal tenderness. There is no guarding.   Musculoskeletal:         General: No swelling. Normal range of motion.      Cervical back: Normal range of motion and neck supple. No rigidity or tenderness.      Right lower leg: Edema present.      Left lower leg: Edema present.   Lymphadenopathy:      Cervical: No cervical adenopathy.   Skin:     General: Skin is warm and dry.      Capillary Refill: Capillary refill takes less than 2 seconds.      Coloration: Skin is not jaundiced.      Findings: No bruising.   Neurological:      General: No focal deficit present.      Mental Status: She is alert and oriented to person, place, and time. Mental status is at baseline.      Cranial Nerves: No cranial nerve deficit.      Sensory: No sensory deficit.      Motor: No weakness.   Psychiatric:         Mood and Affect: Mood normal.         Behavior: Behavior normal.         Thought Content: Thought content normal.         Judgment: Judgment normal.       Laboratory    Recent Labs   Lab 04/09/23 2023   WBC 5.22   RBC 4.17   HGB 11.2*   HCT 35.7*      MCV 86   MCH 26.9*   MCHC 31.4*       Recent Labs   Lab 04/09/23 2023   CALCIUM 9.1   PROT 6.9   *   K 3.5   CO2 26   CL 95   BUN 39*   CREATININE 1.2   ALKPHOS 75   ALT 35   AST 31   BILITOT 0.4       Recent Labs   Lab 04/09/23 2201   INR 1.1   APTT 28.5       Recent Labs   Lab 04/09/23 2023          Additional labs: reviewed    Microbiology:       Microbiology Results (last 7 days)       Procedure Component Value Units Date/Time    Blood culture #1 **CANNOT BE ORDERED STAT** [[id number]]  Collected: 04/09/23 2201    Order Status: Completed Specimen: Blood from Peripheral, Forearm, Right Updated: 04/10/23 0517     Blood Culture, Routine No Growth to date    Blood culture #2 **CANNOT BE ORDERED STAT** [[id number]] Collected: 04/09/23 2207    Order Status: Completed Specimen: Blood from Peripheral, Antecubital, Right Updated: 04/10/23 0517     Blood Culture, Routine No Growth to date            Radiology    X-Ray Chest AP Portable  XR CHEST 1 VIEW    CLINICAL HISTORY:  82 years Female Chest Pain    COMPARISON: March 24, 2023    FINDINGS: Cardiomediastinal silhouette is stable compared to prior. Atherosclerotic calcification of the aorta. Status post median sternotomy. Moderate size right pleural effusion has increased in size compared to prior, with underlying right basilar atelectasis. Small left pleural effusion with left basilar atelectasis, similar to prior. No pneumothorax.    IMPRESSION:    Interval increase in size of moderate right pleural effusion.    Stable small left pleural effusion.    Electronically signed by:  Mikhail De La Torre MD  4/10/2023 6:57 AM CDT Workstation: [id number]        Additional Studies    rerviewed    Ventilator Information                  No results for input(s): PH, PCO2, PO2, HCO3, POCSATURATED, BE in the last 72 hours.      Impression    Active Hospital Problems    Diagnosis  POA    *Acute on chronic respiratory failure with hypoxia [J96.21]  Yes    Asthma [J45.909]  Yes     Chronic    Valvular heart disease, moderate AS/TR [I38]  Yes     Chronic    Bilateral pleural effusion, right greater than left, status post right thoracocentesis 1/21 [J90]  Yes     Chronic    Ischemic cardiomyopathy [I25.5]  Yes      Resolved Hospital Problems   No resolved problems to display.       Plan    Officially has undiagnosed pleural effusion because studies not done on prior taps  Can have tap done 4/12/23  Studies ordered  Most likely related to heart issues  Asthma is stable  Await  ECHO  Follow BNP  Edy as able    Thank you for this consult.  I will follow with you while the patient is hospitalized.        Francisco Gallardo MD  [facility] Pulmonary/Critical Care  04/10/2023

## 2023-04-11 LAB
ANION GAP SERPL CALC-SCNC: 15 MMOL/L (ref 8–16)
BNP SERPL-MCNC: 597 PG/ML (ref 0–99)
BUN SERPL-MCNC: 35 MG/DL (ref 8–23)
CALCIUM SERPL-MCNC: 8.3 MG/DL (ref 8.7–10.5)
CHLORIDE SERPL-SCNC: 95 MMOL/L (ref 95–110)
CO2 SERPL-SCNC: 22 MMOL/L (ref 23–29)
CREAT SERPL-MCNC: 1.3 MG/DL (ref 0.5–1.4)
EST. GFR  (NO RACE VARIABLE): 41.1 ML/MIN/1.73 M^2
GLUCOSE SERPL-MCNC: 104 MG/DL (ref 70–110)
GLUCOSE SERPL-MCNC: 126 MG/DL (ref 70–110)
GLUCOSE SERPL-MCNC: 131 MG/DL (ref 70–110)
GLUCOSE SERPL-MCNC: 90 MG/DL (ref 70–110)
MAGNESIUM SERPL-MCNC: 1.9 MG/DL (ref 1.6–2.6)
POTASSIUM SERPL-SCNC: 3.3 MMOL/L (ref 3.5–5.1)
SODIUM SERPL-SCNC: 132 MMOL/L (ref 136–145)

## 2023-04-11 PROCEDURE — 94761 N-INVAS EAR/PLS OXIMETRY MLT: CPT

## 2023-04-11 PROCEDURE — 63600175 PHARM REV CODE 636 W HCPCS: Performed by: INTERNAL MEDICINE

## 2023-04-11 PROCEDURE — 12000002 HC ACUTE/MED SURGE SEMI-PRIVATE ROOM

## 2023-04-11 PROCEDURE — 25000242 PHARM REV CODE 250 ALT 637 W/ HCPCS: Performed by: INTERNAL MEDICINE

## 2023-04-11 PROCEDURE — 27000221 HC OXYGEN, UP TO 24 HOURS

## 2023-04-11 PROCEDURE — 99232 PR SUBSEQUENT HOSPITAL CARE,LEVL II: ICD-10-PCS | Mod: ,,, | Performed by: INTERNAL MEDICINE

## 2023-04-11 PROCEDURE — 99900031 HC PATIENT EDUCATION (STAT)

## 2023-04-11 PROCEDURE — 96376 TX/PRO/DX INJ SAME DRUG ADON: CPT

## 2023-04-11 PROCEDURE — 83735 ASSAY OF MAGNESIUM: CPT | Performed by: INTERNAL MEDICINE

## 2023-04-11 PROCEDURE — 25000003 PHARM REV CODE 250: Performed by: INTERNAL MEDICINE

## 2023-04-11 PROCEDURE — 36415 COLL VENOUS BLD VENIPUNCTURE: CPT | Performed by: INTERNAL MEDICINE

## 2023-04-11 PROCEDURE — 25000003 PHARM REV CODE 250: Performed by: STUDENT IN AN ORGANIZED HEALTH CARE EDUCATION/TRAINING PROGRAM

## 2023-04-11 PROCEDURE — 94799 UNLISTED PULMONARY SVC/PX: CPT

## 2023-04-11 PROCEDURE — 99232 SBSQ HOSP IP/OBS MODERATE 35: CPT | Mod: ,,, | Performed by: INTERNAL MEDICINE

## 2023-04-11 PROCEDURE — 63600175 PHARM REV CODE 636 W HCPCS: Performed by: STUDENT IN AN ORGANIZED HEALTH CARE EDUCATION/TRAINING PROGRAM

## 2023-04-11 PROCEDURE — 83880 ASSAY OF NATRIURETIC PEPTIDE: CPT | Performed by: INTERNAL MEDICINE

## 2023-04-11 PROCEDURE — 25000242 PHARM REV CODE 250 ALT 637 W/ HCPCS: Performed by: STUDENT IN AN ORGANIZED HEALTH CARE EDUCATION/TRAINING PROGRAM

## 2023-04-11 PROCEDURE — 80048 BASIC METABOLIC PNL TOTAL CA: CPT | Performed by: INTERNAL MEDICINE

## 2023-04-11 PROCEDURE — 99900035 HC TECH TIME PER 15 MIN (STAT)

## 2023-04-11 PROCEDURE — 94640 AIRWAY INHALATION TREATMENT: CPT

## 2023-04-11 RX ORDER — LEVALBUTEROL INHALATION SOLUTION 1.25 MG/3ML
1 SOLUTION RESPIRATORY (INHALATION)
Status: DISCONTINUED | OUTPATIENT
Start: 2023-04-11 | End: 2023-04-13 | Stop reason: HOSPADM

## 2023-04-11 RX ADMIN — AMITRIPTYLINE HYDROCHLORIDE 75 MG: 25 TABLET, FILM COATED ORAL at 09:04

## 2023-04-11 RX ADMIN — ARFORMOTEROL TARTRATE 15 MCG: 15 SOLUTION RESPIRATORY (INHALATION) at 08:04

## 2023-04-11 RX ADMIN — BUDESONIDE INHALATION 0.5 MG: 0.5 SUSPENSION RESPIRATORY (INHALATION) at 08:04

## 2023-04-11 RX ADMIN — FUROSEMIDE 40 MG: 10 INJECTION, SOLUTION INTRAVENOUS at 06:04

## 2023-04-11 RX ADMIN — BUSPIRONE HYDROCHLORIDE 10 MG: 5 TABLET ORAL at 09:04

## 2023-04-11 RX ADMIN — LEVALBUTEROL HYDROCHLORIDE 1.25 MG: 1.25 SOLUTION RESPIRATORY (INHALATION) at 08:04

## 2023-04-11 RX ADMIN — CARBOXYMETHYLCELLULOSE SODIUM 1 DROP: 5 SOLUTION/ DROPS OPHTHALMIC at 09:04

## 2023-04-11 RX ADMIN — FLUTICASONE PROPIONATE 50 MCG: 50 SPRAY, METERED NASAL at 08:04

## 2023-04-11 RX ADMIN — BUSPIRONE HYDROCHLORIDE 10 MG: 5 TABLET ORAL at 08:04

## 2023-04-11 RX ADMIN — Medication 2000 UNITS: at 08:04

## 2023-04-11 RX ADMIN — SIMETHICONE 160 MG: 80 TABLET, CHEWABLE ORAL at 10:04

## 2023-04-11 RX ADMIN — SIMETHICONE 160 MG: 80 TABLET, CHEWABLE ORAL at 07:04

## 2023-04-11 RX ADMIN — METOPROLOL SUCCINATE 25 MG: 25 TABLET, FILM COATED, EXTENDED RELEASE ORAL at 08:04

## 2023-04-11 RX ADMIN — ATORVASTATIN CALCIUM 40 MG: 40 TABLET, FILM COATED ORAL at 09:04

## 2023-04-11 RX ADMIN — ONDANSETRON HYDROCHLORIDE 4 MG: 2 INJECTION, SOLUTION INTRAMUSCULAR; INTRAVENOUS at 06:04

## 2023-04-11 RX ADMIN — LEVOTHYROXINE SODIUM 25 MCG: 0.03 TABLET ORAL at 05:04

## 2023-04-11 RX ADMIN — AMIODARONE HYDROCHLORIDE 200 MG: 200 TABLET ORAL at 08:04

## 2023-04-11 RX ADMIN — CARBOXYMETHYLCELLULOSE SODIUM 1 DROP: 5 SOLUTION/ DROPS OPHTHALMIC at 08:04

## 2023-04-11 RX ADMIN — LEVALBUTEROL HYDROCHLORIDE 1.25 MG: 1.25 SOLUTION RESPIRATORY (INHALATION) at 12:04

## 2023-04-11 RX ADMIN — FUROSEMIDE 40 MG: 10 INJECTION, SOLUTION INTRAVENOUS at 05:04

## 2023-04-11 RX ADMIN — CETIRIZINE HYDROCHLORIDE 10 MG: 5 TABLET ORAL at 08:04

## 2023-04-11 NOTE — ASSESSMENT & PLAN NOTE
Patient with Hypoxic Respiratory failure which is Acute on chronic.  she is not on home oxygen. Supplemental oxygen was provided and noted-      .   Signs/symptoms of respiratory failure include- increased work of breathing. Contributing diagnoses includes - Interstitial lung disease Labs and images were reviewed. Patient Has not had a recent ABG. Will treat underlying causes and adjust management of respiratory failure as follows- plans per pulmonology

## 2023-04-11 NOTE — PROGRESS NOTES
Pulmonary/Critical Care  Progress Note      Patient name: Darlin Tipton  MRN: [id number]  Date: 04/11/2023    Admit Date: 4/9/2023  Consult Requested By: Thelma Lopez MD    Reason for Consult: pleural effusion    HPI:    4/10/2023 - Pt with h/o CHF (EF 40%), valvular heart disease with recurrent pleural effusion (tapped 5/2022 - chem not done, WBC low, cytology negative), tapped 2/2023 - no studies done) presented to ER with increased SOB, ALVAREZ and found to have bilateral effusions R > L, increased edema.  Her last dose of Eliquis was PM 4/9,  Now admitted for further treatment.    4/11/2023 - Stable overnight, thoracentesis planned for tomorrow.  No new issues reported.  BNP better.  ECHO noted - decreased EF, AoS, MR    Review of Systems    Review of Systems   Constitutional:  Positive for malaise/fatigue. Negative for chills, diaphoresis, fever and weight loss.   HENT:  Negative for congestion.    Eyes:  Negative for pain.   Respiratory:  Positive for shortness of breath. Negative for cough, hemoptysis, sputum production, wheezing and stridor.    Cardiovascular:  Positive for orthopnea and leg swelling. Negative for chest pain, palpitations, claudication and PND.   Gastrointestinal:  Negative for abdominal pain, constipation, diarrhea, heartburn, nausea and vomiting.   Genitourinary:  Negative for dysuria, frequency and urgency.   Musculoskeletal:  Negative for falls and myalgias.   Neurological:  Negative for sensory change, focal weakness and weakness.   Psychiatric/Behavioral:  Negative for depression, substance abuse and suicidal ideas. The patient is not nervous/anxious.      Past Medical History    Past Medical History:   Diagnosis Date    Allergy     Dust mites, Grasses, Trees    Arthritis     Asthma     Blood transfusion     CAD (coronary artery disease)     Cataract     CHF (congestive heart failure)     CHRONIC BRONCHITIS     Diabetes mellitus     Diabetes mellitus type II     GERD (gastroesophageal  reflux disease)     Hyperlipidemia     Hypertension     Irregular heart beat     Kidney disease     ckd stage 3  as stated by patient - to see MD    Paroxysmal atrial fibrillation     Post-menopausal bleeding 2018    Spinal stenosis     Thyroid disease     Hypothyroidism       Past Surgical History    Past Surgical History:   Procedure Laterality Date    APPENDECTOMY  1968    BLADDER SUSPENSION  1989    CARDIAC SURGERY  2016    CABG    CATARACT EXTRACTION  9/2007 (L) and 10/2207 (R)    COLONOSCOPY N/A 10/18/2017    Procedure: COLONOSCOPY;  Surgeon: Esme Acuna MD;  Location: [facility] ENDO;  Service: Endoscopy;  Laterality: N/A;    CORONARY ANGIOGRAPHY INCLUDING BYPASS GRAFTS WITH CATHETERIZATION OF LEFT HEART Left 5/13/2022    Procedure: Left heart cath;  Surgeon: Davon Patton MD;  Location: [facility] CATH/EP LAB;  Service: Cardiology;  Laterality: Left;    CORONARY ARTERY BYPASS GRAFT  4/26/2004    x5    ESOPHAGEAL DILATION      ESOPHAGOGASTRODUODENOSCOPY N/A 6/27/2022    Procedure: EGD (ESOPHAGOGASTRODUODENOSCOPY);  Surgeon: Skip Nj MD;  Location: [facility] ENDO;  Service: Endoscopy;  Laterality: N/A;    HYSTEROSCOPY WITH DILATION AND CURETTAGE OF UTERUS N/A 3/12/2020    Procedure: HYSTEROSCOPY, WITH DILATION AND CURETTAGE OF UTERUS;  Surgeon: Ramona Llanos MD;  Location: [facility] OR;  Service: OB/GYN;  Laterality: N/A;    SPINE SURGERY  3/2000    Tumor    THORACENTESIS Right 2/7/2023    Procedure: Thoracentesis;  Surgeon: Rula Weiss MD;  Location: [facility];  Service: Pulmonary;  Laterality: Right;  ultrasound guided thoracentesis of right pleural effusion 2/07/23 0730    TRANSFORAMINAL EPIDURAL INJECTION OF STEROID Right 11/21/2019    Procedure: Injection,steroid,epidural,transforaminal approach;  Surgeon: Bj Jones MD;  Location: [facility] OR;  Service: Pain Management;  Laterality: Right;  L4-5, L5-S1    TRANSFORAMINAL EPIDURAL INJECTION OF STEROID Right 12/31/2019    Procedure:  Injection,steroid,epidural,transforaminal approach;  Surgeon: Bj Jones MD;  Location: [facility] OR;  Service: Pain Management;  Laterality: Right;  L4-5, L5-S1    TRANSFORAMINAL EPIDURAL INJECTION OF STEROID Right 2/5/2020    Procedure: Injection,steroid,epidural,transforaminal approach;  Surgeon: Bj Jones MD;  Location: [facility] OR;  Service: Pain Management;  Laterality: Right;  L4-5, L5-S1    TRANSFORAMINAL EPIDURAL INJECTION OF STEROID Left 1/27/2021    Procedure: Injection,steroid,epidural,transforaminal approach;  Surgeon: Bj Jones MD;  Location: [facility] OR;  Service: Pain Management;  Laterality: Left;  L4-5, L5-S1    TRANSFORAMINAL EPIDURAL INJECTION OF STEROID Right 3/4/2021    Procedure: Injection,steroid,epidural,transforaminal approach;  Surgeon: Bj Jones MD;  Location: [facility] OR;  Service: Pain Management;  Laterality: Right;  L4-L5, L5-S1    WRIST SURGERY  1993    carpal tunnel         Medications (scheduled):      amiodarone  200 mg Oral Daily    amitriptyline  75 mg Oral QHS    budesonide  0.5 mg Nebulization Q12H    And    arformoteroL  15 mcg Nebulization BID    atorvastatin  40 mg Oral QHS    busPIRone  10 mg Oral BID    carboxymethylcellulose  1 drop Both Eyes BID    cetirizine  10 mg Oral Daily    cholecalciferol (vitamin D3)  2,000 Units Oral Daily    fluticasone propionate  1 spray Each Nostril Daily    furosemide (LASIX) injection  40 mg Intravenous Q12H    levalbuterol  1 ampule Nebulization TID WAKE    levothyroxine  25 mcg Oral Before breakfast    metoprolol succinate  25 mg Oral Daily       Medications (infusions):         Medications (prn):     acetaminophen, albuterol-ipratropium, clotrimazole-betamethasone 1-0.05%, dextrose 50%, dextrose 50%, glucagon (human recombinant), glucose, glucose, insulin aspart U-100, melatonin, nitroGLYCERIN, ondansetron, oxyCODONE, simethicone, sodium chloride 0.9%    Family History:   Family History   Problem Relation Age of Onset    Breast cancer Mother      "Breast cancer Maternal Aunt     Allergic rhinitis Neg Hx     Allergies Neg Hx     Angioedema Neg Hx     Asthma Neg Hx     Eczema Neg Hx     Immunodeficiency Neg Hx     Urticaria Neg Hx     Rhinitis Neg Hx     Atopy Neg Hx        Social History: Tobacco:   Social History     Tobacco Use   Smoking Status Passive Smoke Exposure - Never Smoker   Smokeless Tobacco Never   Tobacco Comments    PARENTS                                 EtOH:   Social History     Substance and Sexual Activity   Alcohol Use Yes    Comment: Rare                                Drugs:   Social History     Substance and Sexual Activity   Drug Use No     Physical Exam    Vital signs:  Temp:  [97.2 °F (36.2 °C)-98.6 °F (37 °C)]   Pulse:  []   Resp:  [16-22]   BP: (106-149)/(54-65)   SpO2:  [93 %-100 %]     Intake/Output:   Intake/Output Summary (Last 24 hours) at 4/11/2023 1430  Last data filed at 4/11/2023 1129  Gross per 24 hour   Intake 240 ml   Output 900 ml   Net -660 ml          BMI: Estimated body mass index is 26.34 kg/m² as calculated from the following:    Height as of this encounter: 5' 2" (1.575 m).    Weight as of this encounter: 65.3 kg (144 lb).    Physical Exam  Vitals and nursing note reviewed.   Constitutional:       General: She is not in acute distress.     Appearance: Normal appearance. She is not ill-appearing, toxic-appearing or diaphoretic.   HENT:      Head: Normocephalic and atraumatic.      Right Ear: External ear normal.      Left Ear: External ear normal.      Nose: Nose normal. No congestion or rhinorrhea.      Mouth/Throat:      Mouth: Mucous membranes are moist.      Pharynx: Oropharynx is clear. No oropharyngeal exudate or posterior oropharyngeal erythema.   Eyes:      General: No scleral icterus.        Right eye: No discharge.         Left eye: No discharge.      Extraocular Movements: Extraocular movements intact.      Conjunctiva/sclera: Conjunctivae normal.      Pupils: Pupils are equal, round, and reactive " to light.   Neck:      Vascular: No carotid bruit.   Cardiovascular:      Rate and Rhythm: Normal rate and regular rhythm.      Pulses: Normal pulses.      Heart sounds: Murmur heard.     No friction rub. No gallop.   Pulmonary:      Effort: Pulmonary effort is normal. No respiratory distress.      Breath sounds: No stridor. No wheezing, rhonchi or rales.      Comments: Decreased BS right base  Chest:      Chest wall: No tenderness.   Abdominal:      General: Bowel sounds are normal. There is no distension.      Tenderness: There is no abdominal tenderness. There is no guarding.   Musculoskeletal:         General: No swelling. Normal range of motion.      Cervical back: Normal range of motion and neck supple. No rigidity or tenderness.      Right lower leg: Edema present.      Left lower leg: Edema present.   Lymphadenopathy:      Cervical: No cervical adenopathy.   Skin:     General: Skin is warm and dry.      Capillary Refill: Capillary refill takes less than 2 seconds.      Coloration: Skin is not jaundiced.      Findings: No bruising.   Neurological:      General: No focal deficit present.      Mental Status: She is alert and oriented to person, place, and time. Mental status is at baseline.      Cranial Nerves: No cranial nerve deficit.      Sensory: No sensory deficit.      Motor: No weakness.   Psychiatric:         Mood and Affect: Mood normal.         Behavior: Behavior normal.         Thought Content: Thought content normal.         Judgment: Judgment normal.       Laboratory    No results for input(s): WBC, RBC, HGB, HCT, PLT, MCV, MCH, MCHC in the last 24 hours.      Recent Labs   Lab 04/11/23  0838   CALCIUM 8.3*   *   K 3.3*   CO2 22*   CL 95   BUN 35*   CREATININE 1.3         No results for input(s): PT, INR, APTT in the last 24 hours.      No results for input(s): CPK, CPKMB, TROPONINI, MB in the last 24 hours.      Additional labs: reviewed    Microbiology:       Microbiology Results (last 7  days)       Procedure Component Value Units Date/Time    Blood culture #1 **CANNOT BE ORDERED STAT** [[id number]] Collected: 04/09/23 2201    Order Status: Completed Specimen: Blood from Peripheral, Forearm, Right Updated: 04/10/23 2232     Blood Culture, Routine No Growth to date      No Growth to date    Blood culture #2 **CANNOT BE ORDERED STAT** [[id number]] Collected: 04/09/23 2207    Order Status: Completed Specimen: Blood from Peripheral, Antecubital, Right Updated: 04/10/23 2232     Blood Culture, Routine No Growth to date      No Growth to date            Radiology    Echo  · Moderate to severe tricuspid regurgitation.  · There is moderate aortic valve stenosis.  · Aortic valve area is 0.77 cm2; peak velocity is 2.79 m/s; mean gradient   is 18 mmHg.  · Mildly decreased systolic function.  · The estimated ejection fraction is 45%.  · Grade II left ventricular diastolic dysfunction.  · Mild right atrial enlargement.  · Moderate-to-severe mitral regurgitation.  · Mild left atrial enlargement.  · There is moderate to severe pulmonary hypertension.     X-Ray Chest AP Portable  XR CHEST 1 VIEW    CLINICAL HISTORY:  82 years Female Chest Pain    COMPARISON: March 24, 2023    FINDINGS: Cardiomediastinal silhouette is stable compared to prior. Atherosclerotic calcification of the aorta. Status post median sternotomy. Moderate size right pleural effusion has increased in size compared to prior, with underlying right basilar atelectasis. Small left pleural effusion with left basilar atelectasis, similar to prior. No pneumothorax.    IMPRESSION:    Interval increase in size of moderate right pleural effusion.    Stable small left pleural effusion.    Electronically signed by:  Mikhail De La Torre MD  4/10/2023 6:57 AM CDT Workstation: [id number]        Additional Studies    rerviewed    Ventilator Information                  No results for input(s): PH, PCO2, PO2, HCO3, POCSATURATED, BE in the last 72  hours.      Impression    Active Hospital Problems    Diagnosis  POA    *Acute on chronic respiratory failure with hypoxia [J96.21]  Yes    Asthma [J45.909]  Yes     Chronic    Valvular heart disease, moderate AS/TR [I38]  Yes     Chronic    Bilateral pleural effusion, right greater than left, status post right thoracocentesis 1/21 [J90]  Yes     Chronic    Ischemic cardiomyopathy [I25.5]  Yes      Resolved Hospital Problems   No resolved problems to display.       Plan    Officially has undiagnosed pleural effusion because studies not done on prior taps  Can have tap done 4/12/23  Studies ordered  Most likely related to heart issues  Asthma is stable  ECHO - noted  Follow BNP - better today  Dalilae as able    Thank you for this consult.  I will follow with you while the patient is hospitalized.        Francisco Gallardo MD  [facility] Pulmonary/Critical Care  04/11/2023

## 2023-04-11 NOTE — PROGRESS NOTES
[facility] Medicine  Progress Note    Patient Name: Darlin Tipton  MRN: [id number]  Patient Class: IP- Inpatient   Admission Date: 4/9/2023  Length of Stay: 1 days  Attending Physician: Thelma Lopez MD  Primary Care Provider: Oumar Amlonte Jr, MD        Subjective:     Principal Problem:Acute on chronic respiratory failure with hypoxia        HPI:  83 yo F with PMH including CAD, DM 2, HTN, PAF, GERD, COPD, Hypothyroidism, HFrEF who presents for shortness of breath. Patient has had increasing shortness of breath for the past week, particularly on ambulation. She has been using her nebulizers without relief. She typically wears oxygen at night only and it has progressed to continuous all day with minimal relief. Worse when she sits back or lays down so is sitting up. Has been compliant with lasix, does not feel she is urinating less and feels her leg swelling is better than usual. She endorses cough, worse but nonproductive. She has some chest pressure, central radiating to back, worse today. She reports valvular disease as well and an upcoming cardiology appointment. Reports prior thoracentesis with last on in Feb.  Last dose of Eliquis this evening (4/9 PM). In the ED, vitals with occasional tachycardia, hypoxia with oxygen so increased to 3L, labs relatively stable, CXR with bilateral pleural effusion R > L, hospitalist contacted with patient meeting observation admission.      Overview/Hospital Course:  04/11/2023  Patient is seen examined today.  Gradually improving.  Still complains nauseated today.  Tolerated breakfast but non lunch.  Plan for thoracentesis in a.m. per pulmonology      Interval History:  Plan for thoracentesis a.m.    Review of Systems   Constitutional: Negative.    HENT: Negative.     Eyes: Negative.    Respiratory: Negative.     Cardiovascular: Negative.    Gastrointestinal:  Positive for nausea.   Endocrine: Negative.    Genitourinary: Negative.    Musculoskeletal:  Negative.    Skin: Negative.    Allergic/Immunologic: Negative.    Neurological: Negative.    Hematological: Negative.    Objective:     Vital Signs (Most Recent):  Temp: 97.9 °F (36.6 °C) (04/11/23 1608)  Pulse: 78 (04/11/23 1608)  Resp: 20 (04/11/23 1608)  BP: (!) 107/53 (04/11/23 1608)  SpO2: 97 % (04/11/23 1608)   Vital Signs (24h Range):  Temp:  [97.2 °F (36.2 °C)-98.1 °F (36.7 °C)] 97.9 °F (36.6 °C)  Pulse:  [] 78  Resp:  [18-22] 20  SpO2:  [93 %-100 %] 97 %  BP: (106-149)/(53-65) 107/53     Weight: 65.3 kg (144 lb)  Body mass index is 26.34 kg/m².    Intake/Output Summary (Last 24 hours) at 4/11/2023 1712  Last data filed at 4/11/2023 1129  Gross per 24 hour   Intake 240 ml   Output 600 ml   Net -360 ml      Physical Exam  Vitals and nursing note reviewed. Exam conducted with a chaperone present.   HENT:      Head: Normocephalic and atraumatic.   Eyes:      Pupils: Pupils are equal, round, and reactive to light.   Cardiovascular:      Rate and Rhythm: Normal rate and regular rhythm.   Pulmonary:      Effort: Pulmonary effort is normal.      Breath sounds: Normal breath sounds.   Abdominal:      General: Bowel sounds are normal.      Palpations: Abdomen is soft.   Skin:     General: Skin is warm and dry.      Capillary Refill: Capillary refill takes less than 2 seconds.   Neurological:      General: No focal deficit present.      Mental Status: She is alert.       Significant Labs: All pertinent labs within the past 24 hours have been reviewed.    Significant Imaging: I have reviewed all pertinent imaging results/findings within the past 24 hours.      Assessment/Plan:      * Acute on chronic respiratory failure with hypoxia  Patient with Hypoxic Respiratory failure which is Acute on chronic.  she is not on home oxygen. Supplemental oxygen was provided and noted-      .   Signs/symptoms of respiratory failure include- increased work of breathing. Contributing diagnoses includes - Interstitial lung  disease Labs and images were reviewed. Patient Has not had a recent ABG. Will treat underlying causes and adjust management of respiratory failure as follows- plans per pulmonology    Valvular heart disease, moderate AS/TR  Murmurs noted  Echo completed today      Asthma  Currently stable      Bilateral pleural effusion, right greater than left, status post right thoracocentesis 1/21  Recurrent bilateral effusions  Plan for thoracentesis in a.m. with cell cytology      Ischemic cardiomyopathy  Currently, no chest pain        VTE Risk Mitigation (From admission, onward)         Ordered     IP VTE HIGH RISK PATIENT  Once         04/09/23 2346     Place sequential compression device  Until discontinued         04/09/23 2346                Discharge Planning   SHIRA: 4/12/2023     Code Status: Full Code   Is the patient medically ready for discharge?:     Reason for patient still in hospital (select all that apply): Patient trending condition  Discharge Plan A: Home with family                  Thelma Lopez MD  Department of Hospital Medicine   [facility]

## 2023-04-11 NOTE — HOSPITAL COURSE
04/11/2023  Patient is seen examined today.  Gradually improving.  Still complains nauseated today.  Tolerated breakfast but non lunch.  Plan for thoracentesis in a.m. per pulmonology    04/12/2023  Patient is seen examined today.  Status post source thoracentesis.  Tolerated procedure well.    04/13/2023  Patient is seen examined today.  Doing well.  Ambulating well.  Plan to discharge home follow-up with Pulmonary instructed.    Physical exam  Vital signs stable  Lungs moderate air movement no wheezes or crackles heard  Cardiovascular regular rate and rhythm    Plan discharge home  Diet ADA  Activity as tolerated

## 2023-04-11 NOTE — NURSING
04/11/2023      Assumed care of patient.   Assessment and vital signs assessed.   Labs and meds reviewed.  AOX4, lying in bed, no acute distress noted. No SOB noted currently. 3L NC continuous.  at bedside.  No needs voiced currently.   Plans for thoracentesis tomorrow 4/12. Patient and  notified of upcoming plans.

## 2023-04-11 NOTE — PLAN OF CARE
04/11/23 0825   Patient Assessment/Suction   Level of Consciousness (AVPU) alert   Respiratory Effort Unlabored;Normal   Expansion/Accessory Muscles/Retractions no use of accessory muscles   All Lung Fields Breath Sounds clear;diminished   Rhythm/Pattern, Respiratory unlabored;pattern regular   Cough Frequency infrequent   Cough Type dry;nonproductive   PRE-TX-O2   Device (Oxygen Therapy) nasal cannula   $ Is the patient on Low Flow Oxygen? Yes   Flow (L/min) 3   SpO2 (!) 94 %   Pulse Oximetry Type Intermittent   $ Pulse Oximetry - Multiple Charge Pulse Oximetry - Multiple   Oximetry Probe Site Assessed   Pulse 83   Resp (!) 22   Aerosol Therapy   $ Aerosol Therapy Charges Aerosol Treatment   Daily Review of Necessity (SVN) completed   Respiratory Treatment Status (SVN) given   Treatment Route (SVN) mouthpiece   Patient Position (SVN) HOB elevated   Post Treatment Assessment (SVN) breath sounds improved   Signs of Intolerance (SVN) none   Breath Sounds Post-Respiratory Treatment   Throughout All Fields Post-Treatment All Fields   Throughout All Fields Post-Treatment aeration increased   Post-treatment Heart Rate (beats/min) 76   Post-treatment Resp Rate (breaths/min) 20   Education   $ Education 15 min;Bronchodilator   Respiratory Evaluation   $ Care Plan Tech Time 15 min   $ Eval/Re-eval Charges Re-evaluation

## 2023-04-11 NOTE — SUBJECTIVE & OBJECTIVE
Interval History:  Plan for thoracentesis a.m.    Review of Systems   Constitutional: Negative.    HENT: Negative.     Eyes: Negative.    Respiratory: Negative.     Cardiovascular: Negative.    Gastrointestinal:  Positive for nausea.   Endocrine: Negative.    Genitourinary: Negative.    Musculoskeletal: Negative.    Skin: Negative.    Allergic/Immunologic: Negative.    Neurological: Negative.    Hematological: Negative.    Objective:     Vital Signs (Most Recent):  Temp: 97.9 °F (36.6 °C) (04/11/23 1608)  Pulse: 78 (04/11/23 1608)  Resp: 20 (04/11/23 1608)  BP: (!) 107/53 (04/11/23 1608)  SpO2: 97 % (04/11/23 1608)   Vital Signs (24h Range):  Temp:  [97.2 °F (36.2 °C)-98.1 °F (36.7 °C)] 97.9 °F (36.6 °C)  Pulse:  [] 78  Resp:  [18-22] 20  SpO2:  [93 %-100 %] 97 %  BP: (106-149)/(53-65) 107/53     Weight: 65.3 kg (144 lb)  Body mass index is 26.34 kg/m².    Intake/Output Summary (Last 24 hours) at 4/11/2023 1712  Last data filed at 4/11/2023 1129  Gross per 24 hour   Intake 240 ml   Output 600 ml   Net -360 ml      Physical Exam  Vitals and nursing note reviewed. Exam conducted with a chaperone present.   HENT:      Head: Normocephalic and atraumatic.   Eyes:      Pupils: Pupils are equal, round, and reactive to light.   Cardiovascular:      Rate and Rhythm: Normal rate and regular rhythm.   Pulmonary:      Effort: Pulmonary effort is normal.      Breath sounds: Normal breath sounds.   Abdominal:      General: Bowel sounds are normal.      Palpations: Abdomen is soft.   Skin:     General: Skin is warm and dry.      Capillary Refill: Capillary refill takes less than 2 seconds.   Neurological:      General: No focal deficit present.      Mental Status: She is alert.       Significant Labs: All pertinent labs within the past 24 hours have been reviewed.    Significant Imaging: I have reviewed all pertinent imaging results/findings within the past 24 hours.

## 2023-04-12 LAB
ALBUMIN SERPL BCP-MCNC: 3.8 G/DL (ref 3.5–5.2)
ALP SERPL-CCNC: 77 U/L (ref 55–135)
ALT SERPL W/O P-5'-P-CCNC: 56 U/L (ref 10–44)
ANION GAP SERPL CALC-SCNC: 11 MMOL/L (ref 8–16)
APPEARANCE FLD: NORMAL
AST SERPL-CCNC: 59 U/L (ref 10–40)
BILIRUB SERPL-MCNC: 0.7 MG/DL (ref 0.1–1)
BODY FLD TYPE: NORMAL
BODY FLUID SOURCE, LDH: NORMAL
BUN SERPL-MCNC: 45 MG/DL (ref 8–23)
CALCIUM SERPL-MCNC: 8.9 MG/DL (ref 8.7–10.5)
CHLORIDE SERPL-SCNC: 92 MMOL/L (ref 95–110)
CO2 SERPL-SCNC: 29 MMOL/L (ref 23–29)
COLOR FLD: YELLOW
CREAT SERPL-MCNC: 1.7 MG/DL (ref 0.5–1.4)
ERYTHROCYTE [DISTWIDTH] IN BLOOD BY AUTOMATED COUNT: 14.9 % (ref 11.5–14.5)
EST. GFR  (NO RACE VARIABLE): 29.8 ML/MIN/1.73 M^2
GLUCOSE FLD-MCNC: 120 MG/DL
GLUCOSE SERPL-MCNC: 104 MG/DL (ref 70–110)
GLUCOSE SERPL-MCNC: 111 MG/DL (ref 70–110)
GLUCOSE SERPL-MCNC: 152 MG/DL (ref 70–110)
GLUCOSE SERPL-MCNC: 157 MG/DL (ref 70–110)
GLUCOSE SERPL-MCNC: 260 MG/DL (ref 70–110)
GLUCOSE SERPL-MCNC: 99 MG/DL (ref 70–110)
HCT VFR BLD AUTO: 35.4 % (ref 37–48.5)
HGB BLD-MCNC: 10.6 G/DL (ref 12–16)
LDH FLD L TO P-CCNC: 80 U/L
LYMPHOCYTES NFR FLD MANUAL: 67 %
MCH RBC QN AUTO: 26.4 PG (ref 27–31)
MCHC RBC AUTO-ENTMCNC: 29.9 G/DL (ref 32–36)
MCV RBC AUTO: 88 FL (ref 82–98)
MESOTHL CELL NFR FLD MANUAL: 14 %
MONOS+MACROS NFR FLD MANUAL: 19 %
PLATELET # BLD AUTO: 259 K/UL (ref 150–450)
PMV BLD AUTO: 10.2 FL (ref 9.2–12.9)
POTASSIUM SERPL-SCNC: 3.7 MMOL/L (ref 3.5–5.1)
PROT FLD-MCNC: <3 G/DL
PROT SERPL-MCNC: 6.5 G/DL (ref 6–8.4)
RBC # BLD AUTO: 4.02 M/UL (ref 4–5.4)
SODIUM SERPL-SCNC: 132 MMOL/L (ref 136–145)
SPECIMEN SOURCE: NORMAL
SPECIMEN SOURCE: NORMAL
WBC # BLD AUTO: 5.47 K/UL (ref 3.9–12.7)
WBC # FLD: 652 /CU MM

## 2023-04-12 PROCEDURE — 87206 SMEAR FLUORESCENT/ACID STAI: CPT | Performed by: INTERNAL MEDICINE

## 2023-04-12 PROCEDURE — 88305 TISSUE EXAM BY PATHOLOGIST: CPT | Mod: TC

## 2023-04-12 PROCEDURE — 80053 COMPREHEN METABOLIC PANEL: CPT | Performed by: INTERNAL MEDICINE

## 2023-04-12 PROCEDURE — 63600175 PHARM REV CODE 636 W HCPCS: Performed by: STUDENT IN AN ORGANIZED HEALTH CARE EDUCATION/TRAINING PROGRAM

## 2023-04-12 PROCEDURE — 99232 SBSQ HOSP IP/OBS MODERATE 35: CPT | Mod: ,,, | Performed by: INTERNAL MEDICINE

## 2023-04-12 PROCEDURE — 36415 COLL VENOUS BLD VENIPUNCTURE: CPT | Performed by: INTERNAL MEDICINE

## 2023-04-12 PROCEDURE — 25000242 PHARM REV CODE 250 ALT 637 W/ HCPCS: Performed by: STUDENT IN AN ORGANIZED HEALTH CARE EDUCATION/TRAINING PROGRAM

## 2023-04-12 PROCEDURE — 87205 SMEAR GRAM STAIN: CPT | Performed by: INTERNAL MEDICINE

## 2023-04-12 PROCEDURE — 25000242 PHARM REV CODE 250 ALT 637 W/ HCPCS: Performed by: INTERNAL MEDICINE

## 2023-04-12 PROCEDURE — 94760 N-INVAS EAR/PLS OXIMETRY 1: CPT

## 2023-04-12 PROCEDURE — 25000003 PHARM REV CODE 250: Performed by: STUDENT IN AN ORGANIZED HEALTH CARE EDUCATION/TRAINING PROGRAM

## 2023-04-12 PROCEDURE — 87116 MYCOBACTERIA CULTURE: CPT | Performed by: INTERNAL MEDICINE

## 2023-04-12 PROCEDURE — 94640 AIRWAY INHALATION TREATMENT: CPT

## 2023-04-12 PROCEDURE — 27000221 HC OXYGEN, UP TO 24 HOURS

## 2023-04-12 PROCEDURE — 87102 FUNGUS ISOLATION CULTURE: CPT | Performed by: INTERNAL MEDICINE

## 2023-04-12 PROCEDURE — 82962 GLUCOSE BLOOD TEST: CPT

## 2023-04-12 PROCEDURE — 25000003 PHARM REV CODE 250: Performed by: RADIOLOGY

## 2023-04-12 PROCEDURE — 84157 ASSAY OF PROTEIN OTHER: CPT | Performed by: INTERNAL MEDICINE

## 2023-04-12 PROCEDURE — 12000002 HC ACUTE/MED SURGE SEMI-PRIVATE ROOM

## 2023-04-12 PROCEDURE — 83615 LACTATE (LD) (LDH) ENZYME: CPT | Performed by: INTERNAL MEDICINE

## 2023-04-12 PROCEDURE — 25000003 PHARM REV CODE 250: Performed by: INTERNAL MEDICINE

## 2023-04-12 PROCEDURE — 87070 CULTURE OTHR SPECIMN AEROBIC: CPT | Performed by: INTERNAL MEDICINE

## 2023-04-12 PROCEDURE — 99232 PR SUBSEQUENT HOSPITAL CARE,LEVL II: ICD-10-PCS | Mod: ,,, | Performed by: INTERNAL MEDICINE

## 2023-04-12 PROCEDURE — 82945 GLUCOSE OTHER FLUID: CPT | Performed by: INTERNAL MEDICINE

## 2023-04-12 PROCEDURE — 85027 COMPLETE CBC AUTOMATED: CPT | Performed by: INTERNAL MEDICINE

## 2023-04-12 PROCEDURE — 89051 BODY FLUID CELL COUNT: CPT | Performed by: INTERNAL MEDICINE

## 2023-04-12 RX ORDER — LIDOCAINE HYDROCHLORIDE 10 MG/ML
INJECTION, SOLUTION EPIDURAL; INFILTRATION; INTRACAUDAL; PERINEURAL
Status: COMPLETED | OUTPATIENT
Start: 2023-04-12 | End: 2023-04-12

## 2023-04-12 RX ORDER — GUAIFENESIN 100 MG/5ML
200 SOLUTION ORAL EVERY 4 HOURS PRN
Status: DISCONTINUED | OUTPATIENT
Start: 2023-04-12 | End: 2023-04-13 | Stop reason: HOSPADM

## 2023-04-12 RX ORDER — CETIRIZINE HYDROCHLORIDE 5 MG/1
5 TABLET ORAL DAILY
Status: DISCONTINUED | OUTPATIENT
Start: 2023-04-12 | End: 2023-04-13 | Stop reason: HOSPADM

## 2023-04-12 RX ADMIN — BUDESONIDE INHALATION 0.5 MG: 0.5 SUSPENSION RESPIRATORY (INHALATION) at 07:04

## 2023-04-12 RX ADMIN — CETIRIZINE HYDROCHLORIDE 5 MG: 5 TABLET ORAL at 10:04

## 2023-04-12 RX ADMIN — CARBOXYMETHYLCELLULOSE SODIUM 1 DROP: 5 SOLUTION/ DROPS OPHTHALMIC at 09:04

## 2023-04-12 RX ADMIN — BUSPIRONE HYDROCHLORIDE 10 MG: 5 TABLET ORAL at 10:04

## 2023-04-12 RX ADMIN — ARFORMOTEROL TARTRATE 15 MCG: 15 SOLUTION RESPIRATORY (INHALATION) at 07:04

## 2023-04-12 RX ADMIN — LIDOCAINE HYDROCHLORIDE 3 ML: 10 INJECTION, SOLUTION EPIDURAL; INFILTRATION; INTRACAUDAL; PERINEURAL at 01:04

## 2023-04-12 RX ADMIN — FLUTICASONE PROPIONATE 50 MCG: 50 SPRAY, METERED NASAL at 09:04

## 2023-04-12 RX ADMIN — BUSPIRONE HYDROCHLORIDE 10 MG: 5 TABLET ORAL at 09:04

## 2023-04-12 RX ADMIN — INSULIN ASPART 1 UNITS: 100 INJECTION, SOLUTION INTRAVENOUS; SUBCUTANEOUS at 10:04

## 2023-04-12 RX ADMIN — GUAIFENESIN 200 MG: 200 SOLUTION ORAL at 05:04

## 2023-04-12 RX ADMIN — METOPROLOL SUCCINATE 25 MG: 25 TABLET, FILM COATED, EXTENDED RELEASE ORAL at 09:04

## 2023-04-12 RX ADMIN — Medication 2000 UNITS: at 10:04

## 2023-04-12 RX ADMIN — GUAIFENESIN 200 MG: 200 SOLUTION ORAL at 09:04

## 2023-04-12 RX ADMIN — LEVALBUTEROL HYDROCHLORIDE 1.25 MG: 1.25 SOLUTION RESPIRATORY (INHALATION) at 07:04

## 2023-04-12 RX ADMIN — ATORVASTATIN CALCIUM 40 MG: 40 TABLET, FILM COATED ORAL at 09:04

## 2023-04-12 RX ADMIN — FUROSEMIDE 40 MG: 10 INJECTION, SOLUTION INTRAVENOUS at 05:04

## 2023-04-12 RX ADMIN — AMIODARONE HYDROCHLORIDE 200 MG: 200 TABLET ORAL at 10:04

## 2023-04-12 RX ADMIN — AMITRIPTYLINE HYDROCHLORIDE 75 MG: 25 TABLET, FILM COATED ORAL at 09:04

## 2023-04-12 NOTE — PROGRESS NOTES
[facility]  Progress Note    Patient Name: Darlin Tipton  MRN: [id number]  Patient Class: IP- Inpatient   Admission Date: 4/9/2023  Length of Stay: 2 days  Attending Physician: Thelma Lopez MD  Primary Care Provider: Oumar Almonte Jr, MD        Subjective:     Principal Problem:Acute on chronic respiratory failure with hypoxia        HPI:  83 yo F with PMH including CAD, DM 2, HTN, PAF, GERD, COPD, Hypothyroidism, HFrEF who presents for shortness of breath. Patient has had increasing shortness of breath for the past week, particularly on ambulation. She has been using her nebulizers without relief. She typically wears oxygen at night only and it has progressed to continuous all day with minimal relief. Worse when she sits back or lays down so is sitting up. Has been compliant with lasix, does not feel she is urinating less and feels her leg swelling is better than usual. She endorses cough, worse but nonproductive. She has some chest pressure, central radiating to back, worse today. She reports valvular disease as well and an upcoming cardiology appointment. Reports prior thoracentesis with last on in Feb.  Last dose of Eliquis this evening (4/9 PM). In the ED, vitals with occasional tachycardia, hypoxia with oxygen so increased to 3L, labs relatively stable, CXR with bilateral pleural effusion R > L, hospitalist contacted with patient meeting observation admission.      Overview/Hospital Course:  04/11/2023  Patient is seen examined today.  Gradually improving.  Still complains nauseated today.  Tolerated breakfast but non lunch.  Plan for thoracentesis in a.m. per pulmonology    04/12/2023  Patient is seen examined today.  Status post source thoracentesis.  Tolerated procedure well.      Interval History:  Status post thoracentesis.  Doing well.  Wants to go home in a.m.  Review of Systems   Constitutional: Negative.    HENT: Negative.     Eyes: Negative.    Respiratory:   Positive for shortness of breath.    Cardiovascular: Negative.    Gastrointestinal: Negative.    Endocrine: Negative.    Genitourinary: Negative.    Musculoskeletal: Negative.    Allergic/Immunologic: Negative.    Neurological: Negative.    Hematological: Negative.    Objective:     Vital Signs (Most Recent):  Temp: 97.8 °F (36.6 °C) (04/12/23 1125)  Pulse: 68 (04/12/23 1406)  Resp: 20 (04/12/23 1406)  BP: (!) 103/50 (04/12/23 1406)  SpO2: 100 % (04/12/23 1406)   Vital Signs (24h Range):  Temp:  [97.3 °F (36.3 °C)-98.6 °F (37 °C)] 97.8 °F (36.6 °C)  Pulse:  [68-78] 68  Resp:  [16-20] 20  SpO2:  [92 %-100 %] 100 %  BP: ()/(50-64) 103/50     Weight: 65.3 kg (144 lb)  Body mass index is 26.34 kg/m².    Intake/Output Summary (Last 24 hours) at 4/12/2023 1540  Last data filed at 4/12/2023 1403  Gross per 24 hour   Intake 960 ml   Output 2150 ml   Net -1190 ml      Physical Exam  Vitals and nursing note reviewed. Exam conducted with a chaperone present.   Constitutional:       Appearance: She is obese.   HENT:      Head: Normocephalic and atraumatic.   Eyes:      Pupils: Pupils are equal, round, and reactive to light.   Cardiovascular:      Rate and Rhythm: Normal rate and regular rhythm.   Pulmonary:      Comments: Moderate air movement  No wheezes rhonchi heard  Abdominal:      General: Bowel sounds are normal.      Palpations: Abdomen is soft.   Skin:     General: Skin is warm and dry.      Capillary Refill: Capillary refill takes less than 2 seconds.   Neurological:      General: No focal deficit present.      Mental Status: She is alert.       Significant Labs: All pertinent labs within the past 24 hours have been reviewed.    Significant Imaging: I have reviewed all pertinent imaging results/findings within the past 24 hours.      Assessment/Plan:      * Acute on chronic respiratory failure with hypoxia  Patient with Hypoxic Respiratory failure which is Acute on chronic.  she is not on home oxygen.  Supplemental oxygen was provided and noted-      .   Signs/symptoms of respiratory failure include- increased work of breathing. Contributing diagnoses includes - Interstitial lung disease Labs and images were reviewed. Patient Has not had a recent ABG. Will treat underlying causes and adjust management of respiratory failure as follows- plans per pulmonology    Valvular heart disease, moderate AS/TR  Murmurs noted  Echo completed today      Asthma  Currently stable      Bilateral pleural effusion, right greater than left, status post right thoracocentesis 1/21  Status post thoracentesis      Ischemic cardiomyopathy  Currently, no chest pain        VTE Risk Mitigation (From admission, onward)         Ordered     IP VTE HIGH RISK PATIENT  Once         04/09/23 2346     Place sequential compression device  Until discontinued         04/09/23 2346                Discharge Planning   SHIRA: 4/13/2023     Code Status: Full Code   Is the patient medically ready for discharge?:     Reason for patient still in hospital (select all that apply): Patient trending condition  Discharge Plan A: Home with family                  Thelma Lopez MD  Department of Hospital Medicine   [facility]

## 2023-04-12 NOTE — PLAN OF CARE
Arrived to ultrasound via w/c.  AAO x 3. DAVIS x 4. Resp REU.  Reports dyspnea with minimal activity.  On Nc @ 4 LPM.  Denies pain or nausea.

## 2023-04-12 NOTE — PROGRESS NOTES
Pulmonary/Critical Care  Progress Note      Patient name: Darlin Tipton  MRN: [id number]  Date: 04/12/2023    Admit Date: 4/9/2023  Consult Requested By: Thelma Lopez MD    Reason for Consult: pleural effusion    HPI:    4/10/2023 - Pt with h/o CHF (EF 40%), valvular heart disease with recurrent pleural effusion (tapped 5/2022 - chem not done, WBC low, cytology negative), tapped 2/2023 - no studies done) presented to ER with increased SOB, ALVAREZ and found to have bilateral effusions R > L, increased edema.  Her last dose of Eliquis was PM 4/9,  Now admitted for further treatment.    4/11/2023 - Stable overnight, thoracentesis planned for tomorrow.  No new issues reported.  BNP better.  ECHO noted - decreased EF, AoS, MR    4/12/2023 - Stable overnight to get thoracentesis today (studies ordered), also d/w pt,  and nursing to make sure all ae aware that fluid needs to be sent for studies.    Review of Systems    Review of Systems   Constitutional:  Positive for malaise/fatigue. Negative for chills, diaphoresis, fever and weight loss.   HENT:  Negative for congestion.    Eyes:  Negative for pain.   Respiratory:  Positive for shortness of breath. Negative for cough, hemoptysis, sputum production, wheezing and stridor.    Cardiovascular:  Positive for orthopnea and leg swelling. Negative for chest pain, palpitations, claudication and PND.   Gastrointestinal:  Negative for abdominal pain, constipation, diarrhea, heartburn, nausea and vomiting.   Genitourinary:  Negative for dysuria, frequency and urgency.   Musculoskeletal:  Negative for falls and myalgias.   Neurological:  Negative for sensory change, focal weakness and weakness.   Psychiatric/Behavioral:  Negative for depression, substance abuse and suicidal ideas. The patient is not nervous/anxious.      Past Medical History    Past Medical History:   Diagnosis Date    Allergy     Dust mites, Grasses, Trees    Arthritis     Asthma     Blood transfusion     CAD  (coronary artery disease)     Cataract     CHF (congestive heart failure)     CHRONIC BRONCHITIS     Diabetes mellitus     Diabetes mellitus type II     GERD (gastroesophageal reflux disease)     Hyperlipidemia     Hypertension     Irregular heart beat     Kidney disease     ckd stage 3  as stated by patient - to see MD    Paroxysmal atrial fibrillation     Post-menopausal bleeding 2018    Spinal stenosis     Thyroid disease     Hypothyroidism       Past Surgical History    Past Surgical History:   Procedure Laterality Date    APPENDECTOMY  1968    BLADDER SUSPENSION  1989    CARDIAC SURGERY  2016    CABG    CATARACT EXTRACTION  9/2007 (L) and 10/2207 (R)    COLONOSCOPY N/A 10/18/2017    Procedure: COLONOSCOPY;  Surgeon: Esme Acuna MD;  Location: [facility];  Service: Endoscopy;  Laterality: N/A;    CORONARY ANGIOGRAPHY INCLUDING BYPASS GRAFTS WITH CATHETERIZATION OF LEFT HEART Left 5/13/2022    Procedure: Left heart cath;  Surgeon: Davon Patton MD;  Location: [facility] CATH/EP LAB;  Service: Cardiology;  Laterality: Left;    CORONARY ARTERY BYPASS GRAFT  4/26/2004    x5    ESOPHAGEAL DILATION      ESOPHAGOGASTRODUODENOSCOPY N/A 6/27/2022    Procedure: EGD (ESOPHAGOGASTRODUODENOSCOPY);  Surgeon: Skip Nj MD;  Location: [facility];  Service: Endoscopy;  Laterality: N/A;    HYSTEROSCOPY WITH DILATION AND CURETTAGE OF UTERUS N/A 3/12/2020    Procedure: HYSTEROSCOPY, WITH DILATION AND CURETTAGE OF UTERUS;  Surgeon: Ramona Llanos MD;  Location: [facility];  Service: OB/GYN;  Laterality: N/A;    SPINE SURGERY  3/2000    Tumor    THORACENTESIS Right 2/7/2023    Procedure: Thoracentesis;  Surgeon: Rula Weiss MD;  Location: [facility];  Service: Pulmonary;  Laterality: Right;  ultrasound guided thoracentesis of right pleural effusion 2/07/23 0730    TRANSFORAMINAL EPIDURAL INJECTION OF STEROID Right 11/21/2019    Procedure: Injection,steroid,epidural,transforaminal approach;  Surgeon: Bj Jones MD;   Location: [facility] OR;  Service: Pain Management;  Laterality: Right;  L4-5, L5-S1    TRANSFORAMINAL EPIDURAL INJECTION OF STEROID Right 12/31/2019    Procedure: Injection,steroid,epidural,transforaminal approach;  Surgeon: Bj Jones MD;  Location: [facility] OR;  Service: Pain Management;  Laterality: Right;  L4-5, L5-S1    TRANSFORAMINAL EPIDURAL INJECTION OF STEROID Right 2/5/2020    Procedure: Injection,steroid,epidural,transforaminal approach;  Surgeon: Bj Jones MD;  Location: [facility];  Service: Pain Management;  Laterality: Right;  L4-5, L5-S1    TRANSFORAMINAL EPIDURAL INJECTION OF STEROID Left 1/27/2021    Procedure: Injection,steroid,epidural,transforaminal approach;  Surgeon: Bj Jnoes MD;  Location: [facility];  Service: Pain Management;  Laterality: Left;  L4-5, L5-S1    TRANSFORAMINAL EPIDURAL INJECTION OF STEROID Right 3/4/2021    Procedure: Injection,steroid,epidural,transforaminal approach;  Surgeon: Bj Jones MD;  Location: [facility];  Service: Pain Management;  Laterality: Right;  L4-L5, L5-S1    WRIST SURGERY  1993    carpal tunnel         Medications (scheduled):      amiodarone  200 mg Oral Daily    amitriptyline  75 mg Oral QHS    budesonide  0.5 mg Nebulization Q12H    And    arformoteroL  15 mcg Nebulization BID    atorvastatin  40 mg Oral QHS    busPIRone  10 mg Oral BID    carboxymethylcellulose  1 drop Both Eyes BID    cetirizine  5 mg Oral Daily    cholecalciferol (vitamin D3)  2,000 Units Oral Daily    fluticasone propionate  1 spray Each Nostril Daily    furosemide (LASIX) injection  40 mg Intravenous Q12H    levalbuterol  1 ampule Nebulization TID WAKE    levothyroxine  25 mcg Oral Before breakfast    metoprolol succinate  25 mg Oral Daily       Medications (infusions):         Medications (prn):     acetaminophen, albuterol-ipratropium, clotrimazole-betamethasone 1-0.05%, dextrose 50%, dextrose 50%, glucagon (human recombinant), glucose, glucose, insulin aspart U-100, melatonin,  "nitroGLYCERIN, ondansetron, oxyCODONE, simethicone, sodium chloride 0.9%    Family History:   Family History   Problem Relation Age of Onset    Breast cancer Mother     Breast cancer Maternal Aunt     Allergic rhinitis Neg Hx     Allergies Neg Hx     Angioedema Neg Hx     Asthma Neg Hx     Eczema Neg Hx     Immunodeficiency Neg Hx     Urticaria Neg Hx     Rhinitis Neg Hx     Atopy Neg Hx        Social History: Tobacco:   Social History     Tobacco Use   Smoking Status Passive Smoke Exposure - Never Smoker   Smokeless Tobacco Never   Tobacco Comments    PARENTS                                 EtOH:   Social History     Substance and Sexual Activity   Alcohol Use Yes    Comment: Rare                                Drugs:   Social History     Substance and Sexual Activity   Drug Use No     Physical Exam    Vital signs:  Temp:  [97.3 °F (36.3 °C)-98.6 °F (37 °C)]   Pulse:  []   Resp:  [16-20]   BP: (102-122)/(53-64)   SpO2:  [92 %-99 %]     Intake/Output:   Intake/Output Summary (Last 24 hours) at 4/12/2023 1256  Last data filed at 4/12/2023 0800  Gross per 24 hour   Intake 720 ml   Output 600 ml   Net 120 ml          BMI: Estimated body mass index is 26.34 kg/m² as calculated from the following:    Height as of this encounter: 5' 2" (1.575 m).    Weight as of this encounter: 65.3 kg (144 lb).    Physical Exam  Vitals and nursing note reviewed.   Constitutional:       General: She is not in acute distress.     Appearance: Normal appearance. She is not ill-appearing, toxic-appearing or diaphoretic.   HENT:      Head: Normocephalic and atraumatic.      Right Ear: External ear normal.      Left Ear: External ear normal.      Nose: Nose normal. No congestion or rhinorrhea.      Mouth/Throat:      Mouth: Mucous membranes are moist.      Pharynx: Oropharynx is clear. No oropharyngeal exudate or posterior oropharyngeal erythema.   Eyes:      General: No scleral icterus.        Right eye: No discharge.         Left eye: " No discharge.      Extraocular Movements: Extraocular movements intact.      Conjunctiva/sclera: Conjunctivae normal.      Pupils: Pupils are equal, round, and reactive to light.   Neck:      Vascular: No carotid bruit.   Cardiovascular:      Rate and Rhythm: Normal rate and regular rhythm.      Pulses: Normal pulses.      Heart sounds: Murmur heard.     No friction rub. No gallop.   Pulmonary:      Effort: Pulmonary effort is normal. No respiratory distress.      Breath sounds: No stridor. No wheezing, rhonchi or rales.      Comments: Decreased BS right base  Chest:      Chest wall: No tenderness.   Abdominal:      General: Bowel sounds are normal. There is no distension.      Tenderness: There is no abdominal tenderness. There is no guarding.   Musculoskeletal:         General: No swelling. Normal range of motion.      Cervical back: Normal range of motion and neck supple. No rigidity or tenderness.      Right lower leg: Edema present.      Left lower leg: Edema present.   Lymphadenopathy:      Cervical: No cervical adenopathy.   Skin:     General: Skin is warm and dry.      Capillary Refill: Capillary refill takes less than 2 seconds.      Coloration: Skin is not jaundiced.      Findings: No bruising.   Neurological:      General: No focal deficit present.      Mental Status: She is alert and oriented to person, place, and time. Mental status is at baseline.      Cranial Nerves: No cranial nerve deficit.      Sensory: No sensory deficit.      Motor: No weakness.   Psychiatric:         Mood and Affect: Mood normal.         Behavior: Behavior normal.         Thought Content: Thought content normal.         Judgment: Judgment normal.       Laboratory    Recent Labs   Lab 04/12/23  0447   WBC 5.47   RBC 4.02   HGB 10.6*   HCT 35.4*      MCV 88   MCH 26.4*   MCHC 29.9*         Recent Labs   Lab 04/12/23 0447   CALCIUM 8.9   PROT 6.5   *   K 3.7   CO2 29   CL 92*   BUN 45*   CREATININE 1.7*   ALKPHOS 77    ALT 56*   AST 59*   BILITOT 0.7         No results for input(s): PT, INR, APTT in the last 24 hours.      No results for input(s): CPK, CPKMB, TROPONINI, MB in the last 24 hours.      Additional labs: reviewed    Microbiology:       Microbiology Results (last 7 days)       Procedure Component Value Units Date/Time    Culture, Body Fluid (Aerobic) w/ GS [[id number]]     Order Status: No result Specimen: Body Fluid from Pleural Fluid     Fungus culture [[id number]]     Order Status: No result Specimen: Body Fluid from Pleural Fluid     AFB Culture & Smear [[id number]]     Order Status: No result Specimen: Body Fluid from Pleural Fluid     Blood culture #2 **CANNOT BE ORDERED STAT** [[id number]] Collected: 04/09/23 2207    Order Status: Completed Specimen: Blood from Peripheral, Antecubital, Right Updated: 04/11/23 2232     Blood Culture, Routine No Growth to date      No Growth to date      No Growth to date    Blood culture #1 **CANNOT BE ORDERED STAT** [[id number]] Collected: 04/09/23 2201    Order Status: Completed Specimen: Blood from Peripheral, Forearm, Right Updated: 04/11/23 2232     Blood Culture, Routine No Growth to date      No Growth to date      No Growth to date            Radiology    Echo  · Moderate to severe tricuspid regurgitation.  · There is moderate aortic valve stenosis.  · Aortic valve area is 0.77 cm2; peak velocity is 2.79 m/s; mean gradient   is 18 mmHg.  · Mildly decreased systolic function.  · The estimated ejection fraction is 45%.  · Grade II left ventricular diastolic dysfunction.  · Mild right atrial enlargement.  · Moderate-to-severe mitral regurgitation.  · Mild left atrial enlargement.  · There is moderate to severe pulmonary hypertension.     X-Ray Chest AP Portable  XR CHEST 1 VIEW    CLINICAL HISTORY:  82 years Female Chest Pain    COMPARISON: March 24, 2023    FINDINGS: Cardiomediastinal silhouette is stable compared to prior. Atherosclerotic calcification of the aorta. Status  post median sternotomy. Moderate size right pleural effusion has increased in size compared to prior, with underlying right basilar atelectasis. Small left pleural effusion with left basilar atelectasis, similar to prior. No pneumothorax.    IMPRESSION:    Interval increase in size of moderate right pleural effusion.    Stable small left pleural effusion.    Electronically signed by:  Mikhail De La Torre MD  4/10/2023 6:57 AM CDT Workstation: [id number]        Additional Studies    rerviewed    Ventilator Information                  No results for input(s): PH, PCO2, PO2, HCO3, POCSATURATED, BE in the last 72 hours.      Impression    Active Hospital Problems    Diagnosis  POA    *Acute on chronic respiratory failure with hypoxia [J96.21]  Yes    Asthma [J45.909]  Yes     Chronic    Valvular heart disease, moderate AS/TR [I38]  Yes     Chronic    Bilateral pleural effusion, right greater than left, status post right thoracocentesis 1/21 [J90]  Yes     Chronic    Ischemic cardiomyopathy [I25.5]  Yes      Resolved Hospital Problems   No resolved problems to display.       Plan    Officially has undiagnosed pleural effusion because studies not done on prior taps  For tap today  Studies ordered  Most likely related to heart issues  Asthma is stable  ECHO - noted  Follow BNP - better today  Difreedome as able    Thank you for this consult.  I will follow with you while the patient is hospitalized.        Francisco Gallardo MD  [facility] Pulmonary/Critical Care  04/12/2023

## 2023-04-12 NOTE — SUBJECTIVE & OBJECTIVE
Interval History:  Status post thoracentesis.  Doing well.  Wants to go home in a.m.  Review of Systems   Constitutional: Negative.    HENT: Negative.     Eyes: Negative.    Respiratory:  Positive for shortness of breath.    Cardiovascular: Negative.    Gastrointestinal: Negative.    Endocrine: Negative.    Genitourinary: Negative.    Musculoskeletal: Negative.    Allergic/Immunologic: Negative.    Neurological: Negative.    Hematological: Negative.    Objective:     Vital Signs (Most Recent):  Temp: 97.8 °F (36.6 °C) (04/12/23 1125)  Pulse: 68 (04/12/23 1406)  Resp: 20 (04/12/23 1406)  BP: (!) 103/50 (04/12/23 1406)  SpO2: 100 % (04/12/23 1406)   Vital Signs (24h Range):  Temp:  [97.3 °F (36.3 °C)-98.6 °F (37 °C)] 97.8 °F (36.6 °C)  Pulse:  [68-78] 68  Resp:  [16-20] 20  SpO2:  [92 %-100 %] 100 %  BP: ()/(50-64) 103/50     Weight: 65.3 kg (144 lb)  Body mass index is 26.34 kg/m².    Intake/Output Summary (Last 24 hours) at 4/12/2023 1540  Last data filed at 4/12/2023 1403  Gross per 24 hour   Intake 960 ml   Output 2150 ml   Net -1190 ml      Physical Exam  Vitals and nursing note reviewed. Exam conducted with a chaperone present.   Constitutional:       Appearance: She is obese.   HENT:      Head: Normocephalic and atraumatic.   Eyes:      Pupils: Pupils are equal, round, and reactive to light.   Cardiovascular:      Rate and Rhythm: Normal rate and regular rhythm.   Pulmonary:      Comments: Moderate air movement  No wheezes rhonchi heard  Abdominal:      General: Bowel sounds are normal.      Palpations: Abdomen is soft.   Skin:     General: Skin is warm and dry.      Capillary Refill: Capillary refill takes less than 2 seconds.   Neurological:      General: No focal deficit present.      Mental Status: She is alert.       Significant Labs: All pertinent labs within the past 24 hours have been reviewed.    Significant Imaging: I have reviewed all pertinent imaging results/findings within the past 24  hours.

## 2023-04-12 NOTE — PLAN OF CARE
Tolerated well.  1550cc of clear yellow fluid drained and sample sent for testing.  Post CXR done.  Report called to Xander DESIR on 1100 wing'  Transported back to room via w/c

## 2023-04-13 VITALS
BODY MASS INDEX: 26.5 KG/M2 | HEIGHT: 62 IN | TEMPERATURE: 98 F | SYSTOLIC BLOOD PRESSURE: 104 MMHG | RESPIRATION RATE: 18 BRPM | DIASTOLIC BLOOD PRESSURE: 64 MMHG | OXYGEN SATURATION: 97 % | WEIGHT: 144 LBS | HEART RATE: 88 BPM

## 2023-04-13 PROBLEM — J96.21 ACUTE ON CHRONIC RESPIRATORY FAILURE WITH HYPOXIA: Status: RESOLVED | Noted: 2023-04-09 | Resolved: 2023-04-13

## 2023-04-13 LAB
ALBUMIN SERPL BCP-MCNC: 3.1 G/DL (ref 3.5–5.2)
ALP SERPL-CCNC: 63 U/L (ref 55–135)
ALT SERPL W/O P-5'-P-CCNC: 46 U/L (ref 10–44)
ANION GAP SERPL CALC-SCNC: 11 MMOL/L (ref 8–16)
AST SERPL-CCNC: 37 U/L (ref 10–40)
BILIRUB SERPL-MCNC: 0.7 MG/DL (ref 0.1–1)
BUN SERPL-MCNC: 55 MG/DL (ref 8–23)
CALCIUM SERPL-MCNC: 8 MG/DL (ref 8.7–10.5)
CHLORIDE SERPL-SCNC: 88 MMOL/L (ref 95–110)
CO2 SERPL-SCNC: 31 MMOL/L (ref 23–29)
CREAT SERPL-MCNC: 1.7 MG/DL (ref 0.5–1.4)
EST. GFR  (NO RACE VARIABLE): 29.8 ML/MIN/1.73 M^2
GLUCOSE SERPL-MCNC: 102 MG/DL (ref 70–110)
GLUCOSE SERPL-MCNC: 105 MG/DL (ref 70–110)
POTASSIUM SERPL-SCNC: 3.5 MMOL/L (ref 3.5–5.1)
PROT SERPL-MCNC: 5.6 G/DL (ref 6–8.4)
SODIUM SERPL-SCNC: 130 MMOL/L (ref 136–145)

## 2023-04-13 PROCEDURE — 25000003 PHARM REV CODE 250: Performed by: STUDENT IN AN ORGANIZED HEALTH CARE EDUCATION/TRAINING PROGRAM

## 2023-04-13 PROCEDURE — 94760 N-INVAS EAR/PLS OXIMETRY 1: CPT

## 2023-04-13 PROCEDURE — 99232 PR SUBSEQUENT HOSPITAL CARE,LEVL II: ICD-10-PCS | Mod: ,,, | Performed by: INTERNAL MEDICINE

## 2023-04-13 PROCEDURE — 99232 SBSQ HOSP IP/OBS MODERATE 35: CPT | Mod: ,,, | Performed by: INTERNAL MEDICINE

## 2023-04-13 PROCEDURE — 80053 COMPREHEN METABOLIC PANEL: CPT | Performed by: INTERNAL MEDICINE

## 2023-04-13 PROCEDURE — 25000003 PHARM REV CODE 250: Performed by: INTERNAL MEDICINE

## 2023-04-13 PROCEDURE — 94640 AIRWAY INHALATION TREATMENT: CPT

## 2023-04-13 PROCEDURE — 25000242 PHARM REV CODE 250 ALT 637 W/ HCPCS: Performed by: INTERNAL MEDICINE

## 2023-04-13 PROCEDURE — 36415 COLL VENOUS BLD VENIPUNCTURE: CPT | Performed by: INTERNAL MEDICINE

## 2023-04-13 PROCEDURE — 63600175 PHARM REV CODE 636 W HCPCS: Performed by: STUDENT IN AN ORGANIZED HEALTH CARE EDUCATION/TRAINING PROGRAM

## 2023-04-13 PROCEDURE — 25000242 PHARM REV CODE 250 ALT 637 W/ HCPCS: Performed by: STUDENT IN AN ORGANIZED HEALTH CARE EDUCATION/TRAINING PROGRAM

## 2023-04-13 PROCEDURE — 27000221 HC OXYGEN, UP TO 24 HOURS

## 2023-04-13 RX ORDER — GUAIFENESIN 100 MG/5ML
200 SOLUTION ORAL EVERY 4 HOURS PRN
Qty: 15 ML | Refills: 0 | Status: SHIPPED | OUTPATIENT
Start: 2023-04-13 | End: 2023-04-17

## 2023-04-13 RX ADMIN — AMIODARONE HYDROCHLORIDE 200 MG: 200 TABLET ORAL at 08:04

## 2023-04-13 RX ADMIN — LEVALBUTEROL HYDROCHLORIDE 1.25 MG: 1.25 SOLUTION RESPIRATORY (INHALATION) at 08:04

## 2023-04-13 RX ADMIN — ARFORMOTEROL TARTRATE 15 MCG: 15 SOLUTION RESPIRATORY (INHALATION) at 08:04

## 2023-04-13 RX ADMIN — CETIRIZINE HYDROCHLORIDE 5 MG: 5 TABLET ORAL at 08:04

## 2023-04-13 RX ADMIN — FLUTICASONE PROPIONATE 50 MCG: 50 SPRAY, METERED NASAL at 08:04

## 2023-04-13 RX ADMIN — CARBOXYMETHYLCELLULOSE SODIUM 1 DROP: 5 SOLUTION/ DROPS OPHTHALMIC at 08:04

## 2023-04-13 RX ADMIN — BUDESONIDE INHALATION 0.5 MG: 0.5 SUSPENSION RESPIRATORY (INHALATION) at 08:04

## 2023-04-13 RX ADMIN — BUSPIRONE HYDROCHLORIDE 10 MG: 5 TABLET ORAL at 08:04

## 2023-04-13 RX ADMIN — Medication 2000 UNITS: at 08:04

## 2023-04-13 RX ADMIN — FUROSEMIDE 40 MG: 10 INJECTION, SOLUTION INTRAVENOUS at 06:04

## 2023-04-13 RX ADMIN — LEVOTHYROXINE SODIUM 25 MCG: 0.03 TABLET ORAL at 06:04

## 2023-04-13 NOTE — PLAN OF CARE
Discharge orders and chart reviewed with no further post-acute discharge needs identified at this time.  At this time, patient is cleared for discharge from Case Management standpoint.        04/13/23 0931   Final Note   Assessment Type Final Discharge Note   Anticipated Discharge Disposition Home   What phone number can be called within the next 1-3 days to see how you are doing after discharge? [phone]   Hospital Resources/Appts/Education Provided Appointments scheduled and added to AVS   Post-Acute Status   Discharge Delays None known at this time

## 2023-04-13 NOTE — DISCHARGE SUMMARY
[facility]  Discharge Summary      Patient Name: Darlin Tipton  MRN: [id number]  ROMERO: [id number]  Patient Class: IP- Inpatient  Admission Date: 4/9/2023  Hospital Length of Stay: 3 days  Discharge Date and Time: No discharge date for patient encounter.  Attending Physician: Thelma Lopez MD   Discharging Provider: Thelma Lopez MD  Primary Care Provider: Oumar Almonte Jr, MD    Primary Care Team: Networked reference to record PCT     HPI:   81 yo F with PMH including CAD, DM 2, HTN, PAF, GERD, COPD, Hypothyroidism, HFrEF who presents for shortness of breath. Patient has had increasing shortness of breath for the past week, particularly on ambulation. She has been using her nebulizers without relief. She typically wears oxygen at night only and it has progressed to continuous all day with minimal relief. Worse when she sits back or lays down so is sitting up. Has been compliant with lasix, does not feel she is urinating less and feels her leg swelling is better than usual. She endorses cough, worse but nonproductive. She has some chest pressure, central radiating to back, worse today. She reports valvular disease as well and an upcoming cardiology appointment. Reports prior thoracentesis with last on in Feb.  Last dose of Eliquis this evening (4/9 PM). In the ED, vitals with occasional tachycardia, hypoxia with oxygen so increased to 3L, labs relatively stable, CXR with bilateral pleural effusion R > L, hospitalist contacted with patient meeting observation admission.      * No surgery found *      Hospital Course:   04/11/2023  Patient is seen examined today.  Gradually improving.  Still complains nauseated today.  Tolerated breakfast but non lunch.  Plan for thoracentesis in a.m. per pulmonology    04/12/2023  Patient is seen examined today.  Status post source thoracentesis.  Tolerated procedure well.    04/13/2023  Patient is seen examined today.  Doing well.  Ambulating well.   Plan to discharge home follow-up with Pulmonary instructed.    Physical exam  Vital signs stable  Lungs moderate air movement no wheezes or crackles heard  Cardiovascular regular rate and rhythm    Plan discharge home  Diet ADA  Activity as tolerated       Goals of Care Treatment Preferences:  Code Status: Full Code      Consults:   Consults (From admission, onward)        Status Ordering Provider     Inpatient consult to Pulmonology  Once        Provider:  Francisco Gallardo MD    Completed FARHAN ROLDAN     Inpatient consult to Interventional Radiology  Once        Provider:  Dada Chin MD    Acknowledged FARHAN ROLDAN          Pulmonary  Asthma  Currently stable      Bilateral pleural effusion, right greater than left, status post right thoracocentesis 1/21  Status post thoracentesis  Improved      Cardiac/Vascular  * Valvular heart disease, moderate AS/TR  Murmurs noted  Continue workup as needed      Ischemic cardiomyopathy  Continue gentle diuresis        Final Active Diagnoses:    Diagnosis Date Noted POA    PRINCIPAL PROBLEM:  Valvular heart disease, moderate AS/TR [I38] 01/21/2023 Yes     Chronic    Asthma [J45.909] 01/21/2023 Yes     Chronic    Bilateral pleural effusion, right greater than left, status post right thoracocentesis 1/21 [J90] 01/20/2023 Yes     Chronic    Ischemic cardiomyopathy [I25.5] 05/03/2018 Yes      Problems Resolved During this Admission:    Diagnosis Date Noted Date Resolved POA    Acute on chronic respiratory failure with hypoxia [J96.21] 04/09/2023 04/13/2023 Yes       Discharged Condition: good    Disposition: Home or Self Care    Follow Up:   Follow-up Information     [facility]el[facility] Follow up.    Specialty: Pulmonary Disease  Contact information:  [address] [address]  [place] [address]  [phone]             Oumar Almonte Jr, MD Follow up.    Specialty: Family Medicine  Why: 4/17 @ 10:40 with PA  Contact information:  [address]  [facility]  [address]  [facility] [address]  [phone]                       Patient Instructions:      Diet diabetic     Activity as tolerated         Significant Diagnostic Studies: Labs:   BMP:   Recent Labs   Lab 04/12/23 0447 04/13/23 0423   * 102   * 130*   K 3.7 3.5   CL 92* 88*   CO2 29 31*   BUN 45* 55*   CREATININE 1.7* 1.7*   CALCIUM 8.9 8.0*    and CBC   Recent Labs   Lab 04/12/23 0447   WBC 5.47   HGB 10.6*   HCT 35.4*          Pending Diagnostic Studies:     Procedure Component Value Units Date/Time    Cytology Specimen-Medical Cytology (Fluid/Wash/Brush) [[id number]] Collected: 04/12/23 1400    Order Status: Sent Lab Status: No result     Specimen: Pleural Fluid          Medications:  Reconciled Home Medications:      Medication List      START taking these medications    guaiFENesin 100 mg/5 ml 100 mg/5 mL syrup  Commonly known as: ROBITUSSIN  Take 10 mLs (200 mg total) by mouth every 4 (four) hours as needed for Congestion.        CONTINUE taking these medications    acetaminophen 650 MG Tbsr  Commonly known as: TYLENOL  Take 1,300 mg by mouth once daily. 2 Tablet(s) Oral  Every day.     amiodarone 200 MG Tab  Commonly known as: PACERONE  Take 1 tablet (200 mg total) by mouth 3 (three) times daily for 4 days, THEN 1 tablet (200 mg total) 2 (two) times daily.  Start taking on: January 23, 2023     amitriptyline 75 MG tablet  Commonly known as: ELAVIL  Take 75 mg by mouth every evening.     apixaban 5 mg Tab  Commonly known as: ELIQUIS  Take 1 tablet (5 mg total) by mouth 2 (two) times daily.     busPIRone 10 MG tablet  Commonly known as: BUSPAR  TAKE 1 TABLET BY MOUTH TWICE A DAY     carboxymethylcellulose 1 % ophthalmic solution  Apply 1 drop to eye As instructed. as directed     cetirizine 10 MG tablet  Commonly known as: ZYRTEC  Take 10 mg by mouth once daily.     cholecalciferol (vitamin D3) 50 mcg (2,000 unit) Cap capsule  Commonly known as: VITAMIN D3  Take 1 capsule by mouth  once daily.     clotrimazole-betamethasone 1-0.05% cream  Commonly known as: LOTRISONE  Apply 1 g topically 2 (two) times daily as needed.     coenzyme Q10 100 mg capsule  Take 100 mg by mouth once daily.     cranberry extract 650 mg Cap  Take 1 tablet by mouth 2 (two) times daily.     FARXIGA 5 mg Tab tablet  Generic drug: dapagliflozin  Take 5 mg by mouth once daily.     fluticasone furoate-vilanteroL 100-25 mcg/dose diskus inhaler  Commonly known as: BREO ELLIPTA  Inhale 1 puff into the lungs once daily. Controller Rinse after you use it     fluticasone propionate 50 mcg/actuation nasal spray  Commonly known as: FLONASE  1 spray (50 mcg total) by Each Nostril route once daily.     FREESTYLE LITE STRIPS Strp  Generic drug: blood sugar diagnostic  USE TO TEST BLOOD SUGAR TWICE A DAY     furosemide 20 MG tablet  Commonly known as: LASIX  Take 1 tablet (20 mg total) by mouth once daily.     lancets Misc  1 Units by Misc.(Non-Drug; Combo Route) route 2 (two) times daily.     lansoprazole 30 MG capsule  Commonly known as: PREVACID  Take 1 capsule (30 mg total) by mouth once daily.     levalbuterol 1.25 mg/0.5 mL nebulizer solution  Commonly known as: XOPENEX  Take 0.5 mLs (1.25 mg total) by nebulization every 6 (six) hours as needed for Wheezing. Rescue     levothyroxine 25 MCG tablet  Commonly known as: SYNTHROID  TAKE 1 TABLET BY MOUTH BEFORE BREAKFAST EVERY DAY     metoprolol succinate 25 MG 24 hr tablet  Commonly known as: TOPROL XL  Take 1 tablet (25 mg total) by mouth once daily.     nitroGLYCERIN 0.4 MG SL tablet  Commonly known as: NITROSTAT  Place 0.4 mg under the tongue every 5 (five) minutes as needed. 0.4mg Sublingual PRN .     PROBIOTIC-10 ORAL  Take 1 tablet by mouth once daily.     RESTASIS 0.05 % ophthalmic emulsion  Generic drug: cycloSPORINE  Place 1 drop into both eyes 2 (two) times daily.     rosuvastatin 10 MG tablet  Commonly known as: CRESTOR  Take 1 tablet (10 mg total) by mouth once daily.      triazolam 0.25 MG Tab  Commonly known as: HALCION  Take 1 tablet (0.25 mg total) by mouth nightly as needed (sleep).     vitamins A,C,E-zinc-copper 4,296 mcg-226 mg-90 mg Cap  Take 1 capsule by mouth 2 (two) times daily.            Indwelling Lines/Drains at time of discharge:   Lines/Drains/Airways     None                 Time spent on the discharge of patient: 39 minutes         Thelma Lopez MD  Department of Hospital Medicine  [facility]

## 2023-04-13 NOTE — NURSING
Discharge instructions reviewed with pt.  IV's removed, catheters intact.  Pt waiting for personal oxygen to arrive.

## 2023-04-13 NOTE — PROGRESS NOTES
Pulmonary/Critical Care  Progress Note      Patient name: Darlin Tipton  MRN: [id number]  Date: 04/13/2023    Admit Date: 4/9/2023  Consult Requested By: Thelma Lopez MD    Reason for Consult: pleural effusion    HPI:    4/10/2023 - Pt with h/o CHF (EF 40%), valvular heart disease with recurrent pleural effusion (tapped 5/2022 - chem not done, WBC low, cytology negative), tapped 2/2023 - no studies done) presented to ER with increased SOB, ALVAREZ and found to have bilateral effusions R > L, increased edema.  Her last dose of Eliquis was PM 4/9,  Now admitted for further treatment.    4/11/2023 - Stable overnight, thoracentesis planned for tomorrow.  No new issues reported.  BNP better.  ECHO noted - decreased EF, AoS, MR    4/12/2023 - Stable overnight to get thoracentesis today (studies ordered), also d/w pt,  and nursing to make sure all ae aware that fluid needs to be sent for studies.    4/13/2023 - Had tap yesterday and 1550 ml removed (transudate, cultures and cytology pending).  Breathing feels better and CXR looks good.  Kept overnight due to some coughing but better this AM.  D/W them about the fluid.  ECHO noted and she has severe AoS (? If we are reaching the point to consider a TAVR).    Review of Systems    Review of Systems   Constitutional:  Positive for malaise/fatigue. Negative for chills, diaphoresis, fever and weight loss.   HENT:  Negative for congestion.    Eyes:  Negative for pain.   Respiratory:  Positive for shortness of breath. Negative for cough, hemoptysis, sputum production, wheezing and stridor.    Cardiovascular:  Positive for orthopnea and leg swelling. Negative for chest pain, palpitations, claudication and PND.   Gastrointestinal:  Negative for abdominal pain, constipation, diarrhea, heartburn, nausea and vomiting.   Genitourinary:  Negative for dysuria, frequency and urgency.   Musculoskeletal:  Negative for falls and myalgias.   Neurological:  Negative for sensory change, focal  weakness and weakness.   Psychiatric/Behavioral:  Negative for depression, substance abuse and suicidal ideas. The patient is not nervous/anxious.      Past Medical History    Past Medical History:   Diagnosis Date    Allergy     Dust mites, Grasses, Trees    Arthritis     Asthma     Blood transfusion     CAD (coronary artery disease)     Cataract     CHF (congestive heart failure)     CHRONIC BRONCHITIS     Diabetes mellitus     Diabetes mellitus type II     GERD (gastroesophageal reflux disease)     Hyperlipidemia     Hypertension     Irregular heart beat     Kidney disease     ckd stage 3  as stated by patient - to see MD    Paroxysmal atrial fibrillation     Post-menopausal bleeding 2018    Spinal stenosis     Thyroid disease     Hypothyroidism       Past Surgical History    Past Surgical History:   Procedure Laterality Date    APPENDECTOMY  1968    BLADDER SUSPENSION  1989    CARDIAC SURGERY  2016    CABG    CATARACT EXTRACTION  9/2007 (L) and 10/2207 (R)    COLONOSCOPY N/A 10/18/2017    Procedure: COLONOSCOPY;  Surgeon: Esme Acuna MD;  Location: [facility];  Service: Endoscopy;  Laterality: N/A;    CORONARY ANGIOGRAPHY INCLUDING BYPASS GRAFTS WITH CATHETERIZATION OF LEFT HEART Left 5/13/2022    Procedure: Left heart cath;  Surgeon: Davon Patton MD;  Location: [facility] CATH/EP LAB;  Service: Cardiology;  Laterality: Left;    CORONARY ARTERY BYPASS GRAFT  4/26/2004    x5    ESOPHAGEAL DILATION      ESOPHAGOGASTRODUODENOSCOPY N/A 6/27/2022    Procedure: EGD (ESOPHAGOGASTRODUODENOSCOPY);  Surgeon: Skip Nj MD;  Location: [facility];  Service: Endoscopy;  Laterality: N/A;    HYSTEROSCOPY WITH DILATION AND CURETTAGE OF UTERUS N/A 3/12/2020    Procedure: HYSTEROSCOPY, WITH DILATION AND CURETTAGE OF UTERUS;  Surgeon: Ramona Llanos MD;  Location: [facility] OR;  Service: OB/GYN;  Laterality: N/A;    SPINE SURGERY  3/2000    Tumor    THORACENTESIS Right 2/7/2023    Procedure: Thoracentesis;  Surgeon: Rula  KENNETH Weiss MD;  Location: [facility] ENDO;  Service: Pulmonary;  Laterality: Right;  ultrasound guided thoracentesis of right pleural effusion 2/07/23 0730    TRANSFORAMINAL EPIDURAL INJECTION OF STEROID Right 11/21/2019    Procedure: Injection,steroid,epidural,transforaminal approach;  Surgeon: Bj Jones MD;  [facility];  Service: Pain Management;  Laterality: Right;  L4-5, L5-S1    TRANSFORAMINAL EPIDURAL INJECTION OF STEROID Right 12/31/2019    Procedure: Injection,steroid,epidural,transforaminal approach;  Surgeon: Bj Jones MD;  [facility];  Service: Pain Management;  Laterality: Right;  L4-5, L5-S1    TRANSFORAMINAL EPIDURAL INJECTION OF STEROID Right 2/5/2020    Procedure: Injection,steroid,epidural,transforaminal approach;  Surgeon: Bj Jones MD;  [facility];  Service: Pain Management;  Laterality: Right;  L4-5, L5-S1    TRANSFORAMINAL EPIDURAL INJECTION OF STEROID Left 1/27/2021    Procedure: Injection,steroid,epidural,transforaminal approach;  Surgeon: Bj Jones MD;  [facility];  Service: Pain Management;  Laterality: Left;  L4-5, L5-S1    TRANSFORAMINAL EPIDURAL INJECTION OF STEROID Right 3/4/2021    Procedure: Injection,steroid,epidural,transforaminal approach;  Surgeon: Bj Jones MD;  [facility];  Service: Pain Management;  Laterality: Right;  L4-L5, L5-S1    WRIST SURGERY  1993    carpal tunnel         Medications (scheduled):      amiodarone  200 mg Oral Daily    amitriptyline  75 mg Oral QHS    budesonide  0.5 mg Nebulization Q12H    And    arformoteroL  15 mcg Nebulization BID    atorvastatin  40 mg Oral QHS    busPIRone  10 mg Oral BID    carboxymethylcellulose  1 drop Both Eyes BID    cetirizine  5 mg Oral Daily    cholecalciferol (vitamin D3)  2,000 Units Oral Daily    fluticasone propionate  1 spray Each Nostril Daily    furosemide (LASIX) injection  40 mg Intravenous Q12H    levalbuterol  1 ampule Nebulization TID WAKE    levothyroxine  25 mcg Oral Before  "breakfast    metoprolol succinate  25 mg Oral Daily       Medications (infusions):         Medications (prn):     acetaminophen, albuterol-ipratropium, clotrimazole-betamethasone 1-0.05%, dextrose 50%, dextrose 50%, glucagon (human recombinant), glucose, glucose, guaiFENesin 100 mg/5 ml, insulin aspart U-100, melatonin, nitroGLYCERIN, ondansetron, oxyCODONE, simethicone, sodium chloride 0.9%    Family History:   Family History   Problem Relation Age of Onset    Breast cancer Mother     Breast cancer Maternal Aunt     Allergic rhinitis Neg Hx     Allergies Neg Hx     Angioedema Neg Hx     Asthma Neg Hx     Eczema Neg Hx     Immunodeficiency Neg Hx     Urticaria Neg Hx     Rhinitis Neg Hx     Atopy Neg Hx        Social History: Tobacco:   Social History     Tobacco Use   Smoking Status Passive Smoke Exposure - Never Smoker   Smokeless Tobacco Never   Tobacco Comments    PARENTS                                 EtOH:   Social History     Substance and Sexual Activity   Alcohol Use Yes    Comment: Rare                                Drugs:   Social History     Substance and Sexual Activity   Drug Use No     Physical Exam    Vital signs:  Temp:  [97.5 °F (36.4 °C)-99.1 °F (37.3 °C)]   Pulse:  [68-88]   Resp:  [16-20]   BP: ()/(50-65)   SpO2:  [93 %-100 %]     Intake/Output:   Intake/Output Summary (Last 24 hours) at 4/13/2023 0807  Last data filed at 4/13/2023 0430  Gross per 24 hour   Intake 480 ml   Output 2250 ml   Net -1770 ml          BMI: Estimated body mass index is 26.34 kg/m² as calculated from the following:    Height as of this encounter: 5' 2" (1.575 m).    Weight as of this encounter: 65.3 kg (144 lb).    Physical Exam  Vitals and nursing note reviewed.   Constitutional:       General: She is not in acute distress.     Appearance: Normal appearance. She is not ill-appearing, toxic-appearing or diaphoretic.   HENT:      Head: Normocephalic and atraumatic.      Right Ear: External ear normal.      Left " Ear: External ear normal.      Nose: Nose normal. No congestion or rhinorrhea.      Mouth/Throat:      Mouth: Mucous membranes are moist.      Pharynx: Oropharynx is clear. No oropharyngeal exudate or posterior oropharyngeal erythema.   Eyes:      General: No scleral icterus.        Right eye: No discharge.         Left eye: No discharge.      Extraocular Movements: Extraocular movements intact.      Conjunctiva/sclera: Conjunctivae normal.      Pupils: Pupils are equal, round, and reactive to light.   Neck:      Vascular: No carotid bruit.   Cardiovascular:      Rate and Rhythm: Normal rate and regular rhythm.      Pulses: Normal pulses.      Heart sounds: Murmur heard.     No friction rub. No gallop.   Pulmonary:      Effort: Pulmonary effort is normal. No respiratory distress.      Breath sounds: No stridor. No wheezing, rhonchi or rales.      Comments: Decreased BS right base  Chest:      Chest wall: No tenderness.   Abdominal:      General: Bowel sounds are normal. There is no distension.      Tenderness: There is no abdominal tenderness. There is no guarding.   Musculoskeletal:         General: No swelling. Normal range of motion.      Cervical back: Normal range of motion and neck supple. No rigidity or tenderness.      Right lower leg: Edema present.      Left lower leg: Edema present.   Lymphadenopathy:      Cervical: No cervical adenopathy.   Skin:     General: Skin is warm and dry.      Capillary Refill: Capillary refill takes less than 2 seconds.      Coloration: Skin is not jaundiced.      Findings: No bruising.   Neurological:      General: No focal deficit present.      Mental Status: She is alert and oriented to person, place, and time. Mental status is at baseline.      Cranial Nerves: No cranial nerve deficit.      Sensory: No sensory deficit.      Motor: No weakness.   Psychiatric:         Mood and Affect: Mood normal.         Behavior: Behavior normal.         Thought Content: Thought content  normal.         Judgment: Judgment normal.       Laboratory    No results for input(s): WBC, RBC, HGB, HCT, PLT, MCV, MCH, MCHC in the last 24 hours.      Recent Labs   Lab 04/13/23  0423   CALCIUM 8.0*   PROT 5.6*   *   K 3.5   CO2 31*   CL 88*   BUN 55*   CREATININE 1.7*   ALKPHOS 63   ALT 46*   AST 37   BILITOT 0.7         No results for input(s): PT, INR, APTT in the last 24 hours.      No results for input(s): CPK, CPKMB, TROPONINI, MB in the last 24 hours.      Additional labs: reviewed    Microbiology:       Microbiology Results (last 7 days)       Procedure Component Value Units Date/Time    Blood culture #2 **CANNOT BE ORDERED STAT** [[id number]] Collected: 04/09/23 2207    Order Status: Completed Specimen: Blood from Peripheral, Antecubital, Right Updated: 04/12/23 2232     Blood Culture, Routine No Growth to date      No Growth to date      No Growth to date      No Growth to date    Blood culture #1 **CANNOT BE ORDERED STAT** [[id number]] Collected: 04/09/23 2201    Order Status: Completed Specimen: Blood from Peripheral, Forearm, Right Updated: 04/12/23 2232     Blood Culture, Routine No Growth to date      No Growth to date      No Growth to date      No Growth to date    Culture, Body Fluid (Aerobic) w/ GS [[id number]] Collected: 04/12/23 1401    Order Status: Completed Specimen: Pleural Fluid Updated: 04/12/23 1516     Gram Stain Result Few WBC's      No organisms seen    Narrative:      Pleural Fluid    AFB Culture & Smear [[id number]] Collected: 04/12/23 1401    Order Status: Sent Specimen: Body Fluid from Pleural Fluid Updated: 04/12/23 1424    Fungus culture [[id number]] Collected: 04/12/23 1401    Order Status: Sent Specimen: Body Fluid from Pleural Fluid Updated: 04/12/23 1424            Radiology    X-Ray Chest 1 View  Reason: s/p right thoracentesis s/p right thoracentesis;Chest Pain; Shortness of Breath; Acute on chronic respiratory failure with hypoxia. Hx-Asthma, CAD, CHF, Diabetes  mellitus type II    FINDINGS:  Portable chest at 1416 compared with 4/9/2023 shows no change in enlarged cardiac silhouette size with normal mediastinal contours containing postsurgical changes of CABG.    Right pleural effusion has nearly completely resolved following right thoracentesis. Negative for pneumothorax. Small left pleural effusion has not significantly changed. Pulmonary vasculature are unchanged. No acute osseous abnormality.    IMPRESSION:  Negative for pneumothorax following right thoracentesis.    Electronically signed by:  Dada Chin MD  4/12/2023 2:40 PM CDT Workstation: [id number]  US Thoracentesis with Imaging, Aspiration Only  Reason: Right pleural effusion    PROCEDURE: Ultrasound-guided right thoracentesis    PROCEDURE NOTE:  After obtaining informed consent, right pleural effusion was localized under sonography. Right posterior chest wall skin entry site was localized, and prepped and draped in usual sterile fashion. 1% Lidocaine was used to achieve adequate local anesthesia. A small skin incision was made.    A 8 American catheter and needle were introduced and guided into the pleural fluid without difficulty. Catheter was advanced, and needle withdrawn. A total of 1550 mL of yellow pleural fluid was obtained without difficulty. Catheter was removed, site cleaned, and bandage placed. Patient tolerated the procedure well with no immediate complication or significant blood loss.    IMPRESSION:  Successful ultrasound-guided right thoracentesis.    Electronically signed by:  Dada Chin MD  4/12/2023 2:38 PM CDT Workstation: [id number]        Additional Studies    rerviewed    Ventilator Information                  No results for input(s): PH, PCO2, PO2, HCO3, POCSATURATED, BE in the last 72 hours.      Impression    Active Hospital Problems    Diagnosis  POA    *Acute on chronic respiratory failure with hypoxia [J96.21]  Yes    Asthma [J45.909]  Yes     Chronic    Valvular heart disease,  moderate AS/TR [I38]  Yes     Chronic    Bilateral pleural effusion, right greater than left, status post right thoracocentesis 1/21 [J90]  Yes     Chronic    Ischemic cardiomyopathy [I25.5]  Yes      Resolved Hospital Problems   No resolved problems to display.       Plan    Transudative effusion due to cardiac issues  Respiratory status is stable and she is OK for DC from my standpoint  She should make a follow up appointment with Dr Viramontes as outpt  Optimize cardiac treatment  Asthma is stable  ECHO - noted      Respiratory status stable, I will sign off.  Please reconsult if I can be of further assistance.  Thank you for this consult.        Francisco Gallardo MD  [facility] Pulmonary/Critical Care  04/13/2023

## 2023-04-14 LAB
BACTERIA BLD CULT: NORMAL
BACTERIA BLD CULT: NORMAL

## 2023-04-15 LAB
BACTERIA FLD AEROBE CULT: NO GROWTH
GRAM STN SPEC: NORMAL
GRAM STN SPEC: NORMAL

## 2023-04-17 ENCOUNTER — OFFICE VISIT (OUTPATIENT)
Dept: FAMILY MEDICINE | Facility: CLINIC | Age: 83
End: 2023-04-17
Payer: MEDICARE

## 2023-04-17 VITALS
WEIGHT: 143.75 LBS | HEART RATE: 76 BPM | RESPIRATION RATE: 16 BRPM | DIASTOLIC BLOOD PRESSURE: 58 MMHG | TEMPERATURE: 98 F | HEIGHT: 62 IN | BODY MASS INDEX: 26.45 KG/M2 | OXYGEN SATURATION: 95 % | SYSTOLIC BLOOD PRESSURE: 112 MMHG

## 2023-04-17 DIAGNOSIS — J96.11 CHRONIC RESPIRATORY FAILURE WITH HYPOXIA: Chronic | ICD-10-CM

## 2023-04-17 DIAGNOSIS — E87.1 HYPONATREMIA: ICD-10-CM

## 2023-04-17 DIAGNOSIS — Z09 HOSPITAL DISCHARGE FOLLOW-UP: Primary | ICD-10-CM

## 2023-04-17 DIAGNOSIS — J45.40 MODERATE PERSISTENT ASTHMA, UNSPECIFIED WHETHER COMPLICATED: Chronic | ICD-10-CM

## 2023-04-17 DIAGNOSIS — R01.1 SYSTOLIC MURMUR: ICD-10-CM

## 2023-04-17 DIAGNOSIS — M25.511 ACUTE PAIN OF RIGHT SHOULDER: ICD-10-CM

## 2023-04-17 DIAGNOSIS — N18.32 STAGE 3B CHRONIC KIDNEY DISEASE: ICD-10-CM

## 2023-04-17 PROCEDURE — 1101F PT FALLS ASSESS-DOCD LE1/YR: CPT | Mod: CPTII,S$GLB,, | Performed by: PHYSICIAN ASSISTANT

## 2023-04-17 PROCEDURE — 1159F MED LIST DOCD IN RCRD: CPT | Mod: CPTII,S$GLB,, | Performed by: PHYSICIAN ASSISTANT

## 2023-04-17 PROCEDURE — 1126F PR PAIN SEVERITY QUANTIFIED, NO PAIN PRESENT: ICD-10-PCS | Mod: CPTII,S$GLB,, | Performed by: PHYSICIAN ASSISTANT

## 2023-04-17 PROCEDURE — 99999 PR PBB SHADOW E&M-EST. PATIENT-LVL V: CPT | Mod: PBBFAC,,, | Performed by: PHYSICIAN ASSISTANT

## 2023-04-17 PROCEDURE — 3288F PR FALLS RISK ASSESSMENT DOCUMENTED: ICD-10-PCS | Mod: CPTII,S$GLB,, | Performed by: PHYSICIAN ASSISTANT

## 2023-04-17 PROCEDURE — 3078F PR MOST RECENT DIASTOLIC BLOOD PRESSURE < 80 MM HG: ICD-10-PCS | Mod: CPTII,S$GLB,, | Performed by: PHYSICIAN ASSISTANT

## 2023-04-17 PROCEDURE — 1160F RVW MEDS BY RX/DR IN RCRD: CPT | Mod: CPTII,S$GLB,, | Performed by: PHYSICIAN ASSISTANT

## 2023-04-17 PROCEDURE — 3288F FALL RISK ASSESSMENT DOCD: CPT | Mod: CPTII,S$GLB,, | Performed by: PHYSICIAN ASSISTANT

## 2023-04-17 PROCEDURE — 3074F SYST BP LT 130 MM HG: CPT | Mod: CPTII,S$GLB,, | Performed by: PHYSICIAN ASSISTANT

## 2023-04-17 PROCEDURE — 3078F DIAST BP <80 MM HG: CPT | Mod: CPTII,S$GLB,, | Performed by: PHYSICIAN ASSISTANT

## 2023-04-17 PROCEDURE — 99999 PR PBB SHADOW E&M-EST. PATIENT-LVL V: ICD-10-PCS | Mod: PBBFAC,,, | Performed by: PHYSICIAN ASSISTANT

## 2023-04-17 PROCEDURE — 1126F AMNT PAIN NOTED NONE PRSNT: CPT | Mod: CPTII,S$GLB,, | Performed by: PHYSICIAN ASSISTANT

## 2023-04-17 PROCEDURE — 1159F PR MEDICATION LIST DOCUMENTED IN MEDICAL RECORD: ICD-10-PCS | Mod: CPTII,S$GLB,, | Performed by: PHYSICIAN ASSISTANT

## 2023-04-17 PROCEDURE — 3074F PR MOST RECENT SYSTOLIC BLOOD PRESSURE < 130 MM HG: ICD-10-PCS | Mod: CPTII,S$GLB,, | Performed by: PHYSICIAN ASSISTANT

## 2023-04-17 PROCEDURE — 99213 PR OFFICE/OUTPT VISIT, EST, LEVL III, 20-29 MIN: ICD-10-PCS | Mod: S$GLB,,, | Performed by: PHYSICIAN ASSISTANT

## 2023-04-17 PROCEDURE — 1160F PR REVIEW ALL MEDS BY PRESCRIBER/CLIN PHARMACIST DOCUMENTED: ICD-10-PCS | Mod: CPTII,S$GLB,, | Performed by: PHYSICIAN ASSISTANT

## 2023-04-17 PROCEDURE — 99213 OFFICE O/P EST LOW 20 MIN: CPT | Mod: S$GLB,,, | Performed by: PHYSICIAN ASSISTANT

## 2023-04-17 PROCEDURE — 1101F PR PT FALLS ASSESS DOC 0-1 FALLS W/OUT INJ PAST YR: ICD-10-PCS | Mod: CPTII,S$GLB,, | Performed by: PHYSICIAN ASSISTANT

## 2023-04-17 RX ORDER — OSELTAMIVIR PHOSPHATE 30 MG/1
30 CAPSULE ORAL 2 TIMES DAILY
COMMUNITY
Start: 2023-01-12 | End: 2023-04-17

## 2023-04-17 RX ORDER — CEFDINIR 300 MG/1
CAPSULE ORAL
COMMUNITY
Start: 2022-12-24 | End: 2023-04-17

## 2023-04-17 RX ORDER — PHENAZOPYRIDINE HYDROCHLORIDE 200 MG/1
200 TABLET, FILM COATED ORAL 3 TIMES DAILY
COMMUNITY
Start: 2022-12-24 | End: 2023-06-12 | Stop reason: ALTCHOICE

## 2023-04-17 RX ORDER — LEVALBUTEROL INHALATION SOLUTION 1.25 MG/3ML
SOLUTION RESPIRATORY (INHALATION) EVERY 6 HOURS PRN
COMMUNITY
Start: 2023-04-04 | End: 2023-06-12 | Stop reason: SDUPTHER

## 2023-04-18 ENCOUNTER — HOSPITAL ENCOUNTER (OUTPATIENT)
Dept: RADIOLOGY | Facility: HOSPITAL | Age: 83
Discharge: HOME | End: 2023-04-18
Attending: NURSE PRACTITIONER
Payer: MEDICARE

## 2023-04-18 ENCOUNTER — HOSPITAL ENCOUNTER (OUTPATIENT)
Dept: RADIOLOGY | Facility: HOSPITAL | Age: 83
Discharge: HOME | End: 2023-04-18
Attending: PHYSICIAN ASSISTANT
Payer: MEDICARE

## 2023-04-18 DIAGNOSIS — R06.00 DYSPNEA: ICD-10-CM

## 2023-04-18 DIAGNOSIS — R06.02 SOB (SHORTNESS OF BREATH): ICD-10-CM

## 2023-04-18 DIAGNOSIS — M25.511 ACUTE PAIN OF RIGHT SHOULDER: ICD-10-CM

## 2023-04-18 DIAGNOSIS — J90 PLEURISY WITH EFFUSION: ICD-10-CM

## 2023-04-18 PROCEDURE — 71250 CT THORAX DX C-: CPT | Mod: TC,PO

## 2023-04-18 PROCEDURE — 73030 X-RAY EXAM OF SHOULDER: CPT | Mod: TC,PO,RT

## 2023-04-19 NOTE — PHYSICIAN QUERY
PT Name: Darlin Tipton  MR #: [id number]    DOCUMENTATION CLARIFICATION     CDS/: Gabrilele Sarmiento               Contact information: [phone]    This form is a permanent document in the medical record.     Query Date: April 19, 2023    By submitting this query, we are merely seeking further clarification of documentation. Please utilize your independent clinical judgment when addressing the question(s) below.    Supporting Clinical Findings Location in Medical Record   Bilateral pleural effusions, right greater than left with recurrent thoracentesis    Officially has undiagnosed pleural effusion because studies not done on prior taps  Can have tap done 4/12/23  Most likely related to heart issues H&P, PN, DS      Pulmonary Consult   HFrEF - Systolic Congestive Heart Failure H&P, IM PN 04/10, DS       Provider, please clarify if there is any clinical correlation between Pleural Effusions and HFrEF (Systolic Congestive Heart Failure)           Are the conditions:      [  x] Due to or associated with each other   [  ] Unrelated to each other   [  ] Other explanation (Please Specify):

## 2023-04-19 NOTE — PHYSICIAN QUERY
PT Name: Darlin Tipton  MR #: [id number]     DOCUMENTATION CLARIFICATION     CDS/: Gabrielle Sarmiento               Contact information: [phone]  This form is a permanent document in the medical record.     Query Date: April 19, 2023    By submitting this query, we are merely seeking further clarification of documentation.  Please utilize your independent clinical judgment when addressing the question(s) below.    The Medical Record contains the following   Indicators Supporting Clinical Findings Location in Medical Record    Heart Failure documented HFrEF, probable exacerbation as etiology of volume overload   H&P, IM PN 04/10    BNP 1320 - 04/09    597 - 04/11 Epic Labs    EF/Echo · Moderate to severe tricuspid regurgitation.   · There is moderate aortic valve stenosis.   · Aortic valve area is 0.77 cm2; peak velocity is 2.79 m/s; mean gradient   is 18 mmHg.   · Mildly decreased systolic function.   · The estimated ejection fraction is 45%.   · Grade II left ventricular diastolic dysfunction.   · Mild right atrial enlargement.   · Moderate-to-severe mitral regurgitation.   · Mild left atrial enlargement.   · There is moderate to severe pulmonary hypertension Echo 04/10    Radiology findings Moderate size right pleural effusion has increased in size     Small left pleural effusion with left basilar atelectasis Chest X-ray 04/09    Subjective/Objective Respiratory Conditions Acute/Chronic Hypoxic Respiratory Failure H&P, Consult, DS    Edema, JVD Patient noted to have JVD ER Phys Note    Diuretics/Meds IV Furosemide 40 mg 04/09 - 04/13 MAR    Other Treatment Thoracentesis - Right pleural cavity Rad 04/12         Provider, please specify the diagnosis associated with the above clinical findings.    [  x ]  Acute on Chronic Systolic Heart Failure (HFrEF or HFmrEF) - worsening of CHF signs/symptoms in preexisting CHF   [   ]  Chronic Systolic Heart Failure (HFrEF or HFmrEF) - preexisting and stable

## 2023-04-24 PROBLEM — J96.10 CHRONIC RESPIRATORY FAILURE: Chronic | Status: RESOLVED | Noted: 2023-01-21 | Resolved: 2023-04-24

## 2023-04-24 PROBLEM — N17.9 AKI (ACUTE KIDNEY INJURY): Status: RESOLVED | Noted: 2023-01-22 | Resolved: 2023-04-24

## 2023-04-27 NOTE — PROGRESS NOTES
Subjective     Patient ID: Darlin Tipton is a 82 y.o. female.    Chief Complaint: Hospital Follow Up (afib)    HPI  Review of Systems   Constitutional:  Positive for activity change and fatigue. Negative for appetite change, chills, diaphoresis, fever and unexpected weight change.   HENT: Negative.     Eyes: Negative.    Respiratory:  Positive for cough, shortness of breath and wheezing.    Cardiovascular: Negative.  Negative for chest pain and leg swelling.   Gastrointestinal: Negative.    Endocrine: Negative.    Genitourinary: Negative.    Musculoskeletal:  Positive for arthralgias.   Integumentary:  Negative for rash. Negative.   Neurological: Negative.         Objective     Physical Exam  Vitals reviewed.   Constitutional:       General: She is not in acute distress.     Appearance: Normal appearance. She is normal weight. She is not ill-appearing, toxic-appearing or diaphoretic.   HENT:      Head: Normocephalic and atraumatic.      Comments: Sinuses non tender     Right Ear: Tympanic membrane, ear canal and external ear normal. There is no impacted cerumen.      Left Ear: Tympanic membrane, ear canal and external ear normal. There is no impacted cerumen.      Nose: Congestion present.      Comments: Clear mucus     Mouth/Throat:      Mouth: Mucous membranes are moist.      Pharynx: Oropharynx is clear. No oropharyngeal exudate or posterior oropharyngeal erythema.   Eyes:      General: No scleral icterus.     Conjunctiva/sclera: Conjunctivae normal.   Neck:      Vascular: No carotid bruit.   Cardiovascular:      Rate and Rhythm: Normal rate and regular rhythm.      Pulses: Normal pulses.      Heart sounds: Murmur heard.     No friction rub. No gallop.      Comments: Systolic murmur  Pulmonary:      Effort: Pulmonary effort is normal. No respiratory distress.      Breath sounds: No stridor. Wheezing present. No rhonchi or rales.      Comments: Occasional diffuse expiratory wheeze  Musculoskeletal:          General: Tenderness and signs of injury present. No swelling or deformity.      Cervical back: Normal range of motion and neck supple. No rigidity or tenderness.      Right lower leg: No edema.      Left lower leg: No edema.      Comments: Decreased ROM R shoulder with pain on motion   Lymphadenopathy:      Cervical: No cervical adenopathy.   Skin:     General: Skin is warm and dry.      Findings: No rash.   Neurological:      General: No focal deficit present.      Mental Status: She is alert and oriented to person, place, and time.          Assessment and Plan     Problem List Items Addressed This Visit       Asthma (Chronic)    Stage 3b chronic kidney disease    Relevant Orders    Ambulatory referral/consult to Nephrology    Hyponatremia    Systolic murmur    RESOLVED: Chronic respiratory failure with hypoxia, on 3L noctural oxygen (Chronic)     Other Visit Diagnoses       Hospital discharge follow-up    -  Primary    Acute pain of right shoulder        Relevant Orders    X-ray Shoulder 2 or More Views Right (Completed)    Ambulatory referral/consult to Orthopedics            Darlin was seen today for hospital follow up.    Diagnoses and all orders for this visit:    Hospital discharge follow-up    Acute pain of right shoulder  -     X-ray Shoulder 2 or More Views Right; Future  -     Ambulatory referral/consult to Orthopedics; Future    Chronic respiratory failure with hypoxia    Moderate persistent asthma, unspecified whether complicated    Systolic murmur    Stage 3b chronic kidney disease  -     Ambulatory referral/consult to Nephrology; Future    Hyponatremia      Discussed otc's

## 2023-05-01 DIAGNOSIS — G47.00 INSOMNIA, UNSPECIFIED TYPE: ICD-10-CM

## 2023-05-01 RX ORDER — TRIAZOLAM 0.25 MG/1
0.25 TABLET ORAL NIGHTLY PRN
Qty: 90 TABLET | Refills: 1 | Status: SHIPPED | OUTPATIENT
Start: 2023-05-01 | End: 2023-08-13 | Stop reason: SDUPTHER

## 2023-05-01 NOTE — TELEPHONE ENCOUNTER
No care due was identified.  [facility] Embedded Care Due Messages. Reference number: [id number].   5/01/2023 7:49:38 AM CDT

## 2023-05-02 ENCOUNTER — PATIENT MESSAGE (OUTPATIENT)
Dept: FAMILY MEDICINE | Facility: CLINIC | Age: 83
End: 2023-05-02
Payer: MEDICARE

## 2023-05-02 NOTE — TELEPHONE ENCOUNTER
Patient's urine has odor. Patient was unable to produce enough urine to submit sample for urinalysis. Will attempt to submit sample on tomorrow. Requesting to know if it is possible to start patient on medication now. Please advise. Thank you.

## 2023-05-02 NOTE — TELEPHONE ENCOUNTER
Did she have a catheter while in the hospital? That will make a difference in diagnosis and/or treatment.

## 2023-05-02 NOTE — TELEPHONE ENCOUNTER
Patient notified via e-mail due to this being initial point of contact from patient regarding this matter.

## 2023-05-03 ENCOUNTER — PATIENT MESSAGE (OUTPATIENT)
Dept: FAMILY MEDICINE | Facility: CLINIC | Age: 83
End: 2023-05-03
Payer: MEDICARE

## 2023-05-03 ENCOUNTER — TELEPHONE (OUTPATIENT)
Dept: FAMILY MEDICINE | Facility: CLINIC | Age: 83
End: 2023-05-03
Payer: MEDICARE

## 2023-05-03 DIAGNOSIS — N39.0 URINARY TRACT INFECTION WITHOUT HEMATURIA, SITE UNSPECIFIED: Primary | ICD-10-CM

## 2023-05-03 RX ORDER — GRANULES FOR ORAL 3 G/1
POWDER ORAL
Qty: 3 G | Refills: 0 | Status: SHIPPED | OUTPATIENT
Start: 2023-05-03 | End: 2023-06-12 | Stop reason: ALTCHOICE

## 2023-05-04 ENCOUNTER — NURSE TRIAGE (OUTPATIENT)
Dept: ADMINISTRATIVE | Facility: CLINIC | Age: 83
End: 2023-05-04
Payer: MEDICARE

## 2023-05-04 ENCOUNTER — LAB VISIT (OUTPATIENT)
Dept: LAB | Facility: HOSPITAL | Age: 83
End: 2023-05-04
Attending: NURSE PRACTITIONER
Payer: MEDICARE

## 2023-05-04 ENCOUNTER — OFFICE VISIT (OUTPATIENT)
Dept: PULMONOLOGY | Facility: CLINIC | Age: 83
End: 2023-05-04
Payer: MEDICARE

## 2023-05-04 ENCOUNTER — TELEPHONE (OUTPATIENT)
Dept: UROLOGY | Facility: CLINIC | Age: 83
End: 2023-05-04
Payer: MEDICARE

## 2023-05-04 VITALS
OXYGEN SATURATION: 94 % | DIASTOLIC BLOOD PRESSURE: 64 MMHG | WEIGHT: 143 LBS | SYSTOLIC BLOOD PRESSURE: 110 MMHG | BODY MASS INDEX: 26.16 KG/M2 | HEART RATE: 80 BPM

## 2023-05-04 DIAGNOSIS — I35.0 AORTIC VALVE STENOSIS, ETIOLOGY OF CARDIAC VALVE DISEASE UNSPECIFIED: ICD-10-CM

## 2023-05-04 DIAGNOSIS — J45.30 MILD PERSISTENT ASTHMA WITHOUT COMPLICATION: Primary | ICD-10-CM

## 2023-05-04 DIAGNOSIS — R53.83 FATIGUE, UNSPECIFIED TYPE: ICD-10-CM

## 2023-05-04 DIAGNOSIS — I48.0 PAROXYSMAL ATRIAL FIBRILLATION: ICD-10-CM

## 2023-05-04 DIAGNOSIS — J90 PLEURAL EFFUSION: ICD-10-CM

## 2023-05-04 LAB
ALBUMIN SERPL BCP-MCNC: 2.5 G/DL (ref 3.5–5.2)
ALP SERPL-CCNC: 75 U/L (ref 55–135)
ALT SERPL W/O P-5'-P-CCNC: 142 U/L (ref 10–44)
ANION GAP SERPL CALC-SCNC: 4 MMOL/L (ref 8–16)
AST SERPL-CCNC: 104 U/L (ref 10–40)
BASOPHILS # BLD AUTO: 0.01 K/UL (ref 0–0.2)
BASOPHILS NFR BLD: 0.2 % (ref 0–1.9)
BILIRUB SERPL-MCNC: 0.5 MG/DL (ref 0.1–1)
BUN SERPL-MCNC: 65 MG/DL (ref 8–23)
CALCIUM SERPL-MCNC: 8.3 MG/DL (ref 8.7–10.5)
CHLORIDE SERPL-SCNC: 99 MMOL/L (ref 95–110)
CO2 SERPL-SCNC: 27 MMOL/L (ref 23–29)
CREAT SERPL-MCNC: 1.2 MG/DL (ref 0.5–1.4)
DIFFERENTIAL METHOD: ABNORMAL
EOSINOPHIL # BLD AUTO: 0.1 K/UL (ref 0–0.5)
EOSINOPHIL NFR BLD: 2.7 % (ref 0–8)
ERYTHROCYTE [DISTWIDTH] IN BLOOD BY AUTOMATED COUNT: 15.8 % (ref 11.5–14.5)
EST. GFR  (NO RACE VARIABLE): 45.2 ML/MIN/1.73 M^2
GLUCOSE SERPL-MCNC: 124 MG/DL (ref 70–110)
HCT VFR BLD AUTO: 29.6 % (ref 37–48.5)
HGB BLD-MCNC: 9.2 G/DL (ref 12–16)
IMM GRANULOCYTES # BLD AUTO: 0.03 K/UL (ref 0–0.04)
IMM GRANULOCYTES NFR BLD AUTO: 0.7 % (ref 0–0.5)
LYMPHOCYTES # BLD AUTO: 0.3 K/UL (ref 1–4.8)
LYMPHOCYTES NFR BLD: 7.8 % (ref 18–48)
MCH RBC QN AUTO: 26.1 PG (ref 27–31)
MCHC RBC AUTO-ENTMCNC: 31.1 G/DL (ref 32–36)
MCV RBC AUTO: 84 FL (ref 82–98)
MONOCYTES # BLD AUTO: 0.7 K/UL (ref 0.3–1)
MONOCYTES NFR BLD: 15.5 % (ref 4–15)
NEUTROPHILS # BLD AUTO: 3.2 K/UL (ref 1.8–7.7)
NEUTROPHILS NFR BLD: 73.1 % (ref 38–73)
NRBC BLD-RTO: 0 /100 WBC
PLATELET # BLD AUTO: 378 K/UL (ref 150–450)
PMV BLD AUTO: 9.4 FL (ref 9.2–12.9)
POTASSIUM SERPL-SCNC: 4.5 MMOL/L (ref 3.5–5.1)
PROT SERPL-MCNC: 5.9 G/DL (ref 6–8.4)
RBC # BLD AUTO: 3.52 M/UL (ref 4–5.4)
SODIUM SERPL-SCNC: 130 MMOL/L (ref 136–145)
WBC # BLD AUTO: 4.38 K/UL (ref 3.9–12.7)

## 2023-05-04 PROCEDURE — 85025 COMPLETE CBC W/AUTO DIFF WBC: CPT | Performed by: NURSE PRACTITIONER

## 2023-05-04 PROCEDURE — 1125F PR PAIN SEVERITY QUANTIFIED, PAIN PRESENT: ICD-10-PCS | Mod: CPTII,S$GLB,, | Performed by: NURSE PRACTITIONER

## 2023-05-04 PROCEDURE — 1159F MED LIST DOCD IN RCRD: CPT | Mod: CPTII,S$GLB,, | Performed by: NURSE PRACTITIONER

## 2023-05-04 PROCEDURE — 1159F PR MEDICATION LIST DOCUMENTED IN MEDICAL RECORD: ICD-10-PCS | Mod: CPTII,S$GLB,, | Performed by: NURSE PRACTITIONER

## 2023-05-04 PROCEDURE — 1111F DSCHRG MED/CURRENT MED MERGE: CPT | Mod: CPTII,S$GLB,, | Performed by: NURSE PRACTITIONER

## 2023-05-04 PROCEDURE — 3074F SYST BP LT 130 MM HG: CPT | Mod: CPTII,S$GLB,, | Performed by: NURSE PRACTITIONER

## 2023-05-04 PROCEDURE — 36415 COLL VENOUS BLD VENIPUNCTURE: CPT | Performed by: NURSE PRACTITIONER

## 2023-05-04 PROCEDURE — 3078F PR MOST RECENT DIASTOLIC BLOOD PRESSURE < 80 MM HG: ICD-10-PCS | Mod: CPTII,S$GLB,, | Performed by: NURSE PRACTITIONER

## 2023-05-04 PROCEDURE — 99214 OFFICE O/P EST MOD 30 MIN: CPT | Mod: S$GLB,,, | Performed by: NURSE PRACTITIONER

## 2023-05-04 PROCEDURE — 3074F PR MOST RECENT SYSTOLIC BLOOD PRESSURE < 130 MM HG: ICD-10-PCS | Mod: CPTII,S$GLB,, | Performed by: NURSE PRACTITIONER

## 2023-05-04 PROCEDURE — 80053 COMPREHEN METABOLIC PANEL: CPT | Performed by: NURSE PRACTITIONER

## 2023-05-04 PROCEDURE — 1111F PR DISCHARGE MEDS RECONCILED W/ CURRENT OUTPATIENT MED LIST: ICD-10-PCS | Mod: CPTII,S$GLB,, | Performed by: NURSE PRACTITIONER

## 2023-05-04 PROCEDURE — 3078F DIAST BP <80 MM HG: CPT | Mod: CPTII,S$GLB,, | Performed by: NURSE PRACTITIONER

## 2023-05-04 PROCEDURE — 1125F AMNT PAIN NOTED PAIN PRSNT: CPT | Mod: CPTII,S$GLB,, | Performed by: NURSE PRACTITIONER

## 2023-05-04 PROCEDURE — 99214 PR OFFICE/OUTPT VISIT, EST, LEVL IV, 30-39 MIN: ICD-10-PCS | Mod: S$GLB,,, | Performed by: NURSE PRACTITIONER

## 2023-05-04 RX ORDER — HYDROCORTISONE ACETATE PRAMOXINE HCL 1; 1 G/100G; G/100G
CREAM TOPICAL 2 TIMES DAILY
COMMUNITY
End: 2023-08-14 | Stop reason: SDUPTHER

## 2023-05-04 RX ORDER — SACUBITRIL AND VALSARTAN 24; 26 MG/1; MG/1
1 TABLET, FILM COATED ORAL 2 TIMES DAILY
COMMUNITY
End: 2023-06-12 | Stop reason: ALTCHOICE

## 2023-05-04 RX ORDER — ONDANSETRON 4 MG/1
4 TABLET, FILM COATED ORAL EVERY 8 HOURS PRN
COMMUNITY
End: 2023-05-30 | Stop reason: ALTCHOICE

## 2023-05-04 RX ORDER — MIDODRINE HYDROCHLORIDE 10 MG/1
10 TABLET ORAL
COMMUNITY

## 2023-05-04 NOTE — TELEPHONE ENCOUNTER
----- Message from Sindhu Massey sent at 5/4/2023  4:40 PM CDT -----  Regarding: advise  [facility]  Type: Needs Medical Advice  [facility]  Symptoms (please be specific):    How long has patient had these symptoms:    Pharmacy name and phone #:    [id number] [place] - [address] [facility]  [address]  [place] [address]  Phone: [phone] Fax: [phone]    Best Call Back Number: [phone] (home)     Additional Information: Miguelangel Kern, [facility] regarding Darlin Tipton MRN:[id number]         Stating the medication that  was prescribed by duarte bernal was wrong and the patient has already taken it. Please call to advise

## 2023-05-04 NOTE — TELEPHONE ENCOUNTER
Spoke with Naheed (pharmacist) at [facility] pharmacy. States that she was calling to speak with on call provider regarding pt taking Monurol medication the wrong way.       Spoke with Dr. Garrido, and informed, pharmacist call transferred to provider.  Reason for Disposition   [1] Pharmacy calling with prescription question AND [2] triager unable to answer question    Protocols used: Medication Question Call-A-

## 2023-05-04 NOTE — PROGRESS NOTES
SUBJECTIVE:    Patient ID: Darlin Tipton is a 82 y.o. female.    Chief Complaint: Follow-up (Hospital Follow up/Fluid in Pleural Cavity @ [facility])      HPI      Patient here today with her  and daughter for follow up. She was hospitalized twice since her last visit for recurring pleural effusion that was tapped and recurred quickly after. She then had a pleurodesis which has helped. She feels her breathing is better. She is trying to have a TAVR.  She has had several falls, the most recent was this past Monday, she has bruising to forehead. She was not brought to hospital. The daughter states she is better now. She is still very weak from hospitalizations. She is wearing her oxygen. She is using her Breo. She complains of some bleeding hemorrhoids.    Past Medical History:   Diagnosis Date    Allergy     Dust mites, Grasses, Trees    Arthritis     Asthma     Blood transfusion     CAD (coronary artery disease)     Cataract     CHF (congestive heart failure)     CHRONIC BRONCHITIS     Diabetes mellitus     Diabetes mellitus type II     GERD (gastroesophageal reflux disease)     Hyperlipidemia     Hypertension     Irregular heart beat     Kidney disease     ckd stage 3  as stated by patient - to see MD    Paroxysmal atrial fibrillation     Post-menopausal bleeding 2018    Spinal stenosis     Thyroid disease     Hypothyroidism     Past Surgical History:   Procedure Laterality Date    APPENDECTOMY  1968    BLADDER SUSPENSION  1989    CARDIAC SURGERY  2016    CABG    CATARACT EXTRACTION  9/2007 (L) and 10/2207 (R)    COLONOSCOPY N/A 10/18/2017    Procedure: COLONOSCOPY;  Surgeon: Esme Acuna MD;  Location: [facility];  Service: Endoscopy;  Laterality: N/A;    CORONARY ANGIOGRAPHY INCLUDING BYPASS GRAFTS WITH CATHETERIZATION OF LEFT HEART Left 5/13/2022    Procedure: Left heart cath;  Surgeon: Davon Patton MD;  Location: [facility] CATH/EP LAB;  Service: Cardiology;  Laterality: Left;    CORONARY ARTERY  BYPASS GRAFT  4/26/2004    x5    ESOPHAGEAL DILATION      ESOPHAGOGASTRODUODENOSCOPY N/A 6/27/2022    Procedure: EGD (ESOPHAGOGASTRODUODENOSCOPY);  Surgeon: Skip Nj MD;  Location: [facility];  Service: Endoscopy;  Laterality: N/A;    HYSTEROSCOPY WITH DILATION AND CURETTAGE OF UTERUS N/A 3/12/2020    Procedure: HYSTEROSCOPY, WITH DILATION AND CURETTAGE OF UTERUS;  Surgeon: Ramona Llanos MD;  Location: [facility] OR;  Service: OB/GYN;  Laterality: N/A;    SPINE SURGERY  3/2000    Tumor    THORACENTESIS Right 2/7/2023    Procedure: Thoracentesis;  Surgeon: Rula Weiss MD;  Location: [facility];  Service: Pulmonary;  Laterality: Right;  ultrasound guided thoracentesis of right pleural effusion 2/07/23 0730    TRANSFORAMINAL EPIDURAL INJECTION OF STEROID Right 11/21/2019    Procedure: Injection,steroid,epidural,transforaminal approach;  Surgeon: Bj Jones MD;  Location: [facility];  Service: Pain Management;  Laterality: Right;  L4-5, L5-S1    TRANSFORAMINAL EPIDURAL INJECTION OF STEROID Right 12/31/2019    Procedure: Injection,steroid,epidural,transforaminal approach;  Surgeon: Bj Jones MD;  Location: [facility];  Service: Pain Management;  Laterality: Right;  L4-5, L5-S1    TRANSFORAMINAL EPIDURAL INJECTION OF STEROID Right 2/5/2020    Procedure: Injection,steroid,epidural,transforaminal approach;  Surgeon: Bj Jones MD;  Location: [facility];  Service: Pain Management;  Laterality: Right;  L4-5, L5-S1    TRANSFORAMINAL EPIDURAL INJECTION OF STEROID Left 1/27/2021    Procedure: Injection,steroid,epidural,transforaminal approach;  Surgeon: Bj Jones MD;  Location: [facility] OR;  Service: Pain Management;  Laterality: Left;  L4-5, L5-S1    TRANSFORAMINAL EPIDURAL INJECTION OF STEROID Right 3/4/2021    Procedure: Injection,steroid,epidural,transforaminal approach;  Surgeon: Bj Jones MD;  Location: [facility] OR;  Service: Pain Management;  Laterality: Right;  L4-L5, L5-S1    WRIST SURGERY  1993    carpal tunnel        Family History   Problem Relation Age of Onset    Breast cancer Mother     Breast cancer Maternal Aunt     Allergic rhinitis Neg Hx     Allergies Neg Hx     Angioedema Neg Hx     Asthma Neg Hx     Eczema Neg Hx     Immunodeficiency Neg Hx     Urticaria Neg Hx     Rhinitis Neg Hx     Atopy Neg Hx         Social History:   Marital Status:   Occupation: Data Unavailable  Alcohol History:  reports current alcohol use.  Tobacco History:  reports that she has never smoked. She has been exposed to tobacco smoke. She has never used smokeless tobacco.  Drug History:  reports no history of drug use.    Review of patient's allergies indicates:   Allergen Reactions    Dexlansoprazole Itching, Nausea Only and Rash    Sulfa (sulfonamide antibiotics) Rash    Floxacillin Itching    Januvia [sitagliptin] Other (See Comments)     Hot flashes    Tetracyclines Itching    Fenofibrate micronized Rash    Nitrofurantoin macrocrystalline Other (See Comments)     unknown    Phenylfenesin la [phenylpropanolamine-gg] Rash       Current Outpatient Medications   Medication Sig Dispense Refill    acetaminophen (TYLENOL) 650 MG TbSR Take 1,300 mg by mouth once daily. 2 Tablet(s) Oral  Every day.      amiodarone (PACERONE) 200 MG Tab Take 1 tablet (200 mg total) by mouth 3 (three) times daily for 4 days, THEN 1 tablet (200 mg total) 2 (two) times daily. 72 tablet 0    amitriptyline (ELAVIL) 75 MG tablet Take 75 mg by mouth every evening.      apixaban (ELIQUIS) 5 mg Tab Take 1 tablet (5 mg total) by mouth 2 (two) times daily. 180 tablet 3    busPIRone (BUSPAR) 10 MG tablet TAKE 1 TABLET BY MOUTH TWICE A DAY (Patient taking differently: Take 10 mg by mouth 2 (two) times daily.) 180 tablet 3    carboxymethylcellulose 1 % ophthalmic solution Apply 1 drop to eye As instructed. as directed      cetirizine (ZYRTEC) 10 MG tablet Take 10 mg by mouth once daily.      cholecalciferol, vitamin D3, (VITAMIN D3) 50 mcg (2,000 unit) Cap Take 1 capsule  by mouth once daily.      clotrimazole-betamethasone 1-0.05% (LOTRISONE) cream Apply 1 g topically 2 (two) times daily as needed.      coenzyme Q10 100 mg capsule Take 100 mg by mouth once daily.       cranberry extract 650 mg Cap Take 1 tablet by mouth 2 (two) times daily.      FARXIGA 5 mg Tab tablet Take 5 mg by mouth once daily.      fluticasone furoate-vilanteroL (BREO ELLIPTA) 100-25 mcg/dose diskus inhaler Inhale 1 puff into the lungs once daily. Controller Rinse after you use it 180 each 6    fluticasone propionate (FLONASE) 50 mcg/actuation nasal spray 1 spray (50 mcg total) by Each Nostril route once daily. 48 g 3    fosfomycin (MONUROL) 3 gram Pack Divide medication into 3 doses to be given today.  Do not give all at once. 3 g 0    lansoprazole (PREVACID) 30 MG capsule Take 1 capsule (30 mg total) by mouth once daily. 90 capsule 3    levalbuterol (XOPENEX) 1.25 mg/0.5 mL nebulizer solution Take 0.5 mLs (1.25 mg total) by nebulization every 6 (six) hours as needed for Wheezing. Rescue 120 each 3    levalbuterol (XOPENEX) 1.25 mg/3 mL nebulizer solution Take by nebulization every 6 (six) hours as needed.      levothyroxine (SYNTHROID) 25 MCG tablet TAKE 1 TABLET BY MOUTH BEFORE BREAKFAST EVERY DAY (Patient taking differently: Take 25 mcg by mouth before breakfast.) 90 tablet 3    metoprolol succinate (TOPROL XL) 25 MG 24 hr tablet Take 1 tablet (25 mg total) by mouth once daily. 90 tablet 3    midodrine (PROAMATINE) 10 MG tablet Take 10 mg by mouth 2 (two) times daily with meals.      nitroGLYCERIN (NITROSTAT) 0.4 MG SL tablet Place 0.4 mg under the tongue every 5 (five) minutes as needed. 0.4mg Sublingual PRN .        ondansetron (ZOFRAN) 4 MG tablet Take 4 mg by mouth every 8 (eight) hours as needed for Nausea.      pramoxine-hydrocortisone (PROCTOCREAM-HC) 1-1 % rectal cream Place rectally 2 (two) times daily.      RESTASIS 0.05 % ophthalmic emulsion Place 1 drop into both eyes 2 (two) times daily.       rosuvastatin (CRESTOR) 10 MG tablet Take 1 tablet (10 mg total) by mouth once daily. 90 tablet 3    sacubitriL-valsartan (ENTRESTO) 24-26 mg per tablet Take 1 tablet by mouth 2 (two) times daily.      triazolam (HALCION) 0.25 MG Tab Take 1 tablet (0.25 mg total) by mouth nightly as needed (sleep). 90 tablet 1    blood sugar diagnostic (FREESTYLE LITE STRIPS) Strp USE TO TEST BLOOD SUGAR TWICE A  strip 3    furosemide (LASIX) 20 MG tablet Take 1 tablet (20 mg total) by mouth once daily. 30 tablet 0    Lactobac no.41/Bifidobact no.7 (PROBIOTIC-10 ORAL) Take 1 tablet by mouth once daily.      lancets Misc 1 Units by Misc.(Non-Drug; Combo Route) route 2 (two) times daily. 200 each 3    phenazopyridine (PYRIDIUM) 200 MG tablet Take 200 mg by mouth 3 (three) times daily.      vitamins  A,C,E-zinc-copper 14,320-226-200 unit-mg-unit Cap Take 1 capsule by mouth 2 (two) times daily.        No current facility-administered medications for this visit.     Last echo  Summary    The left ventricle is normal in size with mild concentric hypertrophy and low normal systolic function.  The estimated ejection fraction is 52%.  Grade I left ventricular diastolic dysfunction.  Normal right ventricular size.  Mild left atrial enlargement.  There is mild aortic valve stenosis.  Aortic valve area is 2.57 cm2; peak velocity is 2.17 m/s; mean gradient is 13 mmHg.  Mild mitral regurgitation.  Mild tricuspid regurgitation.  Normal central venous pressure (3 mmHg).  The estimated PA systolic pressure is 23 mmHg.    The patient's PFT show no obstruction, normal lung volumes and a mild diffusion defect.  Her 6 min walk was non hypoxemic    Chest xray 01/2023   IMPRESSION:     Interval increase of right pleural effusion, and no significant change of left pleural effusion    Review of Systems  General: Feeling Well.  Eyes: Vision is good.  ENT:  No sinusitis or pharyngitis.   Heart:: chest pain at times  Lungs: breathing and cough a lot  better since pleurodesis   GI: bleeding hemorrhoids  : No dysuria, hesitancy, or nocturia.  Musculoskeletal: No joint pain or myalgias.  Skin: No lesions or rashes.  Neuro: No headaches or neuropathy.  Lymph: swelling to legs   Psych: No anxiety or depression.  Endo: No weight change.    OBJECTIVE:      /64 (BP Location: Left arm, Patient Position: Sitting, BP Method: Medium (Manual))   Pulse 80   Wt 64.9 kg (143 lb)   SpO2 (!) 94% Comment: Oxygen set @ 3 Liters  BMI 26.16 kg/m²     Physical Exam  GENERAL: Older patient in no distress. Appears fatigued    HEENT: Pupils equal and reactive. Extraocular movements intact. Nose intact.      Pharynx moist.  NECK: Supple.   HEART: Regular rate and rhythm. Loud murmur .  LUNGS: decreased to bases   ABDOMEN: Bowel sounds present. Non-tender, no masses palpated.  EXTREMITIES: Normal muscle tone and joint movement, no cyanosis or clubbing.   LYMPHATICS:+1 pitting to right leg   SKIN: pale. Eccyhmosis to right lower back and left upper back, ecchymosis to right forehead  NEURO: Cranial nerves II-XII intact. Motor strength 5/5 bilaterally, upper and lower extremities.  PSYCH: Appropriate affect.    Assessment:       1. Mild persistent asthma without complication    2. Paroxysmal atrial fibrillation    3. Aortic valve stenosis, etiology of cardiac valve disease unspecified    4. Pleural effusion    5. Fatigue, unspecified type                Plan:       Mild persistent asthma without complication    Paroxysmal atrial fibrillation    Aortic valve stenosis, etiology of cardiac valve disease unspecified    Pleural effusion    Fatigue, unspecified type  -     CBC auto differential; Future; Expected date: 05/04/2023  -     Comprehensive Metabolic Panel; Future; Expected date: 05/04/2023              Continue breo daily  Continue oxygen   Cbc and cmp    Follow up in about 6 months (around 11/4/2023).

## 2023-05-05 ENCOUNTER — TELEPHONE (OUTPATIENT)
Dept: PULMONOLOGY | Facility: CLINIC | Age: 83
End: 2023-05-05

## 2023-05-05 ENCOUNTER — TELEPHONE (OUTPATIENT)
Dept: CARDIOLOGY | Facility: CLINIC | Age: 83
End: 2023-05-05
Payer: MEDICARE

## 2023-05-05 ENCOUNTER — PATIENT MESSAGE (OUTPATIENT)
Dept: PULMONOLOGY | Facility: CLINIC | Age: 83
End: 2023-05-05

## 2023-05-05 NOTE — TELEPHONE ENCOUNTER
Called and left her a message, randa had received a message on this patient. When I looked at her chart she was now established with Dr. Diaz.

## 2023-05-05 NOTE — TELEPHONE ENCOUNTER
Called patient to discuss labs, had to leave a voicemail.  H/H is lower. Trending down over the last 3 weeks. Sodium low but stable, bun worse, protein and albumin low, ALT and AST up

## 2023-05-08 DIAGNOSIS — N18.32 STAGE 3B CHRONIC KIDNEY DISEASE: Primary | ICD-10-CM

## 2023-05-10 ENCOUNTER — PATIENT MESSAGE (OUTPATIENT)
Dept: FAMILY MEDICINE | Facility: CLINIC | Age: 83
End: 2023-05-10
Payer: MEDICARE

## 2023-05-10 ENCOUNTER — TELEPHONE (OUTPATIENT)
Dept: FAMILY MEDICINE | Facility: CLINIC | Age: 83
End: 2023-05-10
Payer: MEDICARE

## 2023-05-10 NOTE — TELEPHONE ENCOUNTER
Spoke with Kylah advised patient needs to stop any potassium supplements and multivitamin.  Repeat potassium level on Monday.  Advised also sent a message to patient's daughter.

## 2023-05-10 NOTE — TELEPHONE ENCOUNTER
----- Message from Edel Diaz sent at 5/10/2023 10:31 AM CDT -----  Regarding: Needs Medical Advice  Contact: Kylah with [facility] at [phone]  Type: Needs Medical Advice  Who Called:  Kylah with [facility] at [phone]    Best Call Back Number: [phone]    Additional Information: Would like to discuss labs for patient. Please call and advise. Thank you

## 2023-05-15 LAB — FUNGUS SPEC CULT: NORMAL

## 2023-05-17 ENCOUNTER — TELEPHONE (OUTPATIENT)
Dept: GASTROENTEROLOGY | Facility: CLINIC | Age: 83
End: 2023-05-17
Payer: MEDICARE

## 2023-05-17 NOTE — TELEPHONE ENCOUNTER
Call placed to Ms. Saeed in regard to her appt on 5/30. No answer, left message to return call.     **appt needs to be rescheduled due to MD in procedures

## 2023-05-29 LAB
ACID FAST MOD KINY STN SPEC: NORMAL
MYCOBACTERIUM SPEC QL CULT: NORMAL

## 2023-05-30 ENCOUNTER — OFFICE VISIT (OUTPATIENT)
Dept: GASTROENTEROLOGY | Facility: CLINIC | Age: 83
End: 2023-05-30
Payer: MEDICARE

## 2023-05-30 VITALS — RESPIRATION RATE: 18 BRPM | BODY MASS INDEX: 25.15 KG/M2 | WEIGHT: 136.69 LBS | HEIGHT: 62 IN

## 2023-05-30 DIAGNOSIS — D50.8 OTHER IRON DEFICIENCY ANEMIA: Primary | ICD-10-CM

## 2023-05-30 PROCEDURE — 1126F AMNT PAIN NOTED NONE PRSNT: CPT | Mod: CPTII,S$GLB,, | Performed by: INTERNAL MEDICINE

## 2023-05-30 PROCEDURE — 1126F PR PAIN SEVERITY QUANTIFIED, NO PAIN PRESENT: ICD-10-PCS | Mod: CPTII,S$GLB,, | Performed by: INTERNAL MEDICINE

## 2023-05-30 PROCEDURE — 99214 PR OFFICE/OUTPT VISIT, EST, LEVL IV, 30-39 MIN: ICD-10-PCS | Mod: S$GLB,,, | Performed by: INTERNAL MEDICINE

## 2023-05-30 PROCEDURE — 99999 PR PBB SHADOW E&M-EST. PATIENT-LVL III: ICD-10-PCS | Mod: PBBFAC,,, | Performed by: INTERNAL MEDICINE

## 2023-05-30 PROCEDURE — 99999 PR PBB SHADOW E&M-EST. PATIENT-LVL III: CPT | Mod: PBBFAC,,, | Performed by: INTERNAL MEDICINE

## 2023-05-30 PROCEDURE — 99214 OFFICE O/P EST MOD 30 MIN: CPT | Mod: S$GLB,,, | Performed by: INTERNAL MEDICINE

## 2023-05-30 RX ORDER — PROMETHAZINE HYDROCHLORIDE 12.5 MG/1
12.5 TABLET ORAL EVERY 8 HOURS PRN
Qty: 30 TABLET | Refills: 1 | Status: SHIPPED | OUTPATIENT
Start: 2023-05-30 | End: 2023-10-18

## 2023-05-30 NOTE — PROGRESS NOTES
"[facility]    CC: Bloating, fullness     HPI 82 y.o. female with multiple medical problems including severe pulmonary HTN and CHF with recurrent pleural effusions requiring thoracentesis, CAD, DM, CKD3, chronic normocytic anemia with iron deficiency and afib, presents to follow up moderate, persistent abdominal bloating, fullness and early satiety. Her symptoms somewhat improved after FDgard. She has frequent nausea that is not much improved with Zofran or Phenergan. She continues to have mild constipation which has slightly improved on daily Miralax. She has close cardiology follow up and is waiting on decision for a TAVR. She continues to require continuous oxygen. Her last EGD was in June 2022 and was notable for mild gastritis.       Past Medical History:   Diagnosis Date    Allergy     Dust mites, Grasses, Trees    Arthritis     Asthma     Blood transfusion     CAD (coronary artery disease)     Cataract     CHF (congestive heart failure)     CHRONIC BRONCHITIS     Diabetes mellitus     Diabetes mellitus type II     GERD (gastroesophageal reflux disease)     Hyperlipidemia     Hypertension     Irregular heart beat     Kidney disease     ckd stage 3  as stated by patient - to see MD    Paroxysmal atrial fibrillation     Post-menopausal bleeding 2018    Spinal stenosis     Thyroid disease     Hypothyroidism       Allergies and Medications reviewed     Review of Systems  General ROS: negative for - chills, fever or weight loss  Cardiovascular ROS: no chest pain; +  dyspnea on exertion  Gastrointestinal ROS: + bloating, + fullness, improved in constipation     Physical Examination  Resp 18   Ht 5' 2" (1.575 m)   Wt 62 kg (136 lb 11 oz)   BMI 25.00 kg/m²   General appearance: alert, cooperative, no distress  HENT: Normocephalic, atraumatic, neck symmetrical, no nasal discharge, sclera anicteric   Lungs: clear to auscultation bilaterally, symmetric chest wall expansion bilaterally  Heart: regular " rate and rhythm without rub; no displacement of the PMI   Abdomen: soft, nondistended, BS active, no masses appreciated  Extremities: extremities symmetric; no clubbing, cyanosis, or edema        Labs:  5/4/23:  -Hgb - 9.2, MCV- 84, Plt- 378  -AST- 104, ALT- 142, Alk phos- 75, Bili- 0.5, Cr- 1.2    Imaging:  September 2022 abdominal ultrasound was independently visualized and reviewed by me and showed 1. Mild biliary dilation which may relate to senescent change.  Correlate with bilirubin level as the need for further assessment with ERCP/MRCP.  2. Simple right renal cyst..    GES June 2022- 1. At 2 hours, there is slightly delayed gastric emptying, however images at 1 and 4 hours demonstrate no abnormalities, with 97% emptying at the 4 hour kishan.    Assessment:   82 y.o. female with multiple medical problems including recurrent pleural effusion requiring recent thoracentesis and CKD 3 with chronic normocytic anemia, presents to follow up mild chronic constipation as well as abdominal fullness and bloating, ? Related to HF ? Delayed gastric emptying. She is noted to have elevation in LFTs, ? Congestive hepatopathy.         Plan:  -Repeat labs  -Schedule abdominal ultrasound  -Continue FDGard  -Increase Miralax to BID  -Trial of Lyudmila, Continue Zofran/Phenergan PRN      Leticia Shaw MD  [facility]  [address], [address]  [place] [address]  Office: [phone]  Fax: [phone]

## 2023-05-31 ENCOUNTER — HOSPITAL ENCOUNTER (OUTPATIENT)
Dept: RADIOLOGY | Facility: HOSPITAL | Age: 83
Discharge: HOME | End: 2023-05-31
Attending: INTERNAL MEDICINE
Payer: MEDICARE

## 2023-05-31 DIAGNOSIS — D50.8 OTHER IRON DEFICIENCY ANEMIA: ICD-10-CM

## 2023-05-31 DIAGNOSIS — K74.69 OTHER CIRRHOSIS OF LIVER: Primary | ICD-10-CM

## 2023-05-31 PROCEDURE — 76705 US ABDOMEN LIMITED: ICD-10-PCS | Mod: 26,,, | Performed by: RADIOLOGY

## 2023-05-31 PROCEDURE — 76705 ECHO EXAM OF ABDOMEN: CPT | Mod: 26,,, | Performed by: RADIOLOGY

## 2023-05-31 PROCEDURE — 76705 ECHO EXAM OF ABDOMEN: CPT | Mod: TC,PO

## 2023-06-05 ENCOUNTER — PATIENT MESSAGE (OUTPATIENT)
Dept: GASTROENTEROLOGY | Facility: CLINIC | Age: 83
End: 2023-06-05
Payer: MEDICARE

## 2023-06-06 DIAGNOSIS — D50.0 IRON DEFICIENCY ANEMIA DUE TO CHRONIC BLOOD LOSS: ICD-10-CM

## 2023-06-06 RX ORDER — HEPARIN 100 UNIT/ML
500 SYRINGE INTRAVENOUS
Status: CANCELLED | OUTPATIENT
Start: 2023-06-06

## 2023-06-06 RX ORDER — SODIUM CHLORIDE 0.9 % (FLUSH) 0.9 %
10 SYRINGE (ML) INJECTION
Status: CANCELLED | OUTPATIENT
Start: 2023-06-06

## 2023-06-06 NOTE — PROGRESS NOTES
Spoke with patient's daughter to review recent labs, which show worsening of anemia with iron deficiency. We discussed possibility of blood loss from the GI tract however she is not a candidate for endoscopy at this time (nor  does she wish for it). I will begin IV iron x 2 doses then recheck labs in 2-3 months. Her daughter agrees with plans.    [facility] 6/1/23  -Hgb- 8.6, Hct- 28.6, MCV- 32,,, Plt- 349  -Iron- 23, TIBC- 369, Fe sat- 6%, Ferritin- 41

## 2023-06-09 ENCOUNTER — TELEPHONE (OUTPATIENT)
Dept: INFUSION THERAPY | Facility: HOSPITAL | Age: 83
End: 2023-06-09

## 2023-06-09 ENCOUNTER — PATIENT MESSAGE (OUTPATIENT)
Dept: HEMATOLOGY/ONCOLOGY | Facility: CLINIC | Age: 83
End: 2023-06-09

## 2023-06-09 ENCOUNTER — TELEPHONE (OUTPATIENT)
Dept: HEMATOLOGY/ONCOLOGY | Facility: CLINIC | Age: 83
End: 2023-06-09

## 2023-06-09 DIAGNOSIS — D51.8 DIETARY VITAMIN B12 DEFICIENCY ANEMIA: ICD-10-CM

## 2023-06-09 DIAGNOSIS — N18.32 STAGE 3B CHRONIC KIDNEY DISEASE: Primary | ICD-10-CM

## 2023-06-09 DIAGNOSIS — D53.9 ANEMIA ASSOCIATED WITH NUTRITIONAL DEFICIENCY: ICD-10-CM

## 2023-06-09 DIAGNOSIS — E07.9 THYROID DYSFUNCTION: ICD-10-CM

## 2023-06-09 DIAGNOSIS — D63.1 ANEMIA DUE TO CHRONIC RENAL FAILURE TREATED WITH ERYTHROPOIETIN, STAGE 3 (MODERATE): ICD-10-CM

## 2023-06-09 DIAGNOSIS — D50.9 IRON DEFICIENCY ANEMIA, UNSPECIFIED IRON DEFICIENCY ANEMIA TYPE: ICD-10-CM

## 2023-06-09 DIAGNOSIS — N18.30 ANEMIA DUE TO CHRONIC RENAL FAILURE TREATED WITH ERYTHROPOIETIN, STAGE 3 (MODERATE): ICD-10-CM

## 2023-06-09 RX ORDER — CYANOCOBALAMIN 1000 UG/ML
1000 INJECTION, SOLUTION INTRAMUSCULAR; SUBCUTANEOUS ONCE
Status: CANCELLED | OUTPATIENT
Start: 2023-06-16

## 2023-06-09 NOTE — TELEPHONE ENCOUNTER
I will see her as a new patient on Tuesday June 13, 9:00 a.m..  I have placed orders for lab at infusion lab on Tuesday June 13.  Dr. Horn of GI has already placed orders for Venofer weekly times 10 weeks.  I have added orders for B12 and Retacrit concurrently at the infusion center.  She has anemia secondary to iron deficiency, B12 deficiency, renal insufficiency.    Please get her on the schedule during the week of June 13th for the above infusions and injections.  She is having a Cardiology procedure in approximately 2 weeks, we would try to improve her anemia prior to the procedure as possible.    Moody Alvarez MD

## 2023-06-09 NOTE — TELEPHONE ENCOUNTER
MORELIA for patient regarding setting up her iron infusion on Monday 6/12/2023 at either 10am or 11am.Return number given to call us back.

## 2023-06-12 ENCOUNTER — LAB VISIT (OUTPATIENT)
Dept: LAB | Facility: HOSPITAL | Age: 83
End: 2023-06-12
Attending: INTERNAL MEDICINE
Payer: MEDICARE

## 2023-06-12 ENCOUNTER — OFFICE VISIT (OUTPATIENT)
Dept: FAMILY MEDICINE | Facility: CLINIC | Age: 83
End: 2023-06-12
Payer: MEDICARE

## 2023-06-12 VITALS
DIASTOLIC BLOOD PRESSURE: 60 MMHG | HEART RATE: 68 BPM | HEIGHT: 62 IN | OXYGEN SATURATION: 96 % | WEIGHT: 132.94 LBS | BODY MASS INDEX: 24.46 KG/M2 | SYSTOLIC BLOOD PRESSURE: 90 MMHG | TEMPERATURE: 98 F

## 2023-06-12 DIAGNOSIS — I48.0 PAROXYSMAL ATRIAL FIBRILLATION: ICD-10-CM

## 2023-06-12 DIAGNOSIS — N18.32 STAGE 3B CHRONIC KIDNEY DISEASE: ICD-10-CM

## 2023-06-12 DIAGNOSIS — K21.9 GASTROESOPHAGEAL REFLUX DISEASE WITHOUT ESOPHAGITIS: ICD-10-CM

## 2023-06-12 DIAGNOSIS — J45.30 MILD PERSISTENT ASTHMA WITHOUT COMPLICATION: ICD-10-CM

## 2023-06-12 DIAGNOSIS — N18.32 TYPE 2 DIABETES MELLITUS WITH STAGE 3B CHRONIC KIDNEY DISEASE, WITHOUT LONG-TERM CURRENT USE OF INSULIN: Chronic | ICD-10-CM

## 2023-06-12 DIAGNOSIS — I25.119 ATHEROSCLEROSIS OF NATIVE CORONARY ARTERY OF NATIVE HEART WITH ANGINA PECTORIS: ICD-10-CM

## 2023-06-12 DIAGNOSIS — D51.8 DIETARY VITAMIN B12 DEFICIENCY ANEMIA: ICD-10-CM

## 2023-06-12 DIAGNOSIS — E78.2 MIXED HYPERLIPIDEMIA: ICD-10-CM

## 2023-06-12 DIAGNOSIS — E11.22 TYPE 2 DIABETES MELLITUS WITH STAGE 3B CHRONIC KIDNEY DISEASE, WITHOUT LONG-TERM CURRENT USE OF INSULIN: Chronic | ICD-10-CM

## 2023-06-12 DIAGNOSIS — E03.9 HYPOTHYROIDISM, UNSPECIFIED TYPE: Chronic | ICD-10-CM

## 2023-06-12 DIAGNOSIS — D50.9 IRON DEFICIENCY ANEMIA, UNSPECIFIED IRON DEFICIENCY ANEMIA TYPE: ICD-10-CM

## 2023-06-12 DIAGNOSIS — I38 VALVULAR HEART DISEASE: Chronic | ICD-10-CM

## 2023-06-12 DIAGNOSIS — D50.0 IRON DEFICIENCY ANEMIA DUE TO CHRONIC BLOOD LOSS: Primary | ICD-10-CM

## 2023-06-12 DIAGNOSIS — D53.9 ANEMIA ASSOCIATED WITH NUTRITIONAL DEFICIENCY: ICD-10-CM

## 2023-06-12 LAB
ALBUMIN SERPL BCP-MCNC: 3.5 G/DL (ref 3.5–5.2)
ALP SERPL-CCNC: 69 U/L (ref 55–135)
ALT SERPL W/O P-5'-P-CCNC: 45 U/L (ref 10–44)
ANION GAP SERPL CALC-SCNC: 11 MMOL/L (ref 8–16)
ANISOCYTOSIS BLD QL SMEAR: SLIGHT
AST SERPL-CCNC: 40 U/L (ref 10–40)
BASOPHILS # BLD AUTO: 0.01 K/UL (ref 0–0.2)
BASOPHILS NFR BLD: 0.3 % (ref 0–1.9)
BILIRUB SERPL-MCNC: 0.6 MG/DL (ref 0.1–1)
BUN SERPL-MCNC: 57 MG/DL (ref 8–23)
CALCIUM SERPL-MCNC: 9.1 MG/DL (ref 8.7–10.5)
CHLORIDE SERPL-SCNC: 100 MMOL/L (ref 95–110)
CO2 SERPL-SCNC: 28 MMOL/L (ref 23–29)
CREAT SERPL-MCNC: 1.2 MG/DL (ref 0.5–1.4)
DIFFERENTIAL METHOD: ABNORMAL
EOSINOPHIL # BLD AUTO: 0.1 K/UL (ref 0–0.5)
EOSINOPHIL NFR BLD: 3.1 % (ref 0–8)
ERYTHROCYTE [DISTWIDTH] IN BLOOD BY AUTOMATED COUNT: 17.2 % (ref 11.5–14.5)
EST. GFR  (NO RACE VARIABLE): 45.2 ML/MIN/1.73 M^2
FERRITIN SERPL-MCNC: 29 NG/ML (ref 20–300)
FOLATE SERPL-MCNC: 17.8 NG/ML (ref 4–24)
GLUCOSE SERPL-MCNC: 140 MG/DL (ref 70–110)
HCT VFR BLD AUTO: 29.6 % (ref 37–48.5)
HGB BLD-MCNC: 8.9 G/DL (ref 12–16)
HYPOCHROMIA BLD QL SMEAR: ABNORMAL
IMM GRANULOCYTES # BLD AUTO: 0.01 K/UL (ref 0–0.04)
IMM GRANULOCYTES NFR BLD AUTO: 0.3 % (ref 0–0.5)
LYMPHOCYTES # BLD AUTO: 0.7 K/UL (ref 1–4.8)
LYMPHOCYTES NFR BLD: 22.9 % (ref 18–48)
MCH RBC QN AUTO: 25 PG (ref 27–31)
MCHC RBC AUTO-ENTMCNC: 30.1 G/DL (ref 32–36)
MCV RBC AUTO: 83 FL (ref 82–98)
MONOCYTES # BLD AUTO: 0.5 K/UL (ref 0.3–1)
MONOCYTES NFR BLD: 16.9 % (ref 4–15)
NEUTROPHILS # BLD AUTO: 1.8 K/UL (ref 1.8–7.7)
NEUTROPHILS NFR BLD: 56.5 % (ref 38–73)
NRBC BLD-RTO: 0 /100 WBC
PLATELET # BLD AUTO: 248 K/UL (ref 150–450)
PLATELET BLD QL SMEAR: ABNORMAL
PMV BLD AUTO: 9.7 FL (ref 9.2–12.9)
POTASSIUM SERPL-SCNC: 4 MMOL/L (ref 3.5–5.1)
PROT SERPL-MCNC: 6.9 G/DL (ref 6–8.4)
RBC # BLD AUTO: 3.56 M/UL (ref 4–5.4)
RETICS/RBC NFR AUTO: 1.7 % (ref 0.5–2.5)
SODIUM SERPL-SCNC: 139 MMOL/L (ref 136–145)
VIT B12 SERPL-MCNC: 399 PG/ML (ref 210–950)
WBC # BLD AUTO: 3.19 K/UL (ref 3.9–12.7)

## 2023-06-12 PROCEDURE — 82607 VITAMIN B-12: CPT | Performed by: INTERNAL MEDICINE

## 2023-06-12 PROCEDURE — 99214 OFFICE O/P EST MOD 30 MIN: CPT | Mod: S$GLB,,, | Performed by: FAMILY MEDICINE

## 2023-06-12 PROCEDURE — 3078F PR MOST RECENT DIASTOLIC BLOOD PRESSURE < 80 MM HG: ICD-10-PCS | Mod: CPTII,S$GLB,, | Performed by: FAMILY MEDICINE

## 2023-06-12 PROCEDURE — 1126F AMNT PAIN NOTED NONE PRSNT: CPT | Mod: CPTII,S$GLB,, | Performed by: FAMILY MEDICINE

## 2023-06-12 PROCEDURE — 36415 COLL VENOUS BLD VENIPUNCTURE: CPT | Performed by: INTERNAL MEDICINE

## 2023-06-12 PROCEDURE — 1160F RVW MEDS BY RX/DR IN RCRD: CPT | Mod: CPTII,S$GLB,, | Performed by: FAMILY MEDICINE

## 2023-06-12 PROCEDURE — 82668 ASSAY OF ERYTHROPOIETIN: CPT | Performed by: INTERNAL MEDICINE

## 2023-06-12 PROCEDURE — 99999 PR PBB SHADOW E&M-EST. PATIENT-LVL III: ICD-10-PCS | Mod: PBBFAC,,, | Performed by: FAMILY MEDICINE

## 2023-06-12 PROCEDURE — 3288F PR FALLS RISK ASSESSMENT DOCUMENTED: ICD-10-PCS | Mod: CPTII,S$GLB,, | Performed by: FAMILY MEDICINE

## 2023-06-12 PROCEDURE — 3074F SYST BP LT 130 MM HG: CPT | Mod: CPTII,S$GLB,, | Performed by: FAMILY MEDICINE

## 2023-06-12 PROCEDURE — 3078F DIAST BP <80 MM HG: CPT | Mod: CPTII,S$GLB,, | Performed by: FAMILY MEDICINE

## 2023-06-12 PROCEDURE — 3288F FALL RISK ASSESSMENT DOCD: CPT | Mod: CPTII,S$GLB,, | Performed by: FAMILY MEDICINE

## 2023-06-12 PROCEDURE — 82746 ASSAY OF FOLIC ACID SERUM: CPT | Performed by: INTERNAL MEDICINE

## 2023-06-12 PROCEDURE — 3074F PR MOST RECENT SYSTOLIC BLOOD PRESSURE < 130 MM HG: ICD-10-PCS | Mod: CPTII,S$GLB,, | Performed by: FAMILY MEDICINE

## 2023-06-12 PROCEDURE — 1126F PR PAIN SEVERITY QUANTIFIED, NO PAIN PRESENT: ICD-10-PCS | Mod: CPTII,S$GLB,, | Performed by: FAMILY MEDICINE

## 2023-06-12 PROCEDURE — 1159F PR MEDICATION LIST DOCUMENTED IN MEDICAL RECORD: ICD-10-PCS | Mod: CPTII,S$GLB,, | Performed by: FAMILY MEDICINE

## 2023-06-12 PROCEDURE — 85045 AUTOMATED RETICULOCYTE COUNT: CPT | Performed by: INTERNAL MEDICINE

## 2023-06-12 PROCEDURE — 82728 ASSAY OF FERRITIN: CPT | Performed by: INTERNAL MEDICINE

## 2023-06-12 PROCEDURE — 99214 PR OFFICE/OUTPT VISIT, EST, LEVL IV, 30-39 MIN: ICD-10-PCS | Mod: S$GLB,,, | Performed by: FAMILY MEDICINE

## 2023-06-12 PROCEDURE — 1100F PTFALLS ASSESS-DOCD GE2>/YR: CPT | Mod: CPTII,S$GLB,, | Performed by: FAMILY MEDICINE

## 2023-06-12 PROCEDURE — 1160F PR REVIEW ALL MEDS BY PRESCRIBER/CLIN PHARMACIST DOCUMENTED: ICD-10-PCS | Mod: CPTII,S$GLB,, | Performed by: FAMILY MEDICINE

## 2023-06-12 PROCEDURE — 85025 COMPLETE CBC W/AUTO DIFF WBC: CPT | Performed by: INTERNAL MEDICINE

## 2023-06-12 PROCEDURE — 1100F PR PT FALLS ASSESS DOC 2+ FALLS/FALL W/INJURY/YR: ICD-10-PCS | Mod: CPTII,S$GLB,, | Performed by: FAMILY MEDICINE

## 2023-06-12 PROCEDURE — 99999 PR PBB SHADOW E&M-EST. PATIENT-LVL III: CPT | Mod: PBBFAC,,, | Performed by: FAMILY MEDICINE

## 2023-06-12 PROCEDURE — 80053 COMPREHEN METABOLIC PANEL: CPT | Performed by: INTERNAL MEDICINE

## 2023-06-12 PROCEDURE — 1159F MED LIST DOCD IN RCRD: CPT | Mod: CPTII,S$GLB,, | Performed by: FAMILY MEDICINE

## 2023-06-12 RX ORDER — VIT C/E/ZN/COPPR/LUTEIN/ZEAXAN 250MG-90MG
CAPSULE ORAL DAILY
COMMUNITY
End: 2023-10-18

## 2023-06-12 RX ORDER — CALCIUM CARB, CITRATE, MALATE 250 MG
1 CAPSULE ORAL DAILY
COMMUNITY
End: 2023-10-18

## 2023-06-13 ENCOUNTER — TELEPHONE (OUTPATIENT)
Dept: HEMATOLOGY/ONCOLOGY | Facility: CLINIC | Age: 83
End: 2023-06-13

## 2023-06-13 ENCOUNTER — OFFICE VISIT (OUTPATIENT)
Dept: HEMATOLOGY/ONCOLOGY | Facility: CLINIC | Age: 83
End: 2023-06-13
Payer: MEDICARE

## 2023-06-13 VITALS
RESPIRATION RATE: 20 BRPM | HEIGHT: 62 IN | BODY MASS INDEX: 24.75 KG/M2 | HEART RATE: 85 BPM | WEIGHT: 134.5 LBS | TEMPERATURE: 97 F | SYSTOLIC BLOOD PRESSURE: 92 MMHG | DIASTOLIC BLOOD PRESSURE: 60 MMHG

## 2023-06-13 DIAGNOSIS — N18.32 STAGE 3B CHRONIC KIDNEY DISEASE: ICD-10-CM

## 2023-06-13 DIAGNOSIS — D50.9 IRON DEFICIENCY ANEMIA, UNSPECIFIED IRON DEFICIENCY ANEMIA TYPE: Primary | ICD-10-CM

## 2023-06-13 DIAGNOSIS — D63.1 ANEMIA DUE TO CHRONIC RENAL FAILURE TREATED WITH ERYTHROPOIETIN, STAGE 3 (MODERATE): ICD-10-CM

## 2023-06-13 DIAGNOSIS — N18.30 ANEMIA DUE TO CHRONIC RENAL FAILURE TREATED WITH ERYTHROPOIETIN, STAGE 3 (MODERATE): ICD-10-CM

## 2023-06-13 DIAGNOSIS — D51.8 DIETARY VITAMIN B12 DEFICIENCY ANEMIA: ICD-10-CM

## 2023-06-13 PROCEDURE — 1100F PR PT FALLS ASSESS DOC 2+ FALLS/FALL W/INJURY/YR: ICD-10-PCS | Mod: CPTII,S$GLB,, | Performed by: INTERNAL MEDICINE

## 2023-06-13 PROCEDURE — 1126F AMNT PAIN NOTED NONE PRSNT: CPT | Mod: CPTII,S$GLB,, | Performed by: INTERNAL MEDICINE

## 2023-06-13 PROCEDURE — 99204 OFFICE O/P NEW MOD 45 MIN: CPT | Mod: S$GLB,,, | Performed by: INTERNAL MEDICINE

## 2023-06-13 PROCEDURE — 3074F SYST BP LT 130 MM HG: CPT | Mod: CPTII,S$GLB,, | Performed by: INTERNAL MEDICINE

## 2023-06-13 PROCEDURE — 3078F DIAST BP <80 MM HG: CPT | Mod: CPTII,S$GLB,, | Performed by: INTERNAL MEDICINE

## 2023-06-13 PROCEDURE — 3288F PR FALLS RISK ASSESSMENT DOCUMENTED: ICD-10-PCS | Mod: CPTII,S$GLB,, | Performed by: INTERNAL MEDICINE

## 2023-06-13 PROCEDURE — 3288F FALL RISK ASSESSMENT DOCD: CPT | Mod: CPTII,S$GLB,, | Performed by: INTERNAL MEDICINE

## 2023-06-13 PROCEDURE — 1159F MED LIST DOCD IN RCRD: CPT | Mod: CPTII,S$GLB,, | Performed by: INTERNAL MEDICINE

## 2023-06-13 PROCEDURE — 1100F PTFALLS ASSESS-DOCD GE2>/YR: CPT | Mod: CPTII,S$GLB,, | Performed by: INTERNAL MEDICINE

## 2023-06-13 PROCEDURE — 1126F PR PAIN SEVERITY QUANTIFIED, NO PAIN PRESENT: ICD-10-PCS | Mod: CPTII,S$GLB,, | Performed by: INTERNAL MEDICINE

## 2023-06-13 PROCEDURE — 3074F PR MOST RECENT SYSTOLIC BLOOD PRESSURE < 130 MM HG: ICD-10-PCS | Mod: CPTII,S$GLB,, | Performed by: INTERNAL MEDICINE

## 2023-06-13 PROCEDURE — 1159F PR MEDICATION LIST DOCUMENTED IN MEDICAL RECORD: ICD-10-PCS | Mod: CPTII,S$GLB,, | Performed by: INTERNAL MEDICINE

## 2023-06-13 PROCEDURE — 99204 PR OFFICE/OUTPT VISIT, NEW, LEVL IV, 45-59 MIN: ICD-10-PCS | Mod: S$GLB,,, | Performed by: INTERNAL MEDICINE

## 2023-06-13 PROCEDURE — 3078F PR MOST RECENT DIASTOLIC BLOOD PRESSURE < 80 MM HG: ICD-10-PCS | Mod: CPTII,S$GLB,, | Performed by: INTERNAL MEDICINE

## 2023-06-13 NOTE — LETTER
June 13, 2023        Oumar Almonte Jr., MD  [address]  [address]  [place] [address]             [facility] Hematology [facility]  [address]  [place] [address]  Phone: [phone]  Fax: [phone]   Patient: Darlin Tipton   MR Number: [id number]   YOB: 1940   Date of Visit: 6/13/2023       Dear Dr. Almonte:    Thank you for referring Darlin Tipton to me for evaluation. Below are the relevant portions of my assessment and plan of care.            If you have questions, please do not hesitate to call me. I look forward to following Darlin along with you.    Sincerely,      MINOO Cordoba MD           CC    No Recipients

## 2023-06-14 ENCOUNTER — PATIENT MESSAGE (OUTPATIENT)
Dept: FAMILY MEDICINE | Facility: CLINIC | Age: 83
End: 2023-06-14
Payer: MEDICARE

## 2023-06-14 DIAGNOSIS — I95.9 HYPOTENSION, UNSPECIFIED HYPOTENSION TYPE: Primary | ICD-10-CM

## 2023-06-14 LAB — EPO SERPL-ACNC: 22.9 MIU/ML (ref 2.6–18.5)

## 2023-06-14 RX ORDER — MIDODRINE HYDROCHLORIDE 10 MG/1
10 TABLET ORAL 2 TIMES DAILY WITH MEALS
Qty: 180 TABLET | Refills: 3 | Status: CANCELLED | OUTPATIENT
Start: 2023-06-14

## 2023-06-14 NOTE — TELEPHONE ENCOUNTER
Called and spoke with patient. She states that she spoke with the nurse practitioner regarding the below message yesterday. She is wondering if she can have her colonoscopy with someone in [place] due to not having anyone in this area to drive her.    Patient states she does not need the refill.

## 2023-06-14 NOTE — TELEPHONE ENCOUNTER
Refill Routing Note   Medication(s) are not appropriate for processing by [facility] for the following reason(s):      Medication outside of protocol    [facility] action(s):  Route Care Due:  None identified          Appointments  past 12m or future 3m with PCP    Date Provider   Last Visit   6/12/2023 Oumar Almonte Jr., MD   Next Visit   9/14/2023 Oumar Almonte Jr., MD   ED visits in past 90 days: 0        Note composed:7:51 AM 06/14/2023

## 2023-06-14 NOTE — PROGRESS NOTES
[facility] In Office Hematology Oncology Initial Encounter Note    6/13/23    Subjective:      Patient ID:   Darlin Tipton  82 y.o. female  1940  MD Yuri Chawla MD      Chief Complaint:   ALVAREZ    HPI:  82 y.o. female has ALVAREZ, increased fatigue, on 02 at 3 liters per minute.  She has Aortic Valve Dx, in need of TAVR procedure soon.  Referred for anemia management to try increase Hgb prior to procedure.    Hx of VHD, L carotid bruit, 1st degree AV block, CAD, ischemic cardiomyopathy, cholesterol, Par. At. Fib>, angina, CHF.  Also chronic bronchitis, asthma, bilateral pleural effusion.  Spinal stenosis, scoliosis, osteoarthritis, DDD, lumbar radiculitis.  Hematuria and post menopausal bleeding hx.  DM, hypothyroid, Fe deficiency, B 12 deficiency, 3B renal insufficiency, immune deficiency disorder, GERD, dysphasia, colon polyps.    S/P CABG, thoracentesis x's 3, pleurodesis.    Allergy to multiple meds, see her list.    Smoke no, ETOH yes.    Meds see list            ROS:   GEN: normal without any fever, night sweats or weight loss  HEENT: normal with no HA's, sore throat, stiff neck, changes in vision  CV: See HPI  PULM:See HPI  GI: See HPI  : normal with no hematuria, dysuria  BREAST: normal with no mass, discharge, pain  SKIN: normal with no rash, erythema, bruising, or swelling     Past Medical History:   Diagnosis Date    Allergy     Dust mites, Grasses, Trees    Arthritis     Asthma     Blood transfusion     CAD (coronary artery disease)     Cataract     CHF (congestive heart failure)     CHRONIC BRONCHITIS     Diabetes mellitus     Diabetes mellitus type II     GERD (gastroesophageal reflux disease)     Hyperlipidemia     Hypertension     Irregular heart beat     Kidney disease     ckd stage 3  as stated by patient - to see MD    Paroxysmal atrial fibrillation     Post-menopausal bleeding 2018    Spinal stenosis     Thyroid disease     Hypothyroidism     Past Surgical History:    Procedure Laterality Date    APPENDECTOMY  1968    BLADDER SUSPENSION  1989    CARDIAC SURGERY  2016    CABG    CATARACT EXTRACTION  9/2007 (L) and 10/2207 (R)    COLONOSCOPY N/A 10/18/2017    Procedure: COLONOSCOPY;  Surgeon: Esme Acuna MD;  Location: [facility] ENDO;  Service: Endoscopy;  Laterality: N/A;    CORONARY ANGIOGRAPHY INCLUDING BYPASS GRAFTS WITH CATHETERIZATION OF LEFT HEART Left 5/13/2022    Procedure: Left heart cath;  Surgeon: Davon Patton MD;  Location: [facility] CATH/EP LAB;  Service: Cardiology;  Laterality: Left;    CORONARY ARTERY BYPASS GRAFT  4/26/2004    x5    ESOPHAGEAL DILATION      ESOPHAGOGASTRODUODENOSCOPY N/A 6/27/2022    Procedure: EGD (ESOPHAGOGASTRODUODENOSCOPY);  Surgeon: Skip Nj MD;  Location: [facility] ENDO;  Service: Endoscopy;  Laterality: N/A;    HYSTEROSCOPY WITH DILATION AND CURETTAGE OF UTERUS N/A 3/12/2020    Procedure: HYSTEROSCOPY, WITH DILATION AND CURETTAGE OF UTERUS;  Surgeon: Ramona Llanos MD;  Location: [facility] OR;  Service: OB/GYN;  Laterality: N/A;    SPINE SURGERY  3/2000    Tumor    THORACENTESIS Right 2/7/2023    Procedure: Thoracentesis;  Surgeon: Rula Weiss MD;  Location: [facility];  Service: Pulmonary;  Laterality: Right;  ultrasound guided thoracentesis of right pleural effusion 2/07/23 0730    TRANSFORAMINAL EPIDURAL INJECTION OF STEROID Right 11/21/2019    Procedure: Injection,steroid,epidural,transforaminal approach;  Surgeon: Bj Jones MD;  Location: [facility] OR;  Service: Pain Management;  Laterality: Right;  L4-5, L5-S1    TRANSFORAMINAL EPIDURAL INJECTION OF STEROID Right 12/31/2019    Procedure: Injection,steroid,epidural,transforaminal approach;  Surgeon: Bj Jones MD;  Location: [facility] OR;  Service: Pain Management;  Laterality: Right;  L4-5, L5-S1    TRANSFORAMINAL EPIDURAL INJECTION OF STEROID Right 2/5/2020    Procedure: Injection,steroid,epidural,transforaminal approach;  Surgeon: Bj Jones MD;  Location: [facility] OR;  Service: Pain  Management;  Laterality: Right;  L4-5, L5-S1    TRANSFORAMINAL EPIDURAL INJECTION OF STEROID Left 1/27/2021    Procedure: Injection,steroid,epidural,transforaminal approach;  Surgeon: Bj Jones MD;  Location: [facility] OR;  Service: Pain Management;  Laterality: Left;  L4-5, L5-S1    TRANSFORAMINAL EPIDURAL INJECTION OF STEROID Right 3/4/2021    Procedure: Injection,steroid,epidural,transforaminal approach;  Surgeon: Bj Jones MD;  Location: [facility] OR;  Service: Pain Management;  Laterality: Right;  L4-L5, L5-S1    WRIST SURGERY  1993    carpal tunnel         Review of patient's allergies indicates:   Allergen Reactions    Dexlansoprazole Itching, Nausea Only and Rash    Sulfa (sulfonamide antibiotics) Rash    Floxacillin Itching    Januvia [sitagliptin] Other (See Comments)     Hot flashes    Tetracyclines Itching    Fenofibrate micronized Rash    Nitrofurantoin macrocrystalline Other (See Comments)     unknown    Phenylfenesin la [phenylpropanolamine-gg] Rash     Social History     Socioeconomic History    Marital status:    Tobacco Use    Smoking status: Never     Passive exposure: Yes    Smokeless tobacco: Never    Tobacco comments:     PARENTS    Substance and Sexual Activity    Alcohol use: Yes     Comment: Rare    Drug use: No    Sexual activity: Not Currently     Social Determinants of Health     Financial Resource Strain: Low Risk     Difficulty of Paying Living Expenses: Not hard at all   Food Insecurity: No Food Insecurity    Worried About Running Out of Food in the Last Year: Never true    Ran Out of Food in the Last Year: Never true   Transportation Needs: No Transportation Needs    Lack of Transportation (Medical): No    Lack of Transportation (Non-Medical): No   Physical Activity: Inactive    Days of Exercise per Week: 0 days    Minutes of Exercise per Session: 0 min   Stress: No Stress Concern Present    Feeling of Stress : Only a little   Social Connections: Socially Integrated    Frequency of  Communication with Friends and Family: Three times a week    Frequency of Social Gatherings with Friends and Family: Three times a week    Attends Evangelical Services: More than 4 times per year    Active Member of Clubs or Organizations: Yes    Attends Club or Organization Meetings: Never    Marital Status:    Housing Stability: Low Risk     Unable to Pay for Housing in the Last Year: No    Number of Places Lived in the Last Year: 1    Unstable Housing in the Last Year: No         Current Outpatient Medications:     acetaminophen (TYLENOL) 650 MG TbSR, Take 1,300 mg by mouth once daily. 2 Tablet(s) Oral  Every day., Disp: , Rfl:     amitriptyline (ELAVIL) 75 MG tablet, Take 75 mg by mouth every evening., Disp: , Rfl:     apixaban (ELIQUIS) 5 mg Tab, Take 1 tablet (5 mg total) by mouth 2 (two) times daily., Disp: 180 tablet, Rfl: 3    blood sugar diagnostic (FREESTYLE LITE STRIPS) Strp, USE TO TEST BLOOD SUGAR TWICE A DAY, Disp: 200 strip, Rfl: 3    busPIRone (BUSPAR) 10 MG tablet, TAKE 1 TABLET BY MOUTH TWICE A DAY (Patient taking differently: Take 10 mg by mouth 2 (two) times daily.), Disp: 180 tablet, Rfl: 3    carboxymethylcellulose 1 % ophthalmic solution, Apply 1 drop to eye As instructed. as directed, Disp: , Rfl:     cetirizine (ZYRTEC) 10 MG tablet, Take 10 mg by mouth once daily., Disp: , Rfl:     cholecalciferol, vitamin D3, (VITAMIN D3) 50 mcg (2,000 unit) Cap, Take 1 capsule by mouth once daily., Disp: , Rfl:     clotrimazole-betamethasone 1-0.05% (LOTRISONE) cream, Apply 1 g topically 2 (two) times daily as needed., Disp: , Rfl:     coenzyme Q10 100 mg capsule, Take 100 mg by mouth once daily. , Disp: , Rfl:     cranberry extract 650 mg Cap, Take 1 tablet by mouth 2 (two) times daily., Disp: , Rfl:     FARXIGA 5 mg Tab tablet, Take 5 mg by mouth once daily., Disp: , Rfl:     fluticasone furoate-vilanteroL (BREO ELLIPTA) 100-25 mcg/dose diskus inhaler, Inhale 1 puff into the lungs once daily.  Controller Rinse after you use it, Disp: 180 each, Rfl: 6    fluticasone propionate (FLONASE) 50 mcg/actuation nasal spray, 1 spray (50 mcg total) by Each Nostril route once daily., Disp: 48 g, Rfl: 3    Lactobac no.41/Bifidobact no.7 (PROBIOTIC-10 ORAL), Take 1 tablet by mouth once daily., Disp: , Rfl:     lancets Misc, 1 Units by Misc.(Non-Drug; Combo Route) route 2 (two) times daily., Disp: 200 each, Rfl: 3    lansoprazole (PREVACID) 30 MG capsule, Take 1 capsule (30 mg total) by mouth once daily., Disp: 90 capsule, Rfl: 3    levalbuterol (XOPENEX) 1.25 mg/0.5 mL nebulizer solution, Take 0.5 mLs (1.25 mg total) by nebulization every 6 (six) hours as needed for Wheezing. Rescue, Disp: 120 each, Rfl: 3    levothyroxine (SYNTHROID) 25 MCG tablet, TAKE 1 TABLET BY MOUTH BEFORE BREAKFAST EVERY DAY (Patient taking differently: Take 25 mcg by mouth before breakfast.), Disp: 90 tablet, Rfl: 3    metoprolol succinate (TOPROL XL) 25 MG 24 hr tablet, Take 1 tablet (25 mg total) by mouth once daily., Disp: 90 tablet, Rfl: 3    midodrine (PROAMATINE) 10 MG tablet, Take 10 mg by mouth 2 (two) times daily with meals., Disp: , Rfl:     nitroGLYCERIN (NITROSTAT) 0.4 MG SL tablet, Place 0.4 mg under the tongue every 5 (five) minutes as needed. 0.4mg Sublingual PRN .  , Disp: , Rfl:     potassium citrate 99 mg Cap, Take 1 capsule by mouth once daily., Disp: , Rfl:     pramoxine-hydrocortisone (PROCTOCREAM-HC) 1-1 % rectal cream, Place rectally 2 (two) times daily., Disp: , Rfl:     promethazine (PHENERGAN) 12.5 MG Tab, Take 1 tablet (12.5 mg total) by mouth every 8 (eight) hours as needed (nausea)., Disp: 30 tablet, Rfl: 1    RESTASIS 0.05 % ophthalmic emulsion, Place 1 drop into both eyes 2 (two) times daily., Disp: , Rfl:     rosuvastatin (CRESTOR) 10 MG tablet, Take 1 tablet (10 mg total) by mouth once daily., Disp: 90 tablet, Rfl: 3    triazolam (HALCION) 0.25 MG Tab, Take 1 tablet (0.25 mg total) by mouth nightly as needed  "(sleep)., Disp: 90 tablet, Rfl: 1    vit C,V-Pb-xyipo-lutein-zeaxan (PRESERVISION AREDS-2) 250-90-40-1 mg Cap, Take by mouth once daily., Disp: , Rfl:     vitamins  A,C,E-zinc-copper 14,320-226-200 unit-mg-unit Cap, Take 1 capsule by mouth 2 (two) times daily. , Disp: , Rfl:     amiodarone (PACERONE) 200 MG Tab, Take 1 tablet (200 mg total) by mouth 3 (three) times daily for 4 days, THEN 1 tablet (200 mg total) 2 (two) times daily., Disp: 72 tablet, Rfl: 0    furosemide (LASIX) 20 MG tablet, Take 1 tablet (20 mg total) by mouth once daily., Disp: 30 tablet, Rfl: 0          Objective:   Vitals:  Blood pressure 92/60, pulse 85, temperature 97.4 °F (36.3 °C), resp. rate 20, height 5' 2" (1.575 m), weight 61 kg (134 lb 8 oz).    Physical Examination:   GEN: no apparent distress, comfortable, 02 in place, frail in appearance  HEAD: atraumatic and normocephalic  EYES: + pallor, no icterus  ENT: no pharyngeal erythema, external ears WNL; no nasal discharge  NECK: no masses, thyroid normal, trachea midline, no LAD/LN's, supple  CV: RRR with  murmur; normal pulse; normal S1 and S2  CHEST: Normal respiratory effort; CTAB; distant breath sounds; no wheeze or crackles  ABDOM: nontender and nondistended; soft; no rebound/guarding, L/S NP, no palpable ascites  MUSC/Skeletal: ROM normal; no crepitus; joints normal  EXTREM: no clubbing, cyanosis, inflammation or swelling  SKIN: no rashes, lesions, ulcers, petechiae   : no cvat  NEURO: grossly intact; motor/sensory WNL;  no tremors  PSYCH: normal mood, affect and behavior  LYMPH: normal cervical, supraclavicular, axillary  LN's      Labs:   Lab Results   Component Value Date    WBC 3.19 (L) 06/12/2023    HGB 8.9 (L) 06/12/2023    HCT 29.6 (L) 06/12/2023    MCV 83 06/12/2023     06/12/2023    CMP  Sodium   Date Value Ref Range Status   06/12/2023 139 136 - 145 mmol/L Final     Potassium   Date Value Ref Range Status   06/12/2023 4.0 3.5 - 5.1 mmol/L Final     Chloride   Date " Value Ref Range Status   06/12/2023 100 95 - 110 mmol/L Final     CO2   Date Value Ref Range Status   06/12/2023 28 23 - 29 mmol/L Final     Glucose   Date Value Ref Range Status   06/12/2023 140 (H) 70 - 110 mg/dL Final     BUN   Date Value Ref Range Status   06/12/2023 57 (H) 8 - 23 mg/dL Final     Creatinine   Date Value Ref Range Status   06/12/2023 1.2 0.5 - 1.4 mg/dL Final     Calcium   Date Value Ref Range Status   06/12/2023 9.1 8.7 - 10.5 mg/dL Final     Total Protein   Date Value Ref Range Status   06/12/2023 6.9 6.0 - 8.4 g/dL Final     Albumin   Date Value Ref Range Status   06/12/2023 3.5 3.5 - 5.2 g/dL Final     Total Bilirubin   Date Value Ref Range Status   06/12/2023 0.6 0.1 - 1.0 mg/dL Final     Comment:     For infants and newborns, interpretation of results should be based  on gestational age, weight and in agreement with clinical  observations.    Premature Infant recommended reference ranges:  Up to 24 hours.............<8.0 mg/dL  Up to 48 hours............<12.0 mg/dL  3-5 days..................<15.0 mg/dL  6-29 days.................<15.0 mg/dL       Alkaline Phosphatase   Date Value Ref Range Status   06/12/2023 69 55 - 135 U/L Final     AST   Date Value Ref Range Status   06/12/2023 40 10 - 40 U/L Final     ALT   Date Value Ref Range Status   06/12/2023 45 (H) 10 - 44 U/L Final     Anion Gap   Date Value Ref Range Status   06/12/2023 11 8 - 16 mmol/L Final     eGFR if    Date Value Ref Range Status   06/27/2022 49 (A) >60 mL/min/1.73 m^2 Final     eGFR if non    Date Value Ref Range Status   06/27/2022 42 (A) >60 mL/min/1.73 m^2 Final     Comment:     Calculation used to obtain the estimated glomerular filtration  rate (eGFR) is the CKD-EPI equation.        Hgb 9., B12 331-399, folate 18, Ferritin 29.    Assessment:   (1) 82 y.o. female with diagnosis of moderate anemia, Hgb 8.9.  Etiology is multifactorial, Fe deficiency, B 12 deficiency, renal  insufficiency.  Stamina is 1/10 per pt and 3/10 per her .    (2)Discussed and ordered Fe infusion weekly x's 2 weeks to replenish Fe stores.  1-2% risk of reaction to Fe infusion.  Infuse slowly over several hours.    Begin B 12 1000 mcg subq weekly.    Begin Procrit 43847 units subq weekly, titrate for Hgb 10.    (3)Frailty and heart condition now does not allow GI endoscopy or colonoscopy.    (4)Needs TAVR procedure with in 2-4 weeks per Dr. Diaz.    RTC Friday 6/16/23.

## 2023-06-15 ENCOUNTER — LAB VISIT (OUTPATIENT)
Dept: LAB | Facility: HOSPITAL | Age: 83
End: 2023-06-15
Attending: INTERNAL MEDICINE
Payer: MEDICARE

## 2023-06-15 DIAGNOSIS — E78.00 PURE HYPERCHOLESTEROLEMIA: ICD-10-CM

## 2023-06-15 DIAGNOSIS — I51.9 MYXEDEMA HEART DISEASE: ICD-10-CM

## 2023-06-15 DIAGNOSIS — E03.9 MYXEDEMA HEART DISEASE: ICD-10-CM

## 2023-06-15 DIAGNOSIS — E11.21 DIABETIC GLOMERULOPATHY: Primary | ICD-10-CM

## 2023-06-15 DIAGNOSIS — E11.40 DIABETIC NEUROPATHY: ICD-10-CM

## 2023-06-15 DIAGNOSIS — N18.30 CHRONIC KIDNEY DISEASE, STAGE III (MODERATE): ICD-10-CM

## 2023-06-15 LAB
ALBUMIN SERPL BCP-MCNC: 3.5 G/DL (ref 3.5–5.2)
ALBUMIN/CREAT UR: 61.1 UG/MG (ref 0–30)
ALP SERPL-CCNC: 81 U/L (ref 55–135)
ALT SERPL W/O P-5'-P-CCNC: 60 U/L (ref 10–44)
ANION GAP SERPL CALC-SCNC: 11 MMOL/L (ref 8–16)
AST SERPL-CCNC: 57 U/L (ref 10–40)
BILIRUB SERPL-MCNC: 0.7 MG/DL (ref 0.1–1)
BUN SERPL-MCNC: 74 MG/DL (ref 8–23)
CALCIUM SERPL-MCNC: 8.9 MG/DL (ref 8.7–10.5)
CHLORIDE SERPL-SCNC: 97 MMOL/L (ref 95–110)
CHOLEST SERPL-MCNC: 100 MG/DL (ref 120–199)
CHOLEST/HDLC SERPL: 2.4 {RATIO} (ref 2–5)
CO2 SERPL-SCNC: 28 MMOL/L (ref 23–29)
CREAT SERPL-MCNC: 1.5 MG/DL (ref 0.5–1.4)
CREAT UR-MCNC: 57 MG/DL (ref 15–325)
EST. GFR  (NO RACE VARIABLE): 34.6 ML/MIN/1.73 M^2
ESTIMATED AVG GLUCOSE: 128 MG/DL (ref 68–131)
GLUCOSE SERPL-MCNC: 100 MG/DL (ref 70–110)
HBA1C MFR BLD: 6.1 % (ref 4.5–6.2)
HDLC SERPL-MCNC: 42 MG/DL (ref 40–75)
HDLC SERPL: 42 % (ref 20–50)
LDLC SERPL CALC-MCNC: 47.2 MG/DL (ref 63–159)
MICROALBUMIN UR DL<=1MG/L-MCNC: 34.8 UG/ML
NONHDLC SERPL-MCNC: 58 MG/DL
POTASSIUM SERPL-SCNC: 4.1 MMOL/L (ref 3.5–5.1)
PROT SERPL-MCNC: 6.6 G/DL (ref 6–8.4)
SODIUM SERPL-SCNC: 136 MMOL/L (ref 136–145)
TRIGL SERPL-MCNC: 54 MG/DL (ref 30–150)
TSH SERPL DL<=0.005 MIU/L-ACNC: 3.45 UIU/ML (ref 0.34–5.6)

## 2023-06-15 PROCEDURE — 84443 ASSAY THYROID STIM HORMONE: CPT | Performed by: INTERNAL MEDICINE

## 2023-06-15 PROCEDURE — 82570 ASSAY OF URINE CREATININE: CPT | Performed by: INTERNAL MEDICINE

## 2023-06-15 PROCEDURE — 80061 LIPID PANEL: CPT | Performed by: INTERNAL MEDICINE

## 2023-06-15 PROCEDURE — 83036 HEMOGLOBIN GLYCOSYLATED A1C: CPT | Performed by: INTERNAL MEDICINE

## 2023-06-15 PROCEDURE — 36415 COLL VENOUS BLD VENIPUNCTURE: CPT | Performed by: INTERNAL MEDICINE

## 2023-06-15 PROCEDURE — 80053 COMPREHEN METABOLIC PANEL: CPT | Performed by: INTERNAL MEDICINE

## 2023-06-16 ENCOUNTER — PATIENT MESSAGE (OUTPATIENT)
Dept: HEMATOLOGY/ONCOLOGY | Facility: CLINIC | Age: 83
End: 2023-06-16

## 2023-06-16 ENCOUNTER — TELEPHONE (OUTPATIENT)
Dept: HEMATOLOGY/ONCOLOGY | Facility: CLINIC | Age: 83
End: 2023-06-16

## 2023-06-16 ENCOUNTER — INFUSION (OUTPATIENT)
Dept: INFUSION THERAPY | Facility: HOSPITAL | Age: 83
End: 2023-06-16
Attending: INTERNAL MEDICINE
Payer: MEDICARE

## 2023-06-16 ENCOUNTER — OFFICE VISIT (OUTPATIENT)
Dept: HEMATOLOGY/ONCOLOGY | Facility: CLINIC | Age: 83
End: 2023-06-16
Payer: MEDICARE

## 2023-06-16 VITALS
TEMPERATURE: 98 F | OXYGEN SATURATION: 97 % | HEIGHT: 62 IN | SYSTOLIC BLOOD PRESSURE: 111 MMHG | DIASTOLIC BLOOD PRESSURE: 62 MMHG | RESPIRATION RATE: 18 BRPM | BODY MASS INDEX: 24.84 KG/M2 | HEART RATE: 79 BPM | WEIGHT: 135 LBS

## 2023-06-16 VITALS
HEART RATE: 78 BPM | DIASTOLIC BLOOD PRESSURE: 51 MMHG | SYSTOLIC BLOOD PRESSURE: 101 MMHG | HEIGHT: 62 IN | BODY MASS INDEX: 24.84 KG/M2 | WEIGHT: 135 LBS | RESPIRATION RATE: 19 BRPM | TEMPERATURE: 98 F | OXYGEN SATURATION: 98 %

## 2023-06-16 DIAGNOSIS — D50.0 IRON DEFICIENCY ANEMIA DUE TO CHRONIC BLOOD LOSS: Primary | ICD-10-CM

## 2023-06-16 DIAGNOSIS — D51.8 DIETARY VITAMIN B12 DEFICIENCY ANEMIA: ICD-10-CM

## 2023-06-16 DIAGNOSIS — D63.1 ANEMIA DUE TO CHRONIC RENAL FAILURE TREATED WITH ERYTHROPOIETIN, STAGE 3 (MODERATE): ICD-10-CM

## 2023-06-16 DIAGNOSIS — D50.9 IRON DEFICIENCY ANEMIA, UNSPECIFIED IRON DEFICIENCY ANEMIA TYPE: Primary | ICD-10-CM

## 2023-06-16 DIAGNOSIS — N18.30 ANEMIA DUE TO CHRONIC RENAL FAILURE TREATED WITH ERYTHROPOIETIN, STAGE 3 (MODERATE): ICD-10-CM

## 2023-06-16 PROCEDURE — 3074F SYST BP LT 130 MM HG: CPT | Mod: CPTII,S$GLB,, | Performed by: INTERNAL MEDICINE

## 2023-06-16 PROCEDURE — 3078F PR MOST RECENT DIASTOLIC BLOOD PRESSURE < 80 MM HG: ICD-10-PCS | Mod: CPTII,S$GLB,, | Performed by: INTERNAL MEDICINE

## 2023-06-16 PROCEDURE — 1126F AMNT PAIN NOTED NONE PRSNT: CPT | Mod: CPTII,S$GLB,, | Performed by: INTERNAL MEDICINE

## 2023-06-16 PROCEDURE — 3078F DIAST BP <80 MM HG: CPT | Mod: CPTII,S$GLB,, | Performed by: INTERNAL MEDICINE

## 2023-06-16 PROCEDURE — 96365 THER/PROPH/DIAG IV INF INIT: CPT

## 2023-06-16 PROCEDURE — 25000003 PHARM REV CODE 250: Performed by: INTERNAL MEDICINE

## 2023-06-16 PROCEDURE — 96366 THER/PROPH/DIAG IV INF ADDON: CPT

## 2023-06-16 PROCEDURE — 96372 THER/PROPH/DIAG INJ SC/IM: CPT

## 2023-06-16 PROCEDURE — 99213 OFFICE O/P EST LOW 20 MIN: CPT | Mod: S$GLB,,, | Performed by: INTERNAL MEDICINE

## 2023-06-16 PROCEDURE — 63600175 PHARM REV CODE 636 W HCPCS: Performed by: INTERNAL MEDICINE

## 2023-06-16 PROCEDURE — 99213 PR OFFICE/OUTPT VISIT, EST, LEVL III, 20-29 MIN: ICD-10-PCS | Mod: S$GLB,,, | Performed by: INTERNAL MEDICINE

## 2023-06-16 PROCEDURE — 3074F PR MOST RECENT SYSTOLIC BLOOD PRESSURE < 130 MM HG: ICD-10-PCS | Mod: CPTII,S$GLB,, | Performed by: INTERNAL MEDICINE

## 2023-06-16 PROCEDURE — 1126F PR PAIN SEVERITY QUANTIFIED, NO PAIN PRESENT: ICD-10-PCS | Mod: CPTII,S$GLB,, | Performed by: INTERNAL MEDICINE

## 2023-06-16 RX ORDER — CYANOCOBALAMIN 1000 UG/ML
1000 INJECTION, SOLUTION INTRAMUSCULAR; SUBCUTANEOUS ONCE
Status: CANCELLED | OUTPATIENT
Start: 2023-06-16

## 2023-06-16 RX ORDER — HEPARIN 100 UNIT/ML
500 SYRINGE INTRAVENOUS
Status: CANCELLED | OUTPATIENT
Start: 2023-06-23

## 2023-06-16 RX ORDER — SODIUM CHLORIDE 0.9 % (FLUSH) 0.9 %
10 SYRINGE (ML) INJECTION
Status: DISCONTINUED | OUTPATIENT
Start: 2023-06-16 | End: 2023-06-16 | Stop reason: HOSPADM

## 2023-06-16 RX ORDER — CYANOCOBALAMIN 1000 UG/ML
1000 INJECTION, SOLUTION INTRAMUSCULAR; SUBCUTANEOUS ONCE
Status: COMPLETED | OUTPATIENT
Start: 2023-06-16 | End: 2023-06-16

## 2023-06-16 RX ORDER — SODIUM CHLORIDE 0.9 % (FLUSH) 0.9 %
10 SYRINGE (ML) INJECTION
Status: CANCELLED | OUTPATIENT
Start: 2023-06-23

## 2023-06-16 RX ADMIN — CYANOCOBALAMIN 1000 MCG: 1000 INJECTION, SOLUTION INTRAMUSCULAR at 11:06

## 2023-06-16 RX ADMIN — SODIUM CHLORIDE: 0.9 INJECTION, SOLUTION INTRAVENOUS at 08:06

## 2023-06-16 RX ADMIN — IRON SUCROSE 500 MG: 20 INJECTION, SOLUTION INTRAVENOUS at 08:06

## 2023-06-16 NOTE — PLAN OF CARE
Problem: Fatigue  Goal: Improved Activity Tolerance  Intervention: Promote Improved Energy  Flowsheets (Taken 6/16/2023 1121)  Fatigue Management: fatigue-related activity identified

## 2023-06-17 NOTE — PROGRESS NOTES
[facility] In Office Hematology Oncology Subsequent Encounter Note    6/16/23    Subjective:      Patient ID:   Darlin Tipton  82 y.o. female  1940  MD Yuri Chawla MD      Chief Complaint:   ALVAREZ    HPI:  82 y.o. female has ALVAREZ, increased fatigue, on 02 at 3 liters per minute.  She has Aortic Valve Dx, in need of TAVR procedure soon.  Referred for anemia management to try increase Hgb prior to procedure.    Hx of VHD, L carotid bruit, 1st degree AV block, CAD, ischemic cardiomyopathy, cholesterol, Par. At. Fib>, angina, CHF.  Also chronic bronchitis, asthma, bilateral pleural effusion.  Spinal stenosis, scoliosis, osteoarthritis, DDD, lumbar radiculitis.  Hematuria and post menopausal bleeding hx.  DM, hypothyroid, Fe deficiency, B 12 deficiency, 3B renal insufficiency, immune deficiency disorder, GERD, dysphasia, colon polyps.    S/P CABG, thoracentesis x's 3, pleurodesis.    Allergy to multiple meds, see her list.    Smoke no, ETOH yes.    Meds see list    Today 6/16/23, she begins begins Venofer 500 mg over 4 hours, ongoing now, no S/S of reaction.    Due for B 12 and Procrit, weekly, once approved.  RTC 1 week.    ROS:   GEN: normal without any fever, night sweats or weight loss  HEENT: normal with no HA's, sore throat, stiff neck, changes in vision  CV: See HPI  PULM:See HPI  GI: See HPI  : normal with no hematuria, dysuria  BREAST: normal with no mass, discharge, pain  SKIN: normal with no rash, erythema, bruising, or swelling     Past Medical History:   Diagnosis Date    Allergy     Dust mites, Grasses, Trees    Arthritis     Asthma     Blood transfusion     CAD (coronary artery disease)     Cataract     CHF (congestive heart failure)     CHRONIC BRONCHITIS     Diabetes mellitus     Diabetes mellitus type II     GERD (gastroesophageal reflux disease)     Hyperlipidemia     Hypertension     Irregular heart beat     Kidney disease     ckd stage 3  as stated by patient - to see MD     Paroxysmal atrial fibrillation     Post-menopausal bleeding 2018    Spinal stenosis     Thyroid disease     Hypothyroidism     Past Surgical History:   Procedure Laterality Date    APPENDECTOMY  1968    BLADDER SUSPENSION  1989    CARDIAC SURGERY  2016    CABG    CATARACT EXTRACTION  9/2007 (L) and 10/2207 (R)    COLONOSCOPY N/A 10/18/2017    Procedure: COLONOSCOPY;  Surgeon: Esme Acuna MD;  Location: [facility] ENDO;  Service: Endoscopy;  Laterality: N/A;    CORONARY ANGIOGRAPHY INCLUDING BYPASS GRAFTS WITH CATHETERIZATION OF LEFT HEART Left 5/13/2022    Procedure: Left heart cath;  Surgeon: Davon Patton MD;  Location: [facility] CATH/EP LAB;  Service: Cardiology;  Laterality: Left;    CORONARY ARTERY BYPASS GRAFT  4/26/2004    x5    ESOPHAGEAL DILATION      ESOPHAGOGASTRODUODENOSCOPY N/A 6/27/2022    Procedure: EGD (ESOPHAGOGASTRODUODENOSCOPY);  Surgeon: Skip Nj MD;  Location: [facility] ENDO;  Service: Endoscopy;  Laterality: N/A;    HYSTEROSCOPY WITH DILATION AND CURETTAGE OF UTERUS N/A 3/12/2020    Procedure: HYSTEROSCOPY, WITH DILATION AND CURETTAGE OF UTERUS;  Surgeon: Ramona Llanos MD;  Location: [facility] OR;  Service: OB/GYN;  Laterality: N/A;    SPINE SURGERY  3/2000    Tumor    THORACENTESIS Right 2/7/2023    Procedure: Thoracentesis;  Surgeon: Rula Weiss MD;  Location: [facility];  Service: Pulmonary;  Laterality: Right;  ultrasound guided thoracentesis of right pleural effusion 2/07/23 0730    TRANSFORAMINAL EPIDURAL INJECTION OF STEROID Right 11/21/2019    Procedure: Injection,steroid,epidural,transforaminal approach;  Surgeon: Bj Jones MD;  Location: [facility] OR;  Service: Pain Management;  Laterality: Right;  L4-5, L5-S1    TRANSFORAMINAL EPIDURAL INJECTION OF STEROID Right 12/31/2019    Procedure: Injection,steroid,epidural,transforaminal approach;  Surgeon: Bj Jones MD;  Location: [facility] OR;  Service: Pain Management;  Laterality: Right;  L4-5, L5-S1    TRANSFORAMINAL EPIDURAL INJECTION  OF STEROID Right 2/5/2020    Procedure: Injection,steroid,epidural,transforaminal approach;  Surgeon: Bj Jones MD;  Location: [facility] OR;  Service: Pain Management;  Laterality: Right;  L4-5, L5-S1    TRANSFORAMINAL EPIDURAL INJECTION OF STEROID Left 1/27/2021    Procedure: Injection,steroid,epidural,transforaminal approach;  Surgeon: Bj Jones MD;  Location: [facility] OR;  Service: Pain Management;  Laterality: Left;  L4-5, L5-S1    TRANSFORAMINAL EPIDURAL INJECTION OF STEROID Right 3/4/2021    Procedure: Injection,steroid,epidural,transforaminal approach;  Surgeon: Bj Jones MD;  Location: [facility] OR;  Service: Pain Management;  Laterality: Right;  L4-L5, L5-S1    WRIST SURGERY  1993    carpal tunnel         Review of patient's allergies indicates:   Allergen Reactions    Dexlansoprazole Itching, Nausea Only and Rash    Sulfa (sulfonamide antibiotics) Rash    Floxacillin Itching    Januvia [sitagliptin] Other (See Comments)     Hot flashes    Tetracyclines Itching    Fenofibrate micronized Rash    Nitrofurantoin macrocrystalline Other (See Comments)     unknown    Phenylfenesin la [phenylpropanolamine-gg] Rash     Social History     Socioeconomic History    Marital status:    Tobacco Use    Smoking status: Never     Passive exposure: Yes    Smokeless tobacco: Never    Tobacco comments:     PARENTS    Substance and Sexual Activity    Alcohol use: Yes     Comment: Rare    Drug use: No    Sexual activity: Not Currently     Social Determinants of Health     Financial Resource Strain: Low Risk     Difficulty of Paying Living Expenses: Not hard at all   Food Insecurity: No Food Insecurity    Worried About Running Out of Food in the Last Year: Never true    Ran Out of Food in the Last Year: Never true   Transportation Needs: No Transportation Needs    Lack of Transportation (Medical): No    Lack of Transportation (Non-Medical): No   Physical Activity: Inactive    Days of Exercise per Week: 0 days    Minutes of  Exercise per Session: 0 min   Stress: No Stress Concern Present    Feeling of Stress : Only a little   Social Connections: Socially Integrated    Frequency of Communication with Friends and Family: Three times a week    Frequency of Social Gatherings with Friends and Family: Three times a week    Attends [employer] Services: More than 4 times per year    Active Member of Clubs or Organizations: Yes    Attends Club or Organization Meetings: Never    Marital Status:    Housing Stability: Low Risk     Unable to Pay for Housing in the Last Year: No    Number of Places Lived in the Last Year: 1    Unstable Housing in the Last Year: No         Current Outpatient Medications:     acetaminophen (TYLENOL) 650 MG TbSR, Take 1,300 mg by mouth once daily. 2 Tablet(s) Oral  Every day., Disp: , Rfl:     amiodarone (PACERONE) 200 MG Tab, Take 1 tablet (200 mg total) by mouth 3 (three) times daily for 4 days, THEN 1 tablet (200 mg total) 2 (two) times daily., Disp: 72 tablet, Rfl: 0    amitriptyline (ELAVIL) 75 MG tablet, Take 75 mg by mouth every evening., Disp: , Rfl:     apixaban (ELIQUIS) 5 mg Tab, Take 1 tablet (5 mg total) by mouth 2 (two) times daily., Disp: 180 tablet, Rfl: 3    blood sugar diagnostic (FREESTYLE LITE STRIPS) Strp, USE TO TEST BLOOD SUGAR TWICE A DAY, Disp: 200 strip, Rfl: 3    busPIRone (BUSPAR) 10 MG tablet, TAKE 1 TABLET BY MOUTH TWICE A DAY (Patient taking differently: Take 10 mg by mouth 2 (two) times daily.), Disp: 180 tablet, Rfl: 3    carboxymethylcellulose 1 % ophthalmic solution, Apply 1 drop to eye As instructed. as directed, Disp: , Rfl:     cetirizine (ZYRTEC) 10 MG tablet, Take 10 mg by mouth once daily., Disp: , Rfl:     cholecalciferol, vitamin D3, (VITAMIN D3) 50 mcg (2,000 unit) Cap, Take 1 capsule by mouth once daily., Disp: , Rfl:     clotrimazole-betamethasone 1-0.05% (LOTRISONE) cream, Apply 1 g topically 2 (two) times daily as needed., Disp: , Rfl:     coenzyme Q10 100 mg  capsule, Take 100 mg by mouth once daily. , Disp: , Rfl:     cranberry extract 650 mg Cap, Take 1 tablet by mouth 2 (two) times daily., Disp: , Rfl:     FARXIGA 5 mg Tab tablet, Take 5 mg by mouth once daily., Disp: , Rfl:     fluticasone furoate-vilanteroL (BREO ELLIPTA) 100-25 mcg/dose diskus inhaler, Inhale 1 puff into the lungs once daily. Controller Rinse after you use it, Disp: 180 each, Rfl: 6    fluticasone propionate (FLONASE) 50 mcg/actuation nasal spray, 1 spray (50 mcg total) by Each Nostril route once daily., Disp: 48 g, Rfl: 3    furosemide (LASIX) 20 MG tablet, Take 1 tablet (20 mg total) by mouth once daily., Disp: 30 tablet, Rfl: 0    Lactobac no.41/Bifidobact no.7 (PROBIOTIC-10 ORAL), Take 1 tablet by mouth once daily., Disp: , Rfl:     lancets Misc, 1 Units by Misc.(Non-Drug; Combo Route) route 2 (two) times daily., Disp: 200 each, Rfl: 3    lansoprazole (PREVACID) 30 MG capsule, Take 1 capsule (30 mg total) by mouth once daily., Disp: 90 capsule, Rfl: 3    levalbuterol (XOPENEX) 1.25 mg/0.5 mL nebulizer solution, Take 0.5 mLs (1.25 mg total) by nebulization every 6 (six) hours as needed for Wheezing. Rescue, Disp: 120 each, Rfl: 3    levothyroxine (SYNTHROID) 25 MCG tablet, TAKE 1 TABLET BY MOUTH BEFORE BREAKFAST EVERY DAY (Patient taking differently: Take 25 mcg by mouth before breakfast.), Disp: 90 tablet, Rfl: 3    metoprolol succinate (TOPROL XL) 25 MG 24 hr tablet, Take 1 tablet (25 mg total) by mouth once daily., Disp: 90 tablet, Rfl: 3    midodrine (PROAMATINE) 10 MG tablet, Take 10 mg by mouth 2 (two) times daily with meals., Disp: , Rfl:     nitroGLYCERIN (NITROSTAT) 0.4 MG SL tablet, Place 0.4 mg under the tongue every 5 (five) minutes as needed. 0.4mg Sublingual PRN .  , Disp: , Rfl:     potassium citrate 99 mg Cap, Take 1 capsule by mouth once daily., Disp: , Rfl:     pramoxine-hydrocortisone (PROCTOCREAM-HC) 1-1 % rectal cream, Place rectally 2 (two) times daily., Disp: , Rfl:      "promethazine (PHENERGAN) 12.5 MG Tab, Take 1 tablet (12.5 mg total) by mouth every 8 (eight) hours as needed (nausea)., Disp: 30 tablet, Rfl: 1    RESTASIS 0.05 % ophthalmic emulsion, Place 1 drop into both eyes 2 (two) times daily., Disp: , Rfl:     rosuvastatin (CRESTOR) 10 MG tablet, Take 1 tablet (10 mg total) by mouth once daily., Disp: 90 tablet, Rfl: 3    triazolam (HALCION) 0.25 MG Tab, Take 1 tablet (0.25 mg total) by mouth nightly as needed (sleep)., Disp: 90 tablet, Rfl: 1    vit C,Z-Os-mosga-lutein-zeaxan (PRESERVISION AREDS-2) 250-90-40-1 mg Cap, Take by mouth once daily., Disp: , Rfl:     vitamins  A,C,E-zinc-copper 14,[id number] unit-mg-unit Cap, Take 1 capsule by mouth 2 (two) times daily. , Disp: , Rfl:   No current facility-administered medications for this visit.          Objective:   Vitals:  Blood pressure (!) 101/51, pulse 78, temperature 97.6 °F (36.4 °C), resp. rate 19, height 5' 2" (1.575 m), weight 61.2 kg (135 lb), SpO2 98 %.    Physical Examination:   GEN: no apparent distress, comfortable, 02 in place, frail in appearance  HEAD: atraumatic and normocephalic  EYES: + pallor, no icterus  ENT: no pharyngeal erythema, external ears WNL; no nasal discharge  NECK: no masses, thyroid normal, trachea midline, no LAD/LN's, supple  CV: RRR with  murmur; normal pulse; normal S1 and S2  CHEST: Normal respiratory effort; CTAB; distant breath sounds; no wheeze or crackles  ABDOM: nontender and nondistended; soft; no rebound/guarding, L/S NP, no palpable ascites  MUSC/Skeletal: ROM normal; no crepitus; joints normal  EXTREM: no clubbing, cyanosis, inflammation or swelling  SKIN: no rashes, lesions, ulcers, petechiae   : no cvat  NEURO: grossly intact; motor/sensory WNL;  no tremors  PSYCH: normal mood, affect and behavior  LYMPH: normal cervical, supraclavicular, axillary  LN's      Labs:   Lab Results   Component Value Date    WBC 3.19 (L) 06/12/2023    HGB 8.9 (L) 06/12/2023    HCT 29.6 (L) " 06/12/2023    MCV 83 06/12/2023     06/12/2023    CMP  Sodium   Date Value Ref Range Status   06/15/2023 136 136 - 145 mmol/L Final     Potassium   Date Value Ref Range Status   06/15/2023 4.1 3.5 - 5.1 mmol/L Final     Chloride   Date Value Ref Range Status   06/15/2023 97 95 - 110 mmol/L Final     CO2   Date Value Ref Range Status   06/15/2023 28 23 - 29 mmol/L Final     Glucose   Date Value Ref Range Status   06/15/2023 100 70 - 110 mg/dL Final     BUN   Date Value Ref Range Status   06/15/2023 74 (H) 8 - 23 mg/dL Final     Creatinine   Date Value Ref Range Status   06/15/2023 1.5 (H) 0.5 - 1.4 mg/dL Final     Calcium   Date Value Ref Range Status   06/15/2023 8.9 8.7 - 10.5 mg/dL Final     Total Protein   Date Value Ref Range Status   06/15/2023 6.6 6.0 - 8.4 g/dL Final     Albumin   Date Value Ref Range Status   06/15/2023 3.5 3.5 - 5.2 g/dL Final     Total Bilirubin   Date Value Ref Range Status   06/15/2023 0.7 0.1 - 1.0 mg/dL Final     Comment:     For infants and newborns, interpretation of results should be based  on gestational age, weight and in agreement with clinical  observations.    Premature Infant recommended reference ranges:  Up to 24 hours.............<8.0 mg/dL  Up to 48 hours............<12.0 mg/dL  3-5 days..................<15.0 mg/dL  6-29 days.................<15.0 mg/dL       Alkaline Phosphatase   Date Value Ref Range Status   06/15/2023 81 55 - 135 U/L Final     AST   Date Value Ref Range Status   06/15/2023 57 (H) 10 - 40 U/L Final     ALT   Date Value Ref Range Status   06/15/2023 60 (H) 10 - 44 U/L Final     Anion Gap   Date Value Ref Range Status   06/15/2023 11 8 - 16 mmol/L Final     eGFR if    Date Value Ref Range Status   06/27/2022 49 (A) >60 mL/min/1.73 m^2 Final     eGFR if non    Date Value Ref Range Status   06/27/2022 42 (A) >60 mL/min/1.73 m^2 Final     Comment:     Calculation used to obtain the estimated glomerular filtration  rate  (eGFR) is the CKD-EPI equation.        Hgb 9., B12 331-399, folate 18, Ferritin 29.    Assessment:   (1) 82 y.o. female with diagnosis of moderate anemia, Hgb 8.9.  Etiology is multifactorial, Fe deficiency, B 12 deficiency, renal insufficiency.  Stamina is 1/10 per pt and 3/10 per her .    (2)Discussed and ordered Fe infusion weekly x's 2 weeks to replenish Fe stores.  1-2% risk of reaction to Fe infusion.  Infuse slowly over several hours.  Venofer 1 of 2 given today.    Begin B 12 1000 mcg subq weekly.    Begin Procrit 95162 units subq weekly, titrate for Hgb 10.    (3)Frailty and heart condition now does not allow GI endoscopy or colonoscopy.    (4)Needs TAVR procedure with in 2-4 weeks per Dr. Diaz.    RTC Friday 6/23/23.    RTC 3 weeks with CBC, ferritin.

## 2023-06-20 ENCOUNTER — TELEPHONE (OUTPATIENT)
Dept: HEPATOLOGY | Facility: CLINIC | Age: 83
End: 2023-06-20
Payer: MEDICARE

## 2023-06-20 NOTE — PROGRESS NOTES
Subjective     Patient ID: Darlin Tipton is a 82 y.o. female.    Chief Complaint: Diabetes, Hypertension, Coronary Artery Disease, Asthma, Gastroesophageal Reflux, Chronic Kidney Disease, Paroxysmal atrial fibrillation, and Aortic Stenosis    Patient presents here for 6 month follow-up of diabetes, hypertension, CAD, asthma, GERD, chronic kidney disease, paroxysmal atrial fibrillation, and aortic stenosis.  She states that she is scheduled to have a TAVR procedure but she is too anemic at this time and needs this to be improved before she can have the procedure.  She also has some mitral insufficiency which she states the cardiologist feels should improve once the aortic valve is replaced.  She is scheduled to see Hematology within the next week and also has already been ordered to have iron infusions.  She has had some congestive heart failure and feels weak and is ready to get the surgery done as soon as the anemia can be improved.  Her diabetes is well controlled with a recent hemoglobin A1c of 6.1%.  She denies any hypoglycemia.  She does have diabetic nephropathy but no diabetic retinopathy or neuropathy.  Her hypertension is well controlled with her blood pressure today 111/62.  She is tolerating her medications well.  She does have coronary disease in addition to the above-mentioned valvular problem but denies any chest pains or palpitations.  She is followed by Dr. Diaz.  She does have asthma with the significant allergic component with this is stable without any recent flares.  Her GERD is stable with her present use of Prevacid 30 mg daily.  She does have chronic kidney disease and is followed by Nephrology at this time.  She does have paroxysmal atrial fibrillation and is on Eliquis as an anticoagulant as well as amiodarone for rate control.  As far as health maintenance, she is up-to-date with all of her recommended screening exams and immunizations with the exception of shingles vaccine, 3rd COVID  booster, and DEXA scan.    Review of Systems   Constitutional:  Positive for fatigue. Negative for chills, fever and unexpected weight change.   HENT:  Negative for nasal congestion, postnasal drip and sore throat.    Eyes:  Negative for pain and visual disturbance.        No diabetic retinopathy   Respiratory:  Positive for shortness of breath and wheezing (Occasional). Negative for cough.    Cardiovascular:  Negative for chest pain and palpitations.   Gastrointestinal:  Negative for abdominal pain, diarrhea, nausea and vomiting.   Musculoskeletal:  Positive for arthralgias. Negative for back pain.   Neurological:  Negative for dizziness, light-headedness and headaches.   Hematological:  Negative for adenopathy. Bruises/bleeds easily.   Psychiatric/Behavioral:  Negative for sleep disturbance. The patient is nervous/anxious.         Objective     Physical Exam  Vitals reviewed.   Constitutional:       General: She is not in acute distress.     Appearance: Normal appearance. She is well-developed.   HENT:      Right Ear: Tympanic membrane and external ear normal.      Left Ear: Tympanic membrane and external ear normal.      Mouth/Throat:      Pharynx: Oropharynx is clear. No posterior oropharyngeal erythema.   Neck:      Thyroid: No thyromegaly.      Vascular: No carotid bruit.   Cardiovascular:      Rate and Rhythm: Normal rate. Rhythm irregular.      Pulses:           Carotid pulses are 2+ on the right side and 2+ on the left side.       Radial pulses are 2+ on the right side and 2+ on the left side.        Dorsalis pedis pulses are 1+ on the right side and 1+ on the left side.      Heart sounds: Murmur heard.   Decrescendo systolic murmur is present with a grade of 3/6.   Pulmonary:      Effort: Pulmonary effort is normal.      Breath sounds: Normal breath sounds. No wheezing or rales.   Musculoskeletal:         General: No tenderness. Normal range of motion.      Cervical back: Normal range of motion and neck  supple.      Right lower leg: No edema.      Left lower leg: No edema.   Lymphadenopathy:      Cervical: No cervical adenopathy.   Neurological:      General: No focal deficit present.      Mental Status: She is alert and oriented to person, place, and time.      Cranial Nerves: No cranial nerve deficit.      Deep Tendon Reflexes: Reflexes are normal and symmetric.   Psychiatric:         Mood and Affect: Mood normal.         Behavior: Behavior normal.          Assessment and Plan     1. Iron deficiency anemia due to chronic blood loss    2. Valvular heart disease, moderate AS/TR    3. Atherosclerosis of native coronary artery of native heart with angina pectoris    4. Paroxysmal atrial fibrillation    5. Type 2 diabetes mellitus with stage 3b chronic kidney disease, without long-term current use of insulin    6. Mild persistent asthma without complication    7. Mixed hyperlipidemia    8. Stage 3b chronic kidney disease    9. Hypothyroidism, unspecified type    10. Gastroesophageal reflux disease without esophagitis        1. Continue present medication as her diabetes, hypertension, CAD, asthma, GERD, chronic kidney disease, and paroxysmal atrial fibrillation are stable  2. Continue diabetic, low-sodium, low-fat low-cholesterol diet  3. Follow-up with hematology in order to help correct her anemia  4.  Once her anemia is satisfactorily corrected, she can proceed with TAVR procedure  5. Follow up with Cardiology as scheduled  6.  Follow up with me in 3 months          Follow up in about 3 months (around 9/12/2023).

## 2023-06-20 NOTE — TELEPHONE ENCOUNTER
Referral to hepatology for concern for cirrhosis on imaging, elevated liver enzymes     Scheduling attempt # 1    Please call pt to schedule appt with MD

## 2023-06-20 NOTE — TELEPHONE ENCOUNTER
Scheduling attempt #2    Patient called to schedule appt.  No answer, voicemail left with call back .

## 2023-06-21 NOTE — TELEPHONE ENCOUNTER
Referral to hepatology for concern for cirrhosis on imaging, elevated liver enzymes      Scheduling attempt # 3  Called the patient to schedule a hepatology consult from referral.  No answer, left voicemail with call back # [phone]. Letter mailed out.

## 2023-06-23 ENCOUNTER — INFUSION (OUTPATIENT)
Dept: INFUSION THERAPY | Facility: HOSPITAL | Age: 83
End: 2023-06-23
Attending: INTERNAL MEDICINE
Payer: MEDICARE

## 2023-06-23 ENCOUNTER — DOCUMENTATION ONLY (OUTPATIENT)
Dept: HEMATOLOGY/ONCOLOGY | Facility: HOSPITAL | Age: 83
End: 2023-06-23

## 2023-06-23 VITALS
WEIGHT: 133.88 LBS | OXYGEN SATURATION: 96 % | BODY MASS INDEX: 24.64 KG/M2 | TEMPERATURE: 98 F | SYSTOLIC BLOOD PRESSURE: 109 MMHG | HEIGHT: 62 IN | RESPIRATION RATE: 16 BRPM | HEART RATE: 77 BPM | DIASTOLIC BLOOD PRESSURE: 72 MMHG

## 2023-06-23 DIAGNOSIS — D51.8 DIETARY VITAMIN B12 DEFICIENCY ANEMIA: ICD-10-CM

## 2023-06-23 DIAGNOSIS — D63.1 ANEMIA DUE TO CHRONIC RENAL FAILURE TREATED WITH ERYTHROPOIETIN, STAGE 3 (MODERATE): ICD-10-CM

## 2023-06-23 DIAGNOSIS — N18.30 ANEMIA DUE TO CHRONIC RENAL FAILURE TREATED WITH ERYTHROPOIETIN, STAGE 3 (MODERATE): ICD-10-CM

## 2023-06-23 DIAGNOSIS — D50.0 IRON DEFICIENCY ANEMIA DUE TO CHRONIC BLOOD LOSS: Primary | ICD-10-CM

## 2023-06-23 PROCEDURE — 96372 THER/PROPH/DIAG INJ SC/IM: CPT | Mod: 59

## 2023-06-23 PROCEDURE — A4216 STERILE WATER/SALINE, 10 ML: HCPCS | Performed by: INTERNAL MEDICINE

## 2023-06-23 PROCEDURE — 96366 THER/PROPH/DIAG IV INF ADDON: CPT

## 2023-06-23 PROCEDURE — 63600175 PHARM REV CODE 636 W HCPCS: Performed by: INTERNAL MEDICINE

## 2023-06-23 PROCEDURE — 25000003 PHARM REV CODE 250: Performed by: INTERNAL MEDICINE

## 2023-06-23 PROCEDURE — 96365 THER/PROPH/DIAG IV INF INIT: CPT

## 2023-06-23 RX ORDER — SODIUM CHLORIDE 0.9 % (FLUSH) 0.9 %
10 SYRINGE (ML) INJECTION
Status: CANCELLED | OUTPATIENT
Start: 2023-06-30

## 2023-06-23 RX ORDER — CYANOCOBALAMIN 1000 UG/ML
1000 INJECTION, SOLUTION INTRAMUSCULAR; SUBCUTANEOUS ONCE
Status: COMPLETED | OUTPATIENT
Start: 2023-06-23 | End: 2023-06-23

## 2023-06-23 RX ORDER — CYANOCOBALAMIN 1000 UG/ML
1000 INJECTION, SOLUTION INTRAMUSCULAR; SUBCUTANEOUS ONCE
Status: CANCELLED | OUTPATIENT
Start: 2023-06-23

## 2023-06-23 RX ORDER — SODIUM CHLORIDE 0.9 % (FLUSH) 0.9 %
10 SYRINGE (ML) INJECTION
Status: DISCONTINUED | OUTPATIENT
Start: 2023-06-23 | End: 2023-06-23 | Stop reason: HOSPADM

## 2023-06-23 RX ORDER — HEPARIN 100 UNIT/ML
500 SYRINGE INTRAVENOUS
Status: CANCELLED | OUTPATIENT
Start: 2023-06-30

## 2023-06-23 RX ADMIN — SODIUM CHLORIDE, PRESERVATIVE FREE 10 ML: 5 INJECTION INTRAVENOUS at 12:06

## 2023-06-23 RX ADMIN — CYANOCOBALAMIN 1000 MCG: 1000 INJECTION, SOLUTION INTRAMUSCULAR at 08:06

## 2023-06-23 RX ADMIN — SODIUM CHLORIDE: 0.9 INJECTION, SOLUTION INTRAVENOUS at 08:06

## 2023-06-23 RX ADMIN — IRON SUCROSE 500 MG: 20 INJECTION, SOLUTION INTRAVENOUS at 08:06

## 2023-06-23 NOTE — PLAN OF CARE
Problem: Fatigue  Goal: Improved Activity Tolerance  Outcome: Ongoing, Progressing  Intervention: Promote Improved Energy  Flowsheets (Taken 6/23/2023 0818)  Fatigue Management:   fatigue-related activity identified   frequent rest breaks encouraged   paced activity encouraged  Sleep/Rest Enhancement:   regular sleep/rest pattern promoted   relaxation techniques promoted  Activity Management: Ambulated -L4

## 2023-06-23 NOTE — PROGRESS NOTES
Pt lives in [place], he has CLL and AIHA.  He is too frail to continue coming to [place].  He is seeing Dr. Aldana of [facility] for heme onc care now.  Moody Alvarez MD

## 2023-06-23 NOTE — LETTER
June 23, 2023          No Recipients             Critical access hospital  [address]  [facility] [address]   Patient: Darlin Tipton   MR Number: [id number]   YOB: 1940   Date of Visit: 6/23/2023       Dear Dr. Sotelo Recipients:    Thank you for referring Darlin Tipton to me for evaluation. Below are the relevant portions of my assessment and plan of care.            If you have questions, please do not hesitate to call me. I look forward to following Darlin along with you.    Sincerely,      MINOO Cordoba MD           CC    No Recipients

## 2023-06-26 ENCOUNTER — LAB VISIT (OUTPATIENT)
Dept: LAB | Facility: HOSPITAL | Age: 83
End: 2023-06-26
Attending: INTERNAL MEDICINE
Payer: MEDICARE

## 2023-06-26 DIAGNOSIS — D50.9 IRON DEFICIENCY ANEMIA, UNSPECIFIED IRON DEFICIENCY ANEMIA TYPE: ICD-10-CM

## 2023-06-26 DIAGNOSIS — D51.8 DIETARY VITAMIN B12 DEFICIENCY ANEMIA: ICD-10-CM

## 2023-06-26 DIAGNOSIS — D50.0 IRON DEFICIENCY ANEMIA DUE TO CHRONIC BLOOD LOSS: ICD-10-CM

## 2023-06-26 LAB
BASOPHILS # BLD AUTO: 0.01 K/UL (ref 0–0.2)
BASOPHILS NFR BLD: 0.3 % (ref 0–1.9)
DIFFERENTIAL METHOD: ABNORMAL
EOSINOPHIL # BLD AUTO: 0.1 K/UL (ref 0–0.5)
EOSINOPHIL NFR BLD: 1.6 % (ref 0–8)
ERYTHROCYTE [DISTWIDTH] IN BLOOD BY AUTOMATED COUNT: 20.7 % (ref 11.5–14.5)
FERRITIN SERPL-MCNC: 539 NG/ML (ref 20–300)
FERRITIN SERPL-MCNC: 539 NG/ML (ref 20–300)
HCT VFR BLD AUTO: 30.1 % (ref 37–48.5)
HGB BLD-MCNC: 9 G/DL (ref 12–16)
IMM GRANULOCYTES # BLD AUTO: 0.02 K/UL (ref 0–0.04)
IMM GRANULOCYTES NFR BLD AUTO: 0.5 % (ref 0–0.5)
LYMPHOCYTES # BLD AUTO: 0.7 K/UL (ref 1–4.8)
LYMPHOCYTES NFR BLD: 18.4 % (ref 18–48)
MCH RBC QN AUTO: 24.9 PG (ref 27–31)
MCHC RBC AUTO-ENTMCNC: 29.9 G/DL (ref 32–36)
MCV RBC AUTO: 83 FL (ref 82–98)
MONOCYTES # BLD AUTO: 0.5 K/UL (ref 0.3–1)
MONOCYTES NFR BLD: 13.3 % (ref 4–15)
NEUTROPHILS # BLD AUTO: 2.5 K/UL (ref 1.8–7.7)
NEUTROPHILS NFR BLD: 65.9 % (ref 38–73)
NRBC BLD-RTO: 0 /100 WBC
PLATELET # BLD AUTO: 208 K/UL (ref 150–450)
PMV BLD AUTO: 9.5 FL (ref 9.2–12.9)
RBC # BLD AUTO: 3.61 M/UL (ref 4–5.4)
VIT B12 SERPL-MCNC: >1500 PG/ML (ref 210–950)
WBC # BLD AUTO: 3.76 K/UL (ref 3.9–12.7)

## 2023-06-26 PROCEDURE — 82607 VITAMIN B-12: CPT | Performed by: INTERNAL MEDICINE

## 2023-06-26 PROCEDURE — 85025 COMPLETE CBC W/AUTO DIFF WBC: CPT | Performed by: INTERNAL MEDICINE

## 2023-06-26 PROCEDURE — 82728 ASSAY OF FERRITIN: CPT | Performed by: INTERNAL MEDICINE

## 2023-06-26 PROCEDURE — 36415 COLL VENOUS BLD VENIPUNCTURE: CPT | Performed by: INTERNAL MEDICINE

## 2023-06-30 ENCOUNTER — INFUSION (OUTPATIENT)
Dept: INFUSION THERAPY | Facility: HOSPITAL | Age: 83
End: 2023-06-30
Attending: INTERNAL MEDICINE
Payer: MEDICARE

## 2023-06-30 VITALS
RESPIRATION RATE: 16 BRPM | DIASTOLIC BLOOD PRESSURE: 61 MMHG | TEMPERATURE: 98 F | SYSTOLIC BLOOD PRESSURE: 100 MMHG | OXYGEN SATURATION: 91 % | HEIGHT: 62 IN | HEART RATE: 80 BPM | BODY MASS INDEX: 24.66 KG/M2 | WEIGHT: 134 LBS

## 2023-06-30 DIAGNOSIS — D63.1 ANEMIA DUE TO CHRONIC RENAL FAILURE TREATED WITH ERYTHROPOIETIN, STAGE 3 (MODERATE): ICD-10-CM

## 2023-06-30 DIAGNOSIS — D51.8 DIETARY VITAMIN B12 DEFICIENCY ANEMIA: Primary | ICD-10-CM

## 2023-06-30 DIAGNOSIS — N18.30 ANEMIA DUE TO CHRONIC RENAL FAILURE TREATED WITH ERYTHROPOIETIN, STAGE 3 (MODERATE): ICD-10-CM

## 2023-06-30 PROCEDURE — 63600175 PHARM REV CODE 636 W HCPCS: Performed by: INTERNAL MEDICINE

## 2023-06-30 PROCEDURE — 96372 THER/PROPH/DIAG INJ SC/IM: CPT

## 2023-06-30 RX ORDER — CYANOCOBALAMIN 1000 UG/ML
1000 INJECTION, SOLUTION INTRAMUSCULAR; SUBCUTANEOUS ONCE
Status: COMPLETED | OUTPATIENT
Start: 2023-06-30 | End: 2023-06-30

## 2023-06-30 RX ORDER — CYANOCOBALAMIN 1000 UG/ML
1000 INJECTION, SOLUTION INTRAMUSCULAR; SUBCUTANEOUS ONCE
Status: CANCELLED | OUTPATIENT
Start: 2023-06-30

## 2023-06-30 RX ADMIN — CYANOCOBALAMIN 1000 MCG: 1000 INJECTION, SOLUTION INTRAMUSCULAR at 03:06

## 2023-06-30 NOTE — PLAN OF CARE
Problem: Anemia  Goal: Anemia Symptom Improvement  Outcome: Ongoing, Progressing  Intervention: Monitor and Manage Anemia  Flowsheets (Taken 6/30/2023 1503)  Oral Nutrition Promotion: rest periods promoted  Fatigue Management: activity schedule adjusted

## 2023-07-05 ENCOUNTER — INFUSION (OUTPATIENT)
Dept: INFUSION THERAPY | Facility: HOSPITAL | Age: 83
End: 2023-07-05
Attending: INTERNAL MEDICINE
Payer: MEDICARE

## 2023-07-05 VITALS
SYSTOLIC BLOOD PRESSURE: 108 MMHG | RESPIRATION RATE: 17 BRPM | TEMPERATURE: 98 F | BODY MASS INDEX: 24.66 KG/M2 | WEIGHT: 134 LBS | HEART RATE: 81 BPM | DIASTOLIC BLOOD PRESSURE: 59 MMHG | OXYGEN SATURATION: 96 % | HEIGHT: 62 IN

## 2023-07-05 DIAGNOSIS — D63.1 ANEMIA DUE TO CHRONIC RENAL FAILURE TREATED WITH ERYTHROPOIETIN, STAGE 3 (MODERATE): Primary | ICD-10-CM

## 2023-07-05 DIAGNOSIS — N18.30 ANEMIA DUE TO CHRONIC RENAL FAILURE TREATED WITH ERYTHROPOIETIN, STAGE 3 (MODERATE): Primary | ICD-10-CM

## 2023-07-05 PROCEDURE — 63600175 PHARM REV CODE 636 W HCPCS: Mod: JG | Performed by: NURSE PRACTITIONER

## 2023-07-05 PROCEDURE — 96372 THER/PROPH/DIAG INJ SC/IM: CPT

## 2023-07-05 RX ORDER — CYANOCOBALAMIN 1000 UG/ML
1000 INJECTION, SOLUTION INTRAMUSCULAR; SUBCUTANEOUS ONCE
Status: CANCELLED | OUTPATIENT
Start: 2023-07-05

## 2023-07-05 RX ADMIN — EPOETIN ALFA 20000 UNITS: 20000 SOLUTION INTRAVENOUS; SUBCUTANEOUS at 01:07

## 2023-07-05 NOTE — PLAN OF CARE
Problem: Fatigue  Goal: Improved Activity Tolerance  Outcome: Ongoing, Progressing  Intervention: Promote Improved Energy  Flowsheets (Taken 7/5/2023 1304)  Fatigue Management: fatigue-related activity identified  Sleep/Rest Enhancement: noise level reduced

## 2023-07-07 ENCOUNTER — PATIENT MESSAGE (OUTPATIENT)
Dept: PULMONOLOGY | Facility: CLINIC | Age: 83
End: 2023-07-07

## 2023-07-07 ENCOUNTER — INFUSION (OUTPATIENT)
Dept: INFUSION THERAPY | Facility: HOSPITAL | Age: 83
End: 2023-07-07
Attending: INTERNAL MEDICINE
Payer: MEDICARE

## 2023-07-07 VITALS
WEIGHT: 138 LBS | HEART RATE: 81 BPM | SYSTOLIC BLOOD PRESSURE: 115 MMHG | DIASTOLIC BLOOD PRESSURE: 69 MMHG | HEIGHT: 62 IN | BODY MASS INDEX: 25.4 KG/M2 | OXYGEN SATURATION: 95 % | TEMPERATURE: 98 F | RESPIRATION RATE: 16 BRPM

## 2023-07-07 DIAGNOSIS — D51.8 DIETARY VITAMIN B12 DEFICIENCY ANEMIA: Primary | ICD-10-CM

## 2023-07-07 DIAGNOSIS — N18.30 ANEMIA DUE TO CHRONIC RENAL FAILURE TREATED WITH ERYTHROPOIETIN, STAGE 3 (MODERATE): ICD-10-CM

## 2023-07-07 DIAGNOSIS — D63.1 ANEMIA DUE TO CHRONIC RENAL FAILURE TREATED WITH ERYTHROPOIETIN, STAGE 3 (MODERATE): ICD-10-CM

## 2023-07-07 DIAGNOSIS — J90 PLEURAL EFFUSION: Primary | ICD-10-CM

## 2023-07-07 PROCEDURE — 96372 THER/PROPH/DIAG INJ SC/IM: CPT

## 2023-07-07 PROCEDURE — 63600175 PHARM REV CODE 636 W HCPCS: Performed by: INTERNAL MEDICINE

## 2023-07-07 RX ORDER — CYANOCOBALAMIN 1000 UG/ML
1000 INJECTION, SOLUTION INTRAMUSCULAR; SUBCUTANEOUS ONCE
Status: COMPLETED | OUTPATIENT
Start: 2023-07-07 | End: 2023-07-07

## 2023-07-07 RX ORDER — CYANOCOBALAMIN 1000 UG/ML
1000 INJECTION, SOLUTION INTRAMUSCULAR; SUBCUTANEOUS ONCE
Status: CANCELLED | OUTPATIENT
Start: 2023-07-07

## 2023-07-07 RX ADMIN — CYANOCOBALAMIN 1000 MCG: 1000 INJECTION, SOLUTION INTRAMUSCULAR at 03:07

## 2023-07-07 NOTE — PLAN OF CARE
Problem: Fall Injury Risk  Goal: Absence of Fall and Fall-Related Injury  Outcome: Ongoing, Progressing  Intervention: Identify and Manage Contributors  Flowsheets (Taken 7/7/2023 1517)  Self-Care Promotion: safe use of adaptive equipment encouraged  Medication Review/Management: medications reviewed  Intervention: Promote Injury-Free Environment  Flowsheets (Taken 7/7/2023 1517)  Safety Promotion/Fall Prevention: assistive device/personal item within reach

## 2023-07-11 ENCOUNTER — LAB VISIT (OUTPATIENT)
Dept: LAB | Facility: HOSPITAL | Age: 83
End: 2023-07-11
Attending: INTERNAL MEDICINE
Payer: MEDICARE

## 2023-07-11 ENCOUNTER — TELEPHONE (OUTPATIENT)
Dept: HEMATOLOGY/ONCOLOGY | Facility: CLINIC | Age: 83
End: 2023-07-11

## 2023-07-11 ENCOUNTER — TELEPHONE (OUTPATIENT)
Dept: INFUSION THERAPY | Facility: HOSPITAL | Age: 83
End: 2023-07-11

## 2023-07-11 DIAGNOSIS — E87.5 HYPERKALEMIA: ICD-10-CM

## 2023-07-11 DIAGNOSIS — D50.9 IRON DEFICIENCY ANEMIA, UNSPECIFIED IRON DEFICIENCY ANEMIA TYPE: ICD-10-CM

## 2023-07-11 DIAGNOSIS — D50.9 IRON DEFICIENCY ANEMIA, UNSPECIFIED IRON DEFICIENCY ANEMIA TYPE: Primary | ICD-10-CM

## 2023-07-11 LAB
ALBUMIN SERPL BCP-MCNC: 3.6 G/DL (ref 3.5–5.2)
ALP SERPL-CCNC: 86 U/L (ref 55–135)
ALT SERPL W/O P-5'-P-CCNC: 34 U/L (ref 10–44)
ANION GAP SERPL CALC-SCNC: 8 MMOL/L (ref 8–16)
ANISOCYTOSIS BLD QL SMEAR: ABNORMAL
AST SERPL-CCNC: 29 U/L (ref 10–40)
BASOPHILS # BLD AUTO: 0.02 K/UL (ref 0–0.2)
BASOPHILS NFR BLD: 0.6 % (ref 0–1.9)
BILIRUB SERPL-MCNC: 0.8 MG/DL (ref 0.1–1)
BUN SERPL-MCNC: 59 MG/DL (ref 8–23)
CALCIUM SERPL-MCNC: 8.9 MG/DL (ref 8.7–10.5)
CHLORIDE SERPL-SCNC: 96 MMOL/L (ref 95–110)
CO2 SERPL-SCNC: 32 MMOL/L (ref 23–29)
CREAT SERPL-MCNC: 1.4 MG/DL (ref 0.5–1.4)
DIFFERENTIAL METHOD: ABNORMAL
EOSINOPHIL # BLD AUTO: 0.1 K/UL (ref 0–0.5)
EOSINOPHIL NFR BLD: 3.1 % (ref 0–8)
ERYTHROCYTE [DISTWIDTH] IN BLOOD BY AUTOMATED COUNT: 24 % (ref 11.5–14.5)
EST. GFR  (NO RACE VARIABLE): 37.6 ML/MIN/1.73 M^2
FERRITIN SERPL-MCNC: 177 NG/ML (ref 20–300)
GLUCOSE SERPL-MCNC: 130 MG/DL (ref 70–110)
HCT VFR BLD AUTO: 33.8 % (ref 37–48.5)
HGB BLD-MCNC: 10.1 G/DL (ref 12–16)
IMM GRANULOCYTES # BLD AUTO: 0.01 K/UL (ref 0–0.04)
IMM GRANULOCYTES NFR BLD AUTO: 0.3 % (ref 0–0.5)
LYMPHOCYTES # BLD AUTO: 0.6 K/UL (ref 1–4.8)
LYMPHOCYTES NFR BLD: 17.1 % (ref 18–48)
MCH RBC QN AUTO: 26 PG (ref 27–31)
MCHC RBC AUTO-ENTMCNC: 29.9 G/DL (ref 32–36)
MCV RBC AUTO: 87 FL (ref 82–98)
MONOCYTES # BLD AUTO: 0.5 K/UL (ref 0.3–1)
MONOCYTES NFR BLD: 15.1 % (ref 4–15)
NEUTROPHILS # BLD AUTO: 2.3 K/UL (ref 1.8–7.7)
NEUTROPHILS NFR BLD: 63.8 % (ref 38–73)
NRBC BLD-RTO: 0 /100 WBC
PLATELET # BLD AUTO: 243 K/UL (ref 150–450)
PLATELET BLD QL SMEAR: ABNORMAL
PMV BLD AUTO: 8.9 FL (ref 9.2–12.9)
POIKILOCYTOSIS BLD QL SMEAR: SLIGHT
POTASSIUM SERPL-SCNC: 3.1 MMOL/L (ref 3.5–5.1)
PROT SERPL-MCNC: 6.8 G/DL (ref 6–8.4)
RBC # BLD AUTO: 3.89 M/UL (ref 4–5.4)
SODIUM SERPL-SCNC: 136 MMOL/L (ref 136–145)
WBC # BLD AUTO: 3.57 K/UL (ref 3.9–12.7)

## 2023-07-11 PROCEDURE — 80053 COMPREHEN METABOLIC PANEL: CPT | Performed by: INTERNAL MEDICINE

## 2023-07-11 PROCEDURE — 82728 ASSAY OF FERRITIN: CPT | Performed by: INTERNAL MEDICINE

## 2023-07-11 PROCEDURE — 36415 COLL VENOUS BLD VENIPUNCTURE: CPT | Performed by: INTERNAL MEDICINE

## 2023-07-11 PROCEDURE — 85025 COMPLETE CBC W/AUTO DIFF WBC: CPT | Performed by: INTERNAL MEDICINE

## 2023-07-11 NOTE — TELEPHONE ENCOUNTER
Patient canceled for procrit injection. Hgb 10.1, scheduled to return on 7/26 pending lab work. Verbalized understanding.

## 2023-07-12 ENCOUNTER — HOSPITAL ENCOUNTER (OUTPATIENT)
Dept: RADIOLOGY | Facility: HOSPITAL | Age: 83
Discharge: HOME | End: 2023-07-12
Attending: NURSE PRACTITIONER
Payer: MEDICARE

## 2023-07-12 ENCOUNTER — OFFICE VISIT (OUTPATIENT)
Dept: HEMATOLOGY/ONCOLOGY | Facility: CLINIC | Age: 83
End: 2023-07-12
Payer: MEDICARE

## 2023-07-12 VITALS
RESPIRATION RATE: 16 BRPM | BODY MASS INDEX: 24.31 KG/M2 | DIASTOLIC BLOOD PRESSURE: 62 MMHG | TEMPERATURE: 97 F | SYSTOLIC BLOOD PRESSURE: 105 MMHG | WEIGHT: 132.13 LBS | HEART RATE: 81 BPM | HEIGHT: 62 IN

## 2023-07-12 DIAGNOSIS — D51.8 DIETARY VITAMIN B12 DEFICIENCY ANEMIA: ICD-10-CM

## 2023-07-12 DIAGNOSIS — J90 PLEURAL EFFUSION: ICD-10-CM

## 2023-07-12 DIAGNOSIS — D63.1 ANEMIA DUE TO CHRONIC RENAL FAILURE TREATED WITH ERYTHROPOIETIN, STAGE 3 (MODERATE): ICD-10-CM

## 2023-07-12 DIAGNOSIS — N18.30 ANEMIA DUE TO CHRONIC RENAL FAILURE TREATED WITH ERYTHROPOIETIN, STAGE 3 (MODERATE): ICD-10-CM

## 2023-07-12 DIAGNOSIS — D50.9 IRON DEFICIENCY ANEMIA, UNSPECIFIED IRON DEFICIENCY ANEMIA TYPE: Primary | ICD-10-CM

## 2023-07-12 PROCEDURE — 1159F PR MEDICATION LIST DOCUMENTED IN MEDICAL RECORD: ICD-10-PCS | Mod: CPTII,S$GLB,, | Performed by: INTERNAL MEDICINE

## 2023-07-12 PROCEDURE — 1101F PT FALLS ASSESS-DOCD LE1/YR: CPT | Mod: CPTII,S$GLB,, | Performed by: INTERNAL MEDICINE

## 2023-07-12 PROCEDURE — 71046 X-RAY EXAM CHEST 2 VIEWS: CPT | Mod: TC,PO

## 2023-07-12 PROCEDURE — 3288F FALL RISK ASSESSMENT DOCD: CPT | Mod: CPTII,S$GLB,, | Performed by: INTERNAL MEDICINE

## 2023-07-12 PROCEDURE — 99213 OFFICE O/P EST LOW 20 MIN: CPT | Mod: S$GLB,,, | Performed by: INTERNAL MEDICINE

## 2023-07-12 PROCEDURE — 3074F SYST BP LT 130 MM HG: CPT | Mod: CPTII,S$GLB,, | Performed by: INTERNAL MEDICINE

## 2023-07-12 PROCEDURE — 3288F PR FALLS RISK ASSESSMENT DOCUMENTED: ICD-10-PCS | Mod: CPTII,S$GLB,, | Performed by: INTERNAL MEDICINE

## 2023-07-12 PROCEDURE — 1126F PR PAIN SEVERITY QUANTIFIED, NO PAIN PRESENT: ICD-10-PCS | Mod: CPTII,S$GLB,, | Performed by: INTERNAL MEDICINE

## 2023-07-12 PROCEDURE — 1101F PR PT FALLS ASSESS DOC 0-1 FALLS W/OUT INJ PAST YR: ICD-10-PCS | Mod: CPTII,S$GLB,, | Performed by: INTERNAL MEDICINE

## 2023-07-12 PROCEDURE — 3078F PR MOST RECENT DIASTOLIC BLOOD PRESSURE < 80 MM HG: ICD-10-PCS | Mod: CPTII,S$GLB,, | Performed by: INTERNAL MEDICINE

## 2023-07-12 PROCEDURE — 1159F MED LIST DOCD IN RCRD: CPT | Mod: CPTII,S$GLB,, | Performed by: INTERNAL MEDICINE

## 2023-07-12 PROCEDURE — 3074F PR MOST RECENT SYSTOLIC BLOOD PRESSURE < 130 MM HG: ICD-10-PCS | Mod: CPTII,S$GLB,, | Performed by: INTERNAL MEDICINE

## 2023-07-12 PROCEDURE — 3078F DIAST BP <80 MM HG: CPT | Mod: CPTII,S$GLB,, | Performed by: INTERNAL MEDICINE

## 2023-07-12 PROCEDURE — 1126F AMNT PAIN NOTED NONE PRSNT: CPT | Mod: CPTII,S$GLB,, | Performed by: INTERNAL MEDICINE

## 2023-07-12 PROCEDURE — 99213 PR OFFICE/OUTPT VISIT, EST, LEVL III, 20-29 MIN: ICD-10-PCS | Mod: S$GLB,,, | Performed by: INTERNAL MEDICINE

## 2023-07-12 NOTE — LETTER
July 16, 2023        Oumar Almonte Jr., MD  [address]  [address]  [place] [address]             [facility] - Hematology Oncology  [address]  [place] [address]  Phone: [phone]  Fax: [phone]   Patient: Darlin Tipton   MR Number: [id number]   YOB: 1940   Date of Visit: 7/12/2023       Dear Dr. Almonte:    Thank you for referring Darlin Tipton to me for evaluation. Below are the relevant portions of my assessment and plan of care.                           If you have questions, please do not hesitate to call me. I look forward to following Darlin along with you.    Sincerely,      MINOO Cordoba MD           CC    No Recipients

## 2023-07-17 ENCOUNTER — TELEPHONE (OUTPATIENT)
Dept: PULMONOLOGY | Facility: CLINIC | Age: 83
End: 2023-07-17

## 2023-07-17 NOTE — TELEPHONE ENCOUNTER
Chest xray  Impression:     1. Loculated right pleural effusion.  2. Small left pleural effusion.  3. Right mid to lower lung interstitial opacities.

## 2023-07-17 NOTE — PROGRESS NOTES
[facility] In Office Hematology Oncology Subsequent Encounter Note    7/12/23    Subjective:      Patient ID:   Darlin Tipton  82 y.o. female  1940  MD Yuri Chawla MD      Chief Complaint:   ALVAREZ    HPI:  82 y.o. female has ALVAREZ, increased fatigue, on 02 at 3 liters per minute.  She has Aortic Valve Dx, in need of TAVR procedure soon.  Referred for anemia management to try increase Hgb prior to procedure.    Hx of VHD, L carotid bruit, 1st degree AV block, CAD, ischemic cardiomyopathy, cholesterol, Par. At. Fib>, angina, CHF.  Also chronic bronchitis, asthma, bilateral pleural effusion.  Spinal stenosis, scoliosis, osteoarthritis, DDD, lumbar radiculitis.  Hematuria and post menopausal bleeding hx.  DM, hypothyroid, Fe deficiency, B 12 deficiency, 3B renal insufficiency, immune deficiency disorder, GERD, dysphasia, colon polyps.    S/P CABG, thoracentesis x's 3, pleurodesis.    Allergy to multiple meds, see her list.    Smoke no, ETOH yes.    Meds see list    Venofer given weekly.  B 12 and Procrit, weekly, given,  Hgb improving 8.9 to 10.1.  She is doing better.  She is to go for TAVR procedure.    ROS:   GEN: normal without any fever, night sweats or weight loss  HEENT: normal with no HA's, sore throat, stiff neck, changes in vision  CV: See HPI  PULM:See HPI  GI: See HPI  : normal with no hematuria, dysuria  BREAST: normal with no mass, discharge, pain  SKIN: normal with no rash, erythema, bruising, or swelling     Past Medical History:   Diagnosis Date    Allergy     Dust mites, Grasses, Trees    Arthritis     Asthma     Blood transfusion     CAD (coronary artery disease)     Cataract     CHF (congestive heart failure)     CHRONIC BRONCHITIS     Diabetes mellitus     Diabetes mellitus type II     GERD (gastroesophageal reflux disease)     Hyperlipidemia     Hypertension     Irregular heart beat     Kidney disease     ckd stage 3  as stated by patient - to see MD    Paroxysmal  atrial fibrillation     Post-menopausal bleeding 2018    Spinal stenosis     Thyroid disease     Hypothyroidism     Past Surgical History:   Procedure Laterality Date    APPENDECTOMY  1968    BLADDER SUSPENSION  1989    CARDIAC SURGERY  2016    CABG    CATARACT EXTRACTION  9/2007 (L) and 10/2207 (R)    COLONOSCOPY N/A 10/18/2017    Procedure: COLONOSCOPY;  Surgeon: Esme Acuna MD;  Location: [facility] ENDO;  Service: Endoscopy;  Laterality: N/A;    CORONARY ANGIOGRAPHY INCLUDING BYPASS GRAFTS WITH CATHETERIZATION OF LEFT HEART Left 5/13/2022    Procedure: Left heart cath;  Surgeon: Davon Patton MD;  Location: [facility] CATH/EP LAB;  Service: Cardiology;  Laterality: Left;    CORONARY ARTERY BYPASS GRAFT  4/26/2004    x5    ESOPHAGEAL DILATION      ESOPHAGOGASTRODUODENOSCOPY N/A 6/27/2022    Procedure: EGD (ESOPHAGOGASTRODUODENOSCOPY);  Surgeon: Skip Nj MD;  Location: [facility] ENDO;  Service: Endoscopy;  Laterality: N/A;    HYSTEROSCOPY WITH DILATION AND CURETTAGE OF UTERUS N/A 3/12/2020    Procedure: HYSTEROSCOPY, WITH DILATION AND CURETTAGE OF UTERUS;  Surgeon: Ramona Llanos MD;  Location: [facility] OR;  Service: OB/GYN;  Laterality: N/A;    SPINE SURGERY  3/2000    Tumor    THORACENTESIS Right 2/7/2023    Procedure: Thoracentesis;  Surgeon: Rula Weiss MD;  Location: [facility];  Service: Pulmonary;  Laterality: Right;  ultrasound guided thoracentesis of right pleural effusion 2/07/23 0730    TRANSFORAMINAL EPIDURAL INJECTION OF STEROID Right 11/21/2019    Procedure: Injection,steroid,epidural,transforaminal approach;  Surgeon: Bj Jones MD;  Location: [facility] OR;  Service: Pain Management;  Laterality: Right;  L4-5, L5-S1    TRANSFORAMINAL EPIDURAL INJECTION OF STEROID Right 12/31/2019    Procedure: Injection,steroid,epidural,transforaminal approach;  Surgeon: Bj Jones MD;  Location: [facility] OR;  Service: Pain Management;  Laterality: Right;  L4-5, L5-S1    TRANSFORAMINAL EPIDURAL INJECTION OF STEROID  Right 2/5/2020    Procedure: Injection,steroid,epidural,transforaminal approach;  Surgeon: Bj Jones MD;  Location: [facility] OR;  Service: Pain Management;  Laterality: Right;  L4-5, L5-S1    TRANSFORAMINAL EPIDURAL INJECTION OF STEROID Left 1/27/2021    Procedure: Injection,steroid,epidural,transforaminal approach;  Surgeon: Bj Jones MD;  Location: [facility] OR;  Service: Pain Management;  Laterality: Left;  L4-5, L5-S1    TRANSFORAMINAL EPIDURAL INJECTION OF STEROID Right 3/4/2021    Procedure: Injection,steroid,epidural,transforaminal approach;  Surgeon: Bj Jones MD;  Location: [facility] OR;  Service: Pain Management;  Laterality: Right;  L4-L5, L5-S1    WRIST SURGERY  1993    carpal tunnel         Review of patient's allergies indicates:   Allergen Reactions    Dexlansoprazole Itching, Nausea Only and Rash    Sulfa (sulfonamide antibiotics) Rash    Floxacillin Itching    Januvia [sitagliptin] Other (See Comments)     Hot flashes    Tetracyclines Itching    Fenofibrate micronized Rash    Nitrofurantoin macrocrystalline Other (See Comments)     unknown    Phenylfenesin la [phenylpropanolamine-gg] Rash     Social History     Socioeconomic History    Marital status:    Tobacco Use    Smoking status: Never     Passive exposure: Yes    Smokeless tobacco: Never    Tobacco comments:     PARENTS    Substance and Sexual Activity    Alcohol use: Yes     Comment: Rare    Drug use: No    Sexual activity: Not Currently     Social Determinants of Health     Financial Resource Strain: Low Risk     Difficulty of Paying Living Expenses: Not hard at all   Food Insecurity: No Food Insecurity    Worried About Running Out of Food in the Last Year: Never true    Ran Out of Food in the Last Year: Never true   Transportation Needs: No Transportation Needs    Lack of Transportation (Medical): No    Lack of Transportation (Non-Medical): No   Physical Activity: Inactive    Days of Exercise per Week: 0 days    Minutes of Exercise per  Session: 0 min   Stress: No Stress Concern Present    Feeling of Stress : Only a little   Social Connections: Socially Integrated    Frequency of Communication with Friends and Family: Three times a week    Frequency of Social Gatherings with Friends and Family: Three times a week    Attends Taoist Services: More than 4 times per year    Active Member of Clubs or Organizations: Yes    Attends Club or Organization Meetings: Never    Marital Status:    Housing Stability: Low Risk     Unable to Pay for Housing in the Last Year: No    Number of Places Lived in the Last Year: 1    Unstable Housing in the Last Year: No         Current Outpatient Medications:     acetaminophen (TYLENOL) 650 MG TbSR, Take 1,300 mg by mouth once daily. 2 Tablet(s) Oral  Every day., Disp: , Rfl:     amitriptyline (ELAVIL) 75 MG tablet, Take 75 mg by mouth every evening., Disp: , Rfl:     apixaban (ELIQUIS) 5 mg Tab, Take 1 tablet (5 mg total) by mouth 2 (two) times daily., Disp: 180 tablet, Rfl: 3    blood sugar diagnostic (FREESTYLE LITE STRIPS) Strp, USE TO TEST BLOOD SUGAR TWICE A DAY, Disp: 200 strip, Rfl: 3    busPIRone (BUSPAR) 10 MG tablet, TAKE 1 TABLET BY MOUTH TWICE A DAY (Patient taking differently: Take 10 mg by mouth 2 (two) times daily.), Disp: 180 tablet, Rfl: 3    carboxymethylcellulose 1 % ophthalmic solution, Apply 1 drop to eye As instructed. as directed, Disp: , Rfl:     cetirizine (ZYRTEC) 10 MG tablet, Take 10 mg by mouth once daily., Disp: , Rfl:     cholecalciferol, vitamin D3, (VITAMIN D3) 50 mcg (2,000 unit) Cap, Take 1 capsule by mouth once daily., Disp: , Rfl:     clotrimazole-betamethasone 1-0.05% (LOTRISONE) cream, Apply 1 g topically 2 (two) times daily as needed., Disp: , Rfl:     coenzyme Q10 100 mg capsule, Take 100 mg by mouth once daily. , Disp: , Rfl:     cranberry extract 650 mg Cap, Take 1 tablet by mouth 2 (two) times daily., Disp: , Rfl:     FARXIGA 5 mg Tab tablet, Take 5 mg by mouth once  daily., Disp: , Rfl:     fluticasone furoate-vilanteroL (BREO ELLIPTA) 100-25 mcg/dose diskus inhaler, Inhale 1 puff into the lungs once daily. Controller Rinse after you use it, Disp: 180 each, Rfl: 6    fluticasone propionate (FLONASE) 50 mcg/actuation nasal spray, 1 spray (50 mcg total) by Each Nostril route once daily., Disp: 48 g, Rfl: 3    Lactobac no.41/Bifidobact no.7 (PROBIOTIC-10 ORAL), Take 1 tablet by mouth once daily., Disp: , Rfl:     lancets Misc, 1 Units by Misc.(Non-Drug; Combo Route) route 2 (two) times daily., Disp: 200 each, Rfl: 3    lansoprazole (PREVACID) 30 MG capsule, Take 1 capsule (30 mg total) by mouth once daily., Disp: 90 capsule, Rfl: 3    levalbuterol (XOPENEX) 1.25 mg/0.5 mL nebulizer solution, Take 0.5 mLs (1.25 mg total) by nebulization every 6 (six) hours as needed for Wheezing. Rescue, Disp: 120 each, Rfl: 3    levothyroxine (SYNTHROID) 25 MCG tablet, TAKE 1 TABLET BY MOUTH BEFORE BREAKFAST EVERY DAY (Patient taking differently: Take 25 mcg by mouth before breakfast.), Disp: 90 tablet, Rfl: 3    metoprolol succinate (TOPROL XL) 25 MG 24 hr tablet, Take 1 tablet (25 mg total) by mouth once daily., Disp: 90 tablet, Rfl: 3    midodrine (PROAMATINE) 10 MG tablet, Take 10 mg by mouth 2 (two) times daily with meals., Disp: , Rfl:     nitroGLYCERIN (NITROSTAT) 0.4 MG SL tablet, Place 0.4 mg under the tongue every 5 (five) minutes as needed. 0.4mg Sublingual PRN .  , Disp: , Rfl:     potassium citrate 99 mg Cap, Take 1 capsule by mouth once daily., Disp: , Rfl:     pramoxine-hydrocortisone (PROCTOCREAM-HC) 1-1 % rectal cream, Place rectally 2 (two) times daily., Disp: , Rfl:     promethazine (PHENERGAN) 12.5 MG Tab, Take 1 tablet (12.5 mg total) by mouth every 8 (eight) hours as needed (nausea)., Disp: 30 tablet, Rfl: 1    RESTASIS 0.05 % ophthalmic emulsion, Place 1 drop into both eyes 2 (two) times daily., Disp: , Rfl:     rosuvastatin (CRESTOR) 10 MG tablet, Take 1 tablet (10 mg  "total) by mouth once daily., Disp: 90 tablet, Rfl: 3    triazolam (HALCION) 0.25 MG Tab, Take 1 tablet (0.25 mg total) by mouth nightly as needed (sleep)., Disp: 90 tablet, Rfl: 1    vit C,U-Xc-ygnyx-lutein-zeaxan (PRESERVISION AREDS-2) 250-90-40-1 mg Cap, Take by mouth once daily., Disp: , Rfl:     vitamins  A,C,E-zinc-copper 14,320-226-200 unit-mg-unit Cap, Take 1 capsule by mouth 2 (two) times daily. , Disp: , Rfl:     amiodarone (PACERONE) 200 MG Tab, Take 1 tablet (200 mg total) by mouth 3 (three) times daily for 4 days, THEN 1 tablet (200 mg total) 2 (two) times daily., Disp: 72 tablet, Rfl: 0    furosemide (LASIX) 20 MG tablet, Take 1 tablet (20 mg total) by mouth once daily., Disp: 30 tablet, Rfl: 0          Objective:   Vitals:  Blood pressure 105/62, pulse 81, temperature 97.2 °F (36.2 °C), resp. rate 16, height 5' 2" (1.575 m), weight 59.9 kg (132 lb 1.6 oz).    Physical Examination:   GEN: no apparent distress, comfortable, 02 in place, frail in appearance  HEAD: atraumatic and normocephalic  EYES: + pallor, no icterus  ENT: no pharyngeal erythema, external ears WNL; no nasal discharge  NECK: no masses, thyroid normal, trachea midline, no LAD/LN's, supple  CV: RRR with  murmur; normal pulse; normal S1 and S2  CHEST: Normal respiratory effort; CTAB; distant breath sounds; no wheeze or crackles  ABDOM: nontender and nondistended; soft; no rebound/guarding, L/S NP, no palpable ascites  MUSC/Skeletal: ROM normal; no crepitus; joints normal  EXTREM: no clubbing, cyanosis, inflammation or swelling  SKIN: no rashes, lesions, ulcers, petechiae   : no cvat  NEURO: grossly intact; motor/sensory WNL;  no tremors  PSYCH: normal mood, affect and behavior  LYMPH: normal cervical, supraclavicular, axillary  LN's      Labs:   Lab Results   Component Value Date    WBC 3.57 (L) 07/11/2023    HGB 10.1 (L) 07/11/2023    HCT 33.8 (L) 07/11/2023    MCV 87 07/11/2023     07/11/2023    CMP  Sodium   Date Value Ref " Range Status   07/11/2023 136 136 - 145 mmol/L Final     Potassium   Date Value Ref Range Status   07/11/2023 3.1 (L) 3.5 - 5.1 mmol/L Final     Chloride   Date Value Ref Range Status   07/11/2023 96 95 - 110 mmol/L Final     CO2   Date Value Ref Range Status   07/11/2023 32 (H) 23 - 29 mmol/L Final     Glucose   Date Value Ref Range Status   07/11/2023 130 (H) 70 - 110 mg/dL Final     BUN   Date Value Ref Range Status   07/11/2023 59 (H) 8 - 23 mg/dL Final     Creatinine   Date Value Ref Range Status   07/11/2023 1.4 0.5 - 1.4 mg/dL Final     Calcium   Date Value Ref Range Status   07/11/2023 8.9 8.7 - 10.5 mg/dL Final     Total Protein   Date Value Ref Range Status   07/11/2023 6.8 6.0 - 8.4 g/dL Final     Albumin   Date Value Ref Range Status   07/11/2023 3.6 3.5 - 5.2 g/dL Final     Total Bilirubin   Date Value Ref Range Status   07/11/2023 0.8 0.1 - 1.0 mg/dL Final     Comment:     For infants and newborns, interpretation of results should be based  on gestational age, weight and in agreement with clinical  observations.    Premature Infant recommended reference ranges:  Up to 24 hours.............<8.0 mg/dL  Up to 48 hours............<12.0 mg/dL  3-5 days..................<15.0 mg/dL  6-29 days.................<15.0 mg/dL       Alkaline Phosphatase   Date Value Ref Range Status   07/11/2023 86 55 - 135 U/L Final     AST   Date Value Ref Range Status   07/11/2023 29 10 - 40 U/L Final     ALT   Date Value Ref Range Status   07/11/2023 34 10 - 44 U/L Final     Anion Gap   Date Value Ref Range Status   07/11/2023 8 8 - 16 mmol/L Final     eGFR if    Date Value Ref Range Status   06/27/2022 49 (A) >60 mL/min/1.73 m^2 Final     eGFR if non    Date Value Ref Range Status   06/27/2022 42 (A) >60 mL/min/1.73 m^2 Final     Comment:     Calculation used to obtain the estimated glomerular filtration  rate (eGFR) is the CKD-EPI equation.        Hgb 9., B12 331-399, folate 18, Ferritin  29.    Assessment:   (1) 82 y.o. female with diagnosis of moderate anemia, Hgb 8.9.  Etiology is multifactorial, Fe deficiency, B 12 deficiency, renal insufficiency.  Stamina is 1/10 per pt and 3/10 per her .  Improving now on Rx.    (2)Venofer given weekly.  B 12 1000 mcg subq weekly.  Procrit 35098 units subq weekly, titrate for Hgb 10.  HGb 10.1 now.    (3)Frailty and heart condition now does not allow GI endoscopy or colonoscopy.    (4)Needs TAVR procedure with in 2-4 weeks per Dr. Diaz.    RTC 4 weeks with CBC, ferritin.

## 2023-07-25 ENCOUNTER — TELEPHONE (OUTPATIENT)
Dept: INFUSION THERAPY | Facility: HOSPITAL | Age: 83
End: 2023-07-25

## 2023-07-25 ENCOUNTER — LAB VISIT (OUTPATIENT)
Dept: LAB | Facility: HOSPITAL | Age: 83
End: 2023-07-25
Attending: INTERNAL MEDICINE
Payer: MEDICARE

## 2023-07-25 DIAGNOSIS — D50.9 IRON DEFICIENCY ANEMIA, UNSPECIFIED IRON DEFICIENCY ANEMIA TYPE: ICD-10-CM

## 2023-07-25 LAB
BASOPHILS # BLD AUTO: 0.02 K/UL (ref 0–0.2)
BASOPHILS NFR BLD: 0.6 % (ref 0–1.9)
DIFFERENTIAL METHOD: ABNORMAL
EOSINOPHIL # BLD AUTO: 0.1 K/UL (ref 0–0.5)
EOSINOPHIL NFR BLD: 1.4 % (ref 0–8)
ERYTHROCYTE [DISTWIDTH] IN BLOOD BY AUTOMATED COUNT: 22.2 % (ref 11.5–14.5)
HCT VFR BLD AUTO: 37.6 % (ref 37–48.5)
HGB BLD-MCNC: 11.1 G/DL (ref 12–16)
IMM GRANULOCYTES # BLD AUTO: 0.01 K/UL (ref 0–0.04)
IMM GRANULOCYTES NFR BLD AUTO: 0.3 % (ref 0–0.5)
LYMPHOCYTES # BLD AUTO: 0.8 K/UL (ref 1–4.8)
LYMPHOCYTES NFR BLD: 22 % (ref 18–48)
MCH RBC QN AUTO: 25.6 PG (ref 27–31)
MCHC RBC AUTO-ENTMCNC: 29.5 G/DL (ref 32–36)
MCV RBC AUTO: 87 FL (ref 82–98)
MONOCYTES # BLD AUTO: 0.6 K/UL (ref 0.3–1)
MONOCYTES NFR BLD: 15.9 % (ref 4–15)
NEUTROPHILS # BLD AUTO: 2.1 K/UL (ref 1.8–7.7)
NEUTROPHILS NFR BLD: 59.8 % (ref 38–73)
NRBC BLD-RTO: 0 /100 WBC
PLATELET # BLD AUTO: 188 K/UL (ref 150–450)
PMV BLD AUTO: 9.4 FL (ref 9.2–12.9)
RBC # BLD AUTO: 4.33 M/UL (ref 4–5.4)
WBC # BLD AUTO: 3.46 K/UL (ref 3.9–12.7)

## 2023-07-25 PROCEDURE — 85025 COMPLETE CBC W/AUTO DIFF WBC: CPT | Performed by: INTERNAL MEDICINE

## 2023-07-25 PROCEDURE — 36415 COLL VENOUS BLD VENIPUNCTURE: CPT | Performed by: INTERNAL MEDICINE

## 2023-07-25 NOTE — TELEPHONE ENCOUNTER
Called patient a home number. Spoke with patient directly. Patient to go to [facility] this afternoon to get lab work drawn for tomorrow's appt.

## 2023-07-25 NOTE — TELEPHONE ENCOUNTER
Spoke with patient directly. Retacrit injection to be cancelled due to elevated hgb. Patient verbalized understanding. Scheduling notified of change.

## 2023-08-01 ENCOUNTER — TELEPHONE (OUTPATIENT)
Dept: INFUSION THERAPY | Facility: HOSPITAL | Age: 83
End: 2023-08-01

## 2023-08-01 NOTE — TELEPHONE ENCOUNTER
Informed patient's  that Ms. Saeed's retacrit injection would be cancelled on 8/2 due to elevated Hgb. Understanding verbalized. Scheduling notified of change.

## 2023-08-04 ENCOUNTER — INFUSION (OUTPATIENT)
Dept: INFUSION THERAPY | Facility: HOSPITAL | Age: 83
End: 2023-08-04
Attending: INTERNAL MEDICINE
Payer: MEDICARE

## 2023-08-04 VITALS
RESPIRATION RATE: 18 BRPM | DIASTOLIC BLOOD PRESSURE: 61 MMHG | HEIGHT: 62 IN | TEMPERATURE: 98 F | WEIGHT: 137.19 LBS | SYSTOLIC BLOOD PRESSURE: 96 MMHG | BODY MASS INDEX: 25.25 KG/M2 | HEART RATE: 77 BPM

## 2023-08-04 DIAGNOSIS — N18.30 ANEMIA DUE TO CHRONIC RENAL FAILURE TREATED WITH ERYTHROPOIETIN, STAGE 3 (MODERATE): ICD-10-CM

## 2023-08-04 DIAGNOSIS — D51.8 DIETARY VITAMIN B12 DEFICIENCY ANEMIA: Primary | ICD-10-CM

## 2023-08-04 DIAGNOSIS — D63.1 ANEMIA DUE TO CHRONIC RENAL FAILURE TREATED WITH ERYTHROPOIETIN, STAGE 3 (MODERATE): ICD-10-CM

## 2023-08-04 PROCEDURE — 96372 THER/PROPH/DIAG INJ SC/IM: CPT

## 2023-08-04 PROCEDURE — 63600175 PHARM REV CODE 636 W HCPCS: Performed by: INTERNAL MEDICINE

## 2023-08-04 RX ORDER — CYANOCOBALAMIN 1000 UG/ML
1000 INJECTION, SOLUTION INTRAMUSCULAR; SUBCUTANEOUS ONCE
Status: COMPLETED | OUTPATIENT
Start: 2023-08-04 | End: 2023-08-04

## 2023-08-04 RX ORDER — CYANOCOBALAMIN 1000 UG/ML
1000 INJECTION, SOLUTION INTRAMUSCULAR; SUBCUTANEOUS ONCE
Status: CANCELLED | OUTPATIENT
Start: 2023-08-04

## 2023-08-04 RX ADMIN — CYANOCOBALAMIN 1000 MCG: 1000 INJECTION, SOLUTION INTRAMUSCULAR at 02:08

## 2023-08-08 ENCOUNTER — LAB VISIT (OUTPATIENT)
Dept: LAB | Facility: HOSPITAL | Age: 83
End: 2023-08-08
Attending: INTERNAL MEDICINE
Payer: MEDICARE

## 2023-08-08 ENCOUNTER — TELEPHONE (OUTPATIENT)
Dept: INFUSION THERAPY | Facility: HOSPITAL | Age: 83
End: 2023-08-08

## 2023-08-08 DIAGNOSIS — D50.9 IRON DEFICIENCY ANEMIA, UNSPECIFIED IRON DEFICIENCY ANEMIA TYPE: ICD-10-CM

## 2023-08-08 LAB
BASOPHILS # BLD AUTO: 0.01 K/UL (ref 0–0.2)
BASOPHILS NFR BLD: 0.3 % (ref 0–1.9)
DIFFERENTIAL METHOD: ABNORMAL
EOSINOPHIL # BLD AUTO: 0.1 K/UL (ref 0–0.5)
EOSINOPHIL NFR BLD: 2.7 % (ref 0–8)
ERYTHROCYTE [DISTWIDTH] IN BLOOD BY AUTOMATED COUNT: 21.6 % (ref 11.5–14.5)
HCT VFR BLD AUTO: 33.9 % (ref 37–48.5)
HGB BLD-MCNC: 10.5 G/DL (ref 12–16)
IMM GRANULOCYTES # BLD AUTO: 0.01 K/UL (ref 0–0.04)
IMM GRANULOCYTES NFR BLD AUTO: 0.3 % (ref 0–0.5)
LYMPHOCYTES # BLD AUTO: 0.5 K/UL (ref 1–4.8)
LYMPHOCYTES NFR BLD: 13.3 % (ref 18–48)
MCH RBC QN AUTO: 26 PG (ref 27–31)
MCHC RBC AUTO-ENTMCNC: 31 G/DL (ref 32–36)
MCV RBC AUTO: 84 FL (ref 82–98)
MONOCYTES # BLD AUTO: 0.6 K/UL (ref 0.3–1)
MONOCYTES NFR BLD: 15.2 % (ref 4–15)
NEUTROPHILS # BLD AUTO: 2.5 K/UL (ref 1.8–7.7)
NEUTROPHILS NFR BLD: 68.2 % (ref 38–73)
NRBC BLD-RTO: 0 /100 WBC
PLATELET # BLD AUTO: 158 K/UL (ref 150–450)
PMV BLD AUTO: 9.7 FL (ref 9.2–12.9)
RBC # BLD AUTO: 4.04 M/UL (ref 4–5.4)
WBC # BLD AUTO: 3.68 K/UL (ref 3.9–12.7)

## 2023-08-08 PROCEDURE — 36415 COLL VENOUS BLD VENIPUNCTURE: CPT | Performed by: INTERNAL MEDICINE

## 2023-08-08 PROCEDURE — 85025 COMPLETE CBC W/AUTO DIFF WBC: CPT | Performed by: INTERNAL MEDICINE

## 2023-08-08 NOTE — TELEPHONE ENCOUNTER
Called patient regarding cancellation of retacrit injection appt for 8/9 due to elevated hgb. Patient verbalized understanding. Scheduling notified of change.

## 2023-08-13 ENCOUNTER — PATIENT MESSAGE (OUTPATIENT)
Dept: FAMILY MEDICINE | Facility: CLINIC | Age: 83
End: 2023-08-13
Payer: MEDICARE

## 2023-08-13 DIAGNOSIS — G47.00 INSOMNIA, UNSPECIFIED TYPE: ICD-10-CM

## 2023-08-13 NOTE — TELEPHONE ENCOUNTER
No care due was identified.  [facility] Embedded Care Due Messages. Reference number: [id number].   8/13/2023 6:59:22 PM CDT

## 2023-08-14 RX ORDER — HYDROCORTISONE ACETATE PRAMOXINE HCL 1; 1 G/100G; G/100G
CREAM TOPICAL 2 TIMES DAILY
Qty: 30 G | Refills: 5 | Status: SHIPPED | OUTPATIENT
Start: 2023-08-14 | End: 2023-09-01 | Stop reason: SDUPTHER

## 2023-08-14 RX ORDER — TRIAZOLAM 0.25 MG/1
0.25 TABLET ORAL NIGHTLY PRN
Qty: 90 TABLET | Refills: 1 | Status: SHIPPED | OUTPATIENT
Start: 2023-08-14 | End: 2023-09-14 | Stop reason: ALTCHOICE

## 2023-08-15 ENCOUNTER — TELEPHONE (OUTPATIENT)
Dept: INFUSION THERAPY | Facility: HOSPITAL | Age: 83
End: 2023-08-15

## 2023-08-16 ENCOUNTER — TELEPHONE (OUTPATIENT)
Dept: FAMILY MEDICINE | Facility: CLINIC | Age: 83
End: 2023-08-16
Payer: MEDICARE

## 2023-08-16 RX ORDER — FLUTICASONE FUROATE AND VILANTEROL 100; 25 UG/1; UG/1
1 POWDER RESPIRATORY (INHALATION) DAILY
Qty: 180 EACH | Refills: 6 | Status: SHIPPED | OUTPATIENT
Start: 2023-08-16 | End: 2023-10-18

## 2023-08-16 NOTE — TELEPHONE ENCOUNTER
Spoke with Shantel  nurse    She evaluated the patient at her visit this morning. No injuries noted   On Monday she fell when standing in her kitchen.   She said the patient denied  any dizziness, headaches, or blurred vision at that time  Patient does have a walker she does not use it all of the time when she is ambulatory  FYI   Please advise She instructed the patient to call if any problems       ----- Message from Oleg Priest sent at 8/16/2023 12:50 PM CDT -----  Type: Needs Medical Advice  Who Called:  Shantel lion home health nurse  Symptoms (please be specific):  said pt did have 2 falls--1 Monday and --1 this morning -- the one this morning she rolled out of bed while sleeping--no injuries--no pain just need dr montes--said she did not hit her head--she did decline any PT at this time--please call and advise  Best Call Back Number: [phone]  Additional Information: thank you

## 2023-08-22 ENCOUNTER — PATIENT MESSAGE (OUTPATIENT)
Dept: HEMATOLOGY/ONCOLOGY | Facility: CLINIC | Age: 83
End: 2023-08-22

## 2023-08-28 ENCOUNTER — TELEPHONE (OUTPATIENT)
Dept: FAMILY MEDICINE | Facility: CLINIC | Age: 83
End: 2023-08-28
Payer: MEDICARE

## 2023-08-28 DIAGNOSIS — M79.672 FOOT PAIN, LEFT: Primary | ICD-10-CM

## 2023-08-28 NOTE — TELEPHONE ENCOUNTER
----- Message from Reanna Betts sent at 8/28/2023 10:35 AM CDT -----  Contact: self  Type: Sooner Appointment Request        Caller is requesting a sooner appointment. Caller declined first available appointment listed below. Caller will not accept being placed on the waitlist and is requesting a message be sent to doctor.        Name of Caller: Patient   Best Call Back Number: [phone] (Nurse from [facility])  Additional Information: Pt had a fall on Saturday 8/26/23 has open skin tear, redness & bruising by her left foot as well , Pt is on Plavix. Pt states it is painful to touch. [facility] is saying can she get a x- ray and also requesting portable for her so she doesn't have to compromised if it can be avoided Pt recently had surgery as well. Thanks

## 2023-08-29 NOTE — TELEPHONE ENCOUNTER
Spoke to nurse who says injury was to foot and leg and to call patient about xray. Left message for patient to call office.

## 2023-08-30 ENCOUNTER — HOSPITAL ENCOUNTER (OUTPATIENT)
Dept: RADIOLOGY | Facility: CLINIC | Age: 83
Discharge: HOME | End: 2023-08-30
Attending: FAMILY MEDICINE
Payer: MEDICARE

## 2023-08-30 ENCOUNTER — OFFICE VISIT (OUTPATIENT)
Dept: HEMATOLOGY/ONCOLOGY | Facility: CLINIC | Age: 83
End: 2023-08-30
Payer: MEDICARE

## 2023-08-30 ENCOUNTER — LAB VISIT (OUTPATIENT)
Dept: LAB | Facility: HOSPITAL | Age: 83
End: 2023-08-30
Attending: INTERNAL MEDICINE
Payer: MEDICARE

## 2023-08-30 ENCOUNTER — TELEPHONE (OUTPATIENT)
Dept: INFUSION THERAPY | Facility: HOSPITAL | Age: 83
End: 2023-08-30

## 2023-08-30 VITALS
TEMPERATURE: 97 F | DIASTOLIC BLOOD PRESSURE: 65 MMHG | BODY MASS INDEX: 26.53 KG/M2 | HEART RATE: 85 BPM | RESPIRATION RATE: 18 BRPM | WEIGHT: 144.19 LBS | SYSTOLIC BLOOD PRESSURE: 109 MMHG | HEIGHT: 62 IN

## 2023-08-30 DIAGNOSIS — D63.1 ANEMIA DUE TO CHRONIC RENAL FAILURE TREATED WITH ERYTHROPOIETIN, STAGE 3 (MODERATE): ICD-10-CM

## 2023-08-30 DIAGNOSIS — D50.9 IRON DEFICIENCY ANEMIA, UNSPECIFIED IRON DEFICIENCY ANEMIA TYPE: ICD-10-CM

## 2023-08-30 DIAGNOSIS — M79.672 FOOT PAIN, LEFT: ICD-10-CM

## 2023-08-30 DIAGNOSIS — D51.8 DIETARY VITAMIN B12 DEFICIENCY ANEMIA: ICD-10-CM

## 2023-08-30 DIAGNOSIS — N18.30 ANEMIA DUE TO CHRONIC RENAL FAILURE TREATED WITH ERYTHROPOIETIN, STAGE 3 (MODERATE): ICD-10-CM

## 2023-08-30 DIAGNOSIS — D50.9 IRON DEFICIENCY ANEMIA, UNSPECIFIED IRON DEFICIENCY ANEMIA TYPE: Primary | ICD-10-CM

## 2023-08-30 LAB
BASOPHILS # BLD AUTO: 0.01 K/UL (ref 0–0.2)
BASOPHILS NFR BLD: 0.3 % (ref 0–1.9)
DIFFERENTIAL METHOD: ABNORMAL
EOSINOPHIL # BLD AUTO: 0 K/UL (ref 0–0.5)
EOSINOPHIL NFR BLD: 0.8 % (ref 0–8)
ERYTHROCYTE [DISTWIDTH] IN BLOOD BY AUTOMATED COUNT: 21.5 % (ref 11.5–14.5)
FERRITIN SERPL-MCNC: 122 NG/ML (ref 20–300)
HCT VFR BLD AUTO: 31.2 % (ref 37–48.5)
HGB BLD-MCNC: 9.8 G/DL (ref 12–16)
IMM GRANULOCYTES # BLD AUTO: 0.03 K/UL (ref 0–0.04)
IMM GRANULOCYTES NFR BLD AUTO: 0.8 % (ref 0–0.5)
LYMPHOCYTES # BLD AUTO: 0.4 K/UL (ref 1–4.8)
LYMPHOCYTES NFR BLD: 11.1 % (ref 18–48)
MCH RBC QN AUTO: 26.1 PG (ref 27–31)
MCHC RBC AUTO-ENTMCNC: 31.4 G/DL (ref 32–36)
MCV RBC AUTO: 83 FL (ref 82–98)
MONOCYTES # BLD AUTO: 0.6 K/UL (ref 0.3–1)
MONOCYTES NFR BLD: 14.9 % (ref 4–15)
NEUTROPHILS # BLD AUTO: 2.7 K/UL (ref 1.8–7.7)
NEUTROPHILS NFR BLD: 72.1 % (ref 38–73)
NRBC BLD-RTO: 0 /100 WBC
PLATELET # BLD AUTO: 172 K/UL (ref 150–450)
PMV BLD AUTO: 9.2 FL (ref 9.2–12.9)
RBC # BLD AUTO: 3.75 M/UL (ref 4–5.4)
VIT B12 SERPL-MCNC: >1500 PG/ML (ref 210–950)
WBC # BLD AUTO: 3.69 K/UL (ref 3.9–12.7)

## 2023-08-30 PROCEDURE — 3288F PR FALLS RISK ASSESSMENT DOCUMENTED: ICD-10-PCS | Mod: CPTII,S$GLB,, | Performed by: INTERNAL MEDICINE

## 2023-08-30 PROCEDURE — 3078F DIAST BP <80 MM HG: CPT | Mod: CPTII,S$GLB,, | Performed by: INTERNAL MEDICINE

## 2023-08-30 PROCEDURE — 73630 X-RAY EXAM OF FOOT: CPT | Mod: TC,FY,PO,LT

## 2023-08-30 PROCEDURE — 99214 PR OFFICE/OUTPT VISIT, EST, LEVL IV, 30-39 MIN: ICD-10-PCS | Mod: S$GLB,,, | Performed by: INTERNAL MEDICINE

## 2023-08-30 PROCEDURE — 1100F PTFALLS ASSESS-DOCD GE2>/YR: CPT | Mod: CPTII,S$GLB,, | Performed by: INTERNAL MEDICINE

## 2023-08-30 PROCEDURE — 85025 COMPLETE CBC W/AUTO DIFF WBC: CPT | Performed by: INTERNAL MEDICINE

## 2023-08-30 PROCEDURE — 82728 ASSAY OF FERRITIN: CPT | Performed by: INTERNAL MEDICINE

## 2023-08-30 PROCEDURE — 1125F PR PAIN SEVERITY QUANTIFIED, PAIN PRESENT: ICD-10-PCS | Mod: CPTII,S$GLB,, | Performed by: INTERNAL MEDICINE

## 2023-08-30 PROCEDURE — 3288F FALL RISK ASSESSMENT DOCD: CPT | Mod: CPTII,S$GLB,, | Performed by: INTERNAL MEDICINE

## 2023-08-30 PROCEDURE — 99214 OFFICE O/P EST MOD 30 MIN: CPT | Mod: S$GLB,,, | Performed by: INTERNAL MEDICINE

## 2023-08-30 PROCEDURE — 82607 VITAMIN B-12: CPT | Performed by: INTERNAL MEDICINE

## 2023-08-30 PROCEDURE — 3074F PR MOST RECENT SYSTOLIC BLOOD PRESSURE < 130 MM HG: ICD-10-PCS | Mod: CPTII,S$GLB,, | Performed by: INTERNAL MEDICINE

## 2023-08-30 PROCEDURE — 3078F PR MOST RECENT DIASTOLIC BLOOD PRESSURE < 80 MM HG: ICD-10-PCS | Mod: CPTII,S$GLB,, | Performed by: INTERNAL MEDICINE

## 2023-08-30 PROCEDURE — 73630 X-RAY EXAM OF FOOT: CPT | Mod: 26,LT,S$GLB, | Performed by: RADIOLOGY

## 2023-08-30 PROCEDURE — 1125F AMNT PAIN NOTED PAIN PRSNT: CPT | Mod: CPTII,S$GLB,, | Performed by: INTERNAL MEDICINE

## 2023-08-30 PROCEDURE — 73630 XR FOOT COMPLETE 3 VIEW LEFT: ICD-10-PCS | Mod: 26,LT,S$GLB, | Performed by: RADIOLOGY

## 2023-08-30 PROCEDURE — 1100F PR PT FALLS ASSESS DOC 2+ FALLS/FALL W/INJURY/YR: ICD-10-PCS | Mod: CPTII,S$GLB,, | Performed by: INTERNAL MEDICINE

## 2023-08-30 PROCEDURE — 3074F SYST BP LT 130 MM HG: CPT | Mod: CPTII,S$GLB,, | Performed by: INTERNAL MEDICINE

## 2023-08-30 PROCEDURE — 1159F MED LIST DOCD IN RCRD: CPT | Mod: CPTII,S$GLB,, | Performed by: INTERNAL MEDICINE

## 2023-08-30 PROCEDURE — 1159F PR MEDICATION LIST DOCUMENTED IN MEDICAL RECORD: ICD-10-PCS | Mod: CPTII,S$GLB,, | Performed by: INTERNAL MEDICINE

## 2023-08-30 PROCEDURE — 36415 COLL VENOUS BLD VENIPUNCTURE: CPT | Performed by: INTERNAL MEDICINE

## 2023-08-30 NOTE — TELEPHONE ENCOUNTER
Patient arrived for retacrit injection but does not have recent labs. Patient had labs collected today. Explained to daughter Lexis that patient needs labs every 13 days for retacrit injection. Verbalized understanding. Patient to return Friday for b12 injection and should be need retacrit pt to receive injection next week. Updated patient call back numbers in chart.

## 2023-08-31 NOTE — PROGRESS NOTES
[facility] In Office Hematology Oncology Subsequent Encounter Note    8/30/23    Subjective:      Patient ID:   Darlin Tipton  83 y.o. female  1940  MD Yuri Chawla MD      Chief Complaint:   ALVAREZ    HPI:  83 y.o. female has ALVAREZ, increased fatigue, on 02 at 3 liters per minute.  She has Aortic Valve Dx, neededTAVR procedure .  Referred for anemia management to try increase Hgb prior to procedure.    Hx of VHD, L carotid bruit, 1st degree AV block, CAD, ischemic cardiomyopathy, cholesterol, Par. At. Fib>, angina, CHF.  Also chronic bronchitis, asthma, bilateral pleural effusion.  Spinal stenosis, scoliosis, osteoarthritis, DDD, lumbar radiculitis.  Hematuria and post menopausal bleeding hx.  DM, hypothyroid, Fe deficiency, B 12 deficiency, 3B renal insufficiency, immune deficiency disorder, GERD, dysphasia, colon polyps.    S/P CABG, thoracentesis x's 3, pleurodesis.    Allergy to multiple meds, see her list.    Smoke no, ETOH yes.    Meds see list    Venofer given weekly.  B 12 and Procrit, weekly, given,  Hgb improving 8.9 to 10.1.  She is doing better.  After Venofer and started on Procrit, Hgb went to 11, now at 9.8, due for procrit, and B 12 Friday.  She had TAVR procedure.  Still with ALVAREZ, but somewhat stronger.  She fell, ecchymotic at L shoulder and lower L leg.    ROS:   GEN: normal without any fever, night sweats or weight loss  HEENT: normal with no HA's, sore throat, stiff neck, changes in vision  CV: See HPI  PULM:See HPI  GI: See HPI  : normal with no hematuria, dysuria  BREAST: normal with no mass, discharge, pain  SKIN: normal with no rash, erythema, bruising, or swelling     Past Medical History:   Diagnosis Date    Allergy     Dust mites, Grasses, Trees    Arthritis     Asthma     Blood transfusion     CAD (coronary artery disease)     Cataract     CHF (congestive heart failure)     CHRONIC BRONCHITIS     Diabetes mellitus     Diabetes mellitus type II     GERD  (gastroesophageal reflux disease)     Hyperlipidemia     Hypertension     Irregular heart beat     Kidney disease     ckd stage 3  as stated by patient - to see MD    Paroxysmal atrial fibrillation     Post-menopausal bleeding 2018    Spinal stenosis     Thyroid disease     Hypothyroidism     Past Surgical History:   Procedure Laterality Date    APPENDECTOMY  1968    BLADDER SUSPENSION  1989    CARDIAC SURGERY  2016    CABG    CATARACT EXTRACTION  9/2007 (L) and 10/2207 (R)    COLONOSCOPY N/A 10/18/2017    Procedure: COLONOSCOPY;  Surgeon: Esme Acuna MD;  Location: [facility] ENDO;  Service: Endoscopy;  Laterality: N/A;    CORONARY ANGIOGRAPHY INCLUDING BYPASS GRAFTS WITH CATHETERIZATION OF LEFT HEART Left 5/13/2022    Procedure: Left heart cath;  Surgeon: Davon Patton MD;  Location: [facility] CATH/EP LAB;  Service: Cardiology;  Laterality: Left;    CORONARY ARTERY BYPASS GRAFT  4/26/2004    x5    ESOPHAGEAL DILATION      ESOPHAGOGASTRODUODENOSCOPY N/A 6/27/2022    Procedure: EGD (ESOPHAGOGASTRODUODENOSCOPY);  Surgeon: Skip Nj MD;  Location: [facility] ENDO;  Service: Endoscopy;  Laterality: N/A;    HYSTEROSCOPY WITH DILATION AND CURETTAGE OF UTERUS N/A 3/12/2020    Procedure: HYSTEROSCOPY, WITH DILATION AND CURETTAGE OF UTERUS;  Surgeon: Ramona Llanos MD;  Location: [facility] OR;  Service: OB/GYN;  Laterality: N/A;    SPINE SURGERY  3/2000    Tumor    THORACENTESIS Right 2/7/2023    Procedure: Thoracentesis;  Surgeon: Rula Weiss MD;  Location: [facility];  Service: Pulmonary;  Laterality: Right;  ultrasound guided thoracentesis of right pleural effusion 2/07/23 0730    TRANSFORAMINAL EPIDURAL INJECTION OF STEROID Right 11/21/2019    Procedure: Injection,steroid,epidural,transforaminal approach;  Surgeon: Bj Jones MD;  Location: [facility] OR;  Service: Pain Management;  Laterality: Right;  L4-5, L5-S1    TRANSFORAMINAL EPIDURAL INJECTION OF STEROID Right 12/31/2019    Procedure:  Injection,steroid,epidural,transforaminal approach;  Surgeon: Bj Jones MD;  Location: [facility] OR;  Service: Pain Management;  Laterality: Right;  L4-5, L5-S1    TRANSFORAMINAL EPIDURAL INJECTION OF STEROID Right 2/5/2020    Procedure: Injection,steroid,epidural,transforaminal approach;  Surgeon: Bj Jones MD;  Location: [facility] OR;  Service: Pain Management;  Laterality: Right;  L4-5, L5-S1    TRANSFORAMINAL EPIDURAL INJECTION OF STEROID Left 1/27/2021    Procedure: Injection,steroid,epidural,transforaminal approach;  Surgeon: Bj Jones MD;  Location: [facility] OR;  Service: Pain Management;  Laterality: Left;  L4-5, L5-S1    TRANSFORAMINAL EPIDURAL INJECTION OF STEROID Right 3/4/2021    Procedure: Injection,steroid,epidural,transforaminal approach;  Surgeon: Bj Jones MD;  Location: [facility] OR;  Service: Pain Management;  Laterality: Right;  L4-L5, L5-S1    WRIST SURGERY  1993    carpal tunnel         Review of patient's allergies indicates:   Allergen Reactions    Dexlansoprazole Itching, Nausea Only and Rash    Sulfa (sulfonamide antibiotics) Rash    Floxacillin Itching    Januvia [sitagliptin] Other (See Comments)     Hot flashes    Tetracyclines Itching    Fenofibrate micronized Rash    Nitrofurantoin macrocrystalline Other (See Comments)     unknown    Phenylfenesin la [phenylpropanolamine-gg] Rash     Social History     Socioeconomic History    Marital status:    Tobacco Use    Smoking status: Never     Passive exposure: Yes    Smokeless tobacco: Never    Tobacco comments:     PARENTS    Substance and Sexual Activity    Alcohol use: Yes     Comment: Rare    Drug use: No    Sexual activity: Not Currently     Social Determinants of Health     Financial Resource Strain: Low Risk  (12/13/2022)    Overall Financial Resource Strain (CARDIA)     Difficulty of Paying Living Expenses: Not hard at all   Food Insecurity: No Food Insecurity (12/13/2022)    Hunger Vital Sign     Worried About Running Out of Food in the  Last Year: Never true     Ran Out of Food in the Last Year: Never true   Transportation Needs: No Transportation Needs (12/13/2022)    PRAPARE - Transportation     Lack of Transportation (Medical): No     Lack of Transportation (Non-Medical): No   Physical Activity: Inactive (12/13/2022)    Exercise Vital Sign     Days of Exercise per Week: 0 days     Minutes of Exercise per Session: 0 min   Stress: No Stress Concern Present (12/13/2022)    [employer] [place] [employer] - Occupational Stress Questionnaire     Feeling of Stress : Only a little   Social Connections: Socially Integrated (12/13/2022)    Social Connection and Isolation Panel [NHANES]     Frequency of Communication with Friends and Family: Three times a week     Frequency of Social Gatherings with Friends and Family: Three times a week     Attends Christian Services: More than 4 times per year     Active Member of Clubs or Organizations: Yes     Attends Club or Organization Meetings: Never     Marital Status:    Housing Stability: Low Risk  (12/13/2022)    Housing Stability Vital Sign     Unable to Pay for Housing in the Last Year: No     Number of Places Lived in the Last Year: 1     Unstable Housing in the Last Year: No         Current Outpatient Medications:     acetaminophen (TYLENOL) 650 MG TbSR, Take 1,300 mg by mouth once daily. 2 Tablet(s) Oral  Every day., Disp: , Rfl:     amitriptyline (ELAVIL) 75 MG tablet, Take 50 mg by mouth every evening., Disp: , Rfl:     apixaban (ELIQUIS) 5 mg Tab, Take 1 tablet (5 mg total) by mouth 2 (two) times daily., Disp: 180 tablet, Rfl: 3    blood sugar diagnostic (FREESTYLE LITE STRIPS) Strp, USE TO TEST BLOOD SUGAR TWICE A DAY, Disp: 200 strip, Rfl: 3    busPIRone (BUSPAR) 10 MG tablet, TAKE 1 TABLET BY MOUTH TWICE A DAY (Patient taking differently: Take 10 mg by mouth 2 (two) times daily.), Disp: 180 tablet, Rfl: 3    carboxymethylcellulose 1 % ophthalmic solution, Apply 1 drop to eye As  instructed. as directed, Disp: , Rfl:     cetirizine (ZYRTEC) 10 MG tablet, Take 10 mg by mouth once daily., Disp: , Rfl:     cholecalciferol, vitamin D3, (VITAMIN D3) 50 mcg (2,000 unit) Cap, Take 1 capsule by mouth once daily., Disp: , Rfl:     clotrimazole-betamethasone 1-0.05% (LOTRISONE) cream, Apply 1 g topically 2 (two) times daily as needed., Disp: , Rfl:     coenzyme Q10 100 mg capsule, Take 100 mg by mouth once daily. , Disp: , Rfl:     cranberry extract 650 mg Cap, Take 1 tablet by mouth 2 (two) times daily., Disp: , Rfl:     FARXIGA 5 mg Tab tablet, Take 5 mg by mouth once daily., Disp: , Rfl:     fluticasone furoate-vilanteroL (BREO ELLIPTA) 100-25 mcg/dose diskus inhaler, Inhale 1 puff into the lungs once daily. Controller Rinse after you use it, Disp: 180 each, Rfl: 6    fluticasone propionate (FLONASE) 50 mcg/actuation nasal spray, 1 spray (50 mcg total) by Each Nostril route once daily., Disp: 48 g, Rfl: 3    Lactobac no.41/Bifidobact no.7 (PROBIOTIC-10 ORAL), Take 1 tablet by mouth once daily., Disp: , Rfl:     lancets Misc, 1 Units by Misc.(Non-Drug; Combo Route) route 2 (two) times daily., Disp: 200 each, Rfl: 3    lansoprazole (PREVACID) 30 MG capsule, Take 1 capsule (30 mg total) by mouth once daily., Disp: 90 capsule, Rfl: 3    levalbuterol (XOPENEX) 1.25 mg/0.5 mL nebulizer solution, Take 0.5 mLs (1.25 mg total) by nebulization every 6 (six) hours as needed for Wheezing. Rescue, Disp: 120 each, Rfl: 3    levothyroxine (SYNTHROID) 25 MCG tablet, TAKE 1 TABLET BY MOUTH BEFORE BREAKFAST EVERY DAY (Patient taking differently: Take 25 mcg by mouth before breakfast.), Disp: 90 tablet, Rfl: 3    metoprolol succinate (TOPROL XL) 25 MG 24 hr tablet, Take 1 tablet (25 mg total) by mouth once daily., Disp: 90 tablet, Rfl: 3    midodrine (PROAMATINE) 10 MG tablet, Take 10 mg by mouth 2 (two) times daily with meals., Disp: , Rfl:     nitroGLYCERIN (NITROSTAT) 0.4 MG SL tablet, Place 0.4 mg under the  "tongue every 5 (five) minutes as needed. 0.4mg Sublingual PRN .  , Disp: , Rfl:     potassium citrate 99 mg Cap, Take 1 capsule by mouth once daily., Disp: , Rfl:     pramoxine-hydrocortisone (PROCTOCREAM-HC) 1-1 % rectal cream, Place rectally 2 (two) times daily., Disp: 30 g, Rfl: 5    promethazine (PHENERGAN) 12.5 MG Tab, Take 1 tablet (12.5 mg total) by mouth every 8 (eight) hours as needed (nausea)., Disp: 30 tablet, Rfl: 1    RESTASIS 0.05 % ophthalmic emulsion, Place 1 drop into both eyes 2 (two) times daily., Disp: , Rfl:     rosuvastatin (CRESTOR) 10 MG tablet, Take 1 tablet (10 mg total) by mouth once daily., Disp: 90 tablet, Rfl: 3    triazolam (HALCION) 0.25 MG Tab, Take 1 tablet (0.25 mg total) by mouth nightly as needed (sleep). (Patient taking differently: Take 0.25 mg by mouth nightly as needed (sleep). 1/2 tab), Disp: 90 tablet, Rfl: 1    vit C,K-Id-psmls-lutein-zeaxan (PRESERVISION AREDS-2) 250-90-40-1 mg Cap, Take by mouth once daily., Disp: , Rfl:     vitamins  A,C,E-zinc-copper 14,320-226-200 unit-mg-unit Cap, Take 1 capsule by mouth 2 (two) times daily. , Disp: , Rfl:     amiodarone (PACERONE) 200 MG Tab, Take 1 tablet (200 mg total) by mouth 3 (three) times daily for 4 days, THEN 1 tablet (200 mg total) 2 (two) times daily., Disp: 72 tablet, Rfl: 0    furosemide (LASIX) 20 MG tablet, Take 1 tablet (20 mg total) by mouth once daily., Disp: 30 tablet, Rfl: 0          Objective:   Vitals:  Blood pressure 109/65, pulse 85, temperature 97.2 °F (36.2 °C), resp. rate 18, height 5' 2" (1.575 m), weight 65.4 kg (144 lb 3.2 oz).    Physical Examination:   GEN: no apparent distress, comfortable, 02 in place, frail in appearance  HEAD: atraumatic and normocephalic  EYES: + pallor, no icterus  ENT: no pharyngeal erythema, external ears WNL; no nasal discharge  NECK: no masses, thyroid normal, trachea midline, no LAD/LN's, supple  CV: RRR with  murmur; normal pulse; normal S1 and S2  CHEST: Normal " respiratory effort; CTAB; distant breath sounds; no wheeze or crackles  ABDOM: nontender and nondistended; soft; no rebound/guarding, L/S NP, no palpable ascites  MUSC/Skeletal: ROM normal; no crepitus; joints normal  EXTREM: ecchymotic L shoulder and L lower leg.  SKIN: no rashes, lesions, ulcers, petechiae   : no cvat  NEURO: grossly intact; motor/sensory WNL;  no tremors  PSYCH: normal mood, affect and behavior  LYMPH: normal cervical, supraclavicular, axillary  LN's      Labs:   Lab Results   Component Value Date    WBC 3.69 (L) 08/30/2023    HGB 9.8 (L) 08/30/2023    HCT 31.2 (L) 08/30/2023    MCV 83 08/30/2023     08/30/2023    CMP  Sodium   Date Value Ref Range Status   07/11/2023 136 136 - 145 mmol/L Final     Potassium   Date Value Ref Range Status   07/11/2023 3.1 (L) 3.5 - 5.1 mmol/L Final     Chloride   Date Value Ref Range Status   07/11/2023 96 95 - 110 mmol/L Final     CO2   Date Value Ref Range Status   07/11/2023 32 (H) 23 - 29 mmol/L Final     Glucose   Date Value Ref Range Status   07/11/2023 130 (H) 70 - 110 mg/dL Final     BUN   Date Value Ref Range Status   07/11/2023 59 (H) 8 - 23 mg/dL Final     Creatinine   Date Value Ref Range Status   07/11/2023 1.4 0.5 - 1.4 mg/dL Final     Calcium   Date Value Ref Range Status   07/11/2023 8.9 8.7 - 10.5 mg/dL Final     Total Protein   Date Value Ref Range Status   07/11/2023 6.8 6.0 - 8.4 g/dL Final     Albumin   Date Value Ref Range Status   07/11/2023 3.6 3.5 - 5.2 g/dL Final     Total Bilirubin   Date Value Ref Range Status   07/11/2023 0.8 0.1 - 1.0 mg/dL Final     Comment:     For infants and newborns, interpretation of results should be based  on gestational age, weight and in agreement with clinical  observations.    Premature Infant recommended reference ranges:  Up to 24 hours.............<8.0 mg/dL  Up to 48 hours............<12.0 mg/dL  3-5 days..................<15.0 mg/dL  6-29 days.................<15.0 mg/dL       Alkaline  Phosphatase   Date Value Ref Range Status   07/11/2023 86 55 - 135 U/L Final     AST   Date Value Ref Range Status   07/11/2023 29 10 - 40 U/L Final     ALT   Date Value Ref Range Status   07/11/2023 34 10 - 44 U/L Final     Anion Gap   Date Value Ref Range Status   07/11/2023 8 8 - 16 mmol/L Final     eGFR if    Date Value Ref Range Status   06/27/2022 49 (A) >60 mL/min/1.73 m^2 Final     eGFR if non    Date Value Ref Range Status   06/27/2022 42 (A) >60 mL/min/1.73 m^2 Final     Comment:     Calculation used to obtain the estimated glomerular filtration  rate (eGFR) is the CKD-EPI equation.        Hgb 9., B12 331-399, folate 18, Ferritin 29.  Hgb 9.8.  Assessment:   (1) 83 y.o. female with diagnosis of moderate anemia, Hgb 8.9. to 9.8 now.  Etiology is multifactorial, Fe deficiency, B 12 deficiency, renal insufficiency.  Improving now on Rx.  S/P Venofer, on B 12 monthly, Procrit weekly.    (2)S/P TAVR procedure.    (3)Frailty and heart condition now does not allow GI endoscopy or colonoscopy.    (4)CBC q 2 weeks, RTC see me 3 months.

## 2023-09-01 ENCOUNTER — INFUSION (OUTPATIENT)
Dept: INFUSION THERAPY | Facility: HOSPITAL | Age: 83
End: 2023-09-01
Attending: INTERNAL MEDICINE
Payer: MEDICARE

## 2023-09-01 VITALS
TEMPERATURE: 98 F | HEIGHT: 62 IN | HEART RATE: 74 BPM | WEIGHT: 144.63 LBS | OXYGEN SATURATION: 93 % | RESPIRATION RATE: 17 BRPM | DIASTOLIC BLOOD PRESSURE: 50 MMHG | SYSTOLIC BLOOD PRESSURE: 90 MMHG | BODY MASS INDEX: 26.61 KG/M2

## 2023-09-01 DIAGNOSIS — D51.8 DIETARY VITAMIN B12 DEFICIENCY ANEMIA: Primary | ICD-10-CM

## 2023-09-01 DIAGNOSIS — N18.30 ANEMIA DUE TO CHRONIC RENAL FAILURE TREATED WITH ERYTHROPOIETIN, STAGE 3 (MODERATE): ICD-10-CM

## 2023-09-01 DIAGNOSIS — D63.1 ANEMIA DUE TO CHRONIC RENAL FAILURE TREATED WITH ERYTHROPOIETIN, STAGE 3 (MODERATE): ICD-10-CM

## 2023-09-01 PROCEDURE — 63600175 PHARM REV CODE 636 W HCPCS: Performed by: INTERNAL MEDICINE

## 2023-09-01 PROCEDURE — 96372 THER/PROPH/DIAG INJ SC/IM: CPT

## 2023-09-01 RX ORDER — CYANOCOBALAMIN 1000 UG/ML
1000 INJECTION, SOLUTION INTRAMUSCULAR; SUBCUTANEOUS ONCE
Status: CANCELLED | OUTPATIENT
Start: 2023-09-01

## 2023-09-01 RX ORDER — CYANOCOBALAMIN 1000 UG/ML
1000 INJECTION, SOLUTION INTRAMUSCULAR; SUBCUTANEOUS ONCE
Status: COMPLETED | OUTPATIENT
Start: 2023-09-01 | End: 2023-09-01

## 2023-09-01 RX ORDER — HYDROCORTISONE ACETATE PRAMOXINE HCL 1; 1 G/100G; G/100G
CREAM TOPICAL 2 TIMES DAILY
Qty: 30 G | Refills: 5 | Status: SHIPPED | OUTPATIENT
Start: 2023-09-01 | End: 2023-11-27 | Stop reason: SDUPTHER

## 2023-09-01 RX ADMIN — CYANOCOBALAMIN 1000 MCG: 1000 INJECTION, SOLUTION INTRAMUSCULAR at 03:09

## 2023-09-01 NOTE — TELEPHONE ENCOUNTER
Requesting refill for Procto Cream      Last visit 6/12/2023  Next visit  9/14/2023 Please resend to mail ordered  Medication pended

## 2023-09-01 NOTE — PLAN OF CARE
Problem: Fatigue  Goal: Improved Activity Tolerance  Intervention: Promote Improved Energy  Flowsheets (Taken 9/1/2023 9754)  Fatigue Management: fatigue-related activity identified  Activity Management: Ambulated -L4

## 2023-09-05 ENCOUNTER — LAB VISIT (OUTPATIENT)
Dept: LAB | Facility: HOSPITAL | Age: 83
End: 2023-09-05
Attending: NURSE PRACTITIONER
Payer: MEDICARE

## 2023-09-05 DIAGNOSIS — I25.5 ISCHEMIC CARDIOMYOPATHY: Primary | ICD-10-CM

## 2023-09-05 LAB
ANION GAP SERPL CALC-SCNC: 11 MMOL/L (ref 8–16)
BUN SERPL-MCNC: 65 MG/DL (ref 8–23)
CALCIUM SERPL-MCNC: 9.4 MG/DL (ref 8.7–10.5)
CHLORIDE SERPL-SCNC: 93 MMOL/L (ref 95–110)
CO2 SERPL-SCNC: 31 MMOL/L (ref 23–29)
CREAT SERPL-MCNC: 1.5 MG/DL (ref 0.5–1.4)
EST. GFR  (NO RACE VARIABLE): 34.4 ML/MIN/1.73 M^2
GLUCOSE SERPL-MCNC: 81 MG/DL (ref 70–110)
MAGNESIUM SERPL-MCNC: 2.3 MG/DL (ref 1.6–2.6)
POTASSIUM SERPL-SCNC: 3.5 MMOL/L (ref 3.5–5.1)
SODIUM SERPL-SCNC: 135 MMOL/L (ref 136–145)

## 2023-09-05 PROCEDURE — 83735 ASSAY OF MAGNESIUM: CPT | Performed by: NURSE PRACTITIONER

## 2023-09-05 PROCEDURE — 36415 COLL VENOUS BLD VENIPUNCTURE: CPT | Mod: PO | Performed by: NURSE PRACTITIONER

## 2023-09-05 PROCEDURE — 80048 BASIC METABOLIC PNL TOTAL CA: CPT | Performed by: NURSE PRACTITIONER

## 2023-09-05 RX ORDER — LEVALBUTEROL 1.25 MG/.5ML
1 SOLUTION, CONCENTRATE RESPIRATORY (INHALATION) EVERY 6 HOURS PRN
Qty: 120 EACH | Refills: 3 | Status: SHIPPED | OUTPATIENT
Start: 2023-09-05 | End: 2023-09-14 | Stop reason: SDUPTHER

## 2023-09-05 RX ORDER — LEVALBUTEROL 1.25 MG/.5ML
1 SOLUTION, CONCENTRATE RESPIRATORY (INHALATION) EVERY 6 HOURS PRN
Qty: 120 EACH | Refills: 3 | Status: SHIPPED | OUTPATIENT
Start: 2023-09-05 | End: 2023-10-18

## 2023-09-06 ENCOUNTER — INFUSION (OUTPATIENT)
Dept: INFUSION THERAPY | Facility: HOSPITAL | Age: 83
End: 2023-09-06
Attending: INTERNAL MEDICINE
Payer: MEDICARE

## 2023-09-06 VITALS
HEART RATE: 78 BPM | SYSTOLIC BLOOD PRESSURE: 104 MMHG | DIASTOLIC BLOOD PRESSURE: 58 MMHG | WEIGHT: 137 LBS | OXYGEN SATURATION: 97 % | BODY MASS INDEX: 25.21 KG/M2 | TEMPERATURE: 98 F | HEIGHT: 62 IN | RESPIRATION RATE: 18 BRPM

## 2023-09-06 DIAGNOSIS — D63.1 ANEMIA DUE TO CHRONIC RENAL FAILURE TREATED WITH ERYTHROPOIETIN, STAGE 3 (MODERATE): Primary | ICD-10-CM

## 2023-09-06 DIAGNOSIS — N18.30 ANEMIA DUE TO CHRONIC RENAL FAILURE TREATED WITH ERYTHROPOIETIN, STAGE 3 (MODERATE): Primary | ICD-10-CM

## 2023-09-06 PROCEDURE — 96372 THER/PROPH/DIAG INJ SC/IM: CPT

## 2023-09-06 PROCEDURE — 63600175 PHARM REV CODE 636 W HCPCS: Mod: EC,JG | Performed by: NURSE PRACTITIONER

## 2023-09-06 RX ADMIN — EPOETIN ALFA 20000 UNITS: 20000 SOLUTION INTRAVENOUS; SUBCUTANEOUS at 02:09

## 2023-09-06 NOTE — PLAN OF CARE
Problem: Fall Injury Risk  Goal: Absence of Fall and Fall-Related Injury  Outcome: Ongoing, Progressing  Intervention: Identify and Manage Contributors  Flowsheets (Taken 9/6/2023 1425)  Self-Care Promotion: independence encouraged  Medication Review/Management: medications reviewed  Intervention: Promote Injury-Free Environment  Flowsheets (Taken 9/6/2023 1425)  Safety Promotion/Fall Prevention: medications reviewed

## 2023-09-11 ENCOUNTER — PATIENT MESSAGE (OUTPATIENT)
Dept: FAMILY MEDICINE | Facility: CLINIC | Age: 83
End: 2023-09-11
Payer: MEDICARE

## 2023-09-11 DIAGNOSIS — D50.0 IRON DEFICIENCY ANEMIA DUE TO CHRONIC BLOOD LOSS: ICD-10-CM

## 2023-09-11 DIAGNOSIS — N18.32 STAGE 3B CHRONIC KIDNEY DISEASE: Primary | ICD-10-CM

## 2023-09-11 NOTE — TELEPHONE ENCOUNTER
CMP ordered. She should also get a CBC; she has a standing order from Dr Moody Alvarez in there already

## 2023-09-12 ENCOUNTER — LAB VISIT (OUTPATIENT)
Dept: LAB | Facility: HOSPITAL | Age: 83
End: 2023-09-12
Attending: FAMILY MEDICINE
Payer: MEDICARE

## 2023-09-12 DIAGNOSIS — N18.32 STAGE 3B CHRONIC KIDNEY DISEASE: ICD-10-CM

## 2023-09-12 DIAGNOSIS — D50.9 IRON DEFICIENCY ANEMIA, UNSPECIFIED IRON DEFICIENCY ANEMIA TYPE: ICD-10-CM

## 2023-09-12 LAB
ANION GAP SERPL CALC-SCNC: 10 MMOL/L (ref 8–16)
BASOPHILS # BLD AUTO: 0.01 K/UL (ref 0–0.2)
BASOPHILS NFR BLD: 0.3 % (ref 0–1.9)
BUN SERPL-MCNC: 87 MG/DL (ref 8–23)
CALCIUM SERPL-MCNC: 8.9 MG/DL (ref 8.7–10.5)
CHLORIDE SERPL-SCNC: 89 MMOL/L (ref 95–110)
CO2 SERPL-SCNC: 30 MMOL/L (ref 23–29)
CREAT SERPL-MCNC: 1.8 MG/DL (ref 0.5–1.4)
DIFFERENTIAL METHOD: ABNORMAL
EOSINOPHIL # BLD AUTO: 0 K/UL (ref 0–0.5)
EOSINOPHIL NFR BLD: 1 % (ref 0–8)
ERYTHROCYTE [DISTWIDTH] IN BLOOD BY AUTOMATED COUNT: 20.3 % (ref 11.5–14.5)
EST. GFR  (NO RACE VARIABLE): 27.6 ML/MIN/1.73 M^2
GLUCOSE SERPL-MCNC: 115 MG/DL (ref 70–110)
HCT VFR BLD AUTO: 35 % (ref 37–48.5)
HGB BLD-MCNC: 10.8 G/DL (ref 12–16)
IMM GRANULOCYTES # BLD AUTO: 0.01 K/UL (ref 0–0.04)
IMM GRANULOCYTES NFR BLD AUTO: 0.3 % (ref 0–0.5)
LYMPHOCYTES # BLD AUTO: 0.5 K/UL (ref 1–4.8)
LYMPHOCYTES NFR BLD: 13.3 % (ref 18–48)
MCH RBC QN AUTO: 25.7 PG (ref 27–31)
MCHC RBC AUTO-ENTMCNC: 30.9 G/DL (ref 32–36)
MCV RBC AUTO: 83 FL (ref 82–98)
MONOCYTES # BLD AUTO: 0.7 K/UL (ref 0.3–1)
MONOCYTES NFR BLD: 17.7 % (ref 4–15)
NEUTROPHILS # BLD AUTO: 2.6 K/UL (ref 1.8–7.7)
NEUTROPHILS NFR BLD: 67.4 % (ref 38–73)
NRBC BLD-RTO: 0 /100 WBC
PLATELET # BLD AUTO: 212 K/UL (ref 150–450)
PMV BLD AUTO: 10.1 FL (ref 9.2–12.9)
POTASSIUM SERPL-SCNC: 4.1 MMOL/L (ref 3.5–5.1)
RBC # BLD AUTO: 4.2 M/UL (ref 4–5.4)
SODIUM SERPL-SCNC: 129 MMOL/L (ref 136–145)
WBC # BLD AUTO: 3.84 K/UL (ref 3.9–12.7)

## 2023-09-12 PROCEDURE — 36415 COLL VENOUS BLD VENIPUNCTURE: CPT | Performed by: FAMILY MEDICINE

## 2023-09-12 PROCEDURE — 80048 BASIC METABOLIC PNL TOTAL CA: CPT | Performed by: FAMILY MEDICINE

## 2023-09-12 PROCEDURE — 85025 COMPLETE CBC W/AUTO DIFF WBC: CPT | Performed by: INTERNAL MEDICINE

## 2023-09-12 PROCEDURE — 80053 COMPREHEN METABOLIC PANEL: CPT | Performed by: INTERNAL MEDICINE

## 2023-09-13 LAB
ALBUMIN SERPL BCP-MCNC: 3.4 G/DL (ref 3.5–5.2)
ALP SERPL-CCNC: 158 U/L (ref 55–135)
ALT SERPL W/O P-5'-P-CCNC: 43 U/L (ref 10–44)
ANION GAP SERPL CALC-SCNC: 10 MMOL/L (ref 8–16)
AST SERPL-CCNC: 57 U/L (ref 10–40)
BILIRUB SERPL-MCNC: 1.2 MG/DL (ref 0.1–1)
BUN SERPL-MCNC: 87 MG/DL (ref 8–23)
CALCIUM SERPL-MCNC: 8.9 MG/DL (ref 8.7–10.5)
CHLORIDE SERPL-SCNC: 89 MMOL/L (ref 95–110)
CO2 SERPL-SCNC: 30 MMOL/L (ref 23–29)
CREAT SERPL-MCNC: 1.8 MG/DL (ref 0.5–1.4)
EST. GFR  (NO RACE VARIABLE): 27.6 ML/MIN/1.73 M^2
GLUCOSE SERPL-MCNC: 115 MG/DL (ref 70–110)
POTASSIUM SERPL-SCNC: 4.1 MMOL/L (ref 3.5–5.1)
PROT SERPL-MCNC: 6.6 G/DL (ref 6–8.4)
SODIUM SERPL-SCNC: 129 MMOL/L (ref 136–145)

## 2023-09-14 ENCOUNTER — OFFICE VISIT (OUTPATIENT)
Dept: FAMILY MEDICINE | Facility: CLINIC | Age: 83
End: 2023-09-14
Payer: MEDICARE

## 2023-09-14 VITALS
TEMPERATURE: 98 F | DIASTOLIC BLOOD PRESSURE: 66 MMHG | WEIGHT: 141.75 LBS | HEIGHT: 62 IN | OXYGEN SATURATION: 97 % | HEART RATE: 75 BPM | BODY MASS INDEX: 26.09 KG/M2 | SYSTOLIC BLOOD PRESSURE: 100 MMHG

## 2023-09-14 DIAGNOSIS — I25.119 ATHEROSCLEROSIS OF NATIVE CORONARY ARTERY OF NATIVE HEART WITH ANGINA PECTORIS: ICD-10-CM

## 2023-09-14 DIAGNOSIS — K21.9 GASTROESOPHAGEAL REFLUX DISEASE WITHOUT ESOPHAGITIS: ICD-10-CM

## 2023-09-14 DIAGNOSIS — D63.1 ANEMIA DUE TO CHRONIC RENAL FAILURE TREATED WITH ERYTHROPOIETIN, STAGE 3 (MODERATE): ICD-10-CM

## 2023-09-14 DIAGNOSIS — I50.33 ACUTE ON CHRONIC DIASTOLIC CONGESTIVE HEART FAILURE: ICD-10-CM

## 2023-09-14 DIAGNOSIS — N18.32 STAGE 3B CHRONIC KIDNEY DISEASE: ICD-10-CM

## 2023-09-14 DIAGNOSIS — I38 VALVULAR HEART DISEASE: Chronic | ICD-10-CM

## 2023-09-14 DIAGNOSIS — N18.32 TYPE 2 DIABETES MELLITUS WITH STAGE 3B CHRONIC KIDNEY DISEASE, WITHOUT LONG-TERM CURRENT USE OF INSULIN: Primary | Chronic | ICD-10-CM

## 2023-09-14 DIAGNOSIS — I25.5 ISCHEMIC CARDIOMYOPATHY: ICD-10-CM

## 2023-09-14 DIAGNOSIS — J45.40 MODERATE PERSISTENT ASTHMA, UNSPECIFIED WHETHER COMPLICATED: Chronic | ICD-10-CM

## 2023-09-14 DIAGNOSIS — E78.2 MIXED HYPERLIPIDEMIA: ICD-10-CM

## 2023-09-14 DIAGNOSIS — N18.30 ANEMIA DUE TO CHRONIC RENAL FAILURE TREATED WITH ERYTHROPOIETIN, STAGE 3 (MODERATE): ICD-10-CM

## 2023-09-14 DIAGNOSIS — E03.9 HYPOTHYROIDISM, UNSPECIFIED TYPE: Chronic | ICD-10-CM

## 2023-09-14 DIAGNOSIS — I48.0 PAROXYSMAL ATRIAL FIBRILLATION: ICD-10-CM

## 2023-09-14 DIAGNOSIS — E11.22 TYPE 2 DIABETES MELLITUS WITH STAGE 3B CHRONIC KIDNEY DISEASE, WITHOUT LONG-TERM CURRENT USE OF INSULIN: Primary | Chronic | ICD-10-CM

## 2023-09-14 PROCEDURE — 3074F SYST BP LT 130 MM HG: CPT | Mod: CPTII,S$GLB,, | Performed by: FAMILY MEDICINE

## 2023-09-14 PROCEDURE — 3078F PR MOST RECENT DIASTOLIC BLOOD PRESSURE < 80 MM HG: ICD-10-PCS | Mod: CPTII,S$GLB,, | Performed by: FAMILY MEDICINE

## 2023-09-14 PROCEDURE — 99999 PR PBB SHADOW E&M-EST. PATIENT-LVL III: CPT | Mod: PBBFAC,,, | Performed by: FAMILY MEDICINE

## 2023-09-14 PROCEDURE — 3078F DIAST BP <80 MM HG: CPT | Mod: CPTII,S$GLB,, | Performed by: FAMILY MEDICINE

## 2023-09-14 PROCEDURE — 99214 PR OFFICE/OUTPT VISIT, EST, LEVL IV, 30-39 MIN: ICD-10-PCS | Mod: S$GLB,,, | Performed by: FAMILY MEDICINE

## 2023-09-14 PROCEDURE — 1159F MED LIST DOCD IN RCRD: CPT | Mod: CPTII,S$GLB,, | Performed by: FAMILY MEDICINE

## 2023-09-14 PROCEDURE — 2023F PR DILATED RETINAL EXAM W/O EVID OF RETINOPATHY: ICD-10-PCS | Mod: CPTII,S$GLB,, | Performed by: FAMILY MEDICINE

## 2023-09-14 PROCEDURE — 1100F PR PT FALLS ASSESS DOC 2+ FALLS/FALL W/INJURY/YR: ICD-10-PCS | Mod: CPTII,S$GLB,, | Performed by: FAMILY MEDICINE

## 2023-09-14 PROCEDURE — 1126F AMNT PAIN NOTED NONE PRSNT: CPT | Mod: CPTII,S$GLB,, | Performed by: FAMILY MEDICINE

## 2023-09-14 PROCEDURE — 1126F PR PAIN SEVERITY QUANTIFIED, NO PAIN PRESENT: ICD-10-PCS | Mod: CPTII,S$GLB,, | Performed by: FAMILY MEDICINE

## 2023-09-14 PROCEDURE — 3288F FALL RISK ASSESSMENT DOCD: CPT | Mod: CPTII,S$GLB,, | Performed by: FAMILY MEDICINE

## 2023-09-14 PROCEDURE — 99214 OFFICE O/P EST MOD 30 MIN: CPT | Mod: S$GLB,,, | Performed by: FAMILY MEDICINE

## 2023-09-14 PROCEDURE — 1159F PR MEDICATION LIST DOCUMENTED IN MEDICAL RECORD: ICD-10-PCS | Mod: CPTII,S$GLB,, | Performed by: FAMILY MEDICINE

## 2023-09-14 PROCEDURE — 1100F PTFALLS ASSESS-DOCD GE2>/YR: CPT | Mod: CPTII,S$GLB,, | Performed by: FAMILY MEDICINE

## 2023-09-14 PROCEDURE — 3074F PR MOST RECENT SYSTOLIC BLOOD PRESSURE < 130 MM HG: ICD-10-PCS | Mod: CPTII,S$GLB,, | Performed by: FAMILY MEDICINE

## 2023-09-14 PROCEDURE — 99999 PR PBB SHADOW E&M-EST. PATIENT-LVL III: ICD-10-PCS | Mod: PBBFAC,,, | Performed by: FAMILY MEDICINE

## 2023-09-14 PROCEDURE — 3288F PR FALLS RISK ASSESSMENT DOCUMENTED: ICD-10-PCS | Mod: CPTII,S$GLB,, | Performed by: FAMILY MEDICINE

## 2023-09-14 PROCEDURE — 2023F DILAT RTA XM W/O RTNOPTHY: CPT | Mod: CPTII,S$GLB,, | Performed by: FAMILY MEDICINE

## 2023-09-14 RX ORDER — AMIODARONE HYDROCHLORIDE 200 MG/1
200 TABLET ORAL 2 TIMES DAILY
COMMUNITY
End: 2023-10-18 | Stop reason: DRUGHIGH

## 2023-09-14 RX ORDER — TRAZODONE HYDROCHLORIDE 50 MG/1
50 TABLET ORAL NIGHTLY
Qty: 30 TABLET | Refills: 11 | Status: SHIPPED | OUTPATIENT
Start: 2023-09-14 | End: 2023-10-09 | Stop reason: DRUGHIGH

## 2023-09-14 RX ORDER — AMITRIPTYLINE HYDROCHLORIDE 25 MG/1
25 TABLET, FILM COATED ORAL NIGHTLY PRN
COMMUNITY
End: 2023-10-18

## 2023-09-15 ENCOUNTER — PATIENT MESSAGE (OUTPATIENT)
Dept: PULMONOLOGY | Facility: CLINIC | Age: 83
End: 2023-09-15

## 2023-09-15 ENCOUNTER — PATIENT MESSAGE (OUTPATIENT)
Dept: FAMILY MEDICINE | Facility: CLINIC | Age: 83
End: 2023-09-15
Payer: MEDICARE

## 2023-09-19 NOTE — PROGRESS NOTES
Subjective     Patient ID: Darlin Tipton is a 83 y.o. female.    Chief Complaint: Diabetes, Hypertension, Coronary Artery Disease, Asthma, Gastroesophageal Reflux, Chronic Kidney Disease, Paroxysmal atrial fibrillation, and Aortic Stenosis    Patient presents here for 3 month follow-up of diabetes, hypertension, CAD, asthma, GERD, chronic kidney disease, paroxysmal atrial fibrillation, and aortic stenosis.  Since I saw her last, she did undergo a TAVR for repair of her aortic stenosis.  She also does have significant mitral valve disease and needs a procedure on this as soon as she is stable.  She has been having some issues since the TAVR with fluid retention.  She does have a history of congestive heart failure and her last ejection fraction that I see on echo in April of 2023 was 45%.  In addition to her aortic stenosis, she did have moderate to severe mitral regurgitation as well as moderate to severe tricuspid regurgitation.  She also had significant pulmonary hypertension on echocardiogram.  She is being followed by her cardiologist closely.  According to her daughter, they were trying to diurese her with diuretics but her kidney function significantly decreased and they had to back off.  It is a delicate issue of getting just enough diuresis without affecting her chronic kidney disease further.  She denies any chest pains or palpitations.  She does have shortness of breath with minimal activity and also does have a frequent cough.  She does state however that this cough is her usual cough that she has with her allergies and/or asthma.  She denies any wheezing like she has with her asthma.  Her diabetes has been well controlled and her last hemoglobin A1c in June of 2023 was 6.1%.  She denies any hypoglycemia.  Her hypertension is controlled with a blood pressure today 100/66.  According to she and her daughter, she has not had any hypotension.  Her coronary artery disease is stable without chest pains or  palpitations and she seemed to do well with the TAVR other than the above-mentioned issue with fluid retention.  Her GERD is stable with her present use of Prevacid 30 mg daily.  Her chronic kidney disease has worsened with her intense diuresis and her last GFR was 27.  It had previously been running between 35-40.  She has an order from her cardiologist to repeat this in 10 days.  Her atrial fibrillation is stable with good rate control as well as the use of Eliquis as an anticoagulant.  She denies any significant bruising or bleeding.  Her aortic stenosis has been resolved with the TAVR as noted above.  As far as health maintenance, she is up-to-date with all of her recommended screening exams and immunizations with the exception of shingles vaccine, 3rd COVID booster, DEXA scan, and flu vaccine.        Review of Systems   Constitutional:  Positive for fatigue. Negative for chills, fever and unexpected weight change.   HENT:  Positive for nasal congestion. Negative for postnasal drip and sore throat.    Respiratory:  Positive for cough and shortness of breath. Negative for wheezing.    Cardiovascular:  Positive for leg swelling. Negative for chest pain and palpitations.   Gastrointestinal:  Negative for abdominal pain, diarrhea, nausea and vomiting.   Genitourinary:  Negative for difficulty urinating, dysuria and flank pain.   Musculoskeletal:  Negative for arthralgias and back pain.   Neurological:  Negative for dizziness, light-headedness and headaches.   Hematological:  Negative for adenopathy. Bruises/bleeds easily.   Psychiatric/Behavioral:  Positive for sleep disturbance. The patient is not nervous/anxious.           Objective     Physical Exam  Vitals reviewed.   Constitutional:       Appearance: Normal appearance. She is well-developed and normal weight. She is ill-appearing.   HENT:      Right Ear: Tympanic membrane and external ear normal.      Left Ear: Tympanic membrane and external ear normal.       Mouth/Throat:      Pharynx: Oropharynx is clear. No posterior oropharyngeal erythema.   Neck:      Thyroid: No thyromegaly.      Vascular: No carotid bruit.      Comments: No JVD  Cardiovascular:      Rate and Rhythm: Normal rate. Rhythm irregular.      Pulses: Normal pulses.      Heart sounds: Normal heart sounds. No murmur heard.  Pulmonary:      Effort: Pulmonary effort is normal.      Breath sounds: Normal breath sounds. No wheezing or rales.   Musculoskeletal:         General: No tenderness. Normal range of motion.      Cervical back: Normal range of motion and neck supple.      Right lower leg: Edema present.      Left lower leg: Edema present.   Lymphadenopathy:      Cervical: No cervical adenopathy.   Neurological:      General: No focal deficit present.      Mental Status: She is alert and oriented to person, place, and time.      Cranial Nerves: No cranial nerve deficit.      Deep Tendon Reflexes: Reflexes are normal and symmetric.            Assessment and Plan     1. Type 2 diabetes mellitus with stage 3b chronic kidney disease, without long-term current use of insulin    2. Mixed hyperlipidemia    3. Ischemic cardiomyopathy  -     Basic Metabolic Panel; Future; Expected date: 09/14/2023  -     Magnesium; Future; Expected date: 09/14/2023    4. Acute on chronic diastolic congestive heart failure    5. Stage 3b chronic kidney disease    6. Atherosclerosis of native coronary artery of native heart with angina pectoris  -     Basic Metabolic Panel; Future; Expected date: 09/14/2023  -     Magnesium; Future; Expected date: 09/14/2023    7. Paroxysmal atrial fibrillation    8. Valvular heart disease, moderate AS/TR    9. Moderate persistent asthma, unspecified whether complicated    10. Hypothyroidism, unspecified type    11. Gastroesophageal reflux disease without esophagitis    12. Anemia due to chronic renal failure treated with erythropoietin, stage 3 (moderate)    Other orders  -     traZODone (DESYREL)  50 MG tablet; Take 1 tablet (50 mg total) by mouth every evening.  Dispense: 30 tablet; Refill: 11        1. Continue present medication as her diabetes, hypertension, CAD, asthma, GERD, chronic kidney disease, paroxysmal atrial fibrillation, and aortic stenosis are stable  2. Her congestive heart failure is giving her an issue now as well as worsening renal function.  I will speak to her cardiologist to address this and to determine how best to achieve diuresis without worsening her kidney function  3. Check BMP on 9/28/23 as per her cardiologist   4. If she has significant issues, she is instructed to go to the emergency room but she does need to go to [facility] since this is where her cardiologist is per his instructions   5.  Follow-up with me in 3 months         Follow up in about 3 months (around 12/14/2023).

## 2023-09-20 ENCOUNTER — LAB VISIT (OUTPATIENT)
Dept: LAB | Facility: HOSPITAL | Age: 83
End: 2023-09-20
Attending: NURSE PRACTITIONER
Payer: MEDICARE

## 2023-09-20 DIAGNOSIS — Z78.0 MENOPAUSE: ICD-10-CM

## 2023-09-20 DIAGNOSIS — N18.32 CHRONIC KIDNEY DISEASE (CKD) STAGE G3B/A1, MODERATELY DECREASED GLOMERULAR FILTRATION RATE (GFR) BETWEEN 30-44 ML/MIN/1.73 SQUARE METER AND ALBUMINURIA CREATININE RATIO LESS THAN 30 MG/G: Primary | ICD-10-CM

## 2023-09-20 LAB
ANION GAP SERPL CALC-SCNC: 8 MMOL/L (ref 8–16)
BUN SERPL-MCNC: 84 MG/DL (ref 8–23)
CALCIUM SERPL-MCNC: 9.2 MG/DL (ref 8.7–10.5)
CHLORIDE SERPL-SCNC: 94 MMOL/L (ref 95–110)
CO2 SERPL-SCNC: 28 MMOL/L (ref 23–29)
CREAT SERPL-MCNC: 1.8 MG/DL (ref 0.5–1.4)
EST. GFR  (NO RACE VARIABLE): 27.6 ML/MIN/1.73 M^2
GLUCOSE SERPL-MCNC: 94 MG/DL (ref 70–110)
POTASSIUM SERPL-SCNC: 4.3 MMOL/L (ref 3.5–5.1)
SODIUM SERPL-SCNC: 130 MMOL/L (ref 136–145)

## 2023-09-20 PROCEDURE — 36415 COLL VENOUS BLD VENIPUNCTURE: CPT | Performed by: NURSE PRACTITIONER

## 2023-09-20 PROCEDURE — 80048 BASIC METABOLIC PNL TOTAL CA: CPT | Performed by: NURSE PRACTITIONER

## 2023-09-22 NOTE — PROGRESS NOTES
Pre-Visit Chart Review  For Appointment Scheduled on 7/8/2019.      Health Maintenance Due   Topic Date Due    TETANUS VACCINE  [birth year]02/1958    Foot Exam  02/21/2019    Hemoglobin A1c  05/09/2019                      0 = swallows foods/liquids without difficulty

## 2023-09-25 ENCOUNTER — PATIENT MESSAGE (OUTPATIENT)
Dept: FAMILY MEDICINE | Facility: CLINIC | Age: 83
End: 2023-09-25
Payer: MEDICARE

## 2023-09-29 ENCOUNTER — INFUSION (OUTPATIENT)
Dept: INFUSION THERAPY | Facility: HOSPITAL | Age: 83
End: 2023-09-29
Attending: INTERNAL MEDICINE
Payer: MEDICARE

## 2023-09-29 ENCOUNTER — PATIENT MESSAGE (OUTPATIENT)
Dept: FAMILY MEDICINE | Facility: CLINIC | Age: 83
End: 2023-09-29
Payer: MEDICARE

## 2023-09-29 VITALS
BODY MASS INDEX: 25.74 KG/M2 | TEMPERATURE: 98 F | RESPIRATION RATE: 18 BRPM | HEART RATE: 75 BPM | WEIGHT: 139.88 LBS | SYSTOLIC BLOOD PRESSURE: 97 MMHG | HEIGHT: 62 IN | DIASTOLIC BLOOD PRESSURE: 52 MMHG | OXYGEN SATURATION: 96 %

## 2023-09-29 DIAGNOSIS — N18.30 ANEMIA DUE TO CHRONIC RENAL FAILURE TREATED WITH ERYTHROPOIETIN, STAGE 3 (MODERATE): ICD-10-CM

## 2023-09-29 DIAGNOSIS — D51.8 DIETARY VITAMIN B12 DEFICIENCY ANEMIA: Primary | ICD-10-CM

## 2023-09-29 DIAGNOSIS — D63.1 ANEMIA DUE TO CHRONIC RENAL FAILURE TREATED WITH ERYTHROPOIETIN, STAGE 3 (MODERATE): ICD-10-CM

## 2023-09-29 PROCEDURE — 63600175 PHARM REV CODE 636 W HCPCS: Performed by: INTERNAL MEDICINE

## 2023-09-29 PROCEDURE — 96372 THER/PROPH/DIAG INJ SC/IM: CPT

## 2023-09-29 RX ORDER — CYANOCOBALAMIN 1000 UG/ML
1000 INJECTION, SOLUTION INTRAMUSCULAR; SUBCUTANEOUS ONCE
Status: CANCELLED | OUTPATIENT
Start: 2023-09-29

## 2023-09-29 RX ORDER — CYANOCOBALAMIN 1000 UG/ML
1000 INJECTION, SOLUTION INTRAMUSCULAR; SUBCUTANEOUS ONCE
Status: COMPLETED | OUTPATIENT
Start: 2023-09-29 | End: 2023-09-29

## 2023-09-29 RX ADMIN — CYANOCOBALAMIN 1000 MCG: 1000 INJECTION, SOLUTION INTRAMUSCULAR at 01:09

## 2023-09-29 NOTE — PLAN OF CARE
Problem: Fatigue  Goal: Improved Activity Tolerance  Outcome: Ongoing, Progressing  Intervention: Promote Improved Energy  Flowsheets (Taken 9/29/2023 1300)  Fatigue Management: fatigue-related activity identified  Sleep/Rest Enhancement: noise level reduced  Activity Management: Up in chair - L3

## 2023-10-03 ENCOUNTER — LAB VISIT (OUTPATIENT)
Dept: LAB | Facility: HOSPITAL | Age: 83
End: 2023-10-03
Attending: NURSE PRACTITIONER
Payer: MEDICARE

## 2023-10-03 DIAGNOSIS — N18.32 CHRONIC KIDNEY DISEASE (CKD) STAGE G3B/A1, MODERATELY DECREASED GLOMERULAR FILTRATION RATE (GFR) BETWEEN 30-44 ML/MIN/1.73 SQUARE METER AND ALBUMINURIA CREATININE RATIO LESS THAN 30 MG/G: ICD-10-CM

## 2023-10-03 DIAGNOSIS — D64.9 ANEMIA, UNSPECIFIED: Primary | ICD-10-CM

## 2023-10-03 LAB
ANION GAP SERPL CALC-SCNC: 7 MMOL/L (ref 8–16)
BUN SERPL-MCNC: 53 MG/DL (ref 8–23)
CALCIUM SERPL-MCNC: 9.2 MG/DL (ref 8.7–10.5)
CHLORIDE SERPL-SCNC: 101 MMOL/L (ref 95–110)
CO2 SERPL-SCNC: 28 MMOL/L (ref 23–29)
CREAT SERPL-MCNC: 1.9 MG/DL (ref 0.5–1.4)
ERYTHROCYTE [DISTWIDTH] IN BLOOD BY AUTOMATED COUNT: 18.6 % (ref 11.5–14.5)
EST. GFR  (NO RACE VARIABLE): 25.9 ML/MIN/1.73 M^2
GLUCOSE SERPL-MCNC: 101 MG/DL (ref 70–110)
HCT VFR BLD AUTO: 35.5 % (ref 37–48.5)
HGB BLD-MCNC: 10.7 G/DL (ref 12–16)
MCH RBC QN AUTO: 25.5 PG (ref 27–31)
MCHC RBC AUTO-ENTMCNC: 30.1 G/DL (ref 32–36)
MCV RBC AUTO: 85 FL (ref 82–98)
PLATELET # BLD AUTO: 179 K/UL (ref 150–450)
PMV BLD AUTO: 10.7 FL (ref 9.2–12.9)
POTASSIUM SERPL-SCNC: 4.5 MMOL/L (ref 3.5–5.1)
RBC # BLD AUTO: 4.19 M/UL (ref 4–5.4)
SODIUM SERPL-SCNC: 136 MMOL/L (ref 136–145)
WBC # BLD AUTO: 2.75 K/UL (ref 3.9–12.7)

## 2023-10-03 PROCEDURE — 85027 COMPLETE CBC AUTOMATED: CPT | Performed by: NURSE PRACTITIONER

## 2023-10-03 PROCEDURE — 36415 COLL VENOUS BLD VENIPUNCTURE: CPT | Performed by: NURSE PRACTITIONER

## 2023-10-03 PROCEDURE — 80048 BASIC METABOLIC PNL TOTAL CA: CPT | Performed by: NURSE PRACTITIONER

## 2023-10-09 ENCOUNTER — PATIENT MESSAGE (OUTPATIENT)
Dept: FAMILY MEDICINE | Facility: CLINIC | Age: 83
End: 2023-10-09
Payer: MEDICARE

## 2023-10-09 RX ORDER — TRAZODONE HYDROCHLORIDE 150 MG/1
150 TABLET ORAL NIGHTLY
Qty: 30 TABLET | Refills: 11 | Status: SHIPPED | OUTPATIENT
Start: 2023-10-09 | End: 2024-10-08

## 2023-10-25 ENCOUNTER — PATIENT MESSAGE (OUTPATIENT)
Dept: GASTROENTEROLOGY | Facility: CLINIC | Age: 83
End: 2023-10-25
Payer: MEDICARE

## 2023-10-26 ENCOUNTER — LAB VISIT (OUTPATIENT)
Dept: LAB | Facility: HOSPITAL | Age: 83
End: 2023-10-26
Attending: INTERNAL MEDICINE
Payer: MEDICARE

## 2023-10-26 DIAGNOSIS — I50.9 HEART FAILURE, UNSPECIFIED: ICD-10-CM

## 2023-10-26 DIAGNOSIS — D72.819 LEUCOPENIA: ICD-10-CM

## 2023-10-26 DIAGNOSIS — Z86.79 PERSONAL HISTORY OF UNSPECIFIED CIRCULATORY DISEASE: ICD-10-CM

## 2023-10-26 DIAGNOSIS — N18.9 CHRONIC KIDNEY DISEASE, UNSPECIFIED: Primary | ICD-10-CM

## 2023-10-26 LAB
ALBUMIN SERPL BCP-MCNC: 3.6 G/DL (ref 3.5–5.2)
ALP SERPL-CCNC: 154 U/L (ref 55–135)
ALT SERPL W/O P-5'-P-CCNC: 21 U/L (ref 10–44)
ANION GAP SERPL CALC-SCNC: 10 MMOL/L (ref 8–16)
AST SERPL-CCNC: 29 U/L (ref 10–40)
BILIRUB SERPL-MCNC: 1 MG/DL (ref 0.1–1)
BNP SERPL-MCNC: 3041 PG/ML (ref 0–99)
BUN SERPL-MCNC: 69 MG/DL (ref 8–23)
CALCIUM SERPL-MCNC: 9.1 MG/DL (ref 8.7–10.5)
CHLORIDE SERPL-SCNC: 101 MMOL/L (ref 95–110)
CHOLEST SERPL-MCNC: 101 MG/DL (ref 120–199)
CHOLEST/HDLC SERPL: 3 {RATIO} (ref 2–5)
CO2 SERPL-SCNC: 25 MMOL/L (ref 23–29)
CREAT SERPL-MCNC: 1.9 MG/DL (ref 0.5–1.4)
ERYTHROCYTE [DISTWIDTH] IN BLOOD BY AUTOMATED COUNT: 19.8 % (ref 11.5–14.5)
EST. GFR  (NO RACE VARIABLE): 25.9 ML/MIN/1.73 M^2
GLUCOSE SERPL-MCNC: 89 MG/DL (ref 70–110)
HCT VFR BLD AUTO: 33.5 % (ref 37–48.5)
HDLC SERPL-MCNC: 34 MG/DL (ref 40–75)
HDLC SERPL: 33.7 % (ref 20–50)
HGB BLD-MCNC: 10.3 G/DL (ref 12–16)
LDLC SERPL CALC-MCNC: 47.2 MG/DL (ref 63–159)
MCH RBC QN AUTO: 25.8 PG (ref 27–31)
MCHC RBC AUTO-ENTMCNC: 30.7 G/DL (ref 32–36)
MCV RBC AUTO: 84 FL (ref 82–98)
NONHDLC SERPL-MCNC: 67 MG/DL
PLATELET # BLD AUTO: 151 K/UL (ref 150–450)
PMV BLD AUTO: 11.2 FL (ref 9.2–12.9)
POTASSIUM SERPL-SCNC: 4.4 MMOL/L (ref 3.5–5.1)
PROT SERPL-MCNC: 6.8 G/DL (ref 6–8.4)
RBC # BLD AUTO: 3.99 M/UL (ref 4–5.4)
SODIUM SERPL-SCNC: 136 MMOL/L (ref 136–145)
T4 FREE SERPL-MCNC: 1.28 NG/DL (ref 0.71–1.51)
TRIGL SERPL-MCNC: 99 MG/DL (ref 30–150)
TSH SERPL DL<=0.005 MIU/L-ACNC: 5.93 UIU/ML (ref 0.34–5.6)
WBC # BLD AUTO: 2.76 K/UL (ref 3.9–12.7)

## 2023-10-26 PROCEDURE — 85027 COMPLETE CBC AUTOMATED: CPT | Performed by: INTERNAL MEDICINE

## 2023-10-26 PROCEDURE — 36415 COLL VENOUS BLD VENIPUNCTURE: CPT | Performed by: INTERNAL MEDICINE

## 2023-10-26 PROCEDURE — 84481 FREE ASSAY (FT-3): CPT | Performed by: INTERNAL MEDICINE

## 2023-10-26 PROCEDURE — 84443 ASSAY THYROID STIM HORMONE: CPT | Performed by: INTERNAL MEDICINE

## 2023-10-26 PROCEDURE — 84480 ASSAY TRIIODOTHYRONINE (T3): CPT | Performed by: INTERNAL MEDICINE

## 2023-10-26 PROCEDURE — 84436 ASSAY OF TOTAL THYROXINE: CPT | Mod: XB | Performed by: INTERNAL MEDICINE

## 2023-10-26 PROCEDURE — 80061 LIPID PANEL: CPT | Performed by: INTERNAL MEDICINE

## 2023-10-26 PROCEDURE — 80053 COMPREHEN METABOLIC PANEL: CPT | Performed by: INTERNAL MEDICINE

## 2023-10-26 PROCEDURE — 83880 ASSAY OF NATRIURETIC PEPTIDE: CPT | Performed by: INTERNAL MEDICINE

## 2023-10-26 PROCEDURE — 84439 ASSAY OF FREE THYROXINE: CPT | Performed by: INTERNAL MEDICINE

## 2023-10-27 LAB
T3 SERPL-MCNC: 61 NG/DL (ref 71–180)
T3FREE SERPL-MCNC: 1.7 PG/ML (ref 2–4.4)
T4 SERPL-MCNC: 12.3 UG/DL (ref 4.5–12)

## 2023-11-01 ENCOUNTER — PATIENT MESSAGE (OUTPATIENT)
Dept: PULMONOLOGY | Facility: CLINIC | Age: 83
End: 2023-11-01

## 2023-11-01 DIAGNOSIS — R06.00 DYSPNEA, UNSPECIFIED TYPE: Primary | ICD-10-CM

## 2023-11-02 ENCOUNTER — HOSPITAL ENCOUNTER (OUTPATIENT)
Dept: RADIOLOGY | Facility: HOSPITAL | Age: 83
Discharge: HOME | End: 2023-11-02
Attending: NURSE PRACTITIONER
Payer: MEDICARE

## 2023-11-02 ENCOUNTER — OFFICE VISIT (OUTPATIENT)
Dept: PULMONOLOGY | Facility: CLINIC | Age: 83
End: 2023-11-02
Payer: MEDICARE

## 2023-11-02 VITALS
OXYGEN SATURATION: 95 % | BODY MASS INDEX: 24.48 KG/M2 | HEIGHT: 62 IN | HEART RATE: 76 BPM | WEIGHT: 133 LBS | SYSTOLIC BLOOD PRESSURE: 110 MMHG | DIASTOLIC BLOOD PRESSURE: 60 MMHG

## 2023-11-02 DIAGNOSIS — J45.30 MILD PERSISTENT ASTHMA, UNSPECIFIED WHETHER COMPLICATED: Primary | ICD-10-CM

## 2023-11-02 DIAGNOSIS — R05.9 COUGH, UNSPECIFIED TYPE: ICD-10-CM

## 2023-11-02 DIAGNOSIS — G47.34 NOCTURNAL HYPOXEMIA: ICD-10-CM

## 2023-11-02 PROCEDURE — 71046 X-RAY EXAM CHEST 2 VIEWS: CPT | Mod: TC

## 2023-11-02 PROCEDURE — 3074F SYST BP LT 130 MM HG: CPT | Mod: CPTII,S$GLB,, | Performed by: NURSE PRACTITIONER

## 2023-11-02 PROCEDURE — 1159F PR MEDICATION LIST DOCUMENTED IN MEDICAL RECORD: ICD-10-PCS | Mod: CPTII,S$GLB,, | Performed by: NURSE PRACTITIONER

## 2023-11-02 PROCEDURE — 3074F PR MOST RECENT SYSTOLIC BLOOD PRESSURE < 130 MM HG: ICD-10-PCS | Mod: CPTII,S$GLB,, | Performed by: NURSE PRACTITIONER

## 2023-11-02 PROCEDURE — 99214 OFFICE O/P EST MOD 30 MIN: CPT | Mod: S$GLB,,, | Performed by: NURSE PRACTITIONER

## 2023-11-02 PROCEDURE — 99214 PR OFFICE/OUTPT VISIT, EST, LEVL IV, 30-39 MIN: ICD-10-PCS | Mod: S$GLB,,, | Performed by: NURSE PRACTITIONER

## 2023-11-02 PROCEDURE — 3078F PR MOST RECENT DIASTOLIC BLOOD PRESSURE < 80 MM HG: ICD-10-PCS | Mod: CPTII,S$GLB,, | Performed by: NURSE PRACTITIONER

## 2023-11-02 PROCEDURE — 1125F AMNT PAIN NOTED PAIN PRSNT: CPT | Mod: CPTII,S$GLB,, | Performed by: NURSE PRACTITIONER

## 2023-11-02 PROCEDURE — 1159F MED LIST DOCD IN RCRD: CPT | Mod: CPTII,S$GLB,, | Performed by: NURSE PRACTITIONER

## 2023-11-02 PROCEDURE — 1125F PR PAIN SEVERITY QUANTIFIED, PAIN PRESENT: ICD-10-PCS | Mod: CPTII,S$GLB,, | Performed by: NURSE PRACTITIONER

## 2023-11-02 PROCEDURE — 3078F DIAST BP <80 MM HG: CPT | Mod: CPTII,S$GLB,, | Performed by: NURSE PRACTITIONER

## 2023-11-02 RX ORDER — LEVALBUTEROL INHALATION SOLUTION 1.25 MG/3ML
3 SOLUTION RESPIRATORY (INHALATION)
COMMUNITY
End: 2023-12-04 | Stop reason: SDUPTHER

## 2023-11-02 RX ORDER — CETIRIZINE HYDROCHLORIDE 5 MG/1
1 TABLET ORAL DAILY
Status: ON HOLD | COMMUNITY
End: 2023-12-13 | Stop reason: HOSPADM

## 2023-11-02 NOTE — PROGRESS NOTES
SUBJECTIVE:    Patient ID: Darlin Tipton is a 83 y.o. female.    Chief Complaint: Asthma and Cough      HPI   Patient here today feeling alright. She is fatigued. She is sleeping during the day a lot and then not able to sleep at night. She is taking Trazodone at night and it has not helped.   She is coughing up white mucous at times. She is using her Breo.  She has had a pacemaker and TAVR since her last visit here.  She needs to have a mitral valve clip.  She is not sleeping on her oxygen all of the time.  She has had reflux issues recently as well. She is taking Prevacid, and Gaviscon.      Past Medical History:   Diagnosis Date    Allergy     Dust mites, Grasses, Trees    Arthritis     Asthma     Blood transfusion     CAD (coronary artery disease)     Cataract     CHF (congestive heart failure)     CHRONIC BRONCHITIS     Diabetes mellitus     Diabetes mellitus type II     GERD (gastroesophageal reflux disease)     Hyperlipidemia     Hypertension     Irregular heart beat     Kidney disease     ckd stage 3  as stated by patient - to see MD    Paroxysmal atrial fibrillation     Post-menopausal bleeding 2018    Spinal stenosis     Thyroid disease     Hypothyroidism     Past Surgical History:   Procedure Laterality Date    APPENDECTOMY  1968    BLADDER SUSPENSION  1989    CARDIAC SURGERY  2016    CABG    CATARACT EXTRACTION  9/2007 (L) and 10/2207 (R)    COLONOSCOPY N/A 10/18/2017    Procedure: COLONOSCOPY;  Surgeon: Esme Acuna MD;  Location: [facility];  Service: Endoscopy;  Laterality: N/A;    CORONARY ANGIOGRAPHY INCLUDING BYPASS GRAFTS WITH CATHETERIZATION OF LEFT HEART Left 5/13/2022    Procedure: Left heart cath;  Surgeon: Davon Patton MD;  Location: [facility] CATH/EP LAB;  Service: Cardiology;  Laterality: Left;    CORONARY ARTERY BYPASS GRAFT  4/26/2004    x5    ESOPHAGEAL DILATION      ESOPHAGOGASTRODUODENOSCOPY N/A 6/27/2022    Procedure: EGD (ESOPHAGOGASTRODUODENOSCOPY);  Surgeon: Skip COOPER  MD Clem;  Location: [facility];  Service: Endoscopy;  Laterality: N/A;    HYSTEROSCOPY WITH DILATION AND CURETTAGE OF UTERUS N/A 3/12/2020    Procedure: HYSTEROSCOPY, WITH DILATION AND CURETTAGE OF UTERUS;  Surgeon: Ramona Llanos MD;  Location: [facility] OR;  Service: OB/GYN;  Laterality: N/A;    SPINE SURGERY  3/2000    Tumor    THORACENTESIS Right 2/7/2023    Procedure: Thoracentesis;  Surgeon: Rula Weiss MD;  Location: [facility];  Service: Pulmonary;  Laterality: Right;  ultrasound guided thoracentesis of right pleural effusion 2/07/23 0730    TRANSFORAMINAL EPIDURAL INJECTION OF STEROID Right 11/21/2019    Procedure: Injection,steroid,epidural,transforaminal approach;  Surgeon: Bj Jones MD;  Location: [facility];  Service: Pain Management;  Laterality: Right;  L4-5, L5-S1    TRANSFORAMINAL EPIDURAL INJECTION OF STEROID Right 12/31/2019    Procedure: Injection,steroid,epidural,transforaminal approach;  Surgeon: Bj Jones MD;  Location: [facility];  Service: Pain Management;  Laterality: Right;  L4-5, L5-S1    TRANSFORAMINAL EPIDURAL INJECTION OF STEROID Right 2/5/2020    Procedure: Injection,steroid,epidural,transforaminal approach;  Surgeon: Bj Jones MD;  Location: [facility];  Service: Pain Management;  Laterality: Right;  L4-5, L5-S1    TRANSFORAMINAL EPIDURAL INJECTION OF STEROID Left 1/27/2021    Procedure: Injection,steroid,epidural,transforaminal approach;  Surgeon: Bj Jones MD;  Location: [facility];  Service: Pain Management;  Laterality: Left;  L4-5, L5-S1    TRANSFORAMINAL EPIDURAL INJECTION OF STEROID Right 3/4/2021    Procedure: Injection,steroid,epidural,transforaminal approach;  Surgeon: Bj Jones MD;  Location: [facility];  Service: Pain Management;  Laterality: Right;  L4-L5, L5-S1    WRIST SURGERY  1993    carpal tunnel       Family History   Problem Relation Age of Onset    Breast cancer Mother     Breast cancer Maternal Aunt     Allergic rhinitis Neg Hx     Allergies Neg Hx      Angioedema Neg Hx     Asthma Neg Hx     Eczema Neg Hx     Immunodeficiency Neg Hx     Urticaria Neg Hx     Rhinitis Neg Hx     Atopy Neg Hx         Social History:   Marital Status:   Occupation: Data Unavailable  Alcohol History:  reports current alcohol use.  Tobacco History:  reports that she has never smoked. She has been exposed to tobacco smoke. She has never used smokeless tobacco.  Drug History:  reports no history of drug use.    Review of patient's allergies indicates:   Allergen Reactions    Dexlansoprazole Itching, Nausea Only and Rash    Sulfa (sulfonamide antibiotics) Rash    Floxacillin Itching    Januvia [sitagliptin] Other (See Comments)     Hot flashes    Tetracyclines Itching    Fenofibrate micronized Rash    Nitrofurantoin macrocrystalline Other (See Comments)     unknown    Phenylfenesin la [phenylpropanolamine-gg] Rash       Current Outpatient Medications   Medication Sig Dispense Refill    acetaminophen (TYLENOL) 650 MG TbSR Take 1,300 mg by mouth once daily. 2 Tablet(s) Oral  Every day.      amiodarone (PACERONE) 200 MG Tab Take 1 tablet by mouth once daily.      apixaban (ELIQUIS) 5 mg Tab Take 1 tablet (5 mg total) by mouth 2 (two) times daily. 180 tablet 3    blood sugar diagnostic (FREESTYLE LITE STRIPS) Strp USE TO TEST BLOOD SUGAR TWICE A  strip 3    busPIRone (BUSPAR) 10 MG tablet TAKE 1 TABLET BY MOUTH TWICE A DAY (Patient taking differently: Take 10 mg by mouth 2 (two) times daily.) 180 tablet 3    carboxymethylcellulose 1 % ophthalmic solution Apply 1 drop to eye As instructed. as directed      cetirizine (ZYRTEC) 5 MG tablet Take 1 tablet by mouth once daily.      coenzyme Q10 100 mg capsule Take 100 mg by mouth once daily.       cranberry extract 650 mg Cap Take 1 tablet by mouth 2 (two) times daily.      FARXIGA 5 mg Tab tablet Take 5 mg by mouth once daily.      furosemide (LASIX) 20 MG tablet Take 20 mg by mouth 2 (two) times daily.      Lactobac no.41/Bifidobact  no.7 (PROBIOTIC-10 ORAL) Take 1 tablet by mouth once daily.      lancets Misc 1 Units by Misc.(Non-Drug; Combo Route) route 2 (two) times daily. 200 each 3    lansoprazole (PREVACID) 30 MG capsule Take 1 capsule (30 mg total) by mouth once daily. 90 capsule 3    levalbuterol (XOPENEX) 1.25 mg/3 mL nebulizer solution Inhale 3 mLs into the lungs every 6 (six) hours while awake.      levothyroxine (SYNTHROID) 25 MCG tablet Take 25 mcg by mouth before breakfast.      metoprolol succinate (TOPROL XL) 25 MG 24 hr tablet Take 1 tablet (25 mg total) by mouth once daily. 90 tablet 3    midodrine (PROAMATINE) 10 MG tablet Take 10 mg by mouth 2 (two) times daily with meals.      nitroGLYCERIN (NITROSTAT) 0.4 MG SL tablet Place 0.4 mg under the tongue every 5 (five) minutes as needed. 0.4mg Sublingual PRN .        pramoxine-hydrocortisone (PROCTOCREAM-HC) 1-1 % rectal cream Place rectally 2 (two) times daily. 30 g 5    RESTASIS 0.05 % ophthalmic emulsion Place 1 drop into both eyes 2 (two) times daily.      rosuvastatin (CRESTOR) 10 MG tablet Take 10 mg by mouth once daily.      sacubitriL-valsartan (ENTRESTO) 24-26 mg per tablet Take 1 tablet by mouth once daily.      traZODone (DESYREL) 150 MG tablet Take 1 tablet (150 mg total) by mouth every evening. 30 tablet 11    vitamins  A,C,E-zinc-copper 14,320-226-200 unit-mg-unit Cap Take 1 capsule by mouth 2 (two) times daily.       cetirizine-pseudoephedrine 5-120 mg Tb12 Take 1 tablet by mouth once daily.      clotrimazole-betamethasone 1-0.05% (LOTRISONE) cream Apply 1 g topically 2 (two) times daily as needed.       No current facility-administered medications for this visit.         The patient's PFT show no obstruction, normal lung volumes and a mild diffusion defect.  Her 6 min walk was non hypoxemic    Chest xray 07/2023  Impression:     1. Loculated right pleural effusion.  2. Small left pleural effusion.  3. Right mid to lower lung interstitial opacities.       Review of  "Systems  General: always tired   Eyes: Vision is good.  ENT:  No sinusitis or pharyngitis.   Heart::no chest pain  Lungs: coughing up white mucous at times   GI: reflux  : No dysuria, hesitancy, or nocturia.  Musculoskeletal: No joint pain or myalgias.  Skin: No lesions or rashes.  Neuro: No headaches or neuropathy.  Lymph: swelling to legs   Psych: No anxiety or depression.  Endo: weight loss.    OBJECTIVE:      /60 (BP Location: Right arm, Patient Position: Sitting, BP Method: Medium (Manual))   Pulse 76   Ht 5' 2" (1.575 m)   Wt 60.3 kg (133 lb)   SpO2 95%   BMI 24.33 kg/m²     Physical Exam  GENERAL: Older patient in no distress. Appears fatigued    HEENT: Pupils equal and reactive. Extraocular movements intact. Nose intact.      Pharynx moist.  NECK: Supple.   HEART: Regular rate and rhythm. Loud murmur .  LUNGS: clear   ABDOMEN: Bowel sounds present. Non-tender, no masses palpated.  EXTREMITIES: Normal muscle tone and joint movement, no cyanosis or clubbing.   LYMPHATICS:lower legs wrapped +1 pitting edema   NEURO: Cranial nerves II-XII intact. Motor strength 5/5 bilaterally, upper and lower extremities.  PSYCH: Appropriate affect.    Assessment:       1. Mild persistent asthma, unspecified whether complicated    2. Cough, unspecified type    3. Nocturnal hypoxemia                  Plan:       Mild persistent asthma, unspecified whether complicated    Cough, unspecified type  -     X-Ray Chest PA And Lateral; Future    Nocturnal hypoxemia  -     PULSE OXIMETRY OVERNIGHT; Future; Expected date: 11/02/2023                Continue breo daily  Continue oxygen   Humidifier in bedroom if heater on in the cold  Avoid being outdoors right now due to poor air quality  Overnight pulse ox on room air to see if still needs   Will call with the PFT results   Chest xray  Sleep elevated, no eating or drinking at least 1.5 hours before bed   Need bedtime routine, try hard not to nap during the day it will take " away from sleep at night.   Follow up in about 6 months (around 5/2/2024).

## 2023-11-02 NOTE — PATIENT INSTRUCTIONS
Continue breo daily  Continue oxygen   Overnight pulse ox on room air to see if still needs   Will call with the PFT results   Chest xray  Sleep elevated, no eating or drinking at least 1.5 hours before bed   Follow up in about 6 months (around 5/2/2024).

## 2023-11-03 ENCOUNTER — INFUSION (OUTPATIENT)
Dept: INFUSION THERAPY | Facility: HOSPITAL | Age: 83
End: 2023-11-03
Attending: INTERNAL MEDICINE
Payer: MEDICARE

## 2023-11-03 ENCOUNTER — TELEPHONE (OUTPATIENT)
Dept: PULMONOLOGY | Facility: CLINIC | Age: 83
End: 2023-11-03

## 2023-11-03 VITALS
OXYGEN SATURATION: 98 % | TEMPERATURE: 98 F | HEART RATE: 75 BPM | SYSTOLIC BLOOD PRESSURE: 87 MMHG | DIASTOLIC BLOOD PRESSURE: 49 MMHG | RESPIRATION RATE: 18 BRPM

## 2023-11-03 DIAGNOSIS — D51.8 DIETARY VITAMIN B12 DEFICIENCY ANEMIA: Primary | ICD-10-CM

## 2023-11-03 DIAGNOSIS — D63.1 ANEMIA DUE TO CHRONIC RENAL FAILURE TREATED WITH ERYTHROPOIETIN, STAGE 3 (MODERATE): ICD-10-CM

## 2023-11-03 DIAGNOSIS — R91.1 SOLITARY PULMONARY NODULE: Primary | ICD-10-CM

## 2023-11-03 DIAGNOSIS — N18.30 ANEMIA DUE TO CHRONIC RENAL FAILURE TREATED WITH ERYTHROPOIETIN, STAGE 3 (MODERATE): ICD-10-CM

## 2023-11-03 PROCEDURE — 96372 THER/PROPH/DIAG INJ SC/IM: CPT

## 2023-11-03 PROCEDURE — 63600175 PHARM REV CODE 636 W HCPCS: Performed by: INTERNAL MEDICINE

## 2023-11-03 RX ORDER — CYANOCOBALAMIN 1000 UG/ML
1000 INJECTION, SOLUTION INTRAMUSCULAR; SUBCUTANEOUS ONCE
Status: COMPLETED | OUTPATIENT
Start: 2023-11-03 | End: 2023-11-03

## 2023-11-03 RX ORDER — CYANOCOBALAMIN 1000 UG/ML
1000 INJECTION, SOLUTION INTRAMUSCULAR; SUBCUTANEOUS ONCE
Status: CANCELLED | OUTPATIENT
Start: 2023-11-03

## 2023-11-03 RX ADMIN — CYANOCOBALAMIN 1000 MCG: 1000 INJECTION, SOLUTION INTRAMUSCULAR at 11:11

## 2023-11-03 NOTE — TELEPHONE ENCOUNTER
Chest xray  IMPRESSION:  1. Masslike densities in the right mid and upper hemithorax suggesting loculated pleural fluid and possible pseudotumor. CT could be utilized for confirmation.  2. Stable small left pleural effusion.  3. Fibrotic changes at the right lung base, stable.  4. Postoperative changes as above.

## 2023-11-03 NOTE — TELEPHONE ENCOUNTER
I spoke with the daughter. She will go to [place] to get last films there on a disc and bring to me to compare. She had TAVR in august

## 2023-11-07 ENCOUNTER — PATIENT MESSAGE (OUTPATIENT)
Dept: PULMONOLOGY | Facility: CLINIC | Age: 83
End: 2023-11-07

## 2023-11-09 ENCOUNTER — HOSPITAL ENCOUNTER (OUTPATIENT)
Dept: PULMONOLOGY | Facility: HOSPITAL | Age: 83
Discharge: HOME | End: 2023-11-09
Attending: NURSE PRACTITIONER
Payer: MEDICARE

## 2023-11-09 ENCOUNTER — TELEPHONE (OUTPATIENT)
Dept: PULMONOLOGY | Facility: CLINIC | Age: 83
End: 2023-11-09

## 2023-11-09 ENCOUNTER — PATIENT MESSAGE (OUTPATIENT)
Dept: PULMONOLOGY | Facility: CLINIC | Age: 83
End: 2023-11-09

## 2023-11-09 ENCOUNTER — TELEPHONE (OUTPATIENT)
Dept: FAMILY MEDICINE | Facility: CLINIC | Age: 83
End: 2023-11-09
Payer: MEDICARE

## 2023-11-09 DIAGNOSIS — R06.00 DYSPNEA, UNSPECIFIED TYPE: ICD-10-CM

## 2023-11-09 PROCEDURE — 94729 DIFFUSING CAPACITY: CPT

## 2023-11-09 PROCEDURE — 94727 GAS DIL/WSHOT DETER LNG VOL: CPT

## 2023-11-09 PROCEDURE — 94010 BREATHING CAPACITY TEST: CPT

## 2023-11-09 NOTE — TELEPHONE ENCOUNTER
[facility] said the blister ruptured on it's own.   She is asking for an order to apply Medihoney 3 times a week.   Patient has Medihoney available at home

## 2023-11-09 NOTE — TELEPHONE ENCOUNTER
[facility]     states patient has very large blister on left lower leg    would like Home Health nurse to pop it and do wound care. She needs orders   Please advise   She is at the home now        ----- Message from Rico Munoz sent at 11/9/2023  9:43 AM CST -----  Contact: [facility]  Type: Needs Medical Advice  Who Called: [facility]  Best Call Back Number: [phone]  Additional Information: [facility] is calling the office pt has a blister on leg  wants nurse to pop blister nurse is trying to see if she can get orders to do so she is on the way to see pt now.Please call back and advise.

## 2023-11-14 ENCOUNTER — LAB VISIT (OUTPATIENT)
Dept: LAB | Facility: HOSPITAL | Age: 83
End: 2023-11-14
Attending: INTERNAL MEDICINE
Payer: MEDICARE

## 2023-11-14 DIAGNOSIS — D72.819 LEUCOPENIA: ICD-10-CM

## 2023-11-14 DIAGNOSIS — Z86.79 PERSONAL HISTORY OF UNSPECIFIED CIRCULATORY DISEASE: ICD-10-CM

## 2023-11-14 DIAGNOSIS — N18.9 CHRONIC KIDNEY DISEASE, UNSPECIFIED: Primary | ICD-10-CM

## 2023-11-14 LAB
ANION GAP SERPL CALC-SCNC: 5 MMOL/L (ref 8–16)
BASOPHILS # BLD AUTO: 0.01 K/UL (ref 0–0.2)
BASOPHILS NFR BLD: 0.4 % (ref 0–1.9)
BNP SERPL-MCNC: 3261 PG/ML (ref 0–99)
BUN SERPL-MCNC: 76 MG/DL (ref 8–23)
CALCIUM SERPL-MCNC: 8.9 MG/DL (ref 8.7–10.5)
CHLORIDE SERPL-SCNC: 104 MMOL/L (ref 95–110)
CO2 SERPL-SCNC: 27 MMOL/L (ref 23–29)
CREAT SERPL-MCNC: 1.8 MG/DL (ref 0.5–1.4)
DIFFERENTIAL METHOD BLD: ABNORMAL
EOSINOPHIL # BLD AUTO: 0.1 K/UL (ref 0–0.5)
EOSINOPHIL NFR BLD: 3 % (ref 0–8)
ERYTHROCYTE [DISTWIDTH] IN BLOOD BY AUTOMATED COUNT: 21.2 % (ref 11.5–14.5)
EST. GFR  (NO RACE VARIABLE): 27.6 ML/MIN/1.73 M^2
GLUCOSE SERPL-MCNC: 91 MG/DL (ref 70–110)
HCT VFR BLD AUTO: 30.7 % (ref 37–48.5)
HGB BLD-MCNC: 9.3 G/DL (ref 12–16)
IMM GRANULOCYTES # BLD AUTO: 0.01 K/UL (ref 0–0.04)
IMM GRANULOCYTES NFR BLD AUTO: 0.4 % (ref 0–0.5)
LYMPHOCYTES # BLD AUTO: 0.7 K/UL (ref 1–4.8)
LYMPHOCYTES NFR BLD: 30.9 % (ref 18–48)
MCH RBC QN AUTO: 25.4 PG (ref 27–31)
MCHC RBC AUTO-ENTMCNC: 30.3 G/DL (ref 32–36)
MCV RBC AUTO: 84 FL (ref 82–98)
MONOCYTES # BLD AUTO: 0.4 K/UL (ref 0.3–1)
MONOCYTES NFR BLD: 18.5 % (ref 4–15)
NEUTROPHILS # BLD AUTO: 1.1 K/UL (ref 1.8–7.7)
NEUTROPHILS NFR BLD: 46.8 % (ref 38–73)
NRBC BLD-RTO: 0 /100 WBC
PLATELET # BLD AUTO: 130 K/UL (ref 150–450)
PMV BLD AUTO: 11.7 FL (ref 9.2–12.9)
POTASSIUM SERPL-SCNC: 4.7 MMOL/L (ref 3.5–5.1)
RBC # BLD AUTO: 3.66 M/UL (ref 4–5.4)
SODIUM SERPL-SCNC: 136 MMOL/L (ref 136–145)
WBC # BLD AUTO: 2.33 K/UL (ref 3.9–12.7)

## 2023-11-14 PROCEDURE — 80048 BASIC METABOLIC PNL TOTAL CA: CPT | Performed by: INTERNAL MEDICINE

## 2023-11-14 PROCEDURE — 83880 ASSAY OF NATRIURETIC PEPTIDE: CPT | Performed by: INTERNAL MEDICINE

## 2023-11-14 PROCEDURE — 85025 COMPLETE CBC W/AUTO DIFF WBC: CPT | Performed by: INTERNAL MEDICINE

## 2023-11-14 PROCEDURE — 36415 COLL VENOUS BLD VENIPUNCTURE: CPT | Performed by: INTERNAL MEDICINE

## 2023-11-15 ENCOUNTER — HOSPITAL ENCOUNTER (OUTPATIENT)
Dept: RADIOLOGY | Facility: HOSPITAL | Age: 83
Discharge: HOME | End: 2023-11-15
Attending: NURSE PRACTITIONER
Payer: MEDICARE

## 2023-11-15 DIAGNOSIS — R91.1 SOLITARY PULMONARY NODULE: ICD-10-CM

## 2023-11-15 PROCEDURE — 71250 CT THORAX DX C-: CPT | Mod: TC,PO

## 2023-11-16 ENCOUNTER — TELEPHONE (OUTPATIENT)
Dept: PULMONOLOGY | Facility: CLINIC | Age: 83
End: 2023-11-16

## 2023-11-16 NOTE — TELEPHONE ENCOUNTER
CT scan   IMPRESSION:   Near complete resolution of a large right pleural effusion since the preceding CT scan dated 4/18/2023. A small left pleural effusion is unchanged. The lungs are currently fairly well-expanded and appear free of acute infiltrates. No discrete lung masses are evident. The heart is moderately enlarged and has increased in size somewhat since the preceding CT scan.

## 2023-11-17 PROBLEM — I50.43 ACUTE ON CHRONIC COMBINED SYSTOLIC AND DIASTOLIC CONGESTIVE HEART FAILURE, NYHA CLASS 3: Status: ACTIVE | Noted: 2023-11-17

## 2023-11-20 ENCOUNTER — PATIENT MESSAGE (OUTPATIENT)
Dept: HEMATOLOGY/ONCOLOGY | Facility: CLINIC | Age: 83
End: 2023-11-20

## 2023-11-21 ENCOUNTER — TELEPHONE (OUTPATIENT)
Dept: HEMATOLOGY/ONCOLOGY | Facility: CLINIC | Age: 83
End: 2023-11-21

## 2023-11-21 DIAGNOSIS — D50.9 IRON DEFICIENCY ANEMIA, UNSPECIFIED IRON DEFICIENCY ANEMIA TYPE: Primary | ICD-10-CM

## 2023-11-22 ENCOUNTER — LAB VISIT (OUTPATIENT)
Dept: LAB | Facility: HOSPITAL | Age: 83
End: 2023-11-22
Attending: INTERNAL MEDICINE
Payer: MEDICARE

## 2023-11-22 ENCOUNTER — TELEPHONE (OUTPATIENT)
Dept: HEMATOLOGY/ONCOLOGY | Facility: CLINIC | Age: 83
End: 2023-11-22

## 2023-11-22 DIAGNOSIS — D50.9 IRON DEFICIENCY ANEMIA, UNSPECIFIED IRON DEFICIENCY ANEMIA TYPE: ICD-10-CM

## 2023-11-22 LAB
BASOPHILS # BLD AUTO: 0.02 K/UL (ref 0–0.2)
BASOPHILS NFR BLD: 0.8 % (ref 0–1.9)
DIFFERENTIAL METHOD: ABNORMAL
EOSINOPHIL # BLD AUTO: 0.1 K/UL (ref 0–0.5)
EOSINOPHIL NFR BLD: 2 % (ref 0–8)
ERYTHROCYTE [DISTWIDTH] IN BLOOD BY AUTOMATED COUNT: 21.9 % (ref 11.5–14.5)
FERRITIN SERPL-MCNC: 56.2 NG/ML (ref 20–300)
HCT VFR BLD AUTO: 33.8 % (ref 37–48.5)
HGB BLD-MCNC: 10.2 G/DL (ref 12–16)
IMM GRANULOCYTES # BLD AUTO: 0.01 K/UL (ref 0–0.04)
IMM GRANULOCYTES NFR BLD AUTO: 0.4 % (ref 0–0.5)
IRON SERPL-MCNC: 34 UG/DL (ref 30–160)
LYMPHOCYTES # BLD AUTO: 0.9 K/UL (ref 1–4.8)
LYMPHOCYTES NFR BLD: 36.8 % (ref 18–48)
MCH RBC QN AUTO: 26.1 PG (ref 27–31)
MCHC RBC AUTO-ENTMCNC: 30.2 G/DL (ref 32–36)
MCV RBC AUTO: 86 FL (ref 82–98)
MONOCYTES # BLD AUTO: 0.4 K/UL (ref 0.3–1)
MONOCYTES NFR BLD: 17.8 % (ref 4–15)
NEUTROPHILS # BLD AUTO: 1 K/UL (ref 1.8–7.7)
NEUTROPHILS NFR BLD: 42.2 % (ref 38–73)
NRBC BLD-RTO: 0 /100 WBC
PLATELET # BLD AUTO: 151 K/UL (ref 150–450)
PMV BLD AUTO: 10.8 FL (ref 9.2–12.9)
RBC # BLD AUTO: 3.91 M/UL (ref 4–5.4)
SATURATED IRON: 8 % (ref 20–50)
TOTAL IRON BINDING CAPACITY: 428 UG/DL (ref 250–450)
TRANSFERRIN SERPL-MCNC: 306 MG/DL (ref 200–375)
WBC # BLD AUTO: 2.47 K/UL (ref 3.9–12.7)

## 2023-11-22 PROCEDURE — 36415 COLL VENOUS BLD VENIPUNCTURE: CPT | Performed by: INTERNAL MEDICINE

## 2023-11-22 PROCEDURE — 84466 ASSAY OF TRANSFERRIN: CPT | Performed by: INTERNAL MEDICINE

## 2023-11-22 PROCEDURE — 82728 ASSAY OF FERRITIN: CPT | Performed by: INTERNAL MEDICINE

## 2023-11-22 PROCEDURE — 85025 COMPLETE CBC W/AUTO DIFF WBC: CPT | Performed by: INTERNAL MEDICINE

## 2023-11-22 PROCEDURE — 83540 ASSAY OF IRON: CPT | Performed by: INTERNAL MEDICINE

## 2023-11-22 NOTE — TELEPHONE ENCOUNTER
Spoke with daughter and informed her that Dr Cordoba did placed orders for her Retacrit but her insurance company  ([employer]) is requesting a recent iron saturation lab result that shows it is above 20. Placed orders for labs and daughter said should try to do them today. Advised that after labs reviewed a new auth is generated and they should call her to set up appts. Daughter verbalized understanding.

## 2023-11-27 ENCOUNTER — TELEPHONE (OUTPATIENT)
Dept: HEMATOLOGY/ONCOLOGY | Facility: CLINIC | Age: 83
End: 2023-11-27

## 2023-11-27 NOTE — TELEPHONE ENCOUNTER
Spoke with Lexis, pts daughter, and informed her that her hemoglobin is above 10 so she doesn't meet criteria to start Retacrit this week. Informed that her next lab/CBC is for 12/6 and from those labs we will determine if she will come in for her Retacrit appt on 12/14/23. Daughter verbalized understanding.

## 2023-11-28 RX ORDER — HYDROCORTISONE ACETATE PRAMOXINE HCL 1; 1 G/100G; G/100G
CREAM TOPICAL 2 TIMES DAILY
Qty: 30 G | Refills: 5 | Status: ON HOLD | OUTPATIENT
Start: 2023-11-28 | End: 2023-12-13 | Stop reason: HOSPADM

## 2023-11-28 NOTE — TELEPHONE ENCOUNTER
Refill Routing Note   Medication(s) are not appropriate for processing by [facility] for the following reason(s):        Outside of protocol    [facility] action(s):  Route               Appointments  past 12m or future 3m with PCP    Date Provider   Last Visit   9/14/2023 Oumar Almonte Jr., MD   Next Visit   12/11/2023 Oumar Almonte Jr., MD   ED visits in past 90 days: 0        Note composed:8:57 AM 11/28/2023

## 2023-11-29 ENCOUNTER — LAB VISIT (OUTPATIENT)
Dept: LAB | Facility: HOSPITAL | Age: 83
End: 2023-11-29
Attending: INTERNAL MEDICINE
Payer: MEDICARE

## 2023-11-29 DIAGNOSIS — N25.81 SECONDARY HYPERPARATHYROIDISM OF RENAL ORIGIN: ICD-10-CM

## 2023-11-29 DIAGNOSIS — N18.30 CHRONIC KIDNEY DISEASE, STAGE III (MODERATE): Primary | ICD-10-CM

## 2023-11-29 DIAGNOSIS — R80.9 PROTEINURIA: ICD-10-CM

## 2023-11-29 DIAGNOSIS — I10 ESSENTIAL HYPERTENSION, MALIGNANT: ICD-10-CM

## 2023-11-29 LAB
25(OH)D3+25(OH)D2 SERPL-MCNC: 53 NG/ML (ref 30–96)
ALBUMIN SERPL BCP-MCNC: 3.4 G/DL (ref 3.5–5.2)
ANION GAP SERPL CALC-SCNC: 9 MMOL/L (ref 8–16)
BACTERIA #/AREA URNS HPF: NEGATIVE /HPF
BASOPHILS # BLD AUTO: 0.02 K/UL (ref 0–0.2)
BASOPHILS NFR BLD: 0.8 % (ref 0–1.9)
BUN SERPL-MCNC: 93 MG/DL (ref 8–23)
CALCIUM SERPL-MCNC: 9.3 MG/DL (ref 8.7–10.5)
CHLORIDE SERPL-SCNC: 107 MMOL/L (ref 95–110)
CO2 SERPL-SCNC: 22 MMOL/L (ref 23–29)
CREAT SERPL-MCNC: 2.1 MG/DL (ref 0.5–1.4)
CREAT UR-MCNC: 37.4 MG/DL (ref 15–325)
DIFFERENTIAL METHOD BLD: ABNORMAL
EOSINOPHIL # BLD AUTO: 0.1 K/UL (ref 0–0.5)
EOSINOPHIL NFR BLD: 2.7 % (ref 0–8)
ERYTHROCYTE [DISTWIDTH] IN BLOOD BY AUTOMATED COUNT: 21.9 % (ref 11.5–14.5)
EST. GFR  (NO RACE VARIABLE): 22.9 ML/MIN/1.73 M^2
GLUCOSE SERPL-MCNC: 108 MG/DL (ref 70–110)
HCT VFR BLD AUTO: 33 % (ref 37–48.5)
HGB BLD-MCNC: 10 G/DL (ref 12–16)
HYALINE CASTS #/AREA URNS LPF: 10 /LPF
IMM GRANULOCYTES # BLD AUTO: 0.01 K/UL (ref 0–0.04)
IMM GRANULOCYTES NFR BLD AUTO: 0.4 % (ref 0–0.5)
LYMPHOCYTES # BLD AUTO: 0.8 K/UL (ref 1–4.8)
LYMPHOCYTES NFR BLD: 31.3 % (ref 18–48)
MAGNESIUM SERPL-MCNC: 2.1 MG/DL (ref 1.6–2.6)
MCH RBC QN AUTO: 26.2 PG (ref 27–31)
MCHC RBC AUTO-ENTMCNC: 30.3 G/DL (ref 32–36)
MCV RBC AUTO: 87 FL (ref 82–98)
MICROSCOPIC COMMENT: ABNORMAL
MONOCYTES # BLD AUTO: 0.4 K/UL (ref 0.3–1)
MONOCYTES NFR BLD: 13.7 % (ref 4–15)
NEUTROPHILS # BLD AUTO: 1.3 K/UL (ref 1.8–7.7)
NEUTROPHILS NFR BLD: 51.1 % (ref 38–73)
NRBC BLD-RTO: 0 /100 WBC
PHOSPHATE SERPL-MCNC: 4.9 MG/DL (ref 2.7–4.5)
PLATELET # BLD AUTO: 116 K/UL (ref 150–450)
PMV BLD AUTO: 11.1 FL (ref 9.2–12.9)
POTASSIUM SERPL-SCNC: 4.3 MMOL/L (ref 3.5–5.1)
PROT UR-MCNC: 25 MG/DL (ref 6–15)
PTH-INTACT SERPL-MCNC: 21.4 PG/ML (ref 9–77)
RBC # BLD AUTO: 3.81 M/UL (ref 4–5.4)
RBC #/AREA URNS HPF: 2 /HPF (ref 0–4)
SODIUM SERPL-SCNC: 138 MMOL/L (ref 136–145)
SQUAMOUS #/AREA URNS HPF: 3 /HPF
URATE SERPL-MCNC: 6.7 MG/DL (ref 2.4–5.7)
WBC # BLD AUTO: 2.56 K/UL (ref 3.9–12.7)
WBC #/AREA URNS HPF: 1 /HPF (ref 0–5)

## 2023-11-29 PROCEDURE — 85025 COMPLETE CBC W/AUTO DIFF WBC: CPT | Performed by: INTERNAL MEDICINE

## 2023-11-29 PROCEDURE — 82570 ASSAY OF URINE CREATININE: CPT | Performed by: INTERNAL MEDICINE

## 2023-11-29 PROCEDURE — 82306 VITAMIN D 25 HYDROXY: CPT | Performed by: INTERNAL MEDICINE

## 2023-11-29 PROCEDURE — 83735 ASSAY OF MAGNESIUM: CPT | Performed by: INTERNAL MEDICINE

## 2023-11-29 PROCEDURE — 84156 ASSAY OF PROTEIN URINE: CPT | Performed by: INTERNAL MEDICINE

## 2023-11-29 PROCEDURE — 36415 COLL VENOUS BLD VENIPUNCTURE: CPT | Performed by: INTERNAL MEDICINE

## 2023-11-29 PROCEDURE — 80069 RENAL FUNCTION PANEL: CPT | Performed by: INTERNAL MEDICINE

## 2023-11-29 PROCEDURE — 83970 ASSAY OF PARATHORMONE: CPT | Performed by: INTERNAL MEDICINE

## 2023-11-29 PROCEDURE — 81001 URINALYSIS AUTO W/SCOPE: CPT | Performed by: INTERNAL MEDICINE

## 2023-11-29 PROCEDURE — 84550 ASSAY OF BLOOD/URIC ACID: CPT | Performed by: INTERNAL MEDICINE

## 2023-12-01 ENCOUNTER — INFUSION (OUTPATIENT)
Dept: INFUSION THERAPY | Facility: HOSPITAL | Age: 83
End: 2023-12-01
Attending: INTERNAL MEDICINE
Payer: MEDICARE

## 2023-12-01 VITALS
DIASTOLIC BLOOD PRESSURE: 61 MMHG | HEIGHT: 62 IN | BODY MASS INDEX: 24.66 KG/M2 | SYSTOLIC BLOOD PRESSURE: 107 MMHG | OXYGEN SATURATION: 99 % | WEIGHT: 134 LBS | TEMPERATURE: 98 F | RESPIRATION RATE: 16 BRPM | HEART RATE: 75 BPM

## 2023-12-01 DIAGNOSIS — D63.1 ANEMIA DUE TO CHRONIC RENAL FAILURE TREATED WITH ERYTHROPOIETIN, STAGE 3 (MODERATE): ICD-10-CM

## 2023-12-01 DIAGNOSIS — N18.30 ANEMIA DUE TO CHRONIC RENAL FAILURE TREATED WITH ERYTHROPOIETIN, STAGE 3 (MODERATE): ICD-10-CM

## 2023-12-01 DIAGNOSIS — D51.8 DIETARY VITAMIN B12 DEFICIENCY ANEMIA: Primary | ICD-10-CM

## 2023-12-01 PROCEDURE — 63600175 PHARM REV CODE 636 W HCPCS: Performed by: INTERNAL MEDICINE

## 2023-12-01 PROCEDURE — 96372 THER/PROPH/DIAG INJ SC/IM: CPT

## 2023-12-01 RX ORDER — CYANOCOBALAMIN 1000 UG/ML
1000 INJECTION, SOLUTION INTRAMUSCULAR; SUBCUTANEOUS ONCE
Status: COMPLETED | OUTPATIENT
Start: 2023-12-01 | End: 2023-12-01

## 2023-12-01 RX ORDER — CYANOCOBALAMIN 1000 UG/ML
1000 INJECTION, SOLUTION INTRAMUSCULAR; SUBCUTANEOUS ONCE
Status: CANCELLED | OUTPATIENT
Start: 2023-12-01

## 2023-12-01 RX ADMIN — CYANOCOBALAMIN 1000 MCG: 1000 INJECTION, SOLUTION INTRAMUSCULAR at 04:12

## 2023-12-01 NOTE — PLAN OF CARE
Problem: Activity Intolerance  Goal: Enhanced Capacity and Energy  Outcome: Ongoing, Progressing  Intervention: Optimize Activity Tolerance  Flowsheets (Taken 12/1/2023 1618)  Self-Care Promotion: independence encouraged  Activity Management: Ambulated -L4

## 2023-12-04 ENCOUNTER — PATIENT MESSAGE (OUTPATIENT)
Dept: PULMONOLOGY | Facility: CLINIC | Age: 83
End: 2023-12-04

## 2023-12-04 ENCOUNTER — PATIENT MESSAGE (OUTPATIENT)
Dept: HEMATOLOGY/ONCOLOGY | Facility: CLINIC | Age: 83
End: 2023-12-04

## 2023-12-04 RX ORDER — LEVALBUTEROL INHALATION SOLUTION 1.25 MG/3ML
3 SOLUTION RESPIRATORY (INHALATION) EVERY 6 HOURS PRN
Qty: 120 EACH | Refills: 6 | Status: SHIPPED | OUTPATIENT
Start: 2023-12-04

## 2023-12-07 ENCOUNTER — TELEPHONE (OUTPATIENT)
Dept: PULMONOLOGY | Facility: CLINIC | Age: 83
End: 2023-12-07

## 2023-12-07 ENCOUNTER — PATIENT MESSAGE (OUTPATIENT)
Dept: PULMONOLOGY | Facility: CLINIC | Age: 83
End: 2023-12-07

## 2023-12-07 NOTE — TELEPHONE ENCOUNTER
Overnight pulse ox on room air shows sats less then 88 for 10 mins, lowest sat 83%. She needs to sleep on her oxygen

## 2023-12-18 ENCOUNTER — PATIENT MESSAGE (OUTPATIENT)
Dept: FAMILY MEDICINE | Facility: CLINIC | Age: 83
End: 2023-12-18
Payer: MEDICARE

## 2023-12-20 ENCOUNTER — PATIENT MESSAGE (OUTPATIENT)
Dept: HEMATOLOGY/ONCOLOGY | Facility: CLINIC | Age: 83
End: 2023-12-20

## 2023-12-30 ENCOUNTER — PATIENT MESSAGE (OUTPATIENT)
Dept: FAMILY MEDICINE | Facility: CLINIC | Age: 83
End: 2023-12-30
Payer: MEDICARE

## 2024-11-06 NOTE — MR AVS SNAPSHOT
[facility]  [address]  [place] [address]  Phone: [phone]                  Darlin Tipton   2017 11:00 AM   Office Visit    Description:  Female : 1940   Provider:  Luis Uriostegui MD   Department:  [facility]           Reason for Visit     Follow-up     Bronchitis     Cough           Diagnoses this Visit        Comments    Chronic sinus complaints    -  Primary     Mixed simple and mucopurulent chronic bronchitis         Chronic cough         Asthma, persistent         Immune deficiency disorder                To Do List           Future Appointments        Provider Department Dept Phone    3/2/2017 10:30 AM [facility] [phone]    3/6/2017 10:15 AM MD [place] [facility] Urology [phone]    3/10/2017 10:45 AM [facility] [phone]    2017 10:00 AM [facility] PULMONARY [facility] [phone]    2017 10:20 AM MD [place] [facility] [phone]      Goals (5 Years of Data)     None      Follow-Up and Disposition     Return in about 3 months (around 2017).    Follow-up and Disposition History       These Medications        Disp Refills Start End    azelastine (ASTELIN) 137 mcg (0.1 %) nasal spray 90 mL 3 2017    1 spray (137 mcg total) by Nasal route 2 (two) times daily. - Nasal    Pharmacy: [facility] - [address] Ph #: [phone]       azithromycin (ZITHROMAX) 500 MG tablet 3 tablet 3 2017     One daily for yellow mucous, repeat if needed    Pharmacy: [facility] - [address] Ph #: [phone]       predniSONE (DELTASONE) 20 MG tablet 12 tablet 0 2017     One daily for 3 days and repeat for flare of lung symptoms as intructed    Pharmacy: [facility] - [address]  Detail Level: Generalized [facility] #: [phone]         [facility]     [facility] Care [facility] -  Assistance  Registered nurses in the [facility] provide clinical advisement, health education, appointment booking, and other advisory services.  Call for this free service at [phone].             Medications           Message regarding Medications     Verify the changes and/or additions to your medication regime listed below are the same as discussed with your clinician today.  If any of these changes or additions are incorrect, please notify your healthcare provider.        START taking these NEW medications        Refills    azelastine (ASTELIN) 137 mcg (0.1 %) nasal spray 3    Si spray (137 mcg total) by Nasal route 2 (two) times daily.    Class: Normal    Route: Nasal    predniSONE (DELTASONE) 20 MG tablet 0    Sig: One daily for 3 days and repeat for flare of lung symptoms as intructed    Class: Print      STOP taking these medications     aspirin 325 MG tablet Take 1 tablet (325 mg total) by mouth once daily.           Verify that the below list of medications is an accurate representation of the medications you are currently taking.  If none reported, the list may be blank. If incorrect, please contact your healthcare provider. Carry this list with you in case of emergency.           Current Medications     albuterol 90 mcg/actuation inhaler 2 puffs every 4 hours as needed for cough, wheeze, or shortness of breath    amitriptyline (ELAVIL) 50 MG tablet Take 50 mg by mouth every evening. Every day PRN    biotin 5 mg Tab Take by mouth.    carboxymethylcellulose (REFRESH) 1 % ophthalmic solution as directed    cholecalciferol, vitamin D3, (VITAMIN D3) 5,000 unit Tab Take 5,000 Units by mouth once daily.    coenzyme Q10 100 mg capsule 1 Capsule(s) Oral PRN Every day.      cranberry extract (THERACRAN) 650 mg Cap Take 1 tablet by mouth 2 (two) times daily.      diclofenac sodium (VOLTAREN) 1 %  Gel APPLY 2 G TOPICALLY 3 (THREE) TIMES DAILY.    digoxin (LANOXIN) 0.25 MG tablet Take 250 mcg by mouth once daily. 1/2 daily    fenofibric acid (TRILIPIX) 135 mg capsule Every day    fluticasone (FLONASE) 50 mcg/actuation nasal spray USE ONE SPRAY IN EACH NOSTRIL DAILY    fluticasone-vilanterol (BREO ELLIPTA) 200-25 mcg/dose DsDv diskus inhaler Inhale 1 puff into the lungs once daily.    FREESTYLE LITE STRIPS Strp USE TO TEST BLOOD SUGAR TWICE A DAY    hydrocodone-acetaminophen 5-325mg (NORCO) 5-325 mg per tablet Take 1 tablet by mouth every 6 (six) hours as needed for Pain (cough).    isosorbide mononitrate (IMDUR) 60 MG 24 hr tablet Take 60 mg by mouth once daily.    lancets Misc 1 Units by Misc.(Non-Drug; Combo Route) route 2 (two) times daily.    levothyroxine (LEVOTHROID) 25 MCG tablet Take 25 mcg by mouth before breakfast. Every day    metaxalone (SKELAXIN) 800 MG tablet TAKE 1 TABLET TWICE A DAY    metformin (GLUCOPHAGE) 500 MG tablet TAKE 1 TABLET TWICE A DAY WITH MEALS    MYRBETRIQ 50 mg Tb24     PREMARIN vaginal cream INSERT 1 GRAM VAGINALLY ONCE DAILY    rabeprazole (ACIPHEX) 20 mg tablet Take 20 mg by mouth once daily.      simvastatin (ZOCOR) 20 MG tablet Take 20 mg by mouth every evening. Every day    SPIRIVA WITH HANDIHALER 18 mcg inhalation capsule INHALE THE CONTENTS OF 1 CAPSULE DAILY    TOPROL XL 25 mg 24 hr tablet     triazolam (HALCION) 0.125 MG tablet Take 1 tablet (0.125 mg total) by mouth nightly as needed.    acetaminophen (TYLENOL ARTHRITIS) 650 MG tablet 2 Tablet(s) Oral PRN Every day.      azelastine (ASTELIN) 137 mcg (0.1 %) nasal spray 1 spray (137 mcg total) by Nasal route 2 (two) times daily.    azithromycin (ZITHROMAX) 500 MG tablet One daily for yellow mucous, repeat if needed    ciclopirox (LOPROX) 0.77 % Crea Apply topically 2 (two) times daily.    hydrOXYzine HCl (ATARAX) 25 MG tablet TAKE 1 TABLET BY MOUTH EVERY EVENING AS NEEDED PRURITUS. DO NOT DRIVE WHILE TAKING     "magnesium oxide (MAG-OX) 400 mg tablet Take 1 tablet (400 mg total) by mouth 2 (two) times daily.    nitroGLYCERIN (NITROSTAT) 0.4 MG SL tablet 0.4mg Sublingual PRN .      predniSONE (DELTASONE) 20 MG tablet One daily for 3 days and repeat for flare of lung symptoms as intructed    predniSONE (DELTASONE) 20 MG tablet One daily for 3 days and repeat for flare of lung symptoms as intructed    ranitidine (ZANTAC) 150 MG tablet            Clinical Reference Information           Your Vitals Were     BP Pulse Height Weight SpO2 BMI    137/82 (BP Location: Right arm, Patient Position: Sitting, BP Method: Automatic) 96 5' 2" (1.575 m) 59.7 kg (131 lb 9.8 oz) 94% 24.07 kg/m2      Blood Pressure          Most Recent Value    BP  137/82      Allergies as of 2/9/2017     Cefaclor    Disalcid  [Salsalate]    Fenofibrate Micronized    Nitrofurantoin Macrocrystalline    Ofloxacin    Penicillins    Phenylfenesin La  [Phenylpropanolamine-gg]    Sulfa (Sulfonamide Antibiotics)      Immunizations Administered on Date of Encounter - 2/9/2017     None      Orders Placed During Today's Visit     Future Labs/Procedures Expected by Expires    Complete PFT with bronchodilator  5/9/2017 2/9/2018      Instructions    Sinus    Try astelin along with flonase, may use as needed or regular.   Take z ayala if pain or yellow mucous     If remains unstable needing therapy, would do ct sinus, could do prior to next visit if explicitly ask for ct- call if needed    Asthma    Continue breo and use prednisone daily for 3 days if needed.     Check lung capacity prior next visit    Immune weakness   Will re check if not stable.       Language Assistance Services     ATTENTION: Language assistance services are available, free of charge. Please call [phone].      ATENCIÓN: Si riccola raymundo, tiene a ruth disposición servicios gratuitos de asistencia lingüística. Llame al [phone].     CHÚ Ý: N?u b?n nói Ti?ng Vi?t, có các d?ch v? h? tr? ngôn ng? " mi?n phí dành cho b?n. G?i s? [phone].         [facility] complies with applicable Federal civil rights laws and does not discriminate on the basis of race, color, national origin, age, disability, or sex.         Detail Level: Detailed

## (undated) DEVICE — NDL HYPODERMIC BLUNT 18G 1.5IN

## (undated) DEVICE — TUBING MINIBORE EXTENSION

## (undated) DEVICE — APPLICATOR CHLORAPREP CLR 10.5

## (undated) DEVICE — NDL SAFETY 25G X 1.5 ECLIPSE

## (undated) DEVICE — GLOVE SURG ULTRA TOUCH 6

## (undated) DEVICE — NDL SPINAL SPINOCAN 22GX3.5

## (undated) DEVICE — GOWN SMART LRG 044673

## (undated) DEVICE — SYR DISP LL 5CC

## (undated) DEVICE — SOLUTION NACL 0.9% 3000ML

## (undated) DEVICE — CHLORAPREP 10.5 ML APPLICATOR

## (undated) DEVICE — UNDERGLOVE BIOGEL PI MICRO BLUE SZ 6.5

## (undated) DEVICE — CATHETER DIAGNOSTIC DXTERITY 6FR JL 4

## (undated) DEVICE — SYS LABEL CORRECT MED

## (undated) DEVICE — STERILE WATER 1000ML BOTTLE

## (undated) DEVICE — SCRUB HIBICLENS 4% CHG 4OZ

## (undated) DEVICE — TRAY SKIN PREP DRY

## (undated) DEVICE — CATHETER DIAGNOSTIC DXTERITY 6FR LCB

## (undated) DEVICE — GUIDEWIRE DOUBLE ENDED .035 DIA. 150CML

## (undated) DEVICE — SOCK COLLECTION FLUENT SYSTEM

## (undated) DEVICE — SEE ITEM #152308

## (undated) DEVICE — SOLUTION IRRI NS BOTTLE 1000ML R5200-01

## (undated) DEVICE — GLOVE SURG ULTRA TOUCH 7.5

## (undated) DEVICE — CATHETER URETHRAL RED 16FR

## (undated) DEVICE — SEE L#120831

## (undated) DEVICE — KIT MICROINTRODUCE MINI 5X10CM

## (undated) DEVICE — SYR 10CC LUER LOCK

## (undated) DEVICE — CATHETER DIAGNOSTIC DXTERITY 5FR IMA

## (undated) DEVICE — JELLY LUBRICANT STERILE 4 OZ

## (undated) DEVICE — PAD MATERNITY

## (undated) DEVICE — SEAL SCOPE LENS MYSOSURE  40-902

## (undated) DEVICE — TOWEL OR BLUE      MDT2168284

## (undated) DEVICE — WATER STERILE INJ 500ML BAG

## (undated) DEVICE — GLOVE BIOGEL MICRO SURGEON PINK SZ 7

## (undated) DEVICE — CATHETER GUIDE LAUNCHER IMA 6FX90CM

## (undated) DEVICE — GLOVE SURGEONS ULTRA TOUCH 6.5

## (undated) DEVICE — SOLUTION SCRUB IODINE 4OZ

## (undated) DEVICE — PAD POST PERINEAL NO-001

## (undated) DEVICE — CATHETER DIAGNOSTIC DXTERITY 6FR JR 4.0

## (undated) DEVICE — DRESSING TELFA 3X8  1238

## (undated) DEVICE — SYSTEM FLUENT FLUID MANAGEMENT

## (undated) DEVICE — SHEET DRAPE MEDIUM

## (undated) DEVICE — GOWN X-LARGE 044674

## (undated) DEVICE — SET CYSTO IRRIGATION UNIV SPIK

## (undated) DEVICE — CABLE MONOPOLAR 10FT DISPOSABLE

## (undated) DEVICE — GUIDEWIRE RUNTHROUGH 180CM

## (undated) DEVICE — PACK LITHOTOMY 88521

## (undated) DEVICE — TRAY MINOR SLIDELL MEMORIAL HOSPITAL

## (undated) DEVICE — SHEATH PINNACLE 6FRX10CM W/GUIDEWIRE

## (undated) DEVICE — CATHETER EAGLE EYE 5FR 85900P

## (undated) DEVICE — SEE MEDLINE ITEM 152651

## (undated) DEVICE — GLOVE BIOGEL MICRO SURGEON PINK SZ 6.5

## (undated) DEVICE — COVER LIGHT HANDLE LB53

## (undated) DEVICE — SOLUTION PREP IODINE 4OZ